# Patient Record
Sex: FEMALE | Race: WHITE | Employment: OTHER | URBAN - METROPOLITAN AREA
[De-identification: names, ages, dates, MRNs, and addresses within clinical notes are randomized per-mention and may not be internally consistent; named-entity substitution may affect disease eponyms.]

---

## 2017-01-26 ENCOUNTER — OFFICE VISIT (OUTPATIENT)
Dept: INTERNAL MEDICINE | Facility: CLINIC | Age: 67
End: 2017-01-26
Payer: MEDICARE

## 2017-01-26 ENCOUNTER — LAB VISIT (OUTPATIENT)
Dept: LAB | Facility: HOSPITAL | Age: 67
End: 2017-01-26
Attending: FAMILY MEDICINE
Payer: MEDICARE

## 2017-01-26 VITALS
TEMPERATURE: 98 F | SYSTOLIC BLOOD PRESSURE: 172 MMHG | RESPIRATION RATE: 14 BRPM | BODY MASS INDEX: 28.75 KG/M2 | HEART RATE: 64 BPM | DIASTOLIC BLOOD PRESSURE: 98 MMHG | HEIGHT: 63 IN | WEIGHT: 162.25 LBS

## 2017-01-26 DIAGNOSIS — M54.41 CHRONIC BILATERAL LOW BACK PAIN WITH RIGHT-SIDED SCIATICA: ICD-10-CM

## 2017-01-26 DIAGNOSIS — I10 ESSENTIAL HYPERTENSION: ICD-10-CM

## 2017-01-26 DIAGNOSIS — G89.29 CHRONIC BILATERAL LOW BACK PAIN WITH RIGHT-SIDED SCIATICA: ICD-10-CM

## 2017-01-26 DIAGNOSIS — R10.9 ACUTE LEFT FLANK PAIN: ICD-10-CM

## 2017-01-26 DIAGNOSIS — R10.9 ACUTE LEFT FLANK PAIN: Primary | ICD-10-CM

## 2017-01-26 PROBLEM — K21.9 GASTROESOPHAGEAL REFLUX DISEASE WITHOUT ESOPHAGITIS: Status: ACTIVE | Noted: 2017-01-26

## 2017-01-26 PROBLEM — E78.5 HYPERLIPIDEMIA: Status: ACTIVE | Noted: 2017-01-26

## 2017-01-26 PROBLEM — M15.0 PRIMARY OSTEOARTHRITIS INVOLVING MULTIPLE JOINTS: Status: ACTIVE | Noted: 2017-01-26

## 2017-01-26 PROBLEM — M15.9 PRIMARY OSTEOARTHRITIS INVOLVING MULTIPLE JOINTS: Status: ACTIVE | Noted: 2017-01-26

## 2017-01-26 LAB
BILIRUB UR QL STRIP: NEGATIVE
CLARITY UR: CLEAR
COLOR UR: YELLOW
GLUCOSE UR QL STRIP: NEGATIVE
HGB UR QL STRIP: NEGATIVE
KETONES UR QL STRIP: NEGATIVE
LEUKOCYTE ESTERASE UR QL STRIP: ABNORMAL
MICROSCOPIC COMMENT: NORMAL
NITRITE UR QL STRIP: NEGATIVE
PH UR STRIP: 7 [PH] (ref 5–8)
PROT UR QL STRIP: NEGATIVE
SP GR UR STRIP: 1.01 (ref 1–1.03)
URN SPEC COLLECT METH UR: ABNORMAL
WBC #/AREA URNS HPF: 2 /HPF (ref 0–5)

## 2017-01-26 PROCEDURE — 99999 PR PBB SHADOW E&M-EST. PATIENT-LVL III: CPT | Mod: PBBFAC,,, | Performed by: FAMILY MEDICINE

## 2017-01-26 PROCEDURE — 99214 OFFICE O/P EST MOD 30 MIN: CPT | Mod: S$PBB,,, | Performed by: FAMILY MEDICINE

## 2017-01-26 PROCEDURE — 81000 URINALYSIS NONAUTO W/SCOPE: CPT | Mod: PO

## 2017-01-26 RX ORDER — CYCLOBENZAPRINE HCL 10 MG
10 TABLET ORAL 3 TIMES DAILY
Qty: 90 TABLET | Refills: 0 | Status: SHIPPED | OUTPATIENT
Start: 2017-01-26 | End: 2017-02-05

## 2017-01-26 NOTE — MR AVS SNAPSHOT
[facility]  [address]  [place] [address]  Phone: [phone]  Fax: [phone]                  Venus Caldwell   2017 11:20 AM   Office Visit    Description:  Female : 1950   Provider:  Sourav Hernandez MD   Department:  [facility]           Reason for Visit     Flank Pain           Diagnoses this Visit        Comments    Acute left flank pain    -  Primary     Essential hypertension         Chronic bilateral low back pain with right-sided sciatica                To Do List           Future Appointments        Provider Department Dept Phone    2017 12:40 PM SPE[facility]IMEN, [place] [facility] [phone]    3/2/2017 10:00 AM Sourav Hernandez MD [facility] [phone]      Goals (5 Years of Data)     None      Follow-Up and Disposition     Return in about 1 month (around 2017).    Follow-up and Disposition History       These Medications        Disp Refills Start End    cyclobenzaprine (FLEXERIL) 10 MG tablet 90 tablet 0 2017    Take 1 tablet (10 mg total) by mouth 3 (three) times daily. - Oral    Pharmacy: [id number] - [place] - [address] [address] #: [phone]         [facility]     [facility] -  Assistance  Registered nurses in the [facility] provide clinical advisement, health education, appointment booking, and other advisory services.  Call for this free service at [phone].             Medications           Message regarding Medications     Verify the changes and/or additions to your medication regime listed below are the same as discussed with your clinician today.  If any of these changes or additions are incorrect, please notify your healthcare provider.        START taking these NEW medications        Refills    cyclobenzaprine (FLEXERIL) 10 MG tablet 0    Sig: Take 1 tablet (10 mg total) by mouth 3 (three) times daily.  "   Class: Normal    Route: Oral           Verify that the below list of medications is an accurate representation of the medications you are currently taking.  If none reported, the list may be blank. If incorrect, please contact your healthcare provider. Carry this list with you in case of emergency.           Current Medications     atorvastatin (LIPITOR) 40 MG tablet     cetirizine (ZYRTEC) 10 MG tablet Take 1 tablet by mouth Daily.    fluticasone (FLONASE) 50 mcg/actuation nasal spray 2 sprays by Nasal route nightly as needed.    gabapentin (NEURONTIN) 300 MG capsule Take 300 mg by mouth 3 (three) times daily.    hydrochlorothiazide (HYDRODIURIL) 25 MG tablet Take 1 tablet by mouth once daily.    hydrocodone-acetaminophen 10-325mg (NORCO)  mg Tab     losartan (COZAAR) 100 MG tablet Take 100 mg by mouth once daily.    meloxicam (MOBIC) 7.5 MG tablet     metoprolol (TOPROL XL) 50 MG 24 hr tablet Take 1 tablet by mouth Daily.    omeprazole (PRILOSEC) 20 MG capsule Take 20 mg by mouth once daily.    tramadol (ULTRAM) 50 mg tablet Take 50 mg by mouth 2 (two) times daily.    aspirin 325 MG tablet Daily.    cyclobenzaprine (FLEXERIL) 10 MG tablet Take 1 tablet (10 mg total) by mouth 3 (three) times daily.           Clinical Reference Information           Vital Signs - Last Recorded  Most recent update: 1/26/2017 11:45 AM by Sourav Hernandez MD    BP Pulse Temp Resp Ht Wt    (!) 172/98 64 98.2 °F (36.8 °C) (Tympanic) 14 5' 3" (1.6 m) 73.6 kg (162 lb 4.1 oz)    BMI                28.74 kg/m2          Blood Pressure          Most Recent Value    BP  (!)  172/98      Allergies as of 1/26/2017     No Known Drug Allergies      Immunizations Administered on Date of Encounter - 1/26/2017     None      Orders Placed During Today's Visit     Future Labs/Procedures Expected by Expires    Urinalysis  1/26/2017 3/27/2018      [facility] Sign-Up     Activating your [facility] account is as easy as 1-2-3!     1) Visit " [email or web address], select Sign Up Now, enter this activation code and your date of birth, then select Next.  [email or web address]  Expires: 3/12/2017 11:55 AM      2) Create a username and password to use when you visit [facility] in the future and select a security question in case you lose your password and select Next.    3) Enter your e-mail address and click Sign Up!    Additional Information  If you have questions, please e-mail [email or web address] or call [phone] to talk to our [facility] staff. Remember, [facility] is NOT to be used for urgent needs. For medical emergencies, dial 911.

## 2017-01-26 NOTE — PROGRESS NOTES
Subjective:   Patient ID: Venus Caldwell is a 67 y.o. female.  Chief Complaint:  Flank Pain (left)    HPI Comments: Patient presents for evaluation of left flank pain.  Different from chronic back pain.  Her sciatica is usually on right side.  Sees Dr. Burger.  Last visit had increased back pain with muscle spasm.  Responded well to Flexeril.  Labs done showed a normal CBC and CMP.  Denies any history of kidney stones.  Denies any fever or chills or other UTI associated symptoms.    Flank Pain   This is a new problem. The current episode started in the past 7 days. The problem occurs constantly. The problem is unchanged. The pain is present in the costovertebral angle. The quality of the pain is described as cramping. The pain does not radiate. The pain is at a severity of 8/10. The pain is severe. The pain is the same all the time. The symptoms are aggravated by bending, lying down, position and twisting. Associated symptoms include abdominal pain (LLQ) and numbness (Right Side Sciatica). Pertinent negatives include no bladder incontinence, bowel incontinence, chest pain, dysuria, fever, headaches, leg pain, paresis, paresthesias, tingling or weakness. Risk factors include lack of exercise, obesity, poor posture and sedentary lifestyle. She has tried analgesics for the symptoms. The treatment provided moderate relief.     Review of Systems   Constitutional: Negative for chills, fatigue and fever.   Cardiovascular: Negative for chest pain, palpitations and leg swelling.   Gastrointestinal: Positive for abdominal pain (LLQ). Negative for bowel incontinence, constipation, diarrhea, nausea and vomiting.   Genitourinary: Positive for flank pain. Negative for bladder incontinence, decreased urine volume, difficulty urinating, dysuria, frequency, hematuria and urgency.   Musculoskeletal: Positive for back pain and myalgias. Negative for arthralgias, gait problem, joint swelling, neck pain and neck stiffness.   Skin: Negative  "for rash.   Neurological: Positive for numbness (Right Side Sciatica). Negative for dizziness, tingling, tremors, syncope, weakness, light-headedness, headaches and paresthesias.   Psychiatric/Behavioral: Negative for sleep disturbance. The patient is not nervous/anxious.      Objective:     Visit Vitals    BP (!) 190/100 (BP Location: Right arm, Patient Position: Sitting, BP Method: Manual)    Pulse 64    Temp 98.2 °F (36.8 °C) (Tympanic)    Resp 14    Ht 5' 3" (1.6 m)    Wt 73.6 kg (162 lb 4.1 oz)    BMI 28.74 kg/m2     Physical Exam   Constitutional: She is oriented to person, place, and time. She appears well-developed and well-nourished. No distress.   Uncomfortable from pain   HENT:   Head: Normocephalic and atraumatic.   Neck: Normal range of motion and full passive range of motion without pain. Neck supple. No thyromegaly present.   Cardiovascular: Normal rate, regular rhythm, S1 normal, S2 normal and normal heart sounds.  Exam reveals no gallop and no friction rub.    No murmur heard.  Pulmonary/Chest: Effort normal and breath sounds normal. She has no wheezes. She has no rhonchi. She has no rales.   Abdominal: Soft. She exhibits no distension. There is no hepatosplenomegaly. There is tenderness in the suprapubic area and left lower quadrant. There is no rebound, no guarding and no CVA tenderness.   Musculoskeletal: She exhibits no edema.        Lumbar back: She exhibits decreased range of motion, bony tenderness, pain and spasm. She exhibits no tenderness, no swelling, no edema, no deformity and no laceration.   Lymphadenopathy:        Right: No inguinal adenopathy present.        Left: No inguinal adenopathy present.   Neurological: She is oriented to person, place, and time. She has normal strength and normal reflexes. She displays a negative Romberg sign. Coordination and gait normal.   Skin: Skin is warm and dry. No rash noted.   Psychiatric: Her mood appears anxious.   Nursing note and vitals " reviewed.    Assessment:     1. Acute left flank pain    2. Essential hypertension    3. Chronic bilateral low back pain with right-sided sciatica      Plan:   Acute left flank pain/Chronic bilateral low back pain with right-sided sciatica  -     Urinalysis; Future; Expected date: 1/26/17  -     cyclobenzaprine (FLEXERIL) 10 MG tablet; Take 1 tablet (10 mg total) by mouth 3 (three) times daily.  Dispense: 90 tablet; Refill: 0  Questionable musculoskeletal or kidney related  Check UA.  If any white cells, treatment for infection.  If any red cells, CT abdomen pelvis stone protocol.  Add Flexeril to present chronic pain regimen.  If UA negative and Flexeril helped, follow-up Dr. Burger    Essential hypertension  Not controlled.  In pain.  Questionable compliance with medication.  Continue all present blood pressure medications.  Recheck in one month.    RTC 1 month

## 2017-02-16 ENCOUNTER — PATIENT OUTREACH (OUTPATIENT)
Dept: ADMINISTRATIVE | Facility: HOSPITAL | Age: 67
End: 2017-02-16

## 2017-02-16 NOTE — LETTER
February 16, 2017    Venus Caldwell  [address]  [place] [address]             [facility]  [address]  [facility] [address]  Phone: [phone] Dear Mrs. Caldwell:    [facility] is committed to your overall health.  To help you get the most out of each of your visits, we will review your information to make sure you are up to date on all of your recommended tests and/or procedures.      Sourav Hernandez MD has found that you may be due for   Health Maintenance Due   Topic    TETANUS VACCINE     Mammogram     DEXA SCAN     Zoster Vaccine     Pneumococcal (65+) (1 of 2 - PCV13)        If you have had any of the above done at another facility, please bring the records or information with you so that your record at [facility] will be complete.    If you are currently taking medication, please bring it with you to your appointment for review.    We will be happy to assist you with scheduling any necessary appointments or you may contact the [facility] appointment desk at [phone] to schedule at your convenience.     Thank you for choosing [facility] for your healthcare needs,    If you have any questions or concerns, please don't hesitate to call.    Sincerely,  Jerica BURLESON LPN Care Coordinator  [facility]

## 2017-03-02 ENCOUNTER — OFFICE VISIT (OUTPATIENT)
Dept: INTERNAL MEDICINE | Facility: CLINIC | Age: 67
End: 2017-03-02
Payer: MEDICARE

## 2017-03-02 VITALS
SYSTOLIC BLOOD PRESSURE: 136 MMHG | TEMPERATURE: 98 F | HEIGHT: 63 IN | BODY MASS INDEX: 29.1 KG/M2 | WEIGHT: 164.25 LBS | DIASTOLIC BLOOD PRESSURE: 82 MMHG

## 2017-03-02 DIAGNOSIS — Z12.31 ENCOUNTER FOR SCREENING MAMMOGRAM FOR BREAST CANCER: ICD-10-CM

## 2017-03-02 DIAGNOSIS — I10 ESSENTIAL HYPERTENSION: Primary | ICD-10-CM

## 2017-03-02 DIAGNOSIS — Z23 NEED FOR PNEUMOCOCCAL VACCINATION: ICD-10-CM

## 2017-03-02 DIAGNOSIS — Z78.0 ASYMPTOMATIC MENOPAUSAL STATE: ICD-10-CM

## 2017-03-02 DIAGNOSIS — B02.9 HERPES ZOSTER WITHOUT COMPLICATION: ICD-10-CM

## 2017-03-02 PROCEDURE — 99213 OFFICE O/P EST LOW 20 MIN: CPT | Mod: PBBFAC,PO | Performed by: FAMILY MEDICINE

## 2017-03-02 PROCEDURE — 99999 PR PBB SHADOW E&M-EST. PATIENT-LVL III: CPT | Mod: PBBFAC,,, | Performed by: FAMILY MEDICINE

## 2017-03-02 PROCEDURE — 90732 PPSV23 VACC 2 YRS+ SUBQ/IM: CPT | Mod: PBBFAC,PO | Performed by: FAMILY MEDICINE

## 2017-03-02 PROCEDURE — 99214 OFFICE O/P EST MOD 30 MIN: CPT | Mod: S$PBB,,, | Performed by: FAMILY MEDICINE

## 2017-03-02 RX ORDER — CYCLOBENZAPRINE HCL 10 MG
10 TABLET ORAL 3 TIMES DAILY PRN
COMMUNITY
End: 2017-07-24

## 2017-03-02 RX ORDER — ASPIRIN 81 MG/1
81 TABLET ORAL DAILY
COMMUNITY
End: 2017-11-03 | Stop reason: DRUGHIGH

## 2017-03-02 RX ORDER — BETAMETHASONE DIPROPIONATE 0.5 MG/G
1 LOTION TOPICAL 2 TIMES DAILY
COMMUNITY
End: 2017-07-24

## 2017-03-02 NOTE — PROGRESS NOTES
Subjective:   Patient ID: Venus Caldwell is a 67 y.o. female.  Chief Complaint:  Follow-up    HPI Comments: Patient presents for follow-up on hypertension.  Last visit neck pain, unclear etiology.  Blood pressure elevated.  Urine testing negative for infection or possible kidney stones.  Shortly after visit, broke out with rash over area of pain saw dermatology and diagnosed with shingles.  Treated with Valtrex.  No previous Zostavax.  Rash much better.  Pain significant improvement.  Reports blood pressures normal at home without pain.  Due mammogram and bone density.  Needs pneumonia, tetanus, and shingles vaccination.  No new complaints or concerns today.    Review of Systems   Respiratory: Negative for cough, chest tightness, shortness of breath and wheezing.    Cardiovascular: Negative for chest pain, palpitations and leg swelling.   Gastrointestinal: Negative for abdominal pain, constipation, diarrhea, nausea and vomiting.   Genitourinary: Negative for difficulty urinating.   Musculoskeletal: Positive for arthralgias, back pain, gait problem, joint swelling and myalgias.   Skin: Positive for rash.   Neurological: Negative for syncope, weakness and light-headedness.       Current Outpatient Prescriptions:     aspirin (ECOTRIN) 81 MG EC tablet, Take 81 mg by mouth once daily., Disp: , Rfl:     atorvastatin (LIPITOR) 40 MG tablet, Take 40 mg by mouth once daily. , Disp: , Rfl:     betamethasone dipropionate (DIPROLENE) 0.05 % lotion, Apply 1 application topically 2 (two) times daily., Disp: , Rfl:     cetirizine (ZYRTEC) 10 MG tablet, Take 1 tablet by mouth Daily., Disp: , Rfl:     cyclobenzaprine (FLEXERIL) 10 MG tablet, Take 10 mg by mouth 3 (three) times daily as needed for Muscle spasms., Disp: , Rfl:     fluticasone (FLONASE) 50 mcg/actuation nasal spray, 2 sprays by Nasal route nightly as needed., Disp: , Rfl:     gabapentin (NEURONTIN) 300 MG capsule, Take 300 mg by mouth 3 (three) times daily., Disp:  ", Rfl:     hydrocodone-acetaminophen 10-325mg (NORCO)  mg Tab, Take 1 tablet by mouth 4 (four) times daily. , Disp: , Rfl:     losartan (COZAAR) 100 MG tablet, Take 100 mg by mouth once daily., Disp: , Rfl:     meloxicam (MOBIC) 7.5 MG tablet, , Disp: , Rfl:     metoprolol (TOPROL XL) 50 MG 24 hr tablet, Take 1 tablet by mouth Daily., Disp: , Rfl:     omeprazole (PRILOSEC) 20 MG capsule, Take 20 mg by mouth once daily., Disp: , Rfl:     hydrochlorothiazide (HYDRODIURIL) 25 MG tablet, Take 1 tablet by mouth once daily., Disp: , Rfl:     Objective:   /82 (BP Location: Right arm, Patient Position: Sitting, BP Method: Manual)  Temp 98.1 °F (36.7 °C) (Tympanic)   Ht 5' 3" (1.6 m)  Wt 74.5 kg (164 lb 3.9 oz)  BMI 29.09 kg/m2  Physical Exam   Constitutional: Vital signs are normal. She appears well-developed and well-nourished. No distress.   Neck: No JVD present.   Cardiovascular: Normal rate, regular rhythm and normal heart sounds.  Exam reveals no gallop and no friction rub.    No murmur heard.  Pulmonary/Chest: Effort normal and breath sounds normal. She has no wheezes. She has no rhonchi. She has no rales.   Abdominal: Soft. She exhibits no distension. There is no tenderness.   Musculoskeletal: She exhibits no edema.   Skin: Skin is warm and dry. Rash (Healing shingles with no secondary infection.) noted. Rash is vesicular.   Psychiatric: She has a normal mood and affect.   Nursing note and vitals reviewed.    Assessment:     1. Essential hypertension    2. Herpes zoster without complication    3. Need for pneumococcal vaccination    4. Asymptomatic menopausal state    5. Encounter for screening mammogram for breast cancer      Plan:   Essential hypertension  BP controlled, continue present meds    Herpes zoster without complication  Healing well. Zostavax when rash resolves    RHM  -     Pneumococcal Polysaccharide Vaccine (23 Valent) (SQ/IM)  -     DXA Bone Density Spine And Hip; Future; " Expected date: 3/2/1  -     Mammo Digital Screening Bilat with CAD; Future; Expected date: 3/2/17  Tdap and Zostavax at pharmacy when rash resolved    RTC 6 months, sooner as needed

## 2017-03-02 NOTE — MR AVS SNAPSHOT
[facility]  [address] [place] [address]  Phone: [phone]  Fax: [phone]                  Venus Caldwell   3/2/2017 10:00 AM   Office Visit    Description:  Female : 1950   Provider:  Sourav Hernandez MD   Department:  [facility]           Reason for Visit     Follow-up           Diagnoses this Visit        Comments    Essential hypertension    -  Primary     Herpes zoster without complication         Need for pneumococcal vaccination         Asymptomatic menopausal state         Encounter for screening mammogram for breast cancer                To Do List           Future Appointments        Provider Department Dept Phone    3/16/2017 9:15 AM [facility] MAMMO1-SCR [facility] [phone]    3/16/2017 10:00 AM [facility] BMD1 [facility] [phone]    2017 11:00 AM Sourav Hernandez MD [facility] [phone]      Goals (5 Years of Data)     None      Follow-Up and Disposition     Return in about 6 months (around 2017).    Follow-up and Disposition History      [facility]     [facility] Care [facility] -  Assistance  Registered nurses in the [facility] Center provide clinical advisement, health education, appointment booking, and other advisory services.  Call for this free service at [phone].             Medications           Message regarding Medications     Verify the changes and/or additions to your medication regime listed below are the same as discussed with your clinician today.  If any of these changes or additions are incorrect, please notify your healthcare provider.        STOP taking these medications     aspirin 325 MG tablet Daily.    tramadol (ULTRAM) 50 mg tablet Take 50 mg by mouth 2 (two) times daily.           Verify that the below list of medications is an accurate representation of the medications you are currently taking.  If none reported, the list may be  blank. If incorrect, please contact your healthcare provider. Carry this list with you in case of emergency.           Current Medications     aspirin (ECOTRIN) 81 MG EC tablet Take 81 mg by mouth once daily.    atorvastatin (LIPITOR) 40 MG tablet Take 40 mg by mouth once daily.     betamethasone dipropionate (DIPROLENE) 0.05 % lotion Apply 1 application topically 2 (two) times daily.    cetirizine (ZYRTEC) 10 MG tablet Take 1 tablet by mouth Daily.    cyclobenzaprine (FLEXERIL) 10 MG tablet Take 10 mg by mouth 3 (three) times daily as needed for Muscle spasms.    fluticasone (FLONASE) 50 mcg/actuation nasal spray 2 sprays by Nasal route nightly as needed.    gabapentin (NEURONTIN) 300 MG capsule Take 300 mg by mouth 3 (three) times daily.    hydrocodone-acetaminophen 10-325mg (NORCO)  mg Tab Take 1 tablet by mouth 4 (four) times daily.     losartan (COZAAR) 100 MG tablet Take 100 mg by mouth once daily.    meloxicam (MOBIC) 7.5 MG tablet     metoprolol (TOPROL XL) 50 MG 24 hr tablet Take 1 tablet by mouth Daily.    omeprazole (PRILOSEC) 20 MG capsule Take 20 mg by mouth once daily.    hydrochlorothiazide (HYDRODIURIL) 25 MG tablet Take 1 tablet by mouth once daily.           Clinical Reference Information           Your Vitals Were     BP                   136/82 (BP Location: Right arm, Patient Position: Sitting, BP Method: Manual)           Blood Pressure          Most Recent Value    BP  136/82      Allergies as of 3/2/2017     No Known Drug Allergies      Immunizations Administered on Date of Encounter - 3/2/2017     Name Date Dose VIS Date Route    Pneumococcal Polysaccharide - 23 Valent 3/2/2017 0.5 mL 4/24/2015 Intramuscular      Orders Placed During Today's Visit      Normal Orders This Visit    Pneumococcal Polysaccharide Vaccine (23 Valent) (SQ/IM)     Future Labs/Procedures Expected by Expires    DXA Bone Density Spine And Hip  3/2/2017 3/2/2018    Mammo Digital Screening Bilat with CAD  3/2/2017  4/6/2018      [facility] Sign-Up     Activating your [facility] account is as easy as 1-2-3!     1) Visit [email or web address], select Sign Up Now, enter this activation code and your date of birth, then select Next.  [email or web address]  Expires: 3/12/2017 11:55 AM      2) Create a username and password to use when you visit [facility] in the future and select a security question in case you lose your password and select Next.    3) Enter your e-mail address and click Sign Up!    Additional Information  If you have questions, please e-mail [email or web address] or call [phone] to talk to our [facility] staff. Remember, [facility] is NOT to be used for urgent needs. For medical emergencies, dial 911.         Language Assistance Services     ATTENTION: Language assistance services are available, free of charge. Please call [phone].      ATENCIÓN: Si genarola mckenna, tiene a moore disposición servicios gratuitos de asistencia lingüística. Llame al [phone].     [facility] Ý: N?u b?n nói Ti?ng Vi?t, có các d?ch v? h? tr? ngôn ng? mi?n phí dành cho b?n. G?i s? [phone].         [facility] complies with applicable Federal civil rights laws and does not discriminate on the basis of race, color, national origin, age, disability, or sex.

## 2017-03-16 ENCOUNTER — HOSPITAL ENCOUNTER (OUTPATIENT)
Dept: RADIOLOGY | Facility: HOSPITAL | Age: 67
Discharge: HOME | End: 2017-03-16
Attending: FAMILY MEDICINE
Payer: MEDICARE

## 2017-03-16 DIAGNOSIS — Z12.31 ENCOUNTER FOR SCREENING MAMMOGRAM FOR BREAST CANCER: ICD-10-CM

## 2017-03-16 PROCEDURE — 77067 SCR MAMMO BI INCL CAD: CPT | Mod: TC

## 2017-03-16 PROCEDURE — 77067 SCR MAMMO BI INCL CAD: CPT | Mod: 26,,, | Performed by: RADIOLOGY

## 2017-03-22 ENCOUNTER — TELEPHONE (OUTPATIENT)
Dept: INTERNAL MEDICINE | Facility: CLINIC | Age: 67
End: 2017-03-22

## 2017-03-22 NOTE — TELEPHONE ENCOUNTER
----- Message from Sourav Hernandez MD sent at 3/19/2017  8:27 PM CDT -----  Mammogram results are normal. Repeat the test in 1-2 years.

## 2017-04-05 ENCOUNTER — TELEPHONE (OUTPATIENT)
Dept: INTERNAL MEDICINE | Facility: CLINIC | Age: 67
End: 2017-04-05

## 2017-04-05 NOTE — TELEPHONE ENCOUNTER
----- Message from Sourav Hernandez MD sent at 3/31/2017  2:16 PM CDT -----  Osteopenia.  Needs vitamin D checked at next blood draw.  No needs needed. Daily exercise.  Minimal alcohol.  No smoking.   Recheck in 3 years

## 2017-04-13 ENCOUNTER — OFFICE VISIT (OUTPATIENT)
Dept: INTERNAL MEDICINE | Facility: CLINIC | Age: 67
End: 2017-04-13
Payer: MEDICARE

## 2017-04-13 ENCOUNTER — LAB VISIT (OUTPATIENT)
Dept: LAB | Facility: HOSPITAL | Age: 67
End: 2017-04-13
Attending: INTERNAL MEDICINE
Payer: MEDICARE

## 2017-04-13 VITALS
HEART RATE: 84 BPM | BODY MASS INDEX: 28.56 KG/M2 | TEMPERATURE: 98 F | HEIGHT: 63 IN | SYSTOLIC BLOOD PRESSURE: 86 MMHG | DIASTOLIC BLOOD PRESSURE: 60 MMHG | OXYGEN SATURATION: 96 % | WEIGHT: 161.19 LBS

## 2017-04-13 DIAGNOSIS — I95.9 HYPOTENSION, UNSPECIFIED HYPOTENSION TYPE: ICD-10-CM

## 2017-04-13 DIAGNOSIS — R09.89 CHEST CONGESTION: ICD-10-CM

## 2017-04-13 DIAGNOSIS — R19.7 DIARRHEA, UNSPECIFIED TYPE: Primary | ICD-10-CM

## 2017-04-13 DIAGNOSIS — J06.9 ACUTE URI: ICD-10-CM

## 2017-04-13 DIAGNOSIS — R53.83 FATIGUE, UNSPECIFIED TYPE: ICD-10-CM

## 2017-04-13 DIAGNOSIS — R19.7 DIARRHEA, UNSPECIFIED TYPE: ICD-10-CM

## 2017-04-13 LAB
ALBUMIN SERPL BCP-MCNC: 3.3 G/DL
ALP SERPL-CCNC: 158 U/L
ALT SERPL W/O P-5'-P-CCNC: 55 U/L
ANION GAP SERPL CALC-SCNC: 9 MMOL/L
AST SERPL-CCNC: 26 U/L
BASOPHILS # BLD AUTO: 0.03 K/UL
BASOPHILS NFR BLD: 0.4 %
BILIRUB SERPL-MCNC: 0.7 MG/DL
BUN SERPL-MCNC: 16 MG/DL
CALCIUM SERPL-MCNC: 9.7 MG/DL
CHLORIDE SERPL-SCNC: 106 MMOL/L
CO2 SERPL-SCNC: 27 MMOL/L
CREAT SERPL-MCNC: 1 MG/DL
DIFFERENTIAL METHOD: ABNORMAL
EOSINOPHIL # BLD AUTO: 0.3 K/UL
EOSINOPHIL NFR BLD: 3.2 %
ERYTHROCYTE [DISTWIDTH] IN BLOOD BY AUTOMATED COUNT: 14.6 %
EST. GFR  (AFRICAN AMERICAN): >60 ML/MIN/1.73 M^2
EST. GFR  (NON AFRICAN AMERICAN): 58 ML/MIN/1.73 M^2
FLUAV AG SPEC QL IA: NEGATIVE
FLUBV AG SPEC QL IA: NEGATIVE
GLUCOSE SERPL-MCNC: 120 MG/DL
HCT VFR BLD AUTO: 40.5 %
HGB BLD-MCNC: 13.2 G/DL
LYMPHOCYTES # BLD AUTO: 1.5 K/UL
LYMPHOCYTES NFR BLD: 17.7 %
MCH RBC QN AUTO: 29 PG
MCHC RBC AUTO-ENTMCNC: 32.6 %
MCV RBC AUTO: 89 FL
MONOCYTES # BLD AUTO: 1 K/UL
MONOCYTES NFR BLD: 11.1 %
NEUTROPHILS # BLD AUTO: 5.8 K/UL
NEUTROPHILS NFR BLD: 67.6 %
PLATELET # BLD AUTO: 314 K/UL
PMV BLD AUTO: 9.3 FL
POTASSIUM SERPL-SCNC: 4.1 MMOL/L
PROT SERPL-MCNC: 7.5 G/DL
RBC # BLD AUTO: 4.55 M/UL
SODIUM SERPL-SCNC: 142 MMOL/L
SPECIMEN SOURCE: NORMAL
WBC # BLD AUTO: 8.55 K/UL

## 2017-04-13 PROCEDURE — 85025 COMPLETE CBC W/AUTO DIFF WBC: CPT | Mod: PO

## 2017-04-13 PROCEDURE — 99999 PR PBB SHADOW E&M-EST. PATIENT-LVL IV: CPT | Mod: PBBFAC,,, | Performed by: PHYSICIAN ASSISTANT

## 2017-04-13 PROCEDURE — 36415 COLL VENOUS BLD VENIPUNCTURE: CPT | Mod: PO

## 2017-04-13 PROCEDURE — 80053 COMPREHEN METABOLIC PANEL: CPT | Mod: PO

## 2017-04-13 PROCEDURE — 99214 OFFICE O/P EST MOD 30 MIN: CPT | Mod: S$PBB,,, | Performed by: PHYSICIAN ASSISTANT

## 2017-04-13 RX ORDER — VALSARTAN 320 MG/1
320 TABLET ORAL DAILY
COMMUNITY
Start: 2017-03-21 | End: 2018-05-10 | Stop reason: SDUPTHER

## 2017-04-13 NOTE — PROGRESS NOTES
Subjective:       Patient ID: Venus Caldwell is a 67 y.o. female.    Chief Complaint: Diarrhea    HPI Comments: 67 year old female c/o watery diarrhea X 3 days. PCP is Dr. Hernandez. She reports having 1-2 episodes of diarrhea daily. She reports feeling warm but has not had a fever. She reports abdominal pain at onset of sxs, but that has resolved. She reports feeling as though she may have some chest congestion because she has felt she has been breathing harder than usual lately. She reports sinus congestion, post-nasal drainage, and fatigue. She reports no N/V, urinary symptoms, cough, dizziness, rash, swelling, CP, exertional sxs, palpitations, blood in stool, recent foreign travel, raw meat intake, or other medical complaints.    Past Medical History:  No date: Cancer, uterine  No date: Hypertension  No date: Sciatica        Diarrhea    Associated symptoms include abdominal pain (resolved). Pertinent negatives include no chills, coughing, fever, headaches or vomiting.     Review of Systems   Constitutional: Negative for chills and fever.   HENT: Positive for congestion, rhinorrhea and sinus pressure. Negative for ear pain.    Respiratory: Positive for shortness of breath (?). Negative for cough.    Gastrointestinal: Positive for abdominal pain (resolved) and diarrhea. Negative for blood in stool, nausea and vomiting.   Genitourinary: Negative for dysuria, frequency and urgency.   Skin: Negative for rash.   Neurological: Negative for dizziness, syncope, weakness, numbness and headaches.   Psychiatric/Behavioral: Negative for confusion.       Objective:      Physical Exam   Constitutional: She is oriented to person, place, and time. She appears well-developed and well-nourished. No distress.   HENT:   Head: Normocephalic and atraumatic.   Right Ear: Tympanic membrane and ear canal normal.   Left Ear: Tympanic membrane and ear canal normal.   Mouth/Throat: No oropharyngeal exudate.   Tongue slightly dry   Eyes: EOM  are normal. No scleral icterus.   Neck: Neck supple.   Cardiovascular: Normal rate and regular rhythm.    Pulmonary/Chest: Effort normal and breath sounds normal. No respiratory distress. She has no decreased breath sounds. She has no wheezes. She has no rhonchi. She has no rales.   Abdominal: Soft. Bowel sounds are increased. There is no tenderness. There is no rigidity, no rebound, no guarding and no CVA tenderness.   Musculoskeletal: Normal range of motion. She exhibits no edema.   Lymphadenopathy:     She has no cervical adenopathy.   Neurological: She is alert and oriented to person, place, and time. No cranial nerve deficit.   Skin: Skin is warm and dry. No rash noted.   Psychiatric: She has a normal mood and affect. Her speech is normal and behavior is normal. Thought content normal.       Assessment:       1. Diarrhea, unspecified type    2. Hypotension, unspecified hypotension type    3. Chest congestion    4. Acute URI    5. Fatigue, unspecified type        Plan:         1. Pt is hypotensive today at 86/60. She reports she just took her BP medications with a sip of water this AM. She reports feeling fatigued. Recommend she go directly to the ER at this time but she refuses.   2. CBC, CMP, abd xrays, CXR, and rapid flu test today with review following.  3. [place] diet. Fluids and rest. Monitor BP and sxs. Do not take BP medications if hypotensive. ER if sxs or BP at all worsen.  4. F/u with PCP if sxs persist or worsen. F/u with PCP for health management.    Addendum: Pt apparently had weakness after her blood draw today in clinic. Rapid response was called, which I was not a part of. She was sent to the ER ( driving her) at that time. Rapid response team then informed me that they were unable to assess pt / vitals due to her being in the restroom with uncontrollable diarrhea during rapid response. Rapid response team recommended I sign their paper showing pt was in restroom at that time with diarrhea  and ER was recommended. Paper signed.

## 2017-04-13 NOTE — MR AVS SNAPSHOT
[facility]  [address] [place] [address]  Phone: [phone]  Fax: [phone]                  Venus Caldwell   2017 1:00 PM   Office Visit    Description:  Female : 1950   Provider:  LAUREN Weaver   Department:  [facility]           Diagnoses this Visit        Comments    Diarrhea, unspecified type    -  Primary     Hypotension, unspecified hypotension type         Chest congestion         Acute URI         Fatigue, unspecified type                To Do List           Future Appointments        Provider Department Dept Phone    2017 12:50 PM [facility], [facility] [phone]    2017 1:00 PM LAUREN Weaver [facility] [phone]    2017 1:30 PM [facility] XR2 [facility] [phone]    2017 11:00 AM Sourav Hernandez MD [facility] [phone]      Goals (5 Years of Data)     None      [facility]     [facility] Nurse Care Line -  Assistance  Unless otherwise directed by your provider, please contact [facility], our nurse care line that is available for  assistance.     Registered nurses in the [facility] provide: appointment scheduling, clinical advisement, health education, and other advisory services.  Call: [phone] (toll free)               Medications           Message regarding Medications     Verify the changes and/or additions to your medication regime listed below are the same as discussed with your clinician today.  If any of these changes or additions are incorrect, please notify your healthcare provider.        STOP taking these medications     losartan (COZAAR) 100 MG tablet Take 100 mg by mouth once daily.           Verify that the below list of medications is an accurate representation of the medications you are currently taking.  If none reported, the list may be blank. If incorrect, please contact your  "healthcare provider. Carry this list with you in case of emergency.           Current Medications     atorvastatin (LIPITOR) 40 MG tablet Take 40 mg by mouth once daily.     betamethasone dipropionate (DIPROLENE) 0.05 % lotion Apply 1 application topically 2 (two) times daily.    cyclobenzaprine (FLEXERIL) 10 MG tablet Take 10 mg by mouth 3 (three) times daily as needed for Muscle spasms.    fluticasone (FLONASE) 50 mcg/actuation nasal spray 2 sprays by Nasal route nightly as needed.    gabapentin (NEURONTIN) 300 MG capsule Take 300 mg by mouth 3 (three) times daily.    hydrochlorothiazide (HYDRODIURIL) 25 MG tablet Take 1 tablet by mouth once daily.    hydrocodone-acetaminophen 10-325mg (NORCO)  mg Tab Take 1 tablet by mouth 4 (four) times daily.     meloxicam (MOBIC) 7.5 MG tablet Take 7.5 mg by mouth every evening.     metoprolol (TOPROL XL) 50 MG 24 hr tablet Take 1 tablet by mouth Daily.    omeprazole (PRILOSEC) 20 MG capsule Take 20 mg by mouth once daily.    valsartan (DIOVAN) 320 MG tablet     aspirin (ECOTRIN) 81 MG EC tablet Take 81 mg by mouth once daily.    cetirizine (ZYRTEC) 10 MG tablet Take 1 tablet by mouth Daily.           Clinical Reference Information           Your Vitals Were     BP Pulse Temp Height    86/60 (BP Location: Right arm, Patient Position: Sitting, BP Method: Automatic) 84 97.6 °F (36.4 °C) (Tympanic) 5' 3" (1.6 m)    Weight SpO2 BMI    73.1 kg (161 lb 2.5 oz) 96% 28.55 kg/m2      Blood Pressure          Most Recent Value    BP  (!)  86/60      Allergies as of 4/13/2017     No Known Drug Allergies      Immunizations Administered on Date of Encounter - 4/13/2017     None      Orders Placed During Today's Visit      Normal Orders This Visit    Influenza antigen Nasopharyngeal Swab     Future Labs/Procedures Expected by Expires    CBC auto differential  4/13/2017 6/12/2018    Comprehensive metabolic panel  4/13/2017 6/12/2018    X-Ray Abdomen Flat And Erect  4/13/2017 4/13/2018 "    X-Ray Chest PA And Lateral  4/13/2017 4/13/2018      [facility] Sign-Up     Activating your [facility] account is as easy as 1-2-3!     1) Visit [email or web address], select Sign Up Now, enter this activation code and your date of birth, then select Next.  [email or web address]  Expires: 5/28/2017 10:59 AM      2) Create a username and password to use when you visit [facility] in the future and select a security question in case you lose your password and select Next.    3) Enter your e-mail address and click Sign Up!    Additional Information  If you have questions, please e-mail [email or web address] or call [phone] to talk to our [facility] staff. Remember, [facility] is NOT to be used for urgent needs. For medical emergencies, dial 911.         Language Assistance Services     ATTENTION: Language assistance services are available, free of charge. Please call [phone].      ATENCIÓN: Si habla español, tiene a moore disposición servicios gratuitos de asistencia lingüística. Llame al [phone].     CHÚ Ý: N?u b?n nói Ti?ng Vi?t, có các d?ch v? h? tr? ngôn ng? mi?n phí dành cho b?n. G?i s? [phone].         [facility] complies with applicable Federal civil rights laws and does not discriminate on the basis of race, color, national origin, age, disability, or sex.

## 2017-04-17 ENCOUNTER — TELEPHONE (OUTPATIENT)
Dept: INTERNAL MEDICINE | Facility: CLINIC | Age: 67
End: 2017-04-17

## 2017-04-17 NOTE — TELEPHONE ENCOUNTER
----- Message from LAUREN Weaver sent at 4/13/2017  1:33 PM CDT -----  Liver enzymes slightly elevated - possibly from current stomach upset. Pt went to ER for eval.

## 2017-07-24 ENCOUNTER — OFFICE VISIT (OUTPATIENT)
Dept: INTERNAL MEDICINE | Facility: CLINIC | Age: 67
End: 2017-07-24
Payer: MEDICARE

## 2017-07-24 VITALS
SYSTOLIC BLOOD PRESSURE: 108 MMHG | OXYGEN SATURATION: 98 % | HEIGHT: 63 IN | DIASTOLIC BLOOD PRESSURE: 66 MMHG | HEART RATE: 64 BPM | BODY MASS INDEX: 29.46 KG/M2 | WEIGHT: 166.25 LBS | TEMPERATURE: 98 F

## 2017-07-24 DIAGNOSIS — I10 ESSENTIAL HYPERTENSION: Chronic | ICD-10-CM

## 2017-07-24 DIAGNOSIS — M15.9 PRIMARY OSTEOARTHRITIS INVOLVING MULTIPLE JOINTS: Primary | ICD-10-CM

## 2017-07-24 DIAGNOSIS — K21.9 GASTROESOPHAGEAL REFLUX DISEASE WITHOUT ESOPHAGITIS: ICD-10-CM

## 2017-07-24 DIAGNOSIS — Z01.818 PREOP EXAMINATION: ICD-10-CM

## 2017-07-24 DIAGNOSIS — J30.89 CHRONIC NON-SEASONAL ALLERGIC RHINITIS, UNSPECIFIED TRIGGER: ICD-10-CM

## 2017-07-24 DIAGNOSIS — E78.5 HYPERLIPIDEMIA, UNSPECIFIED HYPERLIPIDEMIA TYPE: ICD-10-CM

## 2017-07-24 PROCEDURE — 99999 PR PBB SHADOW E&M-EST. PATIENT-LVL III: CPT | Mod: PBBFAC,,, | Performed by: FAMILY MEDICINE

## 2017-07-24 PROCEDURE — 99213 OFFICE O/P EST LOW 20 MIN: CPT | Mod: PBBFAC,PO | Performed by: FAMILY MEDICINE

## 2017-07-24 PROCEDURE — 1159F MED LIST DOCD IN RCRD: CPT | Mod: ,,, | Performed by: FAMILY MEDICINE

## 2017-07-24 PROCEDURE — 99214 OFFICE O/P EST MOD 30 MIN: CPT | Mod: S$PBB,,, | Performed by: FAMILY MEDICINE

## 2017-07-24 PROCEDURE — 1125F AMNT PAIN NOTED PAIN PRSNT: CPT | Mod: ,,, | Performed by: FAMILY MEDICINE

## 2017-07-24 RX ORDER — ACETAMINOPHEN 500 MG
1 TABLET ORAL
COMMUNITY
End: 2018-02-05 | Stop reason: SDUPTHER

## 2017-07-24 NOTE — PROGRESS NOTES
Subjective:   Patient ID: Venus Caldwell is a 67 y.o. female.  Chief Complaint:  Pre-op Exam      Presents for preoperative evaluation.  Planned left total knee replacement.  August 10, 2017.  Dr. Chen.  Medical history osteoarthritis multiple joints, hypertension, hyperlipidemia, GERD.  Surgical history hysterectomy, cholecystectomy, exploratory laparotomy, total knee replacement right.  NO KNOWN DRUG ALLERGIES.  No tobacco, alcohol , or ilicit drug use.  Negative cardiac review of symptoms  No significant pulmonary disease.      Review of Systems   Constitutional: Negative for chills, fatigue and fever.   HENT: Positive for congestion and rhinorrhea. Negative for dental problem, ear pain, nosebleeds, postnasal drip, sinus pressure and sore throat.    Respiratory: Negative for cough, chest tightness, shortness of breath and wheezing.    Cardiovascular: Negative for chest pain, palpitations and leg swelling.   Gastrointestinal: Negative for abdominal distention, abdominal pain, constipation, diarrhea, nausea and vomiting.   Genitourinary: Negative for difficulty urinating.   Musculoskeletal: Positive for arthralgias, back pain, gait problem and myalgias. Negative for joint swelling.   Skin: Negative for rash.   Neurological: Negative for syncope, weakness, light-headedness and headaches.   Psychiatric/Behavioral: The patient is not nervous/anxious.        Current Outpatient Prescriptions:     aspirin (ECOTRIN) 81 MG EC tablet, Take 81 mg by mouth once daily., Disp: , Rfl:     atorvastatin (LIPITOR) 40 MG tablet, Take 40 mg by mouth once daily. , Disp: , Rfl:     cetirizine (ZYRTEC) 10 MG tablet, Take 1 tablet by mouth Daily., Disp: , Rfl:     cholecalciferol, vitamin D3, 5,000 unit Tab, Take 1 tablet by mouth., Disp: , Rfl:     fluticasone (FLONASE) 50 mcg/actuation nasal spray, 2 sprays by Nasal route nightly as needed., Disp: , Rfl:     gabapentin (NEURONTIN) 300 MG capsule, Take 300 mg by mouth 3 (three)  "times daily., Disp: , Rfl:     hydrocodone-acetaminophen 10-325mg (NORCO)  mg Tab, Take 1 tablet by mouth 4 (four) times daily. , Disp: , Rfl:     meloxicam (MOBIC) 7.5 MG tablet, Take 7.5 mg by mouth every evening. , Disp: , Rfl:     metoprolol (TOPROL XL) 50 MG 24 hr tablet, Take 1 tablet by mouth Daily., Disp: , Rfl:     omeprazole (PRILOSEC) 20 MG capsule, Take 20 mg by mouth once daily., Disp: , Rfl:     valsartan (DIOVAN) 320 MG tablet, , Disp: , Rfl:     Objective:   /66 (BP Location: Right arm, Patient Position: Sitting, BP Method: Manual)   Pulse 64   Temp 97.5 °F (36.4 °C) (Tympanic)   Ht 5' 3" (1.6 m)   Wt 75.4 kg (166 lb 3.6 oz)   SpO2 98%   BMI 29.45 kg/m²     Physical Exam   Constitutional: Vital signs are normal. She appears well-developed and well-nourished. No distress.   Eyes: No scleral icterus.   Neck: No JVD present. Carotid bruit is not present.   Cardiovascular: Normal rate, regular rhythm and normal heart sounds.  Exam reveals no gallop and no friction rub.    No murmur heard.  Pulmonary/Chest: Effort normal and breath sounds normal. She has no wheezes. She has no rhonchi. She has no rales.   Abdominal: Soft. She exhibits no distension. There is no hepatosplenomegaly. There is no tenderness. There is no rebound and no guarding.   Musculoskeletal: She exhibits no edema.   Skin: Skin is warm and dry. No rash noted.   Psychiatric: She has a normal mood and affect.   Nursing note and vitals reviewed.    CBC with white count 8.8, hemoglobin and hematocrit 11.6/34.7, platelets 358.  CMP with glucose 89, creatinine 0.60, AST/ALT normal.  MRSA testing negative.  Urinalysis with positive leukocyte esterase, only 5-10 white blood cells per high per field.  Concentrated 1.0-3.  No bacteria noted.  3-5 epithelial cells to likely contaminant.    EKG sinus bradycardia at 56 bpm with nonspecific T-wave and malady.  No acute changes.    Chest x-ray with bibasilar pulmonary discoid " atelectasis versus scarring right side greater than left.  No acute port a consolidation, edema, or pleural effusion evident.  Cholecystectomy clips right upper quadrant.  Assessment:     1. Primary osteoarthritis involving multiple joints    2. Preop examination    3. Essential hypertension    4. Hyperlipidemia, unspecified hyperlipidemia type    5. Gastroesophageal reflux disease without esophagitis    6. Chronic non-seasonal allergic rhinitis, unspecified trigger      Plan:   Primary osteoarthritis involving multiple joints  Preop examination  Medically stable.  Acceptable risk for general anesthesia.  Form completed.  Meloxicam on hold prior to surgery.    Essential hypertension  Controlled.  BP at goal.  Continue valsartan and Toprol-XL.    Hyperlipidemia, unspecified hyperlipidemia type  Continue Lipitor 40 mg daily.  Aspirin on hold prior to surgery.    Gastroesophageal reflux disease without esophagitis  Stable.  Symptoms controlled on intermittent PPI use.    Chronic non-seasonal allergic rhinitis, unspecified trigger  Controlled on Flonase and Zyrtec.    Return to clinic 6 months or sooner as needed.

## 2017-11-03 ENCOUNTER — OFFICE VISIT (OUTPATIENT)
Dept: INTERNAL MEDICINE | Facility: CLINIC | Age: 67
End: 2017-11-03
Payer: MEDICARE

## 2017-11-03 VITALS
HEART RATE: 77 BPM | OXYGEN SATURATION: 96 % | TEMPERATURE: 98 F | DIASTOLIC BLOOD PRESSURE: 56 MMHG | BODY MASS INDEX: 28.79 KG/M2 | WEIGHT: 162.5 LBS | SYSTOLIC BLOOD PRESSURE: 102 MMHG | HEIGHT: 63 IN

## 2017-11-03 DIAGNOSIS — R19.7 DIARRHEA, UNSPECIFIED TYPE: Primary | ICD-10-CM

## 2017-11-03 PROCEDURE — G0008 ADMIN INFLUENZA VIRUS VAC: HCPCS | Mod: PBBFAC,PO

## 2017-11-03 PROCEDURE — 99213 OFFICE O/P EST LOW 20 MIN: CPT | Mod: PBBFAC,PO | Performed by: INTERNAL MEDICINE

## 2017-11-03 PROCEDURE — 99999 PR PBB SHADOW E&M-EST. PATIENT-LVL III: CPT | Mod: PBBFAC,,, | Performed by: INTERNAL MEDICINE

## 2017-11-03 PROCEDURE — 99213 OFFICE O/P EST LOW 20 MIN: CPT | Mod: 25,S$PBB,, | Performed by: INTERNAL MEDICINE

## 2017-11-03 RX ORDER — DIAZEPAM 5 MG/1
5 TABLET ORAL
COMMUNITY
Start: 2017-09-01 | End: 2017-12-27

## 2017-11-03 RX ORDER — ASPIRIN 325 MG
325 TABLET ORAL
COMMUNITY
Start: 2017-08-11 | End: 2018-08-11

## 2017-11-03 RX ORDER — DIPHENOXYLATE HYDROCHLORIDE AND ATROPINE SULFATE 2.5; .025 MG/1; MG/1
1 TABLET ORAL 4 TIMES DAILY PRN
Qty: 30 TABLET | Refills: 0 | Status: CANCELLED | OUTPATIENT
Start: 2017-11-03

## 2017-11-03 NOTE — PROGRESS NOTES
"Subjective:      Patient ID: Venus Caldwell is a 67 y.o. female.    Chief Complaint: Diarrhea (x 4 days)    66 yo with Patient Active Problem List:     Essential hypertension     Hyperlipidemia     Gastroesophageal reflux disease without esophagitis     Primary osteoarthritis involving multiple joints     Chronic non-seasonal allergic rhinitis    Here today c/o diarrhea.      Diarrhea    This is a new problem. Episode onset: 4 days. Episode frequency: 4 times. The problem has been gradually improving. Diarrhea characteristics: mostly loose. some watery. Associated symptoms include abdominal pain (prior to bm). Pertinent negatives include no chills, coughing or fever. Exacerbated by: eating. There are no known risk factors. Treatments tried: pepto, immodium( no immodium in 2 days) The treatment provided moderate relief. There is no history of inflammatory bowel disease, irritable bowel syndrome or a recent abdominal surgery.     Review of Systems   Constitutional: Negative for chills and fever.   Respiratory: Negative for cough.    Gastrointestinal: Positive for abdominal pain (prior to bm) and diarrhea.     Objective:   BP (!) 102/56 (BP Location: Right arm, Patient Position: Sitting)   Pulse 77   Temp 98.4 °F (36.9 °C) (Oral)   Ht 5' 3" (1.6 m)   Wt 73.7 kg (162 lb 7.7 oz)   SpO2 96%   BMI 28.78 kg/m²     Physical Exam   Constitutional: She appears well-developed and well-nourished. No distress.   Cardiovascular: Normal rate and regular rhythm.    Pulmonary/Chest: Effort normal.   Abdominal: Soft. Bowel sounds are normal. She exhibits no distension and no mass. There is no tenderness. There is no rebound and no guarding.   Musculoskeletal: She exhibits no edema.   Neurological: She is alert.   Skin: Skin is warm and dry.   Psychiatric: She has a normal mood and affect. Her behavior is normal.       Assessment:     1. Diarrhea, unspecified type      Plan:   Diarrhea, unspecified " type  improving  Comments:  immodium over counter per instructions on package  stressed hydration  rtc or see gi if no resolution    Other orders  --     Influenza - High Dose (65+) (PF) (IM)        Lab Frequency Next Occurrence   X-Ray Abdomen Flat And Erect Once 04/13/2017   X-Ray Chest PA And Lateral Once 04/13/2017       Problem List Items Addressed This Visit     None      Visit Diagnoses     Diarrhea, unspecified type    -  Primary    immodium over counter per instructions on package  stress hydration          Return if symptoms worsen or fail to improve.

## 2017-12-27 ENCOUNTER — TELEPHONE (OUTPATIENT)
Dept: INTERNAL MEDICINE | Facility: CLINIC | Age: 67
End: 2017-12-27

## 2017-12-27 ENCOUNTER — OFFICE VISIT (OUTPATIENT)
Dept: INTERNAL MEDICINE | Facility: CLINIC | Age: 67
End: 2017-12-27
Payer: MEDICARE

## 2017-12-27 VITALS
HEART RATE: 73 BPM | HEIGHT: 63 IN | BODY MASS INDEX: 28.95 KG/M2 | WEIGHT: 163.38 LBS | OXYGEN SATURATION: 98 % | SYSTOLIC BLOOD PRESSURE: 126 MMHG | TEMPERATURE: 98 F | DIASTOLIC BLOOD PRESSURE: 76 MMHG

## 2017-12-27 DIAGNOSIS — Z20.828 EXPOSURE TO THE FLU: ICD-10-CM

## 2017-12-27 DIAGNOSIS — J20.0 ACUTE BRONCHITIS DUE TO MYCOPLASMA PNEUMONIAE: Primary | ICD-10-CM

## 2017-12-27 LAB
FLUAV AG SPEC QL IA: NEGATIVE
FLUBV AG SPEC QL IA: NEGATIVE
SPECIMEN SOURCE: NORMAL

## 2017-12-27 PROCEDURE — 99213 OFFICE O/P EST LOW 20 MIN: CPT | Mod: PBBFAC,PO | Performed by: FAMILY MEDICINE

## 2017-12-27 PROCEDURE — 99999 PR PBB SHADOW E&M-EST. PATIENT-LVL III: CPT | Mod: PBBFAC,,, | Performed by: FAMILY MEDICINE

## 2017-12-27 PROCEDURE — 87400 INFLUENZA A/B EACH AG IA: CPT | Mod: 59,PO

## 2017-12-27 PROCEDURE — 99214 OFFICE O/P EST MOD 30 MIN: CPT | Mod: S$PBB,,, | Performed by: FAMILY MEDICINE

## 2017-12-27 RX ORDER — PROMETHAZINE HYDROCHLORIDE AND DEXTROMETHORPHAN HYDROBROMIDE 6.25; 15 MG/5ML; MG/5ML
5 SYRUP ORAL EVERY 6 HOURS PRN
Qty: 180 ML | Refills: 0 | Status: SHIPPED | OUTPATIENT
Start: 2017-12-27 | End: 2017-12-27

## 2017-12-27 RX ORDER — AZITHROMYCIN 250 MG/1
TABLET, FILM COATED ORAL
Qty: 6 TABLET | Refills: 0 | Status: SHIPPED | OUTPATIENT
Start: 2017-12-27 | End: 2018-01-01

## 2017-12-27 RX ORDER — PROMETHAZINE HYDROCHLORIDE AND DEXTROMETHORPHAN HYDROBROMIDE 6.25; 15 MG/5ML; MG/5ML
5 SYRUP ORAL EVERY 6 HOURS PRN
Qty: 180 ML | Refills: 0 | Status: SHIPPED | OUTPATIENT
Start: 2017-12-27 | End: 2018-01-06

## 2017-12-27 NOTE — TELEPHONE ENCOUNTER
----- Message from Sourav Hernandez MD sent at 12/27/2017  9:52 AM CST -----  Flu test negative.  Sent prescription for antibiotic to pharmacy.

## 2017-12-27 NOTE — PROGRESS NOTES
Subjective:   Patient ID: Venus Caldwell is a 67 y.o. female.  Chief Complaint:  Cough; Fever; and Fatigue      Presents for evaluation of URI symptoms.  One week duration.  Slowly improving.  Main complaint is persistent cough.  Had flu vaccine this season.  Positive flu exposure.   admitted to hospital with pneumonia/flu.  Unknown strain.      Influenza   This is a new problem. The current episode started in the past 7 days. The problem occurs constantly. The problem has been gradually improving. Associated symptoms include chills, congestion, coughing, fatigue, a fever, headaches, myalgias and a sore throat. Pertinent negatives include no abdominal pain, anorexia, arthralgias, change in bowel habit, chest pain, diaphoresis, joint swelling, nausea, neck pain, numbness, rash, swollen glands, urinary symptoms, vertigo, visual change, vomiting or weakness. Nothing aggravates the symptoms. She has tried NSAIDs, acetaminophen and rest (Phenergan DM) for the symptoms. The treatment provided moderate relief.     Review of Systems   Constitutional: Positive for chills, fatigue and fever. Negative for diaphoresis.   HENT: Positive for congestion, rhinorrhea and sore throat. Negative for ear discharge, ear pain, postnasal drip, sinus pain, sinus pressure, sneezing and trouble swallowing.    Eyes: Negative for visual disturbance.   Respiratory: Positive for cough. Negative for chest tightness, shortness of breath and wheezing.    Cardiovascular: Negative for chest pain.   Gastrointestinal: Negative for abdominal pain, anorexia, change in bowel habit, nausea and vomiting.   Genitourinary: Negative for difficulty urinating.   Musculoskeletal: Positive for myalgias. Negative for arthralgias, joint swelling, neck pain and neck stiffness.   Skin: Negative for rash.   Neurological: Positive for headaches. Negative for dizziness, vertigo, weakness and numbness.     Objective:   /76 (BP Location: Right arm, Patient  "Position: Sitting, BP Method: Large (Manual))   Pulse 73   Temp 98.3 °F (36.8 °C) (Tympanic)   Ht 5' 3" (1.6 m)   Wt 74.1 kg (163 lb 5.8 oz)   SpO2 98%   BMI 28.94 kg/m²     Physical Exam   Constitutional: She appears well-developed and well-nourished.  Non-toxic appearance. She does not have a sickly appearance. She appears ill. No distress.   HENT:   Right Ear: Hearing, tympanic membrane, external ear and ear canal normal.   Left Ear: Hearing, tympanic membrane, external ear and ear canal normal.   Nose: Mucosal edema and rhinorrhea present. Right sinus exhibits no maxillary sinus tenderness and no frontal sinus tenderness. Left sinus exhibits no maxillary sinus tenderness and no frontal sinus tenderness.   Mouth/Throat: Uvula is midline and mucous membranes are normal. Posterior oropharyngeal edema and posterior oropharyngeal erythema present. No oropharyngeal exudate.   Eyes: Right conjunctiva is not injected. Left conjunctiva is not injected.   Neck: Full passive range of motion without pain.   Cardiovascular: Normal rate, regular rhythm, S1 normal, S2 normal and normal heart sounds.    Pulmonary/Chest: Effort normal. She has no decreased breath sounds. She has no wheezes. She has rhonchi. She has no rales.   Abdominal: Soft. She exhibits no distension. There is no tenderness. There is no rebound, no guarding and no CVA tenderness.   Lymphadenopathy:     She has no cervical adenopathy.   Skin: No rash noted.   Psychiatric: She has a normal mood and affect.   Nursing note and vitals reviewed.    Assessment:     1. Acute bronchitis due to Mycoplasma pneumoniae    2. Exposure to the flu      Plan:   Acute bronchitis due to Mycoplasma pneumoniae  -     promethazine-dextromethorphan (PROMETHAZINE-DM) 6.25-15 mg/5 mL Syrp; Take 5 mLs by mouth every 6 (six) hours as needed (cough).  Dispense: 180 mL; Refill: 0  -     azithromycin (Z-LARON) 250 MG tablet; Take 2 tablets by mouth on day 1; Take 1 tablet by mouth on " days 2-5  Dispense: 6 tablet; Refill: 0  Take medicine as prescribed for cough   Rest, Increase Fluids  Take Tylenol or Motrin as needed for fever  Macrolide for exposure to recent atypical community acquired pneumonia.    Exposure to the flu  -     Influenza antigen Nasopharyngeal Swab  Flu test negative.    Return to clinic as scheduled.

## 2018-02-05 ENCOUNTER — OFFICE VISIT (OUTPATIENT)
Dept: INTERNAL MEDICINE | Facility: CLINIC | Age: 68
End: 2018-02-05
Payer: MEDICARE

## 2018-02-05 VITALS
SYSTOLIC BLOOD PRESSURE: 120 MMHG | DIASTOLIC BLOOD PRESSURE: 62 MMHG | HEIGHT: 63 IN | WEIGHT: 164 LBS | TEMPERATURE: 99 F | BODY MASS INDEX: 29.06 KG/M2 | HEART RATE: 60 BPM | OXYGEN SATURATION: 98 %

## 2018-02-05 DIAGNOSIS — M16.11 PRIMARY OSTEOARTHRITIS OF RIGHT HIP: Primary | ICD-10-CM

## 2018-02-05 DIAGNOSIS — I25.10 CORONARY ARTERY DISEASE INVOLVING NATIVE CORONARY ARTERY OF NATIVE HEART WITHOUT ANGINA PECTORIS: ICD-10-CM

## 2018-02-05 DIAGNOSIS — E78.5 HYPERLIPIDEMIA, UNSPECIFIED HYPERLIPIDEMIA TYPE: ICD-10-CM

## 2018-02-05 DIAGNOSIS — I10 ESSENTIAL HYPERTENSION: Chronic | ICD-10-CM

## 2018-02-05 DIAGNOSIS — Z01.818 PREOP EXAMINATION: ICD-10-CM

## 2018-02-05 DIAGNOSIS — I47.19 PAROXYSMAL ATRIAL TACHYCARDIA: ICD-10-CM

## 2018-02-05 DIAGNOSIS — J30.89 CHRONIC NON-SEASONAL ALLERGIC RHINITIS, UNSPECIFIED TRIGGER: ICD-10-CM

## 2018-02-05 DIAGNOSIS — M15.9 PRIMARY OSTEOARTHRITIS INVOLVING MULTIPLE JOINTS: ICD-10-CM

## 2018-02-05 DIAGNOSIS — K21.9 GASTROESOPHAGEAL REFLUX DISEASE WITHOUT ESOPHAGITIS: ICD-10-CM

## 2018-02-05 PROCEDURE — 99214 OFFICE O/P EST MOD 30 MIN: CPT | Mod: S$PBB,,, | Performed by: FAMILY MEDICINE

## 2018-02-05 PROCEDURE — 99213 OFFICE O/P EST LOW 20 MIN: CPT | Mod: PBBFAC,PO | Performed by: FAMILY MEDICINE

## 2018-02-05 PROCEDURE — 1159F MED LIST DOCD IN RCRD: CPT | Mod: ,,, | Performed by: FAMILY MEDICINE

## 2018-02-05 PROCEDURE — 1125F AMNT PAIN NOTED PAIN PRSNT: CPT | Mod: ,,, | Performed by: FAMILY MEDICINE

## 2018-02-05 PROCEDURE — 99999 PR PBB SHADOW E&M-EST. PATIENT-LVL III: CPT | Mod: PBBFAC,,, | Performed by: FAMILY MEDICINE

## 2018-02-05 RX ORDER — HYDROCODONE BITARTRATE AND ACETAMINOPHEN 10; 325 MG/1; MG/1
1 TABLET ORAL
Status: ON HOLD | COMMUNITY
End: 2018-08-29 | Stop reason: SDUPTHER

## 2018-02-05 RX ORDER — HYDROCODONE BITARTRATE 40 MG/1
1 TABLET, EXTENDED RELEASE ORAL DAILY
COMMUNITY
Start: 2018-01-04 | End: 2018-09-17

## 2018-02-05 RX ORDER — FLUTICASONE PROPIONATE 50 MCG
2 SPRAY, SUSPENSION (ML) NASAL NIGHTLY
Qty: 48 G | Refills: 1 | Status: SHIPPED | OUTPATIENT
Start: 2018-02-05 | End: 2019-02-05

## 2018-02-05 RX ORDER — BETAMETHASONE DIPROPIONATE 0.5 MG/G
CREAM TOPICAL
COMMUNITY
Start: 2018-01-26

## 2018-02-05 RX ORDER — ACETAMINOPHEN 500 MG
1000 TABLET ORAL DAILY
COMMUNITY
End: 2023-08-16

## 2018-02-05 RX ORDER — VALACYCLOVIR HYDROCHLORIDE 500 MG/1
500 TABLET, FILM COATED ORAL
COMMUNITY
Start: 2018-01-18

## 2018-02-06 PROBLEM — I47.19 PAROXYSMAL ATRIAL TACHYCARDIA: Status: ACTIVE | Noted: 2018-02-06

## 2018-02-06 PROBLEM — I25.10 CORONARY ARTERY DISEASE INVOLVING NATIVE CORONARY ARTERY: Status: ACTIVE | Noted: 2018-02-06

## 2018-02-06 NOTE — PROGRESS NOTES
Subjective:   Patient ID: Venus Caldwell is a 68 y.o. female.  Chief Complaint:  Pre-op Exam      Presents for preoperative evaluation/physical exam.  Requested by Dr. Johnson.  Surgery scheduled for February 12, 2018.  Right total hip replacement.  Patient cardiac evaluation by Dr. Hannah.  Low risk for surgery.  Stable coronary artery disease and paroxysmal atrial tachycardia.  Recommends Eliquis 2.5 mg twice a day 35 days post surgery.  Previously tolerated multiple surgeries under general anesthesia without difficulty.  No chronic/active pulmonary disease.  No chronic/active kidney disease.  No symptoms of acute infectious process.  Aspirin and meloxicam on hold.  Other medications appropriate.  Pneumonia and flu vaccines up-to-date.  Needs tetanus booster.  Other than hip pain and chronic pain, no additional complaints concerns today.      Current Outpatient Prescriptions:     aspirin 325 MG tablet, Take 325 mg by mouth.(On Hold)    atorvastatin (LIPITOR) 40 MG tablet, Take 40 mg by mouth once daily.     betamethasone dipropionate (DIPROLENE) 0.05 % cream    cetirizine (ZYRTEC) 10 MG tablet, Take 1 tablet by mouth Daily.    cholecalciferol, vitamin D3, 5,000 unit Tab, Take by mouth.    fluticasone (FLONASE) 50 mcg/actuation nasal spray, 2 sprays (100 mcg total) by Each Nare route every evening.    gabapentin (NEURONTIN) 300 MG capsule, Take 300 mg by mouth 3 (three) times daily    hydrocodone-acetaminophen 10-325mg (NORCO)  mg Tab, Take by mouth.:     HYSINGLA ER 40 mg TP24    meloxicam (MOBIC) 7.5 MG tablet, Take 7.5 mg by mouth every evening.(On Hold)    metoprolol (TOPROL XL) 50 MG 24 hr tablet, Take 1 tablet by mouth Daily., Disp: , Rfl:     omeprazole (PRILOSEC) 20 MG capsule, Take 20 mg by mouth once daily., Disp: , Rfl:     valACYclovir (VALTREX) 500 MG tablet as needed    valsartan (DIOVAN) 320 MG tablet, Take 320 mg by mouth once daily    Review of Systems   Constitutional: Negative for  "chills, fatigue and fever.   HENT: Negative for congestion, dental problem, ear pain, postnasal drip, sinus pressure and sore throat.    Eyes: Negative for visual disturbance.   Respiratory: Negative for cough, chest tightness, shortness of breath and wheezing.    Cardiovascular: Negative for chest pain, palpitations and leg swelling.   Gastrointestinal: Negative for abdominal distention, abdominal pain, blood in stool, constipation, diarrhea, nausea and vomiting.   Endocrine: Negative for polydipsia, polyphagia and polyuria.   Genitourinary: Negative for difficulty urinating, dysuria, flank pain, hematuria and pelvic pain.   Musculoskeletal: Positive for arthralgias, back pain, gait problem, joint swelling and myalgias.   Skin: Negative for rash.   Neurological: Negative for dizziness, syncope, weakness, light-headedness and headaches.   Hematological: Negative for adenopathy.   Psychiatric/Behavioral: Positive for sleep disturbance. Negative for agitation, behavioral problems, confusion and dysphoric mood. The patient is not nervous/anxious.      Objective:   /62 (BP Location: Right arm, Patient Position: Sitting, BP Method: Large (Manual))   Pulse 60   Temp 98.8 °F (37.1 °C) (Tympanic)   Ht 5' 3" (1.6 m)   Wt 74.4 kg (164 lb 0.4 oz)   SpO2 98%   BMI 29.06 kg/m²     Physical Exam   Constitutional: She is oriented to person, place, and time. Vital signs are normal. She appears well-developed and well-nourished.   HENT:   Right Ear: Hearing, tympanic membrane, external ear and ear canal normal.   Left Ear: Hearing, tympanic membrane, external ear and ear canal normal.   Nose: Nose normal. Right sinus exhibits no maxillary sinus tenderness and no frontal sinus tenderness. Left sinus exhibits no maxillary sinus tenderness and no frontal sinus tenderness.   Mouth/Throat: Uvula is midline, oropharynx is clear and moist and mucous membranes are normal.   Eyes: Conjunctivae, EOM and lids are normal. Pupils are " equal, round, and reactive to light.   Neck: No JVD present. No thyroid mass and no thyromegaly present.   Cardiovascular: Normal rate, regular rhythm and normal heart sounds.  Exam reveals no gallop and no friction rub.    No murmur heard.  Pulses:       Radial pulses are 2+ on the right side, and 2+ on the left side.   Pulmonary/Chest: Effort normal and breath sounds normal. She has no wheezes. She has no rhonchi. She has no rales.   Abdominal: Soft. Bowel sounds are normal. She exhibits no distension. There is no tenderness. There is no rebound, no guarding and no CVA tenderness.   Musculoskeletal: She exhibits no edema.   Neurological: She is alert and oriented to person, place, and time. Gait abnormal. Coordination normal.   Skin: Skin is warm, dry and intact. No lesion and no rash noted.   Psychiatric: She has a normal mood and affect. Her behavior is normal. Judgment and thought content normal.   Nursing note and vitals reviewed.    Blood work not available for review during exam.  Chest x-ray with no evidence of acute pulmonary process.  EKG with sinus bradycardia otherwise normal.    Assessment:     1. Primary osteoarthritis of right hip    2. Preop examination    3. Essential hypertension    4. Hyperlipidemia, unspecified hyperlipidemia type    5. Coronary artery disease involving native coronary artery of native heart without angina pectoris    6. Paroxysmal atrial tachycardia    7. Gastroesophageal reflux disease without esophagitis    8. Chronic non-seasonal allergic rhinitis, unspecified trigger    9. Primary osteoarthritis involving multiple joints      Plan:   Primary osteoarthritis of right hip  Preop examination   Medically stable/acceptable for surgery under general anesthesia    Essential hypertension  Hyperlipidemia, unspecified hyperlipidemia type  Coronary artery disease involving native coronary artery of native heart without angina pectoris  Paroxysmal atrial tachycardia  Stable.   Asymptomatic.  Cleared from cardiology.  Continue all present medications.  Eliquis is advised post surgery.    Gastroesophageal reflux disease without esophagitis  Asymptomatic.  Controlled.  Continue PPI.    Chronic non-seasonal allergic rhinitis, unspecified trigger  -     fluticasone (FLONASE) 50 mcg/actuation nasal spray; 2 sprays (100 mcg total) by Each Nare route every evening.  Dispense: 48 g; Refill: 1  Symptoms controlled.  Continue Flonase and Zyrtec.    Primary osteoarthritis involving multiple joints  Continue per pain management.    Return to clinic 6 months or sooner as needed.

## 2018-02-07 ENCOUNTER — TELEPHONE (OUTPATIENT)
Dept: INTERNAL MEDICINE | Facility: CLINIC | Age: 68
End: 2018-02-07

## 2018-02-07 NOTE — TELEPHONE ENCOUNTER
----- Message from Angela Patel sent at 2/7/2018  1:30 PM CST -----  Contact: pt spouse  Caller states that Dr. Johnson has not received pt pre op clearance for surgery. Caller states that the clearance can be fax to [phone] Attn: Lois.     [phone] (home)

## 2018-03-13 ENCOUNTER — PATIENT OUTREACH (OUTPATIENT)
Dept: ADMINISTRATIVE | Facility: HOSPITAL | Age: 68
End: 2018-03-13

## 2018-03-13 NOTE — LETTER
March 13, 2018    Venus Caldwell  [address] [place] [address]             [facility]  [address]  [facility] [address]  Phone: [phone] Dear Mrs. Caldwell:    [facility] is committed to your overall health.  To help you get the most out of each of your visits, we will review your information to make sure you are up to date on all of your recommended tests and/or procedures.      Sourav Hernandez MD has found that you may be due for   Health Maintenance Due   Topic    TETANUS VACCINE     Zoster Vaccine     Lipid Panel     Pneumococcal (65+) (2 of 2 - PCV13)    Mammogram         If you have had any of the above done at another facility, please bring the records or information with you so that your record at [facility] will be complete.    If you are currently taking medication, please bring it with you to your appointment for review.    We will be happy to assist you with scheduling any necessary appointments or you may contact the [facility] appointment desk at [phone] to schedule at your convenience.     Thank you for choosing [facility] for your healthcare needs,    If you have any questions or concerns, please don't hesitate to call.    Sincerely,  Jerica BURLESON LPN Care Coordinator  [facility]  [phone]

## 2018-03-27 ENCOUNTER — OFFICE VISIT (OUTPATIENT)
Dept: INTERNAL MEDICINE | Facility: CLINIC | Age: 68
End: 2018-03-27
Payer: MEDICARE

## 2018-03-27 ENCOUNTER — LAB VISIT (OUTPATIENT)
Dept: LAB | Facility: HOSPITAL | Age: 68
End: 2018-03-27
Attending: FAMILY MEDICINE
Payer: MEDICARE

## 2018-03-27 VITALS
HEIGHT: 63 IN | BODY MASS INDEX: 29.26 KG/M2 | TEMPERATURE: 98 F | SYSTOLIC BLOOD PRESSURE: 118 MMHG | WEIGHT: 165.13 LBS | DIASTOLIC BLOOD PRESSURE: 66 MMHG

## 2018-03-27 DIAGNOSIS — D64.9 ANEMIA, UNSPECIFIED TYPE: ICD-10-CM

## 2018-03-27 DIAGNOSIS — K21.9 GASTROESOPHAGEAL REFLUX DISEASE WITHOUT ESOPHAGITIS: ICD-10-CM

## 2018-03-27 DIAGNOSIS — Z23 NEED FOR PNEUMOCOCCAL VACCINE: ICD-10-CM

## 2018-03-27 DIAGNOSIS — I10 ESSENTIAL HYPERTENSION: Primary | Chronic | ICD-10-CM

## 2018-03-27 DIAGNOSIS — Z12.39 BREAST CANCER SCREENING: ICD-10-CM

## 2018-03-27 DIAGNOSIS — J30.89 CHRONIC NON-SEASONAL ALLERGIC RHINITIS, UNSPECIFIED TRIGGER: ICD-10-CM

## 2018-03-27 DIAGNOSIS — R73.9 ELEVATED SERUM GLUCOSE: ICD-10-CM

## 2018-03-27 DIAGNOSIS — I47.19 PAROXYSMAL ATRIAL TACHYCARDIA: ICD-10-CM

## 2018-03-27 DIAGNOSIS — E78.5 HYPERLIPIDEMIA, UNSPECIFIED HYPERLIPIDEMIA TYPE: ICD-10-CM

## 2018-03-27 DIAGNOSIS — I10 ESSENTIAL HYPERTENSION: Chronic | ICD-10-CM

## 2018-03-27 DIAGNOSIS — I25.10 CORONARY ARTERY DISEASE INVOLVING NATIVE CORONARY ARTERY OF NATIVE HEART WITHOUT ANGINA PECTORIS: ICD-10-CM

## 2018-03-27 DIAGNOSIS — M15.9 PRIMARY OSTEOARTHRITIS INVOLVING MULTIPLE JOINTS: ICD-10-CM

## 2018-03-27 LAB
CHOLEST SERPL-MCNC: 144 MG/DL
CHOLEST/HDLC SERPL: 3.8 {RATIO}
ERYTHROCYTE [DISTWIDTH] IN BLOOD BY AUTOMATED COUNT: 14.6 %
ESTIMATED AVG GLUCOSE: 120 MG/DL
HBA1C MFR BLD HPLC: 5.8 %
HCT VFR BLD AUTO: 30.8 %
HDLC SERPL-MCNC: 38 MG/DL
HDLC SERPL: 26.4 %
HGB BLD-MCNC: 9.2 G/DL
LDLC SERPL CALC-MCNC: 74.8 MG/DL
MCH RBC QN AUTO: 25.8 PG
MCHC RBC AUTO-ENTMCNC: 29.9 G/DL
MCV RBC AUTO: 87 FL
NONHDLC SERPL-MCNC: 106 MG/DL
PLATELET # BLD AUTO: 533 K/UL
PMV BLD AUTO: 9.9 FL
RBC # BLD AUTO: 3.56 M/UL
TRIGL SERPL-MCNC: 156 MG/DL
TSH SERPL DL<=0.005 MIU/L-ACNC: 0.65 UIU/ML
WBC # BLD AUTO: 7.6 K/UL

## 2018-03-27 PROCEDURE — 85027 COMPLETE CBC AUTOMATED: CPT

## 2018-03-27 PROCEDURE — 36415 COLL VENOUS BLD VENIPUNCTURE: CPT | Mod: PO

## 2018-03-27 PROCEDURE — 99214 OFFICE O/P EST MOD 30 MIN: CPT | Mod: S$PBB,,, | Performed by: FAMILY MEDICINE

## 2018-03-27 PROCEDURE — 99999 PR PBB SHADOW E&M-EST. PATIENT-LVL III: CPT | Mod: PBBFAC,,, | Performed by: FAMILY MEDICINE

## 2018-03-27 PROCEDURE — 83036 HEMOGLOBIN GLYCOSYLATED A1C: CPT

## 2018-03-27 PROCEDURE — 80061 LIPID PANEL: CPT

## 2018-03-27 PROCEDURE — 84443 ASSAY THYROID STIM HORMONE: CPT

## 2018-03-27 PROCEDURE — G0009 ADMIN PNEUMOCOCCAL VACCINE: HCPCS | Mod: PBBFAC,PO

## 2018-03-27 PROCEDURE — 99213 OFFICE O/P EST LOW 20 MIN: CPT | Mod: PBBFAC,PO,25 | Performed by: FAMILY MEDICINE

## 2018-03-27 RX ORDER — GABAPENTIN 300 MG/1
300 CAPSULE ORAL 3 TIMES DAILY
COMMUNITY
End: 2019-02-13

## 2018-03-27 NOTE — PROGRESS NOTES
Subjective:   Patient ID: Venus Caldwell is a 68 y.o. female.  Chief Complaint:  Annual Exam (tired alot/ not sleeping)      Patient presents for annual checkup.  Last visit February 2018 preoperative right total hip replacement.  Reports had surgery.  Went well.  Hip pain significantly improved.  Overall mobility improved.  Still with significant chronic pain from osteoarthritis multiple sites.  Sees pain management.  Complains of fatigue and tired all the time since surgery.  Also with increased sleep difficulty since only taking 1 Norco daily.  Completed course of Eliquis.  Restarted Mobic.  Labs postop showed hemoglobin/hematocrit 8/25.  CMP with elevated glucose 125.  No recent lipid panel on file.  No thyroid testing recently on file.  Needs mammogram, Prevnar, tetanus, and shingles vaccination  Negative cardiac review of symptoms.  GERD stable on PPI.  ALLERGIES well controlled on Zyrtec and Flonase.  No additional complaints or concerns other than those stated above.        Current Outpatient Prescriptions:     aspirin 325 MG tablet, Take 325 mg by mouth., Disp: , Rfl:     atorvastatin (LIPITOR) 40 MG tablet, Take 40 mg by mouth once daily. , Disp: , Rfl:     betamethasone dipropionate (DIPROLENE) 0.05 % cream, , Disp: , Rfl:     cetirizine (ZYRTEC) 10 MG tablet, Take 1 tablet by mouth Daily., Disp: , Rfl:     cholecalciferol, vitamin D3, 5,000 unit Tab, Take by mouth., Disp: , Rfl:     fluticasone (FLONASE) 50 mcg/actuation nasal spray, 2 sprays (100 mcg total) by Each Nare route every evening., Disp: 48 g, Rfl: 1    gabapentin (NEURONTIN) 300 MG capsule, Take 300 mg by mouth 3 (three) times daily. Take 600 mg, two capsules, at bedtime dose, Disp: , Rfl:     hydrocodone-acetaminophen 10-325mg (NORCO)  mg Tab, Take 1 tablet by mouth every 24 hours as needed. , Disp: , Rfl:     HYSINGLA ER 40 mg TP24, Take 1 tablet by mouth once daily. , Disp: , Rfl:     meloxicam (MOBIC) 7.5 MG tablet, Take 7.5  "mg by mouth every evening. , Disp: , Rfl:     metoprolol (TOPROL XL) 50 MG 24 hr tablet, Take 1 tablet by mouth Daily., Disp: , Rfl:     omeprazole (PRILOSEC) 20 MG capsule, Take 20 mg by mouth once daily., Disp: , Rfl:     valACYclovir (VALTREX) 500 MG tablet, , Disp: , Rfl:     valsartan (DIOVAN) 320 MG tablet, Take 320 mg by mouth once daily. , Disp: , Rfl:      Review of Systems   Constitutional: Positive for fatigue. Negative for activity change, appetite change, chills, diaphoresis and fever.   Eyes: Negative for visual disturbance.   Respiratory: Negative for cough, chest tightness, shortness of breath and wheezing.    Cardiovascular: Negative for chest pain, palpitations and leg swelling.   Gastrointestinal: Negative for abdominal distention, abdominal pain, constipation, diarrhea, nausea and vomiting.   Endocrine: Negative for polydipsia, polyphagia and polyuria.   Genitourinary: Negative for difficulty urinating, dysuria, flank pain, frequency, hematuria, pelvic pain and urgency.   Musculoskeletal: Positive for arthralgias, back pain and gait problem. Negative for myalgias and neck pain.   Skin: Negative for rash.   Neurological: Negative for dizziness, tremors, syncope, weakness, light-headedness, numbness and headaches.   Psychiatric/Behavioral: Positive for sleep disturbance. Negative for agitation, behavioral problems, confusion, decreased concentration, dysphoric mood, hallucinations, self-injury and suicidal ideas. The patient is not nervous/anxious and is not hyperactive.      Objective:   /66 (BP Location: Right arm, Patient Position: Sitting, BP Method: Small (Manual))   Temp 98.4 °F (36.9 °C) (Tympanic)   Ht 5' 3" (1.6 m)   Wt 74.9 kg (165 lb 2 oz)   BMI 29.25 kg/m²     Physical Exam   Constitutional: She is oriented to person, place, and time. Vital signs are normal. She appears well-developed and well-nourished. She is cooperative. No distress.   Eyes: No scleral icterus.   Neck: " No JVD present. Carotid bruit is not present. No thyromegaly present.   Cardiovascular: Normal rate, regular rhythm and normal heart sounds.  Exam reveals no gallop and no friction rub.    No murmur heard.  Pulmonary/Chest: Effort normal and breath sounds normal. She has no wheezes. She has no rhonchi. She has no rales.   Abdominal: Soft. She exhibits no distension. There is no hepatosplenomegaly. There is no tenderness. There is no rebound, no guarding and no CVA tenderness.   Musculoskeletal: She exhibits no edema.   Neurological: She is alert and oriented to person, place, and time. Gait abnormal. Coordination normal.   Skin: Skin is warm and dry. No rash noted. No erythema.   Psychiatric: She has a normal mood and affect. Her speech is normal and behavior is normal. Judgment and thought content normal. Her mood appears not anxious. Her affect is not angry, not blunt, not labile and not inappropriate. She is not agitated, not aggressive, not hyperactive, not slowed, not withdrawn, not actively hallucinating and not combative. Thought content is not paranoid and not delusional. Cognition and memory are normal. She does not exhibit a depressed mood. She expresses no homicidal and no suicidal ideation. She is attentive.   Nursing note and vitals reviewed.    Assessment:     1. Essential hypertension    2. Hyperlipidemia, unspecified hyperlipidemia type    3. Coronary artery disease involving native coronary artery of native heart without angina pectoris    4. Paroxysmal atrial tachycardia    5. Gastroesophageal reflux disease without esophagitis    6. Chronic non-seasonal allergic rhinitis, unspecified trigger    7. Primary osteoarthritis involving multiple joints    8. Anemia, unspecified type    9. Elevated serum glucose    10. Need for pneumococcal vaccine    11. Breast cancer screening      Plan:   Essential hypertension  -     TSH; Future; Expected date: 03/27/2018  Controlled.  BP at goal.  Continue valsartan  320 mg daily and Toprol-XL 50 mg daily.    Hyperlipidemia, unspecified hyperlipidemia type  Coronary artery disease involving native coronary artery of native heart without angina pectoris  -     Lipid panel; Future; Expected date: 03/27/2018  Continue aspirin 325 mg daily.  Adjust Lipitor 40 mg daily if needed based on LDL reading.    Paroxysmal atrial tachycardia  Stable.  Asymptomatic.  No recurrence noted within past 12 months.    Gastroesophageal reflux disease without esophagitis  Symptoms controlled on PPI.  Continue Protonix 20 mg daily.    Chronic non-seasonal allergic rhinitis, unspecified trigger  Stable.  Symptoms controlled Zyrtec 10 mg and Flonase daily.    Primary osteoarthritis involving multiple joints  Follow-up with pain management as scheduled.    Anemia, unspecified type  -     CBC Without Differential; Future; Expected date: 03/27/2018  Postop anemia.  No transfusion.  Recheck today, start additional iron supplementation R other treatment as indicated.    Elevated serum glucose  -     Hemoglobin A1c; Future; Expected date: 03/27/2018  Treat for prediabetes or diabetes.  A1c elevated.    RHM  -     (In Office Administered) Pneumococcal Conjugate Vaccine (13 Valent) (IM)  -     Mammo Digital Screening Bilateral With CAD; Future; Expected date: 03/27/2018   Advised to obtain tetanus and shingles vaccine through pharmacy.    Return to clinic 6 months or sooner as needed.

## 2018-03-28 ENCOUNTER — TELEPHONE (OUTPATIENT)
Dept: INTERNAL MEDICINE | Facility: CLINIC | Age: 68
End: 2018-03-28

## 2018-04-09 ENCOUNTER — OFFICE VISIT (OUTPATIENT)
Dept: INTERNAL MEDICINE | Facility: CLINIC | Age: 68
End: 2018-04-09
Payer: MEDICARE

## 2018-04-09 VITALS
TEMPERATURE: 98 F | SYSTOLIC BLOOD PRESSURE: 148 MMHG | OXYGEN SATURATION: 98 % | HEART RATE: 61 BPM | HEIGHT: 63 IN | DIASTOLIC BLOOD PRESSURE: 80 MMHG | BODY MASS INDEX: 29.02 KG/M2 | WEIGHT: 163.81 LBS

## 2018-04-09 DIAGNOSIS — I10 ESSENTIAL HYPERTENSION: Chronic | ICD-10-CM

## 2018-04-09 DIAGNOSIS — R73.03 PREDIABETES: Primary | ICD-10-CM

## 2018-04-09 DIAGNOSIS — M25.561: ICD-10-CM

## 2018-04-09 PROCEDURE — 99213 OFFICE O/P EST LOW 20 MIN: CPT | Mod: PBBFAC,PO | Performed by: FAMILY MEDICINE

## 2018-04-09 PROCEDURE — 99214 OFFICE O/P EST MOD 30 MIN: CPT | Mod: S$PBB,,, | Performed by: FAMILY MEDICINE

## 2018-04-09 PROCEDURE — 99999 PR PBB SHADOW E&M-EST. PATIENT-LVL III: CPT | Mod: PBBFAC,,, | Performed by: FAMILY MEDICINE

## 2018-04-09 RX ORDER — METFORMIN HYDROCHLORIDE 500 MG/1
500 TABLET ORAL
Qty: 90 TABLET | Refills: 0 | Status: SHIPPED | OUTPATIENT
Start: 2018-04-09 | End: 2018-08-14 | Stop reason: SDUPTHER

## 2018-04-09 NOTE — PROGRESS NOTES
"Subjective:   Patient ID: Venus Caldwell is a 68 y.o. female.  Chief Complaint:  Follow-up      Presents for follow-up on recent lab work.  CBC with improved anemia.  Kidney, liver, cluster all, thyroid testing stable.    A1c 5.8% prediabetic range.  New diagnosis.  Reports overall doing well.  Compliant with blood pressure medication. Blood pressure elevated last 2 visits.  No active cardiac symptoms. ? Pain related  Complains of mild pain, above right knee. Continues to work with pain management on decreasing narcotics. Tender to palpation.  Questionable swelling.  Hurts with movement.  Seeing pain management tomorrow.      Review of Systems   Constitutional: Negative for diaphoresis, fatigue and fever.   Eyes: Negative for visual disturbance.   Respiratory: Negative for cough, chest tightness, shortness of breath and wheezing.    Cardiovascular: Negative for chest pain, palpitations and leg swelling.   Gastrointestinal: Negative for abdominal pain, constipation, diarrhea, nausea and vomiting.   Endocrine: Negative for polydipsia, polyphagia and polyuria.   Genitourinary: Negative for difficulty urinating, dysuria, flank pain, frequency, hematuria and urgency.   Musculoskeletal: Positive for arthralgias, back pain, gait problem and myalgias.   Skin: Negative for rash.   Neurological: Negative for dizziness, syncope, weakness, light-headedness, numbness and headaches.   Psychiatric/Behavioral: Negative for sleep disturbance. The patient is not nervous/anxious.      Objective:   BP (!) 148/80 (BP Location: Right arm, Patient Position: Sitting, BP Method: Small (Manual))   Pulse 61   Temp 98.2 °F (36.8 °C) (Tympanic)   Ht 5' 3" (1.6 m)   Wt 74.3 kg (163 lb 12.8 oz)   SpO2 98%   BMI 29.02 kg/m²     Physical Exam   Constitutional: Vital signs are normal. She appears well-developed and well-nourished. No distress.   Eyes: No scleral icterus.   Neck: No JVD present.   Cardiovascular: Normal rate, regular rhythm and " normal heart sounds.  Exam reveals no gallop and no friction rub.    No murmur heard.  Pulmonary/Chest: Effort normal and breath sounds normal. She has no wheezes. She has no rhonchi. She has no rales.   Abdominal: Soft. She exhibits no distension. There is no tenderness.   Musculoskeletal: She exhibits no edema.        Right knee: She exhibits decreased range of motion and abnormal patellar mobility. She exhibits no swelling, no effusion, no ecchymosis, no laceration, no erythema and no bony tenderness. Tenderness found. Patellar tendon tenderness noted. No medial joint line, no lateral joint line, no MCL and no LCL tenderness noted.   Neurological: She displays a negative Romberg sign. Coordination and gait normal.   Skin: Skin is warm and dry. No rash noted.   Psychiatric: She has a normal mood and affect.   Nursing note and vitals reviewed.    Assessment:     1. Prediabetes    2. Essential hypertension    3. Tenderness of right patella      Plan:   Prediabetes  -     metFORMIN (GLUCOPHAGE) 500 MG tablet; Take 1 tablet (500 mg total) by mouth daily with breakfast.  Dispense: 90 tablet; Refill: 0  Patient education/discussion on prediabetes.  Offered choice of weekly exercise targets versus daily low-dose metformin versus injectable GLP-1.  Chooses low-dose metformin.  Start metformin 500 mg daily.  Recheck CMP and A1c in 3 months.    Essential hypertension  Previously controlled.  Elevated today.  Continue valsartan 320 g daily.  Toprol-XL 50 mg daily.  Low-salt diet.  Continue to work on pain management.  Recheck one month.  If remains elevated increased Toprol-XL or add HCTZ    Tenderness of right patella  Probable patella tendinitis.  See pain management tomorrow as scheduled.  If unable to adequately control pain, consider/call for physical therapy referral.    Return to clinic 1 month.

## 2018-04-16 ENCOUNTER — HOSPITAL ENCOUNTER (OUTPATIENT)
Dept: RADIOLOGY | Facility: HOSPITAL | Age: 68
Discharge: HOME | End: 2018-04-16
Attending: FAMILY MEDICINE
Payer: MEDICARE

## 2018-04-16 VITALS — WEIGHT: 163 LBS | BODY MASS INDEX: 28.88 KG/M2 | HEIGHT: 63 IN

## 2018-04-16 DIAGNOSIS — Z12.39 BREAST CANCER SCREENING: ICD-10-CM

## 2018-04-16 PROCEDURE — 77067 SCR MAMMO BI INCL CAD: CPT | Mod: TC,PO

## 2018-04-16 PROCEDURE — 77067 SCR MAMMO BI INCL CAD: CPT | Mod: 26,,, | Performed by: RADIOLOGY

## 2018-04-26 ENCOUNTER — PATIENT OUTREACH (OUTPATIENT)
Dept: ADMINISTRATIVE | Facility: HOSPITAL | Age: 68
End: 2018-04-26

## 2018-05-10 ENCOUNTER — OFFICE VISIT (OUTPATIENT)
Dept: INTERNAL MEDICINE | Facility: CLINIC | Age: 68
End: 2018-05-10
Payer: MEDICARE

## 2018-05-10 VITALS
DIASTOLIC BLOOD PRESSURE: 80 MMHG | SYSTOLIC BLOOD PRESSURE: 130 MMHG | TEMPERATURE: 98 F | WEIGHT: 160.25 LBS | HEIGHT: 63 IN | BODY MASS INDEX: 28.39 KG/M2 | HEART RATE: 63 BPM | OXYGEN SATURATION: 99 %

## 2018-05-10 DIAGNOSIS — I10 ESSENTIAL HYPERTENSION: Primary | Chronic | ICD-10-CM

## 2018-05-10 DIAGNOSIS — R73.03 PREDIABETES: ICD-10-CM

## 2018-05-10 PROCEDURE — 99999 PR PBB SHADOW E&M-EST. PATIENT-LVL III: CPT | Mod: PBBFAC,,, | Performed by: FAMILY MEDICINE

## 2018-05-10 PROCEDURE — 99214 OFFICE O/P EST MOD 30 MIN: CPT | Mod: S$PBB,,, | Performed by: FAMILY MEDICINE

## 2018-05-10 PROCEDURE — 99213 OFFICE O/P EST LOW 20 MIN: CPT | Mod: PBBFAC,PO | Performed by: FAMILY MEDICINE

## 2018-05-10 RX ORDER — VALSARTAN 320 MG/1
320 TABLET ORAL DAILY
Qty: 90 TABLET | Refills: 3 | Status: SHIPPED | OUTPATIENT
Start: 2018-05-10 | End: 2018-08-13

## 2018-05-10 RX ORDER — METOPROLOL SUCCINATE 50 MG/1
50 TABLET, EXTENDED RELEASE ORAL 2 TIMES DAILY
Qty: 180 TABLET | Refills: 3 | Status: SHIPPED | OUTPATIENT
Start: 2018-05-10 | End: 2018-10-08 | Stop reason: SDUPTHER

## 2018-05-10 NOTE — PROGRESS NOTES
"Subjective:   Patient ID: Venus Caldwell is a 68 y.o. female.  Chief Complaint:  Follow-up      One-month follow-up on hypertension.  Previous 2 visits with blood pressure greater than 140/90 despite compliance with losartan 320 mg daily and Toprol XL 50 mg twice a day.  Felt to be related to pain. Continue present medications.  Follow up with pain management.  Reports overall pain level significantly decreased.  Blood pressure normal today.  Last visit  new diagnosis Prediabetes A1c 5.8%. Started metformin 500 mg daily.  Initial GI upset, has since improved.  Tolerating medication without difficulty.  No new complaints concerns today.      Review of Systems   Constitutional: Negative for diaphoresis, fatigue and fever.   Eyes: Negative for visual disturbance.   Respiratory: Negative for cough, chest tightness, shortness of breath and wheezing.    Cardiovascular: Negative for chest pain, palpitations and leg swelling.   Gastrointestinal: Negative for abdominal pain, constipation, diarrhea, nausea and vomiting.   Endocrine: Negative for polydipsia, polyphagia and polyuria.   Genitourinary: Negative for difficulty urinating, dysuria, flank pain, frequency, hematuria and urgency.   Musculoskeletal: Positive for arthralgias, back pain and gait problem. Negative for myalgias.   Skin: Negative for rash.   Neurological: Negative for dizziness, syncope, weakness, light-headedness, numbness and headaches.   Psychiatric/Behavioral: Negative for sleep disturbance. The patient is not nervous/anxious.      Objective:   /80 (BP Location: Right arm, Patient Position: Sitting, BP Method: Large (Manual))   Pulse 63   Temp 98.1 °F (36.7 °C) (Tympanic)   Ht 5' 3" (1.6 m)   Wt 72.7 kg (160 lb 4.4 oz)   SpO2 99%   BMI 28.39 kg/m²     Physical Exam   Constitutional: Vital signs are normal. She appears well-developed and well-nourished. No distress.   Eyes: No scleral icterus.   Neck: No JVD present.   Cardiovascular: Normal " rate, regular rhythm and normal heart sounds.  Exam reveals no gallop and no friction rub.    No murmur heard.  Pulmonary/Chest: Effort normal and breath sounds normal. She has no wheezes. She has no rhonchi. She has no rales.   Abdominal: Soft. She exhibits no distension. There is no tenderness.   Musculoskeletal: She exhibits no edema.   Neurological: She displays a negative Romberg sign. Gait abnormal. Coordination normal.   Skin: Skin is warm and dry. No rash noted.   Psychiatric: She has a normal mood and affect.   Nursing note and vitals reviewed.    Assessment:     1. Essential hypertension    2. Prediabetes      Plan:   Essential hypertension  -     valsartan (DIOVAN) 320 MG tablet; Take 1 tablet (320 mg total) by mouth once daily.  Dispense: 90 tablet; Refill: 3  -     metoprolol succinate (TOPROL XL) 50 MG 24 hr tablet; Take 1 tablet (50 mg total) by mouth 2 (two) times daily.  Dispense: 180 tablet; Refill: 3  Controlled.  BP at goal.  Continue valsartan 320 mg daily and Toprol-XL 50 mg twice a day.    Prediabetes  Continue metformin 500 mg daily.  Recheck A1c and CMP at next visit.    Return to clinic 3 months or sooner as needed.

## 2018-05-15 ENCOUNTER — OFFICE VISIT (OUTPATIENT)
Dept: OPHTHALMOLOGY | Facility: CLINIC | Age: 68
End: 2018-05-15
Payer: MEDICARE

## 2018-05-15 DIAGNOSIS — H52.13 MYOPIA WITH PRESBYOPIA OF BOTH EYES: ICD-10-CM

## 2018-05-15 DIAGNOSIS — H52.4 MYOPIA WITH PRESBYOPIA OF BOTH EYES: ICD-10-CM

## 2018-05-15 DIAGNOSIS — Z13.5 GLAUCOMA SCREENING: ICD-10-CM

## 2018-05-15 DIAGNOSIS — I10 ESSENTIAL HYPERTENSION: Chronic | ICD-10-CM

## 2018-05-15 DIAGNOSIS — R73.03 PREDIABETES: Primary | ICD-10-CM

## 2018-05-15 DIAGNOSIS — H25.13 AGE-RELATED NUCLEAR CATARACT OF BOTH EYES: ICD-10-CM

## 2018-05-15 PROCEDURE — 92014 COMPRE OPH EXAM EST PT 1/>: CPT | Mod: S$PBB,,, | Performed by: OPTOMETRIST

## 2018-05-15 PROCEDURE — 99999 PR PBB SHADOW E&M-EST. PATIENT-LVL I: CPT | Mod: PBBFAC,,, | Performed by: OPTOMETRIST

## 2018-05-15 PROCEDURE — 99211 OFF/OP EST MAY X REQ PHY/QHP: CPT | Mod: PBBFAC,PO | Performed by: OPTOMETRIST

## 2018-05-15 PROCEDURE — 92015 DETERMINE REFRACTIVE STATE: CPT | Mod: ,,, | Performed by: OPTOMETRIST

## 2018-05-15 NOTE — PROGRESS NOTES
HPI     Hypertensive Eye Exam    Additional comments: Yearly           Diabetic Eye Exam    Additional comments: Yearly           Comments   Last seen by [facility] on 5/30/16 for yearly eye exam.  Just diagnosed with diabetes.  States vision has changed since last visit. Pt takes her glasses off to   read. States her bifocal in her glasses do not work.  Wears PAL glasses full-time last updated 2 years ago  No other complaints   No drops         Last edited by Sharmin Keith, PCT on 5/15/2018  1:46 PM. (History)              Assessment /Plan     For exam results, see Encounter Report.    Prediabetes    Essential hypertension    Age-related nuclear cataract of both eyes    Glaucoma screening    Myopia with presbyopia of both eyes      Cataract(s) not yet affecting activities of daily living, therefore, surgery consult is not yet indicated.  No diabetic retinopathy or hypertensive retinopathy in either eye.  Glaucoma screening negative both eyes.  myopic shift secondary to nuclear sclerosis.  Updated glasses prescription.  Return to clinic 1 yr.

## 2018-06-14 ENCOUNTER — HOSPITAL ENCOUNTER (EMERGENCY)
Facility: HOSPITAL | Age: 68
Discharge: HOME | End: 2018-06-14
Attending: EMERGENCY MEDICINE
Payer: MEDICARE

## 2018-06-14 VITALS
OXYGEN SATURATION: 97 % | HEIGHT: 63 IN | TEMPERATURE: 99 F | HEART RATE: 86 BPM | DIASTOLIC BLOOD PRESSURE: 98 MMHG | WEIGHT: 158 LBS | RESPIRATION RATE: 18 BRPM | SYSTOLIC BLOOD PRESSURE: 140 MMHG | BODY MASS INDEX: 28 KG/M2

## 2018-06-14 DIAGNOSIS — K50.00 TERMINAL ILEITIS WITHOUT COMPLICATION: Primary | ICD-10-CM

## 2018-06-14 DIAGNOSIS — R11.2 NON-INTRACTABLE VOMITING WITH NAUSEA, UNSPECIFIED VOMITING TYPE: ICD-10-CM

## 2018-06-14 DIAGNOSIS — R10.9 ABDOMINAL PAIN, UNSPECIFIED ABDOMINAL LOCATION: ICD-10-CM

## 2018-06-14 LAB
ALBUMIN SERPL BCP-MCNC: 3.8 G/DL
ALP SERPL-CCNC: 127 U/L
ALT SERPL W/O P-5'-P-CCNC: 17 U/L
ANION GAP SERPL CALC-SCNC: 10 MMOL/L
APTT BLDCRRT: 27.3 SEC
AST SERPL-CCNC: 16 U/L
BASOPHILS # BLD AUTO: 0.02 K/UL
BASOPHILS NFR BLD: 0.2 %
BILIRUB SERPL-MCNC: 0.8 MG/DL
BILIRUB UR QL STRIP: NEGATIVE
BUN SERPL-MCNC: 16 MG/DL
CALCIUM SERPL-MCNC: 10 MG/DL
CHLORIDE SERPL-SCNC: 105 MMOL/L
CLARITY UR: CLEAR
CO2 SERPL-SCNC: 26 MMOL/L
COLOR UR: YELLOW
CREAT SERPL-MCNC: 0.6 MG/DL
DIFFERENTIAL METHOD: ABNORMAL
EOSINOPHIL # BLD AUTO: 0.2 K/UL
EOSINOPHIL NFR BLD: 1.8 %
ERYTHROCYTE [DISTWIDTH] IN BLOOD BY AUTOMATED COUNT: 16.6 %
EST. GFR  (AFRICAN AMERICAN): >60 ML/MIN/1.73 M^2
EST. GFR  (NON AFRICAN AMERICAN): >60 ML/MIN/1.73 M^2
GLUCOSE SERPL-MCNC: 145 MG/DL
GLUCOSE UR QL STRIP: NEGATIVE
HCT VFR BLD AUTO: 35.9 %
HGB BLD-MCNC: 11.3 G/DL
HGB UR QL STRIP: NEGATIVE
INR PPP: 1
KETONES UR QL STRIP: NEGATIVE
LEUKOCYTE ESTERASE UR QL STRIP: ABNORMAL
LIPASE SERPL-CCNC: 7 U/L
LYMPHOCYTES # BLD AUTO: 1.2 K/UL
LYMPHOCYTES NFR BLD: 11.1 %
MCH RBC QN AUTO: 24.7 PG
MCHC RBC AUTO-ENTMCNC: 31.5 G/DL
MCV RBC AUTO: 78 FL
MICROSCOPIC COMMENT: NORMAL
MONOCYTES # BLD AUTO: 0.4 K/UL
MONOCYTES NFR BLD: 3.8 %
NEUTROPHILS # BLD AUTO: 8.9 K/UL
NEUTROPHILS NFR BLD: 83.1 %
NITRITE UR QL STRIP: NEGATIVE
PH UR STRIP: 7 [PH] (ref 5–8)
PLATELET # BLD AUTO: 335 K/UL
PMV BLD AUTO: 9.4 FL
POTASSIUM SERPL-SCNC: 3.8 MMOL/L
PROT SERPL-MCNC: 7.5 G/DL
PROT UR QL STRIP: NEGATIVE
PROTHROMBIN TIME: 9.9 SEC
RBC # BLD AUTO: 4.58 M/UL
SODIUM SERPL-SCNC: 141 MMOL/L
SP GR UR STRIP: 1.01 (ref 1–1.03)
URN SPEC COLLECT METH UR: ABNORMAL
UROBILINOGEN UR STRIP-ACNC: NEGATIVE EU/DL
WBC # BLD AUTO: 10.65 K/UL
WBC #/AREA URNS HPF: 2 /HPF (ref 0–5)

## 2018-06-14 PROCEDURE — 25500020 PHARM REV CODE 255: Performed by: EMERGENCY MEDICINE

## 2018-06-14 PROCEDURE — 81000 URINALYSIS NONAUTO W/SCOPE: CPT

## 2018-06-14 PROCEDURE — 85610 PROTHROMBIN TIME: CPT

## 2018-06-14 PROCEDURE — 83690 ASSAY OF LIPASE: CPT

## 2018-06-14 PROCEDURE — 80053 COMPREHEN METABOLIC PANEL: CPT

## 2018-06-14 PROCEDURE — 96361 HYDRATE IV INFUSION ADD-ON: CPT

## 2018-06-14 PROCEDURE — 99284 EMERGENCY DEPT VISIT MOD MDM: CPT | Mod: 25

## 2018-06-14 PROCEDURE — 85025 COMPLETE CBC W/AUTO DIFF WBC: CPT

## 2018-06-14 PROCEDURE — 96375 TX/PRO/DX INJ NEW DRUG ADDON: CPT

## 2018-06-14 PROCEDURE — 96365 THER/PROPH/DIAG IV INF INIT: CPT

## 2018-06-14 PROCEDURE — 25000003 PHARM REV CODE 250: Performed by: EMERGENCY MEDICINE

## 2018-06-14 PROCEDURE — 85730 THROMBOPLASTIN TIME PARTIAL: CPT

## 2018-06-14 PROCEDURE — 63600175 PHARM REV CODE 636 W HCPCS: Performed by: EMERGENCY MEDICINE

## 2018-06-14 RX ORDER — MORPHINE SULFATE 4 MG/ML
4 INJECTION, SOLUTION INTRAMUSCULAR; INTRAVENOUS
Status: COMPLETED | OUTPATIENT
Start: 2018-06-14 | End: 2018-06-14

## 2018-06-14 RX ORDER — CIPROFLOXACIN 500 MG/1
500 TABLET ORAL
Status: COMPLETED | OUTPATIENT
Start: 2018-06-14 | End: 2018-06-14

## 2018-06-14 RX ORDER — METRONIDAZOLE 500 MG/1
500 TABLET ORAL 3 TIMES DAILY
Qty: 21 TABLET | Refills: 0 | Status: SHIPPED | OUTPATIENT
Start: 2018-06-14 | End: 2018-06-21

## 2018-06-14 RX ORDER — METRONIDAZOLE 500 MG/1
500 TABLET ORAL
Status: COMPLETED | OUTPATIENT
Start: 2018-06-14 | End: 2018-06-14

## 2018-06-14 RX ORDER — PROMETHAZINE HYDROCHLORIDE 25 MG/1
25 TABLET ORAL EVERY 6 HOURS PRN
Qty: 15 TABLET | Refills: 0 | Status: SHIPPED | OUTPATIENT
Start: 2018-06-14 | End: 2018-09-17

## 2018-06-14 RX ORDER — CIPROFLOXACIN 500 MG/1
500 TABLET ORAL 2 TIMES DAILY
Qty: 14 TABLET | Refills: 0 | Status: SHIPPED | OUTPATIENT
Start: 2018-06-14 | End: 2018-06-21

## 2018-06-14 RX ADMIN — IOHEXOL 75 ML: 350 INJECTION, SOLUTION INTRAVENOUS at 07:06

## 2018-06-14 RX ADMIN — SODIUM CHLORIDE 1000 ML: 0.9 INJECTION, SOLUTION INTRAVENOUS at 05:06

## 2018-06-14 RX ADMIN — PROMETHAZINE HYDROCHLORIDE 12.5 MG: 25 INJECTION INTRAMUSCULAR; INTRAVENOUS at 05:06

## 2018-06-14 RX ADMIN — METRONIDAZOLE 500 MG: 500 TABLET ORAL at 08:06

## 2018-06-14 RX ADMIN — MORPHINE SULFATE 4 MG: 4 INJECTION INTRAVENOUS at 05:06

## 2018-06-14 RX ADMIN — CIPROFLOXACIN 500 MG: 500 TABLET, FILM COATED ORAL at 08:06

## 2018-06-14 NOTE — ED PROVIDER NOTES
SCRIBE #1 NOTE: I, Mica Mccallum, am scribing for, and in the presence of, Clif Orona MD. I have scribed the entire note.      History      Chief Complaint   Patient presents with    Abdominal Pain     pt reports a twisting feeling in her stomach that began this AM, +nausea       Review of patient's allergies indicates:   Allergen Reactions    No known drug allergies     Buprenorphine Rash        HPI   HPI    6/14/2018, 4:53 PM   History obtained from the patient      History of Present Illness: Venus Caldwell is a 68 y.o. female patient who presents to the Emergency Department for diffuse abd pain described as intense cramping which onset gradually this AM. Symptoms are constant and moderate in severity. No mitigating or exacerbating factors reported. Associated sxs include N/V. Patient denies any diarrhea, hematuria, hematochezia, urinary frequency, constipation, fever, chills, diaphoresis, and all other sxs at this time. Pt has had a hysterectomy. No prior Tx reported. No further complaints or concerns at this time.     Arrival mode: Personal vehicle    PCP: Sourav Hernandez MD       Past Medical History:  Past Medical History:   Diagnosis Date    Cancer, uterine     Hypertension     Sciatica        Past Surgical History:  Past Surgical History:   Procedure Laterality Date    GALLBLADDER SURGERY      HYSTERECTOMY      TOTAL KNEE ARTHROPLASTY           Family History:  Family History   Problem Relation Age of Onset    Diabetes Mother     Cataracts Mother     Diabetes Brother     Cataracts Maternal Grandmother     Breast cancer Maternal Aunt        Social History:  Social History     Social History Main Topics    Smoking status: Never Smoker    Smokeless tobacco: Never Used    Alcohol use No    Drug use: Unknown    Sexual activity: Unknown       ROS   Review of Systems   Constitutional: Negative for chills, diaphoresis and fever.   HENT: Negative for congestion, rhinorrhea and sore throat.     Respiratory: Negative for cough and shortness of breath.    Cardiovascular: Negative for chest pain.   Gastrointestinal: Positive for abdominal pain (Diffuse), nausea and vomiting. Negative for blood in stool, constipation and diarrhea.   Genitourinary: Negative for dysuria, frequency and hematuria.   Musculoskeletal: Negative for back pain and neck pain.   Skin: Negative for rash.   Neurological: Negative for dizziness, weakness and headaches.   Hematological: Does not bruise/bleed easily.     Physical Exam      Initial Vitals [06/14/18 1608]   BP Pulse Resp Temp SpO2   (!) 163/99 (!) 54 18 98.3 °F (36.8 °C) 99 %      MAP       --          Physical Exam  Nursing Notes and Vital Signs Reviewed.  Constitutional: Patient is in no acute distress. Well-developed and well-nourished.  Head: Atraumatic. Normocephalic.  Eyes: PERRL. EOM intact. Conjunctivae are not pale. No scleral icterus.  ENT: Mucous membranes are moist. Oropharynx is clear and symmetric.    Neck: Supple. Full ROM. No lymphadenopathy.  Cardiovascular: Regular rate. Regular rhythm. No murmurs, rubs, or gallops. Distal pulses are 2+ and symmetric.  Pulmonary/Chest: No respiratory distress. Clear to auscultation bilaterally. No wheezing or rales.  Abdominal: Soft and mild distention. Diffuse tenderness.  No rebound, guarding, or rigidity.   Musculoskeletal: Moves all extremities. No obvious deformities. No edema. No calf tenderness.  Skin: Warm and dry.  Neurological:  Alert, awake, and appropriate.  Normal speech.  No acute focal neurological deficits are appreciated.  Psychiatric: Normal affect. Good eye contact. Appropriate in content.    ED Course    Procedures  ED Vital Signs:  Vitals:    06/14/18 1608 06/14/18 1759 06/14/18 1832 06/14/18 1902   BP: (!) 163/99 (!) 197/85 (!) 182/83 (!) 170/85   Pulse: (!) 54 63 60 69   Resp: 18 19 18    Temp: 98.3 °F (36.8 °C) 98.7 °F (37.1 °C)     TempSrc: Oral Oral     SpO2: 99% 99% 99% 96%   Weight: 71.7 kg (158  "lb)      Height: 5' 3" (1.6 m)       06/14/18 2058 06/14/18 2107   BP: (!) 140/98    Pulse: 80 86   Resp:     Temp:     TempSrc:     SpO2: 99% 97%   Weight:     Height:         Abnormal Lab Results:  Labs Reviewed   CBC W/ AUTO DIFFERENTIAL - Abnormal; Notable for the following:        Result Value    Hemoglobin 11.3 (*)     Hematocrit 35.9 (*)     MCV 78 (*)     MCH 24.7 (*)     MCHC 31.5 (*)     RDW 16.6 (*)     Gran # (ANC) 8.9 (*)     Gran% 83.1 (*)     Lymph% 11.1 (*)     Mono% 3.8 (*)     All other components within normal limits   COMPREHENSIVE METABOLIC PANEL - Abnormal; Notable for the following:     Glucose 145 (*)     All other components within normal limits   URINALYSIS - Abnormal; Notable for the following:     Leukocytes, UA Trace (*)     All other components within normal limits   PROTIME-INR   APTT   LIPASE   URINALYSIS MICROSCOPIC        All Lab Results:  Results for orders placed or performed during the hospital encounter of 06/14/18   CBC auto differential   Result Value Ref Range    WBC 10.65 3.90 - 12.70 K/uL    RBC 4.58 4.00 - 5.40 M/uL    Hemoglobin 11.3 (L) 12.0 - 16.0 g/dL    Hematocrit 35.9 (L) 37.0 - 48.5 %    MCV 78 (L) 82 - 98 fL    MCH 24.7 (L) 27.0 - 31.0 pg    MCHC 31.5 (L) 32.0 - 36.0 g/dL    RDW 16.6 (H) 11.5 - 14.5 %    Platelets 335 150 - 350 K/uL    MPV 9.4 9.2 - 12.9 fL    Gran # (ANC) 8.9 (H) 1.8 - 7.7 K/uL    Lymph # 1.2 1.0 - 4.8 K/uL    Mono # 0.4 0.3 - 1.0 K/uL    Eos # 0.2 0.0 - 0.5 K/uL    Baso # 0.02 0.00 - 0.20 K/uL    Gran% 83.1 (H) 38.0 - 73.0 %    Lymph% 11.1 (L) 18.0 - 48.0 %    Mono% 3.8 (L) 4.0 - 15.0 %    Eosinophil% 1.8 0.0 - 8.0 %    Basophil% 0.2 0.0 - 1.9 %    Differential Method Automated    Comprehensive metabolic panel   Result Value Ref Range    Sodium 141 136 - 145 mmol/L    Potassium 3.8 3.5 - 5.1 mmol/L    Chloride 105 95 - 110 mmol/L    CO2 26 23 - 29 mmol/L    Glucose 145 (H) 70 - 110 mg/dL    BUN, Bld 16 8 - 23 mg/dL    Creatinine 0.6 0.5 - 1.4 " mg/dL    Calcium 10.0 8.7 - 10.5 mg/dL    Total Protein 7.5 6.0 - 8.4 g/dL    Albumin 3.8 3.5 - 5.2 g/dL    Total Bilirubin 0.8 0.1 - 1.0 mg/dL    Alkaline Phosphatase 127 55 - 135 U/L    AST 16 10 - 40 U/L    ALT 17 10 - 44 U/L    Anion Gap 10 8 - 16 mmol/L    eGFR if African American >60 >60 mL/min/1.73 m^2    eGFR if non African American >60 >60 mL/min/1.73 m^2   Protime-INR   Result Value Ref Range    Prothrombin Time 9.9 9.0 - 12.5 sec    INR 1.0 0.8 - 1.2   APTT   Result Value Ref Range    aPTT 27.3 21.0 - 32.0 sec   Lipase   Result Value Ref Range    Lipase 7 4 - 60 U/L   Urinalysis   Result Value Ref Range    Specimen UA Urine, Clean Catch     Color, UA Yellow Yellow, Straw, Sarina    Appearance, UA Clear Clear    pH, UA 7.0 5.0 - 8.0    Specific Gravity, UA 1.010 1.005 - 1.030    Protein, UA Negative Negative    Glucose, UA Negative Negative    Ketones, UA Negative Negative    Bilirubin (UA) Negative Negative    Occult Blood UA Negative Negative    Nitrite, UA Negative Negative    Urobilinogen, UA Negative <2.0 EU/dL    Leukocytes, UA Trace (A) Negative   Urinalysis Microscopic   Result Value Ref Range    WBC, UA 2 0 - 5 /hpf    Microscopic Comment SEE COMMENT        Imaging Results:  Imaging Results          CT Abdomen Pelvis With Contrast (Final result)  Result time 06/14/18 19:46:49    Final result by Roney Green MD (06/14/18 19:46:49)                 Impression:      Infectious versus inflammatory terminal ileitis.  No evidence of bowel obstruction or perforation.  No abscess.    All CT scans at this facility use dose modulation, iterative reconstruction and/or weight based dosing when appropriate to reduce radiation dose to as low as reasonably achievable.      Electronically signed by: Roney Green MD  Date:    06/14/2018  Time:    19:46             Narrative:    EXAMINATION:  CT ABDOMEN PELVIS WITH CONTRAST    CLINICAL HISTORY:  Abdominal distension;    TECHNIQUE:  Axial CT imaging was performed  through the abdomen and pelvis with  75cc  of intravenous contrast. Multiplanar reformats were performed and interpreted.    COMPARISON:  None    FINDINGS:  Lung bases are clear.    The liver, adrenal glands, and pancreas are normal.  Incidental 9 mm splenic cyst.  Scattered, subcentimeter low-density lesions throughout the kidneys that are too small to characterize but statistically likely represent benign cysts.    The gallbladder has been removed.  No biliary ductal dilatation.    Mild distention of multiple loops of small bowel with scattered air-fluid levels.  There is circumferential wall thickening of a long segment of the terminal ileum.  Adjacent, mild mesenteric edema and trace free pelvic fluid.  The appendix is normal.  The colon is within normal limits.    Aortic atherosclerosis without evidence of aneurysm.  Multiple surgical clips seen throughout the retroperitoneum.    The urinary bladder is unremarkable. The uterus has been removed.  No adenopathy.    Right hip arthroplasty.  Multilevel thoracolumbar spondylosis.                                        The Emergency Provider reviewed the vital signs and test results, which are outlined above.    ED Discussion     9:17 PM: Reassessed pt at this time.  Pt states her condition has improved at this time. Discussed with pt all pertinent ED information and results. Discussed pt dx and plan of tx. Gave pt all f/u and return to the ED instructions. All questions and concerns were addressed at this time. Pt expresses understanding of information and instructions, and is comfortable with plan to discharge. Pt is stable for discharge.    I discussed with patient and/or family/caretaker that evaluation in the ED does not suggest any emergent or life threatening medical conditions requiring immediate intervention beyond what was provided in the ED, and I believe patient is safe for discharge.  Regardless, an unremarkable evaluation in the ED does not preclude the  development or presence of a serious of life threatening condition. As such, patient was instructed to return immediately for any worsening or change in current symptoms.      ED Medication(s):  Medications   sodium chloride 0.9% bolus 1,000 mL (0 mLs Intravenous Stopped 6/14/18 1811)   promethazine (PHENERGAN) 12.5 mg in dextrose 5 % 50 mL IVPB (0 mg Intravenous Stopped 6/14/18 1735)   morphine injection 4 mg (4 mg Intravenous Given 6/14/18 1717)   omnipaque 350 iohexol 75 mL (75 mLs Intravenous Given 6/14/18 1938)   ciprofloxacin HCl tablet 500 mg (500 mg Oral Given 6/14/18 2055)   metroNIDAZOLE tablet 500 mg (500 mg Oral Given 6/14/18 2055)       Discharge Medication List as of 6/14/2018  9:11 PM      START taking these medications    Details   ciprofloxacin HCl (CIPRO) 500 MG tablet Take 1 tablet (500 mg total) by mouth 2 (two) times daily., Starting u 6/14/2018, Until u 6/21/2018, Print      metroNIDAZOLE (FLAGYL) 500 MG tablet Take 1 tablet (500 mg total) by mouth 3 (three) times daily., Starting Thu 6/14/2018, Until Thu 6/21/2018, Print      promethazine (PHENERGAN) 25 MG tablet Take 1 tablet (25 mg total) by mouth every 6 (six) hours as needed for Nausea., Starting Thu 6/14/2018, Print             Follow-up Information     Sourav Hernandez MD In 2 days.    Specialty:  Family Medicine  Contact information:  [address]  [place] [address] [phone]             [facility] - .    Specialty:  Emergency Medicine  Why:  If symptoms worsen  Contact information:  [address]  [facility] [address] [phone]                   Medical Decision Making    Medical Decision Making:   Clinical Tests:   Lab Tests: Ordered and Reviewed  Radiological Study: Ordered and Reviewed           Scribe Attestation:   Scribe #1: I performed the above scribed service and the documentation accurately describes the services I performed. I attest to the accuracy of the  note.    Attending:   Physician Attestation Statement for Scribe #1: I, Clif Orona MD, personally performed the services described in this documentation, as scribed by Mica Mccallum, in my presence, and it is both accurate and complete.          Clinical Impression       ICD-10-CM ICD-9-CM   1. Terminal ileitis without complication K50.00 555.0   2. Abdominal pain, unspecified abdominal location R10.9 789.00   3. Non-intractable vomiting with nausea, unspecified vomiting type R11.2 787.01       Disposition:   Disposition: Discharged  Condition: Stable         Clif Orona MD  06/15/18 0036

## 2018-06-15 NOTE — ED NOTES
Dr. Orona notified that patient is requesting evening pain medication.  MD to bedside to speak with patient.

## 2018-06-18 ENCOUNTER — OFFICE VISIT (OUTPATIENT)
Dept: INTERNAL MEDICINE | Facility: CLINIC | Age: 68
End: 2018-06-18
Payer: MEDICARE

## 2018-06-18 VITALS
TEMPERATURE: 99 F | DIASTOLIC BLOOD PRESSURE: 66 MMHG | BODY MASS INDEX: 27.93 KG/M2 | SYSTOLIC BLOOD PRESSURE: 120 MMHG | HEIGHT: 63 IN | WEIGHT: 157.63 LBS

## 2018-06-18 DIAGNOSIS — K50.00 TERMINAL ILEITIS WITHOUT COMPLICATION: Primary | ICD-10-CM

## 2018-06-18 PROBLEM — I47.19 PAROXYSMAL ATRIAL TACHYCARDIA: Chronic | Status: ACTIVE | Noted: 2018-02-06

## 2018-06-18 PROBLEM — R73.03 PREDIABETES: Chronic | Status: ACTIVE | Noted: 2018-04-09

## 2018-06-18 PROBLEM — E78.2 MIXED HYPERLIPIDEMIA: Chronic | Status: ACTIVE | Noted: 2017-01-26

## 2018-06-18 PROBLEM — M15.0 PRIMARY OSTEOARTHRITIS INVOLVING MULTIPLE JOINTS: Chronic | Status: ACTIVE | Noted: 2017-01-26

## 2018-06-18 PROBLEM — I25.10 CORONARY ARTERY DISEASE INVOLVING NATIVE CORONARY ARTERY: Chronic | Status: ACTIVE | Noted: 2018-02-06

## 2018-06-18 PROBLEM — E78.2 MIXED HYPERLIPIDEMIA: Status: ACTIVE | Noted: 2017-01-26

## 2018-06-18 PROBLEM — J30.89 CHRONIC NON-SEASONAL ALLERGIC RHINITIS: Chronic | Status: ACTIVE | Noted: 2017-07-24

## 2018-06-18 PROBLEM — K21.9 GASTROESOPHAGEAL REFLUX DISEASE WITHOUT ESOPHAGITIS: Chronic | Status: ACTIVE | Noted: 2017-01-26

## 2018-06-18 PROBLEM — M15.9 PRIMARY OSTEOARTHRITIS INVOLVING MULTIPLE JOINTS: Chronic | Status: ACTIVE | Noted: 2017-01-26

## 2018-06-18 PROCEDURE — 99999 PR PBB SHADOW E&M-EST. PATIENT-LVL III: CPT | Mod: PBBFAC,,, | Performed by: FAMILY MEDICINE

## 2018-06-18 PROCEDURE — 99214 OFFICE O/P EST MOD 30 MIN: CPT | Mod: S$PBB,,, | Performed by: FAMILY MEDICINE

## 2018-06-18 PROCEDURE — 99213 OFFICE O/P EST LOW 20 MIN: CPT | Mod: PBBFAC,PO | Performed by: FAMILY MEDICINE

## 2018-06-18 NOTE — PROGRESS NOTES
Subjective:   Patient ID: Venus Caldwell is a 68 y.o. female.  Chief Complaint:  Follow-up ([facility])      Presents for ER follow-up for abdominal pain.  CT imaging showed terminal ileitis unspecified type.  In the ER hydrated, Phenergan.  Discharged on Cipro, Flagyl and oral Phenergan.  Patient reports compliance with Flagyl.  Has not started Cipro.  Overall pain has resolved.  No persistent nausea/vomiting/diarrhea.      Abdominal Pain   This is a new problem. The current episode started 1 to 4 weeks ago. The onset quality is sudden. The problem occurs constantly. The problem has been resolved. The pain is located in the LLQ, RLQ and suprapubic region. The pain is at a severity of 5/10. The pain is moderate. The quality of the pain is aching. The abdominal pain does not radiate. Associated symptoms include anorexia, myalgias, nausea and vomiting. Pertinent negatives include no arthralgias, belching, constipation, diarrhea, dysuria, fever, flatus, frequency, headaches, hematochezia, hematuria, melena or weight loss. The pain is aggravated by palpation and eating. The pain is relieved by nothing. Treatments tried: Flagyl and Phenergan. The treatment provided significant relief. Prior workup: None recently.  Last colonoscopy 2010. Her past medical history is significant for abdominal surgery, gallstones and GERD. There is no history of colon cancer, Crohn's disease, irritable bowel syndrome, pancreatitis, PUD or ulcerative colitis. Patient's medical history includes UTI.     Review of Systems   Constitutional: Positive for appetite change and fatigue. Negative for chills, fever and weight loss.   HENT: Negative for congestion, ear pain, postnasal drip, rhinorrhea, sinus pressure, sore throat and trouble swallowing.    Respiratory: Negative for cough and shortness of breath.    Cardiovascular: Negative for chest pain and leg swelling.   Gastrointestinal: Positive for anorexia, nausea and vomiting. Negative for  "abdominal distention, abdominal pain, blood in stool, constipation, diarrhea, flatus, hematochezia and melena.   Genitourinary: Negative for difficulty urinating, dysuria, flank pain, frequency, hematuria and pelvic pain.   Musculoskeletal: Positive for myalgias. Negative for arthralgias.   Skin: Negative for rash.   Neurological: Negative for dizziness, syncope, weakness, light-headedness and headaches.     Objective:   /66 (BP Location: Right arm, Patient Position: Sitting, BP Method: Small (Manual))   Temp 98.6 °F (37 °C) (Tympanic)   Ht 5' 3" (1.6 m)   Wt 71.5 kg (157 lb 10.1 oz)   BMI 27.92 kg/m²     Physical Exam   Constitutional: Vital signs are normal. She appears well-developed and well-nourished. No distress.   Eyes: No scleral icterus.   Neck: No JVD present.   Cardiovascular: Normal rate, regular rhythm and normal heart sounds.  Exam reveals no gallop and no friction rub.    No murmur heard.  Pulmonary/Chest: Effort normal and breath sounds normal. She has no wheezes. She has no rhonchi. She has no rales.   Abdominal: Soft. Bowel sounds are normal. She exhibits no distension. There is no hepatosplenomegaly. There is no tenderness. There is no rigidity, no rebound, no guarding and no CVA tenderness. No hernia.   Musculoskeletal: She exhibits no edema.   Neurological: She displays a negative Romberg sign. Gait abnormal. Coordination normal.   Skin: Skin is warm and dry. No rash noted.   Psychiatric: She has a normal mood and affect.   Nursing note and vitals reviewed.    Assessment:     1. Terminal ileitis without complication      Plan:   Terminal ileitis without complication  -     Ambulatory referral to Gastroenterology    Unclear etiology for ileitis.  Question if needs additional evaluation of the small intestine for possible Crohn's disease or ulcerative colitis or other abnormality.  Referral to GI.    Needs colonoscopy.    Advised to take/complete Cipro as prescribed.  Continue " Flagyl.  Return to clinic 2 months follow-up in prediabetes as scheduled.

## 2018-06-19 ENCOUNTER — OFFICE VISIT (OUTPATIENT)
Dept: GASTROENTEROLOGY | Facility: CLINIC | Age: 68
End: 2018-06-19
Payer: MEDICARE

## 2018-06-19 VITALS
HEART RATE: 72 BPM | BODY MASS INDEX: 28.13 KG/M2 | DIASTOLIC BLOOD PRESSURE: 70 MMHG | HEIGHT: 63 IN | WEIGHT: 158.75 LBS | SYSTOLIC BLOOD PRESSURE: 98 MMHG

## 2018-06-19 DIAGNOSIS — K50.00 TERMINAL ILEITIS WITHOUT COMPLICATION: Primary | ICD-10-CM

## 2018-06-19 PROCEDURE — 99999 PR PBB SHADOW E&M-EST. PATIENT-LVL III: CPT | Mod: PBBFAC,,, | Performed by: NURSE PRACTITIONER

## 2018-06-19 PROCEDURE — 99213 OFFICE O/P EST LOW 20 MIN: CPT | Mod: PBBFAC,PO | Performed by: NURSE PRACTITIONER

## 2018-06-19 PROCEDURE — 99204 OFFICE O/P NEW MOD 45 MIN: CPT | Mod: S$PBB,,, | Performed by: NURSE PRACTITIONER

## 2018-06-19 RX ORDER — SODIUM, POTASSIUM,MAG SULFATES 17.5-3.13G
1 SOLUTION, RECONSTITUTED, ORAL ORAL ONCE
Qty: 1 BOTTLE | Refills: 0 | Status: SHIPPED | OUTPATIENT
Start: 2018-06-19 | End: 2018-06-19

## 2018-06-19 NOTE — LETTER
June 19, 2018      Sourav Hernandez MD  [address]  [place] [address]           [facility]  [address] [place] [address]  Phone: [phone]  Fax: [phone]          Patient: Venus Caldwell   MR Number: [id number]   YOB: 1950   Date of Visit: 6/19/2018       Dear Dr. Sourav Hernandez:    Thank you for referring Venus Caldwell to me for evaluation. Attached you will find relevant portions of my assessment and plan of care.    If you have questions, please do not hesitate to call me. I look forward to following Venus Caldwell along with you.    Sincerely,    Michelle Raymundo, [facility]    Enclosure  CC:  No Recipients    If you would like to receive this communication electronically, please contact [email or web address] or [phone] to request more information on [employer] Link access.    For providers and/or their staff who would like to refer a patient to [facility], please contact us through our one-stop-shop provider referral line, [facility] , at [phone].    If you feel you have received this communication in error or would no longer like to receive these types of communications, please e-mail [email or web address]

## 2018-06-19 NOTE — PROGRESS NOTES
Clinic Consult:  [facility] Consultation Note    Reason for Consult:  The encounter diagnosis was Terminal ileitis without complication.    PCP: Sourav Hernandez   [address] [place] [address]    HPI:  This is a 68 y.o. female here for evaluation of the above  Pt was recently discharged from the ER after a visit for moderate to sever abdominal pain with diarrhea.   Workup included a CT scan which showed terminal ileitis.  She denies any previous hx of this.  She was given a prescription for Cipro and flagyl, but for unclear reasons, only took the Flagyl, which she is almost finished with.  She states that since discharge, she has had no abdominal pain and only a few episodes of diarrhea.  She has been advancing her diet as tolerated.   No melena or hematochezia.    Last colonoscopy 2010, unremarkable.       Review of Systems   Constitutional: Negative for chills, fever, malaise/fatigue and weight loss.   Respiratory: Negative for cough.    Cardiovascular: Negative for chest pain.   Gastrointestinal:        Per HPI   Musculoskeletal: Negative for myalgias.   Skin: Negative for itching and rash.   Neurological: Negative for headaches.   Psychiatric/Behavioral: The patient is not nervous/anxious.        Medical History:   Past Medical History:   Diagnosis Date    Cancer, uterine     Hypertension     Sciatica        Surgical History:  Past Surgical History:   Procedure Laterality Date    GALLBLADDER SURGERY      HYSTERECTOMY      TOTAL KNEE ARTHROPLASTY         Family History:   Family History   Problem Relation Age of Onset    Diabetes Mother     Cataracts Mother     Diabetes Brother     Cataracts Maternal Grandmother     Breast cancer Maternal Aunt        Social History:   Social History   Substance Use Topics    Smoking status: Never Smoker    Smokeless tobacco: Never Used    Alcohol use No       Allergies: Reviewed    Home Medications:   Current Outpatient Prescriptions on File  "Prior to Visit   Medication Sig Dispense Refill    aspirin 325 MG tablet Take 325 mg by mouth.      atorvastatin (LIPITOR) 40 MG tablet Take 40 mg by mouth once daily.       cetirizine (ZYRTEC) 10 MG tablet Take 1 tablet by mouth Daily.      cholecalciferol, vitamin D3, 5,000 unit Tab Take by mouth.      fluticasone (FLONASE) 50 mcg/actuation nasal spray 2 sprays (100 mcg total) by Each Nare route every evening. 48 g 1    gabapentin (NEURONTIN) 300 MG capsule Take 300 mg by mouth 3 (three) times daily. Take 600 mg, two capsules, at bedtime dose      hydrocodone-acetaminophen 10-325mg (NORCO)  mg Tab Take 1 tablet by mouth every 24 hours as needed.       HYSINGLA ER 40 mg TP24 Take 1 tablet by mouth once daily.       meloxicam (MOBIC) 7.5 MG tablet Take 7.5 mg by mouth every evening.       metFORMIN (GLUCOPHAGE) 500 MG tablet Take 1 tablet (500 mg total) by mouth daily with breakfast. 90 tablet 0    metoprolol succinate (TOPROL XL) 50 MG 24 hr tablet Take 1 tablet (50 mg total) by mouth 2 (two) times daily. 180 tablet 3    metroNIDAZOLE (FLAGYL) 500 MG tablet Take 1 tablet (500 mg total) by mouth 3 (three) times daily. 21 tablet 0    omeprazole (PRILOSEC) 20 MG capsule Take 20 mg by mouth once daily.      promethazine (PHENERGAN) 25 MG tablet Take 1 tablet (25 mg total) by mouth every 6 (six) hours as needed for Nausea. 15 tablet 0    valACYclovir (VALTREX) 500 MG tablet       valsartan (DIOVAN) 320 MG tablet Take 1 tablet (320 mg total) by mouth once daily. 90 tablet 3    betamethasone dipropionate (DIPROLENE) 0.05 % cream       ciprofloxacin HCl (CIPRO) 500 MG tablet Take 1 tablet (500 mg total) by mouth 2 (two) times daily. 14 tablet 0     No current facility-administered medications on file prior to visit.        Physical Exam:  Vital Signs:  BP 98/70   Pulse 72   Ht 5' 3" (1.6 m)   Wt 72 kg (158 lb 11.7 oz)   BMI 28.12 kg/m²   Body mass index is 28.12 kg/m².  Physical Exam "   Constitutional: She is oriented to person, place, and time. She appears well-developed and well-nourished.   HENT:   Head: Normocephalic.   Eyes: No scleral icterus.   Neck: Normal range of motion.   Cardiovascular: Normal rate and regular rhythm.    Pulmonary/Chest: Effort normal and breath sounds normal.   Abdominal: Soft. Bowel sounds are normal. She exhibits no distension. There is no tenderness.   Musculoskeletal: Normal range of motion.   Neurological: She is alert and oriented to person, place, and time.   Skin: Skin is warm and dry.   Psychiatric: She has a normal mood and affect.   Vitals reviewed.      Labs: Pertinent labs reviewed.    Assessment:  1. Terminal ileitis without complication         Recommendations:  Terminal ileitis without complication  - Symptoms are resolving  - Will plan for colonoscopy with special attention to the TI for evaluation and R/O Crohn's  -     Case request GI: COLONOSCOPY  -     sodium,potassium,mag sulfates (SUPREP BOWEL PREP KIT) 17.5-3.13-1.6 gram SolR; Take 354 mLs by mouth once.  Dispense: 1 Bottle; Refill: 0          Follow up to be determined by results of procedure.        Thank you so much for allowing me to participate in the care of GAMAL Bray

## 2018-07-06 ENCOUNTER — OFFICE VISIT (OUTPATIENT)
Dept: INTERNAL MEDICINE | Facility: CLINIC | Age: 68
End: 2018-07-06
Payer: MEDICARE

## 2018-07-06 VITALS
OXYGEN SATURATION: 98 % | TEMPERATURE: 99 F | SYSTOLIC BLOOD PRESSURE: 138 MMHG | DIASTOLIC BLOOD PRESSURE: 80 MMHG | HEART RATE: 65 BPM | BODY MASS INDEX: 26.45 KG/M2 | HEIGHT: 63 IN | WEIGHT: 149.25 LBS

## 2018-07-06 DIAGNOSIS — J01.90 ACUTE SINUSITIS, RECURRENCE NOT SPECIFIED, UNSPECIFIED LOCATION: Primary | ICD-10-CM

## 2018-07-06 PROCEDURE — 99999 PR PBB SHADOW E&M-EST. PATIENT-LVL IV: CPT | Mod: PBBFAC,,, | Performed by: NURSE PRACTITIONER

## 2018-07-06 PROCEDURE — 99214 OFFICE O/P EST MOD 30 MIN: CPT | Mod: S$PBB,,, | Performed by: NURSE PRACTITIONER

## 2018-07-06 PROCEDURE — 99214 OFFICE O/P EST MOD 30 MIN: CPT | Mod: PBBFAC,PO | Performed by: NURSE PRACTITIONER

## 2018-07-06 RX ORDER — NAPROXEN SODIUM 220 MG/1
81 TABLET, FILM COATED ORAL DAILY
COMMUNITY
End: 2019-08-13

## 2018-07-06 RX ORDER — AMOXICILLIN 875 MG/1
875 TABLET, FILM COATED ORAL 2 TIMES DAILY
Qty: 20 TABLET | Refills: 0 | Status: SHIPPED | OUTPATIENT
Start: 2018-07-06 | End: 2018-08-13

## 2018-07-06 NOTE — PATIENT INSTRUCTIONS
Sinusitis (Antibiotic Treatment)    The sinuses are air-filled spaces within the bones of the face. They connect to the inside of the nose. Sinusitis is an inflammation of the tissue lining the sinus cavity. Sinus inflammation can occur during a cold. It can also be due to allergies to pollens and other particles in the air. Sinusitis can cause symptoms of sinus congestion and fullness. A sinus infection causes fever, headache and facial pain. There is often green or yellow drainage from the nose or into the back of the throat (post-nasal drip). You have been given antibiotics to treat this condition.  Home care:  · Take the full course of antibiotics as instructed. Do not stop taking them, even if you feel better.  · Drink plenty of water, hot tea, and other liquids. This may help thin mucus. It also may promote sinus drainage.  · Heat may help soothe painful areas of the face. Use a towel soaked in hot water. Or,  the shower and direct the hot spray onto your face. Using a vaporizer along with a menthol rub at night may also help.   · An expectorant containing guaifenesin may help thin the mucus and promote drainage from the sinuses.  · Over-the-counter decongestants may be used unless a similar medicine was prescribed. Nasal sprays work the fastest. Use one that contains phenylephrine or oxymetazoline. First blow the nose gently. Then use the spray. Do not use these medicines more often than directed on the label or symptoms may get worse. You may also use tablets containing pseudoephedrine. Avoid products that combine ingredients, because side effects may be increased. Read labels. You can also ask the pharmacist for help. (NOTE: Persons with high blood pressure should not use decongestants. They can raise blood pressure.)  · Over-the-counter antihistamines may help if allergies contributed to your sinusitis.    · Do not use nasal rinses or irrigation during an acute sinus infection, unless told to by  your health care provider. Rinsing may spread the infection to other sinuses.  · Use acetaminophen or ibuprofen to control pain, unless another pain medicine was prescribed. (If you have chronic liver or kidney disease or ever had a stomach ulcer, talk with your doctor before using these medicines. Aspirin should never be used in anyone under 18 years of age who is ill with a fever. It may cause severe liver damage.)  · Don't smoke. This can worsen symptoms.  Follow-up care  Follow up with your healthcare provider or our staff if you are not improving within the next week.  When to seek medical advice  Call your healthcare provider if any of these occur:  · Facial pain or headache becoming more severe  · Stiff neck  · Unusual drowsiness or confusion  · Swelling of the forehead or eyelids  · Vision problems, including blurred or double vision  · Fever of 100.4ºF (38ºC) or higher, or as directed by your healthcare provider  · Seizure  · Breathing problems  · Symptoms not resolving within 10 days  Date Last Reviewed: 4/13/2015  © 2150-3660 The "[employer]. [address] [place] [address], [place] [address]. All rights reserved. This information is not intended as a substitute for professional medical care. Always follow your healthcare professional's instructions.

## 2018-07-06 NOTE — PROGRESS NOTES
"CHIEF COMPLAINT/REASON FOR VISIT: nasal congestion, post nasal drip, sore throat, facial pressure    HISTORY OF PRESENT ILLNESS:    Venus Caldwell.  68 y.o.  female complains of a sinus issue with nasal congestion, post nasal drip, sore throat, facial pressure, right ear discomfort  and productive cough onset 10 ago. Patient admits tried Zyrtec medications with little relief.  Has Flonase but has not been taken.      PAST MEDICAL HISTORY:   Past Medical History:   Diagnosis Date    Cancer, uterine     Hypertension     Sciatica          PAST SURGICAL HISTORY:.  Past Surgical History:   Procedure Laterality Date    GALLBLADDER SURGERY      HYSTERECTOMY      TOTAL KNEE ARTHROPLASTY           SOCIAL HISTORY:.  Social History     Social History    Marital status:      Spouse name: N/A    Number of children: N/A    Years of education: N/A     Occupational History    Not on file.     Social History Main Topics    Smoking status: Never Smoker    Smokeless tobacco: Never Used    Alcohol use No    Drug use: No    Sexual activity: Not on file     Other Topics Concern    Not on file     Social History Narrative    No narrative on file       ROS:  GENERAL: Reports no fever, chills.  SKIN: No rashes, itching or changes in color or texture of skin.  HEENT: Reports sinus pressure, nasal congestion/sneezing, postnasal drip, sore throat, ear discomfort, rhinorrhea.  CHEST: Denies cough and sputum production.  Denies shortness of breath and wheezing.  CARDIOVASCULAR: Denies chest pain, PND, orthopnea or reduced exercise tolerance.  ABDOMEN: Appetite fine. No weight loss. .  MUSCULOSKELETAL: No joint stiffness, pain or swelling. Denies back pain.  NEUROLOGIC: Report headaches. No history of seizures, paralysis, alteration of gait or coordination.  PSYCHIATRIC: Denies mood swings, depression or suicidal thoughts.    /80   Pulse 65   Temp 99.4 °F (37.4 °C) (Axillary)   Ht 5' 3" (1.6 m)   Wt 67.7 kg (149 lb " 4 oz)   SpO2 98%   BMI 26.44 kg/m² .    PE:   APPEARANCE: Well nourished, well developed, in mild distress.   SKIN: Normal skin turgor, no lesions.  HEENT: Turbinates red and enlarge, sinus tenderness (right) on palpation, erythematous pharynx, TMs poor light reflex bilaterally.  CHEST: Lungs clear to auscultation. no wheezing  CARDIOVASCULAR: Regular rate and rhythm.  MUSCULOSKELETAL: Motor: 5/5 strength major flexors/extensors.    PLAN:   Advise Hydrate and rest.    Mucinex for cough and chest congestion.  Tylenol or Ibuprofen for fever, headache and body aches.  See PCP or go to ER if symptoms worsen or fail to improve with treatment.      DIAGNOSIS:  Acute sinusitis, recurrence not specified, unspecified location        Instructed to take all medications as ordered.  Informed if no improvement or symptoms worsens to follow up with primary care physician.  Informed to hydrate and rest.  Printed and review after visit summary with patient.

## 2018-07-10 ENCOUNTER — DOCUMENTATION ONLY (OUTPATIENT)
Dept: ENDOSCOPY | Facility: HOSPITAL | Age: 68
End: 2018-07-10

## 2018-07-11 ENCOUNTER — DOCUMENTATION ONLY (OUTPATIENT)
Dept: ENDOSCOPY | Facility: HOSPITAL | Age: 68
End: 2018-07-11

## 2018-07-11 NOTE — PROGRESS NOTES
Left pt a message to call the office regarding procedure arrival time on 8/1/18. Pt has been moved from afternoon to morning.

## 2018-07-30 ENCOUNTER — PATIENT OUTREACH (OUTPATIENT)
Dept: ADMINISTRATIVE | Facility: HOSPITAL | Age: 68
End: 2018-07-30

## 2018-08-01 ENCOUNTER — HOSPITAL ENCOUNTER (OUTPATIENT)
Facility: HOSPITAL | Age: 68
Discharge: HOME | End: 2018-08-01
Attending: SURGERY | Admitting: INTERNAL MEDICINE
Payer: MEDICARE

## 2018-08-01 ENCOUNTER — ANESTHESIA (OUTPATIENT)
Dept: ENDOSCOPY | Facility: HOSPITAL | Age: 68
End: 2018-08-01
Payer: MEDICARE

## 2018-08-01 ENCOUNTER — SURGERY (OUTPATIENT)
Age: 68
End: 2018-08-01

## 2018-08-01 ENCOUNTER — ANESTHESIA EVENT (OUTPATIENT)
Dept: ENDOSCOPY | Facility: HOSPITAL | Age: 68
End: 2018-08-01
Payer: MEDICARE

## 2018-08-01 VITALS
WEIGHT: 152 LBS | TEMPERATURE: 98 F | HEART RATE: 79 BPM | DIASTOLIC BLOOD PRESSURE: 83 MMHG | HEIGHT: 63 IN | BODY MASS INDEX: 26.93 KG/M2 | SYSTOLIC BLOOD PRESSURE: 132 MMHG | RESPIRATION RATE: 12 BRPM | OXYGEN SATURATION: 95 %

## 2018-08-01 DIAGNOSIS — K50.00 TERMINAL ILEITIS: ICD-10-CM

## 2018-08-01 DIAGNOSIS — K63.5 POLYP OF TRANSVERSE COLON, UNSPECIFIED TYPE: Primary | ICD-10-CM

## 2018-08-01 DIAGNOSIS — K62.89 RECTAL NODULE: ICD-10-CM

## 2018-08-01 PROCEDURE — 88305 TISSUE EXAM BY PATHOLOGIST: CPT | Performed by: PATHOLOGY

## 2018-08-01 PROCEDURE — 27201012 HC FORCEPS, HOT/COLD, DISP: Performed by: INTERNAL MEDICINE

## 2018-08-01 PROCEDURE — 63600175 PHARM REV CODE 636 W HCPCS: Performed by: NURSE ANESTHETIST, CERTIFIED REGISTERED

## 2018-08-01 PROCEDURE — 45380 COLONOSCOPY AND BIOPSY: CPT | Mod: ,,, | Performed by: INTERNAL MEDICINE

## 2018-08-01 PROCEDURE — 25000003 PHARM REV CODE 250: Performed by: INTERNAL MEDICINE

## 2018-08-01 PROCEDURE — 45380 COLONOSCOPY AND BIOPSY: CPT | Performed by: INTERNAL MEDICINE

## 2018-08-01 PROCEDURE — 88305 TISSUE EXAM BY PATHOLOGIST: CPT | Mod: 26,,, | Performed by: PATHOLOGY

## 2018-08-01 PROCEDURE — 37000008 HC ANESTHESIA 1ST 15 MINUTES: Performed by: INTERNAL MEDICINE

## 2018-08-01 PROCEDURE — 37000009 HC ANESTHESIA EA ADD 15 MINS: Performed by: INTERNAL MEDICINE

## 2018-08-01 RX ORDER — LIDOCAINE HCL/PF 100 MG/5ML
SYRINGE (ML) INTRAVENOUS
Status: DISCONTINUED | OUTPATIENT
Start: 2018-08-01 | End: 2018-08-01

## 2018-08-01 RX ORDER — PROPOFOL 10 MG/ML
INJECTION, EMULSION INTRAVENOUS
Status: DISCONTINUED | OUTPATIENT
Start: 2018-08-01 | End: 2018-08-01

## 2018-08-01 RX ORDER — SODIUM CHLORIDE 0.9 % (FLUSH) 0.9 %
3 SYRINGE (ML) INJECTION
Status: CANCELLED | OUTPATIENT
Start: 2018-08-01

## 2018-08-01 RX ORDER — SODIUM CHLORIDE, SODIUM LACTATE, POTASSIUM CHLORIDE, CALCIUM CHLORIDE 600; 310; 30; 20 MG/100ML; MG/100ML; MG/100ML; MG/100ML
INJECTION, SOLUTION INTRAVENOUS CONTINUOUS
Status: DISCONTINUED | OUTPATIENT
Start: 2018-08-01 | End: 2018-08-01 | Stop reason: HOSPADM

## 2018-08-01 RX ADMIN — PROPOFOL 40 MG: 10 INJECTION, EMULSION INTRAVENOUS at 08:08

## 2018-08-01 RX ADMIN — PROPOFOL 140 MG: 10 INJECTION, EMULSION INTRAVENOUS at 08:08

## 2018-08-01 RX ADMIN — LIDOCAINE HYDROCHLORIDE 100 MG: 20 INJECTION, SOLUTION INTRAVENOUS at 08:08

## 2018-08-01 RX ADMIN — SODIUM CHLORIDE, SODIUM LACTATE, POTASSIUM CHLORIDE, AND CALCIUM CHLORIDE: 600; 310; 30; 20 INJECTION, SOLUTION INTRAVENOUS at 07:08

## 2018-08-01 RX ADMIN — PROPOFOL 50 MG: 10 INJECTION, EMULSION INTRAVENOUS at 08:08

## 2018-08-01 RX ADMIN — SODIUM CHLORIDE, SODIUM LACTATE, POTASSIUM CHLORIDE, AND CALCIUM CHLORIDE: 600; 310; 30; 20 INJECTION, SOLUTION INTRAVENOUS at 08:08

## 2018-08-01 NOTE — H&P
Short Stay Endoscopy History and Physical    PCP - Sourav Hernandez MD    Procedure - Colonoscopy  ASA - 2  Mallampati - per anesthesia  History of Anesthesia problems - no  Family history Anesthesia problems -  no     HPI:  This is a 68 y.o.female here for evaluation of : Terminal Ileitis    Reflux - no  Dysphagia - no  Abdominal pain - yes  Diarrhea - yes  Anemia - no  GI bleeding - no  Nausea and vomiting-no  Early satiety-no  aversion to sight or smell of food-no    ROS:  Constitutional: No fevers, chills, No weight loss  ENT: No allergies  CV: No chest pain  Pulm: No cough, No shortness of breath  Ophtho: No vision changes  GI: see HPI  Derm: No rash  Heme: No lymphadenopathy, No bruising  MSK: No arthritis  : No dysuria, No hematuria  Endo: No hot or cold intolerance  Neuro: No syncope, No seizure  Psych: No anxiety, No depression    Medical History:  Past Medical History:   Diagnosis Date    Cancer, uterine     Hypertension     Sciatica        Surgical History:  Past Surgical History:   Procedure Laterality Date    GALLBLADDER SURGERY      HYSTERECTOMY      TOTAL KNEE ARTHROPLASTY         Family History:  Family History   Problem Relation Age of Onset    Diabetes Mother     Cataracts Mother     Diabetes Brother     Cataracts Maternal Grandmother     Breast cancer Maternal Aunt        Social History:  Social History     Social History    Marital status:      Spouse name: N/A    Number of children: N/A    Years of education: N/A     Occupational History    Not on file.     Social History Main Topics    Smoking status: Never Smoker    Smokeless tobacco: Never Used    Alcohol use No    Drug use: No    Sexual activity: Not on file     Other Topics Concern    Not on file     Social History Narrative    No narrative on file       Allergies:   Review of patient's allergies indicates:   Allergen Reactions    No known drug allergies     Buprenorphine Rash       Medications:   No  current facility-administered medications on file prior to encounter.      Current Outpatient Prescriptions on File Prior to Encounter   Medication Sig Dispense Refill    aspirin 325 MG tablet Take 325 mg by mouth.      atorvastatin (LIPITOR) 40 MG tablet Take 40 mg by mouth once daily.       betamethasone dipropionate (DIPROLENE) 0.05 % cream       cetirizine (ZYRTEC) 10 MG tablet Take 1 tablet by mouth Daily.      cholecalciferol, vitamin D3, 5,000 unit Tab Take by mouth.      fluticasone (FLONASE) 50 mcg/actuation nasal spray 2 sprays (100 mcg total) by Each Nare route every evening. 48 g 1    gabapentin (NEURONTIN) 300 MG capsule Take 300 mg by mouth 3 (three) times daily. Take 600 mg, two capsules, at bedtime dose      hydrocodone-acetaminophen 10-325mg (NORCO)  mg Tab Take 1 tablet by mouth every 24 hours as needed.       HYSINGLA ER 40 mg TP24 Take 1 tablet by mouth once daily.       meloxicam (MOBIC) 7.5 MG tablet Take 7.5 mg by mouth every evening.       metFORMIN (GLUCOPHAGE) 500 MG tablet Take 1 tablet (500 mg total) by mouth daily with breakfast. 90 tablet 0    metoprolol succinate (TOPROL XL) 50 MG 24 hr tablet Take 1 tablet (50 mg total) by mouth 2 (two) times daily. 180 tablet 3    omeprazole (PRILOSEC) 20 MG capsule Take 20 mg by mouth once daily.      promethazine (PHENERGAN) 25 MG tablet Take 1 tablet (25 mg total) by mouth every 6 (six) hours as needed for Nausea. 15 tablet 0    valACYclovir (VALTREX) 500 MG tablet       valsartan (DIOVAN) 320 MG tablet Take 1 tablet (320 mg total) by mouth once daily. 90 tablet 3       Objective Findings:    Vital Signs:There were no vitals filed for this visit.        Physical Exam:  General Appearance: Well appearing in no acute distress  Eyes:    No scleral icterus  ENT: Neck supple, Lips, mucosa, and tongue normal; teeth and gums normal  Lungs: CTA bilaterally in anterior and posterior fields, no wheezes, no crackles.  Heart:  Regular  rate, S1, S2 normal, no murmurs heard.  Abdomen: Soft, non tender, non distended with normal bowel sounds. No hepatosplenomegaly, ascites, or mass.  Extremities: No clubbing, cyanosis or edema  Skin: No rash    Labs:  Reviewed    Plan:Colonoscopy  I have explained the risks and benefits of endoscopy procedures to the patient including but not limited to bleeding, perforation, infection, and death. The patient wishes to proceed.

## 2018-08-01 NOTE — TRANSFER OF CARE
"Anesthesia Transfer of Care Note    Patient: Venus Caldwell    Procedure(s) Performed: Procedure(s) (LRB):  COLONOSCOPY (N/A)    Patient location: PACU    Anesthesia Type: MAC    Transport from OR: Transported from OR on room air with adequate spontaneous ventilation    Post pain: adequate analgesia    Post assessment: no apparent anesthetic complications    Post vital signs: stable    Level of consciousness: sedated    Nausea/Vomiting: no nausea/vomiting    Complications: none    Transfer of care protocol was followed      Last vitals:   Visit Vitals  BP (!) 123/58   Pulse 87   Temp 36.8 °C (98.3 °F) (Oral)   Resp 12   Ht 5' 3" (1.6 m)   Wt 68.9 kg (152 lb)   SpO2 96%   Breastfeeding? No   BMI 26.93 kg/m²     "

## 2018-08-01 NOTE — ANESTHESIA POSTPROCEDURE EVALUATION
"Anesthesia Post Evaluation    Patient: Venus Caldwell    Procedure(s) Performed: Procedure(s) (LRB):  COLONOSCOPY (N/A)    Final Anesthesia Type: MAC  Patient location during evaluation: PACU  Patient participation: Yes- Able to Participate  Level of consciousness: awake and alert and oriented  Post-procedure vital signs: reviewed and stable  Airway patency: patent  PONV status at discharge: No PONV  Anesthetic complications: no      Cardiovascular status: blood pressure returned to baseline  Respiratory status: unassisted, room air and spontaneous ventilation  Hydration status: euvolemic  Follow-up not needed.        Visit Vitals  BP (!) 123/58   Pulse 87   Temp 36.8 °C (98.3 °F) (Oral)   Resp 12   Ht 5' 3" (1.6 m)   Wt 68.9 kg (152 lb)   SpO2 96%   Breastfeeding? No   BMI 26.93 kg/m²       Pain/Micheal Score: Pain Assessment Performed: Yes (8/1/2018  9:11 AM)  Presence of Pain: denies (8/1/2018  9:11 AM)  Micheal Score: 9 (8/1/2018  9:05 AM)      "

## 2018-08-01 NOTE — ANESTHESIA RELEASE NOTE
"Anesthesia Release from PACU Note    Patient: Venus Caldwell    Procedure(s) Performed: Procedure(s) (LRB):  COLONOSCOPY (N/A)    Anesthesia type: MAC    Post pain: Adequate analgesia    Post assessment: no apparent anesthetic complications, tolerated procedure well and no evidence of recall    Last Vitals:   Visit Vitals  BP (!) 123/58   Pulse 87   Temp 36.8 °C (98.3 °F) (Oral)   Resp 12   Ht 5' 3" (1.6 m)   Wt 68.9 kg (152 lb)   SpO2 96%   Breastfeeding? No   BMI 26.93 kg/m²       Post vital signs: stable    Level of consciousness: awake and alert     Nausea/Vomiting: no nausea/no vomiting    Complications: none    Airway Patency: patent    Respiratory: unassisted, spontaneous ventilation, room air    Cardiovascular: stable    Hydration: euvolemic  "

## 2018-08-01 NOTE — DISCHARGE INSTRUCTIONS
Understanding Colon and Rectal Polyps    The colon (also called the large intestine) is a muscular tube that forms the last part of the digestive tract. It absorbs water and stores food waste. The colon is about 4 to 6 feet long. The rectum is the last 6 inches of the colon. The colon and rectum have a smooth lining composed of millions of cells. Changes in these cells can lead to growths in the colon that can become cancerous and should be removed. Multiple tests are available to screen for colon cancer, but the colonoscopy is the most recommended test. During colonoscopy, these polyps can be removed. How often you need this test depends on many things including your condition, your family history, symptoms, and what the findings were at the previous colonoscopy.   When the colon lining changes  Changes that happen in the cells that line the colon or rectum can lead to growths called polyps. Over a period of years, polyps can turn cancerous. Removing polyps early may prevent cancer from ever forming.  Polyps  Polyps are fleshy clumps of tissue that form on the lining of the colon or rectum. Small polyps are usually benign (not cancerous). However, over time, cells in a polyp can change and become cancerous. Certain types of polyps known as adenomatous polyps are premalignant. The risk for invasive cancer increases with the size of the polyp and certain cell and gene features. This means that they can become cancerous if they're not removed. Hyperplastic polyps are benign. They can grow quite large and not turn cancerous.   Cancer  Almost all colorectal cancers start when polyp cells begin growing abnormally. As a cancerous tumor grows, it may involve more and more of the colon or rectum. In time, cancer can also grow beyond the colon or rectum and spread to nearby organs or to glands called lymph nodes. The cells can also travel to other parts of the body. This is known as metastasis. The earlier a cancerous  tumor is removed, the better the chance of preventing its spread.    Date Last Reviewed: 8/1/2016  © 4244-3136 [employer]. [address] [place] [address], [place] [address]. All rights reserved. This information is not intended as a substitute for professional medical care. Always follow your healthcare professional's instructions.        Diverticulosis    Diverticulosis means that small pouches have formed in the wall of your large intestine (colon). Most often, this problem causes no symptoms and is common as people age. But the pouches in the colon are at risk of becoming infected. When this happens, the condition is called diverticulitis. Although most people with diverticulosis never develop diverticulitis, it is still not uncommon. Rectal bleeding can also occur and in less common situations, a type of colon inflammation called colitis.  While most people do not have symptoms, some people with diverticulosis may have:  · Abdominal cramps and pain  · Bloating  · Constipation  · Change in bowel habits  Causes  The exact cause of diverticulosis (and diverticulitis) has not been proved, but a few things are associated with the condition:  · Low-fiber diet  · Constipation  · Lack of exercise  Your healthcare provider will talk with you about how to manage your condition. Diet changes may be all that are needed to help control diverticulosis and prevent progression to diverticulitis. If you develop diverticulitis, you will likely need other treatments.  Home care  You may be told to take fiber supplements daily. Fiber adds bulk to the stool so that it passes through the colon more easily. Stool softeners may be recommended. You may also be given medications for pain relief. Be sure to take all medications as directed.  In the past, people were told to avoid corn, nuts, and seeds. This is no longer necessary.  Follow these guidelines when caring for yourself at home:  · Eat unprocessed foods that are high in  fiber. Whole grains, fruits, and vegetables are good choices.  · Drink 6 to 8 glasses of water every day unless your healthcare provider has you limit how much fluid you should have.  · Watch for changes in your bowel movements. Tell your provider if you notice any changes.  · Begin an exercise program. Ask your provider how to get started. Generally, walking is the best.  · Get plenty of rest and sleep.  Follow-up care  Follow up with your healthcare provider, or as advised. Regular visits may be needed to check on your health. Sometimes special procedures such as colonoscopy, are needed after an episode of diverticulitis or blooding. Be sure to keep all your appointments.  If a stool sample was taken, or cultures were done, you should be told if they are positive, or if your treatment needs to be changed. You can call as directed for the results.  If X-rays were done, a radiologist will look at them. You will be told if there is a change in your treatment.  If antibiotics were prescribed, be sure to finish them all.  When to seek medical advice  Call your healthcare provider right away if any of these occur:  · Fever of 100.4°F (38°C) or higher, or as directed by your healthcare provider  · Severe cramps in the lower left side of the abdomen or pain that is getting worse  · Tenderness in the lower left side of the abdomen or worsening pain throughout the abdomen  · Diarrhea or constipation that doesn't get better within 24 hours  · Nausea and vomiting  · Bleeding from the rectum  Call 911  Call emergency services if any of the following occur:  · Trouble breathing  · Confusion  · Very drowsy or trouble awakening  · Fainting or loss of consciousness  · Rapid heart rate  · Chest pain  Date Last Reviewed: 12/30/2015  © 5216-7855 [employer]. [address] [place] [address], [place] [address]. All rights reserved. This information is not intended as a substitute for professional medical care. Always follow your  healthcare professional's instructions.

## 2018-08-01 NOTE — DISCHARGE SUMMARY
[facility] -   Brief Operative Note     SUMMARY     Surgery Date: 8/1/2018     Surgeon(s) and Role:     * Yrn Hunter III, MD - Primary    Assisting Surgeon: None    Pre-op Diagnosis:  Terminal ileitis without complication [K50.00]    Post-op Diagnosis:  Post-Op Diagnosis Codes:     * Terminal ileitis without complication [K50.00]      - Colon Polyp      - Rectal Nodule  Procedure(s) (LRB):  COLONOSCOPY (N/A)    Anesthesia: Choice    Description of the findings of the procedure: Procedures completed. See Procedure note for full details.    Findings/Key Components: Procedures completed. See Procedure note for full details.    Prosthetics/Devices: None    Estimated Blood Loss: * No values recorded between 8/1/2018 12:00 AM and 8/1/2018  9:23 AM *         Specimens:   Specimen (12h ago through future)    Start     Ordered    08/01/18 0903  Specimen to Pathology - Surgery  Once     Comments:  1.  Transverse colon polyp      08/01/18 0903    08/01/18 0842  Specimen to Pathology - Surgery  Once,   Status:  Canceled     Comments:  1.   Transverse colon polyp      08/01/18 0902          Discharge Note    SUMMARY     Admit Date: 8/1/2018    Discharge Date and Time: 8/1/2018    Hospital Course (synopsis of major diagnoses, care, treatment, and services provided during the course of the hospital stay):  Procedures completed. See Procedure note for full details. Discharge patient when discharge criteria met.    Final Diagnosis: Post-Op Diagnosis Codes:     * Terminal ileitis without complication [K50.00]      - Colon polyp      - Rectal Nodule  Disposition: Discharge patient when discharge criteria met.    Follow Up/Patient Instructions:       Medications:  Reconciled Home Medications: Current Discharge Medication List      CONTINUE these medications which have NOT CHANGED    Details   amoxicillin (AMOXIL) 875 MG tablet Take 1 tablet (875 mg total) by mouth 2 (two) times daily. Take with food  Qty: 20 tablet,  Refills: 0    Associated Diagnoses: Acute sinusitis, recurrence not specified, unspecified location      atorvastatin (LIPITOR) 40 MG tablet Take 40 mg by mouth once daily.       betamethasone dipropionate (DIPROLENE) 0.05 % cream       cetirizine (ZYRTEC) 10 MG tablet Take 1 tablet by mouth Daily.      cholecalciferol, vitamin D3, 5,000 unit Tab Take by mouth.      fluticasone (FLONASE) 50 mcg/actuation nasal spray 2 sprays (100 mcg total) by Each Nare route every evening.  Qty: 48 g, Refills: 1    Associated Diagnoses: Chronic non-seasonal allergic rhinitis, unspecified trigger      gabapentin (NEURONTIN) 300 MG capsule Take 300 mg by mouth 3 (three) times daily. Take 600 mg, two capsules, at bedtime dose      hydrocodone-acetaminophen 10-325mg (NORCO)  mg Tab Take 1 tablet by mouth every 24 hours as needed.       HYSINGLA ER 40 mg TP24 Take 1 tablet by mouth once daily.       meloxicam (MOBIC) 7.5 MG tablet Take 7.5 mg by mouth every evening.       metFORMIN (GLUCOPHAGE) 500 MG tablet Take 1 tablet (500 mg total) by mouth daily with breakfast.  Qty: 90 tablet, Refills: 0    Associated Diagnoses: Prediabetes      metoprolol succinate (TOPROL XL) 50 MG 24 hr tablet Take 1 tablet (50 mg total) by mouth 2 (two) times daily.  Qty: 180 tablet, Refills: 3    Associated Diagnoses: Essential hypertension      omeprazole (PRILOSEC) 20 MG capsule Take 20 mg by mouth once daily.      promethazine (PHENERGAN) 25 MG tablet Take 1 tablet (25 mg total) by mouth every 6 (six) hours as needed for Nausea.  Qty: 15 tablet, Refills: 0      valACYclovir (VALTREX) 500 MG tablet       valsartan (DIOVAN) 320 MG tablet Take 1 tablet (320 mg total) by mouth once daily.  Qty: 90 tablet, Refills: 3    Associated Diagnoses: Essential hypertension      aspirin 325 MG tablet Take 325 mg by mouth.      aspirin 81 MG Chew Take 81 mg by mouth once daily.              Discharge Procedure Orders  Diet general     Activity as tolerated

## 2018-08-01 NOTE — PROVATION PATIENT INSTRUCTIONS
Discharge Summary/Instructions after an Endoscopic Procedure  Patient Name: Venus Caldwell  Patient MRN: [id number]  Patient YOB: 1950 Wednesday, August 01, 2018 Yrn Hunter III, MD  RESTRICTIONS:  During your procedure today, you received medications for sedation.  These   medications may affect your judgment, balance and coordination.  Therefore,   for 24 hours, you have the following restrictions:   - DO NOT drive a car, operate machinery, make legal/financial decisions,   sign important papers or drink alcohol.    ACTIVITY:  Today: no heavy lifting, straining or running due to procedural   sedation/anesthesia.  The following day: return to full activity including work.  DIET:  Eat and drink normally unless instructed otherwise.     TREATMENT FOR COMMON SIDE EFFECTS:  - Mild abdominal pain, nausea, belching, bloating or excessive gas:  rest,   eat lightly and use a heating pad.  - Sore Throat: treat with throat lozenges and/or gargle with warm salt   water.  - Because air was used during the procedure, expelling large amounts of air   from your rectum or belching is normal.  - If a bowel prep was taken, you may not have a bowel movement for 1-3 days.    This is normal.  SYMPTOMS TO WATCH FOR AND REPORT TO YOUR PHYSICIAN:  1. Abdominal pain or bloating, other than gas cramps.  2. Chest pain.  3. Back pain.  4. Signs of infection such as: chills or fever occurring within 24 hours   after the procedure.  5. Rectal bleeding, which would show as bright red, maroon, or black stools.   (A tablespoon of blood from the rectum is not serious, especially if   hemorrhoids are present.)  6. Vomiting.  7. Weakness or dizziness.  GO DIRECTLY TO THE NEAREST EMERGENCY ROOM IF YOU HAVE ANY OF THE FOLLOWING:      Difficulty breathing              Chills and/or fever over 101 F   Persistent vomiting and/or vomiting blood   Severe abdominal pain   Severe chest pain   Black, tarry stools   Bleeding- more than one  tablespoon   Any other symptom or condition that you feel may need urgent attention  Your doctor recommends these additional instructions:  If any biopsies were taken, your doctors clinic will contact you in 1 to 2   weeks with any results.  - Discharge patient to home (via wheelchair).   - High fiber diet.   - Continue present medications.   - Await pathology results.   - Repeat colonoscopy in 5 years for surveillance based on pathology results.     - Return to GI clinic as previously scheduled.   - Discharge patient to home (via wheelchair).   - High fiber diet.   - Continue present medications.   - Await pathology results.   - Return to GI clinic as previously scheduled.  For questions, problems or results please call your physician Yrn Hunter III, MD at Work:  [phone]  If you have any questions about the above instructions, call the GI   department at [phone] or call the endoscopy unit at [phone]   from 7am until 3 pm.  [facility], EMERGENCY ROOM PHONE NUMBER:   [phone]  IF A COMPLICATION OR EMERGENCY SITUATION ARISES AND YOU ARE UNABLE TO REACH   YOUR PHYSICIAN - GO DIRECTLY TO THE EMERGENCY ROOM.  I have read or have had read to me these discharge instructions for my   procedure and have received a written copy.  I understand these   instructions and will follow-up with my physician if I have any questions.     __________________________________       _____________________________________  Nurse Signature                                          Patient/Designated   Responsible Party Signature  Yrn Hunter III, MD  8/1/2018 9:19:50 AM  This report has been verified and signed electronically.  [employer]

## 2018-08-01 NOTE — ANESTHESIA PREPROCEDURE EVALUATION
08/01/2018  Venus Caldwell is a 68 y.o., female.    Anesthesia Evaluation    I have reviewed the Patient Summary Reports.    I have reviewed the Nursing Notes.   I have reviewed the Medications.     Review of Systems  Anesthesia Hx:  No problems with previous Anesthesia    Social:  Non-Smoker, No Alcohol Use    Hematology/Oncology:         -- Cancer in past history: Oncology Comments: Cancer, uterine and ovarian     EENT/Dental:   chronic allergic rhinitis   Cardiovascular:   Exercise tolerance: poor Hypertension, well controlled CAD asymptomatic  hyperlipidemia ECG has been reviewed. Paroxysmal atrial tachycardia   Pulmonary:   Snore   Renal/:  Renal/ Normal     Hepatic/GI:   Bowel Prep. GERD, well controlled 0530 last drink of fluid.  1700 last solid meal on Monday.  Terminal ileitis   Musculoskeletal:   Arthritis     Neurological:   Neuromuscular Disease,    Endocrine:   Prediabetes   Dermatological:  Skin Normal        Physical Exam  General:  Well nourished    Airway/Jaw/Neck:  Airway Findings: Mallampati: III                Anesthesia Plan  Type of Anesthesia, risks & benefits discussed:  Anesthesia Type:  MAC  Patient's Preference:   Intra-op Monitoring Plan:   Intra-op Monitoring Plan Comments:   Post Op Pain Control Plan:   Post Op Pain Control Plan Comments:   Induction:   IV  Beta Blocker:  Patient is not currently on a Beta-Blocker (No further documentation required).       Informed Consent: Patient understands risks and agrees with Anesthesia plan.  Questions answered.   ASA Score: 3     Day of Surgery Review of History & Physical: I have interviewed and examined the patient. I have reviewed the patient's H&P dated: 08/01/18. There are no significant changes.  H&P update referred to the provider.         Ready For Surgery From Anesthesia Perspective.

## 2018-08-13 ENCOUNTER — OFFICE VISIT (OUTPATIENT)
Dept: INTERNAL MEDICINE | Facility: CLINIC | Age: 68
End: 2018-08-13
Payer: MEDICARE

## 2018-08-13 ENCOUNTER — LAB VISIT (OUTPATIENT)
Dept: LAB | Facility: HOSPITAL | Age: 68
End: 2018-08-13
Attending: FAMILY MEDICINE
Payer: MEDICARE

## 2018-08-13 VITALS
TEMPERATURE: 99 F | HEIGHT: 63 IN | SYSTOLIC BLOOD PRESSURE: 136 MMHG | OXYGEN SATURATION: 99 % | DIASTOLIC BLOOD PRESSURE: 72 MMHG | HEART RATE: 63 BPM | WEIGHT: 161.38 LBS | BODY MASS INDEX: 28.59 KG/M2

## 2018-08-13 DIAGNOSIS — I47.19 PAROXYSMAL ATRIAL TACHYCARDIA: Chronic | ICD-10-CM

## 2018-08-13 DIAGNOSIS — R73.03 PREDIABETES: Primary | Chronic | ICD-10-CM

## 2018-08-13 DIAGNOSIS — M15.9 PRIMARY OSTEOARTHRITIS INVOLVING MULTIPLE JOINTS: Chronic | ICD-10-CM

## 2018-08-13 DIAGNOSIS — I70.0 ABDOMINAL AORTIC ATHEROSCLEROSIS: ICD-10-CM

## 2018-08-13 DIAGNOSIS — I10 ESSENTIAL HYPERTENSION: Chronic | ICD-10-CM

## 2018-08-13 DIAGNOSIS — Z23 NEED FOR SHINGLES VACCINE: ICD-10-CM

## 2018-08-13 DIAGNOSIS — R73.03 PREDIABETES: Chronic | ICD-10-CM

## 2018-08-13 DIAGNOSIS — Z23 NEED FOR TDAP VACCINATION: ICD-10-CM

## 2018-08-13 DIAGNOSIS — E78.2 MIXED HYPERLIPIDEMIA: Chronic | ICD-10-CM

## 2018-08-13 DIAGNOSIS — M54.31 BILATERAL SCIATICA: ICD-10-CM

## 2018-08-13 DIAGNOSIS — M54.32 BILATERAL SCIATICA: ICD-10-CM

## 2018-08-13 PROBLEM — M54.30 SCIATICA: Chronic | Status: ACTIVE | Noted: 2018-08-13

## 2018-08-13 PROBLEM — K50.00 TERMINAL ILEITIS: Status: RESOLVED | Noted: 2018-08-01 | Resolved: 2018-08-13

## 2018-08-13 PROBLEM — M54.30 SCIATICA: Status: ACTIVE | Noted: 2018-08-13

## 2018-08-13 LAB
ALBUMIN SERPL BCP-MCNC: 3.2 G/DL
ALP SERPL-CCNC: 121 U/L
ALT SERPL W/O P-5'-P-CCNC: 30 U/L
ANION GAP SERPL CALC-SCNC: 5 MMOL/L
AST SERPL-CCNC: 25 U/L
BILIRUB SERPL-MCNC: 0.5 MG/DL
BUN SERPL-MCNC: 16 MG/DL
CALCIUM SERPL-MCNC: 9.4 MG/DL
CHLORIDE SERPL-SCNC: 107 MMOL/L
CO2 SERPL-SCNC: 30 MMOL/L
CREAT SERPL-MCNC: 0.6 MG/DL
EST. GFR  (AFRICAN AMERICAN): >60 ML/MIN/1.73 M^2
EST. GFR  (NON AFRICAN AMERICAN): >60 ML/MIN/1.73 M^2
ESTIMATED AVG GLUCOSE: 128 MG/DL
GLUCOSE SERPL-MCNC: 87 MG/DL
HBA1C MFR BLD HPLC: 6.1 %
POTASSIUM SERPL-SCNC: 4.6 MMOL/L
PROT SERPL-MCNC: 6.7 G/DL
SODIUM SERPL-SCNC: 142 MMOL/L

## 2018-08-13 PROCEDURE — 99214 OFFICE O/P EST MOD 30 MIN: CPT | Mod: S$PBB,,, | Performed by: FAMILY MEDICINE

## 2018-08-13 PROCEDURE — 99999 PR PBB SHADOW E&M-EST. PATIENT-LVL V: CPT | Mod: PBBFAC,,, | Performed by: FAMILY MEDICINE

## 2018-08-13 PROCEDURE — 36415 COLL VENOUS BLD VENIPUNCTURE: CPT | Mod: PO

## 2018-08-13 PROCEDURE — 83036 HEMOGLOBIN GLYCOSYLATED A1C: CPT

## 2018-08-13 PROCEDURE — 99215 OFFICE O/P EST HI 40 MIN: CPT | Mod: PBBFAC,PO | Performed by: FAMILY MEDICINE

## 2018-08-13 PROCEDURE — 80053 COMPREHEN METABOLIC PANEL: CPT

## 2018-08-13 RX ORDER — OLMESARTAN MEDOXOMIL 40 MG/1
40 TABLET, FILM COATED ORAL DAILY
COMMUNITY
Start: 2018-08-02 | End: 2019-08-06

## 2018-08-13 NOTE — PROGRESS NOTES
Subjective:   Patient ID: Venus Caldwell is a 68 y.o. female.  Chief Complaint:  Follow-up      Patient presents for follow-up on   Three prediabetes.    Previous A1c 5.8%.  Started metformin 500 mg daily.  Reports compliance.  Denies side effects.  Needs repeat A1c and CMP.    Hypertension previously well controlled.  Valsartan recalled.  Changed to Benicar 40 mg daily by Cardiology.  Continue Toprol XL 50 mg twice a day.  Reports compliance denies side effects.    Hyperlipidemia, but recent study also confirm aortic atherosclerosis, lipid panel with LDL at goal.  On aspirin and statin.    Chronic low back pain with sciatica.  Arthritis multiple sites.  Sees Pain Management at [facility].  They want to discontinue her Norco.  She requests referral to a another pain management physician for 2nd opinion.    Still needs tetanus and shingles vaccine.    Colonoscopy with hyperplastic polyp.  Repeat 10 years.  Also noted small nodule rectal junction.  Surgery consult scheduled for this week.  No new complaints or concerns today.      Review of Systems   Constitutional: Negative for chills, diaphoresis, fatigue and fever.   Eyes: Negative for visual disturbance.   Respiratory: Negative for cough, chest tightness, shortness of breath and wheezing.    Cardiovascular: Negative for chest pain, palpitations and leg swelling.   Gastrointestinal: Negative for abdominal distention, abdominal pain, constipation, diarrhea, nausea and vomiting.   Endocrine: Negative for polydipsia, polyphagia and polyuria.   Genitourinary: Negative for difficulty urinating, dysuria, flank pain, frequency, hematuria and urgency.   Musculoskeletal: Positive for arthralgias, back pain and gait problem. Negative for myalgias.   Skin: Negative for rash.   Neurological: Positive for weakness and numbness. Negative for dizziness, syncope, light-headedness and headaches.   Psychiatric/Behavioral: Negative for sleep disturbance. The patient is not  "nervous/anxious.      Objective:   BP (!) 142/70 (BP Location: Right arm, Patient Position: Sitting, BP Method: Large (Manual))   Pulse 63   Temp 98.6 °F (37 °C) (Tympanic)   Ht 5' 3" (1.6 m)   Wt 73.2 kg (161 lb 6 oz)   SpO2 99%   BMI 28.59 kg/m²     Physical Exam   Constitutional: Vital signs are normal. She appears well-developed and well-nourished. No distress.   Eyes: No scleral icterus.   Neck: No JVD present. Carotid bruit is not present.   Cardiovascular: Normal rate, regular rhythm and normal heart sounds. Exam reveals no gallop and no friction rub.   No murmur heard.  Pulmonary/Chest: Effort normal and breath sounds normal. She has no wheezes. She has no rhonchi. She has no rales.   Abdominal: Soft. She exhibits no distension. There is no hepatosplenomegaly. There is no tenderness. There is no rebound and no guarding.   Musculoskeletal: She exhibits no edema.   Neurological: Gait abnormal.   Skin: Skin is warm and dry. No rash noted.   Psychiatric: She has a normal mood and affect.   Nursing note and vitals reviewed.    Assessment:     1. Prediabetes    2. Essential hypertension    3. Paroxysmal atrial tachycardia    4. Mixed hyperlipidemia    5. Abdominal aortic atherosclerosis    6. Primary osteoarthritis involving multiple joints    7. Bilateral sciatica    8. Need for Tdap vaccination    9. Need for shingles vaccine      Plan:   Prediabetes  -     Comprehensive metabolic panel; Future; Expected date: 08/13/2018  -     Hemoglobin A1c; Future; Expected date: 08/13/2018  Check labs.  Adjust metformin 500 mg daily as indicated.      Essential hypertension  Paroxysmal atrial tachycardia  -     Comprehensive metabolic panel; Future; Expected date: 08/13/2018  Blood pressure controlled, remains at goal, with changed to Benicar 40 mg daily.  Continue Toprol-XL 50 mg twice a day.      Mixed hyperlipidemia  Abdominal aortic atherosclerosis  Stable.  Asymptomatic.  Most recent lipid panel with LDL at goal " for treatment.    Continue aspirin and statin.      Primary osteoarthritis involving multiple joints  Bilateral sciatica  -     Ambulatory referral to Pain Clinic    [facility]  -     diphth,pertus,acell,,tetanus (BOOSTRIX) 2.5-8-5 Lf-mcg-Lf/0.5mL Susp; Inject 0.5 mLs into the muscle once. for 1 dose  Dispense: 0.5 mL; Refill: 0  -     varicella-zoster gE-AS01B, PF, (SHINGRIX, PF,) 50 mcg/0.5 mL injection; Inject 0.5 mLs into the muscle once. for 1 dose  Dispense: 0.5 mL; Refill: 0    Follow up with surgery as scheduled.  RTC 6 months or sooner as needed

## 2018-08-14 DIAGNOSIS — R73.03 PREDIABETES: ICD-10-CM

## 2018-08-14 RX ORDER — METFORMIN HYDROCHLORIDE 500 MG/1
500 TABLET ORAL
Qty: 90 TABLET | Refills: 3 | Status: SHIPPED | OUTPATIENT
Start: 2018-08-14 | End: 2019-08-13

## 2018-08-16 ENCOUNTER — OFFICE VISIT (OUTPATIENT)
Dept: SURGERY | Facility: CLINIC | Age: 68
End: 2018-08-16
Payer: MEDICARE

## 2018-08-16 VITALS
HEIGHT: 63 IN | BODY MASS INDEX: 28.59 KG/M2 | TEMPERATURE: 98 F | HEART RATE: 64 BPM | SYSTOLIC BLOOD PRESSURE: 131 MMHG | WEIGHT: 161.38 LBS | DIASTOLIC BLOOD PRESSURE: 66 MMHG

## 2018-08-16 DIAGNOSIS — D37.5 NEOPLASM OF UNCERTAIN BEHAVIOR OF RECTUM: Primary | ICD-10-CM

## 2018-08-16 PROCEDURE — 99204 OFFICE O/P NEW MOD 45 MIN: CPT | Mod: S$PBB,,, | Performed by: SURGERY

## 2018-08-16 PROCEDURE — 99999 PR PBB SHADOW E&M-EST. PATIENT-LVL IV: CPT | Mod: PBBFAC,,, | Performed by: SURGERY

## 2018-08-16 PROCEDURE — 99214 OFFICE O/P EST MOD 30 MIN: CPT | Mod: PBBFAC | Performed by: SURGERY

## 2018-08-16 RX ORDER — LIDOCAINE HYDROCHLORIDE 10 MG/ML
1 INJECTION, SOLUTION EPIDURAL; INFILTRATION; INTRACAUDAL; PERINEURAL ONCE
Status: DISCONTINUED | OUTPATIENT
Start: 2018-08-16 | End: 2019-06-10

## 2018-08-16 RX ORDER — ONDANSETRON 4 MG/1
8 TABLET, ORALLY DISINTEGRATING ORAL EVERY 8 HOURS PRN
Status: CANCELLED | OUTPATIENT
Start: 2018-08-16

## 2018-08-16 NOTE — LETTER
August 16, 2018      Yrn Hunter III, MD  [address] [facility] [address]  [place] [id number]           [facility]  [address] [place] [id number]  Phone: [phone]  Fax: [phone]          Patient: Venus Caldwell   MR Number: [id number]   YOB: 1950   Date of Visit: 8/16/2018       Dear Dr. Yrn Hunter III:    Thank you for referring Venus Caldwell to me for evaluation. Attached you will find relevant portions of my assessment and plan of care.    If you have questions, please do not hesitate to call me. I look forward to following Venus Caldwell along with you.    Sincerely,    Yrn Balderrama MD    Enclosure  CC:  No Recipients    If you would like to receive this communication electronically, please contact [email or web address] or [phone] to request more information on [employer] Link access.    For providers and/or their staff who would like to refer a patient to [facility], please contact us through our one-stop-shop provider referral line, [facility] , at [phone].    If you feel you have received this communication in error or would no longer like to receive these types of communications, please e-mail [email or web address]

## 2018-08-16 NOTE — PROGRESS NOTES
Patient ID: Venus Caldwell is a 68 y.o. female.    Rectal mass    Chief Complaint: Consult      HPI:  Patient underwent a colonoscopy for abdominal pain and findings of possible inflammation of her terminal ileum on her CT scan.  She was found the have a 6 mm mass just inside her rectum.  She is sent for surgical evaluation to discuss operative biopsy.    Patient reports no blood per rectum.  She denies any significant abdominal pain at this point.    She does have a history of pelvic radiation        Review of Systems   Constitutional: Negative for activity change and unexpected weight change.   HENT: Negative for hearing loss, rhinorrhea and trouble swallowing.    Eyes: Negative for discharge and visual disturbance.   Respiratory: Negative for chest tightness and wheezing.    Cardiovascular: Negative for chest pain and palpitations.   Gastrointestinal: Negative for blood in stool, constipation, diarrhea and vomiting.   Endocrine: Negative for polydipsia and polyuria.   Genitourinary: Negative for difficulty urinating, dysuria, hematuria and menstrual problem.   Musculoskeletal: Negative for arthralgias, joint swelling and neck pain.   Neurological: Negative for weakness and headaches.   Psychiatric/Behavioral: Negative for confusion and dysphoric mood.       Current Outpatient Medications   Medication Sig Dispense Refill    aspirin 81 MG Chew Take 81 mg by mouth once daily.      atorvastatin (LIPITOR) 40 MG tablet Take 40 mg by mouth once daily.       BENICAR 40 mg tablet       betamethasone dipropionate (DIPROLENE) 0.05 % cream       cetirizine (ZYRTEC) 10 MG tablet Take 1 tablet by mouth Daily.      cholecalciferol, vitamin D3, 5,000 unit Tab Take by mouth.      fluticasone (FLONASE) 50 mcg/actuation nasal spray 2 sprays (100 mcg total) by Each Nare route every evening. 48 g 1    gabapentin (NEURONTIN) 300 MG capsule Take 300 mg by mouth 3 (three) times daily. Take 600 mg, two capsules, at bedtime dose       hydrocodone-acetaminophen 10-325mg (NORCO)  mg Tab Take 1 tablet by mouth every 24 hours as needed.       HYSINGLA ER 40 mg TP24 Take 1 tablet by mouth once daily.       meloxicam (MOBIC) 7.5 MG tablet Take 7.5 mg by mouth every evening.       metFORMIN (GLUCOPHAGE) 500 MG tablet Take 1 tablet (500 mg total) by mouth daily with breakfast. 90 tablet 3    metoprolol succinate (TOPROL XL) 50 MG 24 hr tablet Take 1 tablet (50 mg total) by mouth 2 (two) times daily. 180 tablet 3    omeprazole (PRILOSEC) 20 MG capsule Take 20 mg by mouth once daily.      valACYclovir (VALTREX) 500 MG tablet       promethazine (PHENERGAN) 25 MG tablet Take 1 tablet (25 mg total) by mouth every 6 (six) hours as needed for Nausea. 15 tablet 0     Current Facility-Administered Medications   Medication Dose Route Frequency Provider Last Rate Last Dose    ceFOXItin (MEFOXIN) 2 g in dextrose 5 % 100 mL IVPB  2 g Intravenous On Call Procedure Yrn aBlderrama MD        lidocaine (PF) 10 mg/ml (1%) injection 10 mg  1 mL Intradermal Once Yrn Balderrama MD           Review of patient's allergies indicates:   Allergen Reactions    Sulfa (sulfonamide antibiotics) Anaphylaxis     Pt became hypoxic after taking sulfa drugs as a child      Buprenorphine Rash       Past Medical History:   Diagnosis Date    Cancer, uterine     Hypertension     Sciatica        Past Surgical History:   Procedure Laterality Date    GALLBLADDER SURGERY      HYSTERECTOMY      TOTAL KNEE ARTHROPLASTY         Family History   Problem Relation Age of Onset    Diabetes Mother     Cataracts Mother     Diabetes Brother     Cataracts Maternal Grandmother     Breast cancer Maternal Aunt        Social History     Socioeconomic History    Marital status:      Spouse name: Not on file    Number of children: Not on file    Years of education: Not on file    Highest education level: Not on file   Social Needs    Financial resource strain: Not  on file    Food insecurity - worry: Not on file    Food insecurity - inability: Not on file    Transportation needs - medical: Not on file    Transportation needs - non-medical: Not on file   Occupational History    Not on file   Tobacco Use    Smoking status: Never Smoker    Smokeless tobacco: Never Used   Substance and Sexual Activity    Alcohol use: No    Drug use: No    Sexual activity: Not on file   Other Topics Concern    Not on file   Social History Narrative    Not on file       Vitals:    08/16/18 1532   BP: 131/66   Pulse: 64   Temp: 98.4 °F (36.9 °C)       Physical Exam   Constitutional: She appears well-developed and well-nourished. No distress.   HENT:   Head: Normocephalic and atraumatic.   Eyes: No scleral icterus.   Cardiovascular: Normal rate and intact distal pulses.   Pulmonary/Chest: Breath sounds normal.   Abdominal: Soft. Bowel sounds are normal.   Well-healed incision    Rectal exam was deferred   Musculoskeletal: Normal range of motion.   Skin: Skin is warm. Capillary refill takes less than 2 seconds.   Psychiatric: Her behavior is normal. Judgment and thought content normal.   Vitals reviewed.    CT scan images and reports were reviewed.  Colonoscopy images and reports were reviewed    Assessment & Plan:    small submucosal mass just inside the the anus/lower portion the rectum.    Exam under anesthesia and excision of the mass.  The risks of surgery include infection, bleeding and poor wound healing.  Questions were answered.  Consent was obtained.  I explained that we do work with a physician's assistant and medical students in the hospital

## 2018-08-16 NOTE — PATIENT INSTRUCTIONS
"Surgery scheduled for August 29th at the 3rd in the morning.  The hospital typically cause you with the time for arrival.  If for any reason you are not contacted by them please arrive at 7:00 a.m..    On the morning of surgery need to do 1 Fleet's enema before coming to the hospital.    Please stop your aspirin on August 22nd.    Please take all your usual medicines with a simple water on the morning of surgery. If any of your preop labs are abnormal we will let you know    [facility]  Instructions for Patients and Families    You are invited to share your experience with me and my staff.  If you receive a survey in the mail, please return it at your convenience, or complete a brief survey on [facility].  We value your opinion!        Did you know that [facility] can be used to make appointments?  Just select "Schedule Appointment" under the "Visits" menu.    Notify you if any of your preop laboratories show abnormalities.  I    Before surgery:  The pre-op nurse from the hospital will call you before the day of your surgery to confirm your arrival time.  The time of your surgery may change due to emergencies or other unforeseen events.  Do not eat or drink anything after midnight the night before your procedure, except for clear liquids up to three hours before your surgery time, and sips of water with medication.  If you are not diabetic, it is recommended that you drink a glass of clear fruit juice (apple, grape, cranberry, not orange) three hours before your surgery time, but nothing after that.  If you are diabetic, you may have water or sugar-free clear liquids such as Crystal Light up to three hours before your surgery time.    Day of Surgery:  · You will go to a pre-op area where an IV will be started and you will speak to the anesthesiologist and surgeon.  · Your family will be updated throughout the operation.  After surgery, your family member may be taken to a private room " for consultation with the surgeon.  This is for the privacy of your medical information and does not necessarily mean there is anything wrong.    If your incisions have:  · Glue:  You may take a bath or shower immediately and wash your skin as you normally do.  The glue will eventually crumble or peel off. Do not let your incisions soak under water.  · Strips: Leave them on, but it is OK if they fall off on their own. It is OK to get them wet 48 hours after surgery.  · Bandage: You may remove it 2 days after your surgery, and then you may leave the incision open and take a shower or bath, unless otherwise instructed. If your bandage has clear plastic, you may shower with it on and then remove it 2 days after surgery.    Activities  · Walking is recommended after surgery; bed rest is not recommended unless specifically ordered.  · If you have had abdominal surgery, do not lift over 20 pounds for 4 weeks after surgery.  · If you have had hernia surgery, do not lift over 20 pounds for 6 weeks after surgery.  · You may drive when you are off your post-operative pain medication.  · Do not smoke after surgery, it decreases your ability to heal and increases the risk of infection and pneumonia.    Diet:  Drink lots of fluids after surgery.  You might not have much of an appetite at first, you may eat regular food when you feel ready, unless you are given special diet instructions.    Post-operative symptoms and medications  · It is safe to take over-the-counter medications for constipation, heartburn, sleep, or itching if needed.  Prescription pain medication may contain acetaminophen (Tylenol), so you should not take additional acetaminophen (Tylenol) at the same time as your pain medication.  · You may experience nausea, low fever/chills, and clear drainage from your incision, sometimes up to a month after surgery.  Notify our office if you have fever over 101 degrees, worsening redness around your incision, thick cloudy  "drainage, or inability to drink any liquids.  · You will experience some level of pain after surgery.  Your pain medication should help with the pain, but may not be able to eliminate it entirely.  Pain will decrease with time, and most pain will be gone by 4 to 6 weeks after surgery.  · We are not able to call in prescriptions for pain medication after hours or on weekends.  If your pain medication is ineffective or you will run out soon and need a refill, please call our office at [phone].  We are not able to replace pain medication that has been lost or stolen.    After surgery, you will either be discharged home or admitted to the hospital.  If you are admitted to the hospital, one of the surgeons or a physician assistant will see you once a day.  Due to scheduled surgery, we may see you in the afternoon or at night; however, your nurse is able to page us at any time.  If you feel there is a situation that is not being addressed properly, please dial [phone] from the phone in your room.    Follow-up appointment  · You will see your surgeon or a physician assistant in clinic for a follow-up appointment at either our [facility] (off [facility]) or [facility] (off [facility]) locations, usually between one and four weeks after your surgery.  · The hospital nurses can make your follow up appointment, or you can make it online at [email or web address] or call [phone].  · If you have a smartphone with the [employer] krista, please let us know if you would like to do a phone visit instead of a post-op office visit.    If you are signed up for [facility], install the "[employer]" krista to access your test results, send messages to your doctors, and schedule appointments from your smartphone!    "

## 2018-08-16 NOTE — H&P (VIEW-ONLY)
Patient ID: Venus Caldwell is a 68 y.o. female.    Rectal mass    Chief Complaint: Consult      HPI:  Patient underwent a colonoscopy for abdominal pain and findings of possible inflammation of her terminal ileum on her CT scan.  She was found the have a 6 mm mass just inside her rectum.  She is sent for surgical evaluation to discuss operative biopsy.    Patient reports no blood per rectum.  She denies any significant abdominal pain at this point.    She does have a history of pelvic radiation        Review of Systems   Constitutional: Negative for activity change and unexpected weight change.   HENT: Negative for hearing loss, rhinorrhea and trouble swallowing.    Eyes: Negative for discharge and visual disturbance.   Respiratory: Negative for chest tightness and wheezing.    Cardiovascular: Negative for chest pain and palpitations.   Gastrointestinal: Negative for blood in stool, constipation, diarrhea and vomiting.   Endocrine: Negative for polydipsia and polyuria.   Genitourinary: Negative for difficulty urinating, dysuria, hematuria and menstrual problem.   Musculoskeletal: Negative for arthralgias, joint swelling and neck pain.   Neurological: Negative for weakness and headaches.   Psychiatric/Behavioral: Negative for confusion and dysphoric mood.       Current Outpatient Medications   Medication Sig Dispense Refill    aspirin 81 MG Chew Take 81 mg by mouth once daily.      atorvastatin (LIPITOR) 40 MG tablet Take 40 mg by mouth once daily.       BENICAR 40 mg tablet       betamethasone dipropionate (DIPROLENE) 0.05 % cream       cetirizine (ZYRTEC) 10 MG tablet Take 1 tablet by mouth Daily.      cholecalciferol, vitamin D3, 5,000 unit Tab Take by mouth.      fluticasone (FLONASE) 50 mcg/actuation nasal spray 2 sprays (100 mcg total) by Each Nare route every evening. 48 g 1    gabapentin (NEURONTIN) 300 MG capsule Take 300 mg by mouth 3 (three) times daily. Take 600 mg, two capsules, at bedtime dose       hydrocodone-acetaminophen 10-325mg (NORCO)  mg Tab Take 1 tablet by mouth every 24 hours as needed.       HYSINGLA ER 40 mg TP24 Take 1 tablet by mouth once daily.       meloxicam (MOBIC) 7.5 MG tablet Take 7.5 mg by mouth every evening.       metFORMIN (GLUCOPHAGE) 500 MG tablet Take 1 tablet (500 mg total) by mouth daily with breakfast. 90 tablet 3    metoprolol succinate (TOPROL XL) 50 MG 24 hr tablet Take 1 tablet (50 mg total) by mouth 2 (two) times daily. 180 tablet 3    omeprazole (PRILOSEC) 20 MG capsule Take 20 mg by mouth once daily.      valACYclovir (VALTREX) 500 MG tablet       promethazine (PHENERGAN) 25 MG tablet Take 1 tablet (25 mg total) by mouth every 6 (six) hours as needed for Nausea. 15 tablet 0     Current Facility-Administered Medications   Medication Dose Route Frequency Provider Last Rate Last Dose    ceFOXItin (MEFOXIN) 2 g in dextrose 5 % 100 mL IVPB  2 g Intravenous On Call Procedure Yrn Balderrama MD        lidocaine (PF) 10 mg/ml (1%) injection 10 mg  1 mL Intradermal Once Yrn Balderrama MD           Review of patient's allergies indicates:   Allergen Reactions    Sulfa (sulfonamide antibiotics) Anaphylaxis     Pt became hypoxic after taking sulfa drugs as a child      Buprenorphine Rash       Past Medical History:   Diagnosis Date    Cancer, uterine     Hypertension     Sciatica        Past Surgical History:   Procedure Laterality Date    GALLBLADDER SURGERY      HYSTERECTOMY      TOTAL KNEE ARTHROPLASTY         Family History   Problem Relation Age of Onset    Diabetes Mother     Cataracts Mother     Diabetes Brother     Cataracts Maternal Grandmother     Breast cancer Maternal Aunt        Social History     Socioeconomic History    Marital status:      Spouse name: Not on file    Number of children: Not on file    Years of education: Not on file    Highest education level: Not on file   Social Needs    Financial resource strain: Not  on file    Food insecurity - worry: Not on file    Food insecurity - inability: Not on file    Transportation needs - medical: Not on file    Transportation needs - non-medical: Not on file   Occupational History    Not on file   Tobacco Use    Smoking status: Never Smoker    Smokeless tobacco: Never Used   Substance and Sexual Activity    Alcohol use: No    Drug use: No    Sexual activity: Not on file   Other Topics Concern    Not on file   Social History Narrative    Not on file       Vitals:    08/16/18 1532   BP: 131/66   Pulse: 64   Temp: 98.4 °F (36.9 °C)       Physical Exam   Constitutional: She appears well-developed and well-nourished. No distress.   HENT:   Head: Normocephalic and atraumatic.   Eyes: No scleral icterus.   Cardiovascular: Normal rate and intact distal pulses.   Pulmonary/Chest: Breath sounds normal.   Abdominal: Soft. Bowel sounds are normal.   Well-healed incision    Rectal exam was deferred   Musculoskeletal: Normal range of motion.   Skin: Skin is warm. Capillary refill takes less than 2 seconds.   Psychiatric: Her behavior is normal. Judgment and thought content normal.   Vitals reviewed.    CT scan images and reports were reviewed.  Colonoscopy images and reports were reviewed    Assessment & Plan:    small submucosal mass just inside the the anus/lower portion the rectum.    Exam under anesthesia and excision of the mass.  The risks of surgery include infection, bleeding and poor wound healing.  Questions were answered.  Consent was obtained.  I explained that we do work with a physician's assistant and medical students in the hospital

## 2018-08-21 ENCOUNTER — LAB VISIT (OUTPATIENT)
Dept: LAB | Facility: HOSPITAL | Age: 68
End: 2018-08-21
Attending: SURGERY
Payer: MEDICARE

## 2018-08-21 ENCOUNTER — CLINICAL SUPPORT (OUTPATIENT)
Dept: CARDIOLOGY | Facility: CLINIC | Age: 68
End: 2018-08-21
Payer: MEDICARE

## 2018-08-21 DIAGNOSIS — D37.5 NEOPLASM OF UNCERTAIN BEHAVIOR OF RECTUM: ICD-10-CM

## 2018-08-21 LAB
ALBUMIN SERPL BCP-MCNC: 3.5 G/DL
ALP SERPL-CCNC: 126 U/L
ALT SERPL W/O P-5'-P-CCNC: 19 U/L
ANION GAP SERPL CALC-SCNC: 8 MMOL/L
AST SERPL-CCNC: 15 U/L
BASOPHILS # BLD AUTO: 0.07 K/UL
BASOPHILS NFR BLD: 0.8 %
BILIRUB SERPL-MCNC: 0.5 MG/DL
BUN SERPL-MCNC: 15 MG/DL
CALCIUM SERPL-MCNC: 9.4 MG/DL
CHLORIDE SERPL-SCNC: 105 MMOL/L
CO2 SERPL-SCNC: 27 MMOL/L
CREAT SERPL-MCNC: 0.6 MG/DL
DIFFERENTIAL METHOD: ABNORMAL
EOSINOPHIL # BLD AUTO: 0.3 K/UL
EOSINOPHIL NFR BLD: 3.9 %
ERYTHROCYTE [DISTWIDTH] IN BLOOD BY AUTOMATED COUNT: 17.2 %
EST. GFR  (AFRICAN AMERICAN): >60 ML/MIN/1.73 M^2
EST. GFR  (NON AFRICAN AMERICAN): >60 ML/MIN/1.73 M^2
GLUCOSE SERPL-MCNC: 92 MG/DL
HCT VFR BLD AUTO: 32.9 %
HGB BLD-MCNC: 9.8 G/DL
IMM GRANULOCYTES # BLD AUTO: 0.02 K/UL
IMM GRANULOCYTES NFR BLD AUTO: 0.2 %
LYMPHOCYTES # BLD AUTO: 2.3 K/UL
LYMPHOCYTES NFR BLD: 27 %
MCH RBC QN AUTO: 25.4 PG
MCHC RBC AUTO-ENTMCNC: 29.8 G/DL
MCV RBC AUTO: 85 FL
MONOCYTES # BLD AUTO: 0.6 K/UL
MONOCYTES NFR BLD: 6.9 %
NEUTROPHILS # BLD AUTO: 5.2 K/UL
NEUTROPHILS NFR BLD: 61.2 %
NRBC BLD-RTO: 0 /100 WBC
PLATELET # BLD AUTO: 398 K/UL
PMV BLD AUTO: 9.6 FL
POTASSIUM SERPL-SCNC: 4.3 MMOL/L
PROT SERPL-MCNC: 7.2 G/DL
RBC # BLD AUTO: 3.86 M/UL
SODIUM SERPL-SCNC: 140 MMOL/L
WBC # BLD AUTO: 8.42 K/UL

## 2018-08-21 PROCEDURE — 85025 COMPLETE CBC W/AUTO DIFF WBC: CPT

## 2018-08-21 PROCEDURE — 80053 COMPREHEN METABOLIC PANEL: CPT

## 2018-08-21 PROCEDURE — 36415 COLL VENOUS BLD VENIPUNCTURE: CPT

## 2018-08-21 PROCEDURE — 93010 ELECTROCARDIOGRAM REPORT: CPT | Mod: S$PBB,,, | Performed by: INTERNAL MEDICINE

## 2018-08-21 PROCEDURE — 93005 ELECTROCARDIOGRAM TRACING: CPT | Mod: PBBFAC | Performed by: INTERNAL MEDICINE

## 2018-08-27 NOTE — PRE ADMISSION SCREENING
Pre op instructions reviewed with patient per phone:    To confirm, Your surgeon has instructed you:  Surgery is scheduled 08/29/2018 at 0830  .  Pre admit office to call afternoon prior to surgery with final arrival time.  If surgery is on Monday, Pre admit office to call Friday afternoon with with final arrival time      Please report to [facility] 1st floor main lobby by 7am.      INSTRUCTIONS IMPORTANT!!!  ¨ No smoking after 12 midnight, the night before surgery.  ¨ No solid food after 12 midnight, but you may have clear liquids up until 3 hours prior to surgery.  This includes: grape, cranberry, and apple juice (not orange, and no coffee.)   ¨ OK to brush teeth, but no gum, candy or mints!    ¨ Take only these medicines with a small swallow of water-morning of surgery.  Benicar,toprol,gabapentin,norco, prilosec      ____  Do not wear makeup, including mascara.  ____  No powder, lotions or creams to surgical area.  ____  Please remove all jewelry, including piercings and leave at home.  ____  No money or valuables needed. Please leave at home.  ____  Please bring identification and insurance information to hospital.  ____  If going home the same day, arrange for a ride home. You will not be able to   drive if Anesthesia was used.  ____  Children, under 12 years old, must remain in the waiting room with an adult.  They are not allowed in patient areas.  ____  Wear loose fitting clothing. Allow for dressings, bandages.  ____  Stop Aspirin, Ibuprofen, Motrin and Aleve at least 5-7 days before surgery, unless otherwise instructed by your doctor, or the nurse.   You MAY use Tylenol/acetaminophen until day of surgery.  ____  If you take diabetic medication, do not take am of surgery unless instructed by   Doctor.  ____ Stop taking any Fish Oil supplement or any Vitamins that contain Vitamin E at least 5 days prior to surgery.          Bathing Instructions-- The night before surgery and the  morning prior to coming to the hospital:   -Do not shave the surgical area.   -Shower and wash your hair and body as usual with your regular soap and shampoo.   -Rinse your hair and body completely.   -Use one packet of hibiclens to wash the surgical site (using your hand) gently for 5 minutes.  Do not scrub you skin too hard.   -Do not use hibiclens on your head, face, or genitals.   -Do not wash with regular soap after you use the hibiclens.   -Rinse your body thoroughly.   -Dry with clean, soft towel.  Do not use lotion, cream, deodorant, or powders on   the surgical site.    Use antibacterial soap in place of hibiclens if your surgery is on the head, face or genitals.         Surgical Site Infection    Prevention of surgical site infections:     -Keep incisions clean and dry.   -Do not soak/submerge incisions in water until completely healed.   -Do not apply lotions, powders, creams, or deodorants to site.   -Always make sure hands are cleaned with antibacterial soap/ alcohol-based   prior to touching the surgical site.  (This includes doctors, nurses, staff, and yourself.)    Signs and symptoms:   -Redness and pain around the area where you had surgery   -Drainage of cloudy fluid from your surgical wound   -Fever over 100.4  I have read or had read and explained to me, and understand the above information.

## 2018-08-28 ENCOUNTER — ANESTHESIA EVENT (OUTPATIENT)
Dept: SURGERY | Facility: HOSPITAL | Age: 68
End: 2018-08-28
Payer: MEDICARE

## 2018-08-28 NOTE — ANESTHESIA PREPROCEDURE EVALUATION
08/28/2018  Micheal Caldwell is a 68 y.o., female.    Anesthesia Evaluation    I have reviewed the Patient Summary Reports.    I have reviewed the Nursing Notes.   I have reviewed the Medications.     Review of Systems  Anesthesia Hx:  Denies Family Hx of Anesthesia complications.   Denies Personal Hx of Anesthesia complications.   Hematology/Oncology:         -- Anemia: Current/Recent Cancer. (anal mass) --  Cancer in past history (uterine cancer):    Cardiovascular:   Hypertension CAD  Dysrhythmias (paroxysmal atrial tachycardia)     Hepatic/GI:   GERD    Musculoskeletal:   Arthritis     Endocrine:   Diabetes        Physical Exam  General:  Obesity    Airway/Jaw/Neck:  Airway Findings: Mallampati: II      Chest/Lungs:  Chest/Lungs Findings: Clear to auscultation, Normal Respiratory Rate     Heart/Vascular:  Heart Findings: Rate: Normal  Rhythm: Regular Rhythm        Mental Status:  Mental Status Findings:  Cooperative         Anesthesia Plan  Type of Anesthesia, risks & benefits discussed:  Anesthesia Type:   general  Patient's Preference:   Intra-op Monitoring Plan:   Intra-op Monitoring Plan Comments:   Post Op Pain Control Plan:  multimodal analgesia and per primary service following discharge from PACU  Post Op Pain Control Plan Comments:   Induction:   IV  Beta Blocker:  Patient is on a Beta-Blocker and has received one dose within the past 24 hours (No further documentation required).       Informed Consent: Patient understands risks and agrees with Anesthesia plan.  Questions answered. Anesthesia consent signed with patient.  ASA Score:  3     Day of Surgery Review of History & Physical: I have interviewed and examined the patient. I have reviewed the patient's H&P dated:  8/13/18. There are no significant changes.          Ready For Surgery From Anesthesia Perspective.   Results for CHRISTENMICHEAL (MRN  [id number]) as of 8/28/2018 12:28   Ref. Range 8/21/2018 10:23   WBC Latest Ref Range: 3.90 - 12.70 K/uL 8.42   RBC Latest Ref Range: 4.00 - 5.40 M/uL 3.86 (L)   Hemoglobin Latest Ref Range: 12.0 - 16.0 g/dL 9.8 (L)   Hematocrit Latest Ref Range: 37.0 - 48.5 % 32.9 (L)   MCV Latest Ref Range: 82 - 98 fL 85   MCH Latest Ref Range: 27.0 - 31.0 pg 25.4 (L)   MCHC Latest Ref Range: 32.0 - 36.0 g/dL 29.8 (L)   RDW Latest Ref Range: 11.5 - 14.5 % 17.2 (H)   Platelets Latest Ref Range: 150 - 350 K/uL 398 (H)     Results for KRISTEN VELÁSQUEZA DANYELLE (MRN [id number]) as of 8/28/2018 12:28   Ref. Range 8/21/2018 10:39   Sodium Latest Ref Range: 136 - 145 mmol/L 140   Potassium Latest Ref Range: 3.5 - 5.1 mmol/L 4.3   Chloride Latest Ref Range: 95 - 110 mmol/L 105   CO2 Latest Ref Range: 23 - 29 mmol/L 27   Anion Gap Latest Ref Range: 8 - 16 mmol/L 8   BUN, Bld Latest Ref Range: 8 - 23 mg/dL 15   Creatinine Latest Ref Range: 0.5 - 1.4 mg/dL 0.6   eGFR if non African American Latest Ref Range: >60 mL/min/1.73 m^2 >60.0   eGFR if African American Latest Ref Range: >60 mL/min/1.73 m^2 >60.0   Glucose Latest Ref Range: 70 - 110 mg/dL 92   Calcium Latest Ref Range: 8.7 - 10.5 mg/dL 9.4   Alkaline Phosphatase Latest Ref Range: 55 - 135 U/L 126   Total Protein Latest Ref Range: 6.0 - 8.4 g/dL 7.2   Albumin Latest Ref Range: 3.5 - 5.2 g/dL 3.5   Total Bilirubin Latest Ref Range: 0.1 - 1.0 mg/dL 0.5   AST Latest Ref Range: 10 - 40 U/L 15   ALT Latest Ref Range: 10 - 44 U/L 19

## 2018-08-29 ENCOUNTER — ANESTHESIA (OUTPATIENT)
Dept: SURGERY | Facility: HOSPITAL | Age: 68
End: 2018-08-29
Payer: MEDICARE

## 2018-08-29 ENCOUNTER — HOSPITAL ENCOUNTER (OUTPATIENT)
Facility: HOSPITAL | Age: 68
Discharge: HOME | End: 2018-08-29
Attending: SURGERY | Admitting: SURGERY
Payer: MEDICARE

## 2018-08-29 DIAGNOSIS — D37.5 NEOPLASM OF UNCERTAIN BEHAVIOR OF RECTUM: ICD-10-CM

## 2018-08-29 LAB
POCT GLUCOSE: 127 MG/DL (ref 70–110)
POCT GLUCOSE: 94 MG/DL (ref 70–110)

## 2018-08-29 PROCEDURE — 37000008 HC ANESTHESIA 1ST 15 MINUTES: Performed by: SURGERY

## 2018-08-29 PROCEDURE — 25000003 PHARM REV CODE 250: Performed by: SURGERY

## 2018-08-29 PROCEDURE — 36000707: Performed by: SURGERY

## 2018-08-29 PROCEDURE — 46922 EXCISION OF ANAL LESION(S): CPT | Mod: ,,, | Performed by: SURGERY

## 2018-08-29 PROCEDURE — 88305 TISSUE EXAM BY PATHOLOGIST: CPT | Performed by: PATHOLOGY

## 2018-08-29 PROCEDURE — 37000009 HC ANESTHESIA EA ADD 15 MINS: Performed by: SURGERY

## 2018-08-29 PROCEDURE — 25000003 PHARM REV CODE 250: Performed by: ANESTHESIOLOGY

## 2018-08-29 PROCEDURE — 25000003 PHARM REV CODE 250: Performed by: NURSE ANESTHETIST, CERTIFIED REGISTERED

## 2018-08-29 PROCEDURE — 71000015 HC POSTOP RECOV 1ST HR: Performed by: SURGERY

## 2018-08-29 PROCEDURE — 63600175 PHARM REV CODE 636 W HCPCS: Performed by: NURSE ANESTHETIST, CERTIFIED REGISTERED

## 2018-08-29 PROCEDURE — 71000033 HC RECOVERY, INTIAL HOUR: Performed by: SURGERY

## 2018-08-29 PROCEDURE — 88305 TISSUE EXAM BY PATHOLOGIST: CPT | Mod: 26,,, | Performed by: PATHOLOGY

## 2018-08-29 PROCEDURE — 36000706: Performed by: SURGERY

## 2018-08-29 RX ORDER — ONDANSETRON 8 MG/1
8 TABLET, ORALLY DISINTEGRATING ORAL EVERY 8 HOURS PRN
Status: DISCONTINUED | OUTPATIENT
Start: 2018-08-29 | End: 2018-08-29 | Stop reason: HOSPADM

## 2018-08-29 RX ORDER — ROCURONIUM BROMIDE 10 MG/ML
INJECTION, SOLUTION INTRAVENOUS
Status: DISCONTINUED | OUTPATIENT
Start: 2018-08-29 | End: 2018-08-29

## 2018-08-29 RX ORDER — ONDANSETRON 2 MG/ML
INJECTION INTRAMUSCULAR; INTRAVENOUS
Status: DISCONTINUED | OUTPATIENT
Start: 2018-08-29 | End: 2018-08-29

## 2018-08-29 RX ORDER — SUCCINYLCHOLINE CHLORIDE 20 MG/ML
INJECTION INTRAMUSCULAR; INTRAVENOUS
Status: DISCONTINUED | OUTPATIENT
Start: 2018-08-29 | End: 2018-08-29

## 2018-08-29 RX ORDER — OXYCODONE HYDROCHLORIDE 5 MG/1
10 TABLET ORAL ONCE AS NEEDED
Status: DISCONTINUED | OUTPATIENT
Start: 2018-08-29 | End: 2018-08-29 | Stop reason: HOSPADM

## 2018-08-29 RX ORDER — MEPERIDINE HYDROCHLORIDE 50 MG/ML
12.5 INJECTION INTRAMUSCULAR; INTRAVENOUS; SUBCUTANEOUS ONCE AS NEEDED
Status: DISCONTINUED | OUTPATIENT
Start: 2018-08-29 | End: 2018-08-29 | Stop reason: HOSPADM

## 2018-08-29 RX ORDER — FENTANYL CITRATE 50 UG/ML
25 INJECTION, SOLUTION INTRAMUSCULAR; INTRAVENOUS EVERY 5 MIN PRN
Status: DISCONTINUED | OUTPATIENT
Start: 2018-08-29 | End: 2018-08-29 | Stop reason: HOSPADM

## 2018-08-29 RX ORDER — PROPOFOL 10 MG/ML
VIAL (ML) INTRAVENOUS
Status: DISCONTINUED | OUTPATIENT
Start: 2018-08-29 | End: 2018-08-29

## 2018-08-29 RX ORDER — HYDROCODONE BITARTRATE AND ACETAMINOPHEN 5; 325 MG/1; MG/1
1 TABLET ORAL EVERY 4 HOURS PRN
Status: DISCONTINUED | OUTPATIENT
Start: 2018-08-29 | End: 2018-08-29 | Stop reason: HOSPADM

## 2018-08-29 RX ORDER — DEXAMETHASONE SODIUM PHOSPHATE 4 MG/ML
INJECTION, SOLUTION INTRA-ARTICULAR; INTRALESIONAL; INTRAMUSCULAR; INTRAVENOUS; SOFT TISSUE
Status: DISCONTINUED | OUTPATIENT
Start: 2018-08-29 | End: 2018-08-29

## 2018-08-29 RX ORDER — ONDANSETRON 2 MG/ML
4 INJECTION INTRAMUSCULAR; INTRAVENOUS DAILY PRN
Status: DISCONTINUED | OUTPATIENT
Start: 2018-08-29 | End: 2018-08-29 | Stop reason: HOSPADM

## 2018-08-29 RX ORDER — MIDAZOLAM HYDROCHLORIDE 1 MG/ML
INJECTION, SOLUTION INTRAMUSCULAR; INTRAVENOUS
Status: DISCONTINUED | OUTPATIENT
Start: 2018-08-29 | End: 2018-08-29

## 2018-08-29 RX ORDER — BUPIVACAINE HYDROCHLORIDE 2.5 MG/ML
INJECTION, SOLUTION EPIDURAL; INFILTRATION; INTRACAUDAL
Status: DISCONTINUED | OUTPATIENT
Start: 2018-08-29 | End: 2018-08-29 | Stop reason: HOSPADM

## 2018-08-29 RX ORDER — HYDROMORPHONE HYDROCHLORIDE 2 MG/ML
1 INJECTION, SOLUTION INTRAMUSCULAR; INTRAVENOUS; SUBCUTANEOUS EVERY 4 HOURS PRN
Status: DISCONTINUED | OUTPATIENT
Start: 2018-08-29 | End: 2018-08-29 | Stop reason: HOSPADM

## 2018-08-29 RX ORDER — HYDROCODONE BITARTRATE AND ACETAMINOPHEN 7.5; 325 MG/1; MG/1
1 TABLET ORAL EVERY 4 HOURS PRN
Status: DISCONTINUED | OUTPATIENT
Start: 2018-08-29 | End: 2018-08-29 | Stop reason: HOSPADM

## 2018-08-29 RX ORDER — LIDOCAINE HCL/PF 100 MG/5ML
SYRINGE (ML) INTRAVENOUS
Status: DISCONTINUED | OUTPATIENT
Start: 2018-08-29 | End: 2018-08-29

## 2018-08-29 RX ORDER — ACETAMINOPHEN 10 MG/ML
1000 INJECTION, SOLUTION INTRAVENOUS ONCE
Status: DISCONTINUED | OUTPATIENT
Start: 2018-08-29 | End: 2018-08-29 | Stop reason: HOSPADM

## 2018-08-29 RX ORDER — SODIUM CHLORIDE, SODIUM LACTATE, POTASSIUM CHLORIDE, CALCIUM CHLORIDE 600; 310; 30; 20 MG/100ML; MG/100ML; MG/100ML; MG/100ML
INJECTION, SOLUTION INTRAVENOUS CONTINUOUS
Status: DISCONTINUED | OUTPATIENT
Start: 2018-08-29 | End: 2021-09-15

## 2018-08-29 RX ORDER — HYDROCODONE BITARTRATE AND ACETAMINOPHEN 10; 325 MG/1; MG/1
1 TABLET ORAL
Qty: 20 TABLET | Refills: 0 | Status: SHIPPED | OUTPATIENT
Start: 2018-08-29 | End: 2021-01-04

## 2018-08-29 RX ORDER — FENTANYL CITRATE 50 UG/ML
INJECTION, SOLUTION INTRAMUSCULAR; INTRAVENOUS
Status: DISCONTINUED | OUTPATIENT
Start: 2018-08-29 | End: 2018-08-29

## 2018-08-29 RX ADMIN — PROPOFOL 150 MG: 10 INJECTION, EMULSION INTRAVENOUS at 08:08

## 2018-08-29 RX ADMIN — ONDANSETRON 4 MG: 2 INJECTION, SOLUTION INTRAMUSCULAR; INTRAVENOUS at 08:08

## 2018-08-29 RX ADMIN — MIDAZOLAM 2 MG: 1 INJECTION INTRAMUSCULAR; INTRAVENOUS at 08:08

## 2018-08-29 RX ADMIN — LIDOCAINE HYDROCHLORIDE 60 MG: 20 INJECTION, SOLUTION INTRAVENOUS at 08:08

## 2018-08-29 RX ADMIN — DEXAMETHASONE SODIUM PHOSPHATE 4 MG: 4 INJECTION, SOLUTION INTRA-ARTICULAR; INTRALESIONAL; INTRAMUSCULAR; INTRAVENOUS; SOFT TISSUE at 08:08

## 2018-08-29 RX ADMIN — SODIUM CHLORIDE, SODIUM LACTATE, POTASSIUM CHLORIDE, AND CALCIUM CHLORIDE: 600; 310; 30; 20 INJECTION, SOLUTION INTRAVENOUS at 08:08

## 2018-08-29 RX ADMIN — SUCCINYLCHOLINE CHLORIDE 100 MG: 20 INJECTION, SOLUTION INTRAMUSCULAR; INTRAVENOUS at 08:08

## 2018-08-29 RX ADMIN — FENTANYL CITRATE 50 MCG: 50 INJECTION, SOLUTION INTRAMUSCULAR; INTRAVENOUS at 08:08

## 2018-08-29 RX ADMIN — ROCURONIUM BROMIDE 5 MG: 10 INJECTION, SOLUTION INTRAVENOUS at 08:08

## 2018-08-29 NOTE — ANESTHESIA RELEASE NOTE
"Anesthesia Release from PACU Note    Patient: Venus Caldwell    Procedure(s) Performed: Procedure(s) (LRB):  EXCISION, LESION, ANUS (N/A)    Anesthesia type: general    Post pain: Adequate analgesia    Post assessment: no apparent anesthetic complications, tolerated procedure well and no evidence of recall    Last Vitals:   Visit Vitals  BP (!) 147/69 (BP Location: Right arm, Patient Position: Sitting)   Pulse (!) 58   Temp 36.8 °C (98.2 °F) (Tympanic)   Resp 20   Ht 5' 3" (1.6 m)   Wt 71.7 kg (158 lb 1.1 oz)   SpO2 98%   Breastfeeding? No   BMI 28.00 kg/m²       Post vital signs: stable    Level of consciousness: responds to stimulation    Nausea/Vomiting: no nausea/no vomiting    Complications: none    Airway Patency: patent    Respiratory: unassisted    Cardiovascular: stable and blood pressure at baseline    Hydration: euvolemic  "

## 2018-08-29 NOTE — DISCHARGE SUMMARY
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       Discharged Condition: stable    Hospital Course: Patient was admitted for an outpatient procedure and tolerated the procedure well with no complications.    Patient underwent exam under anesthesia and excision of an anterior distal rectal nodule    Final Diagnoses: Same as principal problem.    Disposition: Home or Self Care    Follow up/Patient Instructions:    Medications:  Reconciled Home Medications:      Medication List      START taking these medications    nozaseptin nasal   Commonly known as:  NOZIN  1 each by Each Nare route 2 (two) times daily.        CHANGE how you take these medications    metoprolol succinate 50 MG 24 hr tablet  Commonly known as:  TOPROL XL  Take 1 tablet (50 mg total) by mouth 2 (two) times daily.  What changed:  how much to take        CONTINUE taking these medications    aspirin 81 MG Chew  Take 81 mg by mouth once daily.     atorvastatin 40 MG tablet  Commonly known as:  LIPITOR  Take 40 mg by mouth every evening.     BENICAR 40 MG tablet  Generic drug:  olmesartan  Take 40 mg by mouth once daily.     betamethasone dipropionate 0.05 % cream  Commonly known as:  DIPROLENE     cholecalciferol (vitamin D3) 5,000 unit Tab  Take by mouth.     fluticasone 50 mcg/actuation nasal spray  Commonly known as:  FLONASE  2 sprays (100 mcg total) by Each Nare route every evening.     gabapentin 300 MG capsule  Commonly known as:  NEURONTIN  Take 300 mg by mouth 3 (three) times daily. Take 600 mg, two capsules, at bedtime dose     HYDROcodone-acetaminophen  mg per tablet  Commonly known as:   NORCO  Take 1 tablet by mouth every 24 hours as needed.     meloxicam 7.5 MG tablet  Commonly known as:  MOBIC  Take 7.5 mg by mouth every evening.     metFORMIN 500 MG tablet  Commonly known as:  GLUCOPHAGE  Take 1 tablet (500 mg total) by mouth daily with breakfast.     omeprazole 20 MG capsule  Commonly known as:  PRILOSEC  Take 20 mg by mouth once daily.     promethazine 25 MG tablet  Commonly known as:  PHENERGAN  Take 1 tablet (25 mg total) by mouth every 6 (six) hours as needed for Nausea.     valACYclovir 500 MG tablet  Commonly known as:  VALTREX     ZyrTEC 10 MG tablet  Generic drug:  cetirizine  Take 1 tablet by mouth Daily.        ASK your doctor about these medications    HYSINGLA ER 40 mg Tp24  Generic drug:  HYDROcodone bitartrate  Take 1 tablet by mouth once daily.          Discharge Procedure Orders   Diet general     Call MD for:  temperature >100.4     Call MD for:  persistent nausea and vomiting     Call MD for:  severe uncontrolled pain     Call MD for:  difficulty breathing, headache or visual disturbances     Call MD for:  redness, tenderness, or signs of infection (pain, swelling, redness, odor or green/yellow discharge around incision site)     Activity as tolerated         Discharge Procedure Orders (must include Diet, Follow-up, Activity):   Discharge Procedure Orders (must include Diet, Follow-up, Activity)   Diet general     Call MD for:  temperature >100.4     Call MD for:  persistent nausea and vomiting     Call MD for:  severe uncontrolled pain     Call MD for:  difficulty breathing, headache or visual disturbances     Call MD for:  redness, tenderness, or signs of infection (pain, swelling, redness, odor or green/yellow discharge around incision site)     Activity as tolerated

## 2018-08-29 NOTE — OP NOTE
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  She was placed on the operating table in the prone position. Appropriate padding was placed.  Pneumatic compression devices were placed on the lower extremities.  The buttocks were taped in distraction after the patient was placed in the kenney-knife position.    A time-out was performed.    A digital exam was performed and no abnormalities were palpated.  The bivalve anoscope was inserted the anal canal was inspected. There was a 6 mm small subcutaneous nodule located anteriorly.    I elliptical incision was made around the nodule.  The nodule was  from the underlying mucosa using electrocautery.    Hemostasis was achieved with electrocautery.    The mucosal defect was closed with 3 0 Vicryl in a running locking fashion.    An additional chromic stitch was placed to ensure hemostasis.    Marcaine was infiltrated.  Dry sterile dressings were placed.  Patient tolerated procedure well    Estimated Blood Loss: * No values recorded between 8/29/2018  8:38 AM and 8/29/2018  9:04 AM *           Specimens (From admission, onward)    Start     Ordered    08/29/18 0847  Specimen to Pathology - Surgery  Once     Start Status   08/29/18 0847 Collected (08/29/18 0850)       08/29/18 0848        Implants: * No implants in log *           Disposition: PACU - hemodynamically stable.           Condition: Stable    Attestation:  I performed the procedure.           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  Qty: 1 applicator, Refills: 0         CONTINUE these medications which have CHANGED    Details   HYDROcodone-acetaminophen (NORCO)  mg per tablet Take 1 tablet by mouth every 24 hours as needed.  Qty: 20 tablet, Refills: 0         CONTINUE these medications which have NOT CHANGED    Details   aspirin 81 MG Chew Take 81 mg by mouth once daily.      atorvastatin (LIPITOR) 40 MG tablet Take 40 mg by mouth every evening.       BENICAR 40 mg tablet Take 40 mg by mouth once daily.     Associated Diagnoses: Essential hypertension      betamethasone dipropionate (DIPROLENE) 0.05 % cream       cetirizine (ZYRTEC) 10 MG tablet Take 1 tablet by mouth Daily.      cholecalciferol, vitamin D3, 5,000 unit Tab Take by mouth.      fluticasone (FLONASE) 50 mcg/actuation nasal spray 2 sprays (100 mcg total) by Each Nare route every evening.  Qty: 48 g, Refills: 1    Associated Diagnoses: Chronic non-seasonal allergic rhinitis, unspecified trigger      gabapentin (NEURONTIN) 300 MG capsule Take 300 mg by mouth 3 (three) times daily. Take 600 mg, two capsules, at bedtime dose      meloxicam (MOBIC) 7.5 MG tablet Take 7.5 mg by mouth every evening.       metFORMIN (GLUCOPHAGE) 500 MG tablet Take 1 tablet (500 mg total) by mouth daily with breakfast.  Qty: 90 tablet, Refills: 3    Associated Diagnoses: Prediabetes      metoprolol succinate (TOPROL XL) 50 MG 24 hr tablet Take 1 tablet (50 mg total) by mouth 2 (two) times daily.  Qty: 180 tablet, Refills: 3    Associated Diagnoses: Essential hypertension      omeprazole (PRILOSEC) 20 MG capsule Take 20 mg by mouth once  daily.      valACYclovir (VALTREX) 500 MG tablet       HYSINGLA ER 40 mg TP24 Take 1 tablet by mouth once daily.       promethazine (PHENERGAN) 25 MG tablet Take 1 tablet (25 mg total) by mouth every 6 (six) hours as needed for Nausea.  Qty: 15 tablet, Refills: 0             Discharge Procedure Orders (must include Diet, Follow-up, Activity)   Discharge Procedure Orders (must include Diet, Follow-up, Activity)   Diet general     Call MD for:  temperature >100.4     Call MD for:  persistent nausea and vomiting     Call MD for:  severe uncontrolled pain     Call MD for:  difficulty breathing, headache or visual disturbances     Call MD for:  redness, tenderness, or signs of infection (pain, swelling, redness, odor or green/yellow discharge around incision site)     Activity as tolerated        Discharge Date: No discharge date for patient encounter.

## 2018-08-29 NOTE — INTERVAL H&P NOTE
The patient has been examined and the H&P has been reviewed:    I concur with the findings and no changes have occurred since H&P was written.    Anesthesia/Surgery risks, benefits and alternative options discussed and understood by patient/family.          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

## 2018-08-29 NOTE — ANESTHESIA POSTPROCEDURE EVALUATION
"Anesthesia Post Evaluation    Patient: Venus Caldwell    Procedure(s) Performed: Procedure(s) (LRB):  EXCISION, LESION, ANUS (N/A)    Final Anesthesia Type: general  Patient location during evaluation: PACU  Patient participation: Yes- Able to Participate  Level of consciousness: awake  Post-procedure vital signs: reviewed and stable  Pain management: adequate  Airway patency: patent  PONV status at discharge: No PONV  Anesthetic complications: no      Cardiovascular status: stable  Respiratory status: unassisted  Hydration status: euvolemic  Follow-up not needed.        Visit Vitals  BP (!) 189/85   Pulse 63   Temp 36.5 °C (97.7 °F) (Axillary)   Resp 16   Ht 5' 3" (1.6 m)   Wt 71.7 kg (158 lb 1.1 oz)   SpO2 97%   Breastfeeding? No   BMI 28.00 kg/m²       Pain/Micheal Score: Pain Assessment Performed: Yes (8/29/2018 10:00 AM)  Presence of Pain: denies (8/29/2018 10:00 AM)  Micheal Score: 10 (8/29/2018  9:45 AM)        "

## 2018-08-29 NOTE — TRANSFER OF CARE
"Anesthesia Transfer of Care Note    Patient: Venus Caldwell    Procedure(s) Performed: Procedure(s) (LRB):  EXCISION, LESION, ANUS (N/A)    Patient location: PACU    Anesthesia Type: general    Transport from OR: Transported from OR on room air with adequate spontaneous ventilation    Post pain: adequate analgesia    Post assessment: no apparent anesthetic complications    Post vital signs: stable    Level of consciousness: responds to stimulation    Nausea/Vomiting: no nausea/vomiting    Complications: none    Transfer of care protocol was followed      Last vitals:   Visit Vitals  BP (!) 147/69 (BP Location: Right arm, Patient Position: Sitting)   Pulse (!) 58   Temp 36.8 °C (98.2 °F) (Tympanic)   Resp 20   Ht 5' 3" (1.6 m)   Wt 71.7 kg (158 lb 1.1 oz)   SpO2 98%   Breastfeeding? No   BMI 28.00 kg/m²     "

## 2018-08-29 NOTE — DISCHARGE INSTRUCTIONS
Please obtain MiraLax are Glycolax and take it daily for the next 7 days.    Soak in the tub after bowel movements are after having perianal discomfort.    Please call for any increasing pain, redness and drainage.  A little bit of bleeding is not unusual after this procedure.    You do have some sutures in  that will eventually fall while        General Information:    1. Do not drink alcoholic beverages including beer for 24 hours or as long as you are on pain medication..  2. Do not drive a motor vehicle, operate machinery or power tools, or signs legal papers for 24 hours or as long as you are on pain medication.   3. You may experience light-headedness, dizziness, and sleepiness following surgery. Please do not stay alone. A responsible adult should be with you for this 24 hour period.  4. Go home and rest.  5. Progress slowly to a normal diet unless instructed.  Otherwise, begin with liquids such as soft drinks, then soup and crackers working up to solid foods. Drink plenty of nonalcoholic fluids.  6. Certain anesthetics and pain medications produce nausea and vomiting in certain individuals. If nausea becomes a problem at home, call you doctor.  7. A nurse will be calling you sometime after surgery. Do not be alarmed. This is our way of finding out how you are doing.  8. Several times every hour while you are awake, take 2-3 deep breaths and cough. If you had stomach surgery hold a pillow or rolled towel firmly against your stomach before you cough. This will help with any pain the cough might cause.  9. Several times every hour while you are awake, pump and flex your feet 5-6 times and do foot circles. This will help prevent blood clots.  10. Call your doctor for severe pain, bleeding, fever, or signs or symptoms of infection (pain, swelling, redness, foul odor, drainage).  11. You can contact your doctor anytime by [phone] for the [facility] (at [facility]) or [phone] for the [facility] on [facility].   [email or web address] is another way to contact your doctor if you are an active participant online with [facility].  12. Continue Nozin provided at discharge twice daily for 7 days or until the incision is healed.  See pamphlet or box for instructions.

## 2018-08-29 NOTE — PLAN OF CARE
Rachana AGUILAR notified that [facility] will not fill post op Rx due to pt having a recent Rx prescription filled and informed prescription will be shredded. Ms. Monroe verbalized agreement.

## 2018-08-30 VITALS
BODY MASS INDEX: 28 KG/M2 | TEMPERATURE: 98 F | RESPIRATION RATE: 16 BRPM | HEART RATE: 63 BPM | SYSTOLIC BLOOD PRESSURE: 189 MMHG | WEIGHT: 158.06 LBS | HEIGHT: 63 IN | OXYGEN SATURATION: 97 % | DIASTOLIC BLOOD PRESSURE: 85 MMHG

## 2018-08-31 ENCOUNTER — TELEPHONE (OUTPATIENT)
Dept: PAIN MEDICINE | Facility: CLINIC | Age: 68
End: 2018-08-31

## 2018-08-31 NOTE — TELEPHONE ENCOUNTER
Contacted patient to offer sooner appointment; appointment rescheduled with Dr. Moody on October 3rd at 3:10PM. Pt verbalized understanding.

## 2018-09-07 ENCOUNTER — TELEPHONE (OUTPATIENT)
Dept: SURGERY | Facility: HOSPITAL | Age: 68
End: 2018-09-07

## 2018-09-17 ENCOUNTER — OFFICE VISIT (OUTPATIENT)
Dept: SURGERY | Facility: CLINIC | Age: 68
End: 2018-09-17
Payer: MEDICARE

## 2018-09-17 VITALS
HEART RATE: 61 BPM | WEIGHT: 163.56 LBS | DIASTOLIC BLOOD PRESSURE: 72 MMHG | BODY MASS INDEX: 28.98 KG/M2 | SYSTOLIC BLOOD PRESSURE: 131 MMHG | TEMPERATURE: 98 F

## 2018-09-17 DIAGNOSIS — Z09 POSTOP CHECK: Primary | ICD-10-CM

## 2018-09-17 PROBLEM — D37.5: Status: RESOLVED | Noted: 2018-08-16 | Resolved: 2018-09-17

## 2018-09-17 PROCEDURE — 99024 POSTOP FOLLOW-UP VISIT: CPT | Mod: POP,,, | Performed by: SURGERY

## 2018-09-17 PROCEDURE — 99999 PR PBB SHADOW E&M-EST. PATIENT-LVL III: CPT | Mod: PBBFAC,,, | Performed by: SURGERY

## 2018-09-17 PROCEDURE — 99213 OFFICE O/P EST LOW 20 MIN: CPT | Mod: PBBFAC | Performed by: SURGERY

## 2018-09-17 NOTE — PATIENT INSTRUCTIONS
We removed a small benign cyst    Low was some general information about hemorrhoid     Hemorrhoids    Hemorrhoids are swollen and inflamed veins inside the rectum and near the anus. The rectum is the last several inches of the colon. The anus is the passage between the rectum and the outside of the body.  Causes  The veins can become swollen due to increased pressure in them. This is most often caused by:  · Chronic constipation or diarrhea  · Straining when having a bowel movement  · Sitting too long on the toilet  · A low-fiber diet  · Pregnancy  Symptoms  · Bleeding from the rectum (this may be noticeable after bowel movements)  · Lump near the anus  · Itching around the anus  · Pain around the anus  There are different types of hemorrhoids. Depending on the type you have and the severity, you may be able to treat yourself at home. In some cases, a procedure may be the best treatment option. Your healthcare provider can tell you more about this, if needed.  Home care  General care  · To get relief from pain or itching, try:  ¨ Topical products. Your healthcare provider may prescribe or recommend creams, ointments, or pads that can be applied to the hemorrhoid. Use these exactly as directed.  ¨ Medicines. Your healthcare provider may recommend stool softeners, suppositories, or laxatives to help manage constipation. Use these exactly as directed.  ¨ Sitz baths. A sitz bath involves sitting in a few inches of warm bath water. Be careful not to make the water so hot that you burn yourself--test it before sitting in it. Soak for about 10 to 15 minutes a few times a day. This may help relieve pain.  Tips to help prevent hemorrhoids  · Eat more fiber. Fiber adds bulk to stool and absorbs water as it moves through your colon. This makes stool softer and easier to pass.  ¨ Increase the fiber in your diet with more fiber-rich foods. These include fresh fruit, vegetables, and whole grains.  ¨ Take a fiber supplement or  bulking agent, if advised to by your provider. These include products such as psyllium or methylcellulose.  · Drink plenty of water, if directed to by your provider. This can help keep stool soft.  · Be more active. Frequent exercise aids digestion and helps prevent constipation. It may also help make bowel movements more regular.  · Dont strain during bowel movements. This can make hemorrhoids more likely. Also, dont sit on the toilet for long periods of time.  Follow-up care  Follow up with your healthcare provider, or as advised. If a culture or imaging tests were done, you will be notified of the results when they are ready. This may take a few days or longer.  When to seek medical advice  Call your healthcare provider right away if any of these occur:  · Increased bleeding from the rectum  · Increased pain around the rectum or anus  · Weakness or dizziness  Call 911  Call 911 or return to the emergency department right away if any of these occur:  · Trouble breathing or swallowing  · Fainting or loss of consciousness  · Unusually fast heart rate  · Vomiting blood  · Large amounts of blood in stool  Date Last Reviewed: 6/22/2015  © 5396-9936 [employer]. [address] [place] [address]. All rights reserved. This information is not intended as a substitute for professional medical care. Always follow your healthcare professional's instructions.        Treating Hemorrhoids: Self-Care    Follow your healthcare providers advice about caring for your hemorrhoids at home. Some treatments help relieve symptoms right away. Others involve making changes in your diet and exercise habits. These can help ease constipation and prevent hemorrhoid symptoms from coming back.  Relieving symptoms  Your healthcare provider may prescribe anti-inflammatory medicine to help ease your symptoms. The following tips will also help relieve pain and swelling.  · Take sitz baths. Taking a sitz bath means sitting  in a few inches of warm bath water. Soaking for 10 minutes twice a day can provide welcome relief from painful hemorrhoids. It can also help the area stay clean.  · Develop good bowel habits. Use the bathroom when you need to. Dont ignore the urge to move your bowels. This can lead to constipation, hard stools, and straining. Also, dont read while on the toilet. Sit only as long as needed. Wipe gently with soft, unscented toilet tissue or baby wipes.  · Use ice packs. Placing an ice pack on a thrombosed external hemorrhoid can help relieve pain right away. It will also help reduce the blood clot. Use the ice for 15 to 20 minutes at a time. Keep a cloth between the ice and your skin to prevent skin damage.  · Use other measures. Laxatives and enemas can help ease constipation. But use them only on your healthcare providers advice. For symptom relief, try using cotton pads soaked in witch hazel. These are available at most drugstores. Over-the-counter hemorrhoid ointments and petroleum jelly can also provide relief.  Add fiber to your diet  Adding fiber to your diet can help relieve constipation by making stools softer and easier to pass. To increase your fiber intake, your healthcare provider may recommend a bulking agent, such as psyllium. This is a high-fiber supplement available at most grocery stores and drugstores. Eating more fiber-rich foods will also help. There are two types of fiber:  · Insoluble fiber is the main ingredient in bulking agents. Its also found in foods such as wheat bran, whole-grain breads, fresh fruits, and vegetables.  · Soluble fiber is found in foods such as oat bran. Although soluble fiber is good for you, it may not ease constipation as much as foods high in insoluble fiber.  Drink more water  Along with a high-fiber diet, drinking more water can help ease constipation. This is because insoluble fiber absorbs water, making stools soft and bulky. Be sure to drink plenty of water  throughout the day. Drinking fruit juices, such as prune juice or apple juice, can also help prevent constipation.  Get more exercise  Regular exercise aids digestion and helps prevent constipation. Its also great for your health. So talk with your healthcare provider about starting an exercise program. Low-impact activities, such as swimming or walking, are good places to start. Take it easy at first. And remember to drink plenty of water when you exercise.  High-fiber foods  High-fiber foods offer many benefits. By making your stools softer, they help heal and prevent swollen hemorrhoids. They may also help reduce the risk of colon and rectal cancer. Best of all, theyre usually low in calories and taste great. Here are some examples of fiber-rich foods.  · Whole grains, such as wheat bran, corn bran, and brown rice.  · Vegetables, especially carrots, broccoli, cabbage, and peas.  · Fruits, such as apples, bananas, raisins, peaches, and pears.  · Nuts and legumes, especially peanuts, lentils, and kidney beans.  Easy ways to add fiber  The tips below offer some simple ways to add more high-fiber foods to your meals.  · Start your day with a high-fiber breakfast. Eat a wheat bran cereal along with a sliced banana. Or, try peanut butter on whole-wheat toast.  · Eat carrot sticks for snacks. Theyre easy to prepare, taste great, and are low in calories.  · Use whole-grain breads instead of white bread for sandwiches.  · Eat fruits for treats. Try an apple and some raisins instead of a candy bar.   Date Last Reviewed: 7/1/2016  © 9494-4834 [employer]. [address] [place] [address], [place] [address]. All rights reserved. This information is not intended as a substitute for professional medical care. Always follow your healthcare professional's instructions.

## 2018-10-03 ENCOUNTER — OFFICE VISIT (OUTPATIENT)
Dept: PAIN MEDICINE | Facility: CLINIC | Age: 68
End: 2018-10-03
Payer: MEDICARE

## 2018-10-03 VITALS
WEIGHT: 163.56 LBS | HEART RATE: 70 BPM | DIASTOLIC BLOOD PRESSURE: 62 MMHG | SYSTOLIC BLOOD PRESSURE: 104 MMHG | RESPIRATION RATE: 16 BRPM | HEIGHT: 63 IN | BODY MASS INDEX: 28.98 KG/M2

## 2018-10-03 DIAGNOSIS — M54.31 SCIATICA OF RIGHT SIDE: Chronic | ICD-10-CM

## 2018-10-03 DIAGNOSIS — M15.9 PRIMARY OSTEOARTHRITIS INVOLVING MULTIPLE JOINTS: Primary | Chronic | ICD-10-CM

## 2018-10-03 PROCEDURE — 99213 OFFICE O/P EST LOW 20 MIN: CPT | Mod: PBBFAC,PO | Performed by: PAIN MEDICINE

## 2018-10-03 PROCEDURE — 99999 PR PBB SHADOW E&M-EST. PATIENT-LVL III: CPT | Mod: PBBFAC,,, | Performed by: PAIN MEDICINE

## 2018-10-03 PROCEDURE — 99204 OFFICE O/P NEW MOD 45 MIN: CPT | Mod: S$PBB,,, | Performed by: PAIN MEDICINE

## 2018-10-03 NOTE — PROGRESS NOTES
Chief Pain Complaint:  Joint Pain      History of Present Illness:   This patient is a 68 y.o. female who presents today complaining of the above noted pain/s. The patient describes the pain as follows. Ms. Caldwell has history of hypertension, GERD, CAD, osteoarthritis of multiple joints status post bilateral knee replacements and right hip replacement who presents with complaints of low back pain which radiates into posterior legs to the knee but not below.  Currently she rates her pain as a 4/10 and describes her symptoms as a constant aching which is worse with walking and improved with rest and Norco.  She has previously been treated by Dr. Burger at [facility] and she has been on chronic opioids for several years including Norco 10 mg q.i.d. and highsingla ER which was [place].  She states that she kept her medications in a safe and would only take 3 Norco dose per day and occasionally 1 in the middle of the night if she woke up in pain. She reports that she had some procedures performed over the years including injections however she is unsure what type of injections and states that she got minimal pain relief.  She does not have any of her imaging records with her today.  She denies bowel or bladder changes.  Dr. Burger was slowly beginning to wean her off her opioids and suggested she seek a 2nd opinion if she did agree with this treatment.    Previous Therapy:  Medications:  Norco, meloxicam, gabapentin  Injections:  Some injections in the past, unknown type  Surgeries:  No lumbar surgeries  Physical Therapy:  Has participated with bilateral knee and right hip replacements    Past Surgical History:   Procedure Laterality Date    CHOLECYSTECTOMY      COLONOSCOPY N/A 8/1/2018    Procedure: COLONOSCOPY;  Surgeon: Yrn Hunter III, MD;  Location: [facility];  Service: Endoscopy;  Laterality: N/A;    COLONOSCOPY N/A 8/1/2018    Per[facility] III[facility] ENDO     "EXCISION, LESION, ANUS N/A 8/29/2018    Performed by Yrn Balderrama MD at [facility] OR    HYSTERECTOMY      JOINT REPLACEMENT Right     hip replacement    SURGICAL EXCISION OF ANAL LESION N/A 8/29/2018    Procedure: EXCISION, LESION, ANUS;  Surgeon: Yrn Balderrama MD;  Location: [facility] OR;  Service: General;  Laterality: N/A;    TOTAL KNEE ARTHROPLASTY       Imaging / Labs / Studies (reviewed on 10/3/2018):    Review of Systems:  CONSTITUTIONAL: patient denies any fever, chills, or weight loss  SKIN: patient denies any rash or itching  RESPIRATORY: patient denies having any shortness of breath  GASTROINTESTINAL: patient denies having any diarrhea, constipation, or bowel incontinence  GENITOURINARY: patient denies having any abnormal bladder function    MUSCULOSKELETAL:  - patient complains of the above noted pain/s (see chief pain complaint)    NEUROLOGICAL:   - pain as above  - strength in Lower extremities is intact, BILATERALLY  - sensation in Lower extremities is intact, BILATERALLY  - patient denies any loss of bowel or bladder control      PSYCHIATRIC: patient denies any change in mood    Other:  All other systems reviewed and are negative      Physical Exam:  /62 (BP Location: Right arm, Patient Position: Sitting)   Pulse 70   Resp 16   Ht 5' 3" (1.6 m)   Wt 74.2 kg (163 lb 9.3 oz)   BMI 28.98 kg/m²  (reviewed on 10/3/2018)\  General:  Alert and oriented, No acute distress.    HEENT:       EOMI.  Normocephalic, atraumatic.   Cardiovascular:  Regular rate.    Gastrointestinal:  Soft.    Respiratory:  Respirations are non-labored.    Cervical Spine:  No masses or atrophy,  Thoracic Spine:  Palpation normal.    Lumbar Spine:  No masses or atrophy,         Range of motion - normal Flexion, Extension,  Right Lat Bending,  Left Lat Bending        Increased pain with -no movements        Palpation - mild tenderness to palpation over lower lumbar facets bilaterally        PSIS tenderness - positive on " the right        Kemar's/KATHLEEN - difficulty performing due to right hip replacement and bilateral knee replacements              SLR - negative for radicular pain  Motor Exam:      Strength:  Rate on 1-5 scale Right Left   L2- hip flexion  5 5   L3- knee extension  5 5   L4- ankle dorsiflexion 5 5   L5- great toe extension 5 5   S1- ankle plantarflexion 5 5     Sensory Exam:  Full and equal sensation to light touch throughout.    Reflexes   Left DTR's      Knee.   2  Ankle.   2  Right DTR's    Knee.   2  Ankle.   2  Neurologic:  Cranial Nerves II-XII are grossly intact.    Pathologic reflexes:            Clonus - Neg  Psychiatric:  Cooperative.    Gait: Normal    Assessment  Sacroiliitis  Lumbar Radiculopathy    1. 68 y.o. year old patient with PMH of   Past Medical History:   Diagnosis Date    Arthritis     Cancer, uterine     Diabetes mellitus     prediabetes    Hypertension     Sciatica       presenting with pain located lumbosacral spine, bilateral posterior legs  2. Pain Generators / Etiology:  Rule out lumbar radiculopathy, rule out lumbar spondylosis, rule out sacroiliitis  3. Failed Meds (E- Effective, NE- Not Effective):  Norco-e, Mobic-e, Gabapentin-e  4. Physical Therapy - has participated for bilateral knees and right hip but not for lumbar spine  5. Psychological comorbidities -  none  6. Anticoagulants / Antiplatelets:  Aspirin 81 mg     PLAN:  1. Medications :  Agree with gabapentin and provided plan to increase to 600 mg 3 times daily, agree with meloxicam and plan to increase to b.i.d. dosing of the 7.5 mg  2. PT - provide a referral for physical therapy  3. Psychological - none  4. Labs - obtain  none; labs reviewed from 08/21/2018 creatinine 0.6  5. Imaging - obtain  none; will obtain patient's records from [facility] to evaluate MRIs and x-rays  6. Interventions - schedule none  7. Referrals - none  8. Records - none  9. Follow up visit - return to clinic in 6 weeks  10. Patient Questions -  answered all patient's questions regarding potential diagnoses, therapy, treatment    ZAKI Moody MD  Interventional Pain  [facility] [place]

## 2018-10-03 NOTE — PATIENT INSTRUCTIONS
- will provide PT referral for lumbar spine  - increase gabapentin to 600mg three times daily (take 1 tab at breakfast and lunch and 2 tabs at dinner for 1 week, then take 2 tabs at breakfast, 1 at lunch and 2 at dinner for 1 week, then take 2 tabs at breakfast, lunch and dinner  - take mobic 7.5mg twice daily with food  - we will obtain records from [facility]

## 2018-10-03 NOTE — LETTER
October 3, 2018      Sourav Hernandez MD  [address] [place] [address]           [facility]  [address] [place] [address]  Phone: [phone]  Fax: [phone]          Patient: Venus Caldwell   MR Number: [id number]   YOB: 1950   Date of Visit: 10/3/2018       Dear Dr. Sourav Hernandez:    Thank you for referring Venus Caldwell to me for evaluation. Attached you will find relevant portions of my assessment and plan of care.    If you have questions, please do not hesitate to call me. I look forward to following Venus Caldwell along with you.    Sincerely,    Ralph Moody MD    Enclosure  CC:  No Recipients    If you would like to receive this communication electronically, please contact [email or web address] or [phone] to request more information on [employer] access.    For providers and/or their staff who would like to refer a patient to [facility], please contact us through our one-stop-shop provider referral line, [facility] , at [phone].    If you feel you have received this communication in error or would no longer like to receive these types of communications, please e-mail [email or web address]

## 2018-10-08 ENCOUNTER — PATIENT MESSAGE (OUTPATIENT)
Dept: INTERNAL MEDICINE | Facility: CLINIC | Age: 68
End: 2018-10-08

## 2018-10-08 ENCOUNTER — PATIENT MESSAGE (OUTPATIENT)
Dept: PAIN MEDICINE | Facility: CLINIC | Age: 68
End: 2018-10-08

## 2018-10-08 ENCOUNTER — TELEPHONE (OUTPATIENT)
Dept: PHYSICAL MEDICINE AND REHAB | Facility: CLINIC | Age: 68
End: 2018-10-08

## 2018-10-08 DIAGNOSIS — M54.31 BILATERAL SCIATICA: ICD-10-CM

## 2018-10-08 DIAGNOSIS — M15.9 PRIMARY OSTEOARTHRITIS INVOLVING MULTIPLE JOINTS: Primary | ICD-10-CM

## 2018-10-08 DIAGNOSIS — I10 ESSENTIAL HYPERTENSION: Chronic | ICD-10-CM

## 2018-10-08 DIAGNOSIS — M54.32 BILATERAL SCIATICA: ICD-10-CM

## 2018-10-08 RX ORDER — MELOXICAM 7.5 MG/1
7.5 TABLET ORAL NIGHTLY
Qty: 90 TABLET | Refills: 3 | Status: SHIPPED | OUTPATIENT
Start: 2018-10-08 | End: 2019-05-09 | Stop reason: SDUPTHER

## 2018-10-08 RX ORDER — METOPROLOL SUCCINATE 50 MG/1
50 TABLET, EXTENDED RELEASE ORAL 2 TIMES DAILY
Qty: 180 TABLET | Refills: 3 | Status: SHIPPED | OUTPATIENT
Start: 2018-10-08 | End: 2019-09-16

## 2018-10-08 RX ORDER — METOPROLOL SUCCINATE 50 MG/1
50 TABLET, EXTENDED RELEASE ORAL 2 TIMES DAILY
Qty: 60 TABLET | Refills: 0 | Status: SHIPPED | OUTPATIENT
Start: 2018-10-08 | End: 2018-10-08 | Stop reason: SDUPTHER

## 2018-10-08 NOTE — TELEPHONE ENCOUNTER
Left a message for the pt stating f that  and his staff are out of office today; they will be back tomorrow.   ' notified.

## 2018-10-26 ENCOUNTER — OFFICE VISIT (OUTPATIENT)
Dept: GASTROENTEROLOGY | Facility: CLINIC | Age: 68
End: 2018-10-26
Payer: MEDICARE

## 2018-10-26 VITALS
WEIGHT: 167.56 LBS | HEIGHT: 63 IN | SYSTOLIC BLOOD PRESSURE: 136 MMHG | BODY MASS INDEX: 29.69 KG/M2 | HEART RATE: 60 BPM | DIASTOLIC BLOOD PRESSURE: 90 MMHG

## 2018-10-26 DIAGNOSIS — R11.0 NAUSEA: ICD-10-CM

## 2018-10-26 DIAGNOSIS — K50.00 TERMINAL ILEITIS WITHOUT COMPLICATION: ICD-10-CM

## 2018-10-26 DIAGNOSIS — R10.84 GENERALIZED ABDOMINAL PAIN: Primary | ICD-10-CM

## 2018-10-26 PROCEDURE — 99999 PR PBB SHADOW E&M-EST. PATIENT-LVL IV: CPT | Mod: PBBFAC,,, | Performed by: NURSE PRACTITIONER

## 2018-10-26 PROCEDURE — 99214 OFFICE O/P EST MOD 30 MIN: CPT | Mod: S$PBB,,, | Performed by: NURSE PRACTITIONER

## 2018-10-26 PROCEDURE — 99214 OFFICE O/P EST MOD 30 MIN: CPT | Mod: PBBFAC,PO | Performed by: NURSE PRACTITIONER

## 2018-10-27 NOTE — PROGRESS NOTES
Clinic Follow Up:  [facility] Follow Up Note    Reason for Follow Up:  The primary encounter diagnosis was Generalized abdominal pain. Diagnoses of Nausea and Terminal ileitis without complication were also pertinent to this visit.    PCP: Sourav Hernandez       HPI:  This is a 68 y.o. female here for follow up of the above  Pt states that over the last week, she has had a return of abdominal pain with nausea.  She states that the symptoms began suddenly without any known exacerbating events. She denies any association with PO intake or BM's.  She denies any associated diarrhea.  She reports that the symptoms are similar to her previous episode of terminal ileitis, however, she is having no diarrhea with this episode.   No melena or hematochezia.  No weight loss.       Review of Systems   Constitutional: Negative for chills, fever, malaise/fatigue and weight loss.   Respiratory: Negative for cough.    Cardiovascular: Negative for chest pain.   Gastrointestinal:        Per HPI   Musculoskeletal: Negative for myalgias.   Skin: Negative for itching and rash.   Neurological: Negative for headaches.   Psychiatric/Behavioral: The patient is not nervous/anxious.        Medical History:  Past Medical History:   Diagnosis Date    Arthritis     Cancer, uterine     Diabetes mellitus     prediabetes    Hypertension     Sciatica        Surgical History:   Past Surgical History:   Procedure Laterality Date    CHOLECYSTECTOMY      COLONOSCOPY N/A 8/1/2018    Procedure: COLONOSCOPY;  Surgeon: Yrn Hunter III, MD;  Location: [facility];  Service: Endoscopy;  Laterality: N/A;    COLONOSCOPY N/A 8/1/2018    Performed by Yrn Hunter III, MD at [facility] ENDO    EXCISION, LESION, ANUS N/A 8/29/2018    Performed by Yrn Balderrama MD at [facility] OR    HYSTERECTOMY      JOINT REPLACEMENT Right     hip replacement    SURGICAL EXCISION OF ANAL LESION N/A 8/29/2018    Procedure: EXCISION, LESION, ANUS;   Surgeon: Yrn Balderrama MD;  Location: [facility] OR;  Service: General;  Laterality: N/A;    TOTAL KNEE ARTHROPLASTY         Family History:   Family History   Problem Relation Age of Onset    Diabetes Mother     Cataracts Mother     Diabetes Brother     Cataracts Maternal Grandmother     Breast cancer Maternal Aunt        Social History:   Social History     Tobacco Use    Smoking status: Never Smoker    Smokeless tobacco: Never Used   Substance Use Topics    Alcohol use: No    Drug use: No       Allergies: Reviewed    Home Medications:  Current Outpatient Medications on File Prior to Visit   Medication Sig Dispense Refill    aspirin 81 MG Chew Take 81 mg by mouth once daily.      atorvastatin (LIPITOR) 40 MG tablet Take 40 mg by mouth every evening.       BENICAR 40 mg tablet Take 40 mg by mouth once daily.       betamethasone dipropionate (DIPROLENE) 0.05 % cream       cetirizine (ZYRTEC) 10 MG tablet Take 1 tablet by mouth Daily.      cholecalciferol, vitamin D3, 5,000 unit Tab Take by mouth.      fluticasone (FLONASE) 50 mcg/actuation nasal spray 2 sprays (100 mcg total) by Each Nare route every evening. 48 g 1    gabapentin (NEURONTIN) 300 MG capsule Take 300 mg by mouth 3 (three) times daily. Take 600 mg, two capsules, at bedtime dose      HYDROcodone-acetaminophen (NORCO)  mg per tablet Take 1 tablet by mouth every 24 hours as needed. 20 tablet 0    meloxicam (MOBIC) 7.5 MG tablet Take 1 tablet (7.5 mg total) by mouth every evening. 90 tablet 3    metFORMIN (GLUCOPHAGE) 500 MG tablet Take 1 tablet (500 mg total) by mouth daily with breakfast. 90 tablet 3    metoprolol succinate (TOPROL XL) 50 MG 24 hr tablet Take 1 tablet (50 mg total) by mouth 2 (two) times daily. 180 tablet 3    nozaseptin (NOZIN) nasal  1 each by Each Nare route 2 (two) times daily. 1 applicator 0    omeprazole (PRILOSEC) 20 MG capsule Take 20 mg by mouth once daily.      valACYclovir (VALTREX) 500 MG  "tablet        Current Facility-Administered Medications on File Prior to Visit   Medication Dose Route Frequency Provider Last Rate Last Dose    ceFOXItin (MEFOXIN) 2 g in dextrose 5 % 100 mL IVPB  2 g Intravenous On Call Procedure Yrn Balderrama MD        lactated ringers infusion   Intravenous Continuous Elise Hammer MD   Stopped at 08/29/18 0852    lidocaine (PF) 10 mg/ml (1%) injection 10 mg  1 mL Intradermal Once Yrn Balderrama MD           Physical Exam:  Vital Signs:  BP (!) 136/90   Pulse 60   Ht 5' 3" (1.6 m)   Wt 76 kg (167 lb 8.8 oz)   BMI 29.68 kg/m²   Body mass index is 29.68 kg/m².  Physical Exam   Constitutional: She is oriented to person, place, and time. She appears well-developed and well-nourished.   HENT:   Head: Normocephalic.   Eyes: No scleral icterus.   Neck: Normal range of motion.   Cardiovascular: Normal rate and regular rhythm.   Pulmonary/Chest: Effort normal and breath sounds normal.   Abdominal: Soft. Bowel sounds are normal. She exhibits no distension. There is no tenderness.   Musculoskeletal: Normal range of motion.   Neurological: She is alert and oriented to person, place, and time.   Skin: Skin is warm and dry.   Psychiatric: She has a normal mood and affect.   Vitals reviewed.      Labs: Pertinent labs reviewed.    Assessment:  1. Generalized abdominal pain    2. Nausea    3. Terminal ileitis without complication        Recommendations:  - terminal ileitis vs gastroenteritis vs other etiologies  - WIll plan for CT abd for additional investigation.   - pt to notify if diarrhea stars, will get stool studies at that time if needed  Generalized abdominal pain  -     CT Abdomen Pelvis W Wo Contrast; Future; Expected date: 10/26/2018  -     Creatinine, serum; Future; Expected date: 10/26/2018    Nausea    Terminal ileitis without complication          Return to Clinic:  Follow up to be determined by results of above.      "

## 2018-10-29 ENCOUNTER — TELEPHONE (OUTPATIENT)
Dept: RADIOLOGY | Facility: HOSPITAL | Age: 68
End: 2018-10-29

## 2018-10-30 ENCOUNTER — HOSPITAL ENCOUNTER (OUTPATIENT)
Dept: RADIOLOGY | Facility: HOSPITAL | Age: 68
Discharge: HOME | End: 2018-10-30
Attending: NURSE PRACTITIONER
Payer: MEDICARE

## 2018-10-30 DIAGNOSIS — R10.84 GENERALIZED ABDOMINAL PAIN: ICD-10-CM

## 2018-10-30 PROCEDURE — 74178 CT ABD&PLV WO CNTR FLWD CNTR: CPT | Mod: TC

## 2018-10-30 PROCEDURE — 74178 CT ABD&PLV WO CNTR FLWD CNTR: CPT | Mod: 26,,, | Performed by: RADIOLOGY

## 2018-11-02 ENCOUNTER — TELEPHONE (OUTPATIENT)
Dept: GASTROENTEROLOGY | Facility: CLINIC | Age: 68
End: 2018-11-02

## 2018-11-02 NOTE — TELEPHONE ENCOUNTER
Called patient on both house and cell phone numbers, but did not get answer and could not leave a voice message due to mailbox being fool. Release result note to patient portal.

## 2018-11-15 ENCOUNTER — OFFICE VISIT (OUTPATIENT)
Dept: PAIN MEDICINE | Facility: CLINIC | Age: 68
End: 2018-11-15
Payer: MEDICARE

## 2018-11-15 ENCOUNTER — HOSPITAL ENCOUNTER (OUTPATIENT)
Dept: RADIOLOGY | Facility: HOSPITAL | Age: 68
Discharge: HOME | End: 2018-11-15
Attending: PAIN MEDICINE
Payer: MEDICARE

## 2018-11-15 VITALS
WEIGHT: 167.56 LBS | RESPIRATION RATE: 16 BRPM | HEART RATE: 60 BPM | DIASTOLIC BLOOD PRESSURE: 81 MMHG | HEIGHT: 63 IN | SYSTOLIC BLOOD PRESSURE: 176 MMHG | BODY MASS INDEX: 29.69 KG/M2

## 2018-11-15 DIAGNOSIS — M47.816 LUMBAR SPONDYLOSIS: Primary | ICD-10-CM

## 2018-11-15 DIAGNOSIS — M47.816 LUMBAR SPONDYLOSIS: ICD-10-CM

## 2018-11-15 PROCEDURE — 99213 OFFICE O/P EST LOW 20 MIN: CPT | Mod: S$PBB,,, | Performed by: PAIN MEDICINE

## 2018-11-15 PROCEDURE — 99213 OFFICE O/P EST LOW 20 MIN: CPT | Mod: PBBFAC,25 | Performed by: PAIN MEDICINE

## 2018-11-15 PROCEDURE — 99999 PR PBB SHADOW E&M-EST. PATIENT-LVL III: CPT | Mod: PBBFAC,,, | Performed by: PAIN MEDICINE

## 2018-11-15 PROCEDURE — 72110 X-RAY EXAM L-2 SPINE 4/>VWS: CPT | Mod: 26,,, | Performed by: RADIOLOGY

## 2018-11-15 PROCEDURE — 72110 X-RAY EXAM L-2 SPINE 4/>VWS: CPT | Mod: TC

## 2018-11-15 RX ORDER — VALSARTAN 320 MG/1
320 TABLET ORAL
COMMUNITY
End: 2019-02-13

## 2018-11-15 NOTE — PROGRESS NOTES
Chief Pain Complaint:  Back Pain (pt c/o low back pain)      History of Present Illness:   Ms. Caldwell returns for follow up.  She continues to have low back pain which radiates down posterior bilateral legs to the knee but not below in the right anterior thigh to the knee.  In she has been participating in physical therapy which she reports has been going very well and she also has a stationary bike which she rods at home.  She has been taking gabapentin 300 mg 6 times daily, originally she was instructed to take 2 tabs 3 times a day however she found this to be is too sedating.  Therefore, she spread out her dosing throughout the day and has found this to be satisfactory.  She continues to see Dr. Burger at [facility] was continuing to wean her Lortab prescription which was confirmed with the [facility].  She has inquired about continuing this medication had a discussion with her this is not a medication that I prescribed nor would I recommend for her situation.  She continues to take meloxicam 7.5 mg twice daily.  We were supposed to receive her records from [facility] however at this point time with not received the records nor images.  Currently her pain is rated 7/10 and is described as a constant aching sensation.  Her symptoms are worse with activity and improved with rest.  She denies bowel bladder difficulty.    Initial HPI:  This patient is a 68 y.o. female who presents today complaining of the above noted pain/s. The patient describes the pain as follows. Ms. Caldwell has history of hypertension, GERD, CAD, osteoarthritis of multiple joints status post bilateral knee replacements and right hip replacement who presents with complaints of low back pain which radiates into posterior legs to the knee but not below.  Currently she rates her pain as a 4/10 and describes her symptoms as a constant aching which is worse with walking and improved with rest and Norco.  She has previously been [facility] and she has been on chronic opioids for several years including Norco 10 mg q.i.d. and highsingla ER which was confirmed with the [facility].  She states that she kept her medications in a safe and would only take 3 Norco dose per day and occasionally 1 in the middle of the night if she woke up in pain. She reports that she had some procedures performed over the years including injections however she is unsure what type of injections and states that she got minimal pain relief.  She does not have any of her imaging records with her today.  She denies bowel or bladder changes.  Dr. Burger was slowly beginning to wean her off her opioids and suggested she seek a 2nd opinion if she did agree with this treatment.    Previous Therapy:  Medications:  Norco, meloxicam, gabapentin  Injections:  Some injections in the past, unknown type  Surgeries:  No lumbar surgeries  Physical Therapy:  Has participated with bilateral knee and right hip replacements, is currently participating and riding a stationary bike at home    Past Surgical History:   Procedure Laterality Date    CHOLECYSTECTOMY      COLONOSCOPY N/A 8/1/2018    Procedure: COLONOSCOPY;  Surgeon: Yrn Hunter III, MD;  Location: [facility] ENDO;  Service: Endoscopy;  Laterality: N/A;    COLONOSCOPY N/A 8/1/2018    Performed by Yrn Hunter III, MD at [facility] ENDO    EXCISION, LESION, ANUS N/A 8/29/2018    Performed by Yrn Balderrama MD at [facility] OR    HYSTERECTOMY      JOINT REPLACEMENT Right     hip replacement    SURGICAL EXCISION OF ANAL LESION N/A 8/29/2018    Procedure: EXCISION, LESION, ANUS;  Surgeon: Yrn Balderrama MD;  Location: [facility] OR;  Service: General;  Laterality: N/A;    TOTAL KNEE ARTHROPLASTY       Imaging / Labs / Studies (reviewed on 11/15/2018):    Review of Systems:  CONSTITUTIONAL: patient denies any fever, chills, or weight loss  SKIN: patient denies any rash or itching  RESPIRATORY: patient denies having any shortness of  "breath  GASTROINTESTINAL: patient denies having any diarrhea, constipation, or bowel incontinence  GENITOURINARY: patient denies having any abnormal bladder function    MUSCULOSKELETAL:  - patient complains of the above noted pain/s (see chief pain complaint)    NEUROLOGICAL:   - pain as above  - strength in Lower extremities is intact, BILATERALLY  - sensation in Lower extremities is intact, BILATERALLY  - patient denies any loss of bowel or bladder control      PSYCHIATRIC: patient denies any change in mood    Other:  All other systems reviewed and are negative    Physical Exam:  BP (!) 176/81 (BP Location: Left arm, Patient Position: Sitting)   Pulse 60   Resp 16   Ht 5' 3" (1.6 m)   Wt 76 kg (167 lb 8.8 oz)   BMI 29.68 kg/m²  (reviewed on 11/15/2018)\  General:  Alert and oriented, No acute distress.    HEENT:       EOMI.  Normocephalic, atraumatic.   Cardiovascular:  Regular rate.    Gastrointestinal:  Soft.    Respiratory:  Respirations are non-labored.    Cervical Spine:  No masses or atrophy,  Thoracic Spine:  Palpation normal.    Lumbar Spine:  No masses or atrophy,         Range of motion - normal Flexion, Extension,  Right Lat Bending,  Left Lat Bending        Increased pain with -no movements        Palpation - mild tenderness to palpation over lower lumbar facets bilaterally        PSIS tenderness - positive on the right        Kemar's/KATHLEEN - negative bilaterally             SLR - negative for radicular pain  Motor Exam:      Strength:  Rate on 1-5 scale Right Left   L2- hip flexion  5 5   L3- knee extension  5 5   L4- ankle dorsiflexion 5 5   L5- great toe extension 5 5   S1- ankle plantarflexion 5 5     Sensory Exam:  Full and equal sensation to light touch throughout.    Reflexes   Left DTR's      Knee.   2  Ankle.   2  Right DTR's    Knee.   2  Ankle.   2  Neurologic:  Cranial Nerves II-XII are grossly intact.    Pathologic reflexes:            Clonus - Neg  Psychiatric:  Cooperative.  "   Gait: Normal    Assessment  Sacroiliitis  Lumbar Radiculopathy    1. 68 y.o. year old patient with PMH of   Past Medical History:   Diagnosis Date    Arthritis     Cancer, uterine     Diabetes mellitus     prediabetes    Hypertension     Sciatica       presenting with pain located lumbosacral spine, bilateral posterior legs  2. Pain Generators / Etiology:  Rule out lumbar radiculopathy, rule out lumbar spondylosis, rule out sacroiliitis  3. Failed Meds (E- Effective, NE- Not Effective):  Norco-e, Mobic-e, Gabapentin-e  4. Physical Therapy - has participated for bilateral knees and right hip but not for lumbar spine  5. Psychological comorbidities -  none  6. Anticoagulants / Antiplatelets:  Aspirin 81 mg     PLAN:  1. Medications:  Continue meloxicam 7.5 mg twice daily, continue gabapentin 600 mg 3 times daily (patient has been taking 300 mg 6 times daily due to sedating affects of the 600 mg 3 times daily)  2. PT - continue physical therapy and riding in a exercise bike at home  3. Psychological - none  4. Labs - obtain  none; labs reviewed from 08/21/2018 creatinine 0.6  5. Imaging - obtain lumbar x-ray  6. Interventions - schedule none  7. Referrals - none  8. Records - have since 2nd requested [facility] to obtain patient's records and imaging  9. Follow up visit - return to clinic in 6 weeks  10. Patient Questions - answered all patient's questions regarding potential diagnoses, therapy, treatment    ZAKI Moody MD  Interventional Pain  [facility] [place]

## 2018-11-15 NOTE — PATIENT INSTRUCTIONS
- continue gabapentin dosing and add Tylenol dosing during the day, not to exceed 3000 mg daily  - continue meloxicam 7.5mg twice daily, avoid other NSAIDs such as ibuprofen and alleve  - will obtain lumbar spine xray today  - continue physical therapy  - will obtain records from [facility]  - follow up in 4 weeks

## 2018-11-27 ENCOUNTER — PATIENT MESSAGE (OUTPATIENT)
Dept: PAIN MEDICINE | Facility: CLINIC | Age: 68
End: 2018-11-27

## 2018-12-20 ENCOUNTER — HOSPITAL ENCOUNTER (OUTPATIENT)
Dept: RADIOLOGY | Facility: HOSPITAL | Age: 68
Discharge: HOME | End: 2018-12-20
Attending: PAIN MEDICINE
Payer: MEDICARE

## 2018-12-20 ENCOUNTER — OFFICE VISIT (OUTPATIENT)
Dept: PAIN MEDICINE | Facility: CLINIC | Age: 68
End: 2018-12-20
Payer: MEDICARE

## 2018-12-20 VITALS
RESPIRATION RATE: 18 BRPM | HEART RATE: 68 BPM | WEIGHT: 167 LBS | SYSTOLIC BLOOD PRESSURE: 169 MMHG | DIASTOLIC BLOOD PRESSURE: 84 MMHG | HEIGHT: 63 IN | BODY MASS INDEX: 29.59 KG/M2

## 2018-12-20 DIAGNOSIS — M53.3 PAIN OF RIGHT SACROILIAC JOINT: ICD-10-CM

## 2018-12-20 DIAGNOSIS — M25.511 ACUTE PAIN OF RIGHT SHOULDER: ICD-10-CM

## 2018-12-20 DIAGNOSIS — M25.511 ACUTE PAIN OF RIGHT SHOULDER: Primary | ICD-10-CM

## 2018-12-20 PROCEDURE — 99214 OFFICE O/P EST MOD 30 MIN: CPT | Mod: S$PBB,,, | Performed by: PAIN MEDICINE

## 2018-12-20 PROCEDURE — 99999 PR PBB SHADOW E&M-EST. PATIENT-LVL IV: CPT | Mod: PBBFAC,,, | Performed by: PAIN MEDICINE

## 2018-12-20 PROCEDURE — 99214 OFFICE O/P EST MOD 30 MIN: CPT | Mod: PBBFAC,25 | Performed by: PAIN MEDICINE

## 2018-12-20 PROCEDURE — 72040 X-RAY EXAM NECK SPINE 2-3 VW: CPT | Mod: TC

## 2018-12-20 PROCEDURE — 72040 X-RAY EXAM NECK SPINE 2-3 VW: CPT | Mod: 26,,, | Performed by: RADIOLOGY

## 2018-12-20 PROCEDURE — 73030 X-RAY EXAM OF SHOULDER: CPT | Mod: 26,RT,, | Performed by: RADIOLOGY

## 2018-12-20 PROCEDURE — 73030 X-RAY EXAM OF SHOULDER: CPT | Mod: TC,RT

## 2018-12-20 NOTE — PROGRESS NOTES
Chief Pain Complaint:  Chief Complaint   Patient presents with    Low-back Pain     History of Present Illness:   Ms. Caldwell returns for follow up.  She continues to have low back pain which is located at the inferior most aspect of the lumbar spine and superior aspect of the sacral spine.  She reports having some radiation into her right lateral thigh to the knee but not below.  She denies having numbness or weakness in her lower extremities.  She reports that she is taking a half a tablet of her 10 mg Norco every couple of days to help with the pain. She also takes Tylenol arthritis which provides minimal benefit in addition to riding an exercise bike.  She reports that physical therapy did not help at all.  Currently her pain is rated 4/10 and is an aching sensation in her low back.  She has a lot of things going on currently with her  having recent diagnosis of prostate cancer and is about to undergo radiation therapy.  She also reports having new onset right shoulder pain which has been going on for approximately 5 days.  She reports no inciting event however she does have difficulty when she is a passenger in a vehicle and is reaching back to grab her seatbelt.  She denies having weakness and numbness in her bilateral upper extremities and occasionally has symptoms in similar area on the left upper extremity.  She reports, they aching sensation from the shoulder down to the bicep and occasionally up to the neck on her left and right the right side being worse.    Initial HPI:  This patient is a 68 y.o. female who presents today complaining of the above noted pain/s. The patient describes the pain as follows. Ms. Caldwell has history of hypertension, GERD, CAD, osteoarthritis of multiple joints status post bilateral knee replacements and right hip replacement who presents with complaints of low back pain which radiates into posterior legs to the knee but not below.  Currently she rates her pain as a 4/10 and  describes her symptoms as a constant aching which is worse with walking and improved with rest and Norco.  She has previously been treated by Dr. Burger at [facility] and she has been on chronic opioids for several years including Norco 10 mg q.i.d. and highsingla ER which was confirmed with the [facility].  She states that she kept her medications in a safe and would only take 3 Norco dose per day and occasionally 1 in the middle of the night if she woke up in pain. She reports that she had some procedures performed over the years including injections however she is unsure what type of injections and states that she got minimal pain relief.  She does not have any of her imaging records with her today.  She denies bowel or bladder changes.  Dr. Burger was slowly beginning to wean her off her opioids and suggested she seek a 2nd opinion if she did agree with this treatment.    Previous Therapy:  Medications:  Norco, meloxicam, gabapentin  Injections:  Some injections in the past, unknown type  Surgeries:  No lumbar surgeries  Physical Therapy:  Has participated with bilateral knee and right hip replacements, is currently participating and riding a stationary bike at home, participate in physical therapy with minimal benefit    Past Surgical History:   Procedure Laterality Date    CHOLECYSTECTOMY      COLONOSCOPY N/A 8/1/2018    Procedure: COLONOSCOPY;  Surgeon: Yrn Hunter III, MD;  Location: [facility] ENDO;  Service: Endoscopy;  Laterality: N/A;    COLONOSCOPY N/A 8/1/2018    Performed by Yrn Hunter III, MD at [facility] ENDO    EXCISION, LESION, ANUS N/A 8/29/2018    Performed by Yrn Balderrama MD at [facility] OR    HYSTERECTOMY      JOINT REPLACEMENT Right     hip replacement    SURGICAL EXCISION OF ANAL LESION N/A 8/29/2018    Procedure: EXCISION, LESION, ANUS;  Surgeon: Yrn Balderrama MD;  Location: [facility] OR;  Service: General;  Laterality: N/A;    TOTAL KNEE ARTHROPLASTY       Imaging / Labs / Studies  (reviewed on 12/20/2018):    Review of Systems:  CONSTITUTIONAL: patient denies any fever, chills, or weight loss  SKIN: patient denies any rash or itching  RESPIRATORY: patient denies having any shortness of breath  GASTROINTESTINAL: patient denies having any diarrhea, constipation, or bowel incontinence  GENITOURINARY: patient denies having any abnormal bladder function    MUSCULOSKELETAL:  - patient complains of the above noted pain/s (see chief pain complaint)    NEUROLOGICAL:   - pain as above  - strength in Lower extremities is intact, BILATERALLY  - sensation in Lower extremities is intact, BILATERALLY  - patient denies any loss of bowel or bladder control      PSYCHIATRIC: patient denies any change in mood    Other:  All other systems reviewed and are negative    Physical Exam:  There were no vitals taken for this visit. (reviewed on 12/20/2018)\  General:  Alert and oriented, No acute distress.    HEENT:       EOMI.  Normocephalic, atraumatic.   Cardiovascular:  Regular rate.    Gastrointestinal:  Soft.    Respiratory:  Respirations are non-labored.    Cervical Spine:  No masses or atrophy,  Thoracic Spine:  Palpation normal.    Lumbar Spine:  No masses or atrophy,         Range of motion - normal Flexion, Extension,  Right Lat Bending,  Left Lat Bending        Increased pain with -no movements        Palpation - mild tenderness to palpation over lower lumbar facets bilaterally        PSIS tenderness - positive on the right        Kemar's/KATHLEEN - negative bilaterally             SLR - negative for radicular pain    Shoulder Exam   Inspection - no atrophy   Palpation - mild tenderness palpation over anterior shoulder joint  ROM   Flexion (active) -    limited secondary to pain  Flexion (passive) -    limited secondary to pain  Internal rotation (reaching behind back) -   normal  Rotator cuff strength   Supraspinatus (empty can) -   normal  Infraspinatus / teres minor (external rotation) -.    Normal  Subscapularis (belly compression) -   normal  Impingement   Hawkin's Mook (flexion 90 + IR)-   increased pain  AC joint   Crossed body adduction-increased pain    Motor Exam:      Strength:  Rate on 1-5 scale Right Left   L2- hip flexion  5 5   L3- knee extension  5 5   L4- ankle dorsiflexion 5 5   L5- great toe extension 5 5   S1- ankle plantarflexion 5 5     Sensory Exam:  Full and equal sensation to light touch throughout.    Reflexes   Left DTR's      Knee.   2  Ankle.   2  Right DTR's    Knee.   2  Ankle.   2  Neurologic:  Cranial Nerves II-XII are grossly intact.    Pathologic reflexes:            Clonus - Neg  Psychiatric:  Cooperative.    Gait: Normal    Assessment  Sacroiliitis  Lumbar Radiculopathy    1. 68 y.o. year old patient with PMH of   Past Medical History:   Diagnosis Date    Arthritis     Cancer, uterine     Diabetes mellitus     prediabetes    Hypertension     Sciatica       presenting with pain located lumbosacral spine, bilateral posterior legs  2. Pain Generators / Etiology:  Rule out lumbar radiculopathy, rule out lumbar spondylosis, rule out sacroiliitis  3. Failed Meds (E- Effective, NE- Not Effective):  Norco-e, Mobic-e, Gabapentin-e  4. Physical Therapy - has participated for bilateral knees and right hip but not for lumbar spine  5. Psychological comorbidities -  none  6. Anticoagulants / Antiplatelets:  Aspirin 81 mg     PLAN:  1. Medications:  Continue meloxicam 7.5 mg twice daily, continue gabapentin 600 mg 3 times daily (patient has been taking 300 mg 6 times daily due to sedating affects of the 600 mg 3 times daily); patient would like to continue Norco therapy and therefore I have provided her with a referral to Dr. Narciso Zhao  2. PT - continue physical therapy and riding in a exercise bike at home  3. Psychological - none  4. Labs - obtain  none; labs reviewed from 08/21/2018 creatinine 0.6  5. Imaging - cervical spine and right shoulder x-ray today  6.  Interventions - schedule right SI joint injection  7. Referrals - none  8. Records - have since 2nd requested [facility] to obtain patient's records and imaging  9. Follow up visit - return to clinic in 6 weeks  10. Patient Questions - answered all patient's questions regarding potential diagnoses, therapy, treatment    ZAKI Moody MD  Interventional Pain  [facility] [place]

## 2018-12-20 NOTE — H&P (VIEW-ONLY)
Chief Pain Complaint:  Chief Complaint   Patient presents with    Low-back Pain     History of Present Illness:   Ms. Caldwell returns for follow up.  She continues to have low back pain which is located at the inferior most aspect of the lumbar spine and superior aspect of the sacral spine.  She reports having some radiation into her right lateral thigh to the knee but not below.  She denies having numbness or weakness in her lower extremities.  She reports that she is taking a half a tablet of her 10 mg Norco every couple of days to help with the pain. She also takes Tylenol arthritis which provides minimal benefit in addition to riding an exercise bike.  She reports that physical therapy did not help at all.  Currently her pain is rated 4/10 and is an aching sensation in her low back.  She has a lot of things going on currently with her  having recent diagnosis of prostate cancer and is about to undergo radiation therapy.  She also reports having new onset right shoulder pain which has been going on for approximately 5 days.  She reports no inciting event however she does have difficulty when she is a passenger in a vehicle and is reaching back to grab her seatbelt.  She denies having weakness and numbness in her bilateral upper extremities and occasionally has symptoms in similar area on the left upper extremity.  She reports, they aching sensation from the shoulder down to the bicep and occasionally up to the neck on her left and right the right side being worse.    Initial HPI:  This patient is a 68 y.o. female who presents today complaining of the above noted pain/s. The patient describes the pain as follows. Ms. Caldwell has history of hypertension, GERD, CAD, osteoarthritis of multiple joints status post bilateral knee replacements and right hip replacement who presents with complaints of low back pain which radiates into posterior legs to the knee but not below.  Currently she rates her pain as a 4/10 and  describes her symptoms as a constant aching which is worse with walking and improved with rest and Norco.  She has previously been treated by Dr. Burger at [facility] and she has been on chronic opioids for several years including Norco 10 mg q.i.d. and highsingla ER which was confirmed with the [facility].  She states that she kept her medications in a safe and would only take 3 Norco dose per day and occasionally 1 in the middle of the night if she woke up in pain. She reports that she had some procedures performed over the years including injections however she is unsure what type of injections and states that she got minimal pain relief.  She does not have any of her imaging records with her today.  She denies bowel or bladder changes.  Dr. Burger was slowly beginning to wean her off her opioids and suggested she seek a 2nd opinion if she did agree with this treatment.    Previous Therapy:  Medications:  Norco, meloxicam, gabapentin  Injections:  Some injections in the past, unknown type  Surgeries:  No lumbar surgeries  Physical Therapy:  Has participated with bilateral knee and right hip replacements, is currently participating and riding a stationary bike at home, participate in physical therapy with minimal benefit    Past Surgical History:   Procedure Laterality Date    CHOLECYSTECTOMY      COLONOSCOPY N/A 8/1/2018    Procedure: COLONOSCOPY;  Surgeon: Yrn Hunter III, MD;  Location: [facility] ENDO;  Service: Endoscopy;  Laterality: N/A;    COLONOSCOPY N/A 8/1/2018    Performed by Yrn Hunter III, MD at [facility] ENDO    EXCISION, LESION, ANUS N/A 8/29/2018    Performed by Yrn Balderrama MD at [facility] OR    HYSTERECTOMY      JOINT REPLACEMENT Right     hip replacement    SURGICAL EXCISION OF ANAL LESION N/A 8/29/2018    Procedure: EXCISION, LESION, ANUS;  Surgeon: Yrn Balderrama MD;  Location: [facility] OR;  Service: General;  Laterality: N/A;    TOTAL KNEE ARTHROPLASTY       Imaging / Labs / Studies  (reviewed on 12/20/2018):    Review of Systems:  CONSTITUTIONAL: patient denies any fever, chills, or weight loss  SKIN: patient denies any rash or itching  RESPIRATORY: patient denies having any shortness of breath  GASTROINTESTINAL: patient denies having any diarrhea, constipation, or bowel incontinence  GENITOURINARY: patient denies having any abnormal bladder function    MUSCULOSKELETAL:  - patient complains of the above noted pain/s (see chief pain complaint)    NEUROLOGICAL:   - pain as above  - strength in Lower extremities is intact, BILATERALLY  - sensation in Lower extremities is intact, BILATERALLY  - patient denies any loss of bowel or bladder control      PSYCHIATRIC: patient denies any change in mood    Other:  All other systems reviewed and are negative    Physical Exam:  There were no vitals taken for this visit. (reviewed on 12/20/2018)\  General:  Alert and oriented, No acute distress.    HEENT:       EOMI.  Normocephalic, atraumatic.   Cardiovascular:  Regular rate.    Gastrointestinal:  Soft.    Respiratory:  Respirations are non-labored.    Cervical Spine:  No masses or atrophy,  Thoracic Spine:  Palpation normal.    Lumbar Spine:  No masses or atrophy,         Range of motion - normal Flexion, Extension,  Right Lat Bending,  Left Lat Bending        Increased pain with -no movements        Palpation - mild tenderness to palpation over lower lumbar facets bilaterally        PSIS tenderness - positive on the right        Kemar's/KATHLEEN - negative bilaterally             SLR - negative for radicular pain    Shoulder Exam   Inspection - no atrophy   Palpation - mild tenderness palpation over anterior shoulder joint  ROM   Flexion (active) -    limited secondary to pain  Flexion (passive) -    limited secondary to pain  Internal rotation (reaching behind back) -   normal  Rotator cuff strength   Supraspinatus (empty can) -   normal  Infraspinatus / teres minor (external rotation) -.    Normal  Subscapularis (belly compression) -   normal  Impingement   Hawkin's Mook (flexion 90 + IR)-   increased pain  AC joint   Crossed body adduction-increased pain    Motor Exam:      Strength:  Rate on 1-5 scale Right Left   L2- hip flexion  5 5   L3- knee extension  5 5   L4- ankle dorsiflexion 5 5   L5- great toe extension 5 5   S1- ankle plantarflexion 5 5     Sensory Exam:  Full and equal sensation to light touch throughout.    Reflexes   Left DTR's      Knee.   2  Ankle.   2  Right DTR's    Knee.   2  Ankle.   2  Neurologic:  Cranial Nerves II-XII are grossly intact.    Pathologic reflexes:            Clonus - Neg  Psychiatric:  Cooperative.    Gait: Normal    Assessment  Sacroiliitis  Lumbar Radiculopathy    1. 68 y.o. year old patient with PMH of   Past Medical History:   Diagnosis Date    Arthritis     Cancer, uterine     Diabetes mellitus     prediabetes    Hypertension     Sciatica       presenting with pain located lumbosacral spine, bilateral posterior legs  2. Pain Generators / Etiology:  Rule out lumbar radiculopathy, rule out lumbar spondylosis, rule out sacroiliitis  3. Failed Meds (E- Effective, NE- Not Effective):  Norco-e, Mobic-e, Gabapentin-e  4. Physical Therapy - has participated for bilateral knees and right hip but not for lumbar spine  5. Psychological comorbidities -  none  6. Anticoagulants / Antiplatelets:  Aspirin 81 mg     PLAN:  1. Medications:  Continue meloxicam 7.5 mg twice daily, continue gabapentin 600 mg 3 times daily (patient has been taking 300 mg 6 times daily due to sedating affects of the 600 mg 3 times daily); patient would like to continue Norco therapy and therefore I have provided her with a referral to Dr. Narciso Zhao  2. PT - continue physical therapy and riding in a exercise bike at home  3. Psychological - none  4. Labs - obtain  none; labs reviewed from 08/21/2018 creatinine 0.6  5. Imaging - cervical spine and right shoulder x-ray today  6.  Interventions - schedule right SI joint injection  7. Referrals - none  8. Records - have since 2nd requested Neuro med to obtain patient's records and imaging  9. Follow up visit - return to clinic in 6 weeks  10. Patient Questions - answered all patient's questions regarding potential diagnoses, therapy, treatment    ZAKI Moody MD  Interventional Pain  [facility] [place]

## 2018-12-20 NOTE — PATIENT INSTRUCTIONS
-provided referral to Dr. Narciso Zhao  -continue meloxicam and gabapentin  -will schedule for right SI joint injection  -follow up in clinic in 6 weeks

## 2018-12-27 ENCOUNTER — TELEPHONE (OUTPATIENT)
Dept: PAIN MEDICINE | Facility: CLINIC | Age: 68
End: 2018-12-27

## 2018-12-27 NOTE — TELEPHONE ENCOUNTER
Contacted patient. Instructions given for procedure. Pt verbalized understanding.     ----- Message from Shanique Rai sent at 12/27/2018 12:39 PM CST -----  Contact: Pt   Pt called and stated she needs to confirm what time to arrive for her injection on 12/28/18. She can be reached at [phone].    Thanks,  TF

## 2018-12-28 ENCOUNTER — HOSPITAL ENCOUNTER (OUTPATIENT)
Facility: HOSPITAL | Age: 68
Discharge: HOME | End: 2018-12-28
Attending: PAIN MEDICINE | Admitting: PAIN MEDICINE
Payer: MEDICARE

## 2018-12-28 VITALS
SYSTOLIC BLOOD PRESSURE: 166 MMHG | RESPIRATION RATE: 16 BRPM | OXYGEN SATURATION: 98 % | HEART RATE: 66 BPM | TEMPERATURE: 98 F | DIASTOLIC BLOOD PRESSURE: 93 MMHG

## 2018-12-28 DIAGNOSIS — M53.3 PAIN OF RIGHT SACROILIAC JOINT: ICD-10-CM

## 2018-12-28 PROCEDURE — 25000003 PHARM REV CODE 250

## 2018-12-28 PROCEDURE — 63600175 PHARM REV CODE 636 W HCPCS

## 2018-12-28 PROCEDURE — 63600175 PHARM REV CODE 636 W HCPCS: Performed by: PAIN MEDICINE

## 2018-12-28 PROCEDURE — 27096 INJECT SACROILIAC JOINT: CPT | Mod: RT,,, | Performed by: PAIN MEDICINE

## 2018-12-28 PROCEDURE — 27096 PR INJECTION,SACROILIAC JOINT: ICD-10-PCS | Mod: RT,,, | Performed by: PAIN MEDICINE

## 2018-12-28 RX ORDER — DIAZEPAM 5 MG/1
5 TABLET ORAL ONCE
Status: COMPLETED | OUTPATIENT
Start: 2018-12-28 | End: 2018-12-28

## 2018-12-28 RX ORDER — METHYLPREDNISOLONE ACETATE 40 MG/ML
INJECTION, SUSPENSION INTRA-ARTICULAR; INTRALESIONAL; INTRAMUSCULAR; SOFT TISSUE
Status: DISCONTINUED | OUTPATIENT
Start: 2018-12-28 | End: 2018-12-28 | Stop reason: HOSPADM

## 2018-12-28 RX ADMIN — DIAZEPAM 5 MG: 5 TABLET ORAL at 10:12

## 2018-12-28 NOTE — OP NOTE
PROCEDURE: Sacroiliac joint injection under fluoroscopic guidance     SIDE: right      PROCEDURE DATE: 12/28/2018    PREOPERATIVE DIAGNOSIS: Sacroiliitis  POSTOPERATIVE DIAGNOSIS: Sacroiliitis    PROVIDER: ZAKI Moody MD  Assistant(s): None    ANESTHESIA: Local, 5mg valium PO    >> 0 mg of VERSED    >> 0 mcg of FENTANYL     INDICATION: The patient has a history of pain due to sacroiliitis unresponsive to conservative treatments. Fluoroscopy was used to optimize visualization of needle placement and to maximize safety.     PROCEDURE DESCRIPTION: The patient was seen and identified in the preoperative area. Risks, benefits, complications, and alternatives were discussed with the patient. The patient agreed to proceed with the procedure and signed the consent. The site and side of the procedure was identified and marked. An IV was not placed for this procedure.     The patient was taken to the procedural suite. The patient was positioned in prone orientation on procedure table. A time out was performed prior to any intervention. The procedure, site, side, and allergies were stated and agreed to by all present. The lumbosacral area was widely prepped with ChloraPrep. The procedural site was draped in usual sterile fashion. Vital signs were closely monitored throughout this procedure. Conscious sedation was not used for this procedure.    The fluoroscopic camera was placed in contralateral oblique view and was adjusted until the anterior and posterior joint margins of the targeted sacroiliac joint aligned in linear array. The lower pole of the joint was identified, marked, and localized with 1% Lidocaine. A 25 gauge 3.5 inch spinal needle was introduced and advanced to the joint under fluoroscopic guidance. The joint space was entered and after negative aspiration, 2 mL of injectate was posited into the joint space. The needle was then withdrawn outside of the joint space and after negative aspiration 1 mL of  solution was injected outside of the joint space. The injectant solution used was comprised of 2 mL of 1% PF Lidocaine and 1 mL of Methylprednisolone (40 mg/mL). This techniques was performed for the above noted joint/s.    Description of Findings: Not applicable    Prosthetic devices, grafts, tissues, or devices implanted: None    Specimen Removed: No    Estimated Blood Loss: minimal    COMPLICATIONS: None    DISPOSITION / PLANS: The patient was transferred to the recovery area in a stable condition for observation. The patient was reexamined prior to discharge. There was no evidence of acute neurologic injury following the procedure.  Patient was discharged from the recovery room after meeting discharge criteria. Home discharge instructions were given to the patient by the staff.

## 2018-12-28 NOTE — PLAN OF CARE
Problem: Adult Inpatient Plan of Care  Goal: Plan of Care Review  Outcome: Outcome(s) achieved Date Met: 12/28/18  Patient discharged home in stable condition via wheelchair with ride. Verbalized understanding of discharge instructions. Patient voiced no complaints at this time. Patient stood at side of bed, walked steps with no new motor or sensory deficits. Neurologically intact.

## 2019-02-13 ENCOUNTER — OFFICE VISIT (OUTPATIENT)
Dept: INTERNAL MEDICINE | Facility: CLINIC | Age: 69
End: 2019-02-13
Payer: MEDICARE

## 2019-02-13 ENCOUNTER — LAB VISIT (OUTPATIENT)
Dept: LAB | Facility: HOSPITAL | Age: 69
End: 2019-02-13
Attending: FAMILY MEDICINE
Payer: MEDICARE

## 2019-02-13 VITALS
HEIGHT: 63 IN | TEMPERATURE: 99 F | HEART RATE: 83 BPM | SYSTOLIC BLOOD PRESSURE: 138 MMHG | BODY MASS INDEX: 30.79 KG/M2 | OXYGEN SATURATION: 98 % | WEIGHT: 173.75 LBS | DIASTOLIC BLOOD PRESSURE: 88 MMHG

## 2019-02-13 DIAGNOSIS — R73.03 PREDIABETES: Chronic | ICD-10-CM

## 2019-02-13 DIAGNOSIS — Z23 NEED FOR INFLUENZA VACCINATION: ICD-10-CM

## 2019-02-13 DIAGNOSIS — R73.03 PREDIABETES: Primary | Chronic | ICD-10-CM

## 2019-02-13 LAB
ESTIMATED AVG GLUCOSE: 134 MG/DL
HBA1C MFR BLD HPLC: 6.3 %

## 2019-02-13 PROCEDURE — 99999 PR PBB SHADOW E&M-EST. PATIENT-LVL IV: CPT | Mod: PBBFAC,,, | Performed by: FAMILY MEDICINE

## 2019-02-13 PROCEDURE — 99214 OFFICE O/P EST MOD 30 MIN: CPT | Mod: S$PBB,,, | Performed by: FAMILY MEDICINE

## 2019-02-13 PROCEDURE — 90662 IIV NO PRSV INCREASED AG IM: CPT | Mod: PBBFAC,PN

## 2019-02-13 PROCEDURE — 99214 PR OFFICE/OUTPT VISIT, EST, LEVL IV, 30-39 MIN: ICD-10-PCS | Mod: S$PBB,,, | Performed by: FAMILY MEDICINE

## 2019-02-13 PROCEDURE — 83036 HEMOGLOBIN GLYCOSYLATED A1C: CPT

## 2019-02-13 PROCEDURE — 36415 COLL VENOUS BLD VENIPUNCTURE: CPT

## 2019-02-13 PROCEDURE — 99214 OFFICE O/P EST MOD 30 MIN: CPT | Mod: PBBFAC,PN | Performed by: FAMILY MEDICINE

## 2019-02-13 PROCEDURE — 99999 PR PBB SHADOW E&M-EST. PATIENT-LVL IV: ICD-10-PCS | Mod: PBBFAC,,, | Performed by: FAMILY MEDICINE

## 2019-02-13 RX ORDER — GABAPENTIN 600 MG/1
TABLET ORAL
COMMUNITY
Start: 2019-01-09 | End: 2019-02-13

## 2019-02-13 RX ORDER — GABAPENTIN 300 MG/1
300 CAPSULE ORAL 3 TIMES DAILY
COMMUNITY
End: 2021-01-04 | Stop reason: SDUPTHER

## 2019-02-13 RX ORDER — NORTRIPTYLINE HYDROCHLORIDE 25 MG/1
CAPSULE ORAL
COMMUNITY
Start: 2019-01-09 | End: 2019-02-13

## 2019-02-13 RX ORDER — NORTRIPTYLINE HYDROCHLORIDE 50 MG/1
50 CAPSULE ORAL NIGHTLY
COMMUNITY
Start: 2019-02-07 | End: 2021-07-12

## 2019-02-13 NOTE — PROGRESS NOTES
"Subjective:   Patient ID: Venus Caldwell is a 69 y.o. female.  Chief Complaint:  Follow-up (6mo )      Her patient presents follow-up on prediabetes.    Metformin 500 mg daily.  Reports somewhat decreased compliance since last visit.  No symptoms hypo or hyperglycemia.    August 2018 A1c  6.1%.    Hypertension, hyperlipidemia, coronary artery disease, aortic atherosclerosis well controlled with  Previous LDL at goal on present medications.    Complains of dry mouth from recent pain medication changes, otherwise doing well.    Needs flu vaccine.      Review of Systems   Constitutional: Negative for chills, diaphoresis, fatigue and fever.   HENT:        Dry mouth   Eyes: Negative for visual disturbance.   Respiratory: Negative for cough, chest tightness, shortness of breath and wheezing.    Cardiovascular: Negative for chest pain, palpitations and leg swelling.   Gastrointestinal: Negative for abdominal distention, abdominal pain, constipation, diarrhea, nausea and vomiting.   Endocrine: Negative for polydipsia, polyphagia and polyuria.   Genitourinary: Negative for difficulty urinating, dysuria, flank pain, frequency, hematuria and urgency.   Musculoskeletal: Positive for arthralgias, back pain and gait problem. Negative for myalgias.   Skin: Negative for rash.   Neurological: Negative for dizziness, syncope, weakness, light-headedness, numbness and headaches.   Psychiatric/Behavioral: Negative for sleep disturbance. The patient is not nervous/anxious.      Objective:   /88 (BP Location: Right arm, Patient Position: Sitting, BP Method: Medium (Manual))   Pulse 83   Temp 98.7 °F (37.1 °C) (Tympanic)   Ht 5' 3" (1.6 m)   Wt 78.8 kg (173 lb 11.6 oz)   SpO2 98%   BMI 30.77 kg/m²     Physical Exam   Constitutional: Vital signs are normal. She appears well-developed and well-nourished. No distress.   Eyes: No scleral icterus.   Neck: No JVD present. No thyroid mass and no thyromegaly present. "   Cardiovascular: Normal rate, regular rhythm and normal heart sounds. Exam reveals no gallop and no friction rub.   No murmur heard.  Pulmonary/Chest: Effort normal and breath sounds normal. She has no wheezes. She has no rhonchi. She has no rales.   Abdominal: Soft. She exhibits no distension. There is no tenderness.   Musculoskeletal: She exhibits no edema.   Neurological: Gait abnormal. Coordination normal.   Skin: Skin is warm, dry and intact. No rash noted.   Psychiatric: She has a normal mood and affect.   Nursing note and vitals reviewed.    Assessment:       ICD-10-CM ICD-9-CM   1. Prediabetes R73.03 790.29   2. Need for influenza vaccination Z23 V04.81     Plan:   Prediabetes  -     Hemoglobin A1c; Future; Expected date: 02/13/2019  Check A1c.    Adjust metformin 500 mg daily as indicated     Need for influenza vaccination  -     Influenza - High Dose (65+) (PF) (IM)    Return to clinic 6 months

## 2019-03-18 ENCOUNTER — OFFICE VISIT (OUTPATIENT)
Dept: INTERNAL MEDICINE | Facility: CLINIC | Age: 69
End: 2019-03-18
Payer: MEDICARE

## 2019-03-18 VITALS
WEIGHT: 176.56 LBS | SYSTOLIC BLOOD PRESSURE: 142 MMHG | HEART RATE: 79 BPM | BODY MASS INDEX: 31.28 KG/M2 | DIASTOLIC BLOOD PRESSURE: 92 MMHG | TEMPERATURE: 99 F | OXYGEN SATURATION: 97 %

## 2019-03-18 DIAGNOSIS — I10 ESSENTIAL HYPERTENSION: Chronic | ICD-10-CM

## 2019-03-18 DIAGNOSIS — J30.89 CHRONIC NON-SEASONAL ALLERGIC RHINITIS: Primary | Chronic | ICD-10-CM

## 2019-03-18 PROCEDURE — 99214 PR OFFICE/OUTPT VISIT, EST, LEVL IV, 30-39 MIN: ICD-10-PCS | Mod: S$PBB,,, | Performed by: FAMILY MEDICINE

## 2019-03-18 PROCEDURE — 99213 OFFICE O/P EST LOW 20 MIN: CPT | Mod: PBBFAC,PN,25 | Performed by: FAMILY MEDICINE

## 2019-03-18 PROCEDURE — 96372 THER/PROPH/DIAG INJ SC/IM: CPT | Mod: PBBFAC,PN

## 2019-03-18 PROCEDURE — 99999 PR PBB SHADOW E&M-EST. PATIENT-LVL III: ICD-10-PCS | Mod: PBBFAC,,, | Performed by: FAMILY MEDICINE

## 2019-03-18 PROCEDURE — 99214 OFFICE O/P EST MOD 30 MIN: CPT | Mod: S$PBB,,, | Performed by: FAMILY MEDICINE

## 2019-03-18 PROCEDURE — 99999 PR PBB SHADOW E&M-EST. PATIENT-LVL III: CPT | Mod: PBBFAC,,, | Performed by: FAMILY MEDICINE

## 2019-03-18 RX ORDER — GABAPENTIN 600 MG/1
TABLET ORAL
COMMUNITY
Start: 2019-02-27 | End: 2019-03-18

## 2019-03-18 RX ORDER — BETAMETHASONE SODIUM PHOSPHATE AND BETAMETHASONE ACETATE 3; 3 MG/ML; MG/ML
6 INJECTION, SUSPENSION INTRA-ARTICULAR; INTRALESIONAL; INTRAMUSCULAR; SOFT TISSUE
Status: COMPLETED | OUTPATIENT
Start: 2019-03-18 | End: 2019-03-18

## 2019-03-18 RX ORDER — VALSARTAN 320 MG/1
320 TABLET ORAL
COMMUNITY
End: 2019-03-18

## 2019-03-18 RX ORDER — FLUTICASONE PROPIONATE 50 MCG
1 SPRAY, SUSPENSION (ML) NASAL DAILY PRN
COMMUNITY
End: 2020-10-08

## 2019-03-18 RX ADMIN — BETAMETHASONE ACETATE AND BETAMETHASONE SODIUM PHOSPHATE 6 MG: 3; 3 INJECTION, SUSPENSION INTRA-ARTICULAR; INTRALESIONAL; INTRAMUSCULAR; SOFT TISSUE at 02:03

## 2019-03-18 NOTE — PROGRESS NOTES
Subjective:   Patient ID: Venus Caldwell is a 69 y.o. female.  Chief Complaint:  Sinus Problem (x 1 week)      Patient presents for evaluation possible sinusitis.    Known history allergic rhinitis.  Reports compliance with Flonase and Zyrtec.    Recent significant exposure to smoke and other  Previous rhinitis triggers.  No fever.    Head flu vaccine.    Last flare-up July 2018 resolved when restarted above medications.  Not require antibiotics.    Last bronchitis treatment December 2017 with Zithromax and Phenergan DM.      Sinus Problem   This is a new problem. The current episode started in the past 7 days. The problem is unchanged. There has been no fever. Her pain is at a severity of 3/10. The pain is mild. Associated symptoms include congestion, coughing, headaches, a hoarse voice, sinus pressure, sneezing and a sore throat. Pertinent negatives include no chills, diaphoresis, ear pain, neck pain, shortness of breath or swollen glands. Treatments tried: Zyrtec and Flonase. The treatment provided no relief.     Review of Systems   Constitutional: Positive for fatigue. Negative for chills, diaphoresis and fever.   HENT: Positive for congestion, hoarse voice, postnasal drip, rhinorrhea, sinus pressure, sneezing and sore throat. Negative for dental problem, ear discharge, ear pain, sinus pain, tinnitus, trouble swallowing and voice change.    Eyes: Negative for pain, discharge, redness and itching.   Respiratory: Positive for cough. Negative for chest tightness, shortness of breath and wheezing.    Cardiovascular: Negative for chest pain, palpitations and leg swelling.   Gastrointestinal: Negative for abdominal pain, diarrhea, nausea and vomiting.   Musculoskeletal: Negative for myalgias, neck pain and neck stiffness.   Skin: Negative for rash.   Neurological: Positive for headaches.     Objective:   BP (!) 142/92   Pulse 79   Temp 98.7 °F (37.1 °C) (Tympanic)   Wt 80.1 kg (176 lb 9.4 oz)   SpO2 97%    BMI 31.28 kg/m²     Physical Exam   Constitutional: Vital signs are normal. She appears well-developed and well-nourished. She does not appear ill.   HENT:   Right Ear: Hearing, tympanic membrane, external ear and ear canal normal.   Left Ear: Hearing, tympanic membrane, external ear and ear canal normal.   Nose: Mucosal edema and rhinorrhea present. Right sinus exhibits no maxillary sinus tenderness and no frontal sinus tenderness. Left sinus exhibits no maxillary sinus tenderness and no frontal sinus tenderness.   Mouth/Throat: Uvula is midline and mucous membranes are normal. Posterior oropharyngeal erythema present. No oropharyngeal exudate, posterior oropharyngeal edema or tonsillar abscesses. No tonsillar exudate.   Eyes: Right conjunctiva is injected. Left conjunctiva is injected.   Neck: Normal range of motion. Neck supple.   Cardiovascular: Normal rate, regular rhythm and normal heart sounds. Exam reveals no gallop and no friction rub.   No murmur heard.  Pulmonary/Chest: Effort normal and breath sounds normal. She has no wheezes. She has no rhonchi. She has no rales.   Abdominal: Soft. She exhibits no distension. There is no tenderness.   Lymphadenopathy:     She has no cervical adenopathy.   Skin: Skin is warm, dry and intact. No rash noted.   Psychiatric: She has a normal mood and affect.   Nursing note and vitals reviewed.    Assessment:       ICD-10-CM ICD-9-CM   1. Chronic non-seasonal allergic rhinitis J30.89 477.9   2. Essential hypertension I10 401.9     Plan:   Chronic non-seasonal allergic rhinitis  -     betamethasone acetate-betamethasone sodium phosphate injection 6 mg    Exacerbation of chronic allergy tendencies  Celestone shot in office.   Discussed no antibiotics indicated based on exam today.    If no significant improvement in 3-5 days, any fever, overall worsening symptoms despite above call for antibiotic.      Essential hypertension  -     Hypertension Digital Medicine (HDMP)  Enrollment Order  -     Hypertension Digital Medicine (HDMP): Assign Onboarding Questionnaires  Blood pressure elevated last 3 visits.  Patient reports compliance with Benicar 40 mg daily and metoprolol XL 50 mg twice a day.    Agrees to digital hypertension referral.    Continue on present medications and monitor blood pressures at home.    Additional adjustment of medication as indicated.      Return to clinic 4-6 months scheduled.

## 2019-03-20 ENCOUNTER — PATIENT MESSAGE (OUTPATIENT)
Dept: ADMINISTRATIVE | Facility: OTHER | Age: 69
End: 2019-03-20

## 2019-03-21 ENCOUNTER — PATIENT OUTREACH (OUTPATIENT)
Dept: OTHER | Facility: OTHER | Age: 69
End: 2019-03-21

## 2019-03-21 NOTE — LETTER
March 21, 2019     Venus [facility]  [address] [place] [address]       Dear Venus,    Welcome to [facility]! Our goal is to make care effective, proactive and convenient by using data you send us from home to better treat your chronic conditions.          My name is Alka Hinds, and I am your dedicated Digital Medicine clinician. As an expert in medication management, I will help ensure that the medications you are taking continue to provide the intended benefits and help you reach your goals. You can reach me directly at [phone] or by sending me a message directly through your [facility] account.        I am Shahrzad Domingo and I will be your health . My job is to help you identify lifestyle changes to improve your disease control. We will talk about nutrition, exercise, and other ways you may be able to adjust your current habits to better your health. Additionally, we will help ensure you are completing the tests and screenings that are necessary to help manage your conditions. You can reach me directly at [phone] or by sending me a message directly through your [facility] account.    Most importantly, YOU are at the center of this team. Together, we will work to improve your overall health and encourage you to meet your goals for a healthier lifestyle.     What we expect from YOU:  · Please take frequent home blood pressure measurements. We ask that you take at least 1 blood pressure reading per week, but more information will better help us get you know you. Be sure you rest for a few minutes before taking the reading in a quiet, comfortable place.     Be available to receive phone calls or [facility] messages, when appropriate, from your care team. Please let us know if there are any specific days or times that work best for us to reach you via phone.     Complete routine tests and screenings. Dont worry, we will help keep you on track!           What you  should expect from your Digital Medicine Care Team:   We will work with you to create a personalized plan of care and provide you with encouragement and education, including regarding lifestyle changes, that could help you manage your disease states.     We will adjust your current medications, if needed, and continue to monitor your long-term progress.     We will provide you and your physician with monthly progress reports after you have been in the program for more than 30 days.     We will send you reminders through [facility] and text messages to help ensure you do not miss any testing deadlines to help manage your disease states.    You will be able to reach us by phone or through your [facility] account by clicking our names under Care Team on the right side of the home screen.    I look forward to working with you to achieve your blood pressure goals!    We look forward to working with you to help manage your health,    Sincerely,    Your Digital Medicine Team    Please visit our websites to learn more:   · Hypertension: [email or web address]      Remember, we are not available for emergencies. If you have an emergency, please contact your doctors office directly or call [facility] on-call ([phone] or [phone]) or [phone].

## 2019-03-21 NOTE — PROGRESS NOTES
Digital Medicine Enrollment Call    Introduced Mrs. Venus Caldwell to Digital Medicine.     Discussed program expectations and requirements.    Introduced digital medicine care team.     Reviewed the importance of self-monitoring for digital medicine participation.     Reviewed that the Digital Medicine team is not available for emergencies and instructed the patient to call 911 or [facility] ([phone] or [phone]) if one arises.    Pt reports that she sits on a love seat while taking her BP with her arm resting on her 's lap. She takes her BP medications first but only waits a few minutes then takes her BP. Pt advised of the importance of the proper BP technique. She states that she will take her BP medication first with coffee. She will then take her BP at least an hour afterwards while sitting at the kitchen table going forward. Pt states that she has a goal to lose weight and has begun working towards that goal by eating better and drinking more water.          Last 5 Patient Entered Readings                                      Current 30 Day Average: 157/93     Recent Readings 3/21/2019 3/20/2019 3/20/2019 3/20/2019    SBP (mmHg) 149 164 161 138    DBP (mmHg) 91 93 94 92    Pulse 64 73 76 71

## 2019-04-03 ENCOUNTER — PATIENT MESSAGE (OUTPATIENT)
Dept: OTHER | Facility: OTHER | Age: 69
End: 2019-04-03

## 2019-04-03 ENCOUNTER — PATIENT OUTREACH (OUTPATIENT)
Dept: OTHER | Facility: OTHER | Age: 69
End: 2019-04-03

## 2019-04-03 NOTE — PROGRESS NOTES
Last 5 Patient Entered Readings                                      Current 30 Day Average: 146/86     Recent Readings 4/2/2019 4/2/2019 4/1/2019 3/31/2019 3/31/2019    SBP (mmHg) 161 162 121 159 149    DBP (mmHg) 105 86 76 89 87    Pulse 94 70 76 77 75        Hypertension Medications     BENICAR 40 mg tablet Take 40 mg by mouth once daily.     metoprolol succinate (TOPROL XL) 50 MG 24 hr tablet Take 1 tablet (50 mg total) by mouth 2 (two) times daily.     Called patient for digital medicine clinical pharmacist introduction. No answer. Left message for call back        Alka Du, Pharm.D.   Digital Medicine Clinical Pharmacist  [phone]

## 2019-04-03 NOTE — LETTER
May 21, 2019     [facility]  [address] [place] [address]       Dear Venus,    Thank you for enrolling in [facility]s Digital Medicine Program. To participate, we ask that you submit information at least once weekly through your [facility] account and maintain regular contact with your Care Team. We have not received any data or heard from you in some time.     The Digital Medicine Care Team has attempted to reach you on multiple occasions to determine if you would like to continue participating in the program. While we encourage you to continue participating fully, we understand that circumstances may change.     To continue participating in the program, please contact me at [phone]. If we do not hear back, you will be un-enrolled, and your physician will be notified of your decision.    If you have submitted data and believe you are receiving this letter in error, please call the Digital Medicine Patient Support Line at [phone] for troubleshooting.      We look forward to hearing from you soon.    Sincerely,     Alka Du  Digital Medicine Clinical Pharmacist  [phone]

## 2019-04-09 ENCOUNTER — PATIENT OUTREACH (OUTPATIENT)
Dept: OTHER | Facility: OTHER | Age: 69
End: 2019-04-09

## 2019-04-09 NOTE — PROGRESS NOTES
"Last 5 Patient Entered Readings                                      Current 30 Day Average: 143/86     Recent Readings 4/8/2019 4/7/2019 4/6/2019 4/5/2019 4/5/2019    SBP (mmHg) 151 127 138 119 137    DBP (mmHg) 92 78 83 76 80    Pulse 73 84 75 81 75          Patient has been comparing [employer] to [employer] cuff. Patient has been taking BP readings while sitting in a chair, arm supported, sitting for 5 minutes, and waiting at least an hour after taking BP medications. Reviewed proper BP technique and importance of adherence to ensure accurate readings.    Digital Medicine: Health  Introduction    Introduced Mrs. Venus Caldwell to Digital Medicine. Discussed health  role and recommended lifestyle modifications.    Lifestyle Assessment:  Current Dietary Habits(i.e. low sodium, food labels, dining out): Reports gaining 25 lbs over the past 6 months. Recently started reducing ice cream sandwiches at night.Typical meals consist of the following:    Breakfast: deferred    Lunch: peanut butter and jelly sandwich   Dinner: take out 3 nights per week- [employer] or [employer] hamburger and "a few" fries   Snacks: cookies, watermelon, brownies    Beverages: sweet tea, Sprite Zero, water "not as much as I should"    Exercise: Reports staying busy going to doctor appointments. Reports walking in [facility]. Will work with patient to establish exercise routine.     Alcohol/Tobacco: None    Medication Adherence: has been compliant with the medicaiton regimen    Reviewed AHA/AACE recommendations:  Limit sodium intake to <2000mg/day  Perform 150 minutes of physical activity per week    Reviewed the importance of self-monitoring, medication adherence, and that the health  can be used as a resource for lifestyle modifications to help reduce or maintain a healthy lifestyle.  Reviewed that the Digital Medicine team is not available for emergencies and instructed the patient to call 911 or [facility] ([phone] or " [phone]) if one arises.

## 2019-04-12 ENCOUNTER — PATIENT MESSAGE (OUTPATIENT)
Dept: INTERNAL MEDICINE | Facility: CLINIC | Age: 69
End: 2019-04-12

## 2019-04-26 ENCOUNTER — PATIENT MESSAGE (OUTPATIENT)
Dept: ADMINISTRATIVE | Facility: OTHER | Age: 69
End: 2019-04-26

## 2019-04-26 ENCOUNTER — PATIENT MESSAGE (OUTPATIENT)
Dept: INTERNAL MEDICINE | Facility: CLINIC | Age: 69
End: 2019-04-26

## 2019-04-26 DIAGNOSIS — I10 ESSENTIAL HYPERTENSION: Primary | Chronic | ICD-10-CM

## 2019-04-26 RX ORDER — AMLODIPINE BESYLATE 5 MG/1
5 TABLET ORAL DAILY
Qty: 90 TABLET | Refills: 0 | Status: SHIPPED | OUTPATIENT
Start: 2019-04-26 | End: 2019-07-25 | Stop reason: SDUPTHER

## 2019-04-30 NOTE — PROGRESS NOTES
Last 5 Patient Entered Readings                                      Current 30 Day Average: 138/85     Recent Readings 4/30/2019 4/30/2019 4/29/2019 4/28/2019 4/28/2019    SBP (mmHg) 112 123 114 127 119    DBP (mmHg) 68 77 82 86 78    Pulse 84 82 83 78 75          Called patient for digital medicine clinical pharmacist introduction. Patient was not available and asked that I call back.       Alka Du, Pharm.D.   Digital Medicine Clinical Pharmacist  [phone]

## 2019-05-02 ENCOUNTER — OFFICE VISIT (OUTPATIENT)
Dept: CARDIOLOGY | Facility: CLINIC | Age: 69
End: 2019-05-02
Payer: MEDICARE

## 2019-05-02 ENCOUNTER — CLINICAL SUPPORT (OUTPATIENT)
Dept: CARDIOLOGY | Facility: CLINIC | Age: 69
End: 2019-05-02
Payer: MEDICARE

## 2019-05-02 ENCOUNTER — LAB VISIT (OUTPATIENT)
Dept: LAB | Facility: HOSPITAL | Age: 69
End: 2019-05-02
Attending: INTERNAL MEDICINE
Payer: MEDICARE

## 2019-05-02 VITALS
DIASTOLIC BLOOD PRESSURE: 70 MMHG | SYSTOLIC BLOOD PRESSURE: 100 MMHG | HEIGHT: 63 IN | WEIGHT: 175.06 LBS | BODY MASS INDEX: 31.02 KG/M2 | HEART RATE: 83 BPM

## 2019-05-02 DIAGNOSIS — I47.19 PAROXYSMAL ATRIAL TACHYCARDIA: Chronic | ICD-10-CM

## 2019-05-02 DIAGNOSIS — E78.2 MIXED HYPERLIPIDEMIA: Chronic | ICD-10-CM

## 2019-05-02 DIAGNOSIS — R00.2 PALPITATIONS: ICD-10-CM

## 2019-05-02 DIAGNOSIS — I10 ESSENTIAL HYPERTENSION: Chronic | ICD-10-CM

## 2019-05-02 DIAGNOSIS — I10 ESSENTIAL HYPERTENSION: Primary | Chronic | ICD-10-CM

## 2019-05-02 DIAGNOSIS — I25.10 CORONARY ARTERY DISEASE INVOLVING NATIVE CORONARY ARTERY OF NATIVE HEART WITHOUT ANGINA PECTORIS: Chronic | ICD-10-CM

## 2019-05-02 DIAGNOSIS — G47.39 OTHER SLEEP APNEA: ICD-10-CM

## 2019-05-02 DIAGNOSIS — I70.0 ABDOMINAL AORTIC ATHEROSCLEROSIS: Chronic | ICD-10-CM

## 2019-05-02 DIAGNOSIS — I25.10 CORONARY ARTERY DISEASE WITHOUT ANGINA PECTORIS, UNSPECIFIED VESSEL OR LESION TYPE, UNSPECIFIED WHETHER NATIVE OR TRANSPLANTED HEART: Primary | ICD-10-CM

## 2019-05-02 DIAGNOSIS — I25.10 CORONARY ARTERY DISEASE WITHOUT ANGINA PECTORIS, UNSPECIFIED VESSEL OR LESION TYPE, UNSPECIFIED WHETHER NATIVE OR TRANSPLANTED HEART: ICD-10-CM

## 2019-05-02 LAB
T4 FREE SERPL-MCNC: 1.09 NG/DL (ref 0.71–1.51)
TSH SERPL DL<=0.005 MIU/L-ACNC: 0.37 UIU/ML (ref 0.4–4)

## 2019-05-02 PROCEDURE — 93010 ELECTROCARDIOGRAM REPORT: CPT | Mod: S$PBB,,, | Performed by: INTERNAL MEDICINE

## 2019-05-02 PROCEDURE — 99204 OFFICE O/P NEW MOD 45 MIN: CPT | Mod: S$PBB,,, | Performed by: INTERNAL MEDICINE

## 2019-05-02 PROCEDURE — 36415 COLL VENOUS BLD VENIPUNCTURE: CPT

## 2019-05-02 PROCEDURE — 93005 ELECTROCARDIOGRAM TRACING: CPT | Mod: PBBFAC | Performed by: INTERNAL MEDICINE

## 2019-05-02 PROCEDURE — 99213 OFFICE O/P EST LOW 20 MIN: CPT | Mod: PBBFAC,25 | Performed by: INTERNAL MEDICINE

## 2019-05-02 PROCEDURE — 84439 ASSAY OF FREE THYROXINE: CPT

## 2019-05-02 PROCEDURE — 99999 PR PBB SHADOW E&M-EST. PATIENT-LVL III: CPT | Mod: PBBFAC,,, | Performed by: INTERNAL MEDICINE

## 2019-05-02 PROCEDURE — 93010 EKG 12-LEAD: ICD-10-PCS | Mod: S$PBB,,, | Performed by: INTERNAL MEDICINE

## 2019-05-02 PROCEDURE — 84443 ASSAY THYROID STIM HORMONE: CPT

## 2019-05-02 PROCEDURE — 99204 PR OFFICE/OUTPT VISIT, NEW, LEVL IV, 45-59 MIN: ICD-10-PCS | Mod: S$PBB,,, | Performed by: INTERNAL MEDICINE

## 2019-05-02 PROCEDURE — 99999 PR PBB SHADOW E&M-EST. PATIENT-LVL III: ICD-10-PCS | Mod: PBBFAC,,, | Performed by: INTERNAL MEDICINE

## 2019-05-02 NOTE — PROGRESS NOTES
Subjective:   Patient ID:  Venus Caldwell is a 69 y.o. female who presents for cardiac consult of Coronary Artery Disease (consult); Hypertension; and Hyperlipidemia      HPI  The patient came in today for cardiac consult of Coronary Artery Disease (consult); Hypertension; and Hyperlipidemia    Referring Physician: Sourav Hernandez MD   Reason for consult: HTN    19  Venus Caldwell is a 69 y.o. female with current medical conditions HTN, PSVT, pre-DM, OA, uterine CA, GERD, pre-DM presents to establish CV care at [facility] and discuss BP.     Bp has improved with Norvasc 5 mg, recently started by Dr. Hernandez. Has been feeling more weak and fatigue, SBP was in upper 90s lately. She had LHC with minor artery blockage no stents placed, she thinks she had CP but strong FH of CAD. She does get palpitations, a few times a day, lasts one or two beats and she feels ok.   Prior cardiologist Dr. Hannah. Does snore, no prior sleep study. Has gained > 25 lbs in last 6 months, pre-DM.     Patient feels no chest pain, no sob, no leg swelling, no PND,  no syncope, no CNS symptoms.    Patient has fairly good exercise tolerance.    Patient is compliant with medications.    ECG - NSR, inferior infarct    FH - father had MI in age 58, brother  from MI 58    Past Medical History:   Diagnosis Date    Arthritis     Cancer, uterine     Diabetes mellitus     prediabetes    Hypertension     Sciatica        Past Surgical History:   Procedure Laterality Date    CHOLECYSTECTOMY      COLONOSCOPY N/A 2018    Performed by [facility] III[facility]O    EXCISION, LESION, ANUS N/A 2018    Per[facility]derr[facility] at [facility] OR    HYSTERECTOMY      JOINT REPLACEMENT Right     hip replacement    TOTAL KNEE ARTHROPLASTY         Social History     Tobacco Use    Smoking status: Never Smoker    Smokeless tobacco: Never Used   Substance Use Topics    Alcohol use: No    Drug use: No       Family  History   Problem Relation Age of Onset    Diabetes Mother     Cataracts Mother     Diabetes Brother     Cataracts Maternal Grandmother     Breast cancer Maternal Aunt        Patient's Medications   New Prescriptions    No medications on file   Previous Medications    AMLODIPINE (NORVASC) 5 MG TABLET    Take 1 tablet (5 mg total) by mouth once daily.    ASPIRIN 81 MG CHEW    Take 81 mg by mouth once daily.    ATORVASTATIN (LIPITOR) 40 MG TABLET    Take 40 mg by mouth every evening.     BENICAR 40 MG TABLET    Take 40 mg by mouth once daily.     BETAMETHASONE DIPROPIONATE (DIPROLENE) 0.05 % CREAM        CETIRIZINE (ZYRTEC) 10 MG TABLET    Take 1 tablet by mouth Daily.    CHOLECALCIFEROL, VITAMIN D3, 5,000 UNIT TAB    Take 1,000 Units by mouth once daily.     FLUTICASONE (FLONASE) 50 MCG/ACTUATION NASAL SPRAY    1 spray by Each Nare route daily as needed for Rhinitis.    GABAPENTIN (NEURONTIN) 300 MG CAPSULE    Take 300 mg by mouth 3 (three) times daily. 300 mg TID    HYDROCODONE-ACETAMINOPHEN (NORCO)  MG PER TABLET    Take 1 tablet by mouth every 24 hours as needed.    MELOXICAM (MOBIC) 7.5 MG TABLET    Take 1 tablet (7.5 mg total) by mouth every evening.    METFORMIN (GLUCOPHAGE) 500 MG TABLET    Take 1 tablet (500 mg total) by mouth daily with breakfast.    METOPROLOL SUCCINATE (TOPROL XL) 50 MG 24 HR TABLET    Take 1 tablet (50 mg total) by mouth 2 (two) times daily.    NORTRIPTYLINE (PAMELOR) 50 MG CAPSULE    Take 50 mg by mouth nightly.     NOZASEPTIN (NOZIN) NASAL     1 each by Each Nare route 2 (two) times daily.    OMEPRAZOLE (PRILOSEC) 20 MG CAPSULE    Take 20 mg by mouth once daily.    VALACYCLOVIR (VALTREX) 500 MG TABLET    Take 500 mg by mouth as needed.    Modified Medications    No medications on file   Discontinued Medications    No medications on file       Review of Systems   Constitutional: Positive for malaise/fatigue.   HENT: Negative.    Eyes: Negative.    Respiratory:  "Negative.    Cardiovascular: Positive for palpitations.   Gastrointestinal: Negative.    Genitourinary: Negative.    Musculoskeletal: Negative.    Skin: Negative.    Neurological: Negative.    Endo/Heme/Allergies: Negative.    Psychiatric/Behavioral: Negative.    All 12 systems otherwise negative.      Wt Readings from Last 3 Encounters:   05/02/19 79.4 kg (175 lb 0.7 oz)   03/18/19 80.1 kg (176 lb 9.4 oz)   02/13/19 78.8 kg (173 lb 11.6 oz)     Temp Readings from Last 3 Encounters:   03/18/19 98.7 °F (37.1 °C) (Tympanic)   02/13/19 98.7 °F (37.1 °C) (Tympanic)   12/28/18 98 °F (36.7 °C) (Oral)     BP Readings from Last 3 Encounters:   05/02/19 100/70   03/18/19 (!) 142/92   02/13/19 138/88     Pulse Readings from Last 3 Encounters:   05/02/19 83   03/18/19 79   02/13/19 83       /70 (BP Method: Large (Manual))   Pulse 83   Ht 5' 3" (1.6 m)   Wt 79.4 kg (175 lb 0.7 oz)   BMI 31.01 kg/m²     Objective:   Physical Exam   Constitutional: She is oriented to person, place, and time. She appears well-developed and well-nourished. No distress.   HENT:   Head: Normocephalic and atraumatic.   Nose: Nose normal.   Mouth/Throat: Oropharynx is clear and moist.   Eyes: Conjunctivae and EOM are normal. No scleral icterus.   Neck: Normal range of motion. Neck supple. No JVD present. No thyromegaly present.   Cardiovascular: Normal rate, regular rhythm, S1 normal and S2 normal. Exam reveals no gallop, no S3, no S4 and no friction rub.   No murmur heard.  Pulmonary/Chest: Effort normal and breath sounds normal. No stridor. No respiratory distress. She has no wheezes. She has no rales. She exhibits no tenderness.   Abdominal: Soft. Bowel sounds are normal. She exhibits no distension and no mass. There is no tenderness. There is no rebound.   Genitourinary:   Genitourinary Comments: Deferred   Musculoskeletal: Normal range of motion. She exhibits no edema, tenderness or deformity.   Lymphadenopathy:     She has no cervical " adenopathy.   Neurological: She is alert and oriented to person, place, and time. She exhibits normal muscle tone. Coordination normal.   Skin: Skin is warm and dry. No rash noted. She is not diaphoretic. No erythema. No pallor.   Psychiatric: She has a normal mood and affect. Her behavior is normal. Judgment and thought content normal.   Nursing note and vitals reviewed.      Lab Results   Component Value Date     08/21/2018    K 4.3 08/21/2018     08/21/2018    CO2 27 08/21/2018    BUN 15 08/21/2018    CREATININE 0.7 10/30/2018    GLU 92 08/21/2018    HGBA1C 6.3 (H) 02/13/2019    AST 15 08/21/2018    ALT 19 08/21/2018    ALBUMIN 3.5 08/21/2018    PROT 7.2 08/21/2018    BILITOT 0.5 08/21/2018    WBC 8.42 08/21/2018    HGB 9.8 (L) 08/21/2018    HCT 32.9 (L) 08/21/2018    MCV 85 08/21/2018     (H) 08/21/2018    INR 1.0 06/14/2018    TSH 0.645 03/27/2018    CHOL 144 03/27/2018    HDL 38 (L) 03/27/2018    LDLCALC 74.8 03/27/2018    TRIG 156 (H) 03/27/2018     Assessment:      1. Essential hypertension    2. Mixed hyperlipidemia    3. Paroxysmal atrial tachycardia    4. Coronary artery disease involving native coronary artery of native heart without angina pectoris    5. Abdominal aortic atherosclerosis    6. Other sleep apnea    7. Palpitations        Plan:   1. CAD, non obs with abd atherosclerosis   - no CP  - cont asa, statin, BB    2. HTN  - Bp well controlled  - monitor for low BP    3. Palpitations  - rare, cont to monitor  - cont BB  - check TSH,  T4    4. Sleep apnea symptoms  - refer to sleep study    Thank you for allowing me to participate in this patient's care. Please do not hesitate to contact me with any questions or concerns. Consult note has been forwarded to the referral physician.

## 2019-05-02 NOTE — LETTER
May 2, 2019      Sourav Hernandez MD  [address]  [place] [address]           [facility]  [id number] [place] [id number]  Phone: [phone]  Fax: [phone]          Patient: Venus Caldwell   MR Number: [id number]   YOB: 1950   Date of Visit: 5/2/2019       Dear Dr. Sourav Hernandez:    Thank you for referring Venus Caldwell to me for evaluation. Attached you will find relevant portions of my assessment and plan of care.    If you have questions, please do not hesitate to call me. I look forward to following Venus Caldwell along with you.    Sincerely,    Maikol Garcia MD    Enclosure  CC:  No Recipients    If you would like to receive this communication electronically, please contact [email or web address] or [phone] to request more information on [employer] access.    For providers and/or their staff who would like to refer a patient to [facility], please contact us through our one-stop-shop provider referral line, [facility] , at [phone].    If you feel you have received this communication in error or would no longer like to receive these types of communications, please e-mail [email or web address]

## 2019-05-07 NOTE — PROGRESS NOTES
Last 5 Patient Entered Readings                                      Current 30 Day Average: 135/84     Recent Readings 5/7/2019 5/6/2019 5/5/2019 5/5/2019 5/4/2019    SBP (mmHg) 138 141 105 126 148    DBP (mmHg) 86 84 70 81 86    Pulse 73 71 79 78 71          Called patient for digital medicine clinical pharmacist introduction. No answer. Left message for call back. Will send My chart message.       Alka Du, Pharm.D.   Digital Medicine Clinical Pharmacist  [phone]

## 2019-05-08 ENCOUNTER — PATIENT MESSAGE (OUTPATIENT)
Dept: INTERNAL MEDICINE | Facility: CLINIC | Age: 69
End: 2019-05-08

## 2019-05-09 ENCOUNTER — CLINICAL SUPPORT (OUTPATIENT)
Dept: CARDIOLOGY | Facility: CLINIC | Age: 69
End: 2019-05-09
Attending: INTERNAL MEDICINE
Payer: MEDICARE

## 2019-05-09 DIAGNOSIS — I25.10 CORONARY ARTERY DISEASE INVOLVING NATIVE CORONARY ARTERY OF NATIVE HEART WITHOUT ANGINA PECTORIS: Chronic | ICD-10-CM

## 2019-05-09 DIAGNOSIS — I10 ESSENTIAL HYPERTENSION: Chronic | ICD-10-CM

## 2019-05-09 DIAGNOSIS — I47.19 PAROXYSMAL ATRIAL TACHYCARDIA: Chronic | ICD-10-CM

## 2019-05-09 PROCEDURE — 93306 TTE W/DOPPLER COMPLETE: CPT | Mod: PBBFAC | Performed by: INTERNAL MEDICINE

## 2019-05-09 PROCEDURE — 93306 2D ECHO WITH COLOR FLOW DOPPLER: ICD-10-PCS | Mod: 26,S$PBB,, | Performed by: INTERNAL MEDICINE

## 2019-05-09 RX ORDER — MELOXICAM 7.5 MG/1
7.5 TABLET ORAL 2 TIMES DAILY
Qty: 180 TABLET | Refills: 3 | Status: SHIPPED | OUTPATIENT
Start: 2019-05-09 | End: 2020-06-04 | Stop reason: SDUPTHER

## 2019-05-11 LAB
DIASTOLIC DYSFUNCTION: YES
ESTIMATED PA SYSTOLIC PRESSURE: 22.09
RETIRED EF AND QEF - SEE NOTES: 60 (ref 55–65)

## 2019-05-14 NOTE — PROGRESS NOTES
Last 5 Patient Entered Readings                                      Current 30 Day Average: 131/83     Recent Readings 5/14/2019 5/14/2019 5/13/2019 5/13/2019 5/12/2019    SBP (mmHg) 119 135 121 115 143    DBP (mmHg) 81 86 81 77 83    Pulse 82 83 82 88 73        Called patient for digital medicine clinical pharmacist introduction. No answer. Left message for call back.       Alka Du, Pharm.D.   Digital Medicine Clinical Pharmacist  [phone]

## 2019-05-21 NOTE — PROGRESS NOTES
Last 5 Patient Entered Readings                                      Current 30 Day Average: 127/82     Recent Readings 5/20/2019 5/20/2019 5/19/2019 5/18/2019 5/18/2019    SBP (mmHg) 126 121 130 108 108    DBP (mmHg) 81 75 80 72 72    Pulse 93 76 79 85 85        Called patient for digital medicine clinical pharmacist introduction. No answer. Left message for call back. Will send non- compliance letter.       Alka Du, Pharm.D.   Digital Medicine Clinical Pharmacist  [phone]

## 2019-05-27 ENCOUNTER — PATIENT OUTREACH (OUTPATIENT)
Dept: OTHER | Facility: OTHER | Age: 69
End: 2019-05-27

## 2019-05-27 NOTE — PROGRESS NOTES
"Last 5 Patient Entered Readings                                      Current 30 Day Average: 124/79     Recent Readings 5/27/2019 5/26/2019 5/25/2019 5/24/2019 5/23/2019    SBP (mmHg) 120 125 148 132 117    DBP (mmHg) 77 77 86 85 75    Pulse 77 77 71 73 76        Reports feeling "tired" with lower BP readings, will notify DM clinician.     Digital Medicine: Health  Follow Up    Lifestyle Modifications:    1.Dietary Modifications (Sodium intake <2,000mg/day, food labels, dining out): Patient reports eating dinner earlier in efforts to reduce weight. Reports drinking 1-2 bottles of water per day. Set SMART goal to increase intake, will f/u in 3-4 weeks. Patient picked a pair of white jeans to track weight loss.     2.Physical Activity: Walks in [employer] "often", uses chart for support. Encouraged patient to increase time and frequency walking in [employer].    3.Medication Therapy: Patient has been compliant with the medication regimen.    4.Patient has the following medication side effects/concerns: None    Follow up with Mrs. Venus Caldwell completed. No further questions or concerns. Will continue to follow up to achieve health goals.      "

## 2019-05-27 NOTE — PROGRESS NOTES
Last 5 Patient Entered Readings                                      Current 30 Day Average: 124/79     Recent Readings 5/27/2019 5/26/2019 5/25/2019 5/24/2019 5/23/2019    SBP (mmHg) 120 125 148 132 117    DBP (mmHg) 77 77 86 85 75    Pulse 77 77 71 73 76            Digital Medicine: Health  Follow Up    Spoke with patient's , patient not available to follow up, will call back in 1 week.  stated he received NC letter, stated she has taken her BP almost every day and unsure why she received letter. Explained requirement of the program is to maintain contact with care team and DM clinician has been unable to reach patient,  verbalized understanding. Will notify DM clinician.     Current BP average 124/79 mmHg is at goal, 130/80

## 2019-05-30 NOTE — PROGRESS NOTES
Last 5 Patient Entered Readings                                      Current 30 Day Average: 126/79     Recent Readings 5/29/2019 5/29/2019 5/28/2019 5/28/2019 5/27/2019    SBP (mmHg) 164 110 121 124 122    DBP (mmHg) 94 69 74 75 81    Pulse 85 71 78 76 92        Hypertension Medications     amLODIPine (NORVASC) 5 MG tablet Take 1 tablet (5 mg total) by mouth once daily.    BENICAR 40 mg tablet Take 40 mg by mouth once daily.     metoprolol succinate (TOPROL XL) 50 MG 24 hr tablet Take 1 tablet (50 mg total) by mouth 2 (two) times daily.     Called patient for digital medicine clinical pharmacist introduction. Call was interrupted. I called back. No answer. Left message for call back      Alka Du, Pharm.D.   Digital Medicine Clinical Pharmacist  [phone]

## 2019-06-03 NOTE — PROGRESS NOTES
Last 5 Patient Entered Readings                                      Current 30 Day Average: 129/79     Recent Readings 6/3/2019 6/2/2019 6/1/2019 6/1/2019 5/31/2019    SBP (mmHg) 131 110 141 126 134    DBP (mmHg) 80 69 87 74 80    Pulse 81 82 90 73 72        Called patient to complete clinical pharmacist introduction. Patient was not available. Left message for call back.       Alka Du Pharm.D.   Digital Medicine Clinical Pharmacist  [phone]

## 2019-06-10 NOTE — PROGRESS NOTES
Last 5 Patient Entered Readings                                      Current 30 Day Average: 129/79     Recent Readings 6/10/2019 6/9/2019 6/8/2019 6/8/2019 6/7/2019    SBP (mmHg) 129 126 130 130 142    DBP (mmHg) 77 75 81 81 81    Pulse 72 76 69 69 77        Hypertension Medications     amLODIPine (NORVASC) 5 MG tablet Take 1 tablet (5 mg total) by mouth once daily.    BENICAR 40 mg tablet Take 40 mg by mouth once daily.     metoprolol succinate (TOPROL XL) 50 MG 24 hr tablet Take 1 tablet (50 mg total) by mouth 2 (two) times daily.     Called patient to complete clinical pharmacist introduction. She is doing well with no complaints today. PCP started amlodipine 04/2019. Patient says that this has helped her BP. She has noticed slight ankle edema but says it's not bothersome. She tries to take her BP three times daily, and sometimes takes her BP before medication or soon after drinking coffee. She reports correct monitoring technique.     1) 30- day BP average meets goal of <130/<80. Continue current BP medications. Last CMP WNL  2) Discussed proper BP monitoring technique and BP goal  3) Reviewed allergies and current medications  4) Patient knows to contact me with any non-urgent clinical changes or concerns. Go to ED or call [facility] for emergencies.   5) Will defer lifestyle counseling to digital medicine health .   6) Will continue to follow up.     Alka Du, Pharm.D.   Digital Medicine Clinical Pharmacist  [phone]

## 2019-06-24 ENCOUNTER — PATIENT MESSAGE (OUTPATIENT)
Dept: ADMINISTRATIVE | Facility: OTHER | Age: 69
End: 2019-06-24

## 2019-06-24 ENCOUNTER — PATIENT OUTREACH (OUTPATIENT)
Dept: OTHER | Facility: OTHER | Age: 69
End: 2019-06-24

## 2019-06-24 NOTE — PROGRESS NOTES
"Last 5 Patient Entered Readings                                      Current 30 Day Average: 127/77     Recent Readings 6/24/2019 6/24/2019 6/24/2019 6/23/2019 6/22/2019    SBP (mmHg) 100 88 79 116 121    DBP (mmHg) 67 58 60 74 75    Pulse 83 123 121 81 72        Discussed low BP alert. Reports feeling "really tired". Reports working outside for 20 mins prior to reading. Denies drinking "enough" water per day. Reviewed hypotensive protocol. Patient reports she would like to speak with DM clinician to discuss further, placed task. Will f/u in 3-4 weeks to discuss further.     Digital Medicine: Health  Follow Up    Lifestyle Modifications:    1.Dietary Modifications (Sodium intake <2,000mg/day, food labels, dining out): Deferred    2.Physical Activity: Deferred    3.Medication Therapy: Patient has been compliant with the medication regimen.    4.Patient has the following medication side effects/concerns: None    Follow up with Mrs. Venus Caldwell completed. No further questions or concerns. Will continue to follow up to achieve health goals.    "

## 2019-06-26 ENCOUNTER — PATIENT OUTREACH (OUTPATIENT)
Dept: OTHER | Facility: OTHER | Age: 69
End: 2019-06-26

## 2019-06-26 NOTE — PROGRESS NOTES
Last 5 Patient Entered Readings                                      Current 30 Day Average: 128/77     Recent Readings 6/26/2019 6/25/2019 6/25/2019 6/24/2019 6/24/2019    SBP (mmHg) 138 137 144 127 100    DBP (mmHg) 75 82 78 79 67    Pulse 74 85 72 77 83        Hypertension Medications     amLODIPine (NORVASC) 5 MG tablet Take 1 tablet (5 mg total) by mouth once daily.    BENICAR 40 mg tablet Take 40 mg by mouth once daily.     metoprolol succinate (TOPROL XL) 50 MG 24 hr tablet Take 1 tablet (50 mg total) by mouth 2 (two) times daily.     Patient returned my call for BP follow up. She is doing well today with no complaints. BPon 6/24/19 was 79/60 and 88/58 mmHg. Patient says this is likely due to dehydration. She says drinking a lot of water is a problem for her. She drinks 2 cups of regular coffee in the morning, iced tea in the afternoon, and about 2 bottles of water per day    1) 30-day BP average meets goal of <130/<80. Continue current BP medications.   2) Focus on hydration. Aim for at least 64 oz of water per day. Suggested decaf iced tea  3) Monitor BP. Eat salty snack and drink water if BP drops. Report symptoms of hypotension  4) Patient knows to contact me with any non-urgent clinical changes or concerns. Go to ED or call [facility] for emergencies.   5) Will defer lifestyle counseling to digital medicine health .   6) Will continue to follow up.     Alka Du, Pharm.D.   Digital Medicine Clinical Pharmacist  [phone]

## 2019-06-26 NOTE — PROGRESS NOTES
Last 5 Patient Entered Readings                                      Current 30 Day Average: 128/77     Recent Readings 6/26/2019 6/25/2019 6/25/2019 6/24/2019 6/24/2019    SBP (mmHg) 138 137 144 127 100    DBP (mmHg) 75 82 78 79 67    Pulse 74 85 72 77 83        Hypertension Medications     amLODIPine (NORVASC) 5 MG tablet Take 1 tablet (5 mg total) by mouth once daily.    BENICAR 40 mg tablet Take 40 mg by mouth once daily.     metoprolol succinate (TOPROL XL) 50 MG 24 hr tablet Take 1 tablet (50 mg total) by mouth 2 (two) times daily.     Called patient for BP follow up and to address low BP alert/ task placed by health . Patient was not available. I left message for call back.     Alka Du, Pharm.D.   [facility] Clinical Pharmacist  [phone]

## 2019-07-22 ENCOUNTER — PATIENT OUTREACH (OUTPATIENT)
Dept: OTHER | Facility: OTHER | Age: 69
End: 2019-07-22

## 2019-07-23 NOTE — PROGRESS NOTES
"Last 5 Patient Entered Readings                                      Current 30 Day Average: 124/73     Recent Readings 7/23/2019 7/23/2019 7/22/2019 7/21/2019 7/20/2019    SBP (mmHg) 111 99 146 149 148    DBP (mmHg) 71 61 81 79 78    Pulse 77 74 69 73 67        Reports feeling well, no complaints.     Digital Medicine: Health  Follow Up    Lifestyle Modifications:    1.Dietary Modifications (Sodium intake <2,000mg/day, food labels, dining out): Reports she is still not drinking "enough" water throughout the day. Reports she has been eating out "a lot", enjoys [employer] sandwiches.    2.Physical Activity: Reports staying active going to doctor appointments and going to the movies, Stays busy caring for her . Verbalized intent to start walking routine once  recovers from surgery.     3.Medication Therapy: Patient has been compliant with the medication regimen.    4.Patient has the following medication side effects/concerns: None     Follow up with Mrs. Venus Caldwell completed. No further questions or concerns. Will continue to follow up to achieve health goals.      "

## 2019-07-25 DIAGNOSIS — I10 ESSENTIAL HYPERTENSION: Chronic | ICD-10-CM

## 2019-07-29 RX ORDER — AMLODIPINE BESYLATE 5 MG/1
5 TABLET ORAL DAILY
Qty: 90 TABLET | Refills: 3 | Status: SHIPPED | OUTPATIENT
Start: 2019-07-29 | End: 2020-08-07 | Stop reason: SDUPTHER

## 2019-07-30 ENCOUNTER — PATIENT OUTREACH (OUTPATIENT)
Dept: ADMINISTRATIVE | Facility: HOSPITAL | Age: 69
End: 2019-07-30

## 2019-08-06 ENCOUNTER — OFFICE VISIT (OUTPATIENT)
Dept: CARDIOLOGY | Facility: CLINIC | Age: 69
End: 2019-08-06
Payer: MEDICARE

## 2019-08-06 VITALS
HEIGHT: 63 IN | SYSTOLIC BLOOD PRESSURE: 102 MMHG | DIASTOLIC BLOOD PRESSURE: 80 MMHG | WEIGHT: 175.06 LBS | BODY MASS INDEX: 31.02 KG/M2 | HEART RATE: 77 BPM

## 2019-08-06 DIAGNOSIS — R73.03 PREDIABETES: Chronic | ICD-10-CM

## 2019-08-06 DIAGNOSIS — I47.19 PAROXYSMAL ATRIAL TACHYCARDIA: Chronic | ICD-10-CM

## 2019-08-06 DIAGNOSIS — I50.30 (HFPEF) HEART FAILURE WITH PRESERVED EJECTION FRACTION: ICD-10-CM

## 2019-08-06 DIAGNOSIS — E78.2 MIXED HYPERLIPIDEMIA: Chronic | ICD-10-CM

## 2019-08-06 DIAGNOSIS — R00.2 PALPITATIONS: ICD-10-CM

## 2019-08-06 DIAGNOSIS — I10 ESSENTIAL HYPERTENSION: Chronic | ICD-10-CM

## 2019-08-06 DIAGNOSIS — I25.10 CORONARY ARTERY DISEASE INVOLVING NATIVE CORONARY ARTERY OF NATIVE HEART WITHOUT ANGINA PECTORIS: Primary | Chronic | ICD-10-CM

## 2019-08-06 PROCEDURE — 99999 PR PBB SHADOW E&M-EST. PATIENT-LVL III: ICD-10-PCS | Mod: PBBFAC,,, | Performed by: INTERNAL MEDICINE

## 2019-08-06 PROCEDURE — 99214 OFFICE O/P EST MOD 30 MIN: CPT | Mod: S$PBB,,, | Performed by: INTERNAL MEDICINE

## 2019-08-06 PROCEDURE — 99213 OFFICE O/P EST LOW 20 MIN: CPT | Mod: PBBFAC | Performed by: INTERNAL MEDICINE

## 2019-08-06 PROCEDURE — 99999 PR PBB SHADOW E&M-EST. PATIENT-LVL III: CPT | Mod: PBBFAC,,, | Performed by: INTERNAL MEDICINE

## 2019-08-06 PROCEDURE — 99214 PR OFFICE/OUTPT VISIT, EST, LEVL IV, 30-39 MIN: ICD-10-PCS | Mod: S$PBB,,, | Performed by: INTERNAL MEDICINE

## 2019-08-06 RX ORDER — CANDESARTAN 16 MG/1
16 TABLET ORAL DAILY
Qty: 30 TABLET | Refills: 1 | Status: SHIPPED | OUTPATIENT
Start: 2019-08-06 | End: 2019-10-02 | Stop reason: SDUPTHER

## 2019-08-06 NOTE — PROGRESS NOTES
Subjective:   Patient ID:  Venus Caldwell is a 69 y.o. female who presents for cardiac consult of Essential hypertension      HPI  The patient came in today for cardiac consult of Essential hypertension    Referring Physician: Sourav Hernandez MD   Reason for initial consult: HTN      Venus Caldwell is a 69 y.o. female with current medical conditions HFpEF, HTN, PSVT, pre-DM, OA, uterine CA, GERD, pre-DM presents to follow up CV eval.     19  Bp has improved with Norvasc 5 mg, recently started by Dr. Hernandez. Has been feeling more weak and fatigue, SBP was in upper 90s lately. She had LHC with minor artery blockage no stents placed, she thinks she had CP but strong FH of CAD. She does get palpitations, a few times a day, lasts one or two beats and she feels ok.   Prior cardiologist Dr. Hannah. Does snore, no prior sleep study. Has gained > 25 lbs in last 6 months, pre-DM.     19  ECHO with grade 1 DD otherwise normal BIV function. BP has been fluctuating at times. Is on digital HTN program. She has not had sleep study. NO recent heart monitor. Overall active.     Patient feels no chest pain, no sob, no leg swelling, no PND,  no syncope, no CNS symptoms.    Patient has fairly good exercise tolerance.    Patient is compliant with medications.    ECG - NSR, inferior infarct    FH - father had MI in age 58, brother  from MI 58    2d ECHO  2019  CONCLUSIONS     1 - Concentric hypertrophy.     2 - No wall motion abnormalities.     3 - Normal left ventricular systolic function (EF 60-65%).     4 - Impaired LV relaxation, normal LAP (grade 1 diastolic dysfunction).     5 - Normal right ventricular systolic function .     6 - The estimated PA systolic pressure is greater than 22 mmHg.       Past Medical History:   Diagnosis Date    Arthritis     Cancer, uterine     Diabetes mellitus     prediabetes    Hypertension     Sciatica        Past Surgical History:   Procedure Laterality Date     CHOLECYSTECTOMY      COLONOSCOPY N/A 8/1/2018    Performed by [facility]O    EXCISION, LESION, ANUS N/A 8/29/2018    [facility] at [facility] OR    HYSTERECTOMY      JOINT REPLACEMENT Right     hip replacement    TOTAL KNEE ARTHROPLASTY         Social History     Tobacco Use    Smoking status: Never Smoker    Smokeless tobacco: Never Used   Substance Use Topics    Alcohol use: No    Drug use: No       Family History   Problem Relation Age of Onset    Diabetes Mother     Cataracts Mother     Diabetes Brother     Cataracts Maternal Grandmother     Breast cancer Maternal Aunt        Patient's Medications   New Prescriptions    CANDESARTAN (ATACAND) 16 MG TABLET    Take 1 tablet (16 mg total) by mouth once daily.   Previous Medications    AMLODIPINE (NORVASC) 5 MG TABLET    Take 1 tablet (5 mg total) by mouth once daily.    ASPIRIN 81 MG CHEW    Take 81 mg by mouth once daily.    ATORVASTATIN (LIPITOR) 40 MG TABLET    Take 40 mg by mouth every evening.     BETAMETHASONE DIPROPIONATE (DIPROLENE) 0.05 % CREAM    as needed.     CETIRIZINE (ZYRTEC) 10 MG TABLET    Take 1 tablet by mouth Daily.    CHOLECALCIFEROL, VITAMIN D3, 5,000 UNIT TAB    Take 1,000 Units by mouth once daily.     FLUTICASONE (FLONASE) 50 MCG/ACTUATION NASAL SPRAY    1 spray by Each Nare route daily as needed for Rhinitis.    GABAPENTIN (NEURONTIN) 300 MG CAPSULE    Take 300 mg by mouth 3 (three) times daily. 300 mg TID    HYDROCODONE-ACETAMINOPHEN (NORCO)  MG PER TABLET    Take 1 tablet by mouth every 24 hours as needed.    MELOXICAM (MOBIC) 7.5 MG TABLET    Take 1 tablet (7.5 mg total) by mouth 2 (two) times daily.    METFORMIN (GLUCOPHAGE) 500 MG TABLET    Take 1 tablet (500 mg total) by mouth daily with breakfast.    METOPROLOL SUCCINATE (TOPROL XL) 50 MG 24 HR TABLET    Take 1 tablet (50 mg total) by mouth 2 (two) times daily.    NORTRIPTYLINE (PAMELOR) 50 MG CAPSULE    Take 50 mg by  "mouth nightly.     NOZASEPTIN (NOZIN) NASAL     1 each by Each Nare route 2 (two) times daily.    OMEPRAZOLE (PRILOSEC) 20 MG CAPSULE    Take 20 mg by mouth once daily.    VALACYCLOVIR (VALTREX) 500 MG TABLET    Take 500 mg by mouth as needed.    Modified Medications    No medications on file   Discontinued Medications    BENICAR 40 MG TABLET    Take 40 mg by mouth once daily.        Review of Systems   Constitutional: Positive for malaise/fatigue.   HENT: Negative.    Eyes: Negative.    Respiratory: Negative.    Cardiovascular: Positive for palpitations.   Gastrointestinal: Negative.    Genitourinary: Negative.    Musculoskeletal: Negative.    Skin: Negative.    Neurological: Negative.    Endo/Heme/Allergies: Negative.    Psychiatric/Behavioral: Negative.    All 12 systems otherwise negative.      Wt Readings from Last 3 Encounters:   08/06/19 79.4 kg (175 lb 0.7 oz)   05/02/19 79.4 kg (175 lb 0.7 oz)   03/18/19 80.1 kg (176 lb 9.4 oz)     Temp Readings from Last 3 Encounters:   03/18/19 98.7 °F (37.1 °C) (Tympanic)   02/13/19 98.7 °F (37.1 °C) (Tympanic)   12/28/18 98 °F (36.7 °C) (Oral)     BP Readings from Last 3 Encounters:   08/06/19 102/80   05/02/19 100/70   03/18/19 (!) 142/92     Pulse Readings from Last 3 Encounters:   08/06/19 77   05/02/19 83   03/18/19 79       /80 (BP Location: Left arm, Patient Position: Sitting)   Pulse 77   Ht 5' 3" (1.6 m)   Wt 79.4 kg (175 lb 0.7 oz)   BMI 31.01 kg/m²     Objective:   Physical Exam   Constitutional: She is oriented to person, place, and time. She appears well-developed and well-nourished. No distress.   HENT:   Head: Normocephalic and atraumatic.   Nose: Nose normal.   Mouth/Throat: Oropharynx is clear and moist.   Eyes: Conjunctivae and EOM are normal. No scleral icterus.   Neck: Normal range of motion. Neck supple. No JVD present. No thyromegaly present.   Cardiovascular: Normal rate, regular rhythm, S1 normal and S2 normal. Exam reveals no " gallop, no S3, no S4 and no friction rub.   No murmur heard.  Pulmonary/Chest: Effort normal and breath sounds normal. No stridor. No respiratory distress. She has no wheezes. She has no rales. She exhibits no tenderness.   Abdominal: Soft. Bowel sounds are normal. She exhibits no distension and no mass. There is no tenderness. There is no rebound.   Genitourinary:   Genitourinary Comments: Deferred   Musculoskeletal: Normal range of motion. She exhibits no edema, tenderness or deformity.   Lymphadenopathy:     She has no cervical adenopathy.   Neurological: She is alert and oriented to person, place, and time. She exhibits normal muscle tone. Coordination normal.   Skin: Skin is warm and dry. No rash noted. She is not diaphoretic. No erythema. No pallor.   Psychiatric: She has a normal mood and affect. Her behavior is normal. Judgment and thought content normal.   Nursing note and vitals reviewed.      Lab Results   Component Value Date     08/21/2018    K 4.3 08/21/2018     08/21/2018    CO2 27 08/21/2018    BUN 15 08/21/2018    CREATININE 0.7 10/30/2018    GLU 92 08/21/2018    HGBA1C 6.3 (H) 02/13/2019    AST 15 08/21/2018    ALT 19 08/21/2018    ALBUMIN 3.5 08/21/2018    PROT 7.2 08/21/2018    BILITOT 0.5 08/21/2018    WBC 8.42 08/21/2018    HGB 9.8 (L) 08/21/2018    HCT 32.9 (L) 08/21/2018    MCV 85 08/21/2018     (H) 08/21/2018    INR 1.0 06/14/2018    TSH 0.373 (L) 05/02/2019    CHOL 144 03/27/2018    HDL 38 (L) 03/27/2018    LDLCALC 74.8 03/27/2018    TRIG 156 (H) 03/27/2018     Assessment:      1. Coronary artery disease involving native coronary artery of native heart without angina pectoris    2. Essential hypertension    3. Mixed hyperlipidemia    4. Palpitations    5. Paroxysmal atrial tachycardia    6. (HFpEF) heart failure with preserved ejection fraction    7. Prediabetes        Plan:   1. CAD, non obs with abd atherosclerosis   - no CP  - cont asa, statin, BB    2. HTN  - Bp well  controlled  - monitor for low BP    3. Palpitations - intermittent   - check Zio patch  - cont BB    4. Sleep apnea symptoms  - refered to sleep study    5. HFpEF  - low salt diet  - euvolemic     6. PreDM/obesity  - rec weight loss with diet/exercise     Thank you for allowing me to participate in this patient's care. Please do not hesitate to contact me with any questions or concerns. Consult note has been forwarded to the referral physician.

## 2019-08-07 ENCOUNTER — PATIENT OUTREACH (OUTPATIENT)
Dept: OTHER | Facility: OTHER | Age: 69
End: 2019-08-07

## 2019-08-07 NOTE — PROGRESS NOTES
Last 5 Patient Entered Readings                                      Current 30 Day Average: 127/76     Recent Readings 8/7/2019 8/6/2019 8/5/2019 8/4/2019 8/3/2019    SBP (mmHg) 127 132 116 136 137    DBP (mmHg) 76 83 75 78 79    Pulse 70 75 83 71 74        Hypertension Medications     amLODIPine (NORVASC) 5 MG tablet Take 1 tablet (5 mg total) by mouth once daily.    candesartan (ATACAND) 16 MG tablet Take 1 tablet (16 mg total) by mouth once daily.    metoprolol succinate (TOPROL XL) 50 MG 24 hr tablet Take 1 tablet (50 mg total) by mouth 2 (two) times daily.     Called patient for BP follow up. She is doing well with no complaints. She saw Dr. Garcia, cardiologist, yesterday to discuss variable BP. He switched olmesartan to candesartan. Patient says she doesn't know why BP fluctuates but she doesn't feel bad. She has been trying to more water as we discussed at last encounter.     1) 30-day BP average meets goal of <130/<80. Continue current BP medications.   2) Patient knows to contact me with any non-urgent clinical changes or concerns. Go to ED or call [facility] for emergencies.   3) Will defer lifestyle counseling to digital medicine health . Continue to drink more water vs tea  4) Will continue to follow up.     Alka Du, Pharm.D.   Digital Medicine Clinical Pharmacist  [phone]

## 2019-08-13 ENCOUNTER — OFFICE VISIT (OUTPATIENT)
Dept: INTERNAL MEDICINE | Facility: CLINIC | Age: 69
End: 2019-08-13
Payer: MEDICARE

## 2019-08-13 ENCOUNTER — LAB VISIT (OUTPATIENT)
Dept: LAB | Facility: HOSPITAL | Age: 69
End: 2019-08-13
Attending: FAMILY MEDICINE
Payer: MEDICARE

## 2019-08-13 VITALS
BODY MASS INDEX: 31.1 KG/M2 | OXYGEN SATURATION: 94 % | TEMPERATURE: 99 F | WEIGHT: 175.5 LBS | DIASTOLIC BLOOD PRESSURE: 78 MMHG | HEIGHT: 63 IN | SYSTOLIC BLOOD PRESSURE: 132 MMHG | HEART RATE: 86 BPM

## 2019-08-13 DIAGNOSIS — Z79.899 HIGH RISK MEDICATION USE: ICD-10-CM

## 2019-08-13 DIAGNOSIS — M54.31 SCIATICA OF RIGHT SIDE: Chronic | ICD-10-CM

## 2019-08-13 DIAGNOSIS — R73.03 PREDIABETES: ICD-10-CM

## 2019-08-13 DIAGNOSIS — R53.82 CHRONIC FATIGUE: ICD-10-CM

## 2019-08-13 DIAGNOSIS — I10 ESSENTIAL HYPERTENSION: Chronic | ICD-10-CM

## 2019-08-13 DIAGNOSIS — R53.82 CHRONIC FATIGUE: Primary | ICD-10-CM

## 2019-08-13 DIAGNOSIS — Z12.39 SCREENING FOR BREAST CANCER: ICD-10-CM

## 2019-08-13 DIAGNOSIS — J30.89 CHRONIC NON-SEASONAL ALLERGIC RHINITIS: Chronic | ICD-10-CM

## 2019-08-13 DIAGNOSIS — K21.9 GASTROESOPHAGEAL REFLUX DISEASE WITHOUT ESOPHAGITIS: Chronic | ICD-10-CM

## 2019-08-13 DIAGNOSIS — M15.9 PRIMARY OSTEOARTHRITIS INVOLVING MULTIPLE JOINTS: Chronic | ICD-10-CM

## 2019-08-13 DIAGNOSIS — E78.2 MIXED HYPERLIPIDEMIA: Chronic | ICD-10-CM

## 2019-08-13 DIAGNOSIS — I25.10 CORONARY ARTERY DISEASE INVOLVING NATIVE CORONARY ARTERY OF NATIVE HEART WITHOUT ANGINA PECTORIS: Chronic | ICD-10-CM

## 2019-08-13 DIAGNOSIS — I70.0 ABDOMINAL AORTIC ATHEROSCLEROSIS: Chronic | ICD-10-CM

## 2019-08-13 DIAGNOSIS — I50.30 (HFPEF) HEART FAILURE WITH PRESERVED EJECTION FRACTION: ICD-10-CM

## 2019-08-13 DIAGNOSIS — I47.19 PAROXYSMAL ATRIAL TACHYCARDIA: Chronic | ICD-10-CM

## 2019-08-13 LAB
25(OH)D3+25(OH)D2 SERPL-MCNC: 32 NG/ML (ref 30–96)
ALBUMIN SERPL BCP-MCNC: 3.6 G/DL (ref 3.5–5.2)
ALP SERPL-CCNC: 133 U/L (ref 55–135)
ALT SERPL W/O P-5'-P-CCNC: 20 U/L (ref 10–44)
ANION GAP SERPL CALC-SCNC: 9 MMOL/L (ref 8–16)
AST SERPL-CCNC: 17 U/L (ref 10–40)
BILIRUB SERPL-MCNC: 0.4 MG/DL (ref 0.1–1)
BUN SERPL-MCNC: 21 MG/DL (ref 8–23)
CALCIUM SERPL-MCNC: 10 MG/DL (ref 8.7–10.5)
CHLORIDE SERPL-SCNC: 104 MMOL/L (ref 95–110)
CHOLEST SERPL-MCNC: 140 MG/DL (ref 120–199)
CHOLEST/HDLC SERPL: 2.9 {RATIO} (ref 2–5)
CO2 SERPL-SCNC: 27 MMOL/L (ref 23–29)
CREAT SERPL-MCNC: 0.7 MG/DL (ref 0.5–1.4)
ERYTHROCYTE [DISTWIDTH] IN BLOOD BY AUTOMATED COUNT: 14.9 % (ref 11.5–14.5)
EST. GFR  (AFRICAN AMERICAN): >60 ML/MIN/1.73 M^2
EST. GFR  (NON AFRICAN AMERICAN): >60 ML/MIN/1.73 M^2
ESTIMATED AVG GLUCOSE: 160 MG/DL (ref 68–131)
GLUCOSE SERPL-MCNC: 120 MG/DL (ref 70–110)
HBA1C MFR BLD HPLC: 7.2 % (ref 4–5.6)
HCT VFR BLD AUTO: 36.9 % (ref 37–48.5)
HDLC SERPL-MCNC: 48 MG/DL (ref 40–75)
HDLC SERPL: 34.3 % (ref 20–50)
HGB BLD-MCNC: 11.2 G/DL (ref 12–16)
LDLC SERPL CALC-MCNC: 60.4 MG/DL (ref 63–159)
MCH RBC QN AUTO: 28 PG (ref 27–31)
MCHC RBC AUTO-ENTMCNC: 30.4 G/DL (ref 32–36)
MCV RBC AUTO: 92 FL (ref 82–98)
NONHDLC SERPL-MCNC: 92 MG/DL
PLATELET # BLD AUTO: 381 K/UL (ref 150–350)
PMV BLD AUTO: 10 FL (ref 9.2–12.9)
POTASSIUM SERPL-SCNC: 4.5 MMOL/L (ref 3.5–5.1)
PROT SERPL-MCNC: 7.4 G/DL (ref 6–8.4)
RBC # BLD AUTO: 4 M/UL (ref 4–5.4)
SODIUM SERPL-SCNC: 140 MMOL/L (ref 136–145)
TRIGL SERPL-MCNC: 158 MG/DL (ref 30–150)
TSH SERPL DL<=0.005 MIU/L-ACNC: 0.59 UIU/ML (ref 0.4–4)
WBC # BLD AUTO: 8.8 K/UL (ref 3.9–12.7)

## 2019-08-13 PROCEDURE — 80061 LIPID PANEL: CPT

## 2019-08-13 PROCEDURE — 99214 OFFICE O/P EST MOD 30 MIN: CPT | Mod: PBBFAC | Performed by: FAMILY MEDICINE

## 2019-08-13 PROCEDURE — 85027 COMPLETE CBC AUTOMATED: CPT

## 2019-08-13 PROCEDURE — 36415 COLL VENOUS BLD VENIPUNCTURE: CPT

## 2019-08-13 PROCEDURE — 99215 PR OFFICE/OUTPT VISIT, EST, LEVL V, 40-54 MIN: ICD-10-PCS | Mod: S$PBB,,, | Performed by: FAMILY MEDICINE

## 2019-08-13 PROCEDURE — 99215 OFFICE O/P EST HI 40 MIN: CPT | Mod: S$PBB,,, | Performed by: FAMILY MEDICINE

## 2019-08-13 PROCEDURE — 84443 ASSAY THYROID STIM HORMONE: CPT

## 2019-08-13 PROCEDURE — 83036 HEMOGLOBIN GLYCOSYLATED A1C: CPT

## 2019-08-13 PROCEDURE — 82306 VITAMIN D 25 HYDROXY: CPT

## 2019-08-13 PROCEDURE — 99999 PR PBB SHADOW E&M-EST. PATIENT-LVL IV: ICD-10-PCS | Mod: PBBFAC,,, | Performed by: FAMILY MEDICINE

## 2019-08-13 PROCEDURE — 80053 COMPREHEN METABOLIC PANEL: CPT

## 2019-08-13 PROCEDURE — 99999 PR PBB SHADOW E&M-EST. PATIENT-LVL IV: CPT | Mod: PBBFAC,,, | Performed by: FAMILY MEDICINE

## 2019-08-13 RX ORDER — ASPIRIN 81 MG/1
81 TABLET ORAL DAILY
Refills: 0
Start: 2019-08-13 | End: 2021-08-17 | Stop reason: SDUPTHER

## 2019-08-16 ENCOUNTER — CLINICAL SUPPORT (OUTPATIENT)
Dept: CARDIOLOGY | Facility: CLINIC | Age: 69
End: 2019-08-16
Attending: INTERNAL MEDICINE
Payer: MEDICARE

## 2019-08-16 DIAGNOSIS — I47.19 PAROXYSMAL ATRIAL TACHYCARDIA: Chronic | ICD-10-CM

## 2019-08-16 DIAGNOSIS — R00.2 PALPITATIONS: ICD-10-CM

## 2019-08-16 PROCEDURE — 0296T HOLTER MONITOR - 3-14 DAY ADULT: CPT | Mod: PBBFAC | Performed by: NUCLEAR MEDICINE

## 2019-08-16 PROCEDURE — 0298T HOLTER MONITOR - 3-14 DAY ADULT: CPT | Mod: ,,, | Performed by: NUCLEAR MEDICINE

## 2019-08-16 PROCEDURE — 0298T HOLTER MONITOR - 3-14 DAY ADULT: ICD-10-PCS | Mod: ,,, | Performed by: NUCLEAR MEDICINE

## 2019-08-20 ENCOUNTER — PATIENT OUTREACH (OUTPATIENT)
Dept: OTHER | Facility: OTHER | Age: 69
End: 2019-08-20

## 2019-08-20 NOTE — PROGRESS NOTES
Last 5 Patient Entered Readings                                      Current 30 Day Average: 127/76     Recent Readings 8/20/2019 8/20/2019 8/19/2019 8/18/2019 8/18/2019    SBP (mmHg) 134 99 105 131 127    DBP (mmHg) 77 71 69 77 73    Pulse 77 82 75 68 74            Digital Medicine: Health  Follow Up    Left voicemail to follow up with . Venus Caldwell.  Current BP average 127/76 mmHg is at goal, 130/80

## 2019-08-27 ENCOUNTER — PATIENT MESSAGE (OUTPATIENT)
Dept: CARDIOLOGY | Facility: CLINIC | Age: 69
End: 2019-08-27

## 2019-08-27 DIAGNOSIS — R73.03 PREDIABETES: ICD-10-CM

## 2019-08-27 RX ORDER — ATORVASTATIN CALCIUM 40 MG/1
40 TABLET, FILM COATED ORAL NIGHTLY
Qty: 90 TABLET | Refills: 3 | Status: SHIPPED | OUTPATIENT
Start: 2019-08-27 | End: 2020-08-13 | Stop reason: SDUPTHER

## 2019-08-27 NOTE — PROGRESS NOTES
Last 5 Patient Entered Readings                                      Current 30 Day Average: 122/75     Recent Readings 8/27/2019 8/27/2019 8/26/2019 8/26/2019 8/25/2019    SBP (mmHg) 110 109 143 117 119    DBP (mmHg) 75 81 89 74 76    Pulse 74 126 77 72 78            Digital Medicine: Health  Follow Up    Unable to leave voicemail to follow up with Mrs. Venus Caldwell.  Current BP average 122/75 mmHg is at goal, 130/80. Sent [employer] message.

## 2019-08-27 NOTE — TELEPHONE ENCOUNTER
She stopped the metformin due to side effects.    Her last A1c is now greater than 7%.    I do not think she wants this filled.    She is supposed to have an appointment with me to discuss a different medication for her diabetes.

## 2019-08-28 DIAGNOSIS — R73.03 PREDIABETES: ICD-10-CM

## 2019-08-28 RX ORDER — METFORMIN HYDROCHLORIDE 500 MG/1
500 TABLET ORAL
Qty: 90 TABLET | Refills: 3 | Status: SHIPPED | OUTPATIENT
Start: 2019-08-28 | End: 2020-08-13 | Stop reason: SDUPTHER

## 2019-08-28 RX ORDER — METFORMIN HYDROCHLORIDE 500 MG/1
TABLET ORAL
Qty: 90 TABLET | Refills: 4 | OUTPATIENT
Start: 2019-08-28

## 2019-08-28 NOTE — TELEPHONE ENCOUNTER
Attempted to contact pt but no answer and VM full.  Pt need to schedule follow up appt for medication review.  beatrice

## 2019-09-04 ENCOUNTER — PATIENT OUTREACH (OUTPATIENT)
Dept: OTHER | Facility: OTHER | Age: 69
End: 2019-09-04

## 2019-09-04 NOTE — PROGRESS NOTES
Last 5 Patient Entered Readings                                      Current 30 Day Average: 121/75     Recent Readings 9/4/2019 9/3/2019 9/2/2019 9/1/2019 8/31/2019    SBP (mmHg) 113 120 144 114 142    DBP (mmHg) 76 73 80 71 84    Pulse 81 66 67 73 68        Hypertension Medications     amLODIPine (NORVASC) 5 MG tablet Take 1 tablet (5 mg total) by mouth once daily.    candesartan (ATACAND) 16 MG tablet Take 1 tablet (16 mg total) by mouth once daily.    metoprolol succinate (TOPROL XL) 50 MG 24 hr tablet Take 1 tablet (50 mg total) by mouth 2 (two) times daily.     Called patient for BP follow up. No answer. Left message for call back        Alka Du, Pharm.D.  [facility] Digital Medicine Clinical Pharmacist  [phone]

## 2019-09-16 ENCOUNTER — TELEPHONE (OUTPATIENT)
Dept: CARDIOLOGY | Facility: CLINIC | Age: 69
End: 2019-09-16

## 2019-09-16 DIAGNOSIS — I10 ESSENTIAL HYPERTENSION: Chronic | ICD-10-CM

## 2019-09-16 RX ORDER — METOPROLOL SUCCINATE 100 MG/1
100 TABLET, EXTENDED RELEASE ORAL 2 TIMES DAILY
Qty: 60 TABLET | Refills: 3 | Status: SHIPPED | OUTPATIENT
Start: 2019-09-16 | End: 2020-03-02 | Stop reason: SDUPTHER

## 2019-09-16 NOTE — TELEPHONE ENCOUNTER
----- Message from Maikol Garcia MD sent at 9/16/2019  2:07 PM CDT -----  Please call the patient regarding her abnormal result. Frequent SVT runs, double Toprol to 100 mg twice a day, if still symptomatic let me know soon, will add another rhythm medication and/or refer to EP. Needs to do sleep study as well to rule out NICKI.

## 2019-09-17 ENCOUNTER — TELEPHONE (OUTPATIENT)
Dept: CARDIOLOGY | Facility: CLINIC | Age: 69
End: 2019-09-17

## 2019-09-17 NOTE — TELEPHONE ENCOUNTER
Attempted to call patient to give results of Holter monitor voicemail is full not able to leave message.

## 2019-09-18 NOTE — PROGRESS NOTES
Called patient for BP follow up. No answer. Left message for call back    Per chart review, Dr. Garcia directed patient to increase metoprolol. Will follow up on this at next successful outreach.

## 2019-09-19 ENCOUNTER — TELEPHONE (OUTPATIENT)
Dept: CARDIOLOGY | Facility: CLINIC | Age: 69
End: 2019-09-19

## 2019-09-19 NOTE — TELEPHONE ENCOUNTER
Spoke with patient gave test results of Holter monitor her abnormal result. Frequent SVT runs, double Toprol to 100 mg twice a day,  Patient verbalizes understanding

## 2019-09-30 NOTE — PROGRESS NOTES
"Digital Medicine: Health  Follow-Up    Patient reports she is well, no complaints.     Reports she has been "busy" caring for her .     The history is provided by the patient.           Diet:   She has the following dietary restrictions: low sodium diet and low carbShe cooks for self.    Patient does the shopping for groceries.  She gets groceries from the grocery store.      Reports continued healthy eating habits/low-sodium diet. Recently found out she is diabetic, discussed Digital Medicine Diabetes program. Reports she is "watching" her sugar/carb intake. Will complete another food recall next outreach.     Physical Activity:       Will discuss further next call.     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  They live together. Patient doesn't have job.      Patient's  has prostate cancer.       INTERVENTION(S)  recommended diet modifications and encouragement/support    PLAN  patient verbalizes understanding and patient amenable to changes          Topic    Eye Exam        Last 5 Patient Entered Readings                                      Current 30 Day Average: 121/74     Recent Readings 9/30/2019 9/29/2019 9/29/2019 9/28/2019 9/28/2019    SBP (mmHg) 120 106 133 121 95    DBP (mmHg) 74 70 76 70 62    Pulse 73 68 68 70 72                "

## 2019-10-02 ENCOUNTER — PATIENT OUTREACH (OUTPATIENT)
Dept: ADMINISTRATIVE | Facility: HOSPITAL | Age: 69
End: 2019-10-02

## 2019-10-02 ENCOUNTER — PATIENT MESSAGE (OUTPATIENT)
Dept: CARDIOLOGY | Facility: CLINIC | Age: 69
End: 2019-10-02

## 2019-10-02 ENCOUNTER — IMMUNIZATION (OUTPATIENT)
Dept: PHARMACY | Facility: CLINIC | Age: 69
End: 2019-10-02
Payer: MEDICARE

## 2019-10-02 DIAGNOSIS — I10 ESSENTIAL HYPERTENSION: Primary | ICD-10-CM

## 2019-10-02 NOTE — PROGRESS NOTES
Called patient for BP follow up. No answer. Voicemail box full. Will send [employer] message    Per chart review, Dr. Garcia directed patient to increase metoprolol on 9/16/19. Will follow up on this

## 2019-10-03 RX ORDER — METOPROLOL SUCCINATE 50 MG/1
50 TABLET, EXTENDED RELEASE ORAL 2 TIMES DAILY
Qty: 180 TABLET | Refills: 3 | OUTPATIENT
Start: 2019-10-03

## 2019-10-03 RX ORDER — CANDESARTAN 16 MG/1
16 TABLET ORAL DAILY
Qty: 30 TABLET | Refills: 6 | Status: SHIPPED | OUTPATIENT
Start: 2019-10-03 | End: 2020-07-06 | Stop reason: SDUPTHER

## 2019-10-03 NOTE — TELEPHONE ENCOUNTER
Records shows recent refill by Cardiology for Toprol  mg twice a day.  One-month supply was sent to [facility] pharmacy.    Which she like me to send a 3 month supply  for the increased dose to the mail-order pharmacy?

## 2019-10-03 NOTE — TELEPHONE ENCOUNTER
Called pt, no answer, voicemail box full. [employer] message sent requesting pt to return call to clinic.

## 2019-10-08 ENCOUNTER — OFFICE VISIT (OUTPATIENT)
Dept: PULMONOLOGY | Facility: CLINIC | Age: 69
End: 2019-10-08
Payer: MEDICARE

## 2019-10-08 VITALS
HEIGHT: 63 IN | RESPIRATION RATE: 19 BRPM | DIASTOLIC BLOOD PRESSURE: 82 MMHG | HEART RATE: 72 BPM | OXYGEN SATURATION: 97 % | SYSTOLIC BLOOD PRESSURE: 120 MMHG | WEIGHT: 173.75 LBS | BODY MASS INDEX: 30.79 KG/M2

## 2019-10-08 DIAGNOSIS — G47.33 OSA (OBSTRUCTIVE SLEEP APNEA): Chronic | ICD-10-CM

## 2019-10-08 DIAGNOSIS — E66.09 CLASS 1 OBESITY DUE TO EXCESS CALORIES WITH SERIOUS COMORBIDITY AND BODY MASS INDEX (BMI) OF 30.0 TO 30.9 IN ADULT: Chronic | ICD-10-CM

## 2019-10-08 PROBLEM — E66.811 CLASS 1 OBESITY DUE TO EXCESS CALORIES WITH SERIOUS COMORBIDITY AND BODY MASS INDEX (BMI) OF 30.0 TO 30.9 IN ADULT: Chronic | Status: ACTIVE | Noted: 2019-10-08

## 2019-10-08 PROCEDURE — 99204 OFFICE O/P NEW MOD 45 MIN: CPT | Mod: S$PBB,,, | Performed by: INTERNAL MEDICINE

## 2019-10-08 PROCEDURE — 99213 OFFICE O/P EST LOW 20 MIN: CPT | Mod: PBBFAC | Performed by: INTERNAL MEDICINE

## 2019-10-08 PROCEDURE — 99999 PR PBB SHADOW E&M-EST. PATIENT-LVL III: ICD-10-PCS | Mod: PBBFAC,,, | Performed by: INTERNAL MEDICINE

## 2019-10-08 PROCEDURE — 99204 PR OFFICE/OUTPT VISIT, NEW, LEVL IV, 45-59 MIN: ICD-10-PCS | Mod: S$PBB,,, | Performed by: INTERNAL MEDICINE

## 2019-10-08 PROCEDURE — 99999 PR PBB SHADOW E&M-EST. PATIENT-LVL III: CPT | Mod: PBBFAC,,, | Performed by: INTERNAL MEDICINE

## 2019-10-08 NOTE — PROGRESS NOTES
Subjective:      Patient ID: Venus Caldwell is a 69 y.o. female.    Patient Active Problem List   Diagnosis    Essential hypertension    Mixed hyperlipidemia    Gastroesophageal reflux disease without esophagitis    Primary osteoarthritis involving multiple joints    Chronic non-seasonal allergic rhinitis    Coronary artery disease involving native coronary artery    Paroxysmal atrial tachycardia    Prediabetes    Abdominal aortic atherosclerosis    Sciatica    Palpitations    (HFpEF) heart failure with preserved ejection fraction    NICKI (obstructive sleep apnea)    Class 1 obesity due to excess calories with serious comorbidity and body mass index (BMI) of 30.0 to 30.9 in adult       Problem list has been reviewed.    Chief Complaint: chronic non seasonal allergic rhinitis        she has been referred by Maikol Garcia MD for evaluation for possible obstructive sleep apnea    HPI:   Sleep Apnea:    She presents for a sleep evaluation.  Patient has observed  restless sleep, frequent awakening, tossing/turning.   Patient reports snoring and non restful' sleep. she denies decreased memory, decreased concentration    Cardiovascular risk factors: diabetes, hypertension, hyperlipidemia, CHF and obesity.     Bed time is 2000 Wake time is 0600;Sleep onset is within  1 Hour;Sleep maintenance difficulties related to early morning awakening, frequent night time awakening, difficulty falling asleep and non-restful sleep;  Wake after sleep onset occurs four times a night;Nocturia occurs four times a night; Uses sleep aids : Yes - GABAPENTIN  Wakes up with a dry mouth : Yes.    Sleep walking: No;Sleep talking : No;Sleep eating:No;Vivid Dreams : Yes;Cataplexy : No,   Hypnogogic hallucinations:  No      COMORBIDITIES:  BP Readings from Last 3 Encounters:   10/08/19 120/82   08/13/19 132/78   08/06/19 102/80           [place] Questionnaire (validated NICKI screening questionnaire)  Positive --  Snoring/apnea  Positive -- Fatigue    Body mass index is 30.77 kg/m².  (>25 is overweight, >30 is obese)  Blood Pressure = Hypertension    (PreHTN 120-139/80-89, Stg1 140-159/90-99, Stg2 >160/>100)  [place] = Three of three NICKI categories are positive (high risk is 2-3 positive categories)     [place] Sleepiness Scale   EPWORTH SLEEPINESS SCALE 10/8/2019   Sitting and reading 3   Watching TV 3   Sitting, inactive in a public place (e.g. a theatre or a meeting) 2   As a passenger in a car for an hour without a break 0   Lying down to rest in the afternoon when circumstances permit 1   Sitting and talking to someone 0   Sitting quietly after a lunch without alcohol 0   In a car, while stopped for a few minutes in traffic 0   Total score 9       Reference: Alexandro PEÑA. A new method for measuring daytime sleepiness: The [place]  Sleepiness Scale. Sleep 1991; 14(6):540-5.    STOP-Bang Questionnaire (validated NICKI screening questionnaire)  Negative unless checked off.  [x] Snoring    [x]  Tired/Fatigued/Sleepy  [] Obstruction (apneas/choking)  [x] Pressure (HTN)  [] BMI >35  [x] Age >50  [] Neck >40 cm  [] Gender male   STOP-Bang = 4 (low risk 0-2,high risk 3-8)    References:   STOP Questionnaire   A Tool to Screen Patients for Obstructive Sleep Apnea: TYLOR Vasques.C.P.C., STUART Banerjee.B.B.S., Tung Chao M.D.,Makenna Mckeon, Ph.D., Heather Cobos M.B.B.S.,_ Dahlia Aaron.,_ Greer Koo M.D., Pavan Pierson, F.R.C.P.C.; Anesthesiology 2008; 108:812-21 Copyright © 2008, the American Society of Anesthesiologists, Inc. [employer]      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   Denies occupational exposure to asbestos, silica and petrochemicals.    Avocational  Exposures:  none    Pet Exposures:  Dog: Denies allergy to dog dander.    Previous Report Reviewed: lab reports, office notes and radiology reports     The following portions of the patient's history were reviewed and updated as appropriate: She  has a past medical history of Arthritis, Cancer, uterine, Diabetes mellitus, Hypertension, and Sciatica.  She  has a past surgical history that includes Hysterectomy; Total knee arthroplasty; Colonoscopy (N/A, 8/1/2018); Cholecystectomy; Joint replacement (Right); and Surgical excision of anal lesion (N/A, 8/29/2018).  Her family history includes Breast cancer in her maternal aunt; Cataracts in her maternal grandmother and mother; Diabetes in her brother and mother.  She  reports that she has never smoked. She has never used smokeless tobacco. She reports that she does not drink alcohol or use drugs.  She has a current medication list which includes the following prescription(s): amlodipine, aspirin, atorvastatin, betamethasone dipropionate, candesartan, cetirizine, cholecalciferol (vitamin d3), fluticasone propionate, gabapentin, hydrocodone-acetaminophen, meloxicam, metformin, metoprolol succinate, nortriptyline, nozaseptin, omeprazole, and valacyclovir, and the following Facility-Administered Medications: lactated ringers.  She is allergic to sulfa (sulfonamide antibiotics) and buprenorphine..    Review of Systems   Constitutional: Positive for fatigue and night sweats.   HENT: Positive for sinus pressure and congestion. Negative for postnasal drip and rhinorrhea.    Eyes: Positive for itching.   Respiratory: Positive for snoring. Negative for cough, hemoptysis, wheezing and dyspnea on extertion.    Cardiovascular: Positive for palpitations. Negative for chest pain and leg swelling.   Genitourinary: Negative for difficulty urinating and hematuria.   Endocrine: Negative for cold intolerance and heat intolerance.    Musculoskeletal: Positive for arthralgias and back pain.  "  Skin: Negative for rash.   Gastrointestinal: Positive for acid reflux. Negative for nausea and vomiting.   Neurological: Negative for dizziness, light-headedness and headaches.   Hematological: No excessive bruising.   Psychiatric/Behavioral: The patient is not nervous/anxious.    All other systems reviewed and are negative.     Objective:     Vitals:    10/08/19 1328   BP: 120/82   Pulse: 72   Resp: 19   SpO2: 97%   Weight: 78.8 kg (173 lb 11.6 oz)   Height: 5' 3" (1.6 m)     Body mass index is 30.77 kg/m².    Physical Exam   Constitutional: Vital signs are normal. She appears well-developed and well-nourished. No distress.   HENT:   Head: Normocephalic and atraumatic.   Mallampati 4   Eyes: Pupils are equal, round, and reactive to light. EOM are normal. No scleral icterus.   Neck: No JVD present. Carotid bruit is not present. No thyroid mass and no thyromegaly present.   14"   Cardiovascular: Normal rate and regular rhythm. Exam reveals no gallop and no friction rub.   No murmur heard.  Pulmonary/Chest: Effort normal and breath sounds normal. She has no wheezes. She has no rhonchi. She has no rales.   Abdominal: Soft. She exhibits no distension. There is no hepatosplenomegaly. There is no tenderness. There is no rebound and no guarding.   Musculoskeletal: Normal range of motion. She exhibits no edema.   Neurological: She is alert. She displays a negative Romberg sign. Gait abnormal. Coordination normal.   Skin: Skin is warm, dry and intact. Capillary refill takes less than 2 seconds. No rash noted.   Psychiatric: She has a normal mood and affect.   Nursing note and vitals reviewed.      Personal Diagnostic Review      Lab Review     Results for orders placed or performed in visit on 08/13/19   TSH   Result Value Ref Range    TSH 0.586 0.400 - 4.000 uIU/mL   Comprehensive metabolic panel   Result Value Ref Range    Sodium 140 136 - 145 mmol/L    Potassium 4.5 3.5 - 5.1 mmol/L    Chloride 104 95 - 110 mmol/L    " CO2 27 23 - 29 mmol/L    Glucose 120 (H) 70 - 110 mg/dL    BUN, Bld 21 8 - 23 mg/dL    Creatinine 0.7 0.5 - 1.4 mg/dL    Calcium 10.0 8.7 - 10.5 mg/dL    Total Protein 7.4 6.0 - 8.4 g/dL    Albumin 3.6 3.5 - 5.2 g/dL    Total Bilirubin 0.4 0.1 - 1.0 mg/dL    Alkaline Phosphatase 133 55 - 135 U/L    AST 17 10 - 40 U/L    ALT 20 10 - 44 U/L    Anion Gap 9 8 - 16 mmol/L    eGFR if African American >60.0 >60 mL/min/1.73 m^2    eGFR if non African American >60.0 >60 mL/min/1.73 m^2   CBC Without Differential   Result Value Ref Range    WBC 8.80 3.90 - 12.70 K/uL    RBC 4.00 4.00 - 5.40 M/uL    Hemoglobin 11.2 (L) 12.0 - 16.0 g/dL    Hematocrit 36.9 (L) 37.0 - 48.5 %    Mean Corpuscular Volume 92 82 - 98 fL    Mean Corpuscular Hemoglobin 28.0 27.0 - 31.0 pg    Mean Corpuscular Hemoglobin Conc 30.4 (L) 32.0 - 36.0 g/dL    RDW 14.9 (H) 11.5 - 14.5 %    Platelets 381 (H) 150 - 350 K/uL    MPV 10.0 9.2 - 12.9 fL   Lipid panel   Result Value Ref Range    Cholesterol 140 120 - 199 mg/dL    Triglycerides 158 (H) 30 - 150 mg/dL    HDL 48 40 - 75 mg/dL    LDL Cholesterol 60.4 (L) 63.0 - 159.0 mg/dL    Hdl/Cholesterol Ratio 34.3 20.0 - 50.0 %    Total Cholesterol/HDL Ratio 2.9 2.0 - 5.0    Non-HDL Cholesterol 92 mg/dL   Hemoglobin A1c   Result Value Ref Range    Hemoglobin A1C 7.2 (H) 4.0 - 5.6 %    Estimated Avg Glucose 160 (H) 68 - 131 mg/dL   Vitamin D   Result Value Ref Range    Vit D, 25-Hydroxy 32 30 - 96 ng/mL       Assessment /plan       Discussed diagnosis, its evaluation, treatment and usual course. All questions answered.    Problem List Items Addressed This Visit        Endocrine    Class 1 obesity due to excess calories with serious comorbidity and body mass index (BMI) of 30.0 to 30.9 in adult (Chronic)    Current Assessment & Plan     General weight loss/lifestyle modification strategies discussed (elicit support from others; identify saboteurs; non-food rewards, etc).  Behavioral treatment: Slim Fast.  Diet  interventions: low calorie (1000 kCal/d) deficit diet.  Informal exercise measures discussed, e.g. taking stairs instead of elevator.  Regular aerobic exercise program discussed.            Other    NICKI (obstructive sleep apnea) (Chronic)    Current Assessment & Plan     My recommendation at this point would be to set up a [facility].  We have discussed weight loss and how this may improve her situation.  We have discussed behavioral modifications, as well.  After her study, she  could certainly try a CPAP.          Relevant Orders    Polysomnogram (CPAP will be added if patient meets diagnostic criteria.)          TIME SPENT WITH PATIENT: Time spent: 50 minutes in face to face  discussion concerning diagnosis, prognosis, review of lab and test results, benefits of treatment as well as management of disease, counseling of patient and coordination of care between various health  care providers . Greater than half the time spent was used for coordination of care and counseling of patient.     Follow up in about 6 weeks (around 11/19/2019) for NICKI, Obesity.

## 2019-10-08 NOTE — LETTER
October 8, 2019      Maikol Garcia MD  [address]  [place] [address]           [facility]  [address] [place] [id number]  Phone: [phone]  Fax: [phone]          Patient: Venus Caldwell   MR Number: [id number]   YOB: 1950   Date of Visit: 10/8/2019       Dear Dr. Maikol Garcia:    Thank you for referring Venus Caldwell to me for evaluation. Attached you will find relevant portions of my assessment and plan of care.    If you have questions, please do not hesitate to call me. I look forward to following Venus Caldwell along with you.    Sincerely,    Darien Farrell MD    Enclosure  CC:  No Recipients    If you would like to receive this communication electronically, please contact [email or web address] or [phone] to request more information on [facility] access.    For providers and/or their staff who would like to refer a patient to [facility], please contact us through our one-stop-shop provider referral line, [facility] , at [phone].    If you feel you have received this communication in error or would no longer like to receive these types of communications, please e-mail [email or web address]

## 2019-10-08 NOTE — PATIENT INSTRUCTIONS
Obstructive Sleep Apnea  Obstructive sleep apnea is a condition that causes your air passages to become narrowed or blocked during sleep. As a result, breathing stops for short periods. Your body wakes up enough for breathing to begin again, though you don't remember it. The cycle of stopped breathing and brief awakenings can repeat dozens of times a night. This prevents the body from getting to the deeper stages of sleep that are needed for good rest and may cause your body's oxygen level to fall.  Signs of sleep apnea include loud snoring, noisy breathing, and gasping sounds during sleep. Daytime symptoms include waking up tired after a full night's sleep, waking up with headaches, feeling very sleepy or falling asleep during the day, and having problems with memory or concentration.  Risk factors for sleep apnea include:  · Being overweight  · Being a man, or a woman in menopause  · Smoking  · Using alcohol or sedating medicines  · Having enlarged structures in the nose or throat  Home care  Lifestyle changes that can help treat snoring and sleep apnea include the following:  · If you are overweight, lose weight. Talk to your healthcare provider about a weight-loss plan for you.  · Avoid alcohol for 3 to 4 hours before bedtime. Avoid sedating medications. Ask your healthcare provider about the medicines you take.  · If you smoke, talk to your healthcare provider about ways to quit.  · Sleep on your side. This can help prevent gravity from pulling relaxed throat tissues into your breathing passages.  · If you have allergies or sinus problems that block your nose, ask your healthcare provider for help.  Follow-up care  Follow up with your healthcare provider, or as advised. A diagnosis of sleep apnea is made with a sleep study. Your healthcare provider can tell you more about this test.  When to seek medical advice  Sleep apnea can make you more likely to have certain health problems. These include high blood  pressure, heart attack, stroke, and sexual dysfunction. If you have sleep apnea, talk to your healthcare provider about the best treatments for you.  Date Last Reviewed: 4/1/2017  © 7427-0066 [employer]. [address] [place] [address], [place] [address]. All rights reserved. This information is not intended as a substitute for professional medical care. Always follow your healthcare professional's instructions.

## 2019-10-10 ENCOUNTER — HOSPITAL ENCOUNTER (OUTPATIENT)
Dept: SLEEP MEDICINE | Facility: HOSPITAL | Age: 69
Discharge: HOME | End: 2019-10-10
Attending: INTERNAL MEDICINE
Payer: MEDICARE

## 2019-10-10 DIAGNOSIS — G47.33 OSA (OBSTRUCTIVE SLEEP APNEA): Chronic | ICD-10-CM

## 2019-10-10 PROCEDURE — 95810 POLYSOM 6/> YRS 4/> PARAM: CPT | Mod: 26,,, | Performed by: INTERNAL MEDICINE

## 2019-10-10 PROCEDURE — 95810 POLYSOM 6/> YRS 4/> PARAM: CPT

## 2019-10-10 PROCEDURE — 95810 PR POLYSOMNOGRAPHY, 4 OR MORE: ICD-10-PCS | Mod: 26,,, | Performed by: INTERNAL MEDICINE

## 2019-10-10 NOTE — Clinical Note
Mild Obstructive Sleep apnea(NICKI): Overall AHI was 10.3/hr with REM AHI 33.3/hr.EKG showed no cardiac abnormalities.Mild Oxygen DesaturationEEG did not show alpha intrusion.Moderate significant periodic leg movements(PLMs) during sleep. However, no significant associatedarousals.No Significant Central Sleep Apnea (CSA)Normal sleep efficiency, short primary sleep latency, long REM sleep latency and normal slow wavelatency.Reduced testing in Supine position may underestimate NICKI severityLuisana Caldwell - 1950Page 2 of 3Electronically Authenticated By Chester Mims MD on 10/13/2019 04:56 PM, from [phone] Mims MDNPI: [id number] Sleep Apnea (G47.33)Nocturnal Hypoxemia (G47.36)Therapeutic CPAP titration to determine optimal pressure required to alleviate sleep disordered breathing.Positional therapy avoiding supine position during sleep.Avoid alcohol, sedatives and other CNS depressants that m

## 2019-10-13 NOTE — PROCEDURES
"Mild Obstructive Sleep apnea(NICKI): Overall AHI was 10.3/hr with REM AHI 33.3/hr.  EKG showed no cardiac abnormalities.  Mild Oxygen Desaturation  EEG did not show alpha intrusion.  Moderate significant periodic leg movements(PLMs) during sleep. However, no significant associated  arousals.  No Significant Central Sleep Apnea (CSA)  Normal sleep efficiency, short primary sleep latency, long REM sleep latency and normal slow wave  latency.  Reduced testing in Supine position may underestimate NICKI severity  Venus Caldwell - 1950  Page 2 of 3  Electronically Authenticated By Chester Mims MD on 10/13/2019 04:56 PM, from [phone]  Chester Mims MD  NPI: [id number]  DIAGNOSIS  RECOMMENDATIONS  MISCELLANEOUS  Obstructive Sleep Apnea (G47.33)  Nocturnal Hypoxemia (G47.36)  Therapeutic CPAP titration to determine optimal pressure required to alleviate sleep disordered breathing.  Positional therapy avoiding supine position during sleep.  Avoid alcohol, sedatives and other CNS depressants that may worsen sleep apnea and disrupt normal sleep  architecture.  Sleep hygiene should be reviewed to assess factors that may improve sleep quality.  Weight management and regular exercise should be initiated or continued    See imported Sleep Study result in "Chart Review" under the   "Media tab".      (This Sleep Study was interpreted by a Board Certified Sleep   Specialist who conducted an epoch-by-epoch review of the entire   raw data recording.)     (The indication for this sleep study was reviewed and deemed   appropriate by AASM Practice Parameters or other reasons by a   Board Certified Sleep Specialist.)    Chester Mims MD      "

## 2019-10-14 ENCOUNTER — TELEPHONE (OUTPATIENT)
Dept: PULMONOLOGY | Facility: HOSPITAL | Age: 69
End: 2019-10-14

## 2019-10-14 DIAGNOSIS — G47.33 OSA (OBSTRUCTIVE SLEEP APNEA): Primary | Chronic | ICD-10-CM

## 2019-10-14 NOTE — TELEPHONE ENCOUNTER
MD Jami Jha Staff 14 minutes ago (4:46 PM)      Assessment     Mild NICKI.     Plan:    IN LAB CPAP TITRATION       Schedule 4 weeks  follow up.     Please inform patient.      Routing comment

## 2019-10-14 NOTE — TELEPHONE ENCOUNTER
Sleep study Results reviewed:     There were a total of 61 respiratory disturbances out of which 6 were apneas ( 2 obstructive, 0 mixed, 4 central) and 55 hypopneas. The apnea/hypopnea index (AHI) was 10.3 events/hour. The central sleep apnea index was 0.7 events/hour. The REM AHI was 33.3 events/hour and NREM AHI was 9.7 events/hour. The supine AHI was 10.7 events/hour and the non supine AHI was 0 supine during 95.94% of sleep. Respiratory disturbances were associated with oxygen desaturation down to a oh of 73.0% during sleep. The mean oxygen saturation during the study was 91.8%. The cumulative time under 88% oxygen saturation was 8.4 minutes.      Assessment;  Mild NICKI.    Plan:   IN LAB CPAP TITRATION      Schedule 4 weeks  follow up.    Please inform patient.

## 2019-10-14 NOTE — TELEPHONE ENCOUNTER
Patient notified as per Dr. Farrell. She is already scheduled as per below:    Future Appointments   Date Time Provider Department Center   11/13/2019  3:00 PM Darien Farrell MD ON [facility]   12/12/2019  9:00 AM Maikol Garcia MD ON [facility]   2/13/2020 11:00 AM [facility]     Reiterated appt date/time/location to patient. She informed me she would check her calendar since it is a previously scheduled appointment. Patient thanked me and ended call.

## 2019-10-16 NOTE — PROGRESS NOTES
Digital Medicine: Clinician Follow-Up    Called patient for follow up. She is doing well with no complaints. Patient says she was diagnosed with NICKI and is going to get fitted for CPAP machine tomorrow.     The history is provided by the patient.         Sleep Apnea  Patient previously diagnosed with NICKI She reports she is not currently using CPAP. She is not using CPAP because: Patient in process of getting fitted for CPAP machine.     Medication Affordability  Patient is currently not having problems affording medications      INTERVENTION(S)  reviewed appropriate dose schedule    PLAN  patient verbalizes understanding    30-day BP average meets goal of <130/<80 mmHg. Continue current medications.   Reviewed current BP medications and medication allergies.           Topic    Eye Exam      Last 5 Patient Entered Readings                                      Current 30 Day Average: 120/74     Recent Readings 10/16/2019 10/14/2019 10/13/2019 10/12/2019 10/11/2019    SBP (mmHg) 137 111 135 128 111    DBP (mmHg) 82 63 76 78 69    Pulse 71 76 77 73 74          Hypertension Medications     amLODIPine (NORVASC) 5 MG tablet Take 1 tablet (5 mg total) by mouth once daily.    candesartan (ATACAND) 16 MG tablet Take 1 tablet (16 mg total) by mouth once daily.    metoprolol succinate (TOPROL-XL) 100 MG 24 hr tablet Take 1 tablet (100 mg total) by mouth 2 (two) times daily.

## 2019-10-17 ENCOUNTER — HOSPITAL ENCOUNTER (OUTPATIENT)
Dept: SLEEP MEDICINE | Facility: HOSPITAL | Age: 69
Discharge: HOME | End: 2019-10-17
Attending: INTERNAL MEDICINE
Payer: MEDICARE

## 2019-10-17 DIAGNOSIS — G47.33 OSA (OBSTRUCTIVE SLEEP APNEA): Chronic | ICD-10-CM

## 2019-10-17 PROCEDURE — 95811 POLYSOM 6/>YRS CPAP 4/> PARM: CPT | Mod: 26,,, | Performed by: INTERNAL MEDICINE

## 2019-10-17 PROCEDURE — 95811 POLYSOM 6/>YRS CPAP 4/> PARM: CPT

## 2019-10-17 PROCEDURE — 95811 PR POLYSOMNOGRAPHY W/CPAP: ICD-10-PCS | Mod: 26,,, | Performed by: INTERNAL MEDICINE

## 2019-10-17 NOTE — Clinical Note
EKG showed TACHY/CONCHA cardiac abnormalities.Epoch 210, .No snoring was audible during this study.Javi AlexxLindy.GUILLERMO - 1950Page 2 of 3Electronically Authenticated By Chester Mims MD on 10/20/2019 07:00 AM, from [phone] Mims MDNPI: [id number] Significant Obstructive Sleep apnea(NICKI) Optimal pressure attained.MILD significant periodic leg movements(PLMs) during sleep. However, no significant associated arousals.Reduced sleep efficiency, short primary sleep latency, long REM sleep latency and normal slow wavelatency.Supplemental oxygen was not administered during the study.Obstructive Sleep Apnea (G47.33)Trial of CPAP therapy on 7 cm H2O with a Small size Resmed Nasal Mask AirFit N30I mask and heatedhumidification.Avoid alcohol, sedatives and other CNS depressants that may worsen sleep apnea and disrupt normal sleeparchitecture.Sleep hygiene should be reviewed to assess factors that may improve sleep quality.Weight ma

## 2019-10-21 ENCOUNTER — TELEPHONE (OUTPATIENT)
Dept: PULMONOLOGY | Facility: CLINIC | Age: 69
End: 2019-10-21

## 2019-10-21 DIAGNOSIS — G47.33 OSA (OBSTRUCTIVE SLEEP APNEA): Primary | Chronic | ICD-10-CM

## 2019-10-21 NOTE — TELEPHONE ENCOUNTER
CPAP titration sleep study Results reviewed:     The overall AHI was 0.0 per hour, and the RDI was 0.0 events/hour with a central apnea index of 0.0per hour.  The most appropriate setting of CPAP was 7 cm H2O. At this setting, the sleep efficiency was 85% and the  patient was supine for 0%. The AHI was 0.0 events per hour, and the RDI was 0.0 events/hour (with 0.0 central  events) and the arousal index was 6.2 per hour.The oxygen oh was 91.0% during sleep.  The cumulative time under 88% oxygen saturation was 0.0 minutes    Assessment:   Optimal PAP titration    Plan:  Start  CPAP of 7 CMWP.    Schedule  6 weeks follow up for complaince evaluation.     Ordered Please inform pateint

## 2019-10-21 NOTE — PROCEDURES
"EKG showed TACHY/CONCHA cardiac abnormalities.Epoch 210, .  No snoring was audible during this study.  Venus Caldwell - 1950  Page 2 of 3  Electronically Authenticated By Chester Mims MD on 10/20/2019 07:00 AM, from [phone]  Chester Mims MD  NPI: [id number]  DIAGNOSIS  RECOMMENDATIONS  No Significant Obstructive Sleep apnea(NICKI) Optimal pressure attained.  MILD significant periodic leg movements(PLMs) during sleep. However, no significant associated arousals.  Reduced sleep efficiency, short primary sleep latency, long REM sleep latency and normal slow wave  latency.  Supplemental oxygen was not administered during the study.  Obstructive Sleep Apnea (G47.33)  Trial of CPAP therapy on 7 cm H2O with a Small size Resmed Nasal Mask AirFit N30I mask and heated  humidification.  Avoid alcohol, sedatives and other CNS depressants that may worsen sleep apnea and disrupt normal sleep  architecture.  Sleep hygiene should be reviewed to assess factors that may improve sleep quality.  Weight management and regular exercise should be initiated or continued under supervison of clinician.  Return to [facility] Center for re-evaluation after 4 -6 weeks of therapy  Follow up with cardiology    See imported Sleep Study result in "Chart Review" under the   "Media tab".      (This Sleep Study was interpreted by a Board Certified Sleep   Specialist who conducted an epoch-by-epoch review of the entire   raw data recording.)     (The indication for this sleep study was reviewed and deemed   appropriate by AASM Practice Parameters or other reasons by a   Board Certified Sleep Specialist.)    Chester Mims MD      "

## 2019-10-22 NOTE — TELEPHONE ENCOUNTER
Conveyed results of CPAP titration appointment scheduled for 1st download on 12/17/19. All questions answered. Patient verbalized understanding

## 2019-10-28 ENCOUNTER — PATIENT OUTREACH (OUTPATIENT)
Dept: OTHER | Facility: OTHER | Age: 69
End: 2019-10-28

## 2019-10-30 ENCOUNTER — PATIENT OUTREACH (OUTPATIENT)
Dept: ADMINISTRATIVE | Facility: HOSPITAL | Age: 69
End: 2019-10-30

## 2019-10-30 NOTE — PROGRESS NOTES
Spoke with pt re scheduling appt with Dr. Hernandez for f/u DM and HTN. Appt scheduled 12/16/19. Instructed pt on appt. Pt verbalized understanding.

## 2019-12-05 ENCOUNTER — TELEPHONE (OUTPATIENT)
Dept: PULMONOLOGY | Facility: CLINIC | Age: 69
End: 2019-12-05

## 2019-12-05 NOTE — TELEPHONE ENCOUNTER
Phoned pt, provided number and name for dme to set up at for c pap, pt is worried about how much work it will be maintaining the c-pap but she will try it out.

## 2019-12-05 NOTE — TELEPHONE ENCOUNTER
----- Message from Yesica Villafuerte sent at 12/5/2019  3:38 PM CST -----  Regarding: Cpap Setup  We have called pt multiple times for setup. No answer and no return call. Pt has not been setup.

## 2019-12-11 DIAGNOSIS — I25.10 CORONARY ARTERY DISEASE WITHOUT ANGINA PECTORIS, UNSPECIFIED VESSEL OR LESION TYPE, UNSPECIFIED WHETHER NATIVE OR TRANSPLANTED HEART: Primary | ICD-10-CM

## 2019-12-16 ENCOUNTER — OFFICE VISIT (OUTPATIENT)
Dept: INTERNAL MEDICINE | Facility: CLINIC | Age: 69
End: 2019-12-16
Payer: MEDICARE

## 2019-12-16 ENCOUNTER — OFFICE VISIT (OUTPATIENT)
Dept: PODIATRY | Facility: CLINIC | Age: 69
End: 2019-12-16
Payer: MEDICARE

## 2019-12-16 ENCOUNTER — LAB VISIT (OUTPATIENT)
Dept: LAB | Facility: HOSPITAL | Age: 69
End: 2019-12-16
Attending: FAMILY MEDICINE
Payer: MEDICARE

## 2019-12-16 ENCOUNTER — OFFICE VISIT (OUTPATIENT)
Dept: OPHTHALMOLOGY | Facility: CLINIC | Age: 69
End: 2019-12-16
Payer: MEDICARE

## 2019-12-16 VITALS
WEIGHT: 172.94 LBS | HEIGHT: 63 IN | BODY MASS INDEX: 30.64 KG/M2 | DIASTOLIC BLOOD PRESSURE: 77 MMHG | SYSTOLIC BLOOD PRESSURE: 120 MMHG | HEART RATE: 65 BPM

## 2019-12-16 VITALS
DIASTOLIC BLOOD PRESSURE: 80 MMHG | SYSTOLIC BLOOD PRESSURE: 114 MMHG | WEIGHT: 172.38 LBS | BODY MASS INDEX: 30.54 KG/M2 | HEART RATE: 72 BPM | TEMPERATURE: 98 F | OXYGEN SATURATION: 96 % | HEIGHT: 63 IN

## 2019-12-16 DIAGNOSIS — E11.9 TYPE 2 DIABETES MELLITUS WITHOUT COMPLICATION, WITHOUT LONG-TERM CURRENT USE OF INSULIN: ICD-10-CM

## 2019-12-16 DIAGNOSIS — E78.2 MIXED DIABETIC HYPERLIPIDEMIA ASSOCIATED WITH TYPE 2 DIABETES MELLITUS: Chronic | ICD-10-CM

## 2019-12-16 DIAGNOSIS — K21.9 GASTROESOPHAGEAL REFLUX DISEASE WITHOUT ESOPHAGITIS: Chronic | ICD-10-CM

## 2019-12-16 DIAGNOSIS — G47.33 OSA (OBSTRUCTIVE SLEEP APNEA): Chronic | ICD-10-CM

## 2019-12-16 DIAGNOSIS — E11.9 TYPE 2 DIABETES MELLITUS WITHOUT COMPLICATION, WITHOUT LONG-TERM CURRENT USE OF INSULIN: Primary | ICD-10-CM

## 2019-12-16 DIAGNOSIS — E11.69 MIXED DIABETIC HYPERLIPIDEMIA ASSOCIATED WITH TYPE 2 DIABETES MELLITUS: Chronic | ICD-10-CM

## 2019-12-16 DIAGNOSIS — L84 CORN OR CALLUS: ICD-10-CM

## 2019-12-16 DIAGNOSIS — E11.42 ONYCHOMYCOSIS OF MULTIPLE TOENAILS WITH TYPE 2 DIABETES MELLITUS AND PERIPHERAL NEUROPATHY: ICD-10-CM

## 2019-12-16 DIAGNOSIS — I25.10 CORONARY ARTERY DISEASE INVOLVING NATIVE CORONARY ARTERY OF NATIVE HEART WITHOUT ANGINA PECTORIS: Chronic | ICD-10-CM

## 2019-12-16 DIAGNOSIS — L60.3 NAIL DYSTROPHY: ICD-10-CM

## 2019-12-16 DIAGNOSIS — H52.4 BILATERAL PRESBYOPIA: ICD-10-CM

## 2019-12-16 DIAGNOSIS — H25.13 CATARACT, NUCLEAR SCLEROTIC SENILE, BILATERAL: ICD-10-CM

## 2019-12-16 DIAGNOSIS — E11.9 ENCOUNTER FOR COMPREHENSIVE DIABETIC FOOT EXAMINATION, TYPE 2 DIABETES MELLITUS: Primary | ICD-10-CM

## 2019-12-16 DIAGNOSIS — I70.0 ABDOMINAL AORTIC ATHEROSCLEROSIS: Chronic | ICD-10-CM

## 2019-12-16 DIAGNOSIS — E11.9 DIABETES MELLITUS TYPE 2 WITHOUT RETINOPATHY: Primary | ICD-10-CM

## 2019-12-16 DIAGNOSIS — I15.2 HYPERTENSION ASSOCIATED WITH DIABETES: Chronic | ICD-10-CM

## 2019-12-16 DIAGNOSIS — Z12.31 ENCOUNTER FOR SCREENING MAMMOGRAM FOR MALIGNANT NEOPLASM OF BREAST: ICD-10-CM

## 2019-12-16 DIAGNOSIS — B35.1 ONYCHOMYCOSIS OF MULTIPLE TOENAILS WITH TYPE 2 DIABETES MELLITUS AND PERIPHERAL NEUROPATHY: ICD-10-CM

## 2019-12-16 DIAGNOSIS — E11.59 HYPERTENSION ASSOCIATED WITH DIABETES: Chronic | ICD-10-CM

## 2019-12-16 DIAGNOSIS — I50.32 CHRONIC HEART FAILURE WITH PRESERVED EJECTION FRACTION: Chronic | ICD-10-CM

## 2019-12-16 DIAGNOSIS — I47.19 PAROXYSMAL ATRIAL TACHYCARDIA: Chronic | ICD-10-CM

## 2019-12-16 DIAGNOSIS — E11.69 ONYCHOMYCOSIS OF MULTIPLE TOENAILS WITH TYPE 2 DIABETES MELLITUS AND PERIPHERAL NEUROPATHY: ICD-10-CM

## 2019-12-16 DIAGNOSIS — K57.90 DIVERTICULOSIS: ICD-10-CM

## 2019-12-16 PROBLEM — R73.03 PREDIABETES: Chronic | Status: RESOLVED | Noted: 2018-04-09 | Resolved: 2019-12-16

## 2019-12-16 PROCEDURE — 99214 OFFICE O/P EST MOD 30 MIN: CPT | Mod: PBBFAC,25 | Performed by: FAMILY MEDICINE

## 2019-12-16 PROCEDURE — 99999 PR PBB SHADOW E&M-EST. PATIENT-LVL III: ICD-10-PCS | Mod: PBBFAC,,, | Performed by: PODIATRIST

## 2019-12-16 PROCEDURE — 99999 PR PBB SHADOW E&M-EST. PATIENT-LVL I: CPT | Mod: PBBFAC,,, | Performed by: OPTOMETRIST

## 2019-12-16 PROCEDURE — 92014 COMPRE OPH EXAM EST PT 1/>: CPT | Mod: S$PBB,,, | Performed by: OPTOMETRIST

## 2019-12-16 PROCEDURE — 99213 OFFICE O/P EST LOW 20 MIN: CPT | Mod: PBBFAC,27 | Performed by: PODIATRIST

## 2019-12-16 PROCEDURE — 1159F PR MEDICATION LIST DOCUMENTED IN MEDICAL RECORD: ICD-10-PCS | Mod: ,,, | Performed by: FAMILY MEDICINE

## 2019-12-16 PROCEDURE — 99999 PR PBB SHADOW E&M-EST. PATIENT-LVL IV: CPT | Mod: PBBFAC,,, | Performed by: FAMILY MEDICINE

## 2019-12-16 PROCEDURE — 87210 SMEAR WET MOUNT SALINE/INK: CPT

## 2019-12-16 PROCEDURE — 83036 HEMOGLOBIN GLYCOSYLATED A1C: CPT

## 2019-12-16 PROCEDURE — 1159F PR MEDICATION LIST DOCUMENTED IN MEDICAL RECORD: ICD-10-PCS | Mod: ,,, | Performed by: PODIATRIST

## 2019-12-16 PROCEDURE — 1126F PR PAIN SEVERITY QUANTIFIED, NO PAIN PRESENT: ICD-10-PCS | Mod: ,,, | Performed by: PODIATRIST

## 2019-12-16 PROCEDURE — 92015 DETERMINE REFRACTIVE STATE: CPT | Mod: ,,, | Performed by: OPTOMETRIST

## 2019-12-16 PROCEDURE — 1159F MED LIST DOCD IN RCRD: CPT | Mod: ,,, | Performed by: FAMILY MEDICINE

## 2019-12-16 PROCEDURE — 92014 PR EYE EXAM, EST PATIENT,COMPREHESV: ICD-10-PCS | Mod: S$PBB,,, | Performed by: OPTOMETRIST

## 2019-12-16 PROCEDURE — 99999 PR PBB SHADOW E&M-EST. PATIENT-LVL I: ICD-10-PCS | Mod: PBBFAC,,, | Performed by: OPTOMETRIST

## 2019-12-16 PROCEDURE — 1126F AMNT PAIN NOTED NONE PRSNT: CPT | Mod: ,,, | Performed by: PODIATRIST

## 2019-12-16 PROCEDURE — 99999 PR PBB SHADOW E&M-EST. PATIENT-LVL III: CPT | Mod: PBBFAC,,, | Performed by: PODIATRIST

## 2019-12-16 PROCEDURE — 99203 OFFICE O/P NEW LOW 30 MIN: CPT | Mod: S$PBB,,, | Performed by: PODIATRIST

## 2019-12-16 PROCEDURE — 1126F PR PAIN SEVERITY QUANTIFIED, NO PAIN PRESENT: ICD-10-PCS | Mod: ,,, | Performed by: FAMILY MEDICINE

## 2019-12-16 PROCEDURE — 99203 PR OFFICE/OUTPT VISIT, NEW, LEVL III, 30-44 MIN: ICD-10-PCS | Mod: S$PBB,,, | Performed by: PODIATRIST

## 2019-12-16 PROCEDURE — 99215 PR OFFICE/OUTPT VISIT, EST, LEVL V, 40-54 MIN: ICD-10-PCS | Mod: S$PBB,,, | Performed by: FAMILY MEDICINE

## 2019-12-16 PROCEDURE — 36415 COLL VENOUS BLD VENIPUNCTURE: CPT

## 2019-12-16 PROCEDURE — 1126F AMNT PAIN NOTED NONE PRSNT: CPT | Mod: ,,, | Performed by: FAMILY MEDICINE

## 2019-12-16 PROCEDURE — 92015 PR REFRACTION: ICD-10-PCS | Mod: ,,, | Performed by: OPTOMETRIST

## 2019-12-16 PROCEDURE — 1159F MED LIST DOCD IN RCRD: CPT | Mod: ,,, | Performed by: PODIATRIST

## 2019-12-16 PROCEDURE — 99211 OFF/OP EST MAY X REQ PHY/QHP: CPT | Mod: PBBFAC,27,25 | Performed by: OPTOMETRIST

## 2019-12-16 PROCEDURE — 99215 OFFICE O/P EST HI 40 MIN: CPT | Mod: S$PBB,,, | Performed by: FAMILY MEDICINE

## 2019-12-16 PROCEDURE — 99999 PR PBB SHADOW E&M-EST. PATIENT-LVL IV: ICD-10-PCS | Mod: PBBFAC,,, | Performed by: FAMILY MEDICINE

## 2019-12-16 NOTE — PROGRESS NOTES
Subjective:   Patient ID: Venus Caldwell is a 69 y.o. female.  Chief Complaint:  Follow-up (follow up aon DM and HTN. Pt states she may have stomach virus. )      Patient presents for 3 month follow-up.    Last visit A1c confirm new diagnosis of diabetes.  Restarted metformin 500 mg daily.  Reports compliance.  Denies significant side effects.   No symptoms hypo or hyperglycemia. Needs foot and eye exam in addition to repeat A1c.    Additional past medical history:  Hypertension.  Controlled on amlodipine 5 mg daily, Atacand 60 mg daily, Toprol- mg twice a day.    Palpitations with paroxysmal atrial tachycardia.  Tolerating increased dopa Toprol XL without difficulty.  Symptoms improved.    Hyperlipidemia with coronary artery disease and aortic atherosclerosis.  On aspirin 81 mg daily Lipitor 40 mg daily.  Lipid panel with LDL  64 and at goal for treatment.  Denies any symptoms of chest pain.  GERD stable on low-dose PPI.    Vitamin-D insufficiency stable on 5000  Unit daily supplement  Allergic rhinitis stable on Zyrtec daily  Chronic fatigue.  TSH previously normal.  CBC stable hemoglobin/ hematocrit 11/36.9.  Sleep study positive mild to moderate sleep apnea.  Underwent CPAP titration.  Equipment benign arranged.    Routine health maintenance needs glue shingles vaccine and mammogram.    Today complains of 2-3 days of intermittent GI issues.  Nonspecific abdominal pain.  Increased frequency to 4 times a day.  Change in consistency more loose and diarrhea like.  Nothing dark black or bright red specialty for blood.  No nausea/ vomiting/ fever.  Colonoscopy 2018 with significant diverticulosis.      GI Problem   The primary symptoms include fatigue, abdominal pain and diarrhea. Primary symptoms do not include fever, weight loss, nausea, vomiting, melena, hematemesis, jaundice, hematochezia, dysuria, myalgias, arthralgias or rash. The illness began 2 days ago. The onset was sudden. The problem has been  rapidly improving.   The abdominal pain began yesterday. The abdominal pain has been resolved since its onset. The abdominal pain is located in the LLQ, LUQ and epigastric region. The abdominal pain does not radiate. The severity of the abdominal pain is 3/10. The abdominal pain is relieved by bowel movements.     The diarrhea began 2 days ago. The diarrhea is watery. The diarrhea occurs 2 to 4 times per day.   The illness does not include chills, anorexia, dysphagia, odynophagia, bloating, constipation, tenesmus, back pain or itching. Significant associated medical issues include GERD and diverticulitis. Associated medical issues do not include inflammatory bowel disease, gallstones, liver disease, alcohol abuse, PUD, gastric bypass, bowel resection or irritable bowel syndrome.     Review of Systems   Constitutional: Positive for fatigue. Negative for chills, diaphoresis, fever and weight loss.   Eyes: Negative for visual disturbance.   Respiratory: Negative for cough, chest tightness, shortness of breath and wheezing.    Cardiovascular: Negative for chest pain, palpitations and leg swelling.   Gastrointestinal: Positive for abdominal pain and diarrhea. Negative for abdominal distention, anorexia, bloating, blood in stool, constipation, dysphagia, hematemesis, hematochezia, jaundice, melena, nausea, rectal pain and vomiting.   Endocrine: Negative for polydipsia, polyphagia and polyuria.   Genitourinary: Negative for difficulty urinating, dysuria, flank pain, frequency, hematuria and urgency.   Musculoskeletal: Positive for gait problem. Negative for arthralgias, back pain and myalgias.   Skin: Negative for itching and rash.   Neurological: Negative for dizziness, syncope, weakness, light-headedness, numbness and headaches.   Psychiatric/Behavioral: Positive for sleep disturbance. Negative for agitation, behavioral problems, confusion, decreased concentration, dysphoric mood, hallucinations, self-injury and suicidal  "ideas. The patient is not nervous/anxious and is not hyperactive.      Objective:   /80 (BP Location: Right arm, Patient Position: Sitting, BP Method: Medium (Manual))   Pulse 72   Temp 98.1 °F (36.7 °C) (Oral)   Ht 5' 3" (1.6 m)   Wt 78.2 kg (172 lb 6.4 oz)   SpO2 96%   BMI 30.54 kg/m²     Physical Exam   Constitutional: Vital signs are normal. She appears well-developed and well-nourished. No distress.   Eyes: No scleral icterus.   Neck: No JVD present. Carotid bruit is not present. No thyroid mass and no thyromegaly present.   Cardiovascular: Normal rate, regular rhythm and normal heart sounds. Exam reveals no gallop and no friction rub.   No murmur heard.  Pulses:       Dorsalis pedis pulses are 2+ on the right side, and 2+ on the left side.        Posterior tibial pulses are 2+ on the right side, and 2+ on the left side.   Pulmonary/Chest: Effort normal and breath sounds normal. She has no wheezes. She has no rhonchi. She has no rales.   Abdominal: Soft. She exhibits no distension. There is no hepatosplenomegaly. There is no tenderness. There is no rebound and no guarding.   Musculoskeletal: She exhibits no edema.        Right foot: There is normal range of motion and no deformity.        Left foot: There is normal range of motion and no deformity.   Feet:   Right Foot:   Protective Sensation: 3 sites tested. 5 sites sensed.   Skin Integrity: Positive for callus and dry skin. Negative for ulcer, blister, skin breakdown, erythema or warmth.   Left Foot:   Protective Sensation: 5 sites tested. 5 sites sensed.   Skin Integrity: Positive for callus and dry skin. Negative for ulcer, blister, skin breakdown, erythema or warmth.   Neurological: She displays a negative Romberg sign. Gait abnormal. Coordination normal.   Skin: Skin is warm, dry and intact. No rash noted.    No tinea pedis changes present, but bilateral great toenails with significant hyper keratosis and onychomycotic changes.   Psychiatric: " She has a normal mood and affect.   Nursing note and vitals reviewed.    Assessment:       ICD-10-CM ICD-9-CM   1. Type 2 diabetes mellitus without complication, without long-term current use of insulin E11.9 250.00   2. Onychomycosis of multiple toenails with type 2 diabetes mellitus and peripheral neuropathy E11.42 250.60    B35.1 357.2    E11.69 250.80     110.1   3. Corn or callus L84 700   4. Hypertension associated with diabetes E11.59 250.80    I10 401.9   5. Chronic heart failure with preserved ejection fraction I50.32 428.9   6. Paroxysmal atrial tachycardia I47.1 427.0   7. Mixed diabetic hyperlipidemia associated with type 2 diabetes mellitus E11.69 250.80    E78.2 272.2   8. Coronary artery disease involving native coronary artery of native heart without angina pectoris I25.10 414.01   9. Abdominal aortic atherosclerosis I70.0 440.0   10. Gastroesophageal reflux disease without esophagitis K21.9 530.81   11. NICKI (obstructive sleep apnea) G47.33 327.23   12. Encounter for screening mammogram for malignant neoplasm of breast Z12.31 V76.12   13. Diverticulosis K57.90 562.10     Plan:   Type 2 diabetes mellitus without complication, without long-term current use of insulin  -     Hemoglobin A1c; Future; Expected date: 12/16/2019  -     Ambulatory referral to Ophthalmology  -     Ambulatory Referral to Podiatry   check A1c  Adjust metformin 500 mg daily as needed   Referral to Ophthalmology and podiatry.    Onychomycosis of multiple toenails with type 2 diabetes mellitus and peripheral neuropathy  [place] or callus  -     Ambulatory Referral to Podiatry    Hypertension associated with diabetes  Chronic heart failure with preserved ejection fraction  Controlled.  BP at goal.    Continue amlodipine 5 mg daily, Atacand 60 mg daily, and Toprol- mg twice a day.      Paroxysmal atrial tachycardia  Tolerating increased dose of beta-blocker without side effect.    Overall tachycardia /palpitations have  decreased/improved.      Mixed diabetic hyperlipidemia associated with type 2 diabetes mellitus  Coronary artery disease involving native coronary artery of native heart without angina pectoris  Abdominal aortic atherosclerosis  Asymptomatic.  Chest pain free.    Lipid panel with LDL at goal treatment.    Continue aspirin 81 mg daily.    Continue Lipitor 40 mg daily.      Gastroesophageal reflux disease without esophagitis  Stable.  Symptoms controlled.    Continue low-dose PPI daily.    Previous vitamin-D insufficiency resolved.    Adjust vitamin-D 5000 unit daily supplement if indicated.      NICKI (obstructive sleep apnea)  Obtain CPAP equipment ordered.    Stress need for compliance.    Follow-up Sleep Medicine as scheduled.      Encounter for screening mammogram for malignant neoplasm of breast  -     Mammo Digital Screening Bilat; Future; Expected date: 12/16/2019    Diverticulosis  Patient education /and on diverticulosis.    Present GI issues most likely mild flare-up.    No suspicion for diverticulitis.    No additional treatment needed for this episode.      Return to clinic 3 months or sooner as needed.

## 2019-12-16 NOTE — PROGRESS NOTES
HPI     NIDDM exam.  Last eye exam 05/15/2018 [facility].  Update glasses RX.  Lab Results       Component                Value               Date                       HGBA1C                   7.2 (H)             08/13/2019                Last edited by Mark Armstrong, OD on 12/16/2019  1:54 PM. (History)            Assessment /Plan     For exam results, see Encounter Report.    Diabetes mellitus type 2 without retinopathy    Hypertension associated with diabetes    Cataract, nuclear sclerotic senile, bilateral    Bilateral presbyopia      No Background Diabetic Retinopathy    No HTN Retinopathy    Moderate cataracts OU, not surgical    Dispense Final Rx for glasses.  RTC 1 year  Discussed above and answered questions.

## 2019-12-16 NOTE — LETTER
December 16, 2019      Sourav Hernandez MD  [address]  [place] [address]           '[facility]  [address] [place] [id number]  Phone: [phone]  Fax: [phone]          Patient: Venus Caldwell   MR Number: [id number]   YOB: 1950   Date of Visit: 12/16/2019       Dear Dr. Sourav Hernandez:    Thank you for referring Venus Caldwell to me for evaluation. Attached you will find relevant portions of my assessment and plan of care.    If you have questions, please do not hesitate to call me. I look forward to following Venus Caldwell along with you.    Sincerely,    Halina Vidales, SHANON    Enclosure  CC:  No Recipients    If you would like to receive this communication electronically, please contact [email or web address] or [phone] to request more information on [email or web address] Link access.    For providers and/or their staff who would like to refer a patient to [facility], please contact us through our one-stop-shop provider referral line, [facility], at [phone].    If you feel you have received this communication in error or would no longer like to receive these types of communications, please e-mail [email or web address]

## 2019-12-16 NOTE — PATIENT INSTRUCTIONS

## 2019-12-16 NOTE — LETTER
December 16, 2019      Sourav Hernandez MD  [address]  [place] [address]           [facility]  [address] [place] [id number]  Phone: [phone]  Fax: [phone]          Patient: Venus Caldwell   MR Number: [id number]   YOB: 1950   Date of Visit: 12/16/2019       Dear Dr. Sourav Hernandez:    Thank you for referring Venus Caldwell to me for evaluation. Attached you will find relevant portions of my assessment and plan of care.    If you have questions, please do not hesitate to call me. I look forward to following Venus Caldwell along with you.    Sincerely,    Mark Armstrong, OD    Enclosure  CC:  No Recipients    If you would like to receive this communication electronically, please contact [email or web address] or [phone] to request more information on [employer] Link access.    For providers and/or their staff who would like to refer a patient to [facility], please contact us through our one-stop-shop provider referral line, [facility] [place], at [phone].    If you feel you have received this communication in error or would no longer like to receive these types of communications, please e-mail [email or web address]

## 2019-12-16 NOTE — PROGRESS NOTES
Subjective:       Patient ID: Venus Caldwell is a 69 y.o. female.    Chief Complaint: Diabetic Foot Exam (Pt presents w/ dry skin and bilat bunions. Pt states difficulty cutting nails. Wears casual shoes w/ socks. Last seen by Dr Hernandez 12/16/19.)      HPI: Venus Caldwell presents to the office today, under referral by their Primary Care Provider, Sourav Hernandez MD, for her annual diabetic foot assessment and risk evaluation.  Patient also complains of painful thickened, discolored, dystrophic toenails and is interested and a diagnosis and permanent treatment options.  Patient is a DMII.  This patient last saw his/her primary care provider on 12/16/2019.     Hemoglobin A1C   Date Value Ref Range Status   08/13/2019 7.2 (H) 4.0 - 5.6 % Final     Comment:     ADA Screening Guidelines:  5.7-6.4%  Consistent with prediabetes  >or=6.5%  Consistent with diabetes  High levels of fetal hemoglobin interfere with the HbA1C  assay. Heterozygous hemoglobin variants (HbS, HgC, etc)do  not significantly interfere with this assay.   However, presence of multiple variants may affect accuracy.     02/13/2019 6.3 (H) 4.0 - 5.6 % Final     Comment:     ADA Screening Guidelines:  5.7-6.4%  Consistent with prediabetes  >or=6.5%  Consistent with diabetes  High levels of fetal hemoglobin interfere with the HbA1C  assay. Heterozygous hemoglobin variants (HbS, HgC, etc)do  not significantly interfere with this assay.   However, presence of multiple variants may affect accuracy.     08/13/2018 6.1 (H) 4.0 - 5.6 % Final     Comment:     ADA Screening Guidelines:  5.7-6.4%  Consistent with prediabetes  >or=6.5%  Consistent with diabetes  High levels of fetal hemoglobin interfere with the HbA1C  assay. Heterozygous hemoglobin variants (HbS, HgC, etc)do  not significantly interfere with this assay.   However, presence of multiple variants may affect accuracy.     .    Review of patient's allergies indicates:   Allergen Reactions     Sulfa (sulfonamide antibiotics) Anaphylaxis     Pt became hypoxic after taking sulfa drugs as a child      Buprenorphine Rash       Past Medical History:   Diagnosis Date    Arthritis     Cancer, uterine     Diabetes mellitus     prediabetes    Diabetes mellitus, type 2     DM (diabetes mellitus) 08/2019    BS doesn't check 12/16/2019    Hypertension     Sciatica        Family History   Problem Relation Age of Onset    Diabetes Mother     Cataracts Mother     Diabetes Brother     Cataracts Maternal Grandmother     Breast cancer Maternal Aunt        Social History     Socioeconomic History    Marital status:      Spouse name: Not on file    Number of children: Not on file    Years of education: Not on file    Highest education level: Not on file   Occupational History    Not on file   Social Needs    Financial resource strain: Not on file    Food insecurity:     Worry: Not on file     Inability: Not on file    Transportation needs:     Medical: Not on file     Non-medical: Not on file   Tobacco Use    Smoking status: Never Smoker    Smokeless tobacco: Never Used   Substance and Sexual Activity    Alcohol use: No    Drug use: No    Sexual activity: Not on file   Lifestyle    Physical activity:     Days per week: Not on file     Minutes per session: Not on file    Stress: Not on file   Relationships    Social connections:     Talks on phone: Not on file     Gets together: Not on file     Attends Anabaptism service: Not on file     Active member of club or organization: Not on file     Attends meetings of clubs or organizations: Not on file     Relationship status: Not on file   Other Topics Concern    Not on file   Social History Narrative    Not on file       Past Surgical History:   Procedure Laterality Date    CHOLECYSTECTOMY      COLONOSCOPY N/A 8/1/2018    Procedure: COLONOSCOPY;  Surgeon: Yrn Hunter III, MD;  [facility];  Service: Endoscopy;  Laterality:  "N/A;    HYSTERECTOMY      JOINT REPLACEMENT Right     hip replacement    SURGICAL EXCISION OF ANAL LESION N/A 8/29/2018    Procedure: EXCISION, LESION, ANUS;  Surgeon: Yrn Balderrama MD;  [facility];  Service: General;  Laterality: N/A;    TOTAL KNEE ARTHROPLASTY         Review of Systems   Constitutional: Negative for activity change, appetite change, chills and fever.   HENT: Negative for sinus pain, sore throat and voice change.    Eyes: Negative for pain, redness and visual disturbance.   Respiratory: Negative for cough and shortness of breath.    Cardiovascular: Negative for chest pain and palpitations.   Gastrointestinal: Negative for diarrhea, nausea and vomiting.   Musculoskeletal: Negative for back pain and joint swelling.   Skin: Negative for color change and wound.   Neurological: Negative for dizziness, weakness and numbness.   Psychiatric/Behavioral: The patient is not nervous/anxious.        Objective:   /77   Pulse 65   Ht 5' 3" (1.6 m)   Wt 78.4 kg (172 lb 15.2 oz)   BMI 30.64 kg/m²     Physical Exam  LOWER EXTREMITY PHYSICAL EXAMINATION    ORTHOPEDIC:  No pain on palpation of the foot or ankle.   Range of motion within normal limits of the ankle joint, rearfoot, and forefoot.  Manual muscle strength testing is 5/5 with dorsiflexion, plantar flexion, abduction and abduction of the lower extremity.  No pain with or without resistance.  The patient is a full to ambulate without pain or discomfort.  The patient's gait is non antalgic.  The patient does not utilize any assistive device for ambulation.    VASCULAR:  Dorsalis pedis pulse on the right 2/4, on the left 2/4.  Posterior tibial pulse on the right 2/4, on the left 2/4.  Capillary refill intact less than 3 sec of bilateral lower extremities.  Pedal hair growth is present to the dorsal aspect of the foot and digits bilaterally.  No presence of edema to lower extremities.    NEUROLOGY: Protective sensation is intact with " Lucas Fritz monofilament. Proprioception is intact. Intact sensation to light touch. No neurological symptoms noted on palpation of interspace with radiating sensations proximally or distally. Tinel's and Valliex's sign is negative. No neurological symptoms with compression of metatarsal heads. No numbness or tingling reported on examination.     DERMATOLOGY: Skin is dry, scaly, intact. There is 2 the hyperkeratotic lesions to the right foot at the medial aspect of the hallux and the plantar aspect of 1st metatarsophalangeal joint. Upon removal/trimming of this thickened tissue there is no underlying wounds.  There is scaling and flaking of the plantar skin present to the right left foot at the heel and forefoot region.  There is no ulcerations.  Of the webspaces 1, 2, 3, 4 are clean, dry, intact bilateral. The nails on the right foot 1, 2, 5 and left foot 1 are thickened, discolored, dystrophic, elevated with subungual debris.  The remaining nails on the right foot 3, 4 and the left foot 2, 3, 4, 5 are elongated and of normal color, thickness, and texture.    Assessment:     1. Encounter for comprehensive diabetic foot examination, type 2 diabetes mellitus    2. Type 2 diabetes mellitus without complication, without long-term current use of insulin    3. Nail dystrophy        Plan:     Encounter for comprehensive diabetic foot examination, type 2 diabetes mellitus    Type 2 diabetes mellitus without complication, without long-term current use of insulin    Nail dystrophy  -     KOH prep       I counseled the patient on his/her Diabetic Mellitus regarding today's clinical examination and objection findings. We did also discuss recent medication changes, pertinent labs and imaging evaluations and other medical consultation notes and progress notes. Greater than 50% of this visit was spent on counseling and coordination of care. Greater than 20 minutes of this appt. was spent on education about the diabetic foot,  in relation to PVD and/or neuropathy, and the prevention of limb loss.     Shoe gear is inspected and wear and proper fit/type. Patient is reminded of the importance of good nutrition and blood sugar control to help prevent podiatric complications of diabetes. Patient instructed on proper foot hygeine. We discussed wearing proper shoe gear, daily foot inspections, never walking without protective shoe gear, never putting sharp instruments to feet.  Patient  will continue to monitor the areas daily, inspect feet, wear protective shoe gear when ambulatory, moisturizer to maintain skin integrity.     Patient's DMI/DMII is managed by Primary Care Provider and/or Endocrinology Advanced Practice Provider.    I did take a biopsy of the right hallux nail and sent for pathology and KOH stain to determine if there was fungal elements present in the nail.  We did discuss treatments for onychomycosis which includes oral, topical, and over-the-counter options.  Patient states she would like to wait and determine if there is fungus prior to starting any medication

## 2019-12-17 ENCOUNTER — TELEPHONE (OUTPATIENT)
Dept: PODIATRY | Facility: CLINIC | Age: 69
End: 2019-12-17

## 2019-12-17 LAB
ESTIMATED AVG GLUCOSE: 157 MG/DL (ref 68–131)
HBA1C MFR BLD HPLC: 7.1 % (ref 4–5.6)
KOH PREP SPEC: NORMAL

## 2019-12-17 NOTE — TELEPHONE ENCOUNTER
Call patient discussed results of nail biopsy taken in clinic on 12/16/2019.  Nail showing no signs of fungus or yeast elements.

## 2020-01-02 ENCOUNTER — CLINICAL SUPPORT (OUTPATIENT)
Dept: CARDIOLOGY | Facility: CLINIC | Age: 70
End: 2020-01-02
Payer: MEDICARE

## 2020-01-02 ENCOUNTER — OFFICE VISIT (OUTPATIENT)
Dept: CARDIOLOGY | Facility: CLINIC | Age: 70
End: 2020-01-02
Payer: MEDICARE

## 2020-01-02 VITALS
BODY MASS INDEX: 30.54 KG/M2 | SYSTOLIC BLOOD PRESSURE: 112 MMHG | HEART RATE: 75 BPM | DIASTOLIC BLOOD PRESSURE: 70 MMHG | WEIGHT: 172.38 LBS

## 2020-01-02 DIAGNOSIS — I25.10 CORONARY ARTERY DISEASE WITHOUT ANGINA PECTORIS, UNSPECIFIED VESSEL OR LESION TYPE, UNSPECIFIED WHETHER NATIVE OR TRANSPLANTED HEART: ICD-10-CM

## 2020-01-02 DIAGNOSIS — R00.2 PALPITATIONS: ICD-10-CM

## 2020-01-02 DIAGNOSIS — I70.0 ABDOMINAL AORTIC ATHEROSCLEROSIS: Chronic | ICD-10-CM

## 2020-01-02 DIAGNOSIS — I50.32 CHRONIC HEART FAILURE WITH PRESERVED EJECTION FRACTION: Chronic | ICD-10-CM

## 2020-01-02 DIAGNOSIS — I47.19 PAROXYSMAL ATRIAL TACHYCARDIA: Chronic | ICD-10-CM

## 2020-01-02 DIAGNOSIS — E11.9 TYPE 2 DIABETES MELLITUS WITHOUT COMPLICATION, WITHOUT LONG-TERM CURRENT USE OF INSULIN: ICD-10-CM

## 2020-01-02 DIAGNOSIS — I15.2 HYPERTENSION ASSOCIATED WITH DIABETES: Primary | Chronic | ICD-10-CM

## 2020-01-02 DIAGNOSIS — E66.09 CLASS 1 OBESITY DUE TO EXCESS CALORIES WITH SERIOUS COMORBIDITY AND BODY MASS INDEX (BMI) OF 30.0 TO 30.9 IN ADULT: Chronic | ICD-10-CM

## 2020-01-02 DIAGNOSIS — E11.59 HYPERTENSION ASSOCIATED WITH DIABETES: Primary | Chronic | ICD-10-CM

## 2020-01-02 DIAGNOSIS — E78.2 MIXED DIABETIC HYPERLIPIDEMIA ASSOCIATED WITH TYPE 2 DIABETES MELLITUS: Chronic | ICD-10-CM

## 2020-01-02 DIAGNOSIS — E11.69 MIXED DIABETIC HYPERLIPIDEMIA ASSOCIATED WITH TYPE 2 DIABETES MELLITUS: Chronic | ICD-10-CM

## 2020-01-02 DIAGNOSIS — I25.10 CORONARY ARTERY DISEASE INVOLVING NATIVE CORONARY ARTERY OF NATIVE HEART WITHOUT ANGINA PECTORIS: Chronic | ICD-10-CM

## 2020-01-02 PROCEDURE — 99214 OFFICE O/P EST MOD 30 MIN: CPT | Mod: S$PBB,,, | Performed by: INTERNAL MEDICINE

## 2020-01-02 PROCEDURE — 93005 ELECTROCARDIOGRAM TRACING: CPT | Mod: PBBFAC | Performed by: INTERNAL MEDICINE

## 2020-01-02 PROCEDURE — 93010 ELECTROCARDIOGRAM REPORT: CPT | Mod: S$PBB,,, | Performed by: INTERNAL MEDICINE

## 2020-01-02 PROCEDURE — 1159F PR MEDICATION LIST DOCUMENTED IN MEDICAL RECORD: ICD-10-PCS | Mod: ,,, | Performed by: INTERNAL MEDICINE

## 2020-01-02 PROCEDURE — 99999 PR PBB SHADOW E&M-EST. PATIENT-LVL III: ICD-10-PCS | Mod: PBBFAC,,, | Performed by: INTERNAL MEDICINE

## 2020-01-02 PROCEDURE — 99214 PR OFFICE/OUTPT VISIT, EST, LEVL IV, 30-39 MIN: ICD-10-PCS | Mod: S$PBB,,, | Performed by: INTERNAL MEDICINE

## 2020-01-02 PROCEDURE — 99213 OFFICE O/P EST LOW 20 MIN: CPT | Mod: PBBFAC | Performed by: INTERNAL MEDICINE

## 2020-01-02 PROCEDURE — 99999 PR PBB SHADOW E&M-EST. PATIENT-LVL III: CPT | Mod: PBBFAC,,, | Performed by: INTERNAL MEDICINE

## 2020-01-02 PROCEDURE — 1159F MED LIST DOCD IN RCRD: CPT | Mod: ,,, | Performed by: INTERNAL MEDICINE

## 2020-01-02 PROCEDURE — 93010 EKG 12-LEAD: ICD-10-PCS | Mod: S$PBB,,, | Performed by: INTERNAL MEDICINE

## 2020-01-02 NOTE — PROGRESS NOTES
Subjective:   Patient ID:  Venus Caldwell is a 69 y.o. female who presents for cardiac consult of Follow-up      HPI  The patient came in today for cardiac consult of Follow-up    Referring Physician: Sourav Hernandez MD   Reason for initial consult: HTN      Venus Caldwell is a 69 y.o. female with current medical conditions HFpEF, HTN, PSVT, pre-DM, OA, uterine CA, GERD, pre-DM presents to follow up CV eval.     19  Bp has improved with Norvasc 5 mg, recently started by Dr. Hernandez. Has been feeling more weak and fatigue, SBP was in upper 90s lately. She had LHC with minor artery blockage no stents placed, she thinks she had CP but strong FH of CAD. She does get palpitations, a few times a day, lasts one or two beats and she feels ok.   Prior cardiologist Dr. Hannah. Does snore, no prior sleep study. Has gained > 25 lbs in last 6 months, pre-DM.     19  ECHO with grade 1 DD otherwise normal BIV function. BP has been fluctuating at times. Is on digital HTN program. She has not had sleep study. NO recent heart monitor. Overall active.     19  Frequent SVT runs, doubled Toprol to 100 mg twice a day. Sleep study negative. She had two bottles of valsartan, given by Dr. Hannah but wanted to take it off due to recall. She still feels palpitations occasionally worse with talking at times, she did not have much palpitations on valsartan. Will refer to EP.     Patient feels no chest pain, no sob, no leg swelling, no PND,  no syncope, no CNS symptoms.    Patient has fairly good exercise tolerance.    Patient is compliant with medications.    ECG - NSR, inferior infarct    FH - father had MI in age 58, brother  from MI 58    TEST DESCRIPTION   1-CARDIO EVENT RECORDING FROM 19 TO 19    2-INDICATIONS- PALPITATIONS    CONCLUSIONS   Patient had a min HR of 54 bpm, max HR of 200 bpm, and avg HR of 78  bpm. Predominant underlying rhythm was Sinus Rhythm. 93  Supraventricular Tachycardia runs  occurred, the run with the fastest  interval lasting 12 mins 38 secs with a max rate of 200 bpm, the longest  lasting 44 mins 15 secs with an avg rate of 123 bpm. Supraventricular  Tachycardia was detected within +/- 45 seconds of symptomatic patient  event(s). Isolated SVEs were rare (<1.0%), SVE Couplets were rare  (<1.0%), and SVE Triplets were rare (<1.0%). Isolated VEs were rare  (<1.0%, 970), VE Couplets were rare (<1.0%, 12), and VE Triplets were  rare (<1.0%, 1).    This document has been electronically    SIGNED BY: Jarrett Hussein MD On: 09/16/2019 09:28    2d ECHO  05/11/2019  CONCLUSIONS     1 - Concentric hypertrophy.     2 - No wall motion abnormalities.     3 - Normal left ventricular systolic function (EF 60-65%).     4 - Impaired LV relaxation, normal LAP (grade 1 diastolic dysfunction).     5 - Normal right ventricular systolic function .     6 - The estimated PA systolic pressure is greater than 22 mmHg.       Past Medical History:   Diagnosis Date    Arthritis     Cancer, uterine     Diabetes mellitus     prediabetes    Diabetes mellitus, type 2     DM (diabetes mellitus) 08/2019    BS doesn't check 12/16/2019    Hypertension     Sciatica        Past Surgical History:   Procedure Laterality Date    CHOLECYSTECTOMY      COLONOSCOPY N/A 8/1/2018    Procedure: COLONOSCOPY;  Surgeon: Yrn Hunter III, MD;  Location: [facility] ENDO;  Service: Endoscopy;  Laterality: N/A;    HYSTERECTOMY      JOINT REPLACEMENT Right     hip replacement    SURGICAL EXCISION OF ANAL LESION N/A 8/29/2018    Procedure: EXCISION, LESION, ANUS;  Surgeon: Yrn Balderrama MD;  Location: [facility] OR;  Service: General;  Laterality: N/A;    TOTAL KNEE ARTHROPLASTY         Social History     Tobacco Use    Smoking status: Never Smoker    Smokeless tobacco: Never Used   Substance Use Topics    Alcohol use: No    Drug use: No       Family History   Problem Relation Age of Onset    Diabetes Mother     Cataracts  Mother     Diabetes Brother     Cataracts Maternal Grandmother     Breast cancer Maternal Aunt        Patient's Medications   New Prescriptions    No medications on file   Previous Medications    AMLODIPINE (NORVASC) 5 MG TABLET    Take 1 tablet (5 mg total) by mouth once daily.    ASPIRIN (ECOTRIN) 81 MG EC TABLET    Take 1 tablet (81 mg total) by mouth once daily.    ATORVASTATIN (LIPITOR) 40 MG TABLET    Take 1 tablet (40 mg total) by mouth every evening.    BETAMETHASONE DIPROPIONATE (DIPROLENE) 0.05 % CREAM    as needed.     CANDESARTAN (ATACAND) 16 MG TABLET    Take 1 tablet (16 mg total) by mouth once daily.    CETIRIZINE (ZYRTEC) 10 MG TABLET    Take 1 tablet by mouth Daily.    CHOLECALCIFEROL, VITAMIN D3, 5,000 UNIT TAB    Take 1,000 Units by mouth once daily.     FLUTICASONE (FLONASE) 50 MCG/ACTUATION NASAL SPRAY    1 spray by Each Nare route daily as needed for Rhinitis.    GABAPENTIN (NEURONTIN) 300 MG CAPSULE    Take 300 mg by mouth 3 (three) times daily. 300 mg TID    HYDROCODONE-ACETAMINOPHEN (NORCO)  MG PER TABLET    Take 1 tablet by mouth every 24 hours as needed.    MELOXICAM (MOBIC) 7.5 MG TABLET    Take 1 tablet (7.5 mg total) by mouth 2 (two) times daily.    METFORMIN (GLUCOPHAGE) 500 MG TABLET    Take 1 tablet (500 mg total) by mouth daily with breakfast.    METOPROLOL SUCCINATE (TOPROL-XL) 100 MG 24 HR TABLET    Take 1 tablet (100 mg total) by mouth 2 (two) times daily.    NORTRIPTYLINE (PAMELOR) 50 MG CAPSULE    Take 50 mg by mouth nightly.     NOZASEPTIN (NOZIN) NASAL     1 each by Each Nare route 2 (two) times daily.    OMEPRAZOLE (PRILOSEC) 20 MG CAPSULE    Take 20 mg by mouth once daily.    VALACYCLOVIR (VALTREX) 500 MG TABLET    Take 500 mg by mouth as needed.    Modified Medications    No medications on file   Discontinued Medications    No medications on file       Review of Systems   Constitutional: Positive for malaise/fatigue.   HENT: Negative.    Eyes: Negative.     Respiratory: Negative.    Cardiovascular: Positive for palpitations.   Gastrointestinal: Negative.    Genitourinary: Negative.    Musculoskeletal: Negative.    Skin: Negative.    Neurological: Negative.    Endo/Heme/Allergies: Negative.    Psychiatric/Behavioral: Negative.    All 12 systems otherwise negative.      Wt Readings from Last 3 Encounters:   01/02/20 78.2 kg (172 lb 6.4 oz)   12/16/19 78.4 kg (172 lb 15.2 oz)   12/16/19 78.2 kg (172 lb 6.4 oz)     Temp Readings from Last 3 Encounters:   12/16/19 98.1 °F (36.7 °C) (Oral)   08/13/19 98.8 °F (37.1 °C) (Tympanic)   03/18/19 98.7 °F (37.1 °C) (Tympanic)     BP Readings from Last 3 Encounters:   01/02/20 112/70   12/16/19 120/77   12/16/19 114/80     Pulse Readings from Last 3 Encounters:   01/02/20 75   12/16/19 65   12/16/19 72       /70 (BP Location: Right arm, Patient Position: Sitting, BP Method: Medium (Manual))   Pulse 75   Wt 78.2 kg (172 lb 6.4 oz)   BMI 30.54 kg/m²     Objective:   Physical Exam   Constitutional: She is oriented to person, place, and time. She appears well-developed and well-nourished. No distress.   HENT:   Head: Normocephalic and atraumatic.   Nose: Nose normal.   Mouth/Throat: Oropharynx is clear and moist.   Eyes: Conjunctivae and EOM are normal. No scleral icterus.   Neck: Normal range of motion. Neck supple. No JVD present. No thyromegaly present.   Cardiovascular: Normal rate, regular rhythm, S1 normal and S2 normal. Exam reveals no gallop, no S3, no S4 and no friction rub.   No murmur heard.  Pulmonary/Chest: Effort normal and breath sounds normal. No stridor. No respiratory distress. She has no wheezes. She has no rales. She exhibits no tenderness.   Abdominal: Soft. Bowel sounds are normal. She exhibits no distension and no mass. There is no tenderness. There is no rebound.   Genitourinary:   Genitourinary Comments: Deferred   Musculoskeletal: Normal range of motion. She exhibits no edema, tenderness or deformity.    Lymphadenopathy:     She has no cervical adenopathy.   Neurological: She is alert and oriented to person, place, and time. She exhibits normal muscle tone. Coordination normal.   Skin: Skin is warm and dry. No rash noted. She is not diaphoretic. No erythema. No pallor.   Psychiatric: She has a normal mood and affect. Her behavior is normal. Judgment and thought content normal.   Nursing note and vitals reviewed.      Lab Results   Component Value Date     08/13/2019    K 4.5 08/13/2019     08/13/2019    CO2 27 08/13/2019    BUN 21 08/13/2019    CREATININE 0.7 08/13/2019     (H) 08/13/2019    HGBA1C 7.1 (H) 12/16/2019    AST 17 08/13/2019    ALT 20 08/13/2019    ALBUMIN 3.6 08/13/2019    PROT 7.4 08/13/2019    BILITOT 0.4 08/13/2019    WBC 8.80 08/13/2019    HGB 11.2 (L) 08/13/2019    HCT 36.9 (L) 08/13/2019    MCV 92 08/13/2019     (H) 08/13/2019    INR 1.0 06/14/2018    TSH 0.586 08/13/2019    CHOL 140 08/13/2019    HDL 48 08/13/2019    LDLCALC 60.4 (L) 08/13/2019    TRIG 158 (H) 08/13/2019     Assessment:      1. Hypertension associated with diabetes    2. Mixed diabetic hyperlipidemia associated with type 2 diabetes mellitus    3. Coronary artery disease involving native coronary artery of native heart without angina pectoris    4. Paroxysmal atrial tachycardia    5. Abdominal aortic atherosclerosis    6. Chronic heart failure with preserved ejection fraction    7. Palpitations    8. Class 1 obesity due to excess calories with serious comorbidity and body mass index (BMI) of 30.0 to 30.9 in adult    9. Type 2 diabetes mellitus without complication, without long-term current use of insulin        Plan:   1. CAD, non obs with abd atherosclerosis   - no CP  - cont asa, statin, BB    2. HTN  - Bp well controlled  - monitor for low BP    3. Palpitations - intermittent with PSVT  - cont BB  - EP eval to discuss    4. Sleep apnea symptoms  - refered to sleep study - negative     5. HFpEF  -  low salt diet  - euvolemic     6. PreDM/obesity  - rec weight loss with diet/exercise     Thank you for allowing me to participate in this patient's care. Please do not hesitate to contact me with any questions or concerns. Consult note has been forwarded to the referral physician.

## 2020-01-13 NOTE — PROGRESS NOTES
"Digital Medicine: Health  Follow-Up    Reports she is well, but dealing with a "sinus problems" today. Verbalized plans to discuss further with PCP in needed.     Denies symptoms of hypertension, including HA, chest pains, SOB and changes in vision.    Discussed low BP reading yesterday, 89/56, reports she cannot remember if she felt symptoms. Patient not concerned at this time.     Stated no changes to lifestyle since last call. Verbalized plans to watch the [employer] game tonight with her .       The history is provided by the patient.     Follow Up  Follow-up reason(s): reading review and routine education          INTERVENTION(S)  recommended diet modifications, encouragement/support, denied further coaching, denied resources and denied questions    PLAN  patient verbalizes understanding, demonstrates understanding via teach back, patient amenable to changes and continue monitoring    Discuss exercise ideas next call.      There are no preventive care reminders to display for this patient.    Last 5 Patient Entered Readings                                      Current 30 Day Average: 121/75     Recent Readings 1/12/2020 1/11/2020 1/10/2020 1/9/2020 1/9/2020    SBP (mmHg) 89 137 126 119 112    DBP (mmHg) 56 80 83 73 70    Pulse 69 71 73 71 75                      Diet Screening   No change to diet.  She has the following dietary restrictions: low sodium diet    Declined to discuss problem.     Physical Activity Screening   No change to exercise routine.    When asked if exercising, patient responded: no    Reports she would like to lose weight this year. Reports she has an exercise bike at home, verbalized interest to start using this year.    Reports she cannot walk very far due to sciatica pain.    Reports she will "play" with several exercises, including walking in her neighborhood, to discuss next call.      Intervention(s): exercise ideas     Medication Adherence Screening   She misses doses: never  "       SDOH

## 2020-01-21 ENCOUNTER — OFFICE VISIT (OUTPATIENT)
Dept: INTERNAL MEDICINE | Facility: CLINIC | Age: 70
End: 2020-01-21
Payer: MEDICARE

## 2020-01-21 VITALS
DIASTOLIC BLOOD PRESSURE: 88 MMHG | BODY MASS INDEX: 30.54 KG/M2 | SYSTOLIC BLOOD PRESSURE: 148 MMHG | WEIGHT: 172.38 LBS | OXYGEN SATURATION: 89 % | HEART RATE: 113 BPM | TEMPERATURE: 102 F | HEIGHT: 63 IN

## 2020-01-21 DIAGNOSIS — R68.89 FLU-LIKE SYMPTOMS: Primary | ICD-10-CM

## 2020-01-21 LAB
DEPRECATED S PYO AG THROAT QL EIA: NEGATIVE
INFLUENZA A, MOLECULAR: NEGATIVE
INFLUENZA B, MOLECULAR: NEGATIVE
SPECIMEN SOURCE: NORMAL

## 2020-01-21 PROCEDURE — 1159F PR MEDICATION LIST DOCUMENTED IN MEDICAL RECORD: ICD-10-PCS | Mod: ,,, | Performed by: FAMILY MEDICINE

## 2020-01-21 PROCEDURE — 99999 PR PBB SHADOW E&M-EST. PATIENT-LVL III: CPT | Mod: PBBFAC,,, | Performed by: FAMILY MEDICINE

## 2020-01-21 PROCEDURE — 87502 INFLUENZA DNA AMP PROBE: CPT

## 2020-01-21 PROCEDURE — 87880 STREP A ASSAY W/OPTIC: CPT

## 2020-01-21 PROCEDURE — 1125F AMNT PAIN NOTED PAIN PRSNT: CPT | Mod: ,,, | Performed by: FAMILY MEDICINE

## 2020-01-21 PROCEDURE — 1125F PR PAIN SEVERITY QUANTIFIED, PAIN PRESENT: ICD-10-PCS | Mod: ,,, | Performed by: FAMILY MEDICINE

## 2020-01-21 PROCEDURE — 99214 PR OFFICE/OUTPT VISIT, EST, LEVL IV, 30-39 MIN: ICD-10-PCS | Mod: S$PBB,,, | Performed by: FAMILY MEDICINE

## 2020-01-21 PROCEDURE — 87081 CULTURE SCREEN ONLY: CPT

## 2020-01-21 PROCEDURE — 1159F MED LIST DOCD IN RCRD: CPT | Mod: ,,, | Performed by: FAMILY MEDICINE

## 2020-01-21 PROCEDURE — 99214 OFFICE O/P EST MOD 30 MIN: CPT | Mod: S$PBB,,, | Performed by: FAMILY MEDICINE

## 2020-01-21 PROCEDURE — 99999 PR PBB SHADOW E&M-EST. PATIENT-LVL III: ICD-10-PCS | Mod: PBBFAC,,, | Performed by: FAMILY MEDICINE

## 2020-01-21 PROCEDURE — 99213 OFFICE O/P EST LOW 20 MIN: CPT | Mod: PBBFAC | Performed by: FAMILY MEDICINE

## 2020-01-21 RX ORDER — PROMETHAZINE HYDROCHLORIDE AND DEXTROMETHORPHAN HYDROBROMIDE 6.25; 15 MG/5ML; MG/5ML
5 SYRUP ORAL EVERY 6 HOURS PRN
Qty: 180 ML | Refills: 0 | Status: SHIPPED | OUTPATIENT
Start: 2020-01-21 | End: 2020-02-13

## 2020-01-21 RX ORDER — OSELTAMIVIR PHOSPHATE 75 MG/1
75 CAPSULE ORAL 2 TIMES DAILY
Qty: 10 CAPSULE | Refills: 0 | Status: SHIPPED | OUTPATIENT
Start: 2020-01-21 | End: 2020-01-26

## 2020-01-21 RX ORDER — ACETAMINOPHEN 500 MG
1000 TABLET ORAL ONCE
Status: COMPLETED | OUTPATIENT
Start: 2020-01-21 | End: 2020-01-21

## 2020-01-21 RX ADMIN — Medication 1000 MG: at 03:01

## 2020-01-21 NOTE — PROGRESS NOTES
"Subjective:   Patient ID: Venus Caldwell is a 69 y.o. female.  Chief Complaint:  Fever; Cough; Sore Throat; and Generalized Body Aches      Patient with 24 hour symptoms flu like illness.    Had flu vaccine in October.    No known flu or strep exposure.    Influenza   This is a new problem. The current episode started yesterday. The problem occurs constantly. The problem has been gradually worsening. Associated symptoms include chills, congestion, coughing, diaphoresis, fatigue, a fever, headaches, myalgias, nausea, a sore throat and weakness. Pertinent negatives include no abdominal pain, anorexia, arthralgias, change in bowel habit, chest pain, joint swelling, neck pain, numbness, rash, swollen glands, urinary symptoms, vertigo, visual change or vomiting. Nothing aggravates the symptoms. She has tried nothing for the symptoms.      Review of Systems   Constitutional: Positive for chills, diaphoresis, fatigue and fever.   HENT: Positive for congestion, rhinorrhea and sore throat. Negative for ear discharge, ear pain, postnasal drip, sinus pressure, sinus pain, sneezing and trouble swallowing.    Eyes: Negative for visual disturbance.   Respiratory: Positive for cough. Negative for chest tightness, shortness of breath and wheezing.    Cardiovascular: Negative for chest pain.   Gastrointestinal: Positive for nausea. Negative for abdominal pain, anorexia, change in bowel habit and vomiting.   Genitourinary: Negative for difficulty urinating.   Musculoskeletal: Positive for myalgias. Negative for arthralgias, joint swelling, neck pain and neck stiffness.   Skin: Negative for rash.   Neurological: Positive for weakness and headaches. Negative for dizziness, vertigo and numbness.     Objective:   BP (!) 148/88 (BP Location: Left arm, Patient Position: Sitting, BP Method: Medium (Manual))   Pulse (!) 113   Temp (!) 101.7 °F (38.7 °C) (Tympanic)   Ht 5' 3" (1.6 m)   Wt 78.2 kg (172 lb 6.4 oz)   SpO2 (!) 89%   " BMI 30.54 kg/m²     Physical Exam   Constitutional: She appears well-developed and well-nourished.  Non-toxic appearance. She does not have a sickly appearance. She appears ill. No distress.   Febrile    HENT:   Right Ear: Hearing, tympanic membrane, external ear and ear canal normal.   Left Ear: Hearing, tympanic membrane, external ear and ear canal normal.   Nose: Mucosal edema and rhinorrhea present. Right sinus exhibits no maxillary sinus tenderness and no frontal sinus tenderness. Left sinus exhibits no maxillary sinus tenderness and no frontal sinus tenderness.   Mouth/Throat: Uvula is midline and mucous membranes are normal. Posterior oropharyngeal edema and posterior oropharyngeal erythema present. No oropharyngeal exudate.   Eyes: Right conjunctiva is not injected. Left conjunctiva is not injected.   Neck: Full passive range of motion without pain.   Cardiovascular: Regular rhythm, S1 normal, S2 normal and normal heart sounds. Tachycardia present.   Pulmonary/Chest: Effort normal. No accessory muscle usage. No tachypnea. No respiratory distress. She has no decreased breath sounds. She has no wheezes. She has rhonchi. She has no rales.   Abdominal: Soft. She exhibits no distension. There is no tenderness. There is no rebound, no guarding and no CVA tenderness.   Lymphadenopathy:     She has no cervical adenopathy.   Skin: No rash noted.   Psychiatric: She has a normal mood and affect.   Nursing note and vitals reviewed.    Assessment:       ICD-10-CM ICD-9-CM   1. Flu-like symptoms R68.89 780.99     Plan:   Flu-like symptoms  -     Influenza A & B by Molecular  -     Throat Screen, Rapid  -     acetaminophen tablet 1,000 mg  -     promethazine-dextromethorphan (PROMETHAZINE-DM) 6.25-15 mg/5 mL Syrp; Take 5 mLs by mouth every 6 (six) hours as needed (Cough).  Dispense: 180 mL; Refill: 0  -     oseltamivir (TAMIFLU) 75 MG capsule; Take 1 capsule (75 mg total) by mouth 2 (two) times daily. for 5 days  Dispense:  10 capsule; Refill: 0    Rapid strep testing negative.  Probable influenza based on clinical symptoms.    Significant other co morbidities.    Less than 24 hr duration.    Start Tamiflu 75 mg twice a day.    Phenergan DM for cough.    Tylenol in office for fever.    Stay hydrated.    Tylenol/Motrin for fever or myalgias  If any worsening despite above, advised to go to ER.    Otherwise should improve significantly next 40-72 hours.    Return to clinic as needed.

## 2020-01-24 LAB — BACTERIA THROAT CULT: NORMAL

## 2020-01-27 ENCOUNTER — PATIENT MESSAGE (OUTPATIENT)
Dept: INTERNAL MEDICINE | Facility: CLINIC | Age: 70
End: 2020-01-27

## 2020-01-28 ENCOUNTER — OFFICE VISIT (OUTPATIENT)
Dept: INTERNAL MEDICINE | Facility: CLINIC | Age: 70
End: 2020-01-28
Payer: MEDICARE

## 2020-01-28 VITALS
OXYGEN SATURATION: 97 % | DIASTOLIC BLOOD PRESSURE: 70 MMHG | TEMPERATURE: 98 F | BODY MASS INDEX: 30.97 KG/M2 | SYSTOLIC BLOOD PRESSURE: 118 MMHG | HEIGHT: 63 IN | WEIGHT: 174.81 LBS | HEART RATE: 73 BPM

## 2020-01-28 DIAGNOSIS — E78.2 MIXED DIABETIC HYPERLIPIDEMIA ASSOCIATED WITH TYPE 2 DIABETES MELLITUS: Primary | Chronic | ICD-10-CM

## 2020-01-28 DIAGNOSIS — E11.69 MIXED DIABETIC HYPERLIPIDEMIA ASSOCIATED WITH TYPE 2 DIABETES MELLITUS: Primary | Chronic | ICD-10-CM

## 2020-01-28 DIAGNOSIS — R05.9 COUGH: ICD-10-CM

## 2020-01-28 PROCEDURE — 99213 PR OFFICE/OUTPT VISIT, EST, LEVL III, 20-29 MIN: ICD-10-PCS | Mod: S$PBB,,, | Performed by: FAMILY MEDICINE

## 2020-01-28 PROCEDURE — 99999 PR PBB SHADOW E&M-EST. PATIENT-LVL V: CPT | Mod: PBBFAC,,, | Performed by: FAMILY MEDICINE

## 2020-01-28 PROCEDURE — 1126F AMNT PAIN NOTED NONE PRSNT: CPT | Mod: ,,, | Performed by: FAMILY MEDICINE

## 2020-01-28 PROCEDURE — 99215 OFFICE O/P EST HI 40 MIN: CPT | Mod: PBBFAC | Performed by: FAMILY MEDICINE

## 2020-01-28 PROCEDURE — 1159F PR MEDICATION LIST DOCUMENTED IN MEDICAL RECORD: ICD-10-PCS | Mod: ,,, | Performed by: FAMILY MEDICINE

## 2020-01-28 PROCEDURE — 99999 PR PBB SHADOW E&M-EST. PATIENT-LVL V: ICD-10-PCS | Mod: PBBFAC,,, | Performed by: FAMILY MEDICINE

## 2020-01-28 PROCEDURE — 1159F MED LIST DOCD IN RCRD: CPT | Mod: ,,, | Performed by: FAMILY MEDICINE

## 2020-01-28 PROCEDURE — 99213 OFFICE O/P EST LOW 20 MIN: CPT | Mod: S$PBB,,, | Performed by: FAMILY MEDICINE

## 2020-01-28 PROCEDURE — 96372 THER/PROPH/DIAG INJ SC/IM: CPT | Mod: PBBFAC

## 2020-01-28 PROCEDURE — 1126F PR PAIN SEVERITY QUANTIFIED, NO PAIN PRESENT: ICD-10-PCS | Mod: ,,, | Performed by: FAMILY MEDICINE

## 2020-01-28 RX ORDER — BETAMETHASONE SODIUM PHOSPHATE AND BETAMETHASONE ACETATE 3; 3 MG/ML; MG/ML
6 INJECTION, SUSPENSION INTRA-ARTICULAR; INTRALESIONAL; INTRAMUSCULAR; SOFT TISSUE
Status: COMPLETED | OUTPATIENT
Start: 2020-01-28 | End: 2020-01-28

## 2020-01-28 RX ORDER — BENZONATATE 200 MG/1
200 CAPSULE ORAL 3 TIMES DAILY PRN
Qty: 30 CAPSULE | Refills: 1 | Status: SHIPPED | OUTPATIENT
Start: 2020-01-28 | End: 2020-02-07

## 2020-01-28 RX ORDER — PREDNISONE 10 MG/1
TABLET ORAL
Qty: 15 TABLET | Refills: 0 | Status: SHIPPED | OUTPATIENT
Start: 2020-01-28 | End: 2020-02-13

## 2020-01-28 RX ADMIN — BETAMETHASONE ACETATE AND BETAMETHASONE SODIUM PHOSPHATE 6 MG: 3; 3 INJECTION, SUSPENSION INTRA-ARTICULAR; INTRALESIONAL; INTRAMUSCULAR; SOFT TISSUE at 04:01

## 2020-01-28 NOTE — PATIENT INSTRUCTIONS
Fluids, rest as needed    Tylenol twice a day as needed    Take prescription medications as directed    Return to PCP in 2-3 weeks if not resolving

## 2020-01-28 NOTE — PROGRESS NOTES
Venus Caldwell  01/28/2020  [id number]    Sourav Hernandez MD  Patient Care Team:  Sourav Hernandez MD as PCP - General (Family Medicine)  Sourav Hernandez MD as PCP - MSSP Attributed  Nash Hannah MD (Cardiology)  Gwen Burger MD as Consulting Physician (Physical Medicine and Rehabilitation)  Jerica Finch LPN as Care Coordinator  Sourav Hernandez MD as Hypertension Digital Medicine Responsible Provider (Family Medicine)  Shahrzad Domingo as Digital Medicine Health   Alka Du PharmD as Hypertension Digital Medicine Clinician  Medicare [employer] Program as Hypertension Digital Medicine Contract        Chief Complaint:  Chief Complaint   Patient presents with    Cough       History of Present Illness:   Venus Caldwell 70 y.o. female  treated for viral upper respiratory infection (flu test negative at the time) and symptoms improved except that severe cough has remained.  Mostly dry cough associated with low-grade fever at times and some fatigue.  Cough interferes with daily activities and sleep.  At no significant shortness of breath or chest pain except related to the cough itself.      History:  Past Medical History:   Diagnosis Date    Arthritis     Cancer, uterine     Diabetes mellitus     prediabetes    Diabetes mellitus, type 2     DM (diabetes mellitus) 08/2019    BS doesn't check 12/16/2019    Hypertension     Sciatica      Past Surgical History:   Procedure Laterality Date    CHOLECYSTECTOMY      COLONOSCOPY N/A 8/1/2018    Procedure: COLONOSCOPY;  Surgeon: Yrn Hunter III, MD;  Location: [facility];  Service: Endoscopy;  Laterality: N/A;    HYSTERECTOMY      JOINT REPLACEMENT Right     hip replacement    SURGICAL EXCISION OF ANAL LESION N/A 8/29/2018    Procedure: EXCISION, LESION, ANUS;  Surgeon: Yrn Balderrama MD;  Location: [facility] OR;  Service: General;  Laterality: N/A;    TOTAL KNEE ARTHROPLASTY       Family History   Problem Relation Age of Onset     Diabetes Mother     Cataracts Mother     Diabetes Brother     Cataracts Maternal Grandmother     Breast cancer Maternal Aunt      Social History     Socioeconomic History    Marital status:      Spouse name: Not on file    Number of children: Not on file    Years of education: Not on file    Highest education level: Not on file   Occupational History    Not on file   Social Needs    Financial resource strain: Not on file    Food insecurity:     Worry: Not on file     Inability: Not on file    Transportation needs:     Medical: Not on file     Non-medical: Not on file   Tobacco Use    Smoking status: Never Smoker    Smokeless tobacco: Never Used   Substance and Sexual Activity    Alcohol use: No    Drug use: No    Sexual activity: Not on file   Lifestyle    Physical activity:     Days per week: Not on file     Minutes per session: Not on file    Stress: Not on file   Relationships    Social connections:     Talks on phone: Not on file     Gets together: Not on file     Attends [employer] service: Not on file     Active member of club or organization: Not on file     Attends meetings of clubs or organizations: Not on file     Relationship status: Not on file   Other Topics Concern    Not on file   Social History Narrative    Not on file     Patient Active Problem List   Diagnosis    Hypertension associated with diabetes    Mixed diabetic hyperlipidemia associated with type 2 diabetes mellitus    Gastroesophageal reflux disease without esophagitis    Primary osteoarthritis involving multiple joints    Chronic non-seasonal allergic rhinitis    Coronary artery disease involving native coronary artery    Paroxysmal atrial tachycardia    Abdominal aortic atherosclerosis    Sciatica    Palpitations    (HFpEF) heart failure with preserved ejection fraction    NICKI (obstructive sleep apnea)    Class 1 obesity due to excess calories with serious comorbidity and body mass index (BMI) of  30.0 to 30.9 in adult    Encounter for comprehensive diabetic foot examination, type 2 diabetes mellitus    Type 2 diabetes mellitus without complication, without long-term current use of insulin    Nail dystrophy    Cough     Review of patient's allergies indicates:   Allergen Reactions    Sulfa (sulfonamide antibiotics) Anaphylaxis     Pt became hypoxic after taking sulfa drugs as a child      Buprenorphine Rash       The following were reviewed at this visit: active problem list, medication list, allergies, family history, social history, and health maintenance.    Medications:  Current Outpatient Medications on File Prior to Visit   Medication Sig Dispense Refill    amLODIPine (NORVASC) 5 MG tablet Take 1 tablet (5 mg total) by mouth once daily. 90 tablet 3    aspirin (ECOTRIN) 81 MG EC tablet Take 1 tablet (81 mg total) by mouth once daily.  0    betamethasone dipropionate (DIPROLENE) 0.05 % cream as needed.       candesartan (ATACAND) 16 MG tablet Take 1 tablet (16 mg total) by mouth once daily. 30 tablet 6    cetirizine (ZYRTEC) 10 MG tablet Take 1 tablet by mouth Daily.      cholecalciferol, vitamin D3, 5,000 unit Tab Take 1,000 Units by mouth once daily.       fluticasone (FLONASE) 50 mcg/actuation nasal spray 1 spray by Each Nare route daily as needed for Rhinitis.      gabapentin (NEURONTIN) 300 MG capsule Take 300 mg by mouth 3 (three) times daily. 300 mg TID      HYDROcodone-acetaminophen (NORCO)  mg per tablet Take 1 tablet by mouth every 24 hours as needed. 20 tablet 0    meloxicam (MOBIC) 7.5 MG tablet Take 1 tablet (7.5 mg total) by mouth 2 (two) times daily. 180 tablet 3    metFORMIN (GLUCOPHAGE) 500 MG tablet Take 1 tablet (500 mg total) by mouth daily with breakfast. 90 tablet 3    metoprolol succinate (TOPROL-XL) 100 MG 24 hr tablet Take 1 tablet (100 mg total) by mouth 2 (two) times daily. 60 tablet 3    nortriptyline (PAMELOR) 50 MG capsule Take 50 mg by mouth nightly.        omeprazole (PRILOSEC) 20 MG capsule Take 20 mg by mouth once daily.      promethazine-dextromethorphan (PROMETHAZINE-DM) 6.25-15 mg/5 mL Syrp Take 5 mLs by mouth every 6 (six) hours as needed (Cough). 180 mL 0    valACYclovir (VALTREX) 500 MG tablet Take 500 mg by mouth as needed.       atorvastatin (LIPITOR) 40 MG tablet Take 1 tablet (40 mg total) by mouth every evening. 90 tablet 3    nozaseptin (NOZIN) nasal  1 each by Each Nare route 2 (two) times daily. 1 applicator 0     Current Facility-Administered Medications on File Prior to Visit   Medication Dose Route Frequency Provider Last Rate Last Dose    lactated ringers infusion   Intravenous Continuous Elise Hammer MD   Stopped at 08/29/18 0852       Medications have been reviewed and reconciled with patient at this visit.      Exam:  Wt Readings from Last 3 Encounters:   01/28/20 79.3 kg (174 lb 13.2 oz)   01/21/20 78.2 kg (172 lb 6.4 oz)   01/02/20 78.2 kg (172 lb 6.4 oz)     Temp Readings from Last 3 Encounters:   01/28/20 98 °F (36.7 °C) (Tympanic)   01/21/20 (!) 101.7 °F (38.7 °C) (Tympanic)   12/16/19 98.1 °F (36.7 °C) (Oral)     BP Readings from Last 3 Encounters:   01/28/20 118/70   01/21/20 (!) 148/88   01/02/20 112/70     Pulse Readings from Last 3 Encounters:   01/28/20 73   01/21/20 (!) 113   01/02/20 75     Body mass index is 30.97 kg/m².      Review of Systems   Constitutional: Negative for chills, fever and weight loss.   Respiratory: Positive for cough. Negative for shortness of breath.    Cardiovascular: Negative for chest pain and palpitations.     Physical Exam  WNWD, A&O pleasant adult female ambulatory and in no acute distress but with very harsh strong dry cough.    HEENT-ear canals clear  Pharynx with mild postnasal drip  Neck supple  Heart regular rate and rhythm  Lungs clear  Psychiatric good affect cognition      Venus was seen today for cough.    Diagnoses and all orders for this visit:    Mixed diabetic  hyperlipidemia associated with type 2 diabetes mellitus    Cough    Other orders  -     betamethasone acetate-betamethasone sodium phosphate injection 6 mg        The patient verbalized good understanding of the medical issues discussed today and expressed appreciation for the care provided.  Patient was given the opportunity to ask questions and be an active participant in their medical care. Patient had no further questions or concerns at this time.   The patient was encouraged to participate in appropriate physical activity.    After visit summary was printed and given to patient upon discharge today.  Patient goals and care plan are included in After Visit Summary.    This note was produced with voice recognition software and may have sound a like errors

## 2020-01-30 ENCOUNTER — PATIENT OUTREACH (OUTPATIENT)
Dept: ADMINISTRATIVE | Facility: HOSPITAL | Age: 70
End: 2020-01-30

## 2020-02-03 ENCOUNTER — PATIENT OUTREACH (OUTPATIENT)
Dept: OTHER | Facility: OTHER | Age: 70
End: 2020-02-03

## 2020-02-03 DIAGNOSIS — J20.0 ACUTE BRONCHITIS DUE TO MYCOPLASMA PNEUMONIAE: Primary | ICD-10-CM

## 2020-02-03 RX ORDER — DOXYCYCLINE 100 MG/1
100 CAPSULE ORAL 2 TIMES DAILY
Qty: 20 CAPSULE | Refills: 0 | Status: SHIPPED | OUTPATIENT
Start: 2020-02-03 | End: 2020-02-20

## 2020-02-03 RX ORDER — ALBUTEROL SULFATE 90 UG/1
2 AEROSOL, METERED RESPIRATORY (INHALATION)
Qty: 18 G | Refills: 0 | Status: SHIPPED | OUTPATIENT
Start: 2020-02-03 | End: 2020-02-13 | Stop reason: SDUPTHER

## 2020-02-03 NOTE — PROGRESS NOTES
Called patient to address high BP alert on 2/1/2020. 1st attempt. No answer. Left message for call back

## 2020-02-04 NOTE — PROGRESS NOTES
Called patient to address high BP alert on 2/1/2020. 2nd attempt. No answer. Left message for call back

## 2020-02-06 NOTE — PROGRESS NOTES
Called patient to address high BP alert on 2/1/2020. Patient was currently unavailable. Left message for call back.     BP readings have since trended down.

## 2020-02-13 ENCOUNTER — OFFICE VISIT (OUTPATIENT)
Dept: INTERNAL MEDICINE | Facility: CLINIC | Age: 70
End: 2020-02-13
Payer: MEDICARE

## 2020-02-13 VITALS
BODY MASS INDEX: 31.45 KG/M2 | HEART RATE: 84 BPM | SYSTOLIC BLOOD PRESSURE: 132 MMHG | DIASTOLIC BLOOD PRESSURE: 80 MMHG | WEIGHT: 177.5 LBS | HEIGHT: 63 IN | RESPIRATION RATE: 16 BRPM | OXYGEN SATURATION: 98 % | TEMPERATURE: 98 F

## 2020-02-13 DIAGNOSIS — E11.9 TYPE 2 DIABETES MELLITUS WITHOUT COMPLICATION, WITHOUT LONG-TERM CURRENT USE OF INSULIN: ICD-10-CM

## 2020-02-13 DIAGNOSIS — J20.0 ACUTE BRONCHITIS DUE TO MYCOPLASMA PNEUMONIAE: Primary | ICD-10-CM

## 2020-02-13 DIAGNOSIS — R05.9 COUGH: ICD-10-CM

## 2020-02-13 PROCEDURE — 99999 PR PBB SHADOW E&M-EST. PATIENT-LVL III: ICD-10-PCS | Mod: PBBFAC,,, | Performed by: FAMILY MEDICINE

## 2020-02-13 PROCEDURE — 99214 OFFICE O/P EST MOD 30 MIN: CPT | Mod: S$PBB,,, | Performed by: FAMILY MEDICINE

## 2020-02-13 PROCEDURE — 99214 PR OFFICE/OUTPT VISIT, EST, LEVL IV, 30-39 MIN: ICD-10-PCS | Mod: S$PBB,,, | Performed by: FAMILY MEDICINE

## 2020-02-13 PROCEDURE — 99213 OFFICE O/P EST LOW 20 MIN: CPT | Mod: PBBFAC | Performed by: FAMILY MEDICINE

## 2020-02-13 PROCEDURE — 99999 PR PBB SHADOW E&M-EST. PATIENT-LVL III: CPT | Mod: PBBFAC,,, | Performed by: FAMILY MEDICINE

## 2020-02-13 RX ORDER — ALBUTEROL SULFATE 90 UG/1
2 AEROSOL, METERED RESPIRATORY (INHALATION)
Qty: 18 G | Refills: 0 | Status: SHIPPED | OUTPATIENT
Start: 2020-02-13 | End: 2020-03-11

## 2020-02-13 NOTE — PROGRESS NOTES
"Subjective:   Patient ID: Venus Caldwell is a 70 y.o. female.  Chief Complaint:  Follow-up      Patient presents follow-up on persistent cough.    Recently prescribed doxycycline , oral steroids, and albuterol for potential mycoplasma pneumonia.    Did not fill albuterol due to cost.    Completed steroids  Finishing course of doxycycline  Reports cough significantly improved/ almost resolved.    Additional history for allergic rhinitis on Zyrtec but not Flonase.  And GERD on low-dose PPI daily.  Follow-up visit December 2019 for chronic medical conditions.    All were stable.    No new complaints concerns today.      Review of Systems   Constitutional: Negative for chills, diaphoresis, fatigue and fever.   HENT: Negative for congestion, ear discharge, ear pain, postnasal drip, rhinorrhea, sinus pressure, sinus pain, sneezing, sore throat and trouble swallowing.    Eyes: Negative for visual disturbance.   Respiratory: Positive for cough. Negative for chest tightness, shortness of breath and wheezing.    Cardiovascular: Negative for chest pain.   Gastrointestinal: Negative for abdominal pain, nausea and vomiting.   Genitourinary: Negative for difficulty urinating.   Musculoskeletal: Positive for myalgias. Negative for arthralgias, joint swelling, neck pain and neck stiffness.   Skin: Negative for rash.   Neurological: Negative for dizziness, weakness, numbness and headaches.     Objective:   /80 (BP Location: Right arm, Patient Position: Sitting)   Pulse 84   Temp 98.3 °F (36.8 °C) (Oral)   Resp 16   Ht 5' 3" (1.6 m)   Wt 80.5 kg (177 lb 7.5 oz)   SpO2 98%   BMI 31.44 kg/m²     Physical Exam   Constitutional: She is oriented to person, place, and time. Vital signs are normal. She appears well-developed and well-nourished.   Neck: No JVD present. No thyroid mass and no thyromegaly present.   Cardiovascular: Normal rate, regular rhythm and normal heart sounds. Exam reveals no gallop and no friction " rub.   No murmur heard.  Pulses:       Radial pulses are 2+ on the right side, and 2+ on the left side.   Pulmonary/Chest: Effort normal and breath sounds normal. She has no wheezes. She has no rhonchi. She has no rales.   Abdominal: Soft. She exhibits no distension. There is no tenderness. There is no rebound, no guarding and no CVA tenderness.   Musculoskeletal: Normal range of motion. She exhibits no edema.   Neurological: She is oriented to person, place, and time. She displays a negative Romberg sign. Coordination and gait normal.   Skin: Skin is warm and dry. No rash noted.   Psychiatric: She has a normal mood and affect. Her behavior is normal. Judgment and thought content normal.   Nursing note and vitals reviewed.    Assessment:       ICD-10-CM ICD-9-CM   1. Acute bronchitis due to Mycoplasma pneumoniae J20.0 466.0     041.81   2. Cough R05 786.2   3. Type 2 diabetes mellitus without complication, without long-term current use of insulin E11.9 250.00     Plan:   Acute bronchitis due to Mycoplasma pneumoniae  Cough  -     albuterol (PROAIR HFA) 90 mcg/actuation inhaler; Inhale 2 puffs into the lungs every 4 to 6 hours as needed for Wheezing or Shortness of Breath (or Cough).  Dispense: 18 g; Refill: 0  Finish course of doxycycline.    Attempt to fill a butyrate all inhaler at the [place] pharmacy   If cough not resolved by next week, recommend:  Zyrtec daily.  Start Flonase daily.    Increase Prilosec 40 mg twice a day dosing  If no improvement with those changes, will need follow-up for pulmonary function testing to rule out any underlying COPD/asthma.      Type 2 diabetes mellitus without complication, without long-term current use of insulin  A1c 7.2%.  Stable  Continue metformin 500 mg daily.      Return to clinic 6 months or sooner as needed.

## 2020-02-17 ENCOUNTER — PATIENT OUTREACH (OUTPATIENT)
Dept: OTHER | Facility: OTHER | Age: 70
End: 2020-02-17

## 2020-02-17 NOTE — PROGRESS NOTES
Subjective:       Patient ID: Venus Caldwell is a 68 y.o. female.    Patient returns after an EUA and removal of a small cyst    Chief Complaint: Post-op Evaluation    She had some initial irritation of an anterior hemorrhoid but this is now resolved      Review of Systems   Gastrointestinal:        Hemorrhoidal irritation has resolved       Objective:      Physical Exam   Constitutional: She appears well-developed and well-nourished.   Genitourinary:   Genitourinary Comments: The perianal area was examined.  There is a small prolapsing internal hemorrhoid in the anterior aspect.  There is no other abnormality   Vitals reviewed.        FINAL PATHOLOGIC DIAGNOSIS  Rectal nodule:  Simple inclusion cyst.    Assessment:     doing well after removal of the small distal rectal inclusion cyst.  Asymptomatic hemorrhoids    Plan:       Follow up with surgery as needed     
No foreign body/The wound was explored to base in bloodless field.

## 2020-02-17 NOTE — PROGRESS NOTES
"Digital Medicine: Health  Follow-Up    Patient reports she has been going to bed late at night now and sleeping in lounge chair. Reports she has also had a cough for a while, already been to the doctor 3 times. Watching TV late at night. Brothers are also staying with her. Reports her  and brothers are heavy smokers. Reports she has had a cough for 4 weeks.     Reports she is making efforts to "get back on track".     The history is provided by the patient.     Follow Up  Follow-up reason(s): reading review and routine education      Readings are trending up due to cough, not sleeping well.        INTERVENTION(S)  recommended diet modifications, reviewed monitoring technique, encouragement/support, denied further coaching, denied resources and denied questions    PLAN  patient verbalizes understanding, demonstrates understanding via teach back, patient amenable to changes and continue monitoring      There are no preventive care reminders to display for this patient.    Last 5 Patient Entered Readings                                      Current 30 Day Average: 136/78     Recent Readings 2/16/2020 2/15/2020 2/14/2020 2/9/2020 2/8/2020    SBP (mmHg) 148 132 126 136 120    DBP (mmHg) 84 81 80 78 76    Pulse 78 78 80 77 70                      Diet Screening   Breakfast is typically between. Cheerios, oatmeal, toast, grits .  Lunch is typically between. Skips "sometimes".    Dinner is typically between. Spaghetti.    Patient reports eating or drinking the following: packaged/processed foods    Reports she has not been eating "too much" lately, endorses smaller portion sizes.    Physical Activity Screening   No change to exercise routine.        Declined to discuss today.     Medication Adherence Screening   She did not miss a dose this month.      SDOH  "

## 2020-02-25 ENCOUNTER — HOSPITAL ENCOUNTER (OUTPATIENT)
Dept: RADIOLOGY | Facility: HOSPITAL | Age: 70
Discharge: HOME | End: 2020-02-25
Attending: FAMILY MEDICINE
Payer: MEDICARE

## 2020-02-25 VITALS — BODY MASS INDEX: 31.45 KG/M2 | WEIGHT: 177.5 LBS | HEIGHT: 63 IN

## 2020-02-25 DIAGNOSIS — Z12.31 ENCOUNTER FOR SCREENING MAMMOGRAM FOR MALIGNANT NEOPLASM OF BREAST: ICD-10-CM

## 2020-02-25 PROCEDURE — 77067 SCR MAMMO BI INCL CAD: CPT | Mod: 26,,, | Performed by: RADIOLOGY

## 2020-02-25 PROCEDURE — 77063 BREAST TOMOSYNTHESIS BI: CPT | Mod: 26,,, | Performed by: RADIOLOGY

## 2020-02-25 PROCEDURE — 77063 MAMMO DIGITAL SCREENING BILAT WITH TOMOSYNTHESIS_CAD: ICD-10-PCS | Mod: 26,,, | Performed by: RADIOLOGY

## 2020-02-25 PROCEDURE — 77067 SCR MAMMO BI INCL CAD: CPT | Mod: TC

## 2020-02-25 PROCEDURE — 77067 MAMMO DIGITAL SCREENING BILAT WITH TOMOSYNTHESIS_CAD: ICD-10-PCS | Mod: 26,,, | Performed by: RADIOLOGY

## 2020-03-02 ENCOUNTER — PATIENT MESSAGE (OUTPATIENT)
Dept: INTERNAL MEDICINE | Facility: CLINIC | Age: 70
End: 2020-03-02

## 2020-03-02 DIAGNOSIS — I10 ESSENTIAL HYPERTENSION: Chronic | ICD-10-CM

## 2020-03-02 RX ORDER — METOPROLOL SUCCINATE 100 MG/1
100 TABLET, EXTENDED RELEASE ORAL 2 TIMES DAILY
Qty: 180 TABLET | Refills: 3 | Status: SHIPPED | OUTPATIENT
Start: 2020-03-02 | End: 2020-03-04 | Stop reason: SDUPTHER

## 2020-03-02 RX ORDER — METOPROLOL SUCCINATE 100 MG/1
100 TABLET, EXTENDED RELEASE ORAL 2 TIMES DAILY
Qty: 60 TABLET | Refills: 0 | Status: SHIPPED | OUTPATIENT
Start: 2020-03-02 | End: 2020-03-02 | Stop reason: SDUPTHER

## 2020-03-04 ENCOUNTER — PATIENT MESSAGE (OUTPATIENT)
Dept: CARDIOLOGY | Facility: CLINIC | Age: 70
End: 2020-03-04

## 2020-03-04 ENCOUNTER — NURSE TRIAGE (OUTPATIENT)
Dept: ADMINISTRATIVE | Facility: CLINIC | Age: 70
End: 2020-03-04

## 2020-03-04 DIAGNOSIS — I10 ESSENTIAL HYPERTENSION: Chronic | ICD-10-CM

## 2020-03-04 RX ORDER — METOPROLOL SUCCINATE 100 MG/1
100 TABLET, EXTENDED RELEASE ORAL 2 TIMES DAILY
Qty: 30 TABLET | Refills: 1 | Status: SHIPPED | OUTPATIENT
Start: 2020-03-04 | End: 2020-06-08 | Stop reason: SDUPTHER

## 2020-03-04 NOTE — TELEPHONE ENCOUNTER
Pt's  calling with pt at side, reports pt's last BP 93/70 and HR has been in the 150s sustained since this morning. Per triage protocol, advised that he dial 911 for the pt now. Advised that pt should elevate legs. He verbalized understanding.    Reason for Disposition   Shock suspected (e.g., cold/pale/clammy skin, too weak to stand, low BP, rapid pulse)    Additional Information   Negative: Passed out (i.e., fainted, collapsed and was not responding)    Protocols used: HEART RATE AND HEARTBEAT [id number]

## 2020-03-11 ENCOUNTER — PATIENT MESSAGE (OUTPATIENT)
Dept: PULMONOLOGY | Facility: CLINIC | Age: 70
End: 2020-03-11

## 2020-03-11 ENCOUNTER — OFFICE VISIT (OUTPATIENT)
Dept: INTERNAL MEDICINE | Facility: CLINIC | Age: 70
End: 2020-03-11
Payer: MEDICARE

## 2020-03-11 ENCOUNTER — CLINICAL SUPPORT (OUTPATIENT)
Dept: PULMONOLOGY | Facility: CLINIC | Age: 70
End: 2020-03-11
Payer: MEDICARE

## 2020-03-11 ENCOUNTER — HOSPITAL ENCOUNTER (OUTPATIENT)
Dept: RADIOLOGY | Facility: HOSPITAL | Age: 70
Discharge: HOME | End: 2020-03-11
Attending: FAMILY MEDICINE
Payer: MEDICARE

## 2020-03-11 VITALS
SYSTOLIC BLOOD PRESSURE: 118 MMHG | HEART RATE: 78 BPM | DIASTOLIC BLOOD PRESSURE: 82 MMHG | BODY MASS INDEX: 27.89 KG/M2 | RESPIRATION RATE: 16 BRPM | TEMPERATURE: 98 F | WEIGHT: 177.69 LBS | HEIGHT: 67 IN

## 2020-03-11 DIAGNOSIS — R94.2 ABNORMAL PFTS: ICD-10-CM

## 2020-03-11 DIAGNOSIS — J84.10 INTERSTITIAL PULMONARY FIBROSIS: Primary | ICD-10-CM

## 2020-03-11 DIAGNOSIS — R05.3 CHRONIC COUGH: ICD-10-CM

## 2020-03-11 DIAGNOSIS — R05.3 CHRONIC COUGH: Primary | ICD-10-CM

## 2020-03-11 LAB
BRPFT: ABNORMAL
DLCO ADJ PRE: 14.22 ML/(MIN*MMHG) (ref 17.37–28.83)
DLCO SINGLE BREATH LLN: 17.37
DLCO SINGLE BREATH PRE REF: 61.6 %
DLCO SINGLE BREATH REF: 23.1
DLCOC SBVA LLN: 2.94
DLCOC SBVA PRE REF: 93.2 %
DLCOC SBVA REF: 4.25
DLCOC SINGLE BREATH LLN: 17.37
DLCOC SINGLE BREATH PRE REF: 61.6 %
DLCOC SINGLE BREATH REF: 23.1
DLCOVA LLN: 2.94
DLCOVA PRE REF: 93.2 %
DLCOVA PRE: 3.96 ML/(MIN*MMHG*L) (ref 2.94–5.56)
DLCOVA REF: 4.25
DLVAADJ PRE: 3.96 ML/(MIN*MMHG*L) (ref 2.94–5.56)
ERV LLN: 0.68
ERV PRE REF: 7.3 %
ERV REF: 0.68
FEF 25 75 CHG: 2.8 %
FEF 25 75 LLN: 0.92
FEF 25 75 POST REF: 89.5 %
FEF 25 75 PRE REF: 87 %
FEF 25 75 REF: 1.99
FET100 CHG: -12.4 %
FEV1 CHG: 1.9 %
FEV1 FVC CHG: 1 %
FEV1 FVC LLN: 65
FEV1 FVC POST REF: 102.3 %
FEV1 FVC PRE REF: 101.3 %
FEV1 FVC REF: 78
FEV1 LLN: 1.77
FEV1 POST REF: 76.3 %
FEV1 PRE REF: 74.8 %
FEV1 REF: 2.42
FRCPLETH LLN: 2.06
FRCPLETH PREREF: 38.4 %
FRCPLETH REF: 2.88
FVC CHG: 1 %
FVC LLN: 2.3
FVC POST REF: 73.9 %
FVC PRE REF: 73.2 %
FVC REF: 3.15
IVC PRE: 2.1 L (ref 2.3–4)
IVC SINGLE BREATH LLN: 2.3
IVC SINGLE BREATH PRE REF: 66.6 %
IVC SINGLE BREATH REF: 3.15
MVV LLN: 80
MVV PRE REF: 82.5 %
MVV REF: 95
PEF CHG: -5.6 %
PEF LLN: 4.24
PEF POST REF: 79.5 %
PEF PRE REF: 84.3 %
PEF REF: 6.11
POST FEF 25 75: 1.78 L/S (ref 0.92–3.06)
POST FET 100: 7.94 SEC
POST FEV1 FVC: 79.5 % (ref 64.52–90.96)
POST FEV1: 1.85 L (ref 1.77–3.08)
POST FVC: 2.33 L (ref 2.3–4)
POST PEF: 4.86 L/S (ref 4.24–7.98)
PRE DLCO: 14.22 ML/(MIN*MMHG) (ref 17.37–28.83)
PRE ERV: 0.05 L (ref 0.68–0.68)
PRE FEF 25 75: 1.73 L/S (ref 0.92–3.06)
PRE FET 100: 9.06 SEC
PRE FEV1 FVC: 78.74 % (ref 64.52–90.96)
PRE FEV1: 1.81 L (ref 1.77–3.08)
PRE FRC PL: 1.11 L
PRE FVC: 2.31 L (ref 2.3–4)
PRE MVV: 78 L/MIN (ref 80.41–108.79)
PRE PEF: 5.15 L/S (ref 4.24–7.98)
PRE RV: 1 L (ref 1.62–2.77)
PRE TLC: 3.38 L (ref 4.44–6.42)
RAW LLN: 3.06
RAW PRE REF: 81.8 %
RAW PRE: 2.5 CMH2O*S/L (ref 3.06–3.06)
RAW REF: 3.06
RV LLN: 1.62
RV PRE REF: 45.7 %
RV REF: 2.2
RVTLC LLN: 33
RVTLC PRE REF: 69.5 %
RVTLC PRE: 29.71 % (ref 33.17–52.35)
RVTLC REF: 43
TLC LLN: 4.44
TLC PRE REF: 62.2 %
TLC REF: 5.43
VA PRE: 3.6 L (ref 5.28–5.28)
VA SINGLE BREATH LLN: 5.28
VA SINGLE BREATH PRE REF: 68.1 %
VA SINGLE BREATH REF: 5.28
VC LLN: 2.3
VC PRE REF: 75.4 %
VC PRE: 2.37 L (ref 2.3–4)
VC REF: 3.15
VTGRAWPRE: 2.48 L

## 2020-03-11 PROCEDURE — 94726 PLETHYSMOGRAPHY LUNG VOLUMES: CPT | Mod: 26,S$PBB,, | Performed by: INTERNAL MEDICINE

## 2020-03-11 PROCEDURE — 94729 DIFFUSING CAPACITY: CPT | Mod: PBBFAC

## 2020-03-11 PROCEDURE — 94726 PLETHYSMOGRAPHY LUNG VOLUMES: CPT | Mod: PBBFAC

## 2020-03-11 PROCEDURE — 71046 X-RAY EXAM CHEST 2 VIEWS: CPT | Mod: TC

## 2020-03-11 PROCEDURE — 71046 X-RAY EXAM CHEST 2 VIEWS: CPT | Mod: 26,,, | Performed by: RADIOLOGY

## 2020-03-11 PROCEDURE — 94729 DIFFUSING CAPACITY: CPT | Mod: 26,S$PBB,, | Performed by: INTERNAL MEDICINE

## 2020-03-11 PROCEDURE — 94060 PR EVAL OF BRONCHOSPASM: ICD-10-PCS | Mod: 26,S$PBB,, | Performed by: INTERNAL MEDICINE

## 2020-03-11 PROCEDURE — 94729 PR C02/MEMBANE DIFFUSE CAPACITY: ICD-10-PCS | Mod: 26,S$PBB,, | Performed by: INTERNAL MEDICINE

## 2020-03-11 PROCEDURE — 99214 PR OFFICE/OUTPT VISIT, EST, LEVL IV, 30-39 MIN: ICD-10-PCS | Mod: S$PBB,,, | Performed by: FAMILY MEDICINE

## 2020-03-11 PROCEDURE — 99214 OFFICE O/P EST MOD 30 MIN: CPT | Mod: S$PBB,,, | Performed by: FAMILY MEDICINE

## 2020-03-11 PROCEDURE — 99999 PR PBB SHADOW E&M-EST. PATIENT-LVL III: CPT | Mod: PBBFAC,,, | Performed by: FAMILY MEDICINE

## 2020-03-11 PROCEDURE — 94060 EVALUATION OF WHEEZING: CPT | Mod: PBBFAC

## 2020-03-11 PROCEDURE — 99213 OFFICE O/P EST LOW 20 MIN: CPT | Mod: PBBFAC,25 | Performed by: FAMILY MEDICINE

## 2020-03-11 PROCEDURE — 99999 PR PBB SHADOW E&M-EST. PATIENT-LVL III: ICD-10-PCS | Mod: PBBFAC,,, | Performed by: FAMILY MEDICINE

## 2020-03-11 PROCEDURE — 71046 XR CHEST PA AND LATERAL: ICD-10-PCS | Mod: 26,,, | Performed by: RADIOLOGY

## 2020-03-11 PROCEDURE — 94060 EVALUATION OF WHEEZING: CPT | Mod: 26,S$PBB,, | Performed by: INTERNAL MEDICINE

## 2020-03-11 PROCEDURE — 94726 PULM FUNCT TST PLETHYSMOGRAP: ICD-10-PCS | Mod: 26,S$PBB,, | Performed by: INTERNAL MEDICINE

## 2020-03-11 NOTE — PROGRESS NOTES
"Subjective:   Patient ID: Venus Caldwell is a 70 y.o. female.  Chief Complaint:  Cough      Patient presents following chronic cough.    Now proximally 8 weeks duration from previous diagnosis/treatment for URI.  Still unable to fill albuterol inhaler 2 to cost  Did restart daily antihistamine, nasal steroid, and double dose PPI.    No improvement or change in cough.    Cough pattern is stable and not worse   No other constitutional symptoms with cough.    Previously told follow-up if persisted for chest x-ray and pulmonary function testing.    No known COPD  Known chronic allergic rhino sinusitis, but no known asthma.  Other than cough, doing well.      Review of Systems   Constitutional: Negative for chills, diaphoresis, fatigue and fever.   HENT: Negative for congestion, ear discharge, ear pain, postnasal drip, rhinorrhea, sinus pressure, sinus pain, sneezing, sore throat and trouble swallowing.    Eyes: Negative for visual disturbance.   Respiratory: Positive for cough. Negative for chest tightness, shortness of breath and wheezing.    Cardiovascular: Negative for chest pain.   Gastrointestinal: Negative for abdominal pain, nausea and vomiting.   Genitourinary: Negative for difficulty urinating.   Musculoskeletal: Negative for arthralgias, joint swelling, myalgias, neck pain and neck stiffness.   Skin: Negative for rash.   Neurological: Negative for dizziness, weakness, numbness and headaches.     Objective:   /82 (BP Location: Left arm, Patient Position: Sitting)   Pulse 78   Temp 98 °F (36.7 °C) (Oral)   Resp 16   Ht 5' 7" (1.702 m)   Wt 80.6 kg (177 lb 11.1 oz)   BMI 27.83 kg/m²     Physical Exam   Constitutional: She is oriented to person, place, and time. Vital signs are normal. She appears well-developed and well-nourished.   Neck: No JVD present. No thyroid mass and no thyromegaly present.   Cardiovascular: Normal rate, regular rhythm and normal heart sounds. Exam reveals no gallop and no " friction rub.   No murmur heard.  Pulses:       Radial pulses are 2+ on the right side, and 2+ on the left side.   Pulmonary/Chest: Effort normal and breath sounds normal. She has no wheezes. She has no rhonchi. She has no rales.   Abdominal: Soft. She exhibits no distension. There is no tenderness. There is no rebound, no guarding and no CVA tenderness.   Musculoskeletal: Normal range of motion. She exhibits no edema.   Neurological: She is oriented to person, place, and time. She displays a negative Romberg sign. Coordination and gait normal.   Skin: Skin is warm and dry. No rash noted.   Psychiatric: She has a normal mood and affect. Her behavior is normal. Judgment and thought content normal.   Nursing note and vitals reviewed.    Assessment:       ICD-10-CM ICD-9-CM   1. Chronic cough R05 786.2     Plan:   Chronic cough  -     Complete PFT with bronchodilator; Future; Expected date: 03/11/2020  -     X-Ray Chest PA And Lateral; Future; Expected date: 03/11/2020  -     ipratropium-albuteroL (COMBIVENT)  mcg/actuation inhaler; Inhale 1 puff into the lungs every 6 (six) hours as needed for Wheezing or Shortness of Breath (or Cough). Rescue  Dispense: 4 g; Refill: 0    Check chest x-ray and pulmonary function test   If positive for COPD / chronic bronchitis, start Spiriva or equivalent  Try and see if Combivent  Is better covered/ more affordable on her insurance     Follow-up all specialists scheduled.    Return to clinic August 2020 as scheduled   Return to clinic sooner if needed.

## 2020-03-13 NOTE — PROGRESS NOTES
Called patient for BP follow up. No answer. Left message for call back. Sent [employer] message.

## 2020-03-16 ENCOUNTER — PATIENT OUTREACH (OUTPATIENT)
Dept: OTHER | Facility: OTHER | Age: 70
End: 2020-03-16

## 2020-03-17 ENCOUNTER — TELEPHONE (OUTPATIENT)
Dept: INTERNAL MEDICINE | Facility: CLINIC | Age: 70
End: 2020-03-17

## 2020-03-17 NOTE — TELEPHONE ENCOUNTER
----- Message from Sourav Hernandez MD sent at 3/11/2020  1:48 PM CDT -----  Chest Xray is normal

## 2020-03-18 ENCOUNTER — TELEPHONE (OUTPATIENT)
Dept: INTERNAL MEDICINE | Facility: CLINIC | Age: 70
End: 2020-03-18

## 2020-03-18 DIAGNOSIS — I10 ESSENTIAL HYPERTENSION: Primary | ICD-10-CM

## 2020-04-01 ENCOUNTER — PATIENT MESSAGE (OUTPATIENT)
Dept: ADMINISTRATIVE | Facility: OTHER | Age: 70
End: 2020-04-01

## 2020-04-03 NOTE — PROGRESS NOTES
Digital Medicine: Health  Follow-Up    Patient reports she is well, no complaints. Denies symptoms of hypertension- HA, chest pains and SOB.     Discussed COVID-19 and importance of proper hand hygiene and social distancing. Reports she has had a deep cough, since the end of February. Denies other symptoms. Reports she has already been to the doctor to discuss cough. Reports her hands are so dry from washing her hands so much.     Waiting for glucometer to arrive in the mail.     The history is provided by the patient.     Follow Up  Follow-up reason(s): reading review and routine education      Readings are trending up       INTERVENTION(S)  recommended diet modifications, recommend physical activity, encouragement/support, denied further coaching, denied resources, denied support and denied questions    PLAN  patient verbalizes understanding, demonstrates understanding via teach back, patient amenable to changes and continue monitoring      There are no preventive care reminders to display for this patient.    Last 5 Patient Entered Readings                                      Current 30 Day Average: 127/76     Recent Readings 3/31/2020 3/30/2020 3/29/2020 3/28/2020 3/27/2020    SBP (mmHg) 136 115 142 150 113    DBP (mmHg) 76 69 78 81 68    Pulse 68 67 69 71 71                      Diet Screening   Patient reports eating or drinking the following: water and pretzels She has the following dietary restrictions: low fat diet and low sodium diet    Reports she goes to [employer] 1/week, taking extra precautions when shopping. Reports she just joined the [employer] diabetes program, ordered glucometer. Reports she and her  are working to limit fat and sodium intake.     Reports she and her  are making efforts to increase water intake to 3 bottles per day.     Physical Activity Screening   When asked if exercising, patient responded: no    Staying home, watching [employer].     Medication Adherence  Screening   She did not miss a dose this month.      SDOH

## 2020-04-09 NOTE — PROGRESS NOTES
Called patient for BP follow up. No answer. Left message for call back    Alka Du, Pharm.D.  Digital Medicine Clinical Pharmacist  [phone]

## 2020-04-23 NOTE — PROGRESS NOTES
Digital Medicine: Clinician Follow-Up    Called patient for BP follow up. Patient says that she has noticed her ankles are a little swollen for the past couple of weeks. She says she did eat a little more salt than usual. She is taking amlodipine but this is not anew medication. Otherwise, she is doing well with no complaints. She notes that BP is sometimes a little high. She sometimes takes BP reading before taking medications.         Follow Up  Follow-up reason(s): reading review and routine education      Routine Education Topics: eating patterns  Patient's 30-day BP average is above goal of <130/<80 mmHg.       INTERVENTION(S)  reviewed monitoring technique    PLAN  patient verbalizes understanding and continue monitoring    Continue current medications.     Take BP at least an hour after taking medications.     Low sodium diet. Monitor CARLTON. Discuss with PCP if worsens or fails to improve      There are no preventive care reminders to display for this patient.    Last 5 Patient Entered Readings                                      Current 30 Day Average: 131/75     Recent Readings 4/23/2020 4/22/2020 4/22/2020 4/17/2020 4/14/2020    SBP (mmHg) 128 147 137 120 119    DBP (mmHg) 64 76 71 73 74    Pulse 77 74 73 79 69             Hypertension Medications     amLODIPine (NORVASC) 5 MG tablet Take 1 tablet (5 mg total) by mouth once daily.    candesartan (ATACAND) 16 MG tablet Take 1 tablet (16 mg total) by mouth once daily.    metoprolol succinate (TOPROL-XL) 100 MG 24 hr tablet Take 1 tablet (100 mg total) by mouth 2 (two) times daily.                 Screenings

## 2020-04-24 ENCOUNTER — PATIENT OUTREACH (OUTPATIENT)
Dept: OTHER | Facility: OTHER | Age: 70
End: 2020-04-24

## 2020-05-18 ENCOUNTER — OFFICE VISIT (OUTPATIENT)
Dept: INTERNAL MEDICINE | Facility: CLINIC | Age: 70
End: 2020-05-18
Payer: MEDICARE

## 2020-05-18 VITALS
DIASTOLIC BLOOD PRESSURE: 84 MMHG | RESPIRATION RATE: 16 BRPM | HEIGHT: 63 IN | TEMPERATURE: 97 F | OXYGEN SATURATION: 95 % | HEART RATE: 84 BPM | BODY MASS INDEX: 31.05 KG/M2 | SYSTOLIC BLOOD PRESSURE: 108 MMHG | WEIGHT: 175.25 LBS

## 2020-05-18 DIAGNOSIS — J84.10 INTERSTITIAL PULMONARY FIBROSIS: Primary | ICD-10-CM

## 2020-05-18 DIAGNOSIS — R05.3 CHRONIC COUGH: ICD-10-CM

## 2020-05-18 DIAGNOSIS — R94.2 ABNORMAL PFTS: ICD-10-CM

## 2020-05-18 PROCEDURE — 99999 PR PBB SHADOW E&M-EST. PATIENT-LVL IV: CPT | Mod: PBBFAC,,, | Performed by: FAMILY MEDICINE

## 2020-05-18 PROCEDURE — 99999 PR PBB SHADOW E&M-EST. PATIENT-LVL IV: ICD-10-PCS | Mod: PBBFAC,,, | Performed by: FAMILY MEDICINE

## 2020-05-18 PROCEDURE — 99214 OFFICE O/P EST MOD 30 MIN: CPT | Mod: S$PBB,,, | Performed by: FAMILY MEDICINE

## 2020-05-18 PROCEDURE — 99214 OFFICE O/P EST MOD 30 MIN: CPT | Mod: PBBFAC | Performed by: FAMILY MEDICINE

## 2020-05-18 PROCEDURE — 99214 PR OFFICE/OUTPT VISIT, EST, LEVL IV, 30-39 MIN: ICD-10-PCS | Mod: S$PBB,,, | Performed by: FAMILY MEDICINE

## 2020-05-18 NOTE — PROGRESS NOTES
Subjective:   Patient ID: Venus Caldwell is a 70 y.o. female.  Chief Complaint:  Follow-up (cough)      Patient presents following chronic cough.    Last seen 3/2020  Cough > 12 weeks  Could not fill l albuterol inhaler due to cost  Previously failed daily antihistamine, nasal steroid, and double dose PPI.    Chest x-ray ordered was normal   Pulmonary function test abnormal and consistent with interstitial pulmonary fibrosis   Based on abnormalities recommend she see a pulmonologist.     Referral ordered.   Patient missed scheduled visit.  Cough pattern remains stable and not worse   May be slightly helped by Combivent inhaler.    No other constitutional symptoms with cough.       Current Outpatient Medications:     amLODIPine (NORVASC) 5 MG tablet, Take 1 tablet (5 mg total) by mouth once daily., Disp: 90 tablet, Rfl: 3    aspirin (ECOTRIN) 81 MG EC tablet, Take 1 tablet (81 mg total) by mouth once daily., Disp: , Rfl: 0    atorvastatin (LIPITOR) 40 MG tablet, Take 1 tablet (40 mg total) by mouth every evening., Disp: 90 tablet, Rfl: 3    betamethasone dipropionate (DIPROLENE) 0.05 % cream, as needed. , Disp: , Rfl:     candesartan (ATACAND) 16 MG tablet, Take 1 tablet (16 mg total) by mouth once daily., Disp: 30 tablet, Rfl: 6    cetirizine (ZYRTEC) 10 MG tablet, Take 1 tablet by mouth Daily., Disp: , Rfl:     cholecalciferol, vitamin D3, 5,000 unit Tab, Take 1,000 Units by mouth once daily. , Disp: , Rfl:     fluticasone (FLONASE) 50 mcg/actuation nasal spray, 1 spray by Each Nare route daily as needed for Rhinitis., Disp: , Rfl:     gabapentin (NEURONTIN) 300 MG capsule, Take 300 mg by mouth 3 (three) times daily. 300 mg TID, Disp: , Rfl:     HYDROcodone-acetaminophen (NORCO)  mg per tablet, Take 1 tablet by mouth every 24 hours as needed., Disp: 20 tablet, Rfl: 0    ipratropium-albuteroL (COMBIVENT)  mcg/actuation inhaler, Inhale 1 puff into the lungs every 6 (six) hours as needed  "for Wheezing or Shortness of Breath (or Cough). Rescue, Disp: 4 g, Rfl: 0    meloxicam (MOBIC) 7.5 MG tablet, Take 1 tablet (7.5 mg total) by mouth 2 (two) times daily., Disp: 180 tablet, Rfl: 3    metFORMIN (GLUCOPHAGE) 500 MG tablet, Take 1 tablet (500 mg total) by mouth daily with breakfast., Disp: 90 tablet, Rfl: 3    metoprolol succinate (TOPROL-XL) 100 MG 24 hr tablet, Take 1 tablet (100 mg total) by mouth 2 (two) times daily., Disp: 30 tablet, Rfl: 1    nortriptyline (PAMELOR) 50 MG capsule, Take 50 mg by mouth nightly. , Disp: , Rfl:     omeprazole (PRILOSEC) 20 MG capsule, Take 20 mg by mouth once daily., Disp: , Rfl:     valACYclovir (VALTREX) 500 MG tablet, Take 500 mg by mouth as needed. , Disp: , Rfl:   No current facility-administered medications for this visit.     Facility-Administered Medications Ordered in Other Visits:     lactated ringers infusion, , Intravenous, Continuous, Elise Hammer MD, Stopped at 08/29/18 0852    Review of Systems   Constitutional: Negative for chills, diaphoresis, fatigue and fever.   HENT: Negative for congestion, ear discharge, ear pain, postnasal drip, rhinorrhea, sinus pressure, sinus pain, sneezing, sore throat and trouble swallowing.    Eyes: Negative for visual disturbance.   Respiratory: Positive for cough. Negative for chest tightness, shortness of breath and wheezing.    Cardiovascular: Negative for chest pain.   Gastrointestinal: Negative for abdominal pain, nausea and vomiting.   Genitourinary: Negative for difficulty urinating.   Musculoskeletal: Negative for arthralgias, joint swelling, myalgias, neck pain and neck stiffness.   Skin: Negative for rash.   Neurological: Negative for dizziness, weakness, numbness and headaches.     Objective:   /84 (BP Location: Left arm, Patient Position: Sitting, BP Method: Medium (Manual))   Pulse 84   Temp 97.3 °F (36.3 °C) (Tympanic)   Resp 16   Ht 5' 3" (1.6 m)   Wt 79.5 kg (175 lb 4.3 oz)   " SpO2 95%   BMI 31.05 kg/m²     Physical Exam   Constitutional: She is oriented to person, place, and time. Vital signs are normal. She appears well-developed and well-nourished.   Neck: No JVD present. No thyroid mass and no thyromegaly present.   Cardiovascular: Normal rate, regular rhythm and normal heart sounds. Exam reveals no gallop and no friction rub.   No murmur heard.  Pulses:       Radial pulses are 2+ on the right side, and 2+ on the left side.   Pulmonary/Chest: Effort normal and breath sounds normal. She has no wheezes. She has no rhonchi. She has no rales.   Abdominal: Soft. She exhibits no distension. There is no tenderness. There is no rebound, no guarding and no CVA tenderness.   Musculoskeletal: Normal range of motion. She exhibits no edema.   Neurological: She is oriented to person, place, and time. She displays a negative Romberg sign. Coordination and gait normal.   Skin: Skin is warm and dry. No rash noted.   Psychiatric: She has a normal mood and affect. Her behavior is normal. Judgment and thought content normal.   Nursing note and vitals reviewed.    Assessment:       ICD-10-CM ICD-9-CM   1. Interstitial pulmonary fibrosis J84.10 515   2. Abnormal PFTs R94.2 794.2   3. Chronic cough R05 786.2     Plan:   Interstitial pulmonary fibrosis  Abnormal PFTs  Chronic cough  -     Ambulatory referral/consult to Pulmonology; Future; Expected date: 05/25/2020    Review previous chest x-ray and PFT findings with patient.    Recommend she see pulmonology for additional evaluation.    Patient agreeable.    Referral reordered.    For now may continue Combivent as needed.

## 2020-05-25 ENCOUNTER — OFFICE VISIT (OUTPATIENT)
Dept: CARDIOLOGY | Facility: CLINIC | Age: 70
End: 2020-05-25
Payer: MEDICARE

## 2020-05-25 DIAGNOSIS — E11.59 HYPERTENSION ASSOCIATED WITH DIABETES: Primary | Chronic | ICD-10-CM

## 2020-05-25 DIAGNOSIS — I47.10 SVT (SUPRAVENTRICULAR TACHYCARDIA): ICD-10-CM

## 2020-05-25 DIAGNOSIS — I15.2 HYPERTENSION ASSOCIATED WITH DIABETES: Primary | Chronic | ICD-10-CM

## 2020-05-25 PROCEDURE — 99205 PR OFFICE/OUTPT VISIT, NEW, LEVL V, 60-74 MIN: ICD-10-PCS | Mod: 95,,, | Performed by: INTERNAL MEDICINE

## 2020-05-25 PROCEDURE — 99205 OFFICE O/P NEW HI 60 MIN: CPT | Mod: 95,,, | Performed by: INTERNAL MEDICINE

## 2020-05-25 NOTE — LETTER
May 25, 2020      Maikol Garcia MD  [address] [facility]  [place] [address]           [facility]  [address] [place] [address] , 2ND FLOOR  [place] [address]  Phone: [phone]  Fax: [phone]          Patient: Venus Caldwell   MR Number: [id number]   YOB: 1950   Date of Visit: 5/25/2020       Dear Dr. Maikol Garcai:    Thank you for referring Venus Caldwell to me for evaluation. Attached you will find relevant portions of my assessment and plan of care.    If you have questions, please do not hesitate to call me. I look forward to following Venus Caldwell along with you.    Sincerely,    Ramírez Torres MD    Enclosure  CC:  No Recipients    If you would like to receive this communication electronically, please contact [email or web address] or [phone] to request more information on [employer] Link access.    For providers and/or their staff who would like to refer a patient to [facility], please contact us through our one-stop-shop provider referral line, [facility], at [phone].    If you feel you have received this communication in error or would no longer like to receive these types of communications, please e-mail [email or web address]

## 2020-05-25 NOTE — PROGRESS NOTES
Subjective:    Patient ID:  Venus Caldwell is a 70 y.o. female who presents for evaluation of SVT      The patient location is: Home  The chief complaint leading to consultation is: SVT  Visit type: Virtual visit with synchronous audio and video  Total time spent with patient: 15  Each patient to whom he or she provides medical services by telemedicine is:  (1) informed of the relationship between the physician and patient and the respective role of any other health care provider with respect to management of the patient; and (2) notified that he or she may decline to receive medical services by telemedicine and may withdraw from such care at any time.    Notes:   70 yoF DM, HTN referred for SVT. She has palpitations dating back several decades. Her symptoms were under control until the past few months. She had a Zio monitor 8/19 with 45m of SVT as well as several shorter episodes. She feels chest pain with episodes. These have become more worrisome for her. She is on metoprolol 100mg qd with recurrent symptoms. No syncope or near syncope. Normal EF. Episodes initiate with PAC and long AR interval followed by short RP tachycardia.     Zio 8/19:  Patient had a min HR of 54 bpm, max HR of 200 bpm, and avg HR of 78  bpm. Predominant underlying rhythm was Sinus Rhythm. 93  Supraventricular Tachycardia runs occurred, the run with the fastest  interval lasting 12 mins 38 secs with a max rate of 200 bpm, the longest  lasting 44 mins 15 secs with an avg rate of 123 bpm. Supraventricular  Tachycardia was detected within +/- 45 seconds of symptomatic patient  event(s). Isolated SVEs were rare (<1.0%), SVE Couplets were rare  (<1.0%), and SVE Triplets were rare (<1.0%). Isolated VEs were rare  (<1.0%, 970), VE Couplets were rare (<1.0%, 12), and VE Triplets were  rare (<1.0%, 1).    Echo 5/19:  CONCLUSIONS     1 - Concentric hypertrophy.     2 - No wall motion abnormalities.     3 - Normal left ventricular systolic  function (EF 60-65%).     4 - Impaired LV relaxation, normal LAP (grade 1 diastolic dysfunction).     5 - Normal right ventricular systolic function .     6 - The estimated PA systolic pressure is greater than 22 mmHg.     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, uterine  No date: Diabetes mellitus      Comment:  prediabetes  No date: Diabetes mellitus, type 2  08/2019: DM (diabetes mellitus)      Comment:  BS doesn't check 12/16/2019  No date: Hypertension  No date: Ovarian cancer  No date: Sciatica    Past Surgical History:  No date: CHOLECYSTECTOMY  8/1/2018: COLONOSCOPY; N/A      Comment:  Procedure: COLONOSCOPY;  Surgeon: Yrn Hunter III, MD;  Location: [facility] ENDO;  Service: Endoscopy;                 Laterality: N/A;  No date: HYSTERECTOMY  No date: JOINT REPLACEMENT; Right      Comment:  hip replacement  8/29/2018: SURGICAL EXCISION OF ANAL LESION; N/A      Comment:  Procedure: EXCISION, LESION, ANUS;  Surgeon: Yrn Balderrama MD;  Location: [facility] OR;  Service: General;                 Laterality: N/A;  No date: TOTAL KNEE ARTHROPLASTY    Social History    Socioeconomic History      Marital status:       Spouse name: Not on file      Number of children: Not on file      Years of education: Not on file      Highest education level: Not on file    Occupational History      Not on file    Social Needs      Financial resource strain: Not on file      Food insecurity:        Worry: Not on file        Inability: Not on file      Transportation needs:        Medical: Not on file        Non-medical: Not on file    Tobacco Use      Smoking status: Never Smoker      Smokeless tobacco: Never Used    Substance and Sexual Activity      Alcohol use: No      Drug use: No      Sexual activity: Not on file    Lifestyle      Physical activity:        Days per week: Not on file        Minutes per session: Not on file      Stress: Not on file    Relationships       Social connections:        Talks on phone: Not on file        Gets together: Not on file        Attends Anglican service: Not on file        Active member of club or organization: Not on file        Attends meetings of clubs or organizations: Not on file        Relationship status: Not on file    Other Topics      Concerns:        Not on file    Social History Narrative      Not on file    Review of patient's family history indicates:  Problem: Diabetes      Relation: Mother          Age of Onset: (Not Specified)  Problem: Cataracts      Relation: Mother          Age of Onset: (Not Specified)  Problem: Diabetes      Relation: Brother          Age of Onset: (Not Specified)  Problem: Cataracts      Relation: Maternal Grandmother          Age of Onset: (Not Specified)  Problem: Breast cancer      Relation: Maternal Aunt          Age of Onset: (Not Specified)         Review of Systems   Constitution: Negative.   HENT: Negative.    Eyes: Negative.    Cardiovascular: Positive for palpitations. Negative for chest pain, dyspnea on exertion, leg swelling, near-syncope and syncope.   Respiratory: Negative.  Negative for shortness of breath.    Endocrine: Negative.    Hematologic/Lymphatic: Negative.    Skin: Negative.    Musculoskeletal: Negative.    Gastrointestinal: Negative.    Genitourinary: Negative.    Neurological: Negative.  Negative for dizziness and light-headedness.   Psychiatric/Behavioral: Negative.    Allergic/Immunologic: Negative.         Objective:    Physical Exam      Assessment:       1. Hypertension associated with diabetes    2. SVT (supraventricular tachycardia)         Plan:       70 yoF with recurrent, symptomatic SVT. I recommended EPS/RFA for definitive therapy. Extensive discussion regarding risks, benefits, and alternative approaches to the procedure was had with the patient.  The patient voices understanding and all questions have been answered.  The patient would like to proceed as planned.  Risks included but were not limited to vascular injury, cardiac perforation, conduction system damage leading to pacemaker implantation, MI, stroke.    SVT EPS/RFA  Anesthesia, MAC  VIOLET  Hold metoprolol 3 days prior

## 2020-05-28 ENCOUNTER — PATIENT OUTREACH (OUTPATIENT)
Dept: OTHER | Facility: OTHER | Age: 70
End: 2020-05-28

## 2020-06-04 RX ORDER — MELOXICAM 7.5 MG/1
7.5 TABLET ORAL 2 TIMES DAILY
Qty: 180 TABLET | Refills: 3 | Status: CANCELLED | OUTPATIENT
Start: 2020-06-04 | End: 2021-06-04

## 2020-06-08 DIAGNOSIS — I10 ESSENTIAL HYPERTENSION: Chronic | ICD-10-CM

## 2020-06-08 RX ORDER — METOPROLOL SUCCINATE 100 MG/1
100 TABLET, EXTENDED RELEASE ORAL 2 TIMES DAILY
Qty: 180 TABLET | Refills: 3 | Status: SHIPPED | OUTPATIENT
Start: 2020-06-08 | End: 2021-06-03 | Stop reason: SDUPTHER

## 2020-06-08 RX ORDER — MELOXICAM 7.5 MG/1
7.5 TABLET ORAL 2 TIMES DAILY
Qty: 180 TABLET | Refills: 3 | Status: SHIPPED | OUTPATIENT
Start: 2020-06-08 | End: 2020-12-09 | Stop reason: SDUPTHER

## 2020-06-14 ENCOUNTER — PATIENT OUTREACH (OUTPATIENT)
Dept: ADMINISTRATIVE | Facility: OTHER | Age: 70
End: 2020-06-14

## 2020-06-14 DIAGNOSIS — E11.9 TYPE 2 DIABETES MELLITUS WITHOUT COMPLICATION, WITHOUT LONG-TERM CURRENT USE OF INSULIN: Primary | Chronic | ICD-10-CM

## 2020-06-16 ENCOUNTER — LAB VISIT (OUTPATIENT)
Dept: LAB | Facility: HOSPITAL | Age: 70
End: 2020-06-16
Attending: INTERNAL MEDICINE
Payer: MEDICARE

## 2020-06-16 ENCOUNTER — TELEPHONE (OUTPATIENT)
Dept: ELECTROPHYSIOLOGY | Facility: CLINIC | Age: 70
End: 2020-06-16

## 2020-06-16 ENCOUNTER — OFFICE VISIT (OUTPATIENT)
Dept: PULMONOLOGY | Facility: CLINIC | Age: 70
End: 2020-06-16
Payer: MEDICARE

## 2020-06-16 VITALS
HEART RATE: 75 BPM | BODY MASS INDEX: 31.1 KG/M2 | HEIGHT: 63 IN | RESPIRATION RATE: 18 BRPM | DIASTOLIC BLOOD PRESSURE: 74 MMHG | WEIGHT: 175.5 LBS | OXYGEN SATURATION: 98 % | SYSTOLIC BLOOD PRESSURE: 112 MMHG

## 2020-06-16 DIAGNOSIS — R05.9 COUGH: ICD-10-CM

## 2020-06-16 DIAGNOSIS — J98.4 CRLD (CHRONIC RESTRICTIVE LUNG DISEASE): Primary | Chronic | ICD-10-CM

## 2020-06-16 DIAGNOSIS — G47.33 OSA (OBSTRUCTIVE SLEEP APNEA): Chronic | ICD-10-CM

## 2020-06-16 DIAGNOSIS — E66.09 CLASS 1 OBESITY DUE TO EXCESS CALORIES WITH SERIOUS COMORBIDITY AND BODY MASS INDEX (BMI) OF 30.0 TO 30.9 IN ADULT: Chronic | ICD-10-CM

## 2020-06-16 DIAGNOSIS — J30.89 CHRONIC NON-SEASONAL ALLERGIC RHINITIS: Chronic | ICD-10-CM

## 2020-06-16 DIAGNOSIS — R05.3 CHRONIC COUGH: Chronic | ICD-10-CM

## 2020-06-16 DIAGNOSIS — K21.9 GASTROESOPHAGEAL REFLUX DISEASE WITHOUT ESOPHAGITIS: Chronic | ICD-10-CM

## 2020-06-16 LAB
CRP SERPL-MCNC: 7.5 MG/L (ref 0–8.2)
ERYTHROCYTE [SEDIMENTATION RATE] IN BLOOD BY WESTERGREN METHOD: 33 MM/HR (ref 0–20)

## 2020-06-16 PROCEDURE — 86255 FLUORESCENT ANTIBODY SCREEN: CPT | Mod: 91

## 2020-06-16 PROCEDURE — 99214 PR OFFICE/OUTPT VISIT, EST, LEVL IV, 30-39 MIN: ICD-10-PCS | Mod: S$PBB,,, | Performed by: INTERNAL MEDICINE

## 2020-06-16 PROCEDURE — 99999 PR PBB SHADOW E&M-EST. PATIENT-LVL V: CPT | Mod: PBBFAC,,, | Performed by: INTERNAL MEDICINE

## 2020-06-16 PROCEDURE — 36415 COLL VENOUS BLD VENIPUNCTURE: CPT

## 2020-06-16 PROCEDURE — 99215 OFFICE O/P EST HI 40 MIN: CPT | Mod: PBBFAC | Performed by: INTERNAL MEDICINE

## 2020-06-16 PROCEDURE — 86038 ANTINUCLEAR ANTIBODIES: CPT

## 2020-06-16 PROCEDURE — 99214 OFFICE O/P EST MOD 30 MIN: CPT | Mod: S$PBB,,, | Performed by: INTERNAL MEDICINE

## 2020-06-16 PROCEDURE — 86140 C-REACTIVE PROTEIN: CPT

## 2020-06-16 PROCEDURE — 99999 PR PBB SHADOW E&M-EST. PATIENT-LVL V: ICD-10-PCS | Mod: PBBFAC,,, | Performed by: INTERNAL MEDICINE

## 2020-06-16 PROCEDURE — 85651 RBC SED RATE NONAUTOMATED: CPT

## 2020-06-16 RX ORDER — LEVOCETIRIZINE DIHYDROCHLORIDE 5 MG/1
5 TABLET, FILM COATED ORAL NIGHTLY
Qty: 30 TABLET | Refills: 11 | Status: SHIPPED | OUTPATIENT
Start: 2020-06-16 | End: 2021-08-16

## 2020-06-16 NOTE — ASSESSMENT & PLAN NOTE
Etiology unclear.  Multifactorial etiology suspected.  Likely contributors to etiology ( checked)      RESPIRATORY:  [] COPD    []  Asthma  [] Parenchymal lung disease (eg.Bronchiectasis, chroninc bronchitis, CTD - ILD)  [] Pulmonary vasculitis  [] Pneumoconiosis ( Asbestosis, Silicosis, [place] [facility])  [] CTD - ILD  [] Pleural Effusion  [] Lung Tumors  [] Pulmonary Fibrosis.  ENT:  [x] Allergic Rhinitis  [] Sinusitis  [] Laryngitis  [] History of sinus disease / rhinitis  CARDIAC:  [] CHF  [] Valvular heart disease  [] Coronary heart disease  GI:  [] Heart burn / dyspepsia  [x] History of GERD  MEDICATIONS:  [] ACE inhibitor.  [] Beta Blockers  [] Nitrofurantoin    EVALUATION:  [x] JUAN  [x] Sedimentation rate  [x] C-reactive protein  [x] Anti-neutrophilic cytoplasmic antibody  [x] CT scan of chest.  [] 2D ECHO  [] PFT  [] 6MWT      PLAN:  [x] Antihistmines per medication orders.  [] Antibiotics per medication orders.  [] Antitussives per medication orders.  [x] Avoid exposure to tobacco smoke and fumes.      Call for blood in sputum, change in character of cough, development of fever or chills, inability to maintain nutrition and hydration.

## 2020-06-16 NOTE — PATIENT INSTRUCTIONS
Lung Anatomy  Your lungs take air in to give your body oxygen, which the body needs to work. Your lungs, like all the tissues in your body, are made up of billions of tiny specialized cells. Old lung cells die and are replaced by new, identical lung cells. This natural process helps ensure healthy lungs.    Date Last Reviewed: 11/1/2016  © 8753-3866 [employer]. [address] [place] [address], [place] [address]. All rights reserved. This information is not intended as a substitute for professional medical care. Always follow your healthcare professional's instructions.

## 2020-06-16 NOTE — LETTER
June 16, 2020      Sourav Hernandez MD  [address]  [place] [address]           [facility]  [address] [place] [id number]  Phone: [phone]  Fax: [phone]          Patient: Venus Caldwell   MR Number: [id number]   YOB: 1950   Date of Visit: 6/16/2020       Dear Dr. Sourav Hernandez:    Thank you for referring Venus Caldwell to me for evaluation. Attached you will find relevant portions of my assessment and plan of care.    If you have questions, please do not hesitate to call me. I look forward to following Venus Caldwell along with you.    Sincerely,    Darien Farrell MD    Enclosure  CC:  No Recipients    If you would like to receive this communication electronically, please contact [email or web address] or [phone] to request more information on [employer] Link access.    For providers and/or their staff who would like to refer a patient to [facility], please contact us through our one-stop-shop provider referral line, [facility], at [phone].    If you feel you have received this communication in error or would no longer like to receive these types of communications, please e-mail [email or web address]

## 2020-06-16 NOTE — PROGRESS NOTES
Subjective:      Patient ID: Venus Caldwell is a 70 y.o. female.    Patient Active Problem List   Diagnosis    Hypertension associated with diabetes    Mixed diabetic hyperlipidemia associated with type 2 diabetes mellitus    Gastroesophageal reflux disease without esophagitis    Primary osteoarthritis involving multiple joints    Chronic non-seasonal allergic rhinitis    Coronary artery disease involving native coronary artery    Paroxysmal atrial tachycardia    Abdominal aortic atherosclerosis    Sciatica    Palpitations    (HFpEF) heart failure with preserved ejection fraction    NICKI (obstructive sleep apnea)    Class 1 obesity due to excess calories with serious comorbidity and body mass index (BMI) of 30.0 to 30.9 in adult    Encounter for comprehensive diabetic foot examination, type 2 diabetes mellitus    Type 2 diabetes mellitus without complication, without long-term current use of insulin    Nail dystrophy    Chronic cough    SVT (supraventricular tachycardia)    CRLD (chronic restrictive lung disease)       Problem list has been reviewed.    Chief Complaint: Sleep Apnea      HPI:    PSG: 10/10/19: There were a total of 61 respiratory disturbances out of which 6 were apneas ( 2 obstructive, 0 mixed, 4 central) and 55 hypopneas. The apnea/hypopnea index (AHI) was 10.3 events/hour. The central sleep apnea index was 0.7 events/hour. The REM AHI was 33.3 events/hour and NREM AHI was 9.7 events/hour. The supine AHI was 10.7 events/hour and the non supine AHI was 0 supine during 95.94% of sleep. Respiratory disturbances were associated with oxygen desaturation down to a oh of 73.0% during sleep. The mean oxygen saturation during the study was 91.8%. The cumulative time under 88% oxygen saturation was 8.4 minutes.    CPAP titration PSG: 10/17/19: The overall AHI was 0.0 per hour, and the RDI was 0.0 events/hour with a central apnea index of 0.0per hour. The most appropriate setting  of CPAP was 7 cm H2O. At this setting, the sleep efficiency was 85% and the  patient was supine for 0%. The AHI was 0.0 events per hour, and the RDI was 0.0 events/hour (with 0.0 central  events) and the arousal index was 6.2 per hour.The oxygen oh was 91.0% during sleep.  The cumulative time under 88% oxygen saturation was 0.0 minutes    CPAP of 7 CMWP was ordered but patient never received.     [place] Sleepiness Scale   EPWORTH SLEEPINESS SCALE 6/16/2020 10/8/2019   Sitting and reading 2 3   Watching TV 1 3   Sitting, inactive in a public place (e.g. a theatre or a meeting) 0 2   As a passenger in a car for an hour without a break 0 0   Lying down to rest in the afternoon when circumstances permit 0 1   Sitting and talking to someone 0 0   Sitting quietly after a lunch without alcohol 0 0   In a car, while stopped for a few minutes in traffic 0 0   Total score 3 9       Patients reports stable NYHA I dyspnea and Chronic non productive cough. Patient denies recent sick contacts.   Patient does have new pets. Patient does not have a history of asthma.     PFT: 03/11/20: Mild restriction and mild reduction in diffusion which corrects for alveolar volume.     CXR: 03/11/20: Cardiac silhouette and mediastinal contours are stable with cardiomegaly.  Lungs are clear.  Osseous structures are unchanged.      She is a never smoker but her  is an active smoker. She is constantly exposed to passive cigarette smoke.     Patient does have a history of environmental allergens. Allergy testing years ago revealed allergy to dust mites and smoke among other things.  No wheezing. Patient has not traveled recently. Patient does not have a history of smoking. Patient has had a previous chest x-ray. Patient has had a PPD done.  PPD was Negative      Previous Report Reviewed: historical medical records, lab reports, office notes and radiology reports     Past Medical History: The following portions of the patient's history  were reviewed and updated as appropriate:   She  has a past surgical history that includes Hysterectomy; Total knee arthroplasty; Colonoscopy (N/A, 8/1/2018); Cholecystectomy; Joint replacement (Right); and Surgical excision of anal lesion (N/A, 8/29/2018).  Her family history includes Breast cancer in her maternal aunt; Cataracts in her maternal grandmother and mother; Diabetes in her brother and mother.  She  reports that she has never smoked. She has never used smokeless tobacco. She reports that she does not drink alcohol or use drugs.  She has a current medication list which includes the following prescription(s): amlodipine, aspirin, atorvastatin, betamethasone dipropionate, candesartan, cholecalciferol (vitamin d3), fluticasone propionate, gabapentin, hydrocodone-acetaminophen, ipratropium-albuterol, meloxicam, metformin, metoprolol succinate, nortriptyline, omeprazole, valacyclovir, and levocetirizine, and the following Facility-Administered Medications: lactated ringers.  She is allergic to sulfa (sulfonamide antibiotics) and buprenorphine..    Review of Systems   Constitutional: Positive for fatigue and night sweats.   HENT: Positive for sinus pressure and congestion. Negative for postnasal drip and rhinorrhea.    Eyes: Positive for itching.   Respiratory: Positive for snoring. Negative for cough, hemoptysis, wheezing and dyspnea on extertion.    Cardiovascular: Positive for palpitations. Negative for chest pain and leg swelling.   Genitourinary: Negative for difficulty urinating and hematuria.   Endocrine: Negative for cold intolerance and heat intolerance.    Musculoskeletal: Positive for arthralgias and back pain.   Skin: Negative for rash.   Gastrointestinal: Positive for acid reflux. Negative for nausea and vomiting.   Neurological: Negative for dizziness, light-headedness and headaches.   Hematological: No excessive bruising.   Psychiatric/Behavioral: The patient is not nervous/anxious.    All other  "systems reviewed and are negative.         Occupational History:  denies occupational exposure to asbestos, silica and petrochemicals.    Avocational Exposures:  none    Pet Exposures:  none      Objective:     Vitals:    06/16/20 0943   BP: 112/74   Pulse: 75   Resp: 18   SpO2: 98%   Weight: 79.6 kg (175 lb 7.8 oz)   Height: 5' 3" (1.6 m)     Body mass index is 31.09 kg/m².    Physical Exam  Vitals signs and nursing note reviewed.   Constitutional:       General: She is not in acute distress.     Appearance: She is well-developed.   HENT:      Head: Normocephalic and atraumatic.   Eyes:      General: No scleral icterus.     Pupils: Pupils are equal, round, and reactive to light.   Neck:      Thyroid: No thyroid mass or thyromegaly.      Vascular: No carotid bruit or JVD.      Comments: 14"  Cardiovascular:      Rate and Rhythm: Normal rate and regular rhythm.      Heart sounds: No murmur. No friction rub. No gallop.    Pulmonary:      Effort: Pulmonary effort is normal.      Breath sounds: Normal breath sounds. No wheezing, rhonchi or rales.   Abdominal:      General: There is no distension.      Palpations: Abdomen is soft.      Tenderness: There is no abdominal tenderness. There is no guarding or rebound.   Musculoskeletal: Normal range of motion.   Skin:     General: Skin is warm and dry.      Capillary Refill: Capillary refill takes less than 2 seconds.      Findings: No rash.   Neurological:      Mental Status: She is alert.      Coordination: Coordination normal.      Gait: Gait abnormal.         Personal Diagnostic Review    PFT: 03/11/20:  Mild restriction and mild reduction in diffusion which corrects for alveolar volume.     CXR 03/11/20: Cardiac silhouette and mediastinal contours are stable with cardiomegaly.  Lungs are clear.  Osseous structures are unchanged.          Assessment /Plan:     Discussed diagnosis, its evaluation, treatment and usual course. All questions answered.    Problem List Items " Addressed This Visit        Pulmonary    Chronic cough (Chronic)    Current Assessment & Plan     Etiology unclear.  Multifactorial etiology suspected.  Likely contributors to etiology ( checked)      RESPIRATORY:  [] COPD    []  Asthma  [] Parenchymal lung disease (eg.Bronchiectasis, chroninc bronchitis, CTD - ILD)  [] Pulmonary vasculitis  [] Pneumoconiosis ( Asbestosis, Silicosis, [place] [facility])  [] CTD - ILD  [] Pleural Effusion  [] Lung Tumors  [] Pulmonary Fibrosis.  ENT:  [x] Allergic Rhinitis  [] Sinusitis  [] Laryngitis  [] History of sinus disease / rhinitis  CARDIAC:  [] CHF  [] Valvular heart disease  [] Coronary heart disease  GI:  [] Heart burn / dyspepsia  [x] History of GERD  MEDICATIONS:  [] ACE inhibitor.  [] Beta Blockers  [] Nitrofurantoin    EVALUATION:  [x] JUAN  [x] Sedimentation rate  [x] C-reactive protein  [x] Anti-neutrophilic cytoplasmic antibody  [x] CT scan of chest.  [] 2D ECHO  [] PFT  [] 6MWT      PLAN:  [x] Antihistmines per medication orders.  [] Antibiotics per medication orders.  [] Antitussives per medication orders.  [x] Avoid exposure to tobacco smoke and fumes.      Call for blood in sputum, change in character of cough, development of fever or chills, inability to maintain nutrition and hydration.            Relevant Orders    CT Chest Without Contrast    CRLD (chronic restrictive lung disease) - Primary (Chronic)    Current Assessment & Plan     Etiology unclear.  Multifactorial etiology suspected.  Likely contributors to etiology (checked)    There is no radiologic evidence of ILD    [] Pulmonary airway disease    []  Pulmonary parenchymal disease ( sarcoidosis)  [] Pulmonary vascular disease   [] Pleural disease  [] Pulmonary vasculitis  [] Hypoventilation ( chest wall deformity, neuromuscular disease, obesity etc)  [] Anemia  [] Thyroid disease.  [] Cardiac illess  []         Sleep disorder  [x]         Body Habitus (Obesity)    CRLD ROS: no significant ongoing wheezing.  Stable NYHA I STERLING   New concerns: None.   Exam: appears well, vitals normal, no respiratory distress, acyanotic, normal RR.   Assessment:  CRLD stable.   Plan: Current treatment plan is effective - COMBIVENT, no change in therapy. Check HRCT lungs.             Endocrine    Class 1 obesity due to excess calories with serious comorbidity and body mass index (BMI) of 30.0 to 30.9 in adult (Chronic)    Current Assessment & Plan     General weight loss/lifestyle modification strategies discussed (elicit support from others; identify saboteurs; non-food rewards, etc).  Behavioral treatment: Slim Fast.  Diet interventions: low calorie (1000 kCal/d) deficit diet.  Informal exercise measures discussed, e.g. taking stairs instead of elevator.  Regular aerobic exercise program discussed.            GI    Gastroesophageal reflux disease without esophagitis (Chronic)    Current Assessment & Plan     Lifestyle modifications for GERD reviewed - elevate head of bed, weight loss and dietary modifications ( fatty foods, caffeine, chocolate, spicy foods, food with high fat content, carbonated beverages, and peppermint).  Continue PRILOSEC.             Other    Chronic non-seasonal allergic rhinitis (Chronic)    Current Assessment & Plan     Allergy ROS: taking medications as instructed, no medication side effects noted.   New concerns: SINUS PAIN and DISCOMFORT, NASAL CONGESTION.   Exam: nasal congestion with pale boggy mucosa noted.   Assessment:  Allergic Rhinitis poorly controlled.   Plan: Switch ZYRTEC to XYZAL. orders as documented in EMR.         Relevant Medications    levocetirizine (XYZAL) 5 MG tablet    Other Relevant Orders    JUAN Screen w/Reflex    Anti-neutrophilic cytoplasmic antibody    C-Reactive Protein    Sedimentation rate    NICKI (obstructive sleep apnea) (Chronic)    Current Assessment & Plan     Start CPAP of 7. (DME - [facility])     Discussed therapeutic goals for positive airway pressure therapy(CPAP or BiPAP): Ideal  is usage 100% of nights for 6 - 8 hours per night. Minimum usage is 70% of night for at least 4 hours per night used. Pateint expressed understanding. All Questions answered.    Complaince download in 6 weeks.          Relevant Orders    CPAP FOR HOME USE      Other Visit Diagnoses     Cough                  TIME SPENT WITH PATIENT: Time spent: 40 minutes in face to face  discussion concerning diagnosis, prognosis, review of lab and test results, benefits of treatment as well as management of disease, counseling of patient and coordination of care between various health  care providers . Greater than half the time spent was used for coordination of care and counseling of patient.     Return in 6 - 8 weeks  for results review.

## 2020-06-16 NOTE — ASSESSMENT & PLAN NOTE
Etiology unclear.  Multifactorial etiology suspected.  Likely contributors to etiology (checked)    There is no radiologic evidence of ILD    [] Pulmonary airway disease    []  Pulmonary parenchymal disease ( sarcoidosis)  [] Pulmonary vascular disease   [] Pleural disease  [] Pulmonary vasculitis  [] Hypoventilation ( chest wall deformity, neuromuscular disease, obesity etc)  [] Anemia  [] Thyroid disease.  [] Cardiac illess  []         Sleep disorder  [x]         Body Habitus (Obesity)    CRLD ROS: no significant ongoing wheezing. Stable NYHA I STERLING   New concerns: None.   Exam: appears well, vitals normal, no respiratory distress, acyanotic, normal RR.   Assessment:  CRLD stable.   Plan: Current treatment plan is effective - COMBIVENT, no change in therapy. Check HRCT lungs.

## 2020-06-16 NOTE — TELEPHONE ENCOUNTER
Spoke with patient and her  to schedule ablation procedure. They said that she is not having problems with her SVT and they are still worried about coming to [place] to the hospital because of COVID 19. I explained the precautions that [facility] is taking to protect patients, staff and visitors but they would still like to hold off. I advised her to call if she starts having issues with her SVT or for any questions or concerns. Patient and  verbalized understanding and appreciated call.

## 2020-06-16 NOTE — ASSESSMENT & PLAN NOTE
Allergy ROS: taking medications as instructed, no medication side effects noted.   New concerns: SINUS PAIN and DISCOMFORT, NASAL CONGESTION.   Exam: nasal congestion with pale boggy mucosa noted.   Assessment:  Allergic Rhinitis poorly controlled.   Plan: Switch ZYRTEC to XYZAL. orders as documented in EMR.

## 2020-06-16 NOTE — ASSESSMENT & PLAN NOTE
Lifestyle modifications for GERD reviewed - elevate head of bed, weight loss and dietary modifications ( fatty foods, caffeine, chocolate, spicy foods, food with high fat content, carbonated beverages, and peppermint).  Continue PRILOSEC.

## 2020-06-17 LAB — ANA SER QL IF: NORMAL

## 2020-06-19 LAB
ANCA AB TITR SER IF: NORMAL TITER
P-ANCA TITR SER IF: NORMAL TITER

## 2020-07-01 ENCOUNTER — PATIENT OUTREACH (OUTPATIENT)
Dept: ADMINISTRATIVE | Facility: OTHER | Age: 70
End: 2020-07-01

## 2020-07-02 ENCOUNTER — OFFICE VISIT (OUTPATIENT)
Dept: CARDIOLOGY | Facility: CLINIC | Age: 70
End: 2020-07-02
Payer: MEDICARE

## 2020-07-02 ENCOUNTER — HOSPITAL ENCOUNTER (OUTPATIENT)
Dept: CARDIOLOGY | Facility: HOSPITAL | Age: 70
Discharge: HOME | End: 2020-07-02
Attending: INTERNAL MEDICINE
Payer: MEDICARE

## 2020-07-02 VITALS
RESPIRATION RATE: 18 BRPM | SYSTOLIC BLOOD PRESSURE: 124 MMHG | BODY MASS INDEX: 31.84 KG/M2 | HEART RATE: 67 BPM | OXYGEN SATURATION: 98 % | WEIGHT: 179.69 LBS | HEIGHT: 63 IN | DIASTOLIC BLOOD PRESSURE: 80 MMHG

## 2020-07-02 DIAGNOSIS — I10 ESSENTIAL HYPERTENSION: ICD-10-CM

## 2020-07-02 DIAGNOSIS — I47.19 PAROXYSMAL ATRIAL TACHYCARDIA: Chronic | ICD-10-CM

## 2020-07-02 DIAGNOSIS — I50.32 CHRONIC HEART FAILURE WITH PRESERVED EJECTION FRACTION: Chronic | ICD-10-CM

## 2020-07-02 DIAGNOSIS — E11.69 MIXED DIABETIC HYPERLIPIDEMIA ASSOCIATED WITH TYPE 2 DIABETES MELLITUS: Chronic | ICD-10-CM

## 2020-07-02 DIAGNOSIS — R00.2 PALPITATIONS: ICD-10-CM

## 2020-07-02 DIAGNOSIS — I47.10 SVT (SUPRAVENTRICULAR TACHYCARDIA): Primary | ICD-10-CM

## 2020-07-02 DIAGNOSIS — I15.2 HYPERTENSION ASSOCIATED WITH DIABETES: Chronic | ICD-10-CM

## 2020-07-02 DIAGNOSIS — E78.2 MIXED DIABETIC HYPERLIPIDEMIA ASSOCIATED WITH TYPE 2 DIABETES MELLITUS: Chronic | ICD-10-CM

## 2020-07-02 DIAGNOSIS — I25.10 CORONARY ARTERY DISEASE INVOLVING NATIVE CORONARY ARTERY OF NATIVE HEART WITHOUT ANGINA PECTORIS: Chronic | ICD-10-CM

## 2020-07-02 DIAGNOSIS — I70.0 ABDOMINAL AORTIC ATHEROSCLEROSIS: Chronic | ICD-10-CM

## 2020-07-02 DIAGNOSIS — E11.59 HYPERTENSION ASSOCIATED WITH DIABETES: Chronic | ICD-10-CM

## 2020-07-02 PROBLEM — G47.33 OSA (OBSTRUCTIVE SLEEP APNEA): Chronic | Status: RESOLVED | Noted: 2019-10-08 | Resolved: 2020-07-02

## 2020-07-02 PROCEDURE — 93010 ELECTROCARDIOGRAM REPORT: CPT | Mod: ,,, | Performed by: INTERNAL MEDICINE

## 2020-07-02 PROCEDURE — 99214 OFFICE O/P EST MOD 30 MIN: CPT | Mod: PBBFAC,25 | Performed by: INTERNAL MEDICINE

## 2020-07-02 PROCEDURE — 99999 PR PBB SHADOW E&M-EST. PATIENT-LVL IV: CPT | Mod: PBBFAC,,, | Performed by: INTERNAL MEDICINE

## 2020-07-02 PROCEDURE — 93010 EKG 12-LEAD: ICD-10-PCS | Mod: ,,, | Performed by: INTERNAL MEDICINE

## 2020-07-02 PROCEDURE — 93005 ELECTROCARDIOGRAM TRACING: CPT

## 2020-07-02 PROCEDURE — 99214 OFFICE O/P EST MOD 30 MIN: CPT | Mod: S$PBB,,, | Performed by: INTERNAL MEDICINE

## 2020-07-02 PROCEDURE — 99214 PR OFFICE/OUTPT VISIT, EST, LEVL IV, 30-39 MIN: ICD-10-PCS | Mod: S$PBB,,, | Performed by: INTERNAL MEDICINE

## 2020-07-02 PROCEDURE — 99999 PR PBB SHADOW E&M-EST. PATIENT-LVL IV: ICD-10-PCS | Mod: PBBFAC,,, | Performed by: INTERNAL MEDICINE

## 2020-07-02 NOTE — PROGRESS NOTES
Chart reviewed.   Immunizations: Triggered Imm Registry     Orders placed: n/a  Upcoming appts to satisfy SOURAV topics: n/a

## 2020-07-02 NOTE — PROGRESS NOTES
Subjective:   Patient ID:  Venus Caldwell is a 70 y.o. female who presents for cardiac consult of Follow-up      HPI  The patient came in today for cardiac consult of Follow-up    Referring Physician: Sourav Hernandez MD   Reason for initial consult: HTN      Venus Caldwell is a 70 y.o. female with current medical conditions HFpEF, HTN, PSVT, DM, obesity, OA, uterine CA, GERD presents to follow up CV eval.     19  Bp has improved with Norvasc 5 mg, recently started by Dr. Hernandez. Has been feeling more weak and fatigue, SBP was in upper 90s lately. She had LHC with minor artery blockage no stents placed, she thinks she had CP but strong FH of CAD. She does get palpitations, a few times a day, lasts one or two beats and she feels ok.   Prior cardiologist Dr. Hannah. Does snore, no prior sleep study. Has gained > 25 lbs in last 6 months, pre-DM.     19  ECHO with grade 1 DD otherwise normal BIV function. BP has been fluctuating at times. Is on digital HTN program. She has not had sleep study. NO recent heart monitor. Overall active.     19  Frequent SVT runs, doubled Toprol to 100 mg twice a day. Sleep study negative. She had two bottles of valsartan, given by Dr. Hannah but wanted to take it off due to recall. She still feels palpitations occasionally worse with talking at times, she did not have much palpitations on valsartan. Will refer to EP.     20  Had eval by Dr. Torres - symptomatic SVT. He recommended EPS/RFA for definitive therapy. She will have SVT ablation - will hold metoprolol 3 days prior. Overall has been feeling well, rarely feels palpitations. No major heart racing. She has gained some weight lately.   ECG - NSR,     Patient feels no chest pain, no sob, no leg swelling, no PND,  no syncope, no CNS symptoms.    Patient has fairly good exercise tolerance.    Patient is compliant with medications.    ECG - NSR, inferior infarct    FH - father had MI in age 58, brother   from MI 58    TEST DESCRIPTION   1-CARDIO EVENT RECORDING FROM 8/16/19 TO 8/30/19    2-INDICATIONS- PALPITATIONS    CONCLUSIONS   Patient had a min HR of 54 bpm, max HR of 200 bpm, and avg HR of 78  bpm. Predominant underlying rhythm was Sinus Rhythm. 93  Supraventricular Tachycardia runs occurred, the run with the fastest  interval lasting 12 mins 38 secs with a max rate of 200 bpm, the longest  lasting 44 mins 15 secs with an avg rate of 123 bpm. Supraventricular  Tachycardia was detected within +/- 45 seconds of symptomatic patient  event(s). Isolated SVEs were rare (<1.0%), SVE Couplets were rare  (<1.0%), and SVE Triplets were rare (<1.0%). Isolated VEs were rare  (<1.0%, 970), VE Couplets were rare (<1.0%, 12), and VE Triplets were  rare (<1.0%, 1).    This document has been electronically    SIGNED BY: Jarrett Hussein MD On: 09/16/2019 09:28    2d ECHO  05/11/2019  CONCLUSIONS     1 - Concentric hypertrophy.     2 - No wall motion abnormalities.     3 - Normal left ventricular systolic function (EF 60-65%).     4 - Impaired LV relaxation, normal LAP (grade 1 diastolic dysfunction).     5 - Normal right ventricular systolic function .     6 - The estimated PA systolic pressure is greater than 22 mmHg.       Past Medical History:   Diagnosis Date    Arthritis     Cancer, uterine     Diabetes mellitus     prediabetes    Diabetes mellitus, type 2     DM (diabetes mellitus) 08/2019    BS doesn't check 12/16/2019    Hypertension     Ovarian cancer     Sciatica        Past Surgical History:   Procedure Laterality Date    CHOLECYSTECTOMY      COLONOSCOPY N/A 8/1/2018    Procedure: COLONOSCOPY;  Surgeon: Yrn Hunter III, MD;  Location: [facility] ENDO;  Service: Endoscopy;  Laterality: N/A;    HYSTERECTOMY      JOINT REPLACEMENT Right     hip replacement    SURGICAL EXCISION OF ANAL LESION N/A 8/29/2018    Procedure: EXCISION, LESION, ANUS;  Surgeon: Yrn Balderrama MD;  Location: [facility] OR;  Service:  General;  Laterality: N/A;    TOTAL KNEE ARTHROPLASTY         Social History     Tobacco Use    Smoking status: Never Smoker    Smokeless tobacco: Never Used   Substance Use Topics    Alcohol use: No    Drug use: No       Family History   Problem Relation Age of Onset    Diabetes Mother     Cataracts Mother     Diabetes Brother     Cataracts Maternal Grandmother     Breast cancer Maternal Aunt        Patient's Medications   New Prescriptions    No medications on file   Previous Medications    AMLODIPINE (NORVASC) 5 MG TABLET    Take 1 tablet (5 mg total) by mouth once daily.    ASPIRIN (ECOTRIN) 81 MG EC TABLET    Take 1 tablet (81 mg total) by mouth once daily.    ATORVASTATIN (LIPITOR) 40 MG TABLET    Take 1 tablet (40 mg total) by mouth every evening.    BETAMETHASONE DIPROPIONATE (DIPROLENE) 0.05 % CREAM    as needed.     CANDESARTAN (ATACAND) 16 MG TABLET    Take 1 tablet (16 mg total) by mouth once daily.    CHOLECALCIFEROL, VITAMIN D3, 5,000 UNIT TAB    Take 1,000 Units by mouth once daily.     FLUTICASONE (FLONASE) 50 MCG/ACTUATION NASAL SPRAY    1 spray by Each Nare route daily as needed for Rhinitis.    GABAPENTIN (NEURONTIN) 300 MG CAPSULE    Take 300 mg by mouth 3 (three) times daily. 300 mg TID    HYDROCODONE-ACETAMINOPHEN (NORCO)  MG PER TABLET    Take 1 tablet by mouth every 24 hours as needed.    IPRATROPIUM-ALBUTEROL (COMBIVENT)  MCG/ACTUATION INHALER    Inhale 1 puff into the lungs every 6 (six) hours as needed for Wheezing or Shortness of Breath (or Cough). Rescue    LEVOCETIRIZINE (XYZAL) 5 MG TABLET    Take 1 tablet (5 mg total) by mouth every evening.    MELOXICAM (MOBIC) 7.5 MG TABLET    Take 1 tablet (7.5 mg total) by mouth 2 (two) times daily.    METFORMIN (GLUCOPHAGE) 500 MG TABLET    Take 1 tablet (500 mg total) by mouth daily with breakfast.    METOPROLOL SUCCINATE (TOPROL-XL) 100 MG 24 HR TABLET    Take 1 tablet (100 mg total) by mouth 2 (two) times daily.     "NORTRIPTYLINE (PAMELOR) 50 MG CAPSULE    Take 50 mg by mouth nightly.     OMEPRAZOLE (PRILOSEC) 20 MG CAPSULE    Take 20 mg by mouth once daily.    VALACYCLOVIR (VALTREX) 500 MG TABLET    Take 500 mg by mouth as needed.    Modified Medications    No medications on file   Discontinued Medications    No medications on file       Review of Systems   Constitutional: Positive for malaise/fatigue.   HENT: Negative.    Eyes: Negative.    Respiratory: Negative.    Cardiovascular: Positive for palpitations.   Gastrointestinal: Negative.    Genitourinary: Negative.    Musculoskeletal: Negative.    Skin: Negative.    Neurological: Negative.    Endo/Heme/Allergies: Negative.    Psychiatric/Behavioral: Negative.    All 12 systems otherwise negative.      Wt Readings from Last 3 Encounters:   07/02/20 81.5 kg (179 lb 10.8 oz)   06/16/20 79.6 kg (175 lb 7.8 oz)   05/18/20 79.5 kg (175 lb 4.3 oz)     Temp Readings from Last 3 Encounters:   05/18/20 97.3 °F (36.3 °C) (Tympanic)   03/11/20 98 °F (36.7 °C) (Oral)   02/13/20 98.3 °F (36.8 °C) (Oral)     BP Readings from Last 3 Encounters:   07/02/20 124/80   06/16/20 112/74   05/18/20 108/84     Pulse Readings from Last 3 Encounters:   07/02/20 67   06/16/20 75   05/18/20 84       /80 (BP Location: Right arm, Patient Position: Sitting, BP Method: Medium (Manual))   Pulse 67   Resp 18   Ht 5' 3" (1.6 m)   Wt 81.5 kg (179 lb 10.8 oz)   SpO2 98%   BMI 31.83 kg/m²     Objective:   Physical Exam   Constitutional: She is oriented to person, place, and time. She appears well-developed and well-nourished. No distress.   HENT:   Head: Normocephalic and atraumatic.   Nose: Nose normal.   Mouth/Throat: Oropharynx is clear and moist.   Eyes: Conjunctivae and EOM are normal. No scleral icterus.   Neck: Normal range of motion. Neck supple. No JVD present. No thyromegaly present.   Cardiovascular: Normal rate, regular rhythm, S1 normal and S2 normal. Exam reveals no gallop, no S3, no S4 " and no friction rub.   No murmur heard.  Pulmonary/Chest: Effort normal and breath sounds normal. No stridor. No respiratory distress. She has no wheezes. She has no rales. She exhibits no tenderness.   Abdominal: Soft. Bowel sounds are normal. She exhibits no distension and no mass. There is no abdominal tenderness. There is no rebound.   Genitourinary:    Genitourinary Comments: Deferred     Musculoskeletal: Normal range of motion.         General: No tenderness, deformity or edema.   Lymphadenopathy:     She has no cervical adenopathy.   Neurological: She is alert and oriented to person, place, and time. She exhibits normal muscle tone. Coordination normal.   Skin: Skin is warm and dry. No rash noted. She is not diaphoretic. No erythema. No pallor.   Psychiatric: She has a normal mood and affect. Her behavior is normal. Judgment and thought content normal.   Nursing note and vitals reviewed.      Lab Results   Component Value Date     08/13/2019    K 4.5 08/13/2019     08/13/2019    CO2 27 08/13/2019    BUN 21 08/13/2019    CREATININE 0.7 08/13/2019     (H) 08/13/2019    HGBA1C 7.1 (H) 12/16/2019    AST 17 08/13/2019    ALT 20 08/13/2019    ALBUMIN 3.6 08/13/2019    PROT 7.4 08/13/2019    BILITOT 0.4 08/13/2019    WBC 8.80 08/13/2019    HGB 11.2 (L) 08/13/2019    HCT 36.9 (L) 08/13/2019    MCV 92 08/13/2019     (H) 08/13/2019    INR 1.0 06/14/2018    TSH 0.586 08/13/2019    CHOL 140 08/13/2019    HDL 48 08/13/2019    LDLCALC 60.4 (L) 08/13/2019    TRIG 158 (H) 08/13/2019     Assessment:      1. SVT (supraventricular tachycardia)    2. Palpitations    3. Chronic heart failure with preserved ejection fraction    4. Abdominal aortic atherosclerosis    5. Paroxysmal atrial tachycardia    6. Coronary artery disease involving native coronary artery of native heart without angina pectoris    7. Mixed diabetic hyperlipidemia associated with type 2 diabetes mellitus    8. Hypertension associated  with diabetes        Plan:   1. CAD, non obs with abd atherosclerosis   - no CP  - cont asa, statin, BB    2. HTN  - Bp well controlled  - monitor for low BP    3. Palpitations - intermittent with PSVT  - cont BB  - will have SVT ablation by Dr. Torres, Hold metoprolol 3 days prior    4. Sleep apnea symptoms  - refered to sleep study - negative     5. HFpEF  - low salt diet  - euvolemic     6. DM/obesity - A1c 7.1  - cont tx per PCP  - rec weight loss with diet/exercise     Thank you for allowing me to participate in this patient's care. Please do not hesitate to contact me with any questions or concerns. Consult note has been forwarded to the referral physician.

## 2020-07-06 DIAGNOSIS — I10 ESSENTIAL HYPERTENSION: ICD-10-CM

## 2020-07-06 RX ORDER — CANDESARTAN 16 MG/1
16 TABLET ORAL DAILY
Qty: 30 TABLET | Refills: 6 | Status: CANCELLED | OUTPATIENT
Start: 2020-07-06 | End: 2021-07-06

## 2020-07-06 RX ORDER — CANDESARTAN 16 MG/1
16 TABLET ORAL DAILY
Qty: 30 TABLET | Refills: 6 | Status: SHIPPED | OUTPATIENT
Start: 2020-07-06 | End: 2020-12-17 | Stop reason: SDUPTHER

## 2020-07-31 PROCEDURE — 99454 REM MNTR PHYSIOL PARAM 16-30: CPT | Mod: PBBFAC | Performed by: INTERNAL MEDICINE

## 2020-07-31 PROCEDURE — 99454 REM MNTR PHYSIOL PARAM 16-30: CPT | Mod: PBBFAC | Performed by: FAMILY MEDICINE

## 2020-08-03 PROCEDURE — 99454 REM MNTR PHYSIOL PARAM 16-30: CPT | Mod: PBBFAC | Performed by: INTERNAL MEDICINE

## 2020-08-07 DIAGNOSIS — I10 ESSENTIAL HYPERTENSION: Chronic | ICD-10-CM

## 2020-08-07 RX ORDER — AMLODIPINE BESYLATE 5 MG/1
5 TABLET ORAL DAILY
Qty: 90 TABLET | Refills: 3 | Status: SHIPPED | OUTPATIENT
Start: 2020-08-07 | End: 2021-08-02

## 2020-08-13 ENCOUNTER — OFFICE VISIT (OUTPATIENT)
Dept: INTERNAL MEDICINE | Facility: CLINIC | Age: 70
End: 2020-08-13
Payer: MEDICARE

## 2020-08-13 ENCOUNTER — LAB VISIT (OUTPATIENT)
Dept: LAB | Facility: HOSPITAL | Age: 70
End: 2020-08-13
Attending: FAMILY MEDICINE
Payer: MEDICARE

## 2020-08-13 VITALS
WEIGHT: 179 LBS | TEMPERATURE: 99 F | DIASTOLIC BLOOD PRESSURE: 78 MMHG | HEART RATE: 78 BPM | SYSTOLIC BLOOD PRESSURE: 128 MMHG | HEIGHT: 63 IN | BODY MASS INDEX: 31.71 KG/M2 | OXYGEN SATURATION: 97 %

## 2020-08-13 DIAGNOSIS — E11.9 TYPE 2 DIABETES MELLITUS WITHOUT COMPLICATION, WITHOUT LONG-TERM CURRENT USE OF INSULIN: ICD-10-CM

## 2020-08-13 DIAGNOSIS — E78.2 MIXED DIABETIC HYPERLIPIDEMIA ASSOCIATED WITH TYPE 2 DIABETES MELLITUS: ICD-10-CM

## 2020-08-13 DIAGNOSIS — E11.9 TYPE 2 DIABETES MELLITUS WITHOUT COMPLICATION, WITHOUT LONG-TERM CURRENT USE OF INSULIN: Primary | ICD-10-CM

## 2020-08-13 DIAGNOSIS — I10 ESSENTIAL HYPERTENSION: ICD-10-CM

## 2020-08-13 DIAGNOSIS — E11.69 MIXED DIABETIC HYPERLIPIDEMIA ASSOCIATED WITH TYPE 2 DIABETES MELLITUS: ICD-10-CM

## 2020-08-13 DIAGNOSIS — R53.83 FATIGUE, UNSPECIFIED TYPE: ICD-10-CM

## 2020-08-13 DIAGNOSIS — M85.80 OSTEOPENIA, UNSPECIFIED LOCATION: ICD-10-CM

## 2020-08-13 LAB
ALBUMIN SERPL BCP-MCNC: 3.5 G/DL (ref 3.5–5.2)
ALP SERPL-CCNC: 139 U/L (ref 55–135)
ALT SERPL W/O P-5'-P-CCNC: 22 U/L (ref 10–44)
ANION GAP SERPL CALC-SCNC: 7 MMOL/L (ref 8–16)
AST SERPL-CCNC: 21 U/L (ref 10–40)
BILIRUB SERPL-MCNC: 0.4 MG/DL (ref 0.1–1)
BUN SERPL-MCNC: 18 MG/DL (ref 8–23)
CALCIUM SERPL-MCNC: 9.6 MG/DL (ref 8.7–10.5)
CHLORIDE SERPL-SCNC: 106 MMOL/L (ref 95–110)
CHOLEST SERPL-MCNC: 130 MG/DL (ref 120–199)
CHOLEST/HDLC SERPL: 2.7 {RATIO} (ref 2–5)
CO2 SERPL-SCNC: 28 MMOL/L (ref 23–29)
CREAT SERPL-MCNC: 0.7 MG/DL (ref 0.5–1.4)
ERYTHROCYTE [DISTWIDTH] IN BLOOD BY AUTOMATED COUNT: 15.9 % (ref 11.5–14.5)
EST. GFR  (AFRICAN AMERICAN): >60 ML/MIN/1.73 M^2
EST. GFR  (NON AFRICAN AMERICAN): >60 ML/MIN/1.73 M^2
GLUCOSE SERPL-MCNC: 106 MG/DL (ref 70–110)
HCT VFR BLD AUTO: 33 % (ref 37–48.5)
HDLC SERPL-MCNC: 49 MG/DL (ref 40–75)
HDLC SERPL: 37.7 % (ref 20–50)
HGB BLD-MCNC: 9.5 G/DL (ref 12–16)
LDLC SERPL CALC-MCNC: 54.4 MG/DL (ref 63–159)
MCH RBC QN AUTO: 25.7 PG (ref 27–31)
MCHC RBC AUTO-ENTMCNC: 28.8 G/DL (ref 32–36)
MCV RBC AUTO: 89 FL (ref 82–98)
NONHDLC SERPL-MCNC: 81 MG/DL
PLATELET # BLD AUTO: 405 K/UL (ref 150–350)
PMV BLD AUTO: 9.9 FL (ref 9.2–12.9)
POTASSIUM SERPL-SCNC: 4.6 MMOL/L (ref 3.5–5.1)
PROT SERPL-MCNC: 7.1 G/DL (ref 6–8.4)
RBC # BLD AUTO: 3.69 M/UL (ref 4–5.4)
SODIUM SERPL-SCNC: 141 MMOL/L (ref 136–145)
TRIGL SERPL-MCNC: 133 MG/DL (ref 30–150)
TSH SERPL DL<=0.005 MIU/L-ACNC: 0.8 UIU/ML (ref 0.4–4)
WBC # BLD AUTO: 9.32 K/UL (ref 3.9–12.7)

## 2020-08-13 PROCEDURE — 80053 COMPREHEN METABOLIC PANEL: CPT

## 2020-08-13 PROCEDURE — 85027 COMPLETE CBC AUTOMATED: CPT

## 2020-08-13 PROCEDURE — 99999 PR PBB SHADOW E&M-EST. PATIENT-LVL V: CPT | Mod: PBBFAC,,, | Performed by: FAMILY MEDICINE

## 2020-08-13 PROCEDURE — 83036 HEMOGLOBIN GLYCOSYLATED A1C: CPT

## 2020-08-13 PROCEDURE — 36415 COLL VENOUS BLD VENIPUNCTURE: CPT

## 2020-08-13 PROCEDURE — 84443 ASSAY THYROID STIM HORMONE: CPT

## 2020-08-13 PROCEDURE — 99999 PR PBB SHADOW E&M-EST. PATIENT-LVL V: ICD-10-PCS | Mod: PBBFAC,,, | Performed by: FAMILY MEDICINE

## 2020-08-13 PROCEDURE — 99215 OFFICE O/P EST HI 40 MIN: CPT | Mod: PBBFAC,25 | Performed by: FAMILY MEDICINE

## 2020-08-13 PROCEDURE — 80061 LIPID PANEL: CPT

## 2020-08-13 PROCEDURE — 99214 OFFICE O/P EST MOD 30 MIN: CPT | Mod: S$PBB,,, | Performed by: FAMILY MEDICINE

## 2020-08-13 PROCEDURE — 99214 PR OFFICE/OUTPT VISIT, EST, LEVL IV, 30-39 MIN: ICD-10-PCS | Mod: S$PBB,,, | Performed by: FAMILY MEDICINE

## 2020-08-13 RX ORDER — METFORMIN HYDROCHLORIDE 500 MG/1
500 TABLET ORAL
Qty: 90 TABLET | Refills: 3 | Status: SHIPPED | OUTPATIENT
Start: 2020-08-13 | End: 2021-08-02

## 2020-08-13 RX ORDER — ATORVASTATIN CALCIUM 40 MG/1
40 TABLET, FILM COATED ORAL NIGHTLY
Qty: 90 TABLET | Refills: 3 | Status: SHIPPED | OUTPATIENT
Start: 2020-08-13 | End: 2021-08-02

## 2020-08-14 LAB
ESTIMATED AVG GLUCOSE: 160 MG/DL (ref 68–131)
HBA1C MFR BLD HPLC: 7.2 % (ref 4–5.6)

## 2020-08-21 NOTE — PROGRESS NOTES
"Digital Medicine: Health  Follow-Up    The history is provided by the patient.             Reason for review: Blood pressure at goal        Topics Covered on Call: physical activity and Diet    Additional Follow-up details: Higher readings are when she takes BP in the morning, after a poor nights sleep. Reports she feels "a little sleepy" right now.     BP at goal, 125/72.         Diet-Change  24 hour dietary recall  Breakfast is typically between after 10 am. Cinnamon Cheerios or oatmeal .  Lunch is typically between. Skip .   Dinner is typically between. Pork chops, rice and gravy, vegetable.   Patient reports eating or drinking the following: Reports she is eating more at home. Doesn't enjoy cooking very much  Dietary Improvements:Reports she is really trying to avoid sugar/carbs. Reports her cholesterol was "super good" on her last blood test. Trying to incorporate a vegetable at every meal. Working to avoid "junk" foods. Reports she tried to not keep it in the house, but her  really likes junk food. Trying to limit alcala intake.     Dietary Indiscretions:Noticed she has recently gained some weight. Reports this is the most she has ever weighed in her whole life, even when she was pregnant.           Physical Activity-Change      Additional physical activity details: Walks in [employer], 1-2 times per week. Uses shopping cart, leans on it.  uses the motorized chart. But limited exercise beyond this. Thinks she could start using her exercise bike at home. Discussed idea to incorporate this into daily routine. Spent time in change talk. Reports her mom's side of the family has weight issues, "hereditary" problem for her.       Medication Adherence-Medication adherence was asssessed.  Patient continue taking medication as prescribed.          Recently had meds refill at last appointment.   Substance, Sleep, Stress-Not assessed      Continue current diet/physical activity routine.  Provided patient " education.       Addressed any questions or concerns and patient has my contact information if needed prior to next outreach. Patient verbalizes understanding.      Explained the importance of self-monitoring and medication adherence. Encouraged the patient to communicate with their health  for lifestyle modifications to help improve or maintain a healthy lifestyle.            There are no preventive care reminders to display for this patient.    Last 5 Patient Entered Readings                                      Current 30 Day Average: 125/72     Recent Readings 8/18/2020 8/17/2020 8/14/2020 8/11/2020 8/10/2020    SBP (mmHg) 126 136 145 102 137    DBP (mmHg) 71 81 80 61 75    Pulse 75 71 72 71 64

## 2020-08-26 ENCOUNTER — OFFICE VISIT (OUTPATIENT)
Dept: PULMONOLOGY | Facility: CLINIC | Age: 70
End: 2020-08-26
Payer: MEDICARE

## 2020-08-26 ENCOUNTER — HOSPITAL ENCOUNTER (OUTPATIENT)
Dept: RADIOLOGY | Facility: HOSPITAL | Age: 70
Discharge: HOME | End: 2020-08-26
Attending: INTERNAL MEDICINE
Payer: MEDICARE

## 2020-08-26 VITALS
DIASTOLIC BLOOD PRESSURE: 70 MMHG | SYSTOLIC BLOOD PRESSURE: 110 MMHG | BODY MASS INDEX: 30.84 KG/M2 | HEART RATE: 76 BPM | WEIGHT: 174.06 LBS | RESPIRATION RATE: 16 BRPM | HEIGHT: 63 IN | OXYGEN SATURATION: 97 %

## 2020-08-26 DIAGNOSIS — R05.3 CHRONIC COUGH: Primary | Chronic | ICD-10-CM

## 2020-08-26 DIAGNOSIS — E66.09 CLASS 1 OBESITY DUE TO EXCESS CALORIES WITH SERIOUS COMORBIDITY AND BODY MASS INDEX (BMI) OF 30.0 TO 30.9 IN ADULT: Chronic | ICD-10-CM

## 2020-08-26 DIAGNOSIS — J30.89 CHRONIC NON-SEASONAL ALLERGIC RHINITIS: Chronic | ICD-10-CM

## 2020-08-26 DIAGNOSIS — R91.1 LUNG NODULE, SOLITARY: Chronic | ICD-10-CM

## 2020-08-26 DIAGNOSIS — J98.4 CRLD (CHRONIC RESTRICTIVE LUNG DISEASE): Chronic | ICD-10-CM

## 2020-08-26 DIAGNOSIS — R05.3 CHRONIC COUGH: Chronic | ICD-10-CM

## 2020-08-26 DIAGNOSIS — R05.3 CHRONIC COUGH: ICD-10-CM

## 2020-08-26 PROBLEM — R91.8 MULTIPLE LUNG NODULES ON CT: Status: ACTIVE | Noted: 2020-08-26

## 2020-08-26 PROCEDURE — 99213 PR OFFICE/OUTPT VISIT, EST, LEVL III, 20-29 MIN: ICD-10-PCS | Mod: S$PBB,,, | Performed by: INTERNAL MEDICINE

## 2020-08-26 PROCEDURE — 99215 OFFICE O/P EST HI 40 MIN: CPT | Mod: PBBFAC,25 | Performed by: INTERNAL MEDICINE

## 2020-08-26 PROCEDURE — 99999 PR PBB SHADOW E&M-EST. PATIENT-LVL V: ICD-10-PCS | Mod: PBBFAC,,, | Performed by: INTERNAL MEDICINE

## 2020-08-26 PROCEDURE — 71250 CT THORAX DX C-: CPT | Mod: TC

## 2020-08-26 PROCEDURE — 99999 PR PBB SHADOW E&M-EST. PATIENT-LVL V: CPT | Mod: PBBFAC,,, | Performed by: INTERNAL MEDICINE

## 2020-08-26 PROCEDURE — 99213 OFFICE O/P EST LOW 20 MIN: CPT | Mod: S$PBB,,, | Performed by: INTERNAL MEDICINE

## 2020-08-26 RX ORDER — AZELASTINE 1 MG/ML
1 SPRAY, METERED NASAL 2 TIMES DAILY
Qty: 30 ML | Refills: 11 | Status: SHIPPED | OUTPATIENT
Start: 2020-08-26

## 2020-08-26 NOTE — PATIENT INSTRUCTIONS
Lung Anatomy  Your lungs take air in to give your body oxygen, which the body needs to work. Your lungs, like all the tissues in your body, are made up of billions of tiny specialized cells. Old lung cells die and are replaced by new, identical lung cells. This natural process helps ensure healthy lungs.    Date Last Reviewed: 11/1/2016  © 1185-2140 [employer] [address] [place] [address], [place] [address]. All rights reserved. This information is not intended as a substitute for professional medical care. Always follow your healthcare professional's instructions.

## 2020-08-26 NOTE — ASSESSMENT & PLAN NOTE
Etiology unclear.  Multifactorial etiology suspected.  Likely contributors to etiology ( checked)      RESPIRATORY:  [] COPD    []  Asthma  [] Parenchymal lung disease (eg.Bronchiectasis, chroninc bronchitis, CTD - ILD)  [] Pulmonary vasculitis  [] Pneumoconiosis ( Asbestosis, Silicosis, [place] [facility])  [] CTD - ILD  [] Pleural Effusion  [] Lung Tumors  [] Pulmonary Fibrosis.  ENT:  [x] Allergic Rhinitis  [] Sinusitis  [] Laryngitis  [] History of sinus disease / rhinitis  CARDIAC:  [] CHF  [] Valvular heart disease  [] Coronary heart disease  GI:  [] Heart burn / dyspepsia  [x] History of GERD  MEDICATIONS:  [] ACE inhibitor.  [] Beta Blockers  [] Nitrofurantoin      PLAN:  [x] Antihistmines per medication orders.  [] Antibiotics per medication orders.  [x] Antitussives per medication orders.  [x] Avoid exposure to tobacco smoke and fumes.

## 2020-08-26 NOTE — PROGRESS NOTES
Subjective:      Patient ID: Venus Caldwell is a 70 y.o. female.    Patient Active Problem List   Diagnosis    Hypertension associated with diabetes    Mixed diabetic hyperlipidemia associated with type 2 diabetes mellitus    Gastroesophageal reflux disease without esophagitis    Primary osteoarthritis involving multiple joints    Chronic non-seasonal allergic rhinitis    Coronary artery disease involving native coronary artery    Paroxysmal atrial tachycardia    Abdominal aortic atherosclerosis    Sciatica    Palpitations    (HFpEF) heart failure with preserved ejection fraction    Class 1 obesity due to excess calories with serious comorbidity and body mass index (BMI) of 30.0 to 30.9 in adult    Encounter for comprehensive diabetic foot examination, type 2 diabetes mellitus    Type 2 diabetes mellitus without complication, without long-term current use of insulin    Nail dystrophy    Chronic cough    SVT (supraventricular tachycardia)    CRLD (chronic restrictive lung disease)    Osteopenia    Lung nodule, solitary       Problem list has been reviewed.    Chief Complaint: CRLD            HPI:     CT chest and CPAP compliance download reveiewed with patient who voiced understanding .     Patients reports NYHA I dyspnea    The patient does not have currently have symptoms / an exacerbation.       No recent change in breathing.       She has NICKI. She is on CPAP of 7 CMWP. She is complainant with her CPAP. She definitely thinks PAP is beneficial to her health and she wants to continue with PAP therapy.        Compliance Summary  7/27/2020 - 8/25/2020 (30 days)  Days with Device Usage 29 days  Days without Device Usage 1 day  Percent Days with Device Usage 96.7%  Cumulative Usage 6 days 8 hrs. 1 mins. 16 secs.  Maximum Usage (1 Day) 7 hrs. 51 mins. 13 secs.  Average Usage (All Days) 5 hrs. 4 mins. 2 secs.  Average Usage (Days Used) 5 hrs. 14 mins. 31 secs.  Minimum Usage (1 Day) 4 hrs. 7  mins. 6 secs.  Percent of Days with Usage >= 4 Hours 96.7%  Percent of Days with Usage < 4 Hours 3.3%  Date Range  Total Blower Time 6 days 10 hrs. 51 mins. 53 secs.  CPAP Summary  Average Time in Large Leak Per Day 13 mins. 48 secs.  Average AHI 5.6  CPAP 7.0 cmH2O    [place] Sleepiness Scale   EPWORTH SLEEPINESS SCALE 8/26/2020 6/16/2020 10/8/2019   Sitting and reading 3 2 3   Watching TV 2 1 3   Sitting, inactive in a public place (e.g. a theatre or a meeting) 0 0 2   As a passenger in a car for an hour without a break 2 0 0   Lying down to rest in the afternoon when circumstances permit 1 0 1   Sitting and talking to someone 0 0 0   Sitting quietly after a lunch without alcohol 0 0 0   In a car, while stopped for a few minutes in traffic 0 0 0   Total score 8 3 9       Reference: Alexandro PEÑA. A new method for measuring daytime sleepiness: The [place]  Sleepiness Scale. Sleep 1991; 14(6):540-5.       A full  review of systems, past , family  and social histories was performed except as mentioned in the note above, these are non contributory to the main issues discussed today.       Previous Report Reviewed: lab reports, office notes and radiology reports     The following portions of the patient's history were reviewed and updated as appropriate: She  has a past medical history of Arthritis, Cancer, uterine, Diabetes mellitus, Diabetes mellitus, type 2, DM (diabetes mellitus) (08/2019), Hypertension, Ovarian cancer, and Sciatica.  She  has a past surgical history that includes Hysterectomy; Total knee arthroplasty; Colonoscopy (N/A, 8/1/2018); Cholecystectomy; Joint replacement (Right); and Surgical excision of anal lesion (N/A, 8/29/2018).  Her family history includes Breast cancer in her maternal aunt; Cataracts in her maternal grandmother and mother; Diabetes in her brother and mother.  She  reports that she has never smoked. She has never used smokeless tobacco. She reports that she does not drink alcohol or use  "drugs.  She has a current medication list which includes the following prescription(s): amlodipine, atorvastatin, betamethasone dipropionate, candesartan, cholecalciferol (vitamin d3), fluticasone propionate, gabapentin, hydrocodone-acetaminophen, ipratropium-albuterol, levocetirizine, meloxicam, metformin, metoprolol succinate, nortriptyline, omeprazole, valacyclovir, aspirin, and azelastine, and the following Facility-Administered Medications: lactated ringers.  She is allergic to sulfa (sulfonamide antibiotics) and buprenorphine..    Review of Systems   Constitutional: Positive for fatigue and night sweats.   HENT: Positive for postnasal drip, sinus pressure and congestion. Negative for rhinorrhea.    Eyes: Positive for itching.   Respiratory: Positive for snoring, cough and dyspnea on extertion. Negative for hemoptysis and wheezing.    Cardiovascular: Positive for palpitations. Negative for chest pain and leg swelling.   Genitourinary: Negative for difficulty urinating and hematuria.   Endocrine: Negative for cold intolerance and heat intolerance.    Musculoskeletal: Positive for arthralgias and back pain.   Skin: Negative for rash.   Gastrointestinal: Positive for acid reflux. Negative for nausea and vomiting.   Neurological: Negative for dizziness, light-headedness and headaches.   Hematological: No excessive bruising.   Psychiatric/Behavioral: The patient is not nervous/anxious.    All other systems reviewed and are negative.     Objective:     Vitals:    08/26/20 1130   BP: 110/70   Pulse: 76   Resp: 16   SpO2: 97%   Weight: 78.9 kg (174 lb 0.9 oz)   Height: 5' 3" (1.6 m)     Body mass index is 30.83 kg/m².    Physical Exam  Vitals signs and nursing note reviewed.   Constitutional:       General: She is not in acute distress.     Appearance: She is well-developed.   HENT:      Head: Normocephalic and atraumatic.   Eyes:      General: No scleral icterus.     Pupils: Pupils are equal, round, and reactive to " "light.   Neck:      Thyroid: No thyroid mass or thyromegaly.      Vascular: No carotid bruit or JVD.      Comments: 14"  Cardiovascular:      Rate and Rhythm: Normal rate and regular rhythm.      Heart sounds: No murmur. No friction rub. No gallop.    Pulmonary:      Effort: Pulmonary effort is normal.      Breath sounds: Normal breath sounds. No wheezing, rhonchi or rales.   Abdominal:      General: There is no distension.      Palpations: Abdomen is soft.      Tenderness: There is no abdominal tenderness. There is no guarding or rebound.   Musculoskeletal: Normal range of motion.   Skin:     General: Skin is warm and dry.      Capillary Refill: Capillary refill takes less than 2 seconds.      Findings: No rash.   Neurological:      Mental Status: She is alert.      Coordination: Coordination normal.      Gait: Gait abnormal.         Personal Diagnostic Review      Diagnostic Review  CT of chest performed on 08/26/20 without contrast :       Base of Neck: 1.3 cm and 1 cm thyroid nodules in the right and left lobes of the thyroid gland respectively.  Did recommend thyroid ultrasound.     Thoracic soft tissues: Normal.     Aorta: Scattered atherosclerotic calcifications of the nondilated thoracic aorta.     Heart: Normal size. No effusion.     Pulmonary vasculature: Normal.     Zoya/Mediastinum: No adenopathy.     Airways: Patent.     Lungs/Pleura: 2 mm subpleural nodule in the right upper lobe.  No other pulmonary nodules identified.     Esophagus: Normal.     Upper Abdomen: 2.3 cm low-density nodule in the spleen.     Bones: Accentuated thoracic kyphosis with multilevel degenerative changes.      I have reviewed all labs and imaging studies and compared to previous results. I have also discussed labs with all the teams in the medical care of the patient and my plan is outlined below       Laboratory Data:    Lab Results   Component Value Date    CHOL 130 08/13/2020    CHOL 140 08/13/2019    CHOL 144 03/27/2018 "     Lab Results   Component Value Date    HDL 49 08/13/2020    HDL 48 08/13/2019    HDL 38 (L) 03/27/2018     Lab Results   Component Value Date    LDLCALC 54.4 (L) 08/13/2020    LDLCALC 60.4 (L) 08/13/2019    LDLCALC 74.8 03/27/2018     Lab Results   Component Value Date    TRIG 133 08/13/2020    TRIG 158 (H) 08/13/2019    TRIG 156 (H) 03/27/2018     Lab Results   Component Value Date    CHOLHDL 37.7 08/13/2020    CHOLHDL 34.3 08/13/2019    CHOLHDL 26.4 03/27/2018       Chemistry        Component Value Date/Time     08/13/2020 1115    K 4.6 08/13/2020 1115     08/13/2020 1115    CO2 28 08/13/2020 1115    BUN 18 08/13/2020 1115    CREATININE 0.7 08/13/2020 1115     08/13/2020 1115        Component Value Date/Time    CALCIUM 9.6 08/13/2020 1115    ALKPHOS 139 (H) 08/13/2020 1115    AST 21 08/13/2020 1115    ALT 22 08/13/2020 1115    BILITOT 0.4 08/13/2020 1115    ESTGFRAFRICA >60.0 08/13/2020 1115    EGFRNONAA >60.0 08/13/2020 1115          Lab Results   Component Value Date    TSH 0.800 08/13/2020     Lab Results   Component Value Date    INR 1.0 06/14/2018     Lab Results   Component Value Date    WBC 9.32 08/13/2020    HGB 9.5 (L) 08/13/2020    HCT 33.0 (L) 08/13/2020    MCV 89 08/13/2020     (H) 08/13/2020       Assessment /plan       Discussed diagnosis, its evaluation, treatment and usual course. All questions answered.    Problem List Items Addressed This Visit        Pulmonary    Chronic cough - Primary (Chronic)    Current Assessment & Plan     Etiology unclear.  Multifactorial etiology suspected.  Likely contributors to etiology ( checked)      RESPIRATORY:  [] COPD    []  Asthma  [] Parenchymal lung disease (eg.Bronchiectasis, chroninc bronchitis, CTD - ILD)  [] Pulmonary vasculitis  [] Pneumoconiosis ( Asbestosis, Silicosis, [place] Lung)  [] CTD - ILD  [] Pleural Effusion  [] Lung Tumors  [] Pulmonary Fibrosis.  ENT:  [x] Allergic  Rhinitis  [] Sinusitis  [] Laryngitis  [] History of sinus disease / rhinitis  CARDIAC:  [] CHF  [] Valvular heart disease  [] Coronary heart disease  GI:  [] Heart burn / dyspepsia  [x] History of GERD  MEDICATIONS:  [] ACE inhibitor.  [] Beta Blockers  [] Nitrofurantoin      PLAN:  [x] Antihistmines per medication orders.  [] Antibiotics per medication orders.  [x] Antitussives per medication orders.  [x] Avoid exposure to tobacco smoke and fumes.               CRLD (chronic restrictive lung disease) (Chronic)    Current Assessment & Plan     Etiology unclear.  Multifactorial etiology suspected.  Likely contributors to etiology (checked)    [] Pulmonary airway disease    []  Pulmonary parenchymal disease ( sarcoidosis)  [] Pulmonary vascular disease   [] Pleural disease  [] Pulmonary vasculitis  [] Hypoventilation ( chest wall deformity, neuromuscular disease, obesity etc)  [] Anemia  [] Thyroid disease.  [] Cardiac illess  []         Sleep disorder  [x]         Body Habitus (Obesity)    CRLD ROS: no significant ongoing wheezing.   New concerns: None.   Exam: appears well, vitals normal, no respiratory distress, acyanotic, normal RR.   Assessment:  CRLD stable.   Plan: Current treatment plan is effective, no change in therapy.         Lung nodule, solitary    Current Assessment & Plan     2 mm subpleural nodule in the right upper lobe.  No other pulmonary nodules identified.    Repeat CT chest in 1 year.     Fleischner Society Guidelines:    High risk patient:   Smoking history, history of malignancy or risk factors for malignancy.( non-solid ground glass opacities and partially solid nodules may require longer follow up to exclude indolent adenocarcinoma)      Nodule size Low risk patient High risk patient   4 mm  No follow up Follow up CT in 12 months. If unchanged, no further follow up.   4 - 6 mm Follow up CT in 12 months; if unchanged, no further follow up.  Initial follow up CT in 6 - 12 months, then at 18 -  24 months if no change.      6 - 8 mm  Initial follow up CT at 6 - 12 months and at 18 months if no change.  Initial follow up CT at 3 - 6 months. Then at 9-12 months and 24 months if no change.      > 8 mm Initial follow up CT at 3, 6, 9 and 24 months, dynamic contrast enhanced CT, PET-CT and/or biopsy. Initial follow up CT at 3, 6, 9 and 24 months, dynamic contrast enhanced CT, PET-CT and/or biopsy.            Relevant Orders    CT Chest Without Contrast       Endocrine    Class 1 obesity due to excess calories with serious comorbidity and body mass index (BMI) of 30.0 to 30.9 in adult (Chronic)    Current Assessment & Plan     General weight loss/lifestyle modification strategies discussed (elicit support from others; identify saboteurs; non-food rewards, etc).  Behavioral treatment: Slim Fast.  Diet interventions: low calorie (1000 kCal/d) deficit diet.  Informal exercise measures discussed, e.g. taking stairs instead of elevator.  Regular aerobic exercise program discussed.            Other    Chronic non-seasonal allergic rhinitis (Chronic)    Current Assessment & Plan     Allergy ROS: taking medications as instructed, no medication side effects noted.   New concerns: SINUS PAIN and DISCOMFORT, NASAL CONGESTION.   Exam: nasal congestion with pale boggy mucosa noted.   Assessment:  Allergic Rhinitis stable.   Plan: XYZAL, ASTELIN. .         Relevant Medications    azelastine (ASTELIN) 137 mcg (0.1 %) nasal spray          TIME SPENT WITH PATIENT: Time spent: 30 minutes in face to face  discussion concerning diagnosis, prognosis, review of lab and test results, benefits of treatment as well as management of disease, counseling of patient and coordination of care between various health  care providers . Greater than half the time spent was used for coordination of care and counseling of patient.     Follow up in about 6 weeks (around 10/7/2020) for NICKI, Obesity, Lung Nodule, Restrictive Lung Disease,  Cough.

## 2020-08-26 NOTE — ASSESSMENT & PLAN NOTE
2 mm subpleural nodule in the right upper lobe.  No other pulmonary nodules identified.    Repeat CT chest in 1 year.     Fleischner Society Guidelines:    High risk patient:   Smoking history, history of malignancy or risk factors for malignancy.( non-solid ground glass opacities and partially solid nodules may require longer follow up to exclude indolent adenocarcinoma)      Nodule size Low risk patient High risk patient   4 mm  No follow up Follow up CT in 12 months. If unchanged, no further follow up.   4 - 6 mm Follow up CT in 12 months; if unchanged, no further follow up.  Initial follow up CT in 6 - 12 months, then at 18 - 24 months if no change.      6 - 8 mm  Initial follow up CT at 6 - 12 months and at 18 months if no change.  Initial follow up CT at 3 - 6 months. Then at 9-12 months and 24 months if no change.      > 8 mm Initial follow up CT at 3, 6, 9 and 24 months, dynamic contrast enhanced CT, PET-CT and/or biopsy. Initial follow up CT at 3, 6, 9 and 24 months, dynamic contrast enhanced CT, PET-CT and/or biopsy.

## 2020-08-26 NOTE — ASSESSMENT & PLAN NOTE
Allergy ROS: taking medications as instructed, no medication side effects noted.   New concerns: SINUS PAIN and DISCOMFORT, NASAL CONGESTION.   Exam: nasal congestion with pale boggy mucosa noted.   Assessment:  Allergic Rhinitis stable.   Plan: XYZAL, ASTELIN. .

## 2020-08-31 PROCEDURE — 99457 PR MONITORING, PHYSIOL PARAM, REMOTE, 1ST 20 MINS, PER MONTH: ICD-10-PCS | Mod: S$PBB,,, | Performed by: FAMILY MEDICINE

## 2020-08-31 PROCEDURE — 99457 RPM TX MGMT 1ST 20 MIN: CPT | Mod: S$PBB,,, | Performed by: FAMILY MEDICINE

## 2020-09-25 ENCOUNTER — PATIENT OUTREACH (OUTPATIENT)
Dept: OTHER | Facility: OTHER | Age: 70
End: 2020-09-25

## 2020-10-02 NOTE — PROGRESS NOTES
[facility]: Health  Follow-Up    The history is provided by the patient.             Reason for review: Blood pressure at goal      Additional Follow-up details: Patient reports she is well, no complaints. Denies symptoms of hypertension- HA, chest pains and SOB.     Discussed COVID-19 and importance of proper hand hygiene and social distancing.     Patient denies further needs from the program today.             Diet-no change to diet    No change to diet.        Physical Activity-no change to routine  No change to exercise routine.     Medication Adherence-Medication adherence was assessed.        Substance, Sleep, Stress-No change  stress-  Details:  Intervention(s):    Sleep-  Details:  Intervention(s):    Alcohol -  Details:  Intervention(s):    Tobacco-  Details:  Intervention(s):          Continue current diet/physical activity routine.  Provided patient education.       Addressed patient questions and patient has my contact information if needed prior to next outreach. Patient verbalizes understanding.      Explained the importance of self-monitoring and medication adherence. Encouraged the patient to communicate with their health  for lifestyle modifications to help improve or maintain a healthy lifestyle.            There are no preventive care reminders to display for this patient.    Last 5 Patient Entered Readings                                      Current 30 Day Average: 130/75     Recent Readings 10/1/2020 9/28/2020 9/26/2020 9/23/2020 9/21/2020    SBP (mmHg) 131 123 122 134 136    DBP (mmHg) 75 73 72 79 75    Pulse 69 70 70 70 76

## 2020-10-08 ENCOUNTER — OFFICE VISIT (OUTPATIENT)
Dept: PULMONOLOGY | Facility: CLINIC | Age: 70
End: 2020-10-08
Payer: MEDICARE

## 2020-10-08 ENCOUNTER — LAB VISIT (OUTPATIENT)
Dept: LAB | Facility: HOSPITAL | Age: 70
End: 2020-10-08
Attending: INTERNAL MEDICINE
Payer: MEDICARE

## 2020-10-08 VITALS
RESPIRATION RATE: 16 BRPM | OXYGEN SATURATION: 97 % | DIASTOLIC BLOOD PRESSURE: 78 MMHG | SYSTOLIC BLOOD PRESSURE: 130 MMHG | BODY MASS INDEX: 31.99 KG/M2 | HEIGHT: 63 IN | HEART RATE: 87 BPM | WEIGHT: 180.56 LBS

## 2020-10-08 DIAGNOSIS — D64.9 NORMOCYTIC ANEMIA: Chronic | ICD-10-CM

## 2020-10-08 DIAGNOSIS — R05.3 CHRONIC COUGH: Chronic | ICD-10-CM

## 2020-10-08 DIAGNOSIS — E66.09 CLASS 1 OBESITY DUE TO EXCESS CALORIES WITH SERIOUS COMORBIDITY AND BODY MASS INDEX (BMI) OF 31.0 TO 31.9 IN ADULT: Chronic | ICD-10-CM

## 2020-10-08 DIAGNOSIS — R91.1 LUNG NODULE, SOLITARY: Chronic | ICD-10-CM

## 2020-10-08 DIAGNOSIS — J98.4 CRLD (CHRONIC RESTRICTIVE LUNG DISEASE): Primary | Chronic | ICD-10-CM

## 2020-10-08 DIAGNOSIS — J30.89 CHRONIC NON-SEASONAL ALLERGIC RHINITIS: Chronic | ICD-10-CM

## 2020-10-08 PROBLEM — D50.9 IRON DEFICIENCY ANEMIA: Chronic | Status: ACTIVE | Noted: 2020-10-08

## 2020-10-08 LAB — RETICS/RBC NFR AUTO: 2.2 % (ref 0.5–2.5)

## 2020-10-08 PROCEDURE — 82746 ASSAY OF FOLIC ACID SERUM: CPT

## 2020-10-08 PROCEDURE — 99999 PR PBB SHADOW E&M-EST. PATIENT-LVL V: CPT | Mod: PBBFAC,,, | Performed by: INTERNAL MEDICINE

## 2020-10-08 PROCEDURE — 83540 ASSAY OF IRON: CPT

## 2020-10-08 PROCEDURE — 82607 VITAMIN B-12: CPT

## 2020-10-08 PROCEDURE — 99214 OFFICE O/P EST MOD 30 MIN: CPT | Mod: S$PBB,,, | Performed by: INTERNAL MEDICINE

## 2020-10-08 PROCEDURE — 99214 PR OFFICE/OUTPT VISIT, EST, LEVL IV, 30-39 MIN: ICD-10-PCS | Mod: S$PBB,,, | Performed by: INTERNAL MEDICINE

## 2020-10-08 PROCEDURE — 99215 OFFICE O/P EST HI 40 MIN: CPT | Mod: PBBFAC | Performed by: INTERNAL MEDICINE

## 2020-10-08 PROCEDURE — 99999 PR PBB SHADOW E&M-EST. PATIENT-LVL V: ICD-10-PCS | Mod: PBBFAC,,, | Performed by: INTERNAL MEDICINE

## 2020-10-08 PROCEDURE — 82728 ASSAY OF FERRITIN: CPT

## 2020-10-08 PROCEDURE — 36415 COLL VENOUS BLD VENIPUNCTURE: CPT

## 2020-10-08 PROCEDURE — 85045 AUTOMATED RETICULOCYTE COUNT: CPT

## 2020-10-08 NOTE — ASSESSMENT & PLAN NOTE
2 mm subpleural nodule in the right upper lobe.  No other pulmonary nodules identified.    Repeat CT chest in 10 months.     Fleischner Society Guidelines:    High risk patient:   Smoking history, history of malignancy or risk factors for malignancy.( non-solid ground glass opacities and partially solid nodules may require longer follow up to exclude indolent adenocarcinoma)      Nodule size Low risk patient High risk patient   4 mm  No follow up Follow up CT in 12 months. If unchanged, no further follow up.   4 - 6 mm Follow up CT in 12 months; if unchanged, no further follow up.  Initial follow up CT in 6 - 12 months, then at 18 - 24 months if no change.      6 - 8 mm  Initial follow up CT at 6 - 12 months and at 18 months if no change.  Initial follow up CT at 3 - 6 months. Then at 9-12 months and 24 months if no change.      > 8 mm Initial follow up CT at 3, 6, 9 and 24 months, dynamic contrast enhanced CT, PET-CT and/or biopsy. Initial follow up CT at 3, 6, 9 and 24 months, dynamic contrast enhanced CT, PET-CT and/or biopsy.

## 2020-10-08 NOTE — ASSESSMENT & PLAN NOTE
Etiology unclear.  Multifactorial etiology suspected.  Likely contributors to etiology (checked)    []Pulmonary airway disease    [] Pulmonary parenchymal disease ( sarcoidosis)  []Pulmonary vascular disease   []Pleural disease  []Pulmonary vasculitis  []Hypoventilation ( chest wall deformity, neuromuscular disease, obesity etc)  []Anemia  []Thyroid disease.  []Cardiac illess  []Sleep disorder  [x] Body Habitus (Obesity)    CRLD ROS: no significant ongoing wheezing.   New concerns: None.   Exam: appears well, vitals normal, no respiratory distress, acyanotic, normal RR.   Assessment:  CRLD stable.   Plan: COMBIVENT as needed. Current treatment plan is effective, no change in therapy.

## 2020-10-08 NOTE — PROGRESS NOTES
Subjective:      Patient ID: Venus Caldwell is a 70 y.o. female.    Patient Active Problem List   Diagnosis    Hypertension associated with diabetes    Mixed diabetic hyperlipidemia associated with type 2 diabetes mellitus    Gastroesophageal reflux disease without esophagitis    Primary osteoarthritis involving multiple joints    Chronic non-seasonal allergic rhinitis    Coronary artery disease involving native coronary artery    Paroxysmal atrial tachycardia    Abdominal aortic atherosclerosis    Sciatica    Palpitations    (HFpEF) heart failure with preserved ejection fraction    Class 1 obesity due to excess calories with serious comorbidity and body mass index (BMI) of 31.0 to 31.9 in adult    Encounter for comprehensive diabetic foot examination, type 2 diabetes mellitus    Type 2 diabetes mellitus without complication, without long-term current use of insulin    Nail dystrophy    Chronic cough    SVT (supraventricular tachycardia)    CRLD (chronic restrictive lung disease)    Osteopenia    Lung nodule, solitary    Normocytic anemia       Problem list has been reviewed.    Chief Complaint: NICKI on CPAP and CRLD            HPI:     Patients reports stable  NYHA I dyspnea    The patient does not have currently have symptoms / an exacerbation.       No recent change in breathing.       She reports that her cough is better but she has noticed simona association between the cough and her beagle. She thinks she is allergic to dog dander. She reports that allergy testing revealed allergy to dust mite, mold and pollen amongst other things. She does not recall being on AIT.     Her daily allergy regimen is XYZAL and ASTELIN    She has GERD. Controlled on PRILOSEC.    She has NICKI. She is on CPAP of 7 CMWP. She is complainant with her CPAP. She definitely thinks PAP is beneficial to her health and she wants to continue with PAP therapy.      Compliance Summary  7/10/2020 - 10/7/2020 (90  days)  Days with Device Usage 89 days  Days without Device Usage 1 day  Percent Days with Device Usage 98.9%  Cumulative Usage 19 days 6 hrs. 38 mins. 8 secs.  Maximum Usage (1 Day) 8 hrs. 19 mins. 8 secs.  Average Usage (All Days) 5 hrs. 8 mins. 25 secs.  Average Usage (Days Used) 5 hrs. 11 mins. 53 secs.  Minimum Usage (1 Day) 2 hrs. 48 mins. 49 secs.  Percent of Days with Usage >= 4 Hours 93.3%  Percent of Days with Usage < 4 Hours 6.7%  Total Blower Time 19 days 19 hrs. 30 mins. 38 secs.  CPAP Summary  Average Time in Large Leak Per Day 8 mins. 44 secs.  Average AHI 5.3  CPAP 7.0 cmH2O        [place] Sleepiness Scale   EPWORTH SLEEPINESS SCALE 10/8/2020 8/26/2020 6/16/2020 10/8/2019   Sitting and reading 1 3 2 3   Watching TV 2 2 1 3   Sitting, inactive in a public place (e.g. a theatre or a meeting) 0 0 0 2   As a passenger in a car for an hour without a break 1 2 0 0   Lying down to rest in the afternoon when circumstances permit 1 1 0 1   Sitting and talking to someone 0 0 0 0   Sitting quietly after a lunch without alcohol 1 0 0 0   In a car, while stopped for a few minutes in traffic 0 0 0 0   Total score 6 8 3 9       Reference: Alexandro PEÑA. A new method for measuring daytime sleepiness: The [place]  Sleepiness Scale. Sleep 1991; 14(6):540-5.    Immunization status reviewed and up to date.     A full  review of systems, past , family  and social histories was performed except as mentioned in the note above, these are non contributory to the main issues discussed today.       Previous Report Reviewed: lab reports, office notes and radiology reports     The following portions of the patient's history were reviewed and updated as appropriate: She  has a past medical history of Arthritis, Cancer, uterine, Diabetes mellitus, Diabetes mellitus, type 2, DM (diabetes mellitus) (08/2019), Hypertension, Ovarian cancer, and Sciatica.  She  has a past surgical history that includes Hysterectomy; Total knee arthroplasty;  Colonoscopy (N/A, 8/1/2018); Cholecystectomy; Joint replacement (Right); and Surgical excision of anal lesion (N/A, 8/29/2018).  Her family history includes Breast cancer in her maternal aunt; Cataracts in her maternal grandmother and mother; Diabetes in her brother and mother.  She  reports that she has never smoked. She has never used smokeless tobacco. She reports that she does not drink alcohol or use drugs.  She has a current medication list which includes the following prescription(s): amlodipine, atorvastatin, azelastine, betamethasone dipropionate, candesartan, cholecalciferol (vitamin d3), gabapentin, hydrocodone-acetaminophen, ipratropium-albuterol, levocetirizine, meloxicam, metformin, metoprolol succinate, nortriptyline, omeprazole, valacyclovir, and aspirin, and the following Facility-Administered Medications: lactated ringers.  She is allergic to sulfa (sulfonamide antibiotics) and buprenorphine..    Review of Systems   Constitutional: Positive for fatigue and night sweats.   HENT: Positive for postnasal drip, sinus pressure and congestion. Negative for rhinorrhea.    Eyes: Positive for itching.   Respiratory: Positive for snoring, cough and dyspnea on extertion. Negative for hemoptysis and wheezing.    Cardiovascular: Positive for palpitations. Negative for chest pain and leg swelling.   Genitourinary: Negative for difficulty urinating and hematuria.   Endocrine: Negative for cold intolerance and heat intolerance.    Musculoskeletal: Positive for arthralgias and back pain.   Skin: Negative for rash.   Gastrointestinal: Positive for acid reflux. Negative for nausea and vomiting.   Neurological: Negative for dizziness, light-headedness and headaches.   Hematological: No excessive bruising.   Psychiatric/Behavioral: The patient is not nervous/anxious.    All other systems reviewed and are negative.     Objective:     Vitals:    10/08/20 1255   BP: 130/78   Pulse: 87   Resp: 16   SpO2: 97%   Weight: 81.9  "kg (180 lb 8.9 oz)   Height: 5' 3" (1.6 m)     Body mass index is 31.98 kg/m².    Physical Exam  Vitals signs and nursing note reviewed.   Constitutional:       General: She is not in acute distress.     Appearance: She is well-developed.   HENT:      Head: Normocephalic and atraumatic.   Eyes:      General: No scleral icterus.     Pupils: Pupils are equal, round, and reactive to light.   Neck:      Thyroid: No thyroid mass or thyromegaly.      Vascular: No carotid bruit or JVD.      Comments: 14"  Cardiovascular:      Rate and Rhythm: Normal rate and regular rhythm.      Heart sounds: No murmur. No friction rub. No gallop.    Pulmonary:      Effort: Pulmonary effort is normal.      Breath sounds: Normal breath sounds. No wheezing, rhonchi or rales.   Abdominal:      General: There is no distension.      Palpations: Abdomen is soft.      Tenderness: There is no abdominal tenderness. There is no guarding or rebound.   Musculoskeletal: Normal range of motion.   Skin:     General: Skin is warm and dry.      Capillary Refill: Capillary refill takes less than 2 seconds.      Findings: No rash.   Neurological:      Mental Status: She is alert.      Coordination: Coordination normal.      Gait: Gait abnormal.         Personal Diagnostic Review       CT of chest performed on 08/26/20 without contrast :       Base of Neck: 1.3 cm and 1 cm thyroid nodules in the right and left lobes of the thyroid gland respectively.  Did recommend thyroid ultrasound.     Thoracic soft tissues: Normal.     Aorta: Scattered atherosclerotic calcifications of the nondilated thoracic aorta.     Heart: Normal size. No effusion.     Pulmonary vasculature: Normal.     Zoya/Mediastinum: No adenopathy.     Airways: Patent.     Lungs/Pleura: 2 mm subpleural nodule in the right upper lobe.  No other pulmonary nodules identified.     Esophagus: Normal.     Upper Abdomen: 2.3 cm low-density nodule in the spleen.     Bones: Accentuated thoracic kyphosis " with multilevel degenerative changes.      I have reviewed all labs and imaging studies and compared to previous results. I have also discussed labs with all the teams in the medical care of the patient and my plan is outlined below       Laboratory Data:    Lab Results   Component Value Date    CHOL 130 08/13/2020    CHOL 140 08/13/2019    CHOL 144 03/27/2018     Lab Results   Component Value Date    HDL 49 08/13/2020    HDL 48 08/13/2019    HDL 38 (L) 03/27/2018     Lab Results   Component Value Date    LDLCALC 54.4 (L) 08/13/2020    LDLCALC 60.4 (L) 08/13/2019    LDLCALC 74.8 03/27/2018     Lab Results   Component Value Date    TRIG 133 08/13/2020    TRIG 158 (H) 08/13/2019    TRIG 156 (H) 03/27/2018     Lab Results   Component Value Date    CHOLHDL 37.7 08/13/2020    CHOLHDL 34.3 08/13/2019    CHOLHDL 26.4 03/27/2018       Chemistry        Component Value Date/Time     08/13/2020 1115    K 4.6 08/13/2020 1115     08/13/2020 1115    CO2 28 08/13/2020 1115    BUN 18 08/13/2020 1115    CREATININE 0.7 08/13/2020 1115     08/13/2020 1115        Component Value Date/Time    CALCIUM 9.6 08/13/2020 1115    ALKPHOS 139 (H) 08/13/2020 1115    AST 21 08/13/2020 1115    ALT 22 08/13/2020 1115    BILITOT 0.4 08/13/2020 1115    ESTGFRAFRICA >60.0 08/13/2020 1115    EGFRNONAA >60.0 08/13/2020 1115          Lab Results   Component Value Date    TSH 0.800 08/13/2020     Lab Results   Component Value Date    INR 1.0 06/14/2018     Lab Results   Component Value Date    WBC 9.32 08/13/2020    HGB 9.5 (L) 08/13/2020    HCT 33.0 (L) 08/13/2020    MCV 89 08/13/2020     (H) 08/13/2020       Assessment /plan       Discussed diagnosis, its evaluation, treatment and usual course. All questions answered.    Problem List Items Addressed This Visit        Pulmonary    Chronic cough (Chronic)    Current Assessment & Plan     Etiology unclear.  Multifactorial etiology suspected.  Likely contributors to etiology (  checked)      RESPIRATORY:  [] COPD    []  Asthma  [] Parenchymal lung disease (eg.Bronchiectasis, chroninc bronchitis, CTD - ILD)  [] Pulmonary vasculitis  [] Pneumoconiosis ( Asbestosis, Silicosis, [place] [facility])  [] CTD - ILD  [] Pleural Effusion  [] Lung Tumors  [] Pulmonary Fibrosis.  ENT:  [x] Allergic Rhinitis  [] Sinusitis  [] Laryngitis  [] History of sinus disease / rhinitis  CARDIAC:  [] CHF  [] Valvular heart disease  [] Coronary heart disease  GI:  [] Heart burn / dyspepsia  [x] History of GERD  MEDICATIONS:  [] ACE inhibitor.  [] Beta Blockers  [] Nitrofurantoin      PLAN:  [x] Antihistmines per medication orders.  [] Antibiotics per medication orders.  [x] Antitussives per medication orders.  [x] Avoid exposure to tobacco smoke and fumes.               CRLD (chronic restrictive lung disease) - Primary (Chronic)    Current Assessment & Plan     Etiology unclear.  Multifactorial etiology suspected.  Likely contributors to etiology (checked)    []Pulmonary airway disease    [] Pulmonary parenchymal disease ( sarcoidosis)  []Pulmonary vascular disease   []Pleural disease  []Pulmonary vasculitis  []Hypoventilation ( chest wall deformity, neuromuscular disease, obesity etc)  []Anemia  []Thyroid disease.  []Cardiac illess  []Sleep disorder  [x] Body Habitus (Obesity)    CRLD ROS: no significant ongoing wheezing.   New concerns: None.   Exam: appears well, vitals normal, no respiratory distress, acyanotic, normal RR.   Assessment:  CRLD stable.   Plan: COMBIVENT as needed. Current treatment plan is effective, no change in therapy.         Lung nodule, solitary    Current Assessment & Plan     2 mm subpleural nodule in the right upper lobe.  No other pulmonary nodules identified.    Repeat CT chest in 10 months.     Fleischner Society Guidelines:    High risk patient:   Smoking history, history of malignancy or risk factors for malignancy.( non-solid ground glass opacities and partially solid nodules may  require longer follow up to exclude indolent adenocarcinoma)      Nodule size Low risk patient High risk patient   4 mm  No follow up Follow up CT in 12 months. If unchanged, no further follow up.   4 - 6 mm Follow up CT in 12 months; if unchanged, no further follow up.  Initial follow up CT in 6 - 12 months, then at 18 - 24 months if no change.      6 - 8 mm  Initial follow up CT at 6 - 12 months and at 18 months if no change.  Initial follow up CT at 3 - 6 months. Then at 9-12 months and 24 months if no change.      > 8 mm Initial follow up CT at 3, 6, 9 and 24 months, dynamic contrast enhanced CT, PET-CT and/or biopsy. Initial follow up CT at 3, 6, 9 and 24 months, dynamic contrast enhanced CT, PET-CT and/or biopsy.               Oncology    Normocytic anemia (Chronic)    Current Assessment & Plan     Mild normocytic hypochromic anemia.    Anemia work up.         Relevant Orders    Ferritin    Iron and TIBC    Vitamin B12    Folate    RETICULOCYTES       Endocrine    Class 1 obesity due to excess calories with serious comorbidity and body mass index (BMI) of 31.0 to 31.9 in adult (Chronic)    Current Assessment & Plan     General weight loss/lifestyle modification strategies discussed (elicit support from others; identify saboteurs; non-food rewards, etc).  Diet interventions: low calorie (1000 kCal/d) deficit diet.  Informal exercise measures discussed, e.g. taking stairs instead of elevator.  Regular aerobic exercise program discussed.            Other    Chronic non-seasonal allergic rhinitis (Chronic)    Current Assessment & Plan     Allergy ROS: taking medications as instructed, no medication side effects noted.   New concerns: SINUS PAIN and DISCOMFORT, NASAL CONGESTION.   Exam: nasal congestion with pale boggy mucosa noted.   Assessment:  Allergic Rhinitis stable.   Plan: XYZAL, ASTELIN. Ambulatory referral to ALLERGY.         Relevant Orders    Ambulatory referral/consult to Allergy          TIME SPENT  WITH PATIENT: Time spent: 30 minutes in face to face  discussion concerning diagnosis, prognosis, review of lab and test results, benefits of treatment as well as management of disease, counseling of patient and coordination of care between various health  care providers . Greater than half the time spent was used for coordination of care and counseling of patient.     Follow up in about 3 months (around 1/8/2021) for Restrictive Lung Disease, Allergic Rhinitis, Obesity, Lung Nodule.

## 2020-10-08 NOTE — ASSESSMENT & PLAN NOTE
Etiology unclear.  Multifactorial etiology suspected.  Likely contributors to etiology ( checked)      RESPIRATORY:  [] COPD    []  Asthma  [] Parenchymal lung disease (eg.Bronchiectasis, chroninc bronchitis, CTD - ILD)  [] Pulmonary vasculitis  [] Pneumoconiosis ( Asbestosis, Silicosis, [place] [facility])  [] CTD - ILD  [] Pleural Effusion  [] Lung Tumors  [] Pulmonary Fibrosis.  ENT:  [x] Allergic Rhinitis  [] Sinusitis  [] Laryngitis  [] History of sinus disease / rhinitis  CARDIAC:  [] CHF  [] Valvular heart disease  [] Coronary heart disease  GI:  [] Heart burn / dyspepsia  [x] History of GERD  MEDICATIONS:  [] ACE inhibitor.  [] Beta Blockers  [] Nitrofurantoin      PLAN:  [x] Antihistmines per medication orders.  [] Antibiotics per medication orders.  [x] Antitussives per medication orders.  [x] Avoid exposure to tobacco smoke and fumes.

## 2020-10-08 NOTE — PATIENT INSTRUCTIONS
Obstructive Sleep Apnea  Obstructive sleep apnea is a condition that causes your air passages to become narrowed or blocked during sleep. As a result, breathing stops for short periods. Your body wakes up enough for breathing to begin again, though you don't remember it. The cycle of stopped breathing and brief awakenings can repeat dozens of times a night. This prevents the body from getting to the deeper stages of sleep that are needed for good rest and may cause your body's oxygen level to fall.  Signs of sleep apnea include loud snoring, noisy breathing, and gasping sounds during sleep. Daytime symptoms include waking up tired after a full night's sleep, waking up with headaches, feeling very sleepy or falling asleep during the day, and having problems with memory or concentration.  Risk factors for sleep apnea include:  · Being overweight  · Being a man, or a woman in menopause  · Smoking  · Using alcohol or sedating medicines  · Having enlarged structures in the nose or throat  Home care  Lifestyle changes that can help treat snoring and sleep apnea include the following:  · If you are overweight, lose weight. Talk to your healthcare provider about a weight-loss plan for you.  · Avoid alcohol for 3 to 4 hours before bedtime. Avoid sedating medications. Ask your healthcare provider about the medicines you take.  · If you smoke, talk to your healthcare provider about ways to quit.  · Sleep on your side. This can help prevent gravity from pulling relaxed throat tissues into your breathing passages.  · If you have allergies or sinus problems that block your nose, ask your healthcare provider for help.  Follow-up care  Follow up with your healthcare provider, or as advised. A diagnosis of sleep apnea is made with a sleep study. Your healthcare provider can tell you more about this test.  When to seek medical advice  Sleep apnea can make you more likely to have certain health problems. These include high blood  pressure, heart attack, stroke, and sexual dysfunction. If you have sleep apnea, talk to your healthcare provider about the best treatments for you.  Date Last Reviewed: 4/1/2017  © 0999-7518 [employer]. [address] [place] [address], [place] [address]. All rights reserved. This information is not intended as a substitute for professional medical care. Always follow your healthcare professional's instructions.

## 2020-10-08 NOTE — ASSESSMENT & PLAN NOTE
Allergy ROS: taking medications as instructed, no medication side effects noted.   New concerns: SINUS PAIN and DISCOMFORT, NASAL CONGESTION.   Exam: nasal congestion with pale boggy mucosa noted.   Assessment:  Allergic Rhinitis stable.   Plan: XYZAL, ASTELIN. Ambulatory referral to ALLERGY.

## 2020-10-09 ENCOUNTER — TELEPHONE (OUTPATIENT)
Dept: PULMONOLOGY | Facility: CLINIC | Age: 70
End: 2020-10-09

## 2020-10-09 DIAGNOSIS — D50.9 IRON DEFICIENCY ANEMIA, UNSPECIFIED IRON DEFICIENCY ANEMIA TYPE: Primary | ICD-10-CM

## 2020-10-09 LAB
FERRITIN SERPL-MCNC: 14 NG/ML (ref 20–300)
FOLATE SERPL-MCNC: 7.8 NG/ML (ref 4–24)
IRON SERPL-MCNC: 29 UG/DL (ref 30–160)
SATURATED IRON: 6 % (ref 20–50)
TOTAL IRON BINDING CAPACITY: 447 UG/DL (ref 250–450)
TRANSFERRIN SERPL-MCNC: 302 MG/DL (ref 200–375)
VIT B12 SERPL-MCNC: 314 PG/ML (ref 210–950)

## 2020-10-09 NOTE — TELEPHONE ENCOUNTER
Anemia panel reviewed with patient who voiced understanding.     Assessment:     Iron Deficiency Anemia:    Iron Decreased   Saturated Iron Decreased   Ferritin Decreased   TIBC Normal  / Increased       Plan:  1. Ambulatory referral to haematology for valuation and management.

## 2020-10-12 ENCOUNTER — OFFICE VISIT (OUTPATIENT)
Dept: HEMATOLOGY/ONCOLOGY | Facility: CLINIC | Age: 70
End: 2020-10-12
Payer: MEDICARE

## 2020-10-12 VITALS
SYSTOLIC BLOOD PRESSURE: 145 MMHG | DIASTOLIC BLOOD PRESSURE: 87 MMHG | WEIGHT: 179 LBS | HEART RATE: 80 BPM | TEMPERATURE: 98 F | OXYGEN SATURATION: 95 % | HEIGHT: 63 IN | BODY MASS INDEX: 31.71 KG/M2

## 2020-10-12 DIAGNOSIS — D64.9 NORMOCYTIC ANEMIA: Chronic | ICD-10-CM

## 2020-10-12 DIAGNOSIS — D50.8 IRON DEFICIENCY ANEMIA SECONDARY TO INADEQUATE DIETARY IRON INTAKE: ICD-10-CM

## 2020-10-12 DIAGNOSIS — K21.9 GASTROESOPHAGEAL REFLUX DISEASE WITHOUT ESOPHAGITIS: Primary | Chronic | ICD-10-CM

## 2020-10-12 DIAGNOSIS — D50.9 IRON DEFICIENCY ANEMIA, UNSPECIFIED IRON DEFICIENCY ANEMIA TYPE: ICD-10-CM

## 2020-10-12 PROCEDURE — 99999 PR PBB SHADOW E&M-EST. PATIENT-LVL V: CPT | Mod: PBBFAC,,, | Performed by: INTERNAL MEDICINE

## 2020-10-12 PROCEDURE — 99215 OFFICE O/P EST HI 40 MIN: CPT | Mod: PBBFAC | Performed by: INTERNAL MEDICINE

## 2020-10-12 PROCEDURE — 99205 OFFICE O/P NEW HI 60 MIN: CPT | Mod: 25,S$PBB,, | Performed by: INTERNAL MEDICINE

## 2020-10-12 PROCEDURE — 99999 PR PBB SHADOW E&M-EST. PATIENT-LVL V: ICD-10-PCS | Mod: PBBFAC,,, | Performed by: INTERNAL MEDICINE

## 2020-10-12 PROCEDURE — 99205 PR OFFICE/OUTPT VISIT, NEW, LEVL V, 60-74 MIN: ICD-10-PCS | Mod: 25,S$PBB,, | Performed by: INTERNAL MEDICINE

## 2020-10-12 RX ORDER — HEPARIN 100 UNIT/ML
500 SYRINGE INTRAVENOUS
Status: CANCELLED | OUTPATIENT
Start: 2020-10-26

## 2020-10-12 RX ORDER — SODIUM CHLORIDE 0.9 % (FLUSH) 0.9 %
10 SYRINGE (ML) INJECTION
Status: CANCELLED | OUTPATIENT
Start: 2020-10-26

## 2020-10-12 RX ORDER — SODIUM CHLORIDE 0.9 % (FLUSH) 0.9 %
10 SYRINGE (ML) INJECTION
Status: CANCELLED | OUTPATIENT
Start: 2020-11-02

## 2020-10-12 RX ORDER — HEPARIN 100 UNIT/ML
500 SYRINGE INTRAVENOUS
Status: CANCELLED | OUTPATIENT
Start: 2020-11-02

## 2020-10-12 NOTE — PROGRESS NOTES
Subjective:   Date of Visit: 10/12/20   ?   ?    REFERRING PROVIDER: Darien Farrell MD  [address] [facility]  [place] [address]   ?   CHIEF COMPLAINT:  Iron deficiency anemia???????       HPI:  70-year-old female with past medical history significant for endometrial cancer (@ 58 yrs), diabetes type 2, hypertension was referred to us by Dr. Farrell due to recently noted iron deficiency anemia.    Review of most recent iron studies performed on 10/08/2020 showed serum iron of 29, iron saturation of 6% and ferritin level of 14 ng per cc.  CBC from August 2020 showed hemoglobin of 9.5 grams/deciliter, hematocrit of 33.0 however normal MCV.  There was also noticeable thrombocytosis with platelet count at 405.    She denies abnormal bleed including vaginal bleed given her prior history for endometrial cancer.  She denies epistaxis, hemoptysis, hematemesis, hematochezia, melena or hematuria. Denies unintentional weight loss, night sweats, nausea vomiting, abdominal pain, diarrhea or dysuria.    Colonoscopy performed in August 2018 showed a one 4 mm polyp in the transverse colon that was noted to be benign-hyperplastic polyp.  There was also diverticulosis at the hepatic flexure.  Recommended 5 year repeat which will be 2023.  During the colonoscopy, erectile nodule was noted that was biopsied and showed to be simple inclusion cyst.    No pertinent family history or occupational history.      Review of Systems   Constitutional: Positive for fatigue. Negative for activity change, appetite change, chills, fever and unexpected weight change.   HENT: Negative for hearing loss, mouth sores, nosebleeds, sore throat, tinnitus, trouble swallowing and voice change.    Eyes: Negative for visual disturbance.   Respiratory: Positive for shortness of breath. Negative for cough and chest tightness.    Cardiovascular: Negative for chest pain, palpitations and leg swelling.   Gastrointestinal: Negative for abdominal pain, anal  bleeding, blood in stool, constipation, diarrhea, nausea and vomiting.   Genitourinary: Negative for dysuria, frequency, hematuria, pelvic pain, vaginal bleeding and vaginal pain.   Musculoskeletal: Negative for arthralgias, back pain, joint swelling and neck pain.   Skin: Negative for color change, pallor, rash and wound.   Allergic/Immunologic: Negative for immunocompromised state.   Neurological: Positive for weakness. Negative for dizziness, tremors, syncope, speech difficulty, light-headedness and headaches.   Hematological: Negative for adenopathy. Does not bruise/bleed easily.   Psychiatric/Behavioral: Negative for agitation, confusion, decreased concentration, hallucinations and sleep disturbance. The patient is not nervous/anxious.        ?   PAST MEDICAL HISTORY:   Past Medical History:   Diagnosis Date    Arthritis     Cancer, uterine     Diabetes mellitus     prediabetes    Diabetes mellitus, type 2     DM (diabetes mellitus) 08/2019    BS doesn't check 12/16/2019    Hypertension     Ovarian cancer     Sciatica     ?     PAST SURGICAL HISTORY:   Past Surgical History:   Procedure Laterality Date    CHOLECYSTECTOMY      COLONOSCOPY N/A 8/1/2018    Procedure: COLONOSCOPY;  Surgeon: Yrn Hunter III, MD;  Location: [facility] ENDO;  Service: Endoscopy;  Laterality: N/A;    HYSTERECTOMY      JOINT REPLACEMENT Right     hip replacement    SURGICAL EXCISION OF ANAL LESION N/A 8/29/2018    Procedure: EXCISION, LESION, ANUS;  Surgeon: Yrn Balderrama MD;  Location: [facility] OR;  Service: General;  Laterality: N/A;    TOTAL KNEE ARTHROPLASTY        ?   ALLERGIES:   Allergies as of 10/12/2020 - Reviewed 10/12/2020   Allergen Reaction Noted    Sulfa (sulfonamide antibiotics) Anaphylaxis 04/13/2017    Buprenorphine Rash 07/26/2017      ?   MEDICATIONS:?   No outpatient medications have been marked as taking for the 10/12/20 encounter (Office Visit) with Álvaro Campbell MD.      ?   SOCIAL HISTORY:?    Social History     Tobacco Use    Smoking status: Never Smoker    Smokeless tobacco: Never Used   Substance Use Topics    Alcohol use: No     Frequency: Never     Drinks per session: Patient refused     Binge frequency: Never        ?   FAMILY HISTORY:   family history includes Breast cancer in her maternal aunt; Cataracts in her maternal grandmother and mother; Diabetes in her brother and mother.   ?     Objective:      Physical Exam  Constitutional:       General: She is not in acute distress.     Appearance: She is well-developed. She is cachectic. She is not ill-appearing or toxic-appearing.   HENT:      Head: Normocephalic and atraumatic.      Mouth/Throat:      Pharynx: No oropharyngeal exudate.   Eyes:      General: No scleral icterus.        Right eye: No discharge.         Left eye: No discharge.      Conjunctiva/sclera: Conjunctivae normal.      Pupils: Pupils are equal, round, and reactive to light.   Neck:      Musculoskeletal: Normal range of motion and neck supple.      Thyroid: No thyromegaly.   Cardiovascular:      Rate and Rhythm: Normal rate and regular rhythm.      Heart sounds: No murmur.   Pulmonary:      Effort: Pulmonary effort is normal. No respiratory distress.      Breath sounds: Normal breath sounds.   Chest:      Chest wall: No tenderness.   Abdominal:      General: Bowel sounds are normal. There is no distension.      Palpations: Abdomen is soft. There is no mass.      Tenderness: There is no abdominal tenderness. There is no guarding or rebound.   Musculoskeletal: Normal range of motion.         General: No tenderness.   Lymphadenopathy:      Cervical: No cervical adenopathy.      Right cervical: No superficial cervical adenopathy.     Left cervical: No superficial cervical adenopathy.      Upper Body:      Right upper body: No supraclavicular or pectoral adenopathy.      Left upper body: No supraclavicular or pectoral adenopathy.   Skin:     General: Skin is warm and dry.       Capillary Refill: Capillary refill takes 2 to 3 seconds.      Coloration: Skin is not pale.      Findings: No erythema or rash.   Neurological:      Mental Status: She is alert and oriented to person, place, and time.      Cranial Nerves: No cranial nerve deficit.      Sensory: No sensory deficit.   Psychiatric:         Behavior: Behavior normal. Behavior is cooperative.         Judgment: Judgment normal.       ?   Vitals:    10/12/20 1304   BP: (!) 145/87   Pulse: 80   Temp: 98 °F (36.7 °C)      ?     ECOG SCORE    1 - Restricted in strenuous activity-ambulatory and able to carry out work of a light nature         ?   Laboratory:  ?   No visits with results within 1 Day(s) from this visit.   Latest known visit with results is:   Lab Visit on 10/08/2020   Component Date Value Ref Range Status    Ferritin 10/08/2020 14* 20.0 - 300.0 ng/mL Final    Iron 10/08/2020 29* 30 - 160 ug/dL Final    Transferrin 10/08/2020 302  200 - 375 mg/dL Final    TIBC 10/08/2020 447  250 - 450 ug/dL Final    Saturated Iron 10/08/2020 6* 20 - 50 % Final    Vitamin B-12 10/08/2020 314  210 - 950 pg/mL Final    Folate 10/08/2020 7.8  4.0 - 24.0 ng/mL Final    Retic 10/08/2020 2.2  0.5 - 2.5 % Final      ?   Tumor markers   ?   ?   Imaging: CT Chest Without Contrast  Narrative: EXAMINATION:  CT CHEST WITHOUT CONTRAST    CLINICAL HISTORY:  Cough; Cough    TECHNIQUE:  Low dose axial images, sagittal and coronal reformations were obtained from the thoracic inlet to the lung bases. Contrast was not administered.    COMPARISON:  None    FINDINGS:  Base of Neck: 1.3 cm and 1 cm thyroid nodules in the right and left lobes of the thyroid gland respectively.  Did recommend thyroid ultrasound.    Thoracic soft tissues: Normal.    Aorta: Scattered atherosclerotic calcifications of the nondilated thoracic aorta.    Heart: Normal size. No effusion.    Pulmonary vasculature: Normal.    Zoya/Mediastinum: No adenopathy.    Airways:  Patent.    Lungs/Pleura: 2 mm subpleural nodule in the right upper lobe.  No other pulmonary nodules identified.    Esophagus: Normal.    Upper Abdomen: 2.3 cm low-density nodule in the spleen.    Bones: Accentuated thoracic kyphosis with multilevel degenerative changes.  Impression: Thyroid nodules, consider thyroid ultrasound.  2 mm subpleural nodule in the right upper lobe.  2.3 cm low-density nodule in the spleen, most hypodense nodules of the spleen are benign.  Accentuated thoracic kyphosis with multilevel degenerative changes.    For a solid nodule <6 mm, Fleischner Society 2017 guidelines recommend no routine follow up for a low risk patient, or follow-up with non-contrast chest CT at 12 months in a high risk patient.    All CT scans at this facility are performed  using dose modulation techniques as appropriate to performed exam including the following:  automated exposure control; adjustment of mA and/or kV according to the patients size (this includes techniques or standardized protocols for targeted exams where dose is matched to indication/reason for exam: i.e. extremities or head);  iterative reconstruction technique.    Electronically signed by: Chon Conway MD  Date:    08/26/2020  Time:    11:19     ?      Pathology:  Pathology Results  (Last 10 years)    None           ?   Assessment/Plan:       1. Gastroesophageal reflux disease without esophagitis    2. Normocytic anemia    3. Iron deficiency anemia, unspecified iron deficiency anemia type    4. Iron deficiency anemia secondary to inadequate dietary iron intake        Normocytic anemia  Multifactorial including chronic inflammation from hypertension and diabetes.  Noted normal B12, folate and TSH levels.  Iron studies showed severe iron deficiency with ferritin at 14 and iron saturation at 6%.  Plan is to administer 2 course of IV iron therapy and recheck iron status.  Will also recommend upper endoscopy to rule out gastric ulcer given history of  GERD.  Plan to see her back in 4-6 weeks or sooner if needed.      ?Gastroesophageal reflux disease without esophagitis  -     CBC auto differential; Future; Expected date: 10/12/2020  -     Comprehensive Metabolic Panel; Future; Expected date: 10/12/2020  -     Iron and TIBC; Future; Expected date: 10/12/2020  -     Ferritin; Future; Expected date: 10/12/2020  -     Case request GI: EGD (ESOPHAGOGASTRODUODENOSCOPY)    Normocytic anemia  -     CBC auto differential; Future; Expected date: 10/12/2020  -     Comprehensive Metabolic Panel; Future; Expected date: 10/12/2020  -     Iron and TIBC; Future; Expected date: 10/12/2020  -     Ferritin; Future; Expected date: 10/12/2020  -     Case request GI: EGD (ESOPHAGOGASTRODUODENOSCOPY)    Iron deficiency anemia, unspecified iron deficiency anemia type  -     CBC auto differential; Future; Expected date: 10/12/2020  -     Comprehensive Metabolic Panel; Future; Expected date: 10/12/2020  -     Iron and TIBC; Future; Expected date: 10/12/2020  -     Ferritin; Future; Expected date: 10/12/2020  -     Case request GI: EGD (ESOPHAGOGASTRODUODENOSCOPY)  -     Ambulatory referral/consult to Hematology / Oncology    Iron deficiency anemia secondary to inadequate dietary iron intake      ?   Follow-Up: Follow up in about 4 weeks (around 11/9/2020).    EDWIGE ROBISON Md., Ph.D  Hematology & Oncology Department  Phone #: [phone]

## 2020-10-12 NOTE — LETTER
October 12, 2020      Darien Farrell MD  [address] [facility] [place] [address]           [facility]emat[facility] Oncology  [address] [facility] [place] [address]  Phone: [phone]  Fax: [phone]          Patient: Venus Caldwell   MR Number: [id number]   YOB: 1950   Date of Visit: 10/12/2020       Dear Dr. Darien Farrell:    Thank you for referring Venus Caldwell to me for evaluation. Attached you will find relevant portions of my assessment and plan of care.    If you have questions, please do not hesitate to call me. I look forward to following Venus Caldwell along with you.    Sincerely,    Álvaro Campbell MD    Enclosure  CC:  No Recipients    If you would like to receive this communication electronically, please contact [email or web address] or [phone] to request more information on [email or web address] Link access.    For providers and/or their staff who would like to refer a patient to [facility], please contact us through our one-stop-shop provider referral line, [facility], at [phone].    If you feel you have received this communication in error or would no longer like to receive these types of communications, please e-mail [email or web address]

## 2020-10-12 NOTE — ASSESSMENT & PLAN NOTE
Multifactorial including chronic inflammation from hypertension and diabetes.  Noted normal B12, folate and TSH levels.  Iron studies showed severe iron deficiency with ferritin at 14 and iron saturation at 6%.  Plan is to administer 2 course of IV iron therapy and recheck iron status.  Will also recommend upper endoscopy to rule out gastric ulcer given history of GERD.  Plan to see her back in 4-6 weeks or sooner if needed.

## 2020-10-14 ENCOUNTER — TELEPHONE (OUTPATIENT)
Dept: GASTROENTEROLOGY | Facility: CLINIC | Age: 70
End: 2020-10-14

## 2020-10-14 DIAGNOSIS — R10.84 GENERALIZED ABDOMINAL PAIN: Primary | ICD-10-CM

## 2020-10-14 NOTE — TELEPHONE ENCOUNTER
----- Message from Patience England LPN sent at 10/12/2020  2:16 PM CDT -----  Good afternoon, Dr. Campbell would like patient to be seen for a EGD.  Thank you!

## 2020-10-21 ENCOUNTER — INFUSION (OUTPATIENT)
Dept: INFUSION THERAPY | Facility: HOSPITAL | Age: 70
End: 2020-10-21
Attending: INTERNAL MEDICINE
Payer: MEDICARE

## 2020-10-21 VITALS
SYSTOLIC BLOOD PRESSURE: 117 MMHG | HEART RATE: 59 BPM | OXYGEN SATURATION: 98 % | DIASTOLIC BLOOD PRESSURE: 67 MMHG | HEIGHT: 64 IN | TEMPERATURE: 98 F | RESPIRATION RATE: 16 BRPM | BODY MASS INDEX: 31.21 KG/M2

## 2020-10-21 DIAGNOSIS — D50.8 IRON DEFICIENCY ANEMIA SECONDARY TO INADEQUATE DIETARY IRON INTAKE: Primary | ICD-10-CM

## 2020-10-21 PROCEDURE — 96365 THER/PROPH/DIAG IV INF INIT: CPT

## 2020-10-21 PROCEDURE — 25000003 PHARM REV CODE 250: Performed by: INTERNAL MEDICINE

## 2020-10-21 PROCEDURE — 63600175 PHARM REV CODE 636 W HCPCS: Mod: JG | Performed by: INTERNAL MEDICINE

## 2020-10-21 RX ORDER — SODIUM CHLORIDE 0.9 % (FLUSH) 0.9 %
10 SYRINGE (ML) INJECTION
Status: DISCONTINUED | OUTPATIENT
Start: 2020-10-21 | End: 2020-10-21 | Stop reason: HOSPADM

## 2020-10-21 RX ADMIN — FERRIC CARBOXYMALTOSE INJECTION 750 MG: 50 INJECTION, SOLUTION INTRAVENOUS at 10:10

## 2020-10-21 NOTE — DISCHARGE INSTRUCTIONS
[facility]  [address] [facility]  [address]  [phone] phone     [phone] fax  Hours of Operation: Monday- Friday 8:00am- 5:00pm  After hours phone  [phone]  Hematology / Oncology Physicians on call      RAVI Andrea Dr., Dr., Dr., Dr., NP Sydney Prescott, NP Tyesha Taylor, NP    Please call with any concerns regarding your appointment today.

## 2020-10-22 ENCOUNTER — IMMUNIZATION (OUTPATIENT)
Dept: PHARMACY | Facility: CLINIC | Age: 70
End: 2020-10-22
Payer: MEDICARE

## 2020-10-22 ENCOUNTER — LAB VISIT (OUTPATIENT)
Dept: LAB | Facility: HOSPITAL | Age: 70
End: 2020-10-22
Attending: FAMILY MEDICINE
Payer: MEDICARE

## 2020-10-22 ENCOUNTER — OFFICE VISIT (OUTPATIENT)
Dept: ALLERGY | Facility: CLINIC | Age: 70
End: 2020-10-22
Payer: MEDICARE

## 2020-10-22 VITALS
HEIGHT: 62 IN | TEMPERATURE: 98 F | BODY MASS INDEX: 32.14 KG/M2 | DIASTOLIC BLOOD PRESSURE: 76 MMHG | HEART RATE: 70 BPM | SYSTOLIC BLOOD PRESSURE: 123 MMHG | WEIGHT: 174.63 LBS

## 2020-10-22 DIAGNOSIS — J30.89 CHRONIC NON-SEASONAL ALLERGIC RHINITIS: Primary | Chronic | ICD-10-CM

## 2020-10-22 DIAGNOSIS — R05.9 COUGH: ICD-10-CM

## 2020-10-22 DIAGNOSIS — J30.89 CHRONIC NON-SEASONAL ALLERGIC RHINITIS: Chronic | ICD-10-CM

## 2020-10-22 PROCEDURE — 99999 PR PBB SHADOW E&M-EST. PATIENT-LVL V: CPT | Mod: PBBFAC,,, | Performed by: ALLERGY & IMMUNOLOGY

## 2020-10-22 PROCEDURE — 36415 COLL VENOUS BLD VENIPUNCTURE: CPT

## 2020-10-22 PROCEDURE — 99204 OFFICE O/P NEW MOD 45 MIN: CPT | Mod: S$PBB,,, | Performed by: ALLERGY & IMMUNOLOGY

## 2020-10-22 PROCEDURE — 82785 ASSAY OF IGE: CPT

## 2020-10-22 PROCEDURE — 99215 OFFICE O/P EST HI 40 MIN: CPT | Mod: PBBFAC | Performed by: ALLERGY & IMMUNOLOGY

## 2020-10-22 PROCEDURE — 86003 ALLG SPEC IGE CRUDE XTRC EA: CPT

## 2020-10-22 PROCEDURE — 99999 PR PBB SHADOW E&M-EST. PATIENT-LVL V: ICD-10-PCS | Mod: PBBFAC,,, | Performed by: ALLERGY & IMMUNOLOGY

## 2020-10-22 PROCEDURE — 99204 PR OFFICE/OUTPT VISIT, NEW, LEVL IV, 45-59 MIN: ICD-10-PCS | Mod: S$PBB,,, | Performed by: ALLERGY & IMMUNOLOGY

## 2020-10-22 PROCEDURE — 86003 ALLG SPEC IGE CRUDE XTRC EA: CPT | Mod: 59

## 2020-10-22 NOTE — PATIENT INSTRUCTIONS
-The following causes or exacerbate GERD/ heartburn/acid reflux:    Caffeine, chocolate, peppermints, alcohol, spicy foods, greasy foods, large meals, acidic foods (like tomatoes, lemon), and being sedentary after eating.      -If you are not diabetic or have health issues that prohibit a long fast, I recommend no food three to four hours before you lay down at night.

## 2020-10-22 NOTE — PROGRESS NOTES
"Subjective:       Patient ID: Vensu Caldwell is a 70 y.o. female.    Chief Complaint:  Allergic Rhinitis       HPI: 70 year old female complaining of a cough worse at night, when she is sitting up in "her chair". She reports that it is dry. Cough is worse with dog exposure (she has two) and in "her chair".  She has a post nasal drip.  She reports dry eyes. She reports having a runny nose and nasal congestion.  "I was taking Zyrtec and Flonase forever."   She is now taking Levocetirizine in place of Zyrtec. She is also taking Astelin. She stopped taking Flonase.  Previous allergy testing significant to dust mites      Past Medical History:   Diagnosis Date    Arthritis     Cancer, uterine     Diabetes mellitus     prediabetes    Diabetes mellitus, type 2     DM (diabetes mellitus) 08/2019    BS doesn't check 12/16/2019    Hypertension     Ovarian cancer     Sciatica      Family History   Problem Relation Age of Onset    Diabetes Mother     Cataracts Mother     Diabetes Brother     Cataracts Maternal Grandmother     Breast cancer Maternal Aunt        Current Outpatient Medications on File Prior to Visit   Medication Sig Dispense Refill    amLODIPine (NORVASC) 5 MG tablet Take 1 tablet (5 mg total) by mouth once daily. 90 tablet 3    amoxicillin (AMOXIL) 500 MG capsule Take 1 capsule by mouth  every 8 hours  until gone 21 capsule 0    aspirin (ECOTRIN) 81 MG EC tablet Take 1 tablet (81 mg total) by mouth once daily.  0    atorvastatin (LIPITOR) 40 MG tablet Take 1 tablet (40 mg total) by mouth every evening. 90 tablet 3    azelastine (ASTELIN) 137 mcg (0.1 %) nasal spray 1 spray (137 mcg total) by Nasal route 2 (two) times daily. 30 mL 11    betamethasone dipropionate (DIPROLENE) 0.05 % cream as needed.       candesartan (ATACAND) 16 MG tablet Take 1 tablet (16 mg total) by mouth once daily. 30 tablet 6    cholecalciferol, vitamin D3, 5,000 unit Tab Take 1,000 Units by mouth once daily.       " gabapentin (NEURONTIN) 300 MG capsule Take 300 mg by mouth 3 (three) times daily. 300 mg TID      HYDROcodone-acetaminophen (NORCO)  mg per tablet Take 1 tablet by mouth every 24 hours as needed. 20 tablet 0    ibuprofen (ADVIL,MOTRIN) 800 MG tablet Take 1 tablet by  mouth every 6-8 hours as needed for pain 28 tablet 1    ipratropium-albuteroL (COMBIVENT)  mcg/actuation inhaler Inhale 1 puff into the lungs every 6 (six) hours as needed for Wheezing or Shortness of Breath (or Cough). Rescue 4 g 0    levocetirizine (XYZAL) 5 MG tablet Take 1 tablet (5 mg total) by mouth every evening. 30 tablet 11    meloxicam (MOBIC) 7.5 MG tablet Take 1 tablet (7.5 mg total) by mouth 2 (two) times daily. 180 tablet 3    metFORMIN (GLUCOPHAGE) 500 MG tablet Take 1 tablet (500 mg total) by mouth daily with breakfast. 90 tablet 3    metoprolol succinate (TOPROL-XL) 100 MG 24 hr tablet Take 1 tablet (100 mg total) by mouth 2 (two) times daily. 180 tablet 3    nortriptyline (PAMELOR) 50 MG capsule Take 50 mg by mouth nightly.       omeprazole (PRILOSEC) 20 MG capsule Take 20 mg by mouth once daily.      valACYclovir (VALTREX) 500 MG tablet Take 500 mg by mouth as needed.        Current Facility-Administered Medications on File Prior to Visit   Medication Dose Route Frequency Provider Last Rate Last Dose    lactated ringers infusion   Intravenous Continuous Elise Hammer MD   Stopped at 08/29/18 0852    [DISCONTINUED] sodium chloride 0.9% 100 mL flush bag   Intravenous 1 time in Clinic/HOD Álvaro Campbell MD        [DISCONTINUED] sodium chloride 0.9% flush 10 mL  10 mL Intravenous PRN Álvaro Campbell MD         Review of patient's allergies indicates:   Allergen Reactions    Sulfa (sulfonamide antibiotics) Anaphylaxis     Pt became hypoxic after taking sulfa drugs as a child      Buprenorphine Rash       Environmental History: Dogs- 2,   Review of Systems   Constitutional: Negative for chills  and fever.   HENT: Positive for congestion, postnasal drip and rhinorrhea.    Eyes: Negative for discharge and itching.   Respiratory: Positive for cough. Negative for chest tightness, shortness of breath and wheezing.    Cardiovascular: Negative for chest pain and leg swelling.   Gastrointestinal: Negative for nausea and vomiting.   Skin: Positive for rash. Negative for wound.   Allergic/Immunologic: Positive for environmental allergies. Negative for food allergies.   Neurological: Negative for facial asymmetry and speech difficulty.   Hematological: Negative for adenopathy. Does not bruise/bleed easily.   Psychiatric/Behavioral: Negative for behavioral problems and suicidal ideas.        Objective:    Physical Exam  Vitals signs reviewed.   Constitutional:       General: She is not in acute distress.     Appearance: Normal appearance. She is well-developed. She is obese. She is not ill-appearing, toxic-appearing or diaphoretic.   HENT:      Head: Normocephalic and atraumatic.      Right Ear: Tympanic membrane, ear canal and external ear normal. There is no impacted cerumen.      Left Ear: Tympanic membrane, ear canal and external ear normal. There is no impacted cerumen.      Nose: Nose normal. No congestion or rhinorrhea.      Mouth/Throat:      Mouth: Mucous membranes are moist.      Pharynx: Oropharynx is clear. No oropharyngeal exudate or posterior oropharyngeal erythema.   Eyes:      General: No scleral icterus.        Right eye: No discharge.         Left eye: No discharge.      Pupils: Pupils are equal, round, and reactive to light.   Neck:      Musculoskeletal: Normal range of motion and neck supple. No neck rigidity or muscular tenderness.      Thyroid: No thyromegaly.   Cardiovascular:      Rate and Rhythm: Normal rate and regular rhythm.      Heart sounds: Normal heart sounds. No murmur. No friction rub. No gallop.    Pulmonary:      Effort: Pulmonary effort is normal. No respiratory distress.       Breath sounds: Normal breath sounds. No stridor. No wheezing or rales.   Abdominal:      General: Bowel sounds are normal. There is no distension.      Palpations: Abdomen is soft. There is no mass.      Tenderness: There is no abdominal tenderness. There is no guarding or rebound.      Hernia: No hernia is present.   Musculoskeletal: Normal range of motion.         General: No swelling, tenderness or deformity.      Right lower leg: No edema.      Left lower leg: No edema.   Lymphadenopathy:      Cervical: No cervical adenopathy.   Skin:     General: Skin is warm.      Coloration: Skin is not jaundiced or pale.      Findings: No bruising or erythema.   Neurological:      Mental Status: She is alert and oriented to person, place, and time.      Gait: Gait normal.   Psychiatric:         Mood and Affect: Mood normal.         Behavior: Behavior normal.         Thought Content: Thought content normal.         Judgment: Judgment normal.         Unable to skin prick test as she is taking oral antihistamines.  Assessment:       1. Chronic non-seasonal allergic rhinitis Stable   2. Cough         Plan:       Unable to skin prick test today, as she is taking oral antihistamines.  Chronic non-seasonal allergic rhinitis  -     Ambulatory referral/consult to Allergy  -     IgE; Future; Expected date: 10/22/2020  -     Bahia grass IgE; Future; Expected date: 10/22/2020  -     Aspergillus fumagatus IgE; Future; Expected date: 10/22/2020  -     Chaetomium globosum IgE; Future; Expected date: 10/22/2020  -     Cockroach, American IgE; Future; Expected date: 10/22/2020  -     Cladosporium IgE; Future; Expected date: 10/22/2020  -     Curvularia lunata IgE; Future; Expected date: 10/22/2020  -     D. farinae IgE; Future; Expected date: 10/22/2020  -     D. pteronyssinus IgE; Future; Expected date: 10/22/2020  -     Dog dander IgE; Future; Expected date: 10/22/2020  -     Plantain, English IgE; Future; Expected date: 10/22/2020  -      Eucalyptus IgE; Future; Expected date: 10/22/2020  -     Marsh elder, rough IgE; Future; Expected date: 10/22/2020  -     Mugwort IgE; Future; Expected date: 10/22/2020  -     Nettle IgE; Future; Expected date: 10/22/2020  -     Orchard grass IgE; Future; Expected date: 10/22/2020  -     Savannah, western white IgE; Future; Expected date: 10/22/2020  -     Privet, common IgE; Future; Expected date: 10/22/2020  -     Ragweed, short, common IgE; Future; Expected date: 10/22/2020  -     Red top grass IgE; Future; Expected date: 10/22/2020  -     Rye grass, cultivated IgE; Future; Expected date: 10/22/2020  -     Thistle, Russian IgE; Future; Expected date: 10/22/2020  -     Stemphyllium IgE; Future; Expected date: 10/22/2020  -     Brayden IgE; Future; Expected date: 10/22/2020  -     Jasen grass IgE; Future; Expected date: 10/22/2020  -     Allergen, Pecan Tree IgE; Future; Expected date: 10/22/2020  -     [place], black IgE; Future; Expected date: 10/22/2020  -     Aydlett, bald IgE; Future; Expected date: 10/22/2020  -     Oak, white IgE; Future; Expected date: 10/22/2020  -     Allergen, Cocklebur; Future; Expected date: 10/22/2020  -     Cat epithelium IgE; Future; Expected date: 10/22/2020  -     Allergen, Hackberry Celtis; Future; Expected date: 10/22/2020  -     Allergen, Elm Cedar; Future; Expected date: 10/22/2020  -     Allergen-[place]; Future; Expected date: 10/22/2020  -     ALLERGEN-ALTERNARIA ALTERNATA; Future; Expected date: 10/22/2020    Cough    Continue Astelin and Levocetirizine.  GERD maybe causing her cough. Discussed diet and lifestyle changes to improve symptoms.  RTC 2 weeks or sooner, if needed.    HOSSEIN DUNN

## 2020-10-22 NOTE — LETTER
October 22, 2020      Darien Farrell MD  [address]  [place] [address]           [facility]  [address] [place] [id number]  Phone: [phone]  Fax: [phone]          Patient: Venus Caldwell   MR Number: [id number]   YOB: 1950   Date of Visit: 10/22/2020       Dear Dr. Darien Farrell:    Thank you for referring Venus Caldwell to me for evaluation. Attached you will find relevant portions of my assessment and plan of care.    If you have questions, please do not hesitate to call me. I look forward to following Venus Caldwell along with you.    Sincerely,    Kate Bonner MD    Enclosure  CC:  No Recipients    If you would like to receive this communication electronically, please contact [email or web address] or [phone] to request more information on [employer] Link access.    For providers and/or their staff who would like to refer a patient to [facility], please contact us through our one-stop-shop provider referral line, [facility], at [phone].    If you feel you have received this communication in error or would no longer like to receive these types of communications, please e-mail [email or web address]

## 2020-10-23 LAB — IGE SERPL-ACNC: <35 IU/ML (ref 0–100)

## 2020-10-26 ENCOUNTER — PES CALL (OUTPATIENT)
Dept: ADMINISTRATIVE | Facility: CLINIC | Age: 70
End: 2020-10-26

## 2020-10-27 LAB
A ALTERNATA IGE QN: <0.1 KU/L
A FUMIGATUS IGE QN: <0.1 KU/L
ALLERGEN CHAETOMIUM GLOBOSUM IGE: <0.1 KU/L
ALLERGEN WHITE PINE TREE IGE: <0.1 KU/L
BAHIA GRASS IGE QN: <0.1 KU/L
BALD CYPRESS IGE QN: <0.1 KU/L
C HERBARUM IGE QN: <0.1 KU/L
C LUNATA IGE QN: <0.1 KU/L
CAT DANDER IGE QN: <0.1 KU/L
CHAETOMIUM GLOB. CLASS: NORMAL
COCKLEBUR IGE QN: <0.1 KU/L
COCKSFOOT IGE QN: <0.1 KU/L
COMMON RAGWEED IGE QN: <0.1 KU/L
COTTONWOOD IGE QN: <0.1 KU/L
D FARINAE IGE QN: <0.1 KU/L
D PTERONYSS IGE QN: <0.1 KU/L
DEPRECATED A ALTERNATA IGE RAST QL: NORMAL
DEPRECATED A FUMIGATUS IGE RAST QL: NORMAL
DEPRECATED BAHIA GRASS IGE RAST QL: NORMAL
DEPRECATED BALD CYPRESS IGE RAST QL: NORMAL
DEPRECATED C HERBARUM IGE RAST QL: NORMAL
DEPRECATED C LUNATA IGE RAST QL: NORMAL
DEPRECATED CAT DANDER IGE RAST QL: NORMAL
DEPRECATED COCKLEBUR IGE RAST QL: NORMAL
DEPRECATED COCKSFOOT IGE RAST QL: NORMAL
DEPRECATED COMMON RAGWEED IGE RAST QL: NORMAL
DEPRECATED COTTONWOOD IGE RAST QL: NORMAL
DEPRECATED D FARINAE IGE RAST QL: NORMAL
DEPRECATED D PTERONYSS IGE RAST QL: NORMAL
DEPRECATED DOG DANDER IGE RAST QL: NORMAL
DEPRECATED ELDER IGE RAST QL: NORMAL
DEPRECATED ENGL PLANTAIN IGE RAST QL: NORMAL
DEPRECATED GUM-TREE IGE RAST QL: NORMAL
DEPRECATED HACKBERRY TREE IGE RAST QL: NORMAL
DEPRECATED JOHNSON GRASS IGE RAST QL: NORMAL
DEPRECATED MUGWORT IGE RAST QL: NORMAL
DEPRECATED NETTLE IGE RAST QL: NORMAL
DEPRECATED PECAN/HICK TREE IGE RAST QL: NORMAL
DEPRECATED PER RYE GRASS IGE RAST QL: NORMAL
DEPRECATED PRIVET IGE RAST QL: NORMAL
DEPRECATED RED TOP GRASS IGE RAST QL: NORMAL
DEPRECATED ROACH IGE RAST QL: NORMAL
DEPRECATED SALTWORT IGE RAST QL: NORMAL
DEPRECATED TIMOTHY IGE RAST QL: NORMAL
DEPRECATED WHITE OAK IGE RAST QL: NORMAL
DEPRECATED WILLOW IGE RAST QL: NORMAL
DOG DANDER IGE QN: <0.1 KU/L
ELDER IGE QN: <0.1 KU/L
ELM CEDAR CLASS: NORMAL
ELM CEDAR, IGE: <0.1 KU/L
ENGL PLANTAIN IGE QN: <0.1 KU/L
GUM-TREE IGE QN: <0.1 KU/L
HACKBERRY CELTIS, IGE: <0.1 KU/L
JOHNSON GRASS IGE QN: <0.1 KU/L
MUGWORT IGE QN: <0.1 KU/L
NETTLE IGE QN: <0.1 KU/L
PECAN/HICK TREE IGE QN: <0.1 KU/L
PER RYE GRASS IGE QN: <0.1 KU/L
PRIVET IGE QN: <0.1 KU/L
RED TOP GRASS IGE QN: <0.1 KU/L
ROACH IGE QN: <0.1 KU/L
SALTWORT IGE QN: <0.1 KU/L
STEMPHYLIUM HERBARUM CLASS: NORMAL
STEMPHYLLIUM, IGE: <0.1 KU/L
TIMOTHY IGE QN: <0.1 KU/L
WHITE OAK IGE QN: <0.1 KU/L
WHITE PINE CLASS: NORMAL
WILLOW IGE QN: <0.1 KU/L

## 2020-10-28 ENCOUNTER — INFUSION (OUTPATIENT)
Dept: INFUSION THERAPY | Facility: HOSPITAL | Age: 70
End: 2020-10-28
Attending: INTERNAL MEDICINE
Payer: MEDICARE

## 2020-10-28 VITALS
DIASTOLIC BLOOD PRESSURE: 68 MMHG | SYSTOLIC BLOOD PRESSURE: 114 MMHG | HEIGHT: 62 IN | BODY MASS INDEX: 31.94 KG/M2 | OXYGEN SATURATION: 98 % | TEMPERATURE: 98 F | RESPIRATION RATE: 18 BRPM | HEART RATE: 64 BPM

## 2020-10-28 DIAGNOSIS — D50.8 IRON DEFICIENCY ANEMIA SECONDARY TO INADEQUATE DIETARY IRON INTAKE: Primary | ICD-10-CM

## 2020-10-28 PROCEDURE — 96365 THER/PROPH/DIAG IV INF INIT: CPT

## 2020-10-28 PROCEDURE — 25000003 PHARM REV CODE 250: Performed by: INTERNAL MEDICINE

## 2020-10-28 PROCEDURE — 63600175 PHARM REV CODE 636 W HCPCS: Mod: JG | Performed by: INTERNAL MEDICINE

## 2020-10-28 RX ORDER — SODIUM CHLORIDE 0.9 % (FLUSH) 0.9 %
10 SYRINGE (ML) INJECTION
Status: DISCONTINUED | OUTPATIENT
Start: 2020-10-28 | End: 2020-10-28 | Stop reason: HOSPADM

## 2020-10-28 RX ADMIN — FERRIC CARBOXYMALTOSE INJECTION 750 MG: 50 INJECTION, SOLUTION INTRAVENOUS at 10:10

## 2020-10-28 NOTE — PLAN OF CARE
Patient arrived in good spirits and voices no concerns at this time. Patient reports an increase in energy since last infusion.

## 2020-11-05 ENCOUNTER — OFFICE VISIT (OUTPATIENT)
Dept: ALLERGY | Facility: CLINIC | Age: 70
End: 2020-11-05
Payer: MEDICARE

## 2020-11-05 ENCOUNTER — PES CALL (OUTPATIENT)
Dept: ADMINISTRATIVE | Facility: CLINIC | Age: 70
End: 2020-11-05

## 2020-11-05 VITALS
TEMPERATURE: 98 F | BODY MASS INDEX: 31.65 KG/M2 | HEART RATE: 57 BPM | DIASTOLIC BLOOD PRESSURE: 65 MMHG | WEIGHT: 173.06 LBS | SYSTOLIC BLOOD PRESSURE: 111 MMHG

## 2020-11-05 DIAGNOSIS — R09.82 POST-NASAL DRIP: Primary | ICD-10-CM

## 2020-11-05 DIAGNOSIS — Z77.22 TOBACCO SMOKE EXPOSURE: ICD-10-CM

## 2020-11-05 DIAGNOSIS — K21.9 GASTROESOPHAGEAL REFLUX DISEASE, UNSPECIFIED WHETHER ESOPHAGITIS PRESENT: ICD-10-CM

## 2020-11-05 PROCEDURE — 99214 OFFICE O/P EST MOD 30 MIN: CPT | Mod: S$PBB,,, | Performed by: ALLERGY & IMMUNOLOGY

## 2020-11-05 PROCEDURE — 99999 PR PBB SHADOW E&M-EST. PATIENT-LVL IV: CPT | Mod: PBBFAC,,, | Performed by: ALLERGY & IMMUNOLOGY

## 2020-11-05 PROCEDURE — 99214 PR OFFICE/OUTPT VISIT, EST, LEVL IV, 30-39 MIN: ICD-10-PCS | Mod: S$PBB,,, | Performed by: ALLERGY & IMMUNOLOGY

## 2020-11-05 PROCEDURE — 99214 OFFICE O/P EST MOD 30 MIN: CPT | Mod: PBBFAC | Performed by: ALLERGY & IMMUNOLOGY

## 2020-11-05 PROCEDURE — 99999 PR PBB SHADOW E&M-EST. PATIENT-LVL IV: ICD-10-PCS | Mod: PBBFAC,,, | Performed by: ALLERGY & IMMUNOLOGY

## 2020-11-05 RX ORDER — PANTOPRAZOLE SODIUM 40 MG/1
40 TABLET, DELAYED RELEASE ORAL DAILY
Qty: 30 TABLET | Refills: 11 | Status: SHIPPED | OUTPATIENT
Start: 2020-11-05 | End: 2021-08-16

## 2020-11-05 NOTE — PROGRESS NOTES
"Subjective:       Patient ID: Venus Caldwell is a 70 y.o. female.    Follow up from 10/22/2020    Chief Complaint:  Follow-up      HPI 10/22/2020: 70 year old female complaining of a cough worse at night, when she is sitting up in "her chair". She reports that it is dry. Cough is worse with dog exposure (she has two) and in "her chair".  She has a post nasal drip.  She reports dry eyes. She reports having a runny nose and nasal congestion.  "I was taking Zyrtec and Flonase forever."   She is now taking Levocetirizine in place of Zyrtec. She is also taking Astelin. She stopped taking Flonase.  Previous allergy testing significant to dust mites    HPI today: 70 year old here for follow up. She feels that her cough is decreasing. She feels like it is not as "deep" as it previously was. She is taking the same medications she was taking at her previous visit. She reports persistent post nasal drip. She is not on GERD medications.      Past Medical History:   Diagnosis Date    Arthritis     Cancer, uterine     Diabetes mellitus     prediabetes    Diabetes mellitus, type 2     DM (diabetes mellitus) 08/2019    BS doesn't check 12/16/2019    Hypertension     Ovarian cancer     Sciatica      Family History   Problem Relation Age of Onset    Diabetes Mother     Cataracts Mother     Diabetes Brother     Cataracts Maternal Grandmother     Breast cancer Maternal Aunt        Current Outpatient Medications on File Prior to Visit   Medication Sig Dispense Refill    amLODIPine (NORVASC) 5 MG tablet Take 1 tablet (5 mg total) by mouth once daily. 90 tablet 3    amoxicillin (AMOXIL) 500 MG capsule Take 1 capsule by mouth  every 8 hours  until gone 21 capsule 0    aspirin (ECOTRIN) 81 MG EC tablet Take 1 tablet (81 mg total) by mouth once daily.  0    atorvastatin (LIPITOR) 40 MG tablet Take 1 tablet (40 mg total) by mouth every evening. 90 tablet 3    azelastine (ASTELIN) 137 mcg (0.1 %) nasal spray 1 spray (137 " mcg total) by Nasal route 2 (two) times daily. 30 mL 11    betamethasone dipropionate (DIPROLENE) 0.05 % cream as needed.       candesartan (ATACAND) 16 MG tablet Take 1 tablet (16 mg total) by mouth once daily. 30 tablet 6    cholecalciferol, vitamin D3, 5,000 unit Tab Take 1,000 Units by mouth once daily.       gabapentin (NEURONTIN) 300 MG capsule Take 300 mg by mouth 3 (three) times daily. 300 mg TID      HYDROcodone-acetaminophen (NORCO)  mg per tablet Take 1 tablet by mouth every 24 hours as needed. 20 tablet 0    ibuprofen (ADVIL,MOTRIN) 800 MG tablet Take 1 tablet by  mouth every 6-8 hours as needed for pain 28 tablet 1    ipratropium-albuteroL (COMBIVENT)  mcg/actuation inhaler Inhale 1 puff into the lungs every 6 (six) hours as needed for Wheezing or Shortness of Breath (or Cough). Rescue 4 g 0    levocetirizine (XYZAL) 5 MG tablet Take 1 tablet (5 mg total) by mouth every evening. 30 tablet 11    meloxicam (MOBIC) 7.5 MG tablet Take 1 tablet (7.5 mg total) by mouth 2 (two) times daily. 180 tablet 3    metFORMIN (GLUCOPHAGE) 500 MG tablet Take 1 tablet (500 mg total) by mouth daily with breakfast. 90 tablet 3    metoprolol succinate (TOPROL-XL) 100 MG 24 hr tablet Take 1 tablet (100 mg total) by mouth 2 (two) times daily. 180 tablet 3    nortriptyline (PAMELOR) 50 MG capsule Take 50 mg by mouth nightly.       omeprazole (PRILOSEC) 20 MG capsule Take 20 mg by mouth once daily.      valACYclovir (VALTREX) 500 MG tablet Take 500 mg by mouth as needed.        Current Facility-Administered Medications on File Prior to Visit   Medication Dose Route Frequency Provider Last Rate Last Dose    lactated ringers infusion   Intravenous Continuous Elise Hammer MD   Stopped at 08/29/18 0888     Review of patient's allergies indicates:   Allergen Reactions    Sulfa (sulfonamide antibiotics) Anaphylaxis     Pt became hypoxic after taking sulfa drugs as a child      Buprenorphine Rash        Environmental History: Dogs- 2,   Review of Systems   Constitutional: Negative for chills and fever.   HENT: Positive for postnasal drip. Negative for congestion and rhinorrhea.    Eyes: Negative for discharge and itching.   Respiratory: Positive for cough. Negative for chest tightness, shortness of breath and wheezing.    Cardiovascular: Negative for chest pain and leg swelling.   Gastrointestinal: Negative for nausea and vomiting.   Skin: Positive for rash. Negative for wound.   Allergic/Immunologic: Positive for environmental allergies. Negative for food allergies.   Neurological: Negative for facial asymmetry and speech difficulty.   Hematological: Negative for adenopathy. Does not bruise/bleed easily.   Psychiatric/Behavioral: Negative for behavioral problems and suicidal ideas.        Objective:    Physical Exam  Vitals signs reviewed.   Constitutional:       General: She is not in acute distress.     Appearance: Normal appearance. She is well-developed. She is obese. She is not ill-appearing, toxic-appearing or diaphoretic.   HENT:      Head: Normocephalic and atraumatic.      Right Ear: Tympanic membrane, ear canal and external ear normal. There is no impacted cerumen.      Left Ear: Tympanic membrane, ear canal and external ear normal. There is no impacted cerumen.      Nose: Nose normal. No congestion or rhinorrhea.      Mouth/Throat:      Mouth: Mucous membranes are moist.      Pharynx: Oropharynx is clear. No oropharyngeal exudate or posterior oropharyngeal erythema.   Eyes:      General: No scleral icterus.        Right eye: No discharge.         Left eye: No discharge.      Pupils: Pupils are equal, round, and reactive to light.   Neck:      Musculoskeletal: Normal range of motion and neck supple. No neck rigidity or muscular tenderness.      Thyroid: No thyromegaly.   Cardiovascular:      Rate and Rhythm: Normal rate and regular rhythm.      Heart sounds: Normal heart sounds. No murmur. No  friction rub. No gallop.    Pulmonary:      Effort: Pulmonary effort is normal. No respiratory distress.      Breath sounds: Normal breath sounds. No stridor. No wheezing or rales.   Abdominal:      General: Bowel sounds are normal. There is no distension.      Palpations: Abdomen is soft. There is no mass.      Tenderness: There is no abdominal tenderness. There is no guarding or rebound.      Hernia: No hernia is present.   Musculoskeletal: Normal range of motion.         General: No swelling, tenderness or deformity.      Right lower leg: No edema.      Left lower leg: No edema.   Lymphadenopathy:      Cervical: No cervical adenopathy.   Skin:     General: Skin is warm.      Coloration: Skin is not jaundiced or pale.      Findings: No bruising or erythema.   Neurological:      Mental Status: She is alert and oriented to person, place, and time.      Gait: Gait normal.   Psychiatric:         Mood and Affect: Mood normal.         Behavior: Behavior normal.         Thought Content: Thought content normal.         Judgment: Judgment normal.       Reviewed allergy testing, which was negative.  Assessment:       1. Post-nasal drip    2. Gastroesophageal reflux disease, unspecified whether esophagitis present    3. Tobacco smoke exposure         Plan:       Post-nasal drip    Gastroesophageal reflux disease, unspecified whether esophagitis present  -     pantoprazole (PROTONIX) 40 MG tablet; Take 1 tablet (40 mg total) by mouth once daily.  Dispense: 30 tablet; Refill: 11    Tobacco smoke exposure    Advised to take Protonix on an empty stomach with no food for thirty minutes.  Reviewed labs results- negative for allergies.   smokes. Recommend avoidance.  HOSSEIN DUNN

## 2020-11-27 ENCOUNTER — LAB VISIT (OUTPATIENT)
Dept: LAB | Facility: HOSPITAL | Age: 70
End: 2020-11-27
Attending: INTERNAL MEDICINE
Payer: MEDICARE

## 2020-11-27 DIAGNOSIS — D50.9 IRON DEFICIENCY ANEMIA, UNSPECIFIED IRON DEFICIENCY ANEMIA TYPE: ICD-10-CM

## 2020-11-27 DIAGNOSIS — K21.9 GASTROESOPHAGEAL REFLUX DISEASE WITHOUT ESOPHAGITIS: Chronic | ICD-10-CM

## 2020-11-27 DIAGNOSIS — D64.9 NORMOCYTIC ANEMIA: Chronic | ICD-10-CM

## 2020-11-27 LAB
ALBUMIN SERPL BCP-MCNC: 3.7 G/DL (ref 3.5–5.2)
ALP SERPL-CCNC: 122 U/L (ref 55–135)
ALT SERPL W/O P-5'-P-CCNC: 30 U/L (ref 10–44)
ANION GAP SERPL CALC-SCNC: 8 MMOL/L (ref 8–16)
AST SERPL-CCNC: 20 U/L (ref 10–40)
BASOPHILS # BLD AUTO: 0.05 K/UL (ref 0–0.2)
BASOPHILS NFR BLD: 0.6 % (ref 0–1.9)
BILIRUB SERPL-MCNC: 0.6 MG/DL (ref 0.1–1)
BUN SERPL-MCNC: 22 MG/DL (ref 8–23)
CALCIUM SERPL-MCNC: 8.8 MG/DL (ref 8.7–10.5)
CHLORIDE SERPL-SCNC: 104 MMOL/L (ref 95–110)
CO2 SERPL-SCNC: 26 MMOL/L (ref 23–29)
CREAT SERPL-MCNC: 0.7 MG/DL (ref 0.5–1.4)
DIFFERENTIAL METHOD: ABNORMAL
EOSINOPHIL # BLD AUTO: 0.4 K/UL (ref 0–0.5)
EOSINOPHIL NFR BLD: 4.1 % (ref 0–8)
ERYTHROCYTE [DISTWIDTH] IN BLOOD BY AUTOMATED COUNT: 18.9 % (ref 11.5–14.5)
EST. GFR  (AFRICAN AMERICAN): >60 ML/MIN/1.73 M^2
EST. GFR  (NON AFRICAN AMERICAN): >60 ML/MIN/1.73 M^2
FERRITIN SERPL-MCNC: 838 NG/ML (ref 20–300)
GLUCOSE SERPL-MCNC: 145 MG/DL (ref 70–110)
HCT VFR BLD AUTO: 36.3 % (ref 37–48.5)
HGB BLD-MCNC: 11.9 G/DL (ref 12–16)
IMM GRANULOCYTES # BLD AUTO: 0.03 K/UL (ref 0–0.04)
IMM GRANULOCYTES NFR BLD AUTO: 0.3 % (ref 0–0.5)
IRON SERPL-MCNC: 60 UG/DL (ref 30–160)
LYMPHOCYTES # BLD AUTO: 2.8 K/UL (ref 1–4.8)
LYMPHOCYTES NFR BLD: 30.8 % (ref 18–48)
MCH RBC QN AUTO: 29 PG (ref 27–31)
MCHC RBC AUTO-ENTMCNC: 32.8 G/DL (ref 32–36)
MCV RBC AUTO: 89 FL (ref 82–98)
MONOCYTES # BLD AUTO: 0.7 K/UL (ref 0.3–1)
MONOCYTES NFR BLD: 7.5 % (ref 4–15)
NEUTROPHILS # BLD AUTO: 5.1 K/UL (ref 1.8–7.7)
NEUTROPHILS NFR BLD: 56.7 % (ref 38–73)
NRBC BLD-RTO: 0 /100 WBC
PLATELET # BLD AUTO: 269 K/UL (ref 150–350)
PMV BLD AUTO: 9.2 FL (ref 9.2–12.9)
POTASSIUM SERPL-SCNC: 4.2 MMOL/L (ref 3.5–5.1)
PROT SERPL-MCNC: 6.9 G/DL (ref 6–8.4)
RBC # BLD AUTO: 4.1 M/UL (ref 4–5.4)
SATURATED IRON: 21 % (ref 20–50)
SODIUM SERPL-SCNC: 138 MMOL/L (ref 136–145)
TOTAL IRON BINDING CAPACITY: 292 UG/DL (ref 250–450)
TRANSFERRIN SERPL-MCNC: 197 MG/DL (ref 200–375)
WBC # BLD AUTO: 8.93 K/UL (ref 3.9–12.7)

## 2020-11-27 PROCEDURE — 36415 COLL VENOUS BLD VENIPUNCTURE: CPT

## 2020-11-27 PROCEDURE — 82728 ASSAY OF FERRITIN: CPT

## 2020-11-27 PROCEDURE — 80053 COMPREHEN METABOLIC PANEL: CPT

## 2020-11-27 PROCEDURE — 85025 COMPLETE CBC W/AUTO DIFF WBC: CPT

## 2020-11-27 PROCEDURE — 83540 ASSAY OF IRON: CPT

## 2020-11-30 ENCOUNTER — OFFICE VISIT (OUTPATIENT)
Dept: HEMATOLOGY/ONCOLOGY | Facility: CLINIC | Age: 70
End: 2020-11-30
Payer: MEDICARE

## 2020-11-30 VITALS
HEART RATE: 61 BPM | SYSTOLIC BLOOD PRESSURE: 133 MMHG | OXYGEN SATURATION: 98 % | WEIGHT: 175.06 LBS | HEIGHT: 62 IN | DIASTOLIC BLOOD PRESSURE: 78 MMHG | BODY MASS INDEX: 32.22 KG/M2 | TEMPERATURE: 98 F

## 2020-11-30 DIAGNOSIS — D50.8 IRON DEFICIENCY ANEMIA SECONDARY TO INADEQUATE DIETARY IRON INTAKE: ICD-10-CM

## 2020-11-30 PROCEDURE — 99214 OFFICE O/P EST MOD 30 MIN: CPT | Mod: S$PBB,,, | Performed by: INTERNAL MEDICINE

## 2020-11-30 PROCEDURE — 99214 PR OFFICE/OUTPT VISIT, EST, LEVL IV, 30-39 MIN: ICD-10-PCS | Mod: S$PBB,,, | Performed by: INTERNAL MEDICINE

## 2020-11-30 PROCEDURE — 99214 OFFICE O/P EST MOD 30 MIN: CPT | Mod: PBBFAC | Performed by: INTERNAL MEDICINE

## 2020-11-30 PROCEDURE — 99999 PR PBB SHADOW E&M-EST. PATIENT-LVL IV: ICD-10-PCS | Mod: PBBFAC,,, | Performed by: INTERNAL MEDICINE

## 2020-11-30 PROCEDURE — 99999 PR PBB SHADOW E&M-EST. PATIENT-LVL IV: CPT | Mod: PBBFAC,,, | Performed by: INTERNAL MEDICINE

## 2020-11-30 NOTE — PROGRESS NOTES
Subjective:   Date of Visit: 11/30/20   ?   ?    REFERRING PROVIDER: No referring provider defined for this encounter.   ?   CHIEF COMPLAINT:  Iron deficiency anemia???????       HPI:  70-year-old female with past medical history significant for endometrial cancer (@ 58 yrs), diabetes type 2, hypertension was referred to us by Dr. Farrell due to recently noted iron deficiency anemia.    Review of most recent iron studies performed on 10/08/2020 showed serum iron of 29, iron saturation of 6% and ferritin level of 14 ng per cc.  CBC from August 2020 showed hemoglobin of 9.5 grams/deciliter, hematocrit of 33.0 however normal MCV.  There was also noticeable thrombocytosis with platelet count at 405.    She denies abnormal bleed including vaginal bleed given her prior history for endometrial cancer.  She denies epistaxis, hemoptysis, hematemesis, hematochezia, melena or hematuria. Denies unintentional weight loss, night sweats, nausea vomiting, abdominal pain, diarrhea or dysuria.    Colonoscopy performed in August 2018 showed a one 4 mm polyp in the transverse colon that was noted to be benign-hyperplastic polyp.  There was also diverticulosis at the hepatic flexure.  Recommended 5 year repeat which will be 2023.  During the colonoscopy, erectile nodule was noted that was biopsied and showed to be simple inclusion cyst.    No pertinent family history or occupational history.    Interval history:  Iron studies showed severe iron deficiency, status post IV iron therapy x2 and presents today for follow-up.  Overall feels better, denies worsening shortness of breath of fatigue.  Denies any bleed.    Review of Systems   Constitutional: Negative for activity change, appetite change, chills, fever and unexpected weight change.   HENT: Negative for hearing loss, mouth sores, nosebleeds, sore throat, tinnitus, trouble swallowing and voice change.    Eyes: Negative for visual disturbance.   Respiratory: Negative for cough and  chest tightness.    Cardiovascular: Negative for chest pain, palpitations and leg swelling.   Gastrointestinal: Negative for abdominal pain, anal bleeding, blood in stool, constipation, diarrhea, nausea and vomiting.   Genitourinary: Negative for dysuria, frequency, hematuria, pelvic pain, vaginal bleeding and vaginal pain.   Musculoskeletal: Negative for arthralgias, back pain, joint swelling and neck pain.   Skin: Negative for color change, pallor, rash and wound.   Allergic/Immunologic: Negative for immunocompromised state.   Neurological: Positive for weakness. Negative for dizziness, tremors, syncope, speech difficulty, light-headedness and headaches.   Hematological: Negative for adenopathy. Does not bruise/bleed easily.   Psychiatric/Behavioral: Negative for agitation, confusion, decreased concentration, hallucinations and sleep disturbance. The patient is not nervous/anxious.        ?   PAST MEDICAL HISTORY:   Past Medical History:   Diagnosis Date    Arthritis     Cancer, uterine     Diabetes mellitus     prediabetes    Diabetes mellitus, type 2     DM (diabetes mellitus) 08/2019    BS doesn't check 12/16/2019    Hypertension     Ovarian cancer     Sciatica     ?     PAST SURGICAL HISTORY:   Past Surgical History:   Procedure Laterality Date    CHOLECYSTECTOMY      COLONOSCOPY N/A 8/1/2018    Procedure: COLONOSCOPY;  Surgeon: Yrn Hunter III, MD;  Location: [facility] ENDO;  Service: Endoscopy;  Laterality: N/A;    HYSTERECTOMY      JOINT REPLACEMENT Right     hip replacement    SURGICAL EXCISION OF ANAL LESION N/A 8/29/2018    Procedure: EXCISION, LESION, ANUS;  Surgeon: Yrn Balderrama MD;  Location: [facility] OR;  Service: General;  Laterality: N/A;    TOTAL KNEE ARTHROPLASTY        ?   ALLERGIES:   Allergies as of 11/30/2020 - Reviewed 11/30/2020   Allergen Reaction Noted    Sulfa (sulfonamide antibiotics) Anaphylaxis 04/13/2017    Buprenorphine Rash 07/26/2017      ?   MEDICATIONS:?    Outpatient Medications Marked as Taking for the 11/30/20 encounter (Office Visit) with Álvaro Campbell MD   Medication Sig Dispense Refill    amLODIPine (NORVASC) 5 MG tablet Take 1 tablet (5 mg total) by mouth once daily. 90 tablet 3    amoxicillin (AMOXIL) 500 MG capsule Take 1 capsule by mouth  every 8 hours  until gone 21 capsule 0    atorvastatin (LIPITOR) 40 MG tablet Take 1 tablet (40 mg total) by mouth every evening. 90 tablet 3    azelastine (ASTELIN) 137 mcg (0.1 %) nasal spray 1 spray (137 mcg total) by Nasal route 2 (two) times daily. 30 mL 11    betamethasone dipropionate (DIPROLENE) 0.05 % cream as needed.       candesartan (ATACAND) 16 MG tablet Take 1 tablet (16 mg total) by mouth once daily. 30 tablet 6    cholecalciferol, vitamin D3, 5,000 unit Tab Take 1,000 Units by mouth once daily.       gabapentin (NEURONTIN) 300 MG capsule Take 300 mg by mouth 3 (three) times daily. 300 mg TID      HYDROcodone-acetaminophen (NORCO)  mg per tablet Take 1 tablet by mouth every 24 hours as needed. 20 tablet 0    ibuprofen (ADVIL,MOTRIN) 800 MG tablet Take 1 tablet by  mouth every 6-8 hours as needed for pain 28 tablet 1    ipratropium-albuteroL (COMBIVENT)  mcg/actuation inhaler Inhale 1 puff into the lungs every 6 (six) hours as needed for Wheezing or Shortness of Breath (or Cough). Rescue 4 g 0    levocetirizine (XYZAL) 5 MG tablet Take 1 tablet (5 mg total) by mouth every evening. 30 tablet 11    meloxicam (MOBIC) 7.5 MG tablet Take 1 tablet (7.5 mg total) by mouth 2 (two) times daily. 180 tablet 3    metFORMIN (GLUCOPHAGE) 500 MG tablet Take 1 tablet (500 mg total) by mouth daily with breakfast. 90 tablet 3    metoprolol succinate (TOPROL-XL) 100 MG 24 hr tablet Take 1 tablet (100 mg total) by mouth 2 (two) times daily. 180 tablet 3    nortriptyline (PAMELOR) 50 MG capsule Take 50 mg by mouth nightly.       omeprazole (PRILOSEC) 20 MG capsule Take 20 mg by mouth once daily.       pantoprazole (PROTONIX) 40 MG tablet Take 1 tablet (40 mg total) by mouth once daily. 30 tablet 11    valACYclovir (VALTREX) 500 MG tablet Take 500 mg by mouth as needed.         ?   SOCIAL HISTORY:?   Social History     Tobacco Use    Smoking status: Never Smoker    Smokeless tobacco: Never Used   Substance Use Topics    Alcohol use: No     Frequency: Never     Drinks per session: Patient refused     Binge frequency: Never        ?   FAMILY HISTORY:   family history includes Breast cancer in her maternal aunt; Cataracts in her maternal grandmother and mother; Diabetes in her brother and mother.   ?     Objective:      Physical Exam  Constitutional:       General: She is not in acute distress.     Appearance: She is well-developed. She is not ill-appearing or toxic-appearing.   HENT:      Head: Normocephalic and atraumatic.      Mouth/Throat:      Pharynx: No oropharyngeal exudate.   Eyes:      General: No scleral icterus.        Right eye: No discharge.         Left eye: No discharge.      Conjunctiva/sclera: Conjunctivae normal.      Pupils: Pupils are equal, round, and reactive to light.   Neck:      Musculoskeletal: Normal range of motion and neck supple.      Thyroid: No thyromegaly.   Cardiovascular:      Rate and Rhythm: Normal rate and regular rhythm.      Heart sounds: No murmur.   Pulmonary:      Effort: Pulmonary effort is normal. No respiratory distress.      Breath sounds: Normal breath sounds.   Chest:      Chest wall: No tenderness.   Abdominal:      General: Bowel sounds are normal. There is no distension.      Palpations: Abdomen is soft. There is no mass.      Tenderness: There is no abdominal tenderness. There is no guarding or rebound.   Musculoskeletal: Normal range of motion.         General: No tenderness.   Lymphadenopathy:      Cervical: No cervical adenopathy.      Right cervical: No superficial cervical adenopathy.     Left cervical: No superficial cervical adenopathy.      Upper  Body:      Right upper body: No supraclavicular or pectoral adenopathy.      Left upper body: No supraclavicular or pectoral adenopathy.   Skin:     General: Skin is warm and dry.      Capillary Refill: Capillary refill takes 2 to 3 seconds.      Coloration: Skin is not pale.      Findings: No erythema or rash.   Neurological:      Mental Status: She is alert and oriented to person, place, and time.      Cranial Nerves: No cranial nerve deficit.      Sensory: No sensory deficit.   Psychiatric:         Behavior: Behavior normal. Behavior is cooperative.         Judgment: Judgment normal.       ?   Vitals:    11/30/20 1351   BP: 133/78   Pulse: 61   Temp: 98.2 °F (36.8 °C)      ?     ECOG SCORE    1 - Restricted in strenuous activity-ambulatory and able to carry out work of a light nature         ?   Laboratory:  ?   No visits with results within 1 Day(s) from this visit.   Latest known visit with results is:   Lab Visit on 11/27/2020   Component Date Value Ref Range Status    WBC 11/27/2020 8.93  3.90 - 12.70 K/uL Final    RBC 11/27/2020 4.10  4.00 - 5.40 M/uL Final    Hemoglobin 11/27/2020 11.9* 12.0 - 16.0 g/dL Final    Hematocrit 11/27/2020 36.3* 37.0 - 48.5 % Final    MCV 11/27/2020 89  82 - 98 fL Final    MCH 11/27/2020 29.0  27.0 - 31.0 pg Final    MCHC 11/27/2020 32.8  32.0 - 36.0 g/dL Final    RDW 11/27/2020 18.9* 11.5 - 14.5 % Final    Platelets 11/27/2020 269  150 - 350 K/uL Final    MPV 11/27/2020 9.2  9.2 - 12.9 fL Final    Immature Granulocytes 11/27/2020 0.3  0.0 - 0.5 % Final    Gran # (ANC) 11/27/2020 5.1  1.8 - 7.7 K/uL Final    Immature Grans (Abs) 11/27/2020 0.03  0.00 - 0.04 K/uL Final    Lymph # 11/27/2020 2.8  1.0 - 4.8 K/uL Final    Mono # 11/27/2020 0.7  0.3 - 1.0 K/uL Final    Eos # 11/27/2020 0.4  0.0 - 0.5 K/uL Final    Baso # 11/27/2020 0.05  0.00 - 0.20 K/uL Final    nRBC 11/27/2020 0  0 /100 WBC Final    Gran % 11/27/2020 56.7  38.0 - 73.0 % Final    Lymph %  11/27/2020 30.8  18.0 - 48.0 % Final    Mono % 11/27/2020 7.5  4.0 - 15.0 % Final    Eosinophil % 11/27/2020 4.1  0.0 - 8.0 % Final    Basophil % 11/27/2020 0.6  0.0 - 1.9 % Final    Differential Method 11/27/2020 Automated   Final    Sodium 11/27/2020 138  136 - 145 mmol/L Final    Potassium 11/27/2020 4.2  3.5 - 5.1 mmol/L Final    Chloride 11/27/2020 104  95 - 110 mmol/L Final    CO2 11/27/2020 26  23 - 29 mmol/L Final    Glucose 11/27/2020 145* 70 - 110 mg/dL Final    BUN 11/27/2020 22  8 - 23 mg/dL Final    Creatinine 11/27/2020 0.7  0.5 - 1.4 mg/dL Final    Calcium 11/27/2020 8.8  8.7 - 10.5 mg/dL Final    Total Protein 11/27/2020 6.9  6.0 - 8.4 g/dL Final    Albumin 11/27/2020 3.7  3.5 - 5.2 g/dL Final    Total Bilirubin 11/27/2020 0.6  0.1 - 1.0 mg/dL Final    Alkaline Phosphatase 11/27/2020 122  55 - 135 U/L Final    AST 11/27/2020 20  10 - 40 U/L Final    ALT 11/27/2020 30  10 - 44 U/L Final    Anion Gap 11/27/2020 8  8 - 16 mmol/L Final    eGFR if African American 11/27/2020 >60  >60 mL/min/1.73 m^2 Final    eGFR if non African American 11/27/2020 >60  >60 mL/min/1.73 m^2 Final    Iron 11/27/2020 60  30 - 160 ug/dL Final    Transferrin 11/27/2020 197* 200 - 375 mg/dL Final    TIBC 11/27/2020 292  250 - 450 ug/dL Final    Saturated Iron 11/27/2020 21  20 - 50 % Final    Ferritin 11/27/2020 838* 20.0 - 300.0 ng/mL Final      ?   Tumor markers   ?   ?   Imaging: CT Chest Without Contrast  Narrative: EXAMINATION:  CT CHEST WITHOUT CONTRAST    CLINICAL HISTORY:  Cough; Cough    TECHNIQUE:  Low dose axial images, sagittal and coronal reformations were obtained from the thoracic inlet to the lung bases. Contrast was not administered.    COMPARISON:  None    FINDINGS:  Base of Neck: 1.3 cm and 1 cm thyroid nodules in the right and left lobes of the thyroid gland respectively.  Did recommend thyroid ultrasound.    Thoracic soft tissues: Normal.    Aorta: Scattered atherosclerotic  calcifications of the nondilated thoracic aorta.    Heart: Normal size. No effusion.    Pulmonary vasculature: Normal.    Zoya/Mediastinum: No adenopathy.    Airways: Patent.    Lungs/Pleura: 2 mm subpleural nodule in the right upper lobe.  No other pulmonary nodules identified.    Esophagus: Normal.    Upper Abdomen: 2.3 cm low-density nodule in the spleen.    Bones: Accentuated thoracic kyphosis with multilevel degenerative changes.  Impression: Thyroid nodules, consider thyroid ultrasound.  2 mm subpleural nodule in the right upper lobe.  2.3 cm low-density nodule in the spleen, most hypodense nodules of the spleen are benign.  Accentuated thoracic kyphosis with multilevel degenerative changes.    For a solid nodule <6 mm, Fleischner Society 2017 guidelines recommend no routine follow up for a low risk patient, or follow-up with non-contrast chest CT at 12 months in a high risk patient.    All CT scans at this facility are performed  using dose modulation techniques as appropriate to performed exam including the following:  automated exposure control; adjustment of mA and/or kV according to the patients size (this includes techniques or standardized protocols for targeted exams where dose is matched to indication/reason for exam: i.e. extremities or head);  iterative reconstruction technique.    Electronically signed by: Chon Conway MD  Date:    08/26/2020  Time:    11:19     ?      Pathology:  Pathology Results  (Last 10 years)    None           ?   Assessment/Plan:       1. Iron deficiency anemia secondary to inadequate dietary iron intake        Iron deficiency anemia secondary to inadequate dietary iron intake  Iron deficiency anemia status post IV iron therapy with improvement noted in hemoglobin from 9 grams/deciliter to above 11 g per dL.  Iron studies also showed improvement in iron saturation, serum iron level and iron storage capacity.    Ordered upper endoscopy to rule out gastric ulcer.  Yet to be  obtained per patient.  Return back in 3 months with repeat labs or sooner if needed.      ?Iron deficiency anemia secondary to inadequate dietary iron intake  -     CBC Oncology; Future; Expected date: 11/30/2020  -     Comprehensive Metabolic Panel; Future; Expected date: 11/30/2020  -     Iron and TIBC; Future; Expected date: 11/30/2020  -     Ferritin; Future; Expected date: 11/30/2020    ?   Follow-Up: Follow up in about 3 months (around 2/28/2021).    EDWIGE ROBISON Md., Ph.D  Hematology & Oncology Department  Phone #: [phone]

## 2020-11-30 NOTE — ASSESSMENT & PLAN NOTE
Iron deficiency anemia status post IV iron therapy with improvement noted in hemoglobin from 9 grams/deciliter to above 11 g per dL.  Iron studies also showed improvement in iron saturation, serum iron level and iron storage capacity.    Ordered upper endoscopy to rule out gastric ulcer.  Yet to be obtained per patient.  Return back in 3 months with repeat labs or sooner if needed.

## 2020-12-07 DIAGNOSIS — R05.3 CHRONIC COUGH: ICD-10-CM

## 2020-12-09 ENCOUNTER — PATIENT MESSAGE (OUTPATIENT)
Dept: INTERNAL MEDICINE | Facility: CLINIC | Age: 70
End: 2020-12-09

## 2020-12-09 RX ORDER — MELOXICAM 7.5 MG/1
7.5 TABLET ORAL 2 TIMES DAILY
Qty: 180 TABLET | Refills: 3 | Status: SHIPPED | OUTPATIENT
Start: 2020-12-09 | End: 2021-12-09

## 2020-12-17 ENCOUNTER — PATIENT OUTREACH (OUTPATIENT)
Dept: OTHER | Facility: OTHER | Age: 70
End: 2020-12-17

## 2020-12-17 DIAGNOSIS — I10 ESSENTIAL HYPERTENSION: ICD-10-CM

## 2020-12-17 RX ORDER — CANDESARTAN 16 MG/1
16 TABLET ORAL DAILY
Qty: 30 TABLET | Refills: 6 | Status: SHIPPED | OUTPATIENT
Start: 2020-12-17 | End: 2020-12-28 | Stop reason: SDUPTHER

## 2020-12-22 NOTE — TELEPHONE ENCOUNTER
----- Message from Sourav Hernandez MD sent at 3/28/2018  7:38 AM CDT -----  A1c elevated in a Prediabetic range. Schedule an appointment to discuss treatment options.    CBC with anemia, but improving from hospital postsurgical discharge.  Also similar to previous readings when she was not excessively fatigued.  Not likely the cause of her fatigue.  More likely related to her pain and decreasing her chronic narcotic use.     Thyroid testing is normal.  Cholesterol tests at goal.  Continue Lipitor present dose.       70

## 2020-12-28 DIAGNOSIS — I10 ESSENTIAL HYPERTENSION: ICD-10-CM

## 2020-12-28 RX ORDER — CANDESARTAN 16 MG/1
16 TABLET ORAL DAILY
Qty: 30 TABLET | Refills: 6 | Status: SHIPPED | OUTPATIENT
Start: 2020-12-28 | End: 2021-01-14 | Stop reason: SDUPTHER

## 2020-12-29 ENCOUNTER — TELEPHONE (OUTPATIENT)
Dept: GASTROENTEROLOGY | Facility: CLINIC | Age: 70
End: 2020-12-29

## 2020-12-29 ENCOUNTER — PATIENT OUTREACH (OUTPATIENT)
Dept: ADMINISTRATIVE | Facility: OTHER | Age: 70
End: 2020-12-29

## 2020-12-29 NOTE — TELEPHONE ENCOUNTER
----- Message from Fredo Guillory sent at 12/29/2020  3:13 PM CST -----  Contact: Venus  .Type:  Patient Returning Call    Who Called:Dr. Alexander  Who Left Message for Patient: Ines  Does the patient know what this is regarding?:unsure  Would the patient rather a call back or a response via [employer]? Call back  Best Call Back Number:[phone]  Additional Information: n/a      Thanks  DD

## 2020-12-29 NOTE — TELEPHONE ENCOUNTER
Phoned patient to schedule EGD per Dr. Campbell.  Patient's voice mail is full-unable to leave message.  Phoned 's number-he stated she is eating lunch and will call back.

## 2020-12-30 ENCOUNTER — OFFICE VISIT (OUTPATIENT)
Dept: PODIATRY | Facility: CLINIC | Age: 70
End: 2020-12-30
Payer: MEDICARE

## 2020-12-30 VITALS — BODY MASS INDEX: 32.05 KG/M2 | HEIGHT: 62 IN | WEIGHT: 174.19 LBS

## 2020-12-30 DIAGNOSIS — L84 CORN OR CALLUS: ICD-10-CM

## 2020-12-30 DIAGNOSIS — M77.41 METATARSALGIA OF BOTH FEET: ICD-10-CM

## 2020-12-30 DIAGNOSIS — E11.49 TYPE 2 DIABETES MELLITUS WITH NEUROLOGICAL MANIFESTATION: Primary | ICD-10-CM

## 2020-12-30 DIAGNOSIS — M77.42 METATARSALGIA OF BOTH FEET: ICD-10-CM

## 2020-12-30 DIAGNOSIS — L60.2 ONYCHOGRYPHOSIS: ICD-10-CM

## 2020-12-30 PROCEDURE — 99213 OFFICE O/P EST LOW 20 MIN: CPT | Mod: PBBFAC,25 | Performed by: PODIATRIST

## 2020-12-30 PROCEDURE — 11720 PR DEBRIDEMENT OF NAIL(S), 1-5: ICD-10-PCS | Mod: 59,Q9,S$PBB, | Performed by: PODIATRIST

## 2020-12-30 PROCEDURE — 99999 PR PBB SHADOW E&M-EST. PATIENT-LVL III: CPT | Mod: PBBFAC,,, | Performed by: PODIATRIST

## 2020-12-30 PROCEDURE — 99999 PR PBB SHADOW E&M-EST. PATIENT-LVL III: ICD-10-PCS | Mod: PBBFAC,,, | Performed by: PODIATRIST

## 2020-12-30 PROCEDURE — 11056 PARNG/CUTG B9 HYPRKR LES 2-4: CPT | Mod: Q9,PBBFAC,59 | Performed by: PODIATRIST

## 2020-12-30 PROCEDURE — 11720 DEBRIDE NAIL 1-5: CPT | Mod: Q9,PBBFAC | Performed by: PODIATRIST

## 2020-12-30 PROCEDURE — 11056 PR TRIM BENIGN HYPERKERATOTIC SKIN LESION,2-4: ICD-10-PCS | Mod: Q9,S$PBB,, | Performed by: PODIATRIST

## 2020-12-30 PROCEDURE — 11719 PR TRIM NAIL(S): ICD-10-PCS | Mod: 59,Q9,S$PBB, | Performed by: PODIATRIST

## 2020-12-30 PROCEDURE — 11720 DEBRIDE NAIL 1-5: CPT | Mod: 59,Q9,S$PBB, | Performed by: PODIATRIST

## 2020-12-30 PROCEDURE — 99213 PR OFFICE/OUTPT VISIT, EST, LEVL III, 20-29 MIN: ICD-10-PCS | Mod: 25,S$PBB,, | Performed by: PODIATRIST

## 2020-12-30 PROCEDURE — 11719 TRIM NAIL(S) ANY NUMBER: CPT | Mod: Q9,PBBFAC,59 | Performed by: PODIATRIST

## 2020-12-30 PROCEDURE — 11056 PARNG/CUTG B9 HYPRKR LES 2-4: CPT | Mod: Q9,S$PBB,, | Performed by: PODIATRIST

## 2020-12-30 PROCEDURE — 11719 TRIM NAIL(S) ANY NUMBER: CPT | Mod: 59,Q9,S$PBB, | Performed by: PODIATRIST

## 2020-12-30 PROCEDURE — 99213 OFFICE O/P EST LOW 20 MIN: CPT | Mod: 25,S$PBB,, | Performed by: PODIATRIST

## 2020-12-30 NOTE — PROGRESS NOTES
Health Maintenance Due   Topic Date Due    Aspirin/Antiplatelet Therapy  01/24/1968    Shingles Vaccine (2 of 2) 10/08/2018    Foot Exam  12/16/2020    Eye Exam  12/16/2020    Mammogram  02/25/2021     Updates were requested from care everywhere.  Chart was reviewed for overdue Proactive [facility] Encounters ([facility]) topics (CRS, Breast Cancer Screening, Eye exam)  Health Maintenance has been updated.  LINKS immunization registry triggered.  Immunizations were reconciled.

## 2020-12-30 NOTE — PROGRESS NOTES
Subjective:       Patient ID: Venus Caldwell is a 70 y.o. female.    Chief Complaint: Diabetic Foot Exam (Pt reports painful overgrown nails. She also states aching pain in feet at night. Wears tennis shoes w/o socks. Last seen 8/31/2020 by PCP Dr Hernandez.)      HPI: Venus Caldwell presents to the office today, for her yearly diabetic foot exam and risk evaluation.  She reports some aching in her feet when walking barefoot or sock footed in her house.  She is unsure if this does not alleviate with shoe gear wear.  Patient is a DMII.  Patient follows with Dr. Hernandez as her primary care physician.  This patient last saw his/her primary care provider on 08/31/2020.  She complains of painful elongated toenails which are thickened on the right foot and the left foot.  She also is concerned about callus formation to the plantar surface of the right and left greats.     Hemoglobin A1C   Date Value Ref Range Status   08/13/2020 7.2 (H) 4.0 - 5.6 % Final     Comment:     ADA Screening Guidelines:  5.7-6.4%  Consistent with prediabetes  >or=6.5%  Consistent with diabetes  High levels of fetal hemoglobin interfere with the HbA1C  assay. Heterozygous hemoglobin variants (HbS, HgC, etc)do  not significantly interfere with this assay.   However, presence of multiple variants may affect accuracy.     12/16/2019 7.1 (H) 4.0 - 5.6 % Final     Comment:     ADA Screening Guidelines:  5.7-6.4%  Consistent with prediabetes  >or=6.5%  Consistent with diabetes  High levels of fetal hemoglobin interfere with the HbA1C  assay. Heterozygous hemoglobin variants (HbS, HgC, etc)do  not significantly interfere with this assay.   However, presence of multiple variants may affect accuracy.     08/13/2019 7.2 (H) 4.0 - 5.6 % Final     Comment:     ADA Screening Guidelines:  5.7-6.4%  Consistent with prediabetes  >or=6.5%  Consistent with diabetes  High levels of fetal hemoglobin interfere with the HbA1C  assay. Heterozygous  hemoglobin variants (HbS, HgC, etc)do  not significantly interfere with this assay.   However, presence of multiple variants may affect accuracy.     .    Review of patient's allergies indicates:   Allergen Reactions    Sulfa (sulfonamide antibiotics) Anaphylaxis     Pt became hypoxic after taking sulfa drugs as a child      Buprenorphine Rash       Past Medical History:   Diagnosis Date    Arthritis     Cancer, uterine     Diabetes mellitus     prediabetes    Diabetes mellitus, type 2     DM (diabetes mellitus) 08/2019    BS doesn't check 12/16/2019    Hypertension     Ovarian cancer     Sciatica        Family History   Problem Relation Age of Onset    Diabetes Mother     Cataracts Mother     Diabetes Brother     Cataracts Maternal Grandmother     Breast cancer Maternal Aunt        Social History     Socioeconomic History    Marital status:      Spouse name: Not on file    Number of children: Not on file    Years of education: Not on file    Highest education level: Not on file   Occupational History    Not on file   Social Needs    Financial resource strain: Not hard at all    Food insecurity     Worry: Never true     Inability: Never true    Transportation needs     Medical: No     Non-medical: No   Tobacco Use    Smoking status: Never Smoker    Smokeless tobacco: Never Used   Substance and Sexual Activity    Alcohol use: No     Frequency: Never     Drinks per session: Patient refused     Binge frequency: Never    Drug use: No    Sexual activity: Not on file   Lifestyle    Physical activity     Days per week: 0 days     Minutes per session: Not on file    Stress: Not at all   Relationships    Social connections     Talks on phone: Patient refused     Gets together: Never     Attends Voodoo service: Not on file     Active member of club or organization: No     Attends meetings of clubs or organizations: Patient refused     Relationship status:    Other Topics Concern  "   Not on file   Social History Narrative    Not on file       Past Surgical History:   Procedure Laterality Date    CHOLECYSTECTOMY      COLONOSCOPY N/A 8/1/2018    Procedure: COLONOSCOPY;  Surgeon: Yrn Hunter III, MD;  Location: [facility] ENDO;  Service: Endoscopy;  Laterality: N/A;    HYSTERECTOMY      JOINT REPLACEMENT Right     hip replacement    SURGICAL EXCISION OF ANAL LESION N/A 8/29/2018    Procedure: EXCISION, LESION, ANUS;  Surgeon: Yrn Balderrama MD;  Location: [facility] OR;  Service: General;  Laterality: N/A;    TOTAL KNEE ARTHROPLASTY         Review of Systems   Constitutional: Negative for activity change, appetite change, chills and fever.   HENT: Negative for sinus pain, sore throat and voice change.    Eyes: Negative for pain, redness and visual disturbance.   Respiratory: Negative for cough and shortness of breath.    Cardiovascular: Negative for chest pain and palpitations.   Gastrointestinal: Negative for diarrhea, nausea and vomiting.   Musculoskeletal: Negative for back pain and joint swelling.   Skin: Negative for color change and wound.   Neurological: Negative for dizziness, weakness and numbness.   Psychiatric/Behavioral: The patient is not nervous/anxious.          Objective:   Ht 5' 2" (1.575 m)   Wt 79 kg (174 lb 2.6 oz)   BMI 31.85 kg/m²     Physical Exam  LOWER EXTREMITY PHYSICAL EXAMINATION    ORTHOPEDIC:  Hallux abductovalgus deformities noted to the 1st metatarsophalangeal joints bilaterally.  There is sub metatarsal head callusing as well.  Plantar fat pad atrophy is noted to the bilateral forefoot.  No pain on palpation of the foot or ankle.   Range of motion within normal limits of the ankle joint, rearfoot, and forefoot.  Manual muscle strength testing is 5/5 with dorsiflexion, plantar flexion, abduction and abduction of the lower extremity.  No pain with or without resistance.  The patient is a full to ambulate without pain or discomfort.  The patient's gait is non " antalgic.  The patient does not utilize any assistive device for ambulation.    VASCULAR: Capillary refill time is less than 3s. Edema is trace. The left dorsalis pedis pulse is 2/4 and the right dorsalis pedis pulse is 2/4. The left posterior tibial pulse is 1/4 and the right posterior tibial pulse is 1/4. Varicosities are absent. Spider veins and telangiectasias are absent. Hair growth is sparse to absent. Skin appearance is WNL. Proximal to distal warm to cool to    NEUROLOGY: Proprioception is intact. Sensation to light touch is intact. Sensation to pin prick is intact. Vibratory sensation is diminished to the left and right lower extremity. Examination with 5.07 [place] Fritz monofilament reveals that protective sensation is  intact to the left and right plantar surfaces of the foot and digits    DERMATOLOGY: Skin is supple, moist, intact.  There is callusing formation to the plantar surface of the right left 1st metatarsophalangeal joint.  The right and left hallux nails are dystrophic, thickened, elevated, discolored with subungual debris.  There is no signs of ingrowing into the medial lateral borders.  The remaining nails 2-5 on the right foot and the left foot are elongated but of normal color, thickness, and texture.  The web spaces are clean, dry, intact.    Assessment:     1. 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 Onychogryphosis    3. Corn or callus    4. 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 We did also discuss recent medication changes, pertinent labs and imaging evaluations and other medical consultation notes and progress notes. Greater than 50% of this visit was spent on counseling and coordination of care. Greater than 20 minutes of this appt. was spent on education about the diabetic foot, in  relation to PVD and/or neuropathy, and the prevention of limb loss.     Shoe gear is inspected and wear and proper fit/type. Patient is reminded of the importance of good nutrition and blood sugar control to help prevent podiatric complications of diabetes. Patient instructed on proper foot hygeine. We discussed wearing proper shoe gear, daily foot inspections, never walking without protective shoe gear, never putting sharp instruments to feet.  Patient  will continue to monitor the areas daily, inspect feet, wear protective shoe gear when ambulatory, moisturizer to maintain skin integrity.     Patient's DMI/DMII is managed by Primary Care Provider and/or Endocrinology Advanced Practice Provider.    Onychogryphosis  The onychogrphotic nail plates, as outlined above (R#1  ; L# 1) are sharply debrided with double action nail nipper, and/or with the assistance of a mechanical rotary elia, with removal of all offending nail and nail border(s), for reduction of pains. Nails are reduced in terms of length, width and girth with removal of subungual debris to facilitate pain free weight bearing and ambulation. The elongated nails as outlined in the objective portion of this note, were trimmed to appropriate length, with a double action nail nipper, for alleviation/reduction of pains as well. Follow up in approx. 3-4 months.    Corn or callus  Hypertrophic skin formation x2, as outlined within the examination portion of this note, is surgically debrided with sharp #10/#15 blade, to alleviate discomfort with weight bearing and ambulation, and to lessen the possibility of skin complications, e.g., ulceration due to pressure. No ulceration(s) is are noted with/post debridement. The lesion is completed healed and resolved. No evidence of infection.     Metatarsalgia of both feet  Discussed pathology etiology associated metatarsalgia.  Do encourage patient to wear appropriate shoe gear while inside the hallux to provide adequate  padding to the plantar surface of foot.  I discussed the importance of appropriate padding as relates to plantar fat pad atrophy to elevate the metatarsal heads from recurrence discomfort with ambulation.

## 2021-01-02 ENCOUNTER — PATIENT MESSAGE (OUTPATIENT)
Dept: INTERNAL MEDICINE | Facility: CLINIC | Age: 71
End: 2021-01-02

## 2021-01-04 ENCOUNTER — PATIENT MESSAGE (OUTPATIENT)
Dept: INTERNAL MEDICINE | Facility: CLINIC | Age: 71
End: 2021-01-04

## 2021-01-04 DIAGNOSIS — I10 ESSENTIAL HYPERTENSION: ICD-10-CM

## 2021-01-04 RX ORDER — CANDESARTAN 16 MG/1
16 TABLET ORAL DAILY
Qty: 30 TABLET | Refills: 6 | Status: SHIPPED | OUTPATIENT
Start: 2021-01-04 | End: 2021-06-25 | Stop reason: SDUPTHER

## 2021-01-04 RX ORDER — GABAPENTIN 300 MG/1
300 CAPSULE ORAL 3 TIMES DAILY
Qty: 270 CAPSULE | Refills: 3 | Status: SHIPPED | OUTPATIENT
Start: 2021-01-04 | End: 2022-02-16 | Stop reason: SDUPTHER

## 2021-01-07 ENCOUNTER — IMMUNIZATION (OUTPATIENT)
Dept: INTERNAL MEDICINE | Facility: CLINIC | Age: 71
End: 2021-01-07
Payer: MEDICARE

## 2021-01-07 DIAGNOSIS — Z23 NEED FOR VACCINATION: ICD-10-CM

## 2021-01-07 PROCEDURE — 91300 COVID-19, MRNA, LNP-S, PF, 30 MCG/0.3 ML DOSE VACCINE: CPT | Mod: PBBFAC | Performed by: FAMILY MEDICINE

## 2021-01-28 ENCOUNTER — IMMUNIZATION (OUTPATIENT)
Dept: INTERNAL MEDICINE | Facility: CLINIC | Age: 71
End: 2021-01-28
Payer: MEDICARE

## 2021-01-28 DIAGNOSIS — Z23 NEED FOR VACCINATION: Primary | ICD-10-CM

## 2021-01-28 PROCEDURE — 91300 COVID-19, MRNA, LNP-S, PF, 30 MCG/0.3 ML DOSE VACCINE: CPT | Mod: PBBFAC | Performed by: FAMILY MEDICINE

## 2021-01-28 PROCEDURE — 0002A COVID-19, MRNA, LNP-S, PF, 30 MCG/0.3 ML DOSE VACCINE: CPT | Mod: PBBFAC | Performed by: FAMILY MEDICINE

## 2021-01-29 ENCOUNTER — TELEPHONE (OUTPATIENT)
Dept: GASTROENTEROLOGY | Facility: CLINIC | Age: 71
End: 2021-01-29

## 2021-01-31 PROCEDURE — 99457 RPM TX MGMT 1ST 20 MIN: CPT | Mod: S$PBB,,, | Performed by: FAMILY MEDICINE

## 2021-01-31 PROCEDURE — 99457 PR MONITORING, PHYSIOL PARAM, REMOTE, 1ST 20 MINS, PER MONTH: ICD-10-PCS | Mod: S$PBB,,, | Performed by: FAMILY MEDICINE

## 2021-02-17 ENCOUNTER — PES CALL (OUTPATIENT)
Dept: ADMINISTRATIVE | Facility: CLINIC | Age: 71
End: 2021-02-17

## 2021-02-19 ENCOUNTER — LAB VISIT (OUTPATIENT)
Dept: LAB | Facility: HOSPITAL | Age: 71
End: 2021-02-19
Attending: INTERNAL MEDICINE
Payer: MEDICARE

## 2021-02-19 DIAGNOSIS — D50.8 IRON DEFICIENCY ANEMIA SECONDARY TO INADEQUATE DIETARY IRON INTAKE: ICD-10-CM

## 2021-02-19 LAB
ALBUMIN SERPL BCP-MCNC: 3.9 G/DL (ref 3.5–5.2)
ALP SERPL-CCNC: 129 U/L (ref 55–135)
ALT SERPL W/O P-5'-P-CCNC: 26 U/L (ref 10–44)
ANION GAP SERPL CALC-SCNC: 13 MMOL/L (ref 8–16)
AST SERPL-CCNC: 20 U/L (ref 10–40)
BILIRUB SERPL-MCNC: 0.7 MG/DL (ref 0.1–1)
BUN SERPL-MCNC: 26 MG/DL (ref 8–23)
CALCIUM SERPL-MCNC: 9.5 MG/DL (ref 8.7–10.5)
CHLORIDE SERPL-SCNC: 105 MMOL/L (ref 95–110)
CO2 SERPL-SCNC: 25 MMOL/L (ref 23–29)
CREAT SERPL-MCNC: 0.7 MG/DL (ref 0.5–1.4)
ERYTHROCYTE [DISTWIDTH] IN BLOOD BY AUTOMATED COUNT: 12.7 % (ref 11.5–14.5)
EST. GFR  (AFRICAN AMERICAN): >60 ML/MIN/1.73 M^2
EST. GFR  (NON AFRICAN AMERICAN): >60 ML/MIN/1.73 M^2
GLUCOSE SERPL-MCNC: 106 MG/DL (ref 70–110)
HCT VFR BLD AUTO: 40.8 % (ref 37–48.5)
HGB BLD-MCNC: 13 G/DL (ref 12–16)
IMM GRANULOCYTES # BLD AUTO: 0.02 K/UL (ref 0–0.04)
MCH RBC QN AUTO: 30.2 PG (ref 27–31)
MCHC RBC AUTO-ENTMCNC: 31.9 G/DL (ref 32–36)
MCV RBC AUTO: 95 FL (ref 82–98)
NEUTROPHILS # BLD AUTO: 4.4 K/UL (ref 1.8–7.7)
PLATELET # BLD AUTO: 315 K/UL (ref 150–350)
PMV BLD AUTO: 10.8 FL (ref 9.2–12.9)
POTASSIUM SERPL-SCNC: 4.5 MMOL/L (ref 3.5–5.1)
PROT SERPL-MCNC: 7.4 G/DL (ref 6–8.4)
RBC # BLD AUTO: 4.31 M/UL (ref 4–5.4)
SODIUM SERPL-SCNC: 143 MMOL/L (ref 136–145)
WBC # BLD AUTO: 8.26 K/UL (ref 3.9–12.7)

## 2021-02-19 PROCEDURE — 36415 COLL VENOUS BLD VENIPUNCTURE: CPT

## 2021-02-19 PROCEDURE — 85027 COMPLETE CBC AUTOMATED: CPT

## 2021-02-19 PROCEDURE — 82728 ASSAY OF FERRITIN: CPT

## 2021-02-19 PROCEDURE — 83540 ASSAY OF IRON: CPT

## 2021-02-19 PROCEDURE — 80053 COMPREHEN METABOLIC PANEL: CPT

## 2021-02-20 LAB
FERRITIN SERPL-MCNC: 332 NG/ML (ref 20–300)
IRON SERPL-MCNC: 95 UG/DL (ref 30–160)
SATURATED IRON: 32 % (ref 20–50)
TOTAL IRON BINDING CAPACITY: 297 UG/DL (ref 250–450)
TRANSFERRIN SERPL-MCNC: 201 MG/DL (ref 200–375)

## 2021-02-24 ENCOUNTER — OFFICE VISIT (OUTPATIENT)
Dept: HEMATOLOGY/ONCOLOGY | Facility: CLINIC | Age: 71
End: 2021-02-24
Payer: MEDICARE

## 2021-02-24 VITALS
TEMPERATURE: 98 F | SYSTOLIC BLOOD PRESSURE: 106 MMHG | WEIGHT: 174.38 LBS | RESPIRATION RATE: 16 BRPM | OXYGEN SATURATION: 98 % | DIASTOLIC BLOOD PRESSURE: 62 MMHG | HEART RATE: 66 BPM | HEIGHT: 63 IN | BODY MASS INDEX: 30.9 KG/M2

## 2021-02-24 DIAGNOSIS — D50.8 IRON DEFICIENCY ANEMIA SECONDARY TO INADEQUATE DIETARY IRON INTAKE: Primary | ICD-10-CM

## 2021-02-24 PROCEDURE — 99215 OFFICE O/P EST HI 40 MIN: CPT | Mod: PBBFAC | Performed by: NURSE PRACTITIONER

## 2021-02-24 PROCEDURE — 99214 PR OFFICE/OUTPT VISIT, EST, LEVL IV, 30-39 MIN: ICD-10-PCS | Mod: S$PBB,,, | Performed by: NURSE PRACTITIONER

## 2021-02-24 PROCEDURE — 99999 PR PBB SHADOW E&M-EST. PATIENT-LVL V: CPT | Mod: PBBFAC,,, | Performed by: NURSE PRACTITIONER

## 2021-02-24 PROCEDURE — 99214 OFFICE O/P EST MOD 30 MIN: CPT | Mod: S$PBB,,, | Performed by: NURSE PRACTITIONER

## 2021-02-24 PROCEDURE — 99999 PR PBB SHADOW E&M-EST. PATIENT-LVL V: ICD-10-PCS | Mod: PBBFAC,,, | Performed by: NURSE PRACTITIONER

## 2021-03-02 ENCOUNTER — TELEPHONE (OUTPATIENT)
Dept: GASTROENTEROLOGY | Facility: CLINIC | Age: 71
End: 2021-03-02

## 2021-03-04 ENCOUNTER — TELEPHONE (OUTPATIENT)
Dept: GASTROENTEROLOGY | Facility: CLINIC | Age: 71
End: 2021-03-04

## 2021-03-10 ENCOUNTER — TELEPHONE (OUTPATIENT)
Dept: PREADMISSION TESTING | Facility: HOSPITAL | Age: 71
End: 2021-03-10

## 2021-03-14 ENCOUNTER — LAB VISIT (OUTPATIENT)
Dept: OTOLARYNGOLOGY | Facility: CLINIC | Age: 71
End: 2021-03-14
Payer: MEDICARE

## 2021-03-14 DIAGNOSIS — R10.84 GENERALIZED ABDOMINAL PAIN: ICD-10-CM

## 2021-03-14 LAB — SARS-COV-2 RNA RESP QL NAA+PROBE: NOT DETECTED

## 2021-03-14 PROCEDURE — U0003 INFECTIOUS AGENT DETECTION BY NUCLEIC ACID (DNA OR RNA); SEVERE ACUTE RESPIRATORY SYNDROME CORONAVIRUS 2 (SARS-COV-2) (CORONAVIRUS DISEASE [COVID-19]), AMPLIFIED PROBE TECHNIQUE, MAKING USE OF HIGH THROUGHPUT TECHNOLOGIES AS DESCRIBED BY CMS-2020-01-R: HCPCS | Performed by: INTERNAL MEDICINE

## 2021-03-14 PROCEDURE — U0005 INFEC AGEN DETEC AMPLI PROBE: HCPCS | Performed by: INTERNAL MEDICINE

## 2021-03-15 ENCOUNTER — ANESTHESIA EVENT (OUTPATIENT)
Dept: ENDOSCOPY | Facility: HOSPITAL | Age: 71
End: 2021-03-15
Payer: MEDICARE

## 2021-03-17 ENCOUNTER — HOSPITAL ENCOUNTER (OUTPATIENT)
Facility: HOSPITAL | Age: 71
Discharge: HOME | End: 2021-03-17
Attending: INTERNAL MEDICINE | Admitting: INTERNAL MEDICINE
Payer: MEDICARE

## 2021-03-17 ENCOUNTER — ANESTHESIA (OUTPATIENT)
Dept: ENDOSCOPY | Facility: HOSPITAL | Age: 71
End: 2021-03-17
Payer: MEDICARE

## 2021-03-17 VITALS
OXYGEN SATURATION: 99 % | SYSTOLIC BLOOD PRESSURE: 116 MMHG | TEMPERATURE: 98 F | BODY MASS INDEX: 30.86 KG/M2 | WEIGHT: 174.19 LBS | RESPIRATION RATE: 16 BRPM | HEIGHT: 63 IN | HEART RATE: 66 BPM | DIASTOLIC BLOOD PRESSURE: 70 MMHG

## 2021-03-17 DIAGNOSIS — D50.8 IRON DEFICIENCY ANEMIA SECONDARY TO INADEQUATE DIETARY IRON INTAKE: Primary | ICD-10-CM

## 2021-03-17 LAB — POCT GLUCOSE: 160 MG/DL (ref 70–110)

## 2021-03-17 PROCEDURE — 43239 EGD BIOPSY SINGLE/MULTIPLE: CPT | Performed by: INTERNAL MEDICINE

## 2021-03-17 PROCEDURE — 43239 PR EGD, FLEX, W/BIOPSY, SGL/MULTI: ICD-10-PCS | Mod: ,,, | Performed by: INTERNAL MEDICINE

## 2021-03-17 PROCEDURE — 25000003 PHARM REV CODE 250: Performed by: NURSE ANESTHETIST, CERTIFIED REGISTERED

## 2021-03-17 PROCEDURE — 88305 TISSUE EXAM BY PATHOLOGIST: ICD-10-PCS | Mod: 26,,, | Performed by: PATHOLOGY

## 2021-03-17 PROCEDURE — 37000008 HC ANESTHESIA 1ST 15 MINUTES: Performed by: INTERNAL MEDICINE

## 2021-03-17 PROCEDURE — 82962 GLUCOSE BLOOD TEST: CPT | Performed by: INTERNAL MEDICINE

## 2021-03-17 PROCEDURE — D9220A PRA ANESTHESIA: ICD-10-PCS | Mod: ANES,,, | Performed by: ANESTHESIOLOGY

## 2021-03-17 PROCEDURE — 63600175 PHARM REV CODE 636 W HCPCS: Performed by: NURSE ANESTHETIST, CERTIFIED REGISTERED

## 2021-03-17 PROCEDURE — 27201012 HC FORCEPS, HOT/COLD, DISP: Performed by: INTERNAL MEDICINE

## 2021-03-17 PROCEDURE — 88305 TISSUE EXAM BY PATHOLOGIST: CPT | Performed by: PATHOLOGY

## 2021-03-17 PROCEDURE — 43239 EGD BIOPSY SINGLE/MULTIPLE: CPT | Mod: ,,, | Performed by: INTERNAL MEDICINE

## 2021-03-17 PROCEDURE — D9220A PRA ANESTHESIA: Mod: ANES,,, | Performed by: ANESTHESIOLOGY

## 2021-03-17 PROCEDURE — 37000009 HC ANESTHESIA EA ADD 15 MINS: Performed by: INTERNAL MEDICINE

## 2021-03-17 PROCEDURE — D9220A PRA ANESTHESIA: Mod: CRNA,,, | Performed by: NURSE ANESTHETIST, CERTIFIED REGISTERED

## 2021-03-17 PROCEDURE — D9220A PRA ANESTHESIA: ICD-10-PCS | Mod: CRNA,,, | Performed by: NURSE ANESTHETIST, CERTIFIED REGISTERED

## 2021-03-17 PROCEDURE — 88305 TISSUE EXAM BY PATHOLOGIST: CPT | Mod: 26,,, | Performed by: PATHOLOGY

## 2021-03-17 RX ORDER — SODIUM CHLORIDE 0.9 % (FLUSH) 0.9 %
10 SYRINGE (ML) INJECTION
Status: DISCONTINUED | OUTPATIENT
Start: 2021-03-17 | End: 2021-03-17 | Stop reason: HOSPADM

## 2021-03-17 RX ORDER — LIDOCAINE HCL/PF 100 MG/5ML
SYRINGE (ML) INTRAVENOUS
Status: DISCONTINUED | OUTPATIENT
Start: 2021-03-17 | End: 2021-03-17

## 2021-03-17 RX ORDER — PROPOFOL 10 MG/ML
VIAL (ML) INTRAVENOUS
Status: DISCONTINUED | OUTPATIENT
Start: 2021-03-17 | End: 2021-03-17

## 2021-03-17 RX ADMIN — Medication 100 MG: at 07:03

## 2021-03-17 RX ADMIN — PROPOFOL 100 MG: 10 INJECTION, EMULSION INTRAVENOUS at 08:03

## 2021-03-17 RX ADMIN — PROPOFOL 50 MG: 10 INJECTION, EMULSION INTRAVENOUS at 08:03

## 2021-03-17 RX ADMIN — GLYCOPYRROLATE 0.2 MG: 0.2 INJECTION, SOLUTION INTRAMUSCULAR; INTRAVITREAL at 07:03

## 2021-03-17 RX ADMIN — PROPOFOL 100 MG: 10 INJECTION, EMULSION INTRAVENOUS at 07:03

## 2021-03-19 LAB
FINAL PATHOLOGIC DIAGNOSIS: NORMAL
GROSS: NORMAL
Lab: NORMAL

## 2021-04-08 ENCOUNTER — PES CALL (OUTPATIENT)
Dept: ADMINISTRATIVE | Facility: CLINIC | Age: 71
End: 2021-04-08

## 2021-05-02 ENCOUNTER — PATIENT OUTREACH (OUTPATIENT)
Dept: ADMINISTRATIVE | Facility: OTHER | Age: 71
End: 2021-05-02

## 2021-05-02 DIAGNOSIS — E11.9 TYPE 2 DIABETES MELLITUS WITHOUT COMPLICATION, WITHOUT LONG-TERM CURRENT USE OF INSULIN: ICD-10-CM

## 2021-05-02 DIAGNOSIS — Z12.31 ENCOUNTER FOR SCREENING MAMMOGRAM FOR BREAST CANCER: Primary | ICD-10-CM

## 2021-05-03 ENCOUNTER — PATIENT OUTREACH (OUTPATIENT)
Dept: ADMINISTRATIVE | Facility: OTHER | Age: 71
End: 2021-05-03

## 2021-05-03 ENCOUNTER — PATIENT MESSAGE (OUTPATIENT)
Dept: ADMINISTRATIVE | Facility: OTHER | Age: 71
End: 2021-05-03

## 2021-05-03 ENCOUNTER — OFFICE VISIT (OUTPATIENT)
Dept: PODIATRY | Facility: CLINIC | Age: 71
End: 2021-05-03
Payer: MEDICARE

## 2021-05-03 VITALS — BODY MASS INDEX: 30.92 KG/M2 | HEIGHT: 63 IN | WEIGHT: 174.5 LBS

## 2021-05-03 DIAGNOSIS — L60.3 NAIL DYSTROPHY: ICD-10-CM

## 2021-05-03 DIAGNOSIS — E11.49 TYPE 2 DIABETES MELLITUS WITH NEUROLOGICAL MANIFESTATION: Primary | ICD-10-CM

## 2021-05-03 PROCEDURE — 11719 PR TRIM NAIL(S): ICD-10-PCS | Mod: Q9,S$PBB,, | Performed by: PODIATRIST

## 2021-05-03 PROCEDURE — 99999 PR PBB SHADOW E&M-EST. PATIENT-LVL III: CPT | Mod: PBBFAC,,, | Performed by: PODIATRIST

## 2021-05-03 PROCEDURE — 99499 NO LOS: ICD-10-PCS | Mod: S$PBB,,, | Performed by: PODIATRIST

## 2021-05-03 PROCEDURE — 11720 DEBRIDE NAIL 1-5: CPT | Mod: 59,Q9,S$PBB, | Performed by: PODIATRIST

## 2021-05-03 PROCEDURE — 99213 OFFICE O/P EST LOW 20 MIN: CPT | Mod: PBBFAC,25 | Performed by: PODIATRIST

## 2021-05-03 PROCEDURE — 11720 DEBRIDE NAIL 1-5: CPT | Mod: Q9,PBBFAC | Performed by: PODIATRIST

## 2021-05-03 PROCEDURE — 11719 TRIM NAIL(S) ANY NUMBER: CPT | Mod: Q9,S$PBB,, | Performed by: PODIATRIST

## 2021-05-03 PROCEDURE — 11720 PR DEBRIDEMENT OF NAIL(S), 1-5: ICD-10-PCS | Mod: 59,Q9,S$PBB, | Performed by: PODIATRIST

## 2021-05-03 PROCEDURE — 11719 TRIM NAIL(S) ANY NUMBER: CPT | Mod: Q9,PBBFAC | Performed by: PODIATRIST

## 2021-05-03 PROCEDURE — 99499 UNLISTED E&M SERVICE: CPT | Mod: S$PBB,,, | Performed by: PODIATRIST

## 2021-05-03 PROCEDURE — 99999 PR PBB SHADOW E&M-EST. PATIENT-LVL III: ICD-10-PCS | Mod: PBBFAC,,, | Performed by: PODIATRIST

## 2021-05-12 ENCOUNTER — PES CALL (OUTPATIENT)
Dept: ADMINISTRATIVE | Facility: CLINIC | Age: 71
End: 2021-05-12

## 2021-05-27 ENCOUNTER — LAB VISIT (OUTPATIENT)
Dept: LAB | Facility: HOSPITAL | Age: 71
End: 2021-05-27
Attending: INTERNAL MEDICINE
Payer: MEDICARE

## 2021-05-27 DIAGNOSIS — D50.8 IRON DEFICIENCY ANEMIA SECONDARY TO INADEQUATE DIETARY IRON INTAKE: ICD-10-CM

## 2021-05-27 LAB
ANION GAP SERPL CALC-SCNC: 6 MMOL/L (ref 8–16)
BASOPHILS # BLD AUTO: 0.06 K/UL (ref 0–0.2)
BASOPHILS NFR BLD: 0.9 % (ref 0–1.9)
BUN SERPL-MCNC: 18 MG/DL (ref 8–23)
CALCIUM SERPL-MCNC: 9 MG/DL (ref 8.7–10.5)
CHLORIDE SERPL-SCNC: 109 MMOL/L (ref 95–110)
CO2 SERPL-SCNC: 27 MMOL/L (ref 23–29)
CREAT SERPL-MCNC: 0.7 MG/DL (ref 0.5–1.4)
DIFFERENTIAL METHOD: NORMAL
EOSINOPHIL # BLD AUTO: 0.3 K/UL (ref 0–0.5)
EOSINOPHIL NFR BLD: 3.6 % (ref 0–8)
ERYTHROCYTE [DISTWIDTH] IN BLOOD BY AUTOMATED COUNT: 12.2 % (ref 11.5–14.5)
EST. GFR  (AFRICAN AMERICAN): >60 ML/MIN/1.73 M^2
EST. GFR  (NON AFRICAN AMERICAN): >60 ML/MIN/1.73 M^2
GLUCOSE SERPL-MCNC: 123 MG/DL (ref 70–110)
HCT VFR BLD AUTO: 38.9 % (ref 37–48.5)
HGB BLD-MCNC: 12.6 G/DL (ref 12–16)
IMM GRANULOCYTES # BLD AUTO: 0.02 K/UL (ref 0–0.04)
IMM GRANULOCYTES NFR BLD AUTO: 0.3 % (ref 0–0.5)
LYMPHOCYTES # BLD AUTO: 1.9 K/UL (ref 1–4.8)
LYMPHOCYTES NFR BLD: 26.9 % (ref 18–48)
MCH RBC QN AUTO: 30.1 PG (ref 27–31)
MCHC RBC AUTO-ENTMCNC: 32.4 G/DL (ref 32–36)
MCV RBC AUTO: 93 FL (ref 82–98)
MONOCYTES # BLD AUTO: 0.4 K/UL (ref 0.3–1)
MONOCYTES NFR BLD: 6.4 % (ref 4–15)
NEUTROPHILS # BLD AUTO: 4.3 K/UL (ref 1.8–7.7)
NEUTROPHILS NFR BLD: 61.9 % (ref 38–73)
NRBC BLD-RTO: 0 /100 WBC
PLATELET # BLD AUTO: 296 K/UL (ref 150–450)
PMV BLD AUTO: 10 FL (ref 9.2–12.9)
POTASSIUM SERPL-SCNC: 4.1 MMOL/L (ref 3.5–5.1)
RBC # BLD AUTO: 4.18 M/UL (ref 4–5.4)
SODIUM SERPL-SCNC: 142 MMOL/L (ref 136–145)
WBC # BLD AUTO: 6.91 K/UL (ref 3.9–12.7)

## 2021-05-27 PROCEDURE — 85025 COMPLETE CBC W/AUTO DIFF WBC: CPT | Performed by: NURSE PRACTITIONER

## 2021-05-27 PROCEDURE — 82728 ASSAY OF FERRITIN: CPT | Performed by: NURSE PRACTITIONER

## 2021-05-27 PROCEDURE — 80048 BASIC METABOLIC PNL TOTAL CA: CPT | Performed by: NURSE PRACTITIONER

## 2021-05-27 PROCEDURE — 36415 COLL VENOUS BLD VENIPUNCTURE: CPT | Performed by: NURSE PRACTITIONER

## 2021-05-27 PROCEDURE — 83540 ASSAY OF IRON: CPT | Performed by: NURSE PRACTITIONER

## 2021-05-28 LAB
FERRITIN SERPL-MCNC: 212 NG/ML (ref 20–300)
IRON SERPL-MCNC: 68 UG/DL (ref 30–160)
SATURATED IRON: 23 % (ref 20–50)
TOTAL IRON BINDING CAPACITY: 292 UG/DL (ref 250–450)
TRANSFERRIN SERPL-MCNC: 197 MG/DL (ref 200–375)

## 2021-05-31 ENCOUNTER — OFFICE VISIT (OUTPATIENT)
Dept: HEMATOLOGY/ONCOLOGY | Facility: CLINIC | Age: 71
End: 2021-05-31
Payer: MEDICARE

## 2021-05-31 VITALS
TEMPERATURE: 97 F | BODY MASS INDEX: 30.71 KG/M2 | RESPIRATION RATE: 18 BRPM | OXYGEN SATURATION: 98 % | HEIGHT: 63 IN | HEART RATE: 56 BPM | SYSTOLIC BLOOD PRESSURE: 128 MMHG | DIASTOLIC BLOOD PRESSURE: 77 MMHG | WEIGHT: 173.31 LBS

## 2021-05-31 DIAGNOSIS — D50.8 IRON DEFICIENCY ANEMIA SECONDARY TO INADEQUATE DIETARY IRON INTAKE: Primary | ICD-10-CM

## 2021-05-31 PROCEDURE — 99999 PR PBB SHADOW E&M-EST. PATIENT-LVL V: CPT | Mod: PBBFAC,,, | Performed by: NURSE PRACTITIONER

## 2021-05-31 PROCEDURE — 99215 OFFICE O/P EST HI 40 MIN: CPT | Mod: PBBFAC | Performed by: NURSE PRACTITIONER

## 2021-05-31 PROCEDURE — 99213 PR OFFICE/OUTPT VISIT, EST, LEVL III, 20-29 MIN: ICD-10-PCS | Mod: S$PBB,,, | Performed by: NURSE PRACTITIONER

## 2021-05-31 PROCEDURE — 99213 OFFICE O/P EST LOW 20 MIN: CPT | Mod: S$PBB,,, | Performed by: NURSE PRACTITIONER

## 2021-05-31 PROCEDURE — 99999 PR PBB SHADOW E&M-EST. PATIENT-LVL V: ICD-10-PCS | Mod: PBBFAC,,, | Performed by: NURSE PRACTITIONER

## 2021-06-03 ENCOUNTER — PES CALL (OUTPATIENT)
Dept: ADMINISTRATIVE | Facility: CLINIC | Age: 71
End: 2021-06-03

## 2021-06-03 ENCOUNTER — PATIENT MESSAGE (OUTPATIENT)
Dept: INTERNAL MEDICINE | Facility: CLINIC | Age: 71
End: 2021-06-03

## 2021-06-03 DIAGNOSIS — I10 ESSENTIAL HYPERTENSION: Chronic | ICD-10-CM

## 2021-06-03 RX ORDER — METOPROLOL SUCCINATE 100 MG/1
100 TABLET, EXTENDED RELEASE ORAL 2 TIMES DAILY
Qty: 180 TABLET | Refills: 3 | Status: SHIPPED | OUTPATIENT
Start: 2021-06-03 | End: 2021-06-07 | Stop reason: SDUPTHER

## 2021-06-07 RX ORDER — METOPROLOL SUCCINATE 100 MG/1
100 TABLET, EXTENDED RELEASE ORAL 2 TIMES DAILY
Qty: 60 TABLET | Refills: 0 | Status: SHIPPED | OUTPATIENT
Start: 2021-06-07 | End: 2021-06-28 | Stop reason: SDUPTHER

## 2021-06-25 ENCOUNTER — PATIENT MESSAGE (OUTPATIENT)
Dept: CARDIOLOGY | Facility: CLINIC | Age: 71
End: 2021-06-25

## 2021-06-25 DIAGNOSIS — I10 ESSENTIAL HYPERTENSION: ICD-10-CM

## 2021-06-25 RX ORDER — CANDESARTAN 16 MG/1
16 TABLET ORAL DAILY
Qty: 30 TABLET | Refills: 6 | Status: SHIPPED | OUTPATIENT
Start: 2021-06-25 | End: 2021-07-06 | Stop reason: SDUPTHER

## 2021-06-28 ENCOUNTER — PATIENT MESSAGE (OUTPATIENT)
Dept: INTERNAL MEDICINE | Facility: CLINIC | Age: 71
End: 2021-06-28

## 2021-06-28 ENCOUNTER — TELEPHONE (OUTPATIENT)
Dept: ADMINISTRATIVE | Facility: HOSPITAL | Age: 71
End: 2021-06-28

## 2021-06-28 ENCOUNTER — PATIENT OUTREACH (OUTPATIENT)
Dept: ADMINISTRATIVE | Facility: OTHER | Age: 71
End: 2021-06-28

## 2021-06-28 DIAGNOSIS — I10 ESSENTIAL HYPERTENSION: Chronic | ICD-10-CM

## 2021-06-28 RX ORDER — METOPROLOL SUCCINATE 100 MG/1
100 TABLET, EXTENDED RELEASE ORAL 2 TIMES DAILY
Qty: 180 TABLET | Refills: 3 | Status: SHIPPED | OUTPATIENT
Start: 2021-06-28 | End: 2021-07-12 | Stop reason: SDUPTHER

## 2021-06-29 ENCOUNTER — OFFICE VISIT (OUTPATIENT)
Dept: OPHTHALMOLOGY | Facility: CLINIC | Age: 71
End: 2021-06-29
Payer: MEDICARE

## 2021-06-29 ENCOUNTER — PATIENT MESSAGE (OUTPATIENT)
Dept: INTERNAL MEDICINE | Facility: CLINIC | Age: 71
End: 2021-06-29

## 2021-06-29 ENCOUNTER — PATIENT MESSAGE (OUTPATIENT)
Dept: ADMINISTRATIVE | Facility: OTHER | Age: 71
End: 2021-06-29

## 2021-06-29 ENCOUNTER — PATIENT OUTREACH (OUTPATIENT)
Dept: ADMINISTRATIVE | Facility: OTHER | Age: 71
End: 2021-06-29

## 2021-06-29 DIAGNOSIS — E11.9 DIABETES MELLITUS TYPE 2 WITHOUT RETINOPATHY: Primary | ICD-10-CM

## 2021-06-29 DIAGNOSIS — E11.36 DIABETIC CATARACT: ICD-10-CM

## 2021-06-29 DIAGNOSIS — H52.4 BILATERAL PRESBYOPIA: ICD-10-CM

## 2021-06-29 DIAGNOSIS — I15.2 HYPERTENSION ASSOCIATED WITH DIABETES: ICD-10-CM

## 2021-06-29 DIAGNOSIS — E11.59 HYPERTENSION ASSOCIATED WITH DIABETES: ICD-10-CM

## 2021-06-29 PROCEDURE — 92015 PR REFRACTION: ICD-10-PCS | Mod: ,,, | Performed by: OPTOMETRIST

## 2021-06-29 PROCEDURE — 92015 DETERMINE REFRACTIVE STATE: CPT | Mod: ,,, | Performed by: OPTOMETRIST

## 2021-06-29 PROCEDURE — 92014 COMPRE OPH EXAM EST PT 1/>: CPT | Mod: S$PBB,,, | Performed by: OPTOMETRIST

## 2021-06-29 PROCEDURE — 99999 PR PBB SHADOW E&M-EST. PATIENT-LVL I: CPT | Mod: PBBFAC,,, | Performed by: OPTOMETRIST

## 2021-06-29 PROCEDURE — 99211 OFF/OP EST MAY X REQ PHY/QHP: CPT | Mod: PBBFAC | Performed by: OPTOMETRIST

## 2021-06-29 PROCEDURE — 99999 PR PBB SHADOW E&M-EST. PATIENT-LVL I: ICD-10-PCS | Mod: PBBFAC,,, | Performed by: OPTOMETRIST

## 2021-06-29 PROCEDURE — 92014 PR EYE EXAM, EST PATIENT,COMPREHESV: ICD-10-PCS | Mod: S$PBB,,, | Performed by: OPTOMETRIST

## 2021-07-05 ENCOUNTER — PATIENT MESSAGE (OUTPATIENT)
Dept: CARDIOLOGY | Facility: CLINIC | Age: 71
End: 2021-07-05

## 2021-07-05 DIAGNOSIS — I10 ESSENTIAL HYPERTENSION: ICD-10-CM

## 2021-07-06 RX ORDER — CANDESARTAN 16 MG/1
16 TABLET ORAL DAILY
Qty: 90 TABLET | Refills: 1 | Status: SHIPPED | OUTPATIENT
Start: 2021-07-06 | End: 2021-09-09 | Stop reason: SDUPTHER

## 2021-07-08 ENCOUNTER — LAB VISIT (OUTPATIENT)
Dept: LAB | Facility: HOSPITAL | Age: 71
End: 2021-07-08
Payer: MEDICARE

## 2021-07-08 ENCOUNTER — OFFICE VISIT (OUTPATIENT)
Dept: INTERNAL MEDICINE | Facility: CLINIC | Age: 71
End: 2021-07-08
Payer: MEDICARE

## 2021-07-08 VITALS
BODY MASS INDEX: 30.74 KG/M2 | SYSTOLIC BLOOD PRESSURE: 138 MMHG | HEART RATE: 59 BPM | HEIGHT: 63 IN | WEIGHT: 173.5 LBS | DIASTOLIC BLOOD PRESSURE: 68 MMHG | OXYGEN SATURATION: 95 %

## 2021-07-08 DIAGNOSIS — I25.10 CORONARY ARTERY DISEASE INVOLVING NATIVE CORONARY ARTERY OF NATIVE HEART WITHOUT ANGINA PECTORIS: Chronic | ICD-10-CM

## 2021-07-08 DIAGNOSIS — I10 HYPERTENSION, UNSPECIFIED TYPE: ICD-10-CM

## 2021-07-08 DIAGNOSIS — E04.2 MULTIPLE THYROID NODULES: ICD-10-CM

## 2021-07-08 DIAGNOSIS — M85.80 OSTEOPENIA, UNSPECIFIED LOCATION: ICD-10-CM

## 2021-07-08 DIAGNOSIS — E11.49 TYPE II DIABETES MELLITUS WITH NEUROLOGICAL MANIFESTATIONS: ICD-10-CM

## 2021-07-08 DIAGNOSIS — I47.19 PAROXYSMAL ATRIAL TACHYCARDIA: Chronic | ICD-10-CM

## 2021-07-08 DIAGNOSIS — D64.9 ANEMIA, UNSPECIFIED TYPE: ICD-10-CM

## 2021-07-08 DIAGNOSIS — Z00.00 ENCOUNTER FOR PREVENTIVE HEALTH EXAMINATION: Primary | ICD-10-CM

## 2021-07-08 DIAGNOSIS — I70.0 ATHEROSCLEROSIS OF AORTA: ICD-10-CM

## 2021-07-08 DIAGNOSIS — E11.69 HYPERLIPIDEMIA ASSOCIATED WITH TYPE 2 DIABETES MELLITUS: ICD-10-CM

## 2021-07-08 DIAGNOSIS — E78.5 HYPERLIPIDEMIA ASSOCIATED WITH TYPE 2 DIABETES MELLITUS: ICD-10-CM

## 2021-07-08 DIAGNOSIS — Z85.42 HISTORY OF ENDOMETRIAL CANCER: ICD-10-CM

## 2021-07-08 DIAGNOSIS — J98.4 CRLD (CHRONIC RESTRICTIVE LUNG DISEASE): Chronic | ICD-10-CM

## 2021-07-08 DIAGNOSIS — I47.10 SVT (SUPRAVENTRICULAR TACHYCARDIA): ICD-10-CM

## 2021-07-08 DIAGNOSIS — E11.9 TYPE 2 DIABETES MELLITUS WITHOUT COMPLICATION, WITHOUT LONG-TERM CURRENT USE OF INSULIN: ICD-10-CM

## 2021-07-08 DIAGNOSIS — D73.89 NODULE OF SPLEEN: ICD-10-CM

## 2021-07-08 DIAGNOSIS — I50.32 CHRONIC HEART FAILURE WITH PRESERVED EJECTION FRACTION: Chronic | ICD-10-CM

## 2021-07-08 DIAGNOSIS — R91.1 PULMONARY NODULE: ICD-10-CM

## 2021-07-08 DIAGNOSIS — E66.9 OBESITY (BMI 30.0-34.9): ICD-10-CM

## 2021-07-08 PROCEDURE — 82570 ASSAY OF URINE CREATININE: CPT | Performed by: NURSE PRACTITIONER

## 2021-07-08 PROCEDURE — G0439 PPPS, SUBSEQ VISIT: HCPCS | Mod: ,,, | Performed by: NURSE PRACTITIONER

## 2021-07-08 PROCEDURE — 99999 PR PBB SHADOW E&M-EST. PATIENT-LVL V: ICD-10-PCS | Mod: PBBFAC,,, | Performed by: NURSE PRACTITIONER

## 2021-07-08 PROCEDURE — G0439 PR MEDICARE ANNUAL WELLNESS SUBSEQUENT VISIT: ICD-10-PCS | Mod: ,,, | Performed by: NURSE PRACTITIONER

## 2021-07-08 PROCEDURE — 82043 UR ALBUMIN QUANTITATIVE: CPT | Performed by: NURSE PRACTITIONER

## 2021-07-08 PROCEDURE — 99215 OFFICE O/P EST HI 40 MIN: CPT | Mod: PBBFAC | Performed by: NURSE PRACTITIONER

## 2021-07-08 PROCEDURE — 36415 COLL VENOUS BLD VENIPUNCTURE: CPT | Performed by: FAMILY MEDICINE

## 2021-07-08 PROCEDURE — 99999 PR PBB SHADOW E&M-EST. PATIENT-LVL V: CPT | Mod: PBBFAC,,, | Performed by: NURSE PRACTITIONER

## 2021-07-08 PROCEDURE — 83036 HEMOGLOBIN GLYCOSYLATED A1C: CPT | Performed by: FAMILY MEDICINE

## 2021-07-08 RX ORDER — HYDROCODONE BITARTRATE AND ACETAMINOPHEN 10; 325 MG/1; MG/1
1 TABLET ORAL
COMMUNITY
End: 2022-02-16

## 2021-07-09 ENCOUNTER — TELEPHONE (OUTPATIENT)
Dept: INTERNAL MEDICINE | Facility: CLINIC | Age: 71
End: 2021-07-09

## 2021-07-09 LAB
ALBUMIN/CREAT UR: 10.3 UG/MG (ref 0–30)
CREAT UR-MCNC: 263 MG/DL (ref 15–325)
ESTIMATED AVG GLUCOSE: 146 MG/DL (ref 68–131)
HBA1C MFR BLD: 6.7 % (ref 4–5.6)
MICROALBUMIN UR DL<=1MG/L-MCNC: 27 UG/ML

## 2021-07-12 ENCOUNTER — PATIENT MESSAGE (OUTPATIENT)
Dept: INTERNAL MEDICINE | Facility: CLINIC | Age: 71
End: 2021-07-12

## 2021-07-12 DIAGNOSIS — I10 ESSENTIAL HYPERTENSION: Chronic | ICD-10-CM

## 2021-07-12 RX ORDER — METOPROLOL SUCCINATE 100 MG/1
100 TABLET, EXTENDED RELEASE ORAL 2 TIMES DAILY
Qty: 60 TABLET | Refills: 0 | Status: SHIPPED | OUTPATIENT
Start: 2021-07-12 | End: 2021-08-02

## 2021-07-13 ENCOUNTER — HOSPITAL ENCOUNTER (OUTPATIENT)
Dept: RADIOLOGY | Facility: HOSPITAL | Age: 71
Discharge: HOME | End: 2021-07-13
Attending: FAMILY MEDICINE
Payer: MEDICARE

## 2021-07-13 VITALS — BODY MASS INDEX: 30.74 KG/M2 | WEIGHT: 173.5 LBS | HEIGHT: 63 IN

## 2021-07-13 DIAGNOSIS — Z12.31 ENCOUNTER FOR SCREENING MAMMOGRAM FOR BREAST CANCER: ICD-10-CM

## 2021-07-13 PROCEDURE — 77067 SCR MAMMO BI INCL CAD: CPT | Mod: 26,,, | Performed by: RADIOLOGY

## 2021-07-13 PROCEDURE — 77067 MAMMO DIGITAL SCREENING BILAT WITH TOMO: ICD-10-PCS | Mod: 26,,, | Performed by: RADIOLOGY

## 2021-07-13 PROCEDURE — 77063 MAMMO DIGITAL SCREENING BILAT WITH TOMO: ICD-10-PCS | Mod: 26,,, | Performed by: RADIOLOGY

## 2021-07-13 PROCEDURE — 77067 SCR MAMMO BI INCL CAD: CPT | Mod: TC

## 2021-07-13 PROCEDURE — 77063 BREAST TOMOSYNTHESIS BI: CPT | Mod: 26,,, | Performed by: RADIOLOGY

## 2021-07-29 ENCOUNTER — OFFICE VISIT (OUTPATIENT)
Dept: PULMONOLOGY | Facility: CLINIC | Age: 71
End: 2021-07-29
Payer: MEDICARE

## 2021-07-29 VITALS
BODY MASS INDEX: 31.02 KG/M2 | WEIGHT: 175.06 LBS | HEART RATE: 59 BPM | SYSTOLIC BLOOD PRESSURE: 134 MMHG | OXYGEN SATURATION: 96 % | RESPIRATION RATE: 18 BRPM | HEIGHT: 63 IN | DIASTOLIC BLOOD PRESSURE: 76 MMHG

## 2021-07-29 DIAGNOSIS — R91.1 INCIDENTAL PULMONARY NODULE, LESS THAN OR EQUAL TO 3MM: ICD-10-CM

## 2021-07-29 DIAGNOSIS — R91.1 SOLITARY PULMONARY NODULE: ICD-10-CM

## 2021-07-29 DIAGNOSIS — J98.4 CRLD (CHRONIC RESTRICTIVE LUNG DISEASE): ICD-10-CM

## 2021-07-29 DIAGNOSIS — E66.09 CLASS 1 OBESITY DUE TO EXCESS CALORIES WITH SERIOUS COMORBIDITY AND BODY MASS INDEX (BMI) OF 31.0 TO 31.9 IN ADULT: Chronic | ICD-10-CM

## 2021-07-29 DIAGNOSIS — G47.33 OSA ON CPAP: Primary | ICD-10-CM

## 2021-07-29 PROBLEM — R05.3 CHRONIC COUGH: Chronic | Status: RESOLVED | Noted: 2020-01-28 | Resolved: 2021-07-29

## 2021-07-29 PROCEDURE — 99999 PR PBB SHADOW E&M-EST. PATIENT-LVL V: CPT | Mod: PBBFAC,,, | Performed by: NURSE PRACTITIONER

## 2021-07-29 PROCEDURE — 99215 OFFICE O/P EST HI 40 MIN: CPT | Mod: PBBFAC | Performed by: NURSE PRACTITIONER

## 2021-07-29 PROCEDURE — 99215 PR OFFICE/OUTPT VISIT, EST, LEVL V, 40-54 MIN: ICD-10-PCS | Mod: S$PBB,,, | Performed by: NURSE PRACTITIONER

## 2021-07-29 PROCEDURE — 99215 OFFICE O/P EST HI 40 MIN: CPT | Mod: S$PBB,,, | Performed by: NURSE PRACTITIONER

## 2021-07-29 PROCEDURE — 99999 PR PBB SHADOW E&M-EST. PATIENT-LVL V: ICD-10-PCS | Mod: PBBFAC,,, | Performed by: NURSE PRACTITIONER

## 2021-08-04 ENCOUNTER — PATIENT MESSAGE (OUTPATIENT)
Dept: INTERNAL MEDICINE | Facility: CLINIC | Age: 71
End: 2021-08-04

## 2021-08-04 DIAGNOSIS — I10 ESSENTIAL HYPERTENSION: Chronic | ICD-10-CM

## 2021-08-04 RX ORDER — METOPROLOL SUCCINATE 100 MG/1
100 TABLET, EXTENDED RELEASE ORAL DAILY
Qty: 90 TABLET | Refills: 3 | Status: SHIPPED | OUTPATIENT
Start: 2021-08-04 | End: 2021-09-09 | Stop reason: SDUPTHER

## 2021-08-13 ENCOUNTER — PATIENT MESSAGE (OUTPATIENT)
Dept: PULMONOLOGY | Facility: CLINIC | Age: 71
End: 2021-08-13

## 2021-08-16 ENCOUNTER — LAB VISIT (OUTPATIENT)
Dept: LAB | Facility: HOSPITAL | Age: 71
End: 2021-08-16
Attending: FAMILY MEDICINE
Payer: MEDICARE

## 2021-08-16 ENCOUNTER — OFFICE VISIT (OUTPATIENT)
Dept: INTERNAL MEDICINE | Facility: CLINIC | Age: 71
End: 2021-08-16
Payer: MEDICARE

## 2021-08-16 ENCOUNTER — IMMUNIZATION (OUTPATIENT)
Dept: PHARMACY | Facility: CLINIC | Age: 71
End: 2021-08-16
Payer: MEDICARE

## 2021-08-16 VITALS
TEMPERATURE: 99 F | BODY MASS INDEX: 31.13 KG/M2 | DIASTOLIC BLOOD PRESSURE: 80 MMHG | HEIGHT: 63 IN | SYSTOLIC BLOOD PRESSURE: 130 MMHG | WEIGHT: 175.69 LBS | OXYGEN SATURATION: 97 % | HEART RATE: 60 BPM

## 2021-08-16 DIAGNOSIS — I47.10 SVT (SUPRAVENTRICULAR TACHYCARDIA): ICD-10-CM

## 2021-08-16 DIAGNOSIS — I70.0 ATHEROSCLEROSIS OF AORTA: ICD-10-CM

## 2021-08-16 DIAGNOSIS — E78.2 MIXED DIABETIC HYPERLIPIDEMIA ASSOCIATED WITH TYPE 2 DIABETES MELLITUS: ICD-10-CM

## 2021-08-16 DIAGNOSIS — I25.10 CORONARY ARTERY DISEASE INVOLVING NATIVE CORONARY ARTERY OF NATIVE HEART WITHOUT ANGINA PECTORIS: ICD-10-CM

## 2021-08-16 DIAGNOSIS — I15.2 HYPERTENSION ASSOCIATED WITH DIABETES: ICD-10-CM

## 2021-08-16 DIAGNOSIS — D50.8 IRON DEFICIENCY ANEMIA SECONDARY TO INADEQUATE DIETARY IRON INTAKE: ICD-10-CM

## 2021-08-16 DIAGNOSIS — D73.89 NODULE OF SPLEEN: ICD-10-CM

## 2021-08-16 DIAGNOSIS — E11.69 MIXED DIABETIC HYPERLIPIDEMIA ASSOCIATED WITH TYPE 2 DIABETES MELLITUS: ICD-10-CM

## 2021-08-16 DIAGNOSIS — J84.10 INTERSTITIAL PULMONARY FIBROSIS: ICD-10-CM

## 2021-08-16 DIAGNOSIS — R91.1 INCIDENTAL PULMONARY NODULE, LESS THAN OR EQUAL TO 3MM: ICD-10-CM

## 2021-08-16 DIAGNOSIS — I50.32 CHRONIC HEART FAILURE WITH PRESERVED EJECTION FRACTION: ICD-10-CM

## 2021-08-16 DIAGNOSIS — E04.2 MULTIPLE THYROID NODULES: ICD-10-CM

## 2021-08-16 DIAGNOSIS — E11.59 HYPERTENSION ASSOCIATED WITH DIABETES: ICD-10-CM

## 2021-08-16 DIAGNOSIS — E11.49 TYPE II DIABETES MELLITUS WITH NEUROLOGICAL MANIFESTATIONS: Primary | ICD-10-CM

## 2021-08-16 DIAGNOSIS — J98.4 CRLD (CHRONIC RESTRICTIVE LUNG DISEASE): ICD-10-CM

## 2021-08-16 DIAGNOSIS — I47.19 PAROXYSMAL ATRIAL TACHYCARDIA: ICD-10-CM

## 2021-08-16 PROCEDURE — 99999 PR PBB SHADOW E&M-EST. PATIENT-LVL V: CPT | Mod: PBBFAC,,, | Performed by: FAMILY MEDICINE

## 2021-08-16 PROCEDURE — 80061 LIPID PANEL: CPT | Performed by: FAMILY MEDICINE

## 2021-08-16 PROCEDURE — 99215 OFFICE O/P EST HI 40 MIN: CPT | Mod: PBBFAC | Performed by: FAMILY MEDICINE

## 2021-08-16 PROCEDURE — 99215 PR OFFICE/OUTPT VISIT, EST, LEVL V, 40-54 MIN: ICD-10-PCS | Mod: S$PBB,,, | Performed by: FAMILY MEDICINE

## 2021-08-16 PROCEDURE — 36415 COLL VENOUS BLD VENIPUNCTURE: CPT | Performed by: FAMILY MEDICINE

## 2021-08-16 PROCEDURE — 84443 ASSAY THYROID STIM HORMONE: CPT | Performed by: FAMILY MEDICINE

## 2021-08-16 PROCEDURE — 99215 OFFICE O/P EST HI 40 MIN: CPT | Mod: S$PBB,,, | Performed by: FAMILY MEDICINE

## 2021-08-16 PROCEDURE — 99999 PR PBB SHADOW E&M-EST. PATIENT-LVL V: ICD-10-PCS | Mod: PBBFAC,,, | Performed by: FAMILY MEDICINE

## 2021-08-16 RX ORDER — CETIRIZINE HYDROCHLORIDE 10 MG/1
10 TABLET ORAL DAILY
COMMUNITY

## 2021-08-17 ENCOUNTER — NURSE TRIAGE (OUTPATIENT)
Dept: ADMINISTRATIVE | Facility: CLINIC | Age: 71
End: 2021-08-17

## 2021-08-17 LAB
CHOLEST SERPL-MCNC: 127 MG/DL (ref 120–199)
CHOLEST/HDLC SERPL: 2.8 {RATIO} (ref 2–5)
HDLC SERPL-MCNC: 46 MG/DL (ref 40–75)
HDLC SERPL: 36.2 % (ref 20–50)
LDLC SERPL CALC-MCNC: 56.8 MG/DL (ref 63–159)
NONHDLC SERPL-MCNC: 81 MG/DL
TRIGL SERPL-MCNC: 121 MG/DL (ref 30–150)
TSH SERPL DL<=0.005 MIU/L-ACNC: 0.85 UIU/ML (ref 0.4–4)

## 2021-08-17 RX ORDER — ASPIRIN 81 MG/1
81 TABLET ORAL DAILY
Refills: 0
Start: 2021-08-17 | End: 2022-01-26 | Stop reason: SDUPTHER

## 2021-08-18 ENCOUNTER — PATIENT MESSAGE (OUTPATIENT)
Dept: INTERNAL MEDICINE | Facility: CLINIC | Age: 71
End: 2021-08-18

## 2021-08-20 ENCOUNTER — TELEPHONE (OUTPATIENT)
Dept: RADIOLOGY | Facility: HOSPITAL | Age: 71
End: 2021-08-20

## 2021-08-23 ENCOUNTER — HOSPITAL ENCOUNTER (OUTPATIENT)
Dept: RADIOLOGY | Facility: HOSPITAL | Age: 71
Discharge: HOME | End: 2021-08-23
Attending: FAMILY MEDICINE
Payer: MEDICARE

## 2021-08-23 DIAGNOSIS — E04.2 MULTIPLE THYROID NODULES: ICD-10-CM

## 2021-08-23 PROCEDURE — 76536 US EXAM OF HEAD AND NECK: CPT | Mod: TC

## 2021-08-23 PROCEDURE — 76536 US SOFT TISSUE HEAD NECK THYROID: ICD-10-PCS | Mod: 26,,, | Performed by: RADIOLOGY

## 2021-08-23 PROCEDURE — 76536 US EXAM OF HEAD AND NECK: CPT | Mod: 26,,, | Performed by: RADIOLOGY

## 2021-09-07 ENCOUNTER — OFFICE VISIT (OUTPATIENT)
Dept: PODIATRY | Facility: CLINIC | Age: 71
End: 2021-09-07
Payer: MEDICARE

## 2021-09-07 DIAGNOSIS — E11.49 TYPE 2 DIABETES MELLITUS WITH NEUROLOGICAL MANIFESTATION: Primary | ICD-10-CM

## 2021-09-07 DIAGNOSIS — L60.3 NAIL DYSTROPHY: ICD-10-CM

## 2021-09-07 PROCEDURE — 99999 PR PBB SHADOW E&M-EST. PATIENT-LVL III: ICD-10-PCS | Mod: PBBFAC,,, | Performed by: PODIATRIST

## 2021-09-07 PROCEDURE — 11719 TRIM NAIL(S) ANY NUMBER: CPT | Mod: Q9,PBBFAC | Performed by: PODIATRIST

## 2021-09-07 PROCEDURE — 99499 UNLISTED E&M SERVICE: CPT | Mod: S$PBB,,, | Performed by: PODIATRIST

## 2021-09-07 PROCEDURE — 11719 PR TRIM NAIL(S): ICD-10-PCS | Mod: Q9,S$PBB,, | Performed by: PODIATRIST

## 2021-09-07 PROCEDURE — 11719 TRIM NAIL(S) ANY NUMBER: CPT | Mod: Q9,S$PBB,, | Performed by: PODIATRIST

## 2021-09-07 PROCEDURE — 11720 DEBRIDE NAIL 1-5: CPT | Mod: Q9,PBBFAC | Performed by: PODIATRIST

## 2021-09-07 PROCEDURE — 99499 NO LOS: ICD-10-PCS | Mod: S$PBB,,, | Performed by: PODIATRIST

## 2021-09-07 PROCEDURE — 99999 PR PBB SHADOW E&M-EST. PATIENT-LVL III: CPT | Mod: PBBFAC,,, | Performed by: PODIATRIST

## 2021-09-07 PROCEDURE — 11720 DEBRIDE NAIL 1-5: CPT | Mod: Q9,59,S$PBB, | Performed by: PODIATRIST

## 2021-09-07 PROCEDURE — 99213 OFFICE O/P EST LOW 20 MIN: CPT | Mod: PBBFAC | Performed by: PODIATRIST

## 2021-09-07 PROCEDURE — 11720 PR DEBRIDEMENT OF NAIL(S), 1-5: ICD-10-PCS | Mod: Q9,59,S$PBB, | Performed by: PODIATRIST

## 2021-09-09 ENCOUNTER — OFFICE VISIT (OUTPATIENT)
Dept: CARDIOLOGY | Facility: CLINIC | Age: 71
End: 2021-09-09
Payer: MEDICARE

## 2021-09-09 ENCOUNTER — TELEPHONE (OUTPATIENT)
Dept: CARDIOLOGY | Facility: CLINIC | Age: 71
End: 2021-09-09

## 2021-09-09 DIAGNOSIS — I25.10 CORONARY ARTERY DISEASE INVOLVING NATIVE CORONARY ARTERY OF NATIVE HEART WITHOUT ANGINA PECTORIS: Primary | ICD-10-CM

## 2021-09-09 DIAGNOSIS — E11.59 HYPERTENSION ASSOCIATED WITH DIABETES: Chronic | ICD-10-CM

## 2021-09-09 DIAGNOSIS — I47.19 PAROXYSMAL ATRIAL TACHYCARDIA: Chronic | ICD-10-CM

## 2021-09-09 DIAGNOSIS — I47.10 SVT (SUPRAVENTRICULAR TACHYCARDIA): ICD-10-CM

## 2021-09-09 DIAGNOSIS — E78.2 MIXED DIABETIC HYPERLIPIDEMIA ASSOCIATED WITH TYPE 2 DIABETES MELLITUS: Chronic | ICD-10-CM

## 2021-09-09 DIAGNOSIS — I10 ESSENTIAL HYPERTENSION: Chronic | ICD-10-CM

## 2021-09-09 DIAGNOSIS — R00.2 PALPITATIONS: ICD-10-CM

## 2021-09-09 DIAGNOSIS — I50.32 CHRONIC HEART FAILURE WITH PRESERVED EJECTION FRACTION: Chronic | ICD-10-CM

## 2021-09-09 DIAGNOSIS — E11.69 MIXED DIABETIC HYPERLIPIDEMIA ASSOCIATED WITH TYPE 2 DIABETES MELLITUS: Chronic | ICD-10-CM

## 2021-09-09 DIAGNOSIS — I15.2 HYPERTENSION ASSOCIATED WITH DIABETES: Chronic | ICD-10-CM

## 2021-09-09 DIAGNOSIS — I70.0 ATHEROSCLEROSIS OF AORTA: ICD-10-CM

## 2021-09-09 PROBLEM — G47.33 OSA ON CPAP: Status: RESOLVED | Noted: 2019-10-08 | Resolved: 2021-09-09

## 2021-09-09 PROCEDURE — 99214 PR OFFICE/OUTPT VISIT, EST, LEVL IV, 30-39 MIN: ICD-10-PCS | Mod: 95,,, | Performed by: INTERNAL MEDICINE

## 2021-09-09 PROCEDURE — 99214 OFFICE O/P EST MOD 30 MIN: CPT | Mod: 95,,, | Performed by: INTERNAL MEDICINE

## 2021-09-09 RX ORDER — METOPROLOL SUCCINATE 100 MG/1
100 TABLET, EXTENDED RELEASE ORAL DAILY
Qty: 90 TABLET | Refills: 3 | Status: SHIPPED | OUTPATIENT
Start: 2021-09-09 | End: 2022-06-06 | Stop reason: SDUPTHER

## 2021-09-09 RX ORDER — CANDESARTAN 16 MG/1
16 TABLET ORAL DAILY
Qty: 90 TABLET | Refills: 1 | Status: SHIPPED | OUTPATIENT
Start: 2021-09-09 | End: 2022-02-24 | Stop reason: SDUPTHER

## 2021-09-10 ENCOUNTER — HOSPITAL ENCOUNTER (EMERGENCY)
Facility: HOSPITAL | Age: 71
Discharge: HOME | End: 2021-09-10
Attending: EMERGENCY MEDICINE
Payer: MEDICARE

## 2021-09-10 VITALS
TEMPERATURE: 98 F | SYSTOLIC BLOOD PRESSURE: 154 MMHG | DIASTOLIC BLOOD PRESSURE: 75 MMHG | HEART RATE: 66 BPM | OXYGEN SATURATION: 97 % | RESPIRATION RATE: 16 BRPM
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
## 2021-09-10 PROCEDURE — 99284 EMERGENCY DEPT VISIT MOD MDM: CPT | Mod: 25

## 2021-09-10 PROCEDURE — 90471 IMMUNIZATION ADMIN: CPT | Performed by: REGISTERED NURSE

## 2021-09-10 PROCEDURE — 90715 TDAP VACCINE 7 YRS/> IM: CPT | Performed by: REGISTERED NURSE

## 2021-09-10 PROCEDURE — 25000003 PHARM REV CODE 250: Performed by: REGISTERED NURSE

## 2021-09-10 PROCEDURE — 63600175 PHARM REV CODE 636 W HCPCS: Performed by: REGISTERED NURSE

## 2021-09-10 RX ORDER — AMOXICILLIN AND CLAVULANATE POTASSIUM 875; 125 MG/1; MG/1
1 TABLET, FILM COATED ORAL 2 TIMES DAILY
Qty: 20 TABLET | Refills: 0 | Status: SHIPPED | OUTPATIENT
Start: 2021-09-10 | End: 2021-09-23

## 2021-09-10 RX ORDER — AMOXICILLIN AND CLAVULANATE POTASSIUM 875; 125 MG/1; MG/1
1 TABLET, FILM COATED ORAL
Status: COMPLETED | OUTPATIENT
Start: 2021-09-10 | End: 2021-09-10

## 2021-09-10 RX ADMIN — TETANUS TOXOID, REDUCED DIPHTHERIA TOXOID AND ACELLULAR PERTUSSIS VACCINE, ADSORBED 0.5 ML: 5; 2.5; 8; 8; 2.5 SUSPENSION INTRAMUSCULAR at 05:09

## 2021-09-10 RX ADMIN — BACITRACIN, NEOMYCIN, POLYMYXIN B 1 EACH: 400; 3.5; 5 OINTMENT TOPICAL at 04:09

## 2021-09-10 RX ADMIN — AMOXICILLIN AND CLAVULANATE POTASSIUM 1 TABLET: 875; 125 TABLET, FILM COATED ORAL at 04:09

## 2021-09-15 ENCOUNTER — OFFICE VISIT (OUTPATIENT)
Dept: INTERNAL MEDICINE | Facility: CLINIC | Age: 71
End: 2021-09-15
Payer: MEDICARE

## 2021-09-15 VITALS
BODY MASS INDEX: 30.43 KG/M2 | HEART RATE: 71 BPM | SYSTOLIC BLOOD PRESSURE: 124 MMHG | TEMPERATURE: 98 F | WEIGHT: 171.75 LBS | DIASTOLIC BLOOD PRESSURE: 72 MMHG | HEIGHT: 63 IN | OXYGEN SATURATION: 96 %
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
## 2021-09-15 DIAGNOSIS — S61.052D DOG BITE OF LEFT THUMB, SUBSEQUENT ENCOUNTER: Primary | ICD-10-CM

## 2021-09-15 PROCEDURE — 99999 PR PBB SHADOW E&M-EST. PATIENT-LVL V: ICD-10-PCS | Mod: PBBFAC,,, | Performed by: FAMILY MEDICINE

## 2021-09-15 PROCEDURE — 99213 OFFICE O/P EST LOW 20 MIN: CPT | Mod: S$PBB,,, | Performed by: FAMILY MEDICINE

## 2021-09-15 PROCEDURE — 99215 OFFICE O/P EST HI 40 MIN: CPT | Mod: PBBFAC | Performed by: FAMILY MEDICINE

## 2021-09-15 PROCEDURE — 99213 PR OFFICE/OUTPT VISIT, EST, LEVL III, 20-29 MIN: ICD-10-PCS | Mod: S$PBB,,, | Performed by: FAMILY MEDICINE

## 2021-09-15 PROCEDURE — 99999 PR PBB SHADOW E&M-EST. PATIENT-LVL V: CPT | Mod: PBBFAC,,, | Performed by: FAMILY MEDICINE

## 2021-09-15 RX ORDER — MUPIROCIN 20 MG/G
OINTMENT TOPICAL 3 TIMES DAILY
Qty: 22 G | Refills: 0 | Status: SHIPPED | OUTPATIENT
Start: 2021-09-15 | End: 2023-08-16

## 2021-09-15 RX ORDER — MUPIROCIN 20 MG/G
OINTMENT TOPICAL 3 TIMES DAILY
Qty: 30 G | Refills: 0 | Status: SHIPPED | OUTPATIENT
Start: 2021-09-15 | End: 2021-09-15 | Stop reason: SDUPTHER

## 2021-09-20 ENCOUNTER — OFFICE VISIT (OUTPATIENT)
Dept: INTERNAL MEDICINE | Facility: CLINIC | Age: 71
End: 2021-09-20
Payer: MEDICARE

## 2021-09-20 ENCOUNTER — PATIENT MESSAGE (OUTPATIENT)
Dept: INTERNAL MEDICINE | Facility: CLINIC | Age: 71
End: 2021-09-20

## 2021-09-20 VITALS
WEIGHT: 171.31 LBS | OXYGEN SATURATION: 95 % | HEIGHT: 63 IN | HEART RATE: 75 BPM | DIASTOLIC BLOOD PRESSURE: 60 MMHG | TEMPERATURE: 100 F | BODY MASS INDEX: 30.35 KG/M2 | SYSTOLIC BLOOD PRESSURE: 100 MMHG

## 2021-09-20 DIAGNOSIS — R19.7 DIARRHEA, UNSPECIFIED TYPE: Primary | ICD-10-CM

## 2021-09-20 PROCEDURE — 99999 PR PBB SHADOW E&M-EST. PATIENT-LVL V: CPT | Mod: PBBFAC,,, | Performed by: INTERNAL MEDICINE

## 2021-09-20 PROCEDURE — 99214 PR OFFICE/OUTPT VISIT, EST, LEVL IV, 30-39 MIN: ICD-10-PCS | Mod: S$PBB,,, | Performed by: INTERNAL MEDICINE

## 2021-09-20 PROCEDURE — 99214 OFFICE O/P EST MOD 30 MIN: CPT | Mod: S$PBB,,, | Performed by: INTERNAL MEDICINE

## 2021-09-20 PROCEDURE — 99999 PR PBB SHADOW E&M-EST. PATIENT-LVL V: ICD-10-PCS | Mod: PBBFAC,,, | Performed by: INTERNAL MEDICINE

## 2021-09-20 PROCEDURE — 99215 OFFICE O/P EST HI 40 MIN: CPT | Mod: PBBFAC | Performed by: INTERNAL MEDICINE

## 2021-09-23 ENCOUNTER — LAB VISIT (OUTPATIENT)
Dept: LAB | Facility: HOSPITAL | Age: 71
End: 2021-09-23
Attending: FAMILY MEDICINE
Payer: MEDICARE

## 2021-09-23 DIAGNOSIS — D50.8 IRON DEFICIENCY ANEMIA SECONDARY TO INADEQUATE DIETARY IRON INTAKE: ICD-10-CM

## 2021-09-23 LAB
BASOPHILS # BLD AUTO: 0.07 K/UL (ref 0–0.2)
BASOPHILS NFR BLD: 0.8 % (ref 0–1.9)
DIFFERENTIAL METHOD: NORMAL
EOSINOPHIL # BLD AUTO: 0.4 K/UL (ref 0–0.5)
EOSINOPHIL NFR BLD: 4.6 % (ref 0–8)
ERYTHROCYTE [DISTWIDTH] IN BLOOD BY AUTOMATED COUNT: 12.7 % (ref 11.5–14.5)
FERRITIN SERPL-MCNC: 224 NG/ML (ref 20–300)
HCT VFR BLD AUTO: 37.6 % (ref 37–48.5)
HGB BLD-MCNC: 12.1 G/DL (ref 12–16)
IMM GRANULOCYTES # BLD AUTO: 0.03 K/UL (ref 0–0.04)
IMM GRANULOCYTES NFR BLD AUTO: 0.3 % (ref 0–0.5)
IRON SERPL-MCNC: 62 UG/DL (ref 30–160)
LYMPHOCYTES # BLD AUTO: 2.2 K/UL (ref 1–4.8)
LYMPHOCYTES NFR BLD: 24.7 % (ref 18–48)
MCH RBC QN AUTO: 29.4 PG (ref 27–31)
MCHC RBC AUTO-ENTMCNC: 32.2 G/DL (ref 32–36)
MCV RBC AUTO: 91 FL (ref 82–98)
MONOCYTES # BLD AUTO: 0.5 K/UL (ref 0.3–1)
MONOCYTES NFR BLD: 6.1 % (ref 4–15)
NEUTROPHILS # BLD AUTO: 5.6 K/UL (ref 1.8–7.7)
NEUTROPHILS NFR BLD: 63.5 % (ref 38–73)
NRBC BLD-RTO: 0 /100 WBC
PLATELET # BLD AUTO: 347 K/UL (ref 150–450)
PMV BLD AUTO: 9.9 FL (ref 9.2–12.9)
RBC # BLD AUTO: 4.12 M/UL (ref 4–5.4)
SATURATED IRON: 21 % (ref 20–50)
TOTAL IRON BINDING CAPACITY: 289 UG/DL (ref 250–450)
TRANSFERRIN SERPL-MCNC: 195 MG/DL (ref 200–375)
WBC # BLD AUTO: 8.84 K/UL (ref 3.9–12.7)

## 2021-09-23 PROCEDURE — 84466 ASSAY OF TRANSFERRIN: CPT | Performed by: NURSE PRACTITIONER

## 2021-09-23 PROCEDURE — 36415 COLL VENOUS BLD VENIPUNCTURE: CPT | Performed by: NURSE PRACTITIONER

## 2021-09-23 PROCEDURE — 85025 COMPLETE CBC W/AUTO DIFF WBC: CPT | Performed by: NURSE PRACTITIONER

## 2021-09-23 PROCEDURE — 82728 ASSAY OF FERRITIN: CPT | Performed by: NURSE PRACTITIONER

## 2021-09-27 ENCOUNTER — OFFICE VISIT (OUTPATIENT)
Dept: HEMATOLOGY/ONCOLOGY | Facility: CLINIC | Age: 71
End: 2021-09-27
Payer: MEDICARE

## 2021-09-27 VITALS
TEMPERATURE: 98 F | OXYGEN SATURATION: 97 % | HEIGHT: 63 IN | SYSTOLIC BLOOD PRESSURE: 119 MMHG | HEART RATE: 68 BPM | BODY MASS INDEX: 30 KG/M2 | WEIGHT: 169.31 LBS | DIASTOLIC BLOOD PRESSURE: 76 MMHG

## 2021-09-27 DIAGNOSIS — D50.8 IRON DEFICIENCY ANEMIA SECONDARY TO INADEQUATE DIETARY IRON INTAKE: Primary | ICD-10-CM

## 2021-09-27 PROCEDURE — 99215 OFFICE O/P EST HI 40 MIN: CPT | Mod: PBBFAC | Performed by: NURSE PRACTITIONER

## 2021-09-27 PROCEDURE — 99999 PR PBB SHADOW E&M-EST. PATIENT-LVL V: ICD-10-PCS | Mod: PBBFAC,,, | Performed by: NURSE PRACTITIONER

## 2021-09-27 PROCEDURE — 99214 PR OFFICE/OUTPT VISIT, EST, LEVL IV, 30-39 MIN: ICD-10-PCS | Mod: S$PBB,,, | Performed by: NURSE PRACTITIONER

## 2021-09-27 PROCEDURE — 99214 OFFICE O/P EST MOD 30 MIN: CPT | Mod: S$PBB,,, | Performed by: NURSE PRACTITIONER

## 2021-09-27 PROCEDURE — 99999 PR PBB SHADOW E&M-EST. PATIENT-LVL V: CPT | Mod: PBBFAC,,, | Performed by: NURSE PRACTITIONER

## 2021-09-29 ENCOUNTER — IMMUNIZATION (OUTPATIENT)
Dept: PRIMARY CARE CLINIC | Facility: CLINIC | Age: 71
End: 2021-09-29
Payer: MEDICARE

## 2021-09-29 DIAGNOSIS — Z23 NEED FOR VACCINATION: Primary | ICD-10-CM

## 2021-09-29 PROCEDURE — 91300 COVID-19, MRNA, LNP-S, PF, 30 MCG/0.3 ML DOSE VACCINE: CPT | Mod: PBBFAC | Performed by: FAMILY MEDICINE

## 2021-09-29 PROCEDURE — 0003A COVID-19, MRNA, LNP-S, PF, 30 MCG/0.3 ML DOSE VACCINE: CPT | Mod: CV19,PBBFAC | Performed by: FAMILY MEDICINE

## 2021-10-05 ENCOUNTER — PATIENT OUTREACH (OUTPATIENT)
Dept: ADMINISTRATIVE | Facility: OTHER | Age: 71
End: 2021-10-05

## 2021-10-06 ENCOUNTER — LAB VISIT (OUTPATIENT)
Dept: LAB | Facility: HOSPITAL | Age: 71
End: 2021-10-06
Attending: PHYSICIAN ASSISTANT
Payer: MEDICARE

## 2021-10-06 ENCOUNTER — OFFICE VISIT (OUTPATIENT)
Dept: GASTROENTEROLOGY | Facility: CLINIC | Age: 71
End: 2021-10-06
Payer: MEDICARE

## 2021-10-06 VITALS
HEART RATE: 66 BPM | OXYGEN SATURATION: 99 % | WEIGHT: 169.63 LBS | HEIGHT: 63 IN | DIASTOLIC BLOOD PRESSURE: 60 MMHG | BODY MASS INDEX: 30.05 KG/M2 | SYSTOLIC BLOOD PRESSURE: 102 MMHG

## 2021-10-06 DIAGNOSIS — R19.7 DIARRHEA, UNSPECIFIED TYPE: ICD-10-CM

## 2021-10-06 DIAGNOSIS — D50.8 IRON DEFICIENCY ANEMIA SECONDARY TO INADEQUATE DIETARY IRON INTAKE: ICD-10-CM

## 2021-10-06 DIAGNOSIS — R53.83 FATIGUE, UNSPECIFIED TYPE: Primary | ICD-10-CM

## 2021-10-06 DIAGNOSIS — R53.83 FATIGUE, UNSPECIFIED TYPE: ICD-10-CM

## 2021-10-06 LAB
ANION GAP SERPL CALC-SCNC: 15 MMOL/L (ref 8–16)
BUN SERPL-MCNC: 20 MG/DL (ref 8–23)
CALCIUM SERPL-MCNC: 9.6 MG/DL (ref 8.7–10.5)
CHLORIDE SERPL-SCNC: 107 MMOL/L (ref 95–110)
CO2 SERPL-SCNC: 22 MMOL/L (ref 23–29)
CREAT SERPL-MCNC: 0.8 MG/DL (ref 0.5–1.4)
EST. GFR  (AFRICAN AMERICAN): >60 ML/MIN/1.73 M^2
EST. GFR  (NON AFRICAN AMERICAN): >60 ML/MIN/1.73 M^2
GLUCOSE SERPL-MCNC: 134 MG/DL (ref 70–110)
HCT VFR BLD AUTO: 38.5 % (ref 37–48.5)
HGB BLD-MCNC: 12.1 G/DL (ref 12–16)
POTASSIUM SERPL-SCNC: 4.3 MMOL/L (ref 3.5–5.1)
SODIUM SERPL-SCNC: 144 MMOL/L (ref 136–145)

## 2021-10-06 PROCEDURE — 80048 BASIC METABOLIC PNL TOTAL CA: CPT | Performed by: PHYSICIAN ASSISTANT

## 2021-10-06 PROCEDURE — 99215 OFFICE O/P EST HI 40 MIN: CPT | Mod: PBBFAC | Performed by: PHYSICIAN ASSISTANT

## 2021-10-06 PROCEDURE — 99214 OFFICE O/P EST MOD 30 MIN: CPT | Mod: S$PBB,,, | Performed by: PHYSICIAN ASSISTANT

## 2021-10-06 PROCEDURE — 99999 PR PBB SHADOW E&M-EST. PATIENT-LVL V: ICD-10-PCS | Mod: PBBFAC,,, | Performed by: PHYSICIAN ASSISTANT

## 2021-10-06 PROCEDURE — 85014 HEMATOCRIT: CPT | Performed by: PHYSICIAN ASSISTANT

## 2021-10-06 PROCEDURE — 99214 PR OFFICE/OUTPT VISIT, EST, LEVL IV, 30-39 MIN: ICD-10-PCS | Mod: S$PBB,,, | Performed by: PHYSICIAN ASSISTANT

## 2021-10-06 PROCEDURE — 85018 HEMOGLOBIN: CPT | Performed by: PHYSICIAN ASSISTANT

## 2021-10-06 PROCEDURE — 99999 PR PBB SHADOW E&M-EST. PATIENT-LVL V: CPT | Mod: PBBFAC,,, | Performed by: PHYSICIAN ASSISTANT

## 2021-10-06 PROCEDURE — 36415 COLL VENOUS BLD VENIPUNCTURE: CPT | Performed by: PHYSICIAN ASSISTANT

## 2021-11-29 ENCOUNTER — CLINICAL SUPPORT (OUTPATIENT)
Dept: PULMONOLOGY | Facility: CLINIC | Age: 71
End: 2021-11-29
Payer: MEDICARE

## 2021-11-29 ENCOUNTER — OFFICE VISIT (OUTPATIENT)
Dept: PULMONOLOGY | Facility: CLINIC | Age: 71
End: 2021-11-29
Payer: MEDICARE

## 2021-11-29 ENCOUNTER — HOSPITAL ENCOUNTER (OUTPATIENT)
Dept: RADIOLOGY | Facility: HOSPITAL | Age: 71
Discharge: HOME | End: 2021-11-29
Attending: NURSE PRACTITIONER
Payer: MEDICARE

## 2021-11-29 VITALS
RESPIRATION RATE: 20 BRPM | HEIGHT: 63 IN | SYSTOLIC BLOOD PRESSURE: 120 MMHG | OXYGEN SATURATION: 97 % | WEIGHT: 169.75 LBS | BODY MASS INDEX: 30.08 KG/M2 | HEART RATE: 63 BPM | DIASTOLIC BLOOD PRESSURE: 60 MMHG

## 2021-11-29 DIAGNOSIS — J98.4 CRLD (CHRONIC RESTRICTIVE LUNG DISEASE): Chronic | ICD-10-CM

## 2021-11-29 DIAGNOSIS — E11.9 TYPE 2 DIABETES MELLITUS WITHOUT COMPLICATION, WITHOUT LONG-TERM CURRENT USE OF INSULIN: Chronic | ICD-10-CM

## 2021-11-29 DIAGNOSIS — I47.19 PAROXYSMAL ATRIAL TACHYCARDIA: Chronic | ICD-10-CM

## 2021-11-29 DIAGNOSIS — E66.09 CLASS 1 OBESITY DUE TO EXCESS CALORIES WITH SERIOUS COMORBIDITY AND BODY MASS INDEX (BMI) OF 30.0 TO 30.9 IN ADULT: ICD-10-CM

## 2021-11-29 DIAGNOSIS — R91.1 INCIDENTAL PULMONARY NODULE, LESS THAN OR EQUAL TO 3MM: ICD-10-CM

## 2021-11-29 DIAGNOSIS — R91.1 SOLITARY PULMONARY NODULE: ICD-10-CM

## 2021-11-29 DIAGNOSIS — J84.10 INTERSTITIAL PULMONARY FIBROSIS: ICD-10-CM

## 2021-11-29 DIAGNOSIS — I70.0 ATHEROSCLEROSIS OF AORTA: ICD-10-CM

## 2021-11-29 DIAGNOSIS — J30.89 CHRONIC NON-SEASONAL ALLERGIC RHINITIS: Chronic | ICD-10-CM

## 2021-11-29 DIAGNOSIS — E04.2 MULTIPLE THYROID NODULES: ICD-10-CM

## 2021-11-29 DIAGNOSIS — J98.4 CRLD (CHRONIC RESTRICTIVE LUNG DISEASE): ICD-10-CM

## 2021-11-29 DIAGNOSIS — G47.33 OSA ON CPAP: Primary | ICD-10-CM

## 2021-11-29 DIAGNOSIS — I15.2 HYPERTENSION ASSOCIATED WITH DIABETES: Chronic | ICD-10-CM

## 2021-11-29 DIAGNOSIS — E11.59 HYPERTENSION ASSOCIATED WITH DIABETES: Chronic | ICD-10-CM

## 2021-11-29 PROBLEM — E66.811 CLASS 1 OBESITY DUE TO EXCESS CALORIES WITH SERIOUS COMORBIDITY AND BODY MASS INDEX (BMI) OF 30.0 TO 30.9 IN ADULT: Status: ACTIVE | Noted: 2019-10-08

## 2021-11-29 LAB
BRPFT: NORMAL
DLCO ADJ PRE: 15.83 ML/(MIN*MMHG)
DLCO SINGLE BREATH LLN: 14.78
DLCO SINGLE BREATH PRE REF: 77.2 %
DLCO SINGLE BREATH REF: 20.51
DLCOC SBVA LLN: 2.8
DLCOC SBVA PRE REF: 97.9 %
DLCOC SBVA REF: 4.3
DLCOC SINGLE BREATH LLN: 14.78
DLCOC SINGLE BREATH PRE REF: 77.2 %
DLCOC SINGLE BREATH REF: 20.51
DLCOVA LLN: 2.8
DLCOVA PRE REF: 97.9 %
DLCOVA PRE: 4.21 ML/(MIN*MMHG*L)
DLCOVA REF: 4.3
DLVAADJ PRE: 4.21 ML/(MIN*MMHG*L)
ERV LLN: -16449.38
ERV PRE REF: 27.3 %
ERV REF: 0.62
FEF 25 75 CHG: 13.4 %
FEF 25 75 LLN: 0.8
FEF 25 75 POST REF: 88.1 %
FEF 25 75 PRE REF: 77.7 %
FEF 25 75 REF: 1.77
FET100 CHG: -10.3 %
FEV1 CHG: -1.2 %
FEV1 FVC CHG: 4.4 %
FEV1 FVC LLN: 64
FEV1 FVC POST REF: 96.6 %
FEV1 FVC PRE REF: 92.6 %
FEV1 FVC REF: 78
FEV1 LLN: 1.51
FEV1 POST REF: 93.3 %
FEV1 PRE REF: 94.4 %
FEV1 REF: 2.08
FRCPLETH LLN: 1.83
FRCPLETH PREREF: 69.4 %
FRCPLETH REF: 2.66
FVC CHG: -5.4 %
FVC LLN: 1.95
FVC POST REF: 95.8 %
FVC PRE REF: 101.2 %
FVC REF: 2.69
IVC PRE: 2.47 L
IVC SINGLE BREATH LLN: 1.95
IVC SINGLE BREATH PRE REF: 91.8 %
IVC SINGLE BREATH REF: 2.69
MVV LLN: 66
MVV PRE REF: 95.1 %
MVV REF: 78
PEF CHG: 1.3 %
PEF LLN: 3.76
PEF POST REF: 85.4 %
PEF PRE REF: 84.3 %
PEF REF: 5.42
POST FEF 25 75: 1.56 L/S
POST FET 100: 9.15 SEC
POST FEV1 FVC: 75.41 %
POST FEV1: 1.94 L
POST FVC: 2.58 L
POST PEF: 4.62 L/S
PRE DLCO: 15.83 ML/(MIN*MMHG)
PRE ERV: 0.17 L
PRE FEF 25 75: 1.37 L/S
PRE FET 100: 10.2 SEC
PRE FEV1 FVC: 72.25 %
PRE FEV1: 1.97 L
PRE FRC PL: 1.84 L
PRE FVC: 2.72 L
PRE MVV: 74 L/MIN
PRE PEF: 4.56 L/S
PRE RV: 1.66 L
PRE TLC: 4.52 L
RAW LLN: 3.06
RAW PRE REF: 96.8 %
RAW PRE: 2.96 CMH2O*S/L
RAW REF: 3.06
RV LLN: 1.46
RV PRE REF: 81.8 %
RV REF: 2.03
RVTLC LLN: 34
RVTLC PRE REF: 85.3 %
RVTLC PRE: 36.78 %
RVTLC REF: 43
TLC LLN: 3.78
TLC PRE REF: 94.8 %
TLC REF: 4.77
VA PRE: 3.76 L
VA SINGLE BREATH LLN: 4.62
VA SINGLE BREATH PRE REF: 81.5 %
VA SINGLE BREATH REF: 4.62
VC LLN: 1.95
VC PRE REF: 106.2 %
VC PRE: 2.86 L
VC REF: 2.69
VTGRAWPRE: 2.57 L

## 2021-11-29 PROCEDURE — 71250 CT THORAX DX C-: CPT | Mod: TC

## 2021-11-29 PROCEDURE — 94726 PLETHYSMOGRAPHY LUNG VOLUMES: CPT | Mod: PBBFAC

## 2021-11-29 PROCEDURE — 94729 DIFFUSING CAPACITY: CPT | Mod: PBBFAC

## 2021-11-29 PROCEDURE — 99215 OFFICE O/P EST HI 40 MIN: CPT | Mod: PBBFAC,25 | Performed by: NURSE PRACTITIONER

## 2021-11-29 PROCEDURE — 94726 PLETHYSMOGRAPHY LUNG VOLUMES: CPT | Mod: 26,S$PBB,, | Performed by: INTERNAL MEDICINE

## 2021-11-29 PROCEDURE — 94729 PR C02/MEMBANE DIFFUSE CAPACITY: ICD-10-PCS | Mod: 26,S$PBB,, | Performed by: INTERNAL MEDICINE

## 2021-11-29 PROCEDURE — 94060 EVALUATION OF WHEEZING: CPT | Mod: PBBFAC

## 2021-11-29 PROCEDURE — 99999 PR PBB SHADOW E&M-EST. PATIENT-LVL V: CPT | Mod: PBBFAC,,, | Performed by: NURSE PRACTITIONER

## 2021-11-29 PROCEDURE — 99214 PR OFFICE/OUTPT VISIT, EST, LEVL IV, 30-39 MIN: ICD-10-PCS | Mod: 25,S$PBB,, | Performed by: NURSE PRACTITIONER

## 2021-11-29 PROCEDURE — 94060 PR EVAL OF BRONCHOSPASM: ICD-10-PCS | Mod: 26,S$PBB,, | Performed by: INTERNAL MEDICINE

## 2021-11-29 PROCEDURE — 99214 OFFICE O/P EST MOD 30 MIN: CPT | Mod: 25,S$PBB,, | Performed by: NURSE PRACTITIONER

## 2021-11-29 PROCEDURE — 99999 PR PBB SHADOW E&M-EST. PATIENT-LVL V: ICD-10-PCS | Mod: PBBFAC,,, | Performed by: NURSE PRACTITIONER

## 2021-11-29 PROCEDURE — 94729 DIFFUSING CAPACITY: CPT | Mod: 26,S$PBB,, | Performed by: INTERNAL MEDICINE

## 2021-11-29 PROCEDURE — 94060 EVALUATION OF WHEEZING: CPT | Mod: 26,S$PBB,, | Performed by: INTERNAL MEDICINE

## 2021-11-29 PROCEDURE — 94726 PULM FUNCT TST PLETHYSMOGRAP: ICD-10-PCS | Mod: 26,S$PBB,, | Performed by: INTERNAL MEDICINE

## 2021-12-06 ENCOUNTER — OFFICE VISIT (OUTPATIENT)
Dept: HEMATOLOGY/ONCOLOGY | Facility: CLINIC | Age: 71
End: 2021-12-06
Payer: MEDICARE

## 2021-12-06 DIAGNOSIS — D50.8 IRON DEFICIENCY ANEMIA SECONDARY TO INADEQUATE DIETARY IRON INTAKE: ICD-10-CM

## 2021-12-06 PROCEDURE — 99214 OFFICE O/P EST MOD 30 MIN: CPT | Mod: S$PBB,,, | Performed by: INTERNAL MEDICINE

## 2021-12-06 PROCEDURE — 99214 PR OFFICE/OUTPT VISIT, EST, LEVL IV, 30-39 MIN: ICD-10-PCS | Mod: S$PBB,,, | Performed by: INTERNAL MEDICINE

## 2021-12-07 ENCOUNTER — OFFICE VISIT (OUTPATIENT)
Dept: PODIATRY | Facility: CLINIC | Age: 71
End: 2021-12-07
Payer: MEDICARE

## 2021-12-07 DIAGNOSIS — L60.3 NAIL DYSTROPHY: ICD-10-CM

## 2021-12-07 DIAGNOSIS — E11.49 TYPE 2 DIABETES MELLITUS WITH NEUROLOGICAL MANIFESTATION: Primary | ICD-10-CM

## 2021-12-07 PROCEDURE — 11720 DEBRIDE NAIL 1-5: CPT | Mod: Q9,PBBFAC | Performed by: PODIATRIST

## 2021-12-07 PROCEDURE — 99999 PR PBB SHADOW E&M-EST. PATIENT-LVL III: ICD-10-PCS | Mod: PBBFAC,,, | Performed by: PODIATRIST

## 2021-12-07 PROCEDURE — 11719 TRIM NAIL(S) ANY NUMBER: CPT | Mod: Q9,PBBFAC | Performed by: PODIATRIST

## 2021-12-07 PROCEDURE — 11719 TRIM NAIL(S) ANY NUMBER: CPT | Mod: Q9,S$PBB,, | Performed by: PODIATRIST

## 2021-12-07 PROCEDURE — 99499 UNLISTED E&M SERVICE: CPT | Mod: S$PBB,,, | Performed by: PODIATRIST

## 2021-12-07 PROCEDURE — 11719 PR TRIM NAIL(S): ICD-10-PCS | Mod: Q9,S$PBB,, | Performed by: PODIATRIST

## 2021-12-07 PROCEDURE — 11720 DEBRIDE NAIL 1-5: CPT | Mod: 59,Q9,S$PBB, | Performed by: PODIATRIST

## 2021-12-07 PROCEDURE — 99499 NO LOS: ICD-10-PCS | Mod: S$PBB,,, | Performed by: PODIATRIST

## 2021-12-07 PROCEDURE — 11720 PR DEBRIDEMENT OF NAIL(S), 1-5: ICD-10-PCS | Mod: 59,Q9,S$PBB, | Performed by: PODIATRIST

## 2021-12-07 PROCEDURE — 99213 OFFICE O/P EST LOW 20 MIN: CPT | Mod: PBBFAC | Performed by: PODIATRIST

## 2021-12-07 PROCEDURE — 99999 PR PBB SHADOW E&M-EST. PATIENT-LVL III: CPT | Mod: PBBFAC,,, | Performed by: PODIATRIST

## 2021-12-12 ENCOUNTER — HOSPITAL ENCOUNTER (EMERGENCY)
Facility: HOSPITAL | Age: 71
Discharge: HOME | End: 2021-12-12
Attending: EMERGENCY MEDICINE
Payer: MEDICARE

## 2021-12-12 VITALS
OXYGEN SATURATION: 96 % | BODY MASS INDEX: 31.71 KG/M2 | DIASTOLIC BLOOD PRESSURE: 75 MMHG | RESPIRATION RATE: 16 BRPM | WEIGHT: 179 LBS | HEIGHT: 63 IN | SYSTOLIC BLOOD PRESSURE: 132 MMHG | TEMPERATURE: 99 F | HEART RATE: 60 BPM

## 2021-12-12 DIAGNOSIS — R05.9 COUGH: ICD-10-CM

## 2021-12-12 DIAGNOSIS — J40 BRONCHITIS: Primary | ICD-10-CM

## 2021-12-12 LAB
CTP QC/QA: YES
CTP QC/QA: YES
POC MOLECULAR INFLUENZA A AGN: NEGATIVE
POC MOLECULAR INFLUENZA B AGN: NEGATIVE
SARS-COV-2 RDRP RESP QL NAA+PROBE: NEGATIVE

## 2021-12-12 PROCEDURE — 99284 EMERGENCY DEPT VISIT MOD MDM: CPT | Mod: 25

## 2021-12-12 PROCEDURE — U0002 COVID-19 LAB TEST NON-CDC: HCPCS | Performed by: PHYSICIAN ASSISTANT

## 2021-12-12 RX ORDER — ALBUTEROL SULFATE 90 UG/1
1-2 AEROSOL, METERED RESPIRATORY (INHALATION) EVERY 6 HOURS PRN
Qty: 18 G | Refills: 0 | Status: SHIPPED | OUTPATIENT
Start: 2021-12-12 | End: 2023-02-01

## 2021-12-12 RX ORDER — MELOXICAM 7.5 MG/1
7.5 TABLET ORAL 2 TIMES DAILY
COMMUNITY
End: 2021-12-22

## 2021-12-12 RX ORDER — AZITHROMYCIN 250 MG/1
TABLET, FILM COATED ORAL
Qty: 6 TABLET | Refills: 0 | Status: SHIPPED | OUTPATIENT
Start: 2021-12-12 | End: 2021-12-17

## 2021-12-14 ENCOUNTER — PATIENT MESSAGE (OUTPATIENT)
Dept: INTERNAL MEDICINE | Facility: CLINIC | Age: 71
End: 2021-12-14
Payer: MEDICARE

## 2021-12-20 ENCOUNTER — PATIENT MESSAGE (OUTPATIENT)
Dept: INTERNAL MEDICINE | Facility: CLINIC | Age: 71
End: 2021-12-20
Payer: MEDICARE

## 2021-12-21 NOTE — TELEPHONE ENCOUNTER
Evaluated in the ER 12/12/2021 and treated for bronchitis with Zithromax and albuterol    The Zithromax is the most likely cause of her GI upset  She should have completed  so most likely reason why her GI issues have resolved    Unfortunately, based on her GI issues and with that blood pressure reading she has probably gotten dehydrated    I recommended she go to urgent care they can do an x-ray and make sure that her bronchitis is not progressed to a pneumonia    Also, they can give her IV fluids for possible dehydration

## 2021-12-22 ENCOUNTER — HOSPITAL ENCOUNTER (EMERGENCY)
Facility: HOSPITAL | Age: 71
Discharge: HOME | End: 2021-12-22
Attending: EMERGENCY MEDICINE
Payer: MEDICARE

## 2021-12-22 ENCOUNTER — PATIENT MESSAGE (OUTPATIENT)
Dept: INTERNAL MEDICINE | Facility: CLINIC | Age: 71
End: 2021-12-22
Payer: MEDICARE

## 2021-12-22 VITALS
BODY MASS INDEX: 31 KG/M2 | TEMPERATURE: 98 F | DIASTOLIC BLOOD PRESSURE: 59 MMHG | WEIGHT: 175 LBS | HEART RATE: 66 BPM | OXYGEN SATURATION: 98 % | SYSTOLIC BLOOD PRESSURE: 131 MMHG | RESPIRATION RATE: 19 BRPM

## 2021-12-22 DIAGNOSIS — E86.0 DEHYDRATION: Primary | ICD-10-CM

## 2021-12-22 DIAGNOSIS — R05.9 COUGH: ICD-10-CM

## 2021-12-22 DIAGNOSIS — I95.9 HYPOTENSION, UNSPECIFIED HYPOTENSION TYPE: ICD-10-CM

## 2021-12-22 LAB
ALBUMIN SERPL BCP-MCNC: 3.7 G/DL (ref 3.5–5.2)
ALP SERPL-CCNC: 112 U/L (ref 55–135)
ALT SERPL W/O P-5'-P-CCNC: 17 U/L (ref 10–44)
ANION GAP SERPL CALC-SCNC: 9 MMOL/L (ref 8–16)
AST SERPL-CCNC: 12 U/L (ref 10–40)
BACTERIA #/AREA URNS HPF: NORMAL /HPF
BASOPHILS # BLD AUTO: 0.06 K/UL (ref 0–0.2)
BASOPHILS NFR BLD: 0.5 % (ref 0–1.9)
BILIRUB SERPL-MCNC: 0.7 MG/DL (ref 0.1–1)
BILIRUB UR QL STRIP: NEGATIVE
BUN SERPL-MCNC: 26 MG/DL (ref 8–23)
CALCIUM SERPL-MCNC: 9.3 MG/DL (ref 8.7–10.5)
CHLORIDE SERPL-SCNC: 103 MMOL/L (ref 95–110)
CLARITY UR: CLEAR
CO2 SERPL-SCNC: 28 MMOL/L (ref 23–29)
COLOR UR: YELLOW
CREAT SERPL-MCNC: 0.8 MG/DL (ref 0.5–1.4)
CTP QC/QA: YES
DIFFERENTIAL METHOD: ABNORMAL
EOSINOPHIL # BLD AUTO: 0.5 K/UL (ref 0–0.5)
EOSINOPHIL NFR BLD: 4 % (ref 0–8)
ERYTHROCYTE [DISTWIDTH] IN BLOOD BY AUTOMATED COUNT: 13.1 % (ref 11.5–14.5)
EST. GFR  (AFRICAN AMERICAN): >60 ML/MIN/1.73 M^2
EST. GFR  (NON AFRICAN AMERICAN): >60 ML/MIN/1.73 M^2
GLUCOSE SERPL-MCNC: 140 MG/DL (ref 70–110)
GLUCOSE UR QL STRIP: NEGATIVE
HCT VFR BLD AUTO: 38.3 % (ref 37–48.5)
HGB BLD-MCNC: 12.7 G/DL (ref 12–16)
HGB UR QL STRIP: NEGATIVE
IMM GRANULOCYTES # BLD AUTO: 0.04 K/UL (ref 0–0.04)
IMM GRANULOCYTES NFR BLD AUTO: 0.3 % (ref 0–0.5)
INFLUENZA A, MOLECULAR: NEGATIVE
INFLUENZA B, MOLECULAR: NEGATIVE
KETONES UR QL STRIP: NEGATIVE
LACTATE SERPL-SCNC: 1.3 MMOL/L (ref 0.5–2.2)
LEUKOCYTE ESTERASE UR QL STRIP: ABNORMAL
LYMPHOCYTES # BLD AUTO: 2 K/UL (ref 1–4.8)
LYMPHOCYTES NFR BLD: 17.1 % (ref 18–48)
MCH RBC QN AUTO: 29.6 PG (ref 27–31)
MCHC RBC AUTO-ENTMCNC: 33.2 G/DL (ref 32–36)
MCV RBC AUTO: 89 FL (ref 82–98)
MICROSCOPIC COMMENT: NORMAL
MONOCYTES # BLD AUTO: 0.6 K/UL (ref 0.3–1)
MONOCYTES NFR BLD: 5.1 % (ref 4–15)
NEUTROPHILS # BLD AUTO: 8.3 K/UL (ref 1.8–7.7)
NEUTROPHILS NFR BLD: 73 % (ref 38–73)
NITRITE UR QL STRIP: NEGATIVE
NRBC BLD-RTO: 0 /100 WBC
PH UR STRIP: 6 [PH] (ref 5–8)
PLATELET # BLD AUTO: 309 K/UL (ref 150–450)
PMV BLD AUTO: 10.3 FL (ref 9.2–12.9)
POTASSIUM SERPL-SCNC: 3.6 MMOL/L (ref 3.5–5.1)
PROT SERPL-MCNC: 6.7 G/DL (ref 6–8.4)
PROT UR QL STRIP: NEGATIVE
RBC # BLD AUTO: 4.29 M/UL (ref 4–5.4)
SARS-COV-2 RDRP RESP QL NAA+PROBE: NEGATIVE
SODIUM SERPL-SCNC: 140 MMOL/L (ref 136–145)
SP GR UR STRIP: >=1.03 (ref 1–1.03)
SPECIMEN SOURCE: NORMAL
SQUAMOUS #/AREA URNS HPF: 3 /HPF
URN SPEC COLLECT METH UR: ABNORMAL
UROBILINOGEN UR STRIP-ACNC: NEGATIVE EU/DL
WBC # BLD AUTO: 11.43 K/UL (ref 3.9–12.7)
WBC #/AREA URNS HPF: 2 /HPF (ref 0–5)

## 2021-12-22 PROCEDURE — 87040 BLOOD CULTURE FOR BACTERIA: CPT | Performed by: EMERGENCY MEDICINE

## 2021-12-22 PROCEDURE — 80053 COMPREHEN METABOLIC PANEL: CPT | Performed by: PHYSICIAN ASSISTANT

## 2021-12-22 PROCEDURE — U0002 COVID-19 LAB TEST NON-CDC: HCPCS | Performed by: PHYSICIAN ASSISTANT

## 2021-12-22 PROCEDURE — 63600175 PHARM REV CODE 636 W HCPCS: Performed by: EMERGENCY MEDICINE

## 2021-12-22 PROCEDURE — 25000003 PHARM REV CODE 250: Performed by: PHYSICIAN ASSISTANT

## 2021-12-22 PROCEDURE — 81000 URINALYSIS NONAUTO W/SCOPE: CPT | Performed by: PHYSICIAN ASSISTANT

## 2021-12-22 PROCEDURE — 85025 COMPLETE CBC W/AUTO DIFF WBC: CPT | Performed by: PHYSICIAN ASSISTANT

## 2021-12-22 PROCEDURE — 87502 INFLUENZA DNA AMP PROBE: CPT | Performed by: PHYSICIAN ASSISTANT

## 2021-12-22 PROCEDURE — 99284 EMERGENCY DEPT VISIT MOD MDM: CPT | Mod: 25

## 2021-12-22 PROCEDURE — 96360 HYDRATION IV INFUSION INIT: CPT

## 2021-12-22 PROCEDURE — 83605 ASSAY OF LACTIC ACID: CPT | Performed by: EMERGENCY MEDICINE

## 2021-12-22 RX ORDER — MELOXICAM 7.5 MG/1
7.5 TABLET ORAL DAILY
Qty: 90 TABLET | Refills: 3 | Status: SHIPPED | OUTPATIENT
Start: 2021-12-22 | End: 2022-10-05

## 2021-12-22 RX ORDER — PREDNISONE 20 MG/1
40 TABLET ORAL
Status: COMPLETED | OUTPATIENT
Start: 2021-12-22 | End: 2021-12-22

## 2021-12-22 RX ORDER — PREDNISONE 10 MG/1
40 TABLET ORAL DAILY
Qty: 16 TABLET | Refills: 0 | Status: SHIPPED | OUTPATIENT
Start: 2021-12-22 | End: 2021-12-27

## 2021-12-22 RX ADMIN — SODIUM CHLORIDE 1000 ML: 0.9 INJECTION, SOLUTION INTRAVENOUS at 07:12

## 2021-12-22 RX ADMIN — PREDNISONE 40 MG: 20 TABLET ORAL at 09:12

## 2021-12-22 NOTE — FIRST PROVIDER EVALUATION
Emergency Department TeleTriage Encounter Note      CHIEF COMPLAINT    Chief Complaint   Patient presents with    Cough     Diagnosed with bronchitis 2 weeks ago, finished meds. Now vomiting and diarrhea        VITAL SIGNS   Initial Vitals   BP Pulse Resp Temp SpO2   12/22/21 1537 12/22/21 1537 12/22/21 1537 12/22/21 1539 12/22/21 1537   (!) 82/58 81 18 97.9 °F (36.6 °C) 98 %      MAP       --                   ALLERGIES    Review of patient's allergies indicates:   Allergen Reactions    Buprenorphine Rash       PROVIDER TRIAGE NOTE  This is a teletriage evaluation of a 71 y.o. female presenting to the ED complaining of cough. Patient reports bronchitis 2 weeks ago. Completed antibiotic but is still coughing. Also has developed vomiting and diarrhea. No fever. Feels weak.     Initial orders will be placed and care will be transferred to an alternate provider when patient is roomed for a full evaluation. Any additional orders and the final disposition will be determined by that provider.           ORDERS  Labs Reviewed   INFLUENZA A & B BY MOLECULAR   CBC W/ AUTO DIFFERENTIAL   COMPREHENSIVE METABOLIC PANEL   URINALYSIS, REFLEX TO URINE CULTURE   SARS-COV-2 RDRP GENE       ED Orders (720h ago, onward)    Start Ordered     Status Ordering Provider    12/22/21 1600 12/22/21 1552  sodium chloride 0.9% bolus 1,000 mL  ED 1 Time         Ordered DILIPGUSTAVO G.    12/22/21 1553 12/22/21 1552  Insert Saline lock IV  Once         Ordered DILIP, GUSTAVO G.    12/22/21 1552 12/22/21 1552  POCT COVID-19 Rapid Screening  Once         Ordered DILIP GUSTAVO G.    12/22/21 1552 12/22/21 1552  Airborne and Contact and Droplet Isolation Status  Continuous         Ordered DILIP, GUSTAVO G.    12/22/21 1552 12/22/21 1552  Influenza A & B by Molecular  STAT         Ordered DILIP, GUSTAVO G.    12/22/21 1552 12/22/21 1552  CBC auto differential  STAT         Ordered DILIP, GUSTAVO G.    12/22/21 1552 12/22/21 1552  Comprehensive metabolic  panel  STAT         Ordered GUSTAVO CHERRY    12/22/21 1552 12/22/21 1552  Urinalysis, Reflex to Urine Culture Urine, Clean Catch  STAT         Ordered GUSTAVO CHERRY.            Virtual Visit Note: The provider triage portion of this emergency department evaluation and documentation was performed via [employer], a HIPAA-compliant telemedicine application, in concert with a tele-presenter in the room. A face to face patient evaluation with one of my colleagues will occur once the patient is placed in an emergency department room.      DISCLAIMER: This note was prepared with [employer] voice recognition transcription software. Garbled syntax, mangled pronouns, and other bizarre constructions may be attributed to that software system.

## 2021-12-23 NOTE — ED PROVIDER NOTES
SCRIBE #1 NOTE: I, True Bailey, am scribing for, and in the presence of, Yevgeniy Santiago MD. I have scribed the entire note.       History     Chief Complaint   Patient presents with    Cough     Diagnosed with bronchitis 2 weeks ago, finished meds. Now vomiting and diarrhea      Review of patient's allergies indicates:   Allergen Reactions    Buprenorphine Rash         History of Present Illness     HPI    12/22/2021, 7:11 PM  History obtained from the patient      History of Present Illness: Venus Caldwell is a 71 y.o. female patient with a PMHx of uterine cancer, CAD, DM, HTN, ovarian cancer who presents to the Emergency Department for evaluation of cough which onset gradually 2 weeks PTA. Pt states she was given antibiotics to treat her sxs for 5 days but with no relief, also stating she started having abdominal issues such as diarrhea and vomiting 3 or 4 times over a few days. Pt states her blood pressure has been low (97/58). She took her BP medicine this morning and ate breakfast. Symptoms are constant and moderate in severity. No mitigating or exacerbating factors reported. Associated sxs include n/v/d, abdominal pain, loss of appetite, fatigue, weakness, trouble walking. Patient denies any SOB, fever, chills, dysuria, sore throat, and all other sxs at this time. Prior Tx includes antibiotics. No further complaints or concerns at this time.       Arrival mode: Personal vehicle    PCP: Sourav Hernandez MD        Past Medical History:  Past Medical History:   Diagnosis Date    Arthritis     Cancer, uterine     Coronary artery disease     Diabetes mellitus     prediabetes    Diabetes mellitus, type 2     Digestive disorder     DM (diabetes mellitus) 08/2019    BS doesn't check 12/16/2019    DM (diabetes mellitus) 08/2019    BS doesn't check 06/29/2021    Hypertension     Multiple thyroid nodules 7/8/2021    Ovarian cancer     Sciatica     Sleep apnea        Past Surgical History:  Past  Surgical History:   Procedure Laterality Date    CHOLECYSTECTOMY      COLONOSCOPY N/A 8/1/2018    Procedure: COLONOSCOPY;  Surgeon: Yrn Hunter III, MD;  Location: [facility] ENDO;  Service: Endoscopy;  Laterality: N/A;    ESOPHAGOGASTRODUODENOSCOPY N/A 3/17/2021    Procedure: EGD (ESOPHAGOGASTRODUODENOSCOPY) (Dr. EMILIO billings);  Surgeon: Coy Ortiz MD;  Location: [facility] ENDO;  Service: Endoscopy;  Laterality: N/A;    HYSTERECTOMY      JOINT REPLACEMENT Right     hip replacement    SURGICAL EXCISION OF ANAL LESION N/A 8/29/2018    Procedure: EXCISION, LESION, ANUS;  Surgeon: Yrn Balderrama MD;  Location: [facility] OR;  Service: General;  Laterality: N/A;    TOTAL KNEE ARTHROPLASTY Bilateral          Family History:  Family History   Problem Relation Age of Onset    Diabetes Mother     Cataracts Mother     Diabetes Brother     Cataracts Maternal Grandmother     Breast cancer Maternal Aunt        Social History:  Social History     Tobacco Use    Smoking status: Never Smoker    Smokeless tobacco: Never Used   Substance and Sexual Activity    Alcohol use: No    Drug use: No    Sexual activity: Not Currently        Review of Systems     Review of Systems   Constitutional: Positive for activity change (Trouble walking), appetite change (Loss of appetite) and fatigue. Negative for chills and fever.   HENT: Negative for sore throat.    Respiratory: Positive for cough. Negative for shortness of breath.    Cardiovascular: Negative for chest pain.   Gastrointestinal: Positive for abdominal pain, diarrhea, nausea and vomiting.   Genitourinary: Negative for dysuria.   Musculoskeletal: Negative for back pain.   Skin: Negative for rash.   Neurological: Positive for weakness.   Hematological: Does not bruise/bleed easily.   All other systems reviewed and are negative.     Physical Exam     Initial Vitals   BP Pulse Resp Temp SpO2   12/22/21 1537 12/22/21 1537 12/22/21 1537 12/22/21 1539 12/22/21 1537   (!)  82/58 81 18 97.9 °F (36.6 °C) 98 %      MAP       --                 Physical Exam  Nursing Notes and Vital Signs Reviewed.  Constitutional: Patient is in no acute distress. Well-developed and well-nourished.  Head: Atraumatic. Normocephalic.  Eyes: PERRL. EOM intact. Conjunctivae are not pale. No scleral icterus.  ENT: Mucous membranes are moist. Oropharynx is clear and symmetric.    Neck: Supple. Full ROM. No lymphadenopathy.  Cardiovascular: Regular rate. Regular rhythm. No murmurs, rubs, or gallops. Distal pulses are 2+ and symmetric.  Pulmonary/Chest: No respiratory distress. Clear to auscultation bilaterally. No wheezing or rales.  Abdominal: Soft and non-distended.  There is no tenderness.  No rebound, guarding, or rigidity. Good bowel sounds.  Genitourinary: No CVA tenderness  Musculoskeletal: Moves all extremities. No obvious deformities. No edema. No calf tenderness. No leg swelling.  Skin: Warm and dry.  Neurological:  Alert, awake, and appropriate.  Normal speech.  No acute focal neurological deficits are appreciated.  Psychiatric: Normal affect. Good eye contact. Appropriate in content.     ED Course   Critical Care    Date/Time: 12/23/2021 1:17 AM  Performed by: Yevgeniy Santiago MD  Authorized by: Yevgeniy Santiago MD   Direct patient critical care time: 7 minutes  Additional history critical care time: 8 minutes  Ordering / reviewing critical care time: 9 minutes  Documentation critical care time: 7 minutes  Consulting other physicians critical care time: 5 minutes  Consult with family critical care time: 4 minutes  Other critical care time: 5 minutes  Total critical care time (exclusive of procedural time) : 45 minutes  Critical care time was exclusive of separately billable procedures and treating other patients and teaching time.  Critical care was necessary to treat or prevent imminent or life-threatening deterioration of the following conditions: dehydration.  Critical care was time spent personally  by me on the following activities: blood draw for specimens, development of treatment plan with patient or surrogate, discussions with consultants, interpretation of cardiac output measurements, evaluation of patient's response to treatment, examination of patient, obtaining history from patient or surrogate, ordering and performing treatments and interventions, ordering and review of laboratory studies, ordering and review of radiographic studies, pulse oximetry, re-evaluation of patient's condition and review of old charts.        ED Vital Signs:  Vitals:    12/22/21 1537 12/22/21 1539 12/22/21 1915 12/22/21 1918   BP: (!) 82/58   (!) 148/68   Pulse: 81  (!) 58 64   Resp: 18   20   Temp:  97.9 °F (36.6 °C)     TempSrc: Oral Oral     SpO2: 98%   99%   Weight: 79.4 kg (175 lb)       12/22/21 2107 12/22/21 2157   BP: 130/61 (!) 131/59   Pulse: 65 66   Resp: (!) 29 19   Temp:     TempSrc:     SpO2: 100% 98%   Weight:         Abnormal Lab Results:  Labs Reviewed   CBC W/ AUTO DIFFERENTIAL - Abnormal; Notable for the following components:       Result Value    Gran # (ANC) 8.3 (*)     Lymph % 17.1 (*)     All other components within normal limits   COMPREHENSIVE METABOLIC PANEL - Abnormal; Notable for the following components:    Glucose 140 (*)     BUN 26 (*)     All other components within normal limits   URINALYSIS, REFLEX TO URINE CULTURE - Abnormal; Notable for the following components:    Specific Gravity, UA >=1.030 (*)     Leukocytes, UA Trace (*)     All other components within normal limits    Narrative:     Specimen Source->Urine   INFLUENZA A & B BY MOLECULAR   CULTURE, BLOOD   CULTURE, BLOOD   LACTIC ACID, PLASMA   URINALYSIS MICROSCOPIC    Narrative:     Specimen Source->Urine   SARS-COV-2 RDRP GENE    Narrative:     This test utilizes isothermal nucleic acid amplification   technology to detect the SARS-CoV-2 RdRp nucleic acid segment.   The analytical sensitivity (limit of detection) is 125 genome    equivalents/mL.   A POSITIVE result implies infection with the SARS-CoV-2 virus;   the patient is presumed to be contagious.     A NEGATIVE result means that SARS-CoV-2 nucleic acids are not   present above the limit of detection. A NEGATIVE result should be   treated as presumptive. It does not rule out the possibility of   COVID-19 and should not be the sole basis for treatment decisions.   If COVID-19 is strongly suspected based on clinical and exposure   history, re-testing using an alternate molecular assay should be   considered.   This test is only for use under the Food and Drug   Administration s Emergency Use Authorization (EUA).   Commercial kits are provided by [employer].   Performance characteristics of the EUA have been independently   verified by [facility] Department of   Pathology and Laboratory Medicine.   _________________________________________________________________   The authorized Fact Sheet for Healthcare Providers and the authorized Fact   Sheet for Patients of the ID NOW COVID-19 are available on the FDA   website:     https://www.fda.gov/media/420240/download  https://www.fda.gov/media/007148/download              All Lab Results:  Results for orders placed or performed during the hospital encounter of 12/22/21   Influenza A & B by Molecular    Specimen: Nasopharyngeal Swab   Result Value Ref Range    Influenza A, Molecular Negative Negative    Influenza B, Molecular Negative Negative    Flu A & B Source NP    CBC auto differential   Result Value Ref Range    WBC 11.43 3.90 - 12.70 K/uL    RBC 4.29 4.00 - 5.40 M/uL    Hemoglobin 12.7 12.0 - 16.0 g/dL    Hematocrit 38.3 37.0 - 48.5 %    MCV 89 82 - 98 fL    MCH 29.6 27.0 - 31.0 pg    MCHC 33.2 32.0 - 36.0 g/dL    RDW 13.1 11.5 - 14.5 %    Platelets 309 150 - 450 K/uL    MPV 10.3 9.2 - 12.9 fL    Immature Granulocytes 0.3 0.0 - 0.5 %    Gran # (ANC) 8.3 (H) 1.8 - 7.7 K/uL    Immature Grans (Abs) 0.04 0.00 - 0.04 K/uL     Lymph # 2.0 1.0 - 4.8 K/uL    Mono # 0.6 0.3 - 1.0 K/uL    Eos # 0.5 0.0 - 0.5 K/uL    Baso # 0.06 0.00 - 0.20 K/uL    nRBC 0 0 /100 WBC    Gran % 73.0 38.0 - 73.0 %    Lymph % 17.1 (L) 18.0 - 48.0 %    Mono % 5.1 4.0 - 15.0 %    Eosinophil % 4.0 0.0 - 8.0 %    Basophil % 0.5 0.0 - 1.9 %    Differential Method Automated    Comprehensive metabolic panel   Result Value Ref Range    Sodium 140 136 - 145 mmol/L    Potassium 3.6 3.5 - 5.1 mmol/L    Chloride 103 95 - 110 mmol/L    CO2 28 23 - 29 mmol/L    Glucose 140 (H) 70 - 110 mg/dL    BUN 26 (H) 8 - 23 mg/dL    Creatinine 0.8 0.5 - 1.4 mg/dL    Calcium 9.3 8.7 - 10.5 mg/dL    Total Protein 6.7 6.0 - 8.4 g/dL    Albumin 3.7 3.5 - 5.2 g/dL    Total Bilirubin 0.7 0.1 - 1.0 mg/dL    Alkaline Phosphatase 112 55 - 135 U/L    AST 12 10 - 40 U/L    ALT 17 10 - 44 U/L    Anion Gap 9 8 - 16 mmol/L    eGFR if African American >60 >60 mL/min/1.73 m^2    eGFR if non African American >60 >60 mL/min/1.73 m^2   Urinalysis, Reflex to Urine Culture Urine, Clean Catch    Specimen: Urine   Result Value Ref Range    Specimen UA Urine, Clean Catch     Color, UA Yellow Yellow, Straw, Sarina    Appearance, UA Clear Clear    pH, UA 6.0 5.0 - 8.0    Specific Gravity, UA >=1.030 (A) 1.005 - 1.030    Protein, UA Negative Negative    Glucose, UA Negative Negative    Ketones, UA Negative Negative    Bilirubin (UA) Negative Negative    Occult Blood UA Negative Negative    Nitrite, UA Negative Negative    Urobilinogen, UA Negative <2.0 EU/dL    Leukocytes, UA Trace (A) Negative   Lactic acid, plasma   Result Value Ref Range    Lactate (Lactic Acid) 1.3 0.5 - 2.2 mmol/L   Urinalysis Microscopic   Result Value Ref Range    WBC, UA 2 0 - 5 /hpf    Bacteria Rare None-Occ /hpf    Squam Epithel, UA 3 /hpf    Microscopic Comment SEE COMMENT    POCT COVID-19 Rapid Screening   Result Value Ref Range    POC Rapid COVID Negative Negative     Acceptable Yes          Imaging Results:  Imaging  Results          X-Ray Chest AP Portable (Final result)  Result time 12/22/21 20:59:29    Final result by Lida Alejandro MD (12/22/21 20:59:29)                 Impression:      No acute abnormality.      Electronically signed by: Javon Hilton  Date:    12/22/2021  Time:    20:59             Narrative:    EXAMINATION:  XR CHEST AP PORTABLE    CLINICAL HISTORY:  Cough;    TECHNIQUE:  Single frontal view of the chest was performed.    COMPARISON:  None    FINDINGS:  The lungs are clear, with normal appearance of pulmonary vasculature and no pleural effusion or pneumothorax.    The cardiac silhouette is prominent.  The hilar and mediastinal contours are unremarkable.    Bones are intact.                                          The Emergency Provider reviewed the vital signs and test results, which are outlined above.     ED Discussion       9:42 PM: Reassessed pt at this time.  Discussed with pt all pertinent ED information and results. Discussed pt dx and plan of tx. Gave pt all f/u and return to the ED instructions. All questions and concerns were addressed at this time. Pt expresses understanding of information and instructions, and is comfortable with plan to discharge. Pt is stable for discharge.    I discussed with patient and/or family/caretaker that evaluation in the ED does not suggest any emergent or life threatening medical conditions requiring immediate intervention beyond what was provided in the ED, and I believe patient is safe for discharge.  Regardless, an unremarkable evaluation in the ED does not preclude the development or presence of a serious of life threatening condition. As such, patient was instructed to return immediately for any worsening or change in current symptoms.         Medical Decision Making:   Clinical Tests:   Lab Tests: Ordered and Reviewed  Radiological Study: Ordered and Reviewed           ED Medication(s):  Medications   sodium chloride 0.9% bolus 1,000 mL (0 mLs Intravenous  Stopped 12/22/21 2041)   predniSONE tablet 40 mg (40 mg Oral Given 12/22/21 2137)       Discharge Medication List as of 12/22/2021  9:31 PM      START taking these medications    Details   predniSONE (DELTASONE) 10 MG tablet Take 4 tablets (40 mg total) by mouth once daily. for 4 days, Starting Wed 12/22/2021, Until Sun 12/26/2021, Print              Follow-up Information     Sourav Hernandez MD. Schedule an appointment as soon as possible for a visit in 2 days.    Specialty: Family Medicine  Why: For re-evaluation and further treatment  Contact information:  [id number]  [place] [address] [phone]             [facility] - Emergency Dept.. Go today.    Specialty: Emergency Medicine  Why: If symptoms worsen, For re-evaluation and further treatment, As needed  Contact information:  [address] [facility] [address] [phone]                           Scribe Attestation:   Scribe #1: I performed the above scribed service and the documentation accurately describes the services I performed. I attest to the accuracy of the note.     Attending:   Physician Attestation Statement for Scribe #1: I, Yevgeniy Santiago MD, personally performed the services described in this documentation, as scribed by True Bailey, in my presence, and it is both accurate and complete.           Clinical Impression       ICD-10-CM ICD-9-CM   1. Dehydration  E86.0 276.51   2. Cough  R05.9 786.2   3. Hypotension, unspecified hypotension type  I95.9 458.9       Disposition:   Disposition: Discharged  Condition: Stable         Yevgeniy Santiago MD  12/23/21 0118

## 2021-12-28 LAB
BACTERIA BLD CULT: NORMAL
BACTERIA BLD CULT: NORMAL

## 2022-01-05 ENCOUNTER — PATIENT MESSAGE (OUTPATIENT)
Dept: INTERNAL MEDICINE | Facility: CLINIC | Age: 72
End: 2022-01-05
Payer: MEDICARE

## 2022-01-05 DIAGNOSIS — E11.9 TYPE 2 DIABETES MELLITUS WITHOUT COMPLICATION, WITHOUT LONG-TERM CURRENT USE OF INSULIN: ICD-10-CM

## 2022-01-05 RX ORDER — METFORMIN HYDROCHLORIDE 500 MG/1
500 TABLET ORAL
Qty: 30 TABLET | Refills: 0 | Status: SHIPPED | OUTPATIENT
Start: 2022-01-05 | End: 2022-02-16 | Stop reason: SDUPTHER

## 2022-01-05 NOTE — TELEPHONE ENCOUNTER
No new care gaps identified.  Powered by [employer]. Reference number: [id number].   1/05/2022 8:11:56 AM CST

## 2022-01-10 DIAGNOSIS — I47.19 PAROXYSMAL ATRIAL TACHYCARDIA: Primary | ICD-10-CM

## 2022-01-17 ENCOUNTER — PATIENT MESSAGE (OUTPATIENT)
Dept: ADMINISTRATIVE | Facility: OTHER | Age: 72
End: 2022-01-17
Payer: MEDICARE

## 2022-01-21 ENCOUNTER — PATIENT MESSAGE (OUTPATIENT)
Dept: OTHER | Facility: OTHER | Age: 72
End: 2022-01-21
Payer: MEDICARE

## 2022-01-24 ENCOUNTER — PES CALL (OUTPATIENT)
Dept: ADMINISTRATIVE | Facility: CLINIC | Age: 72
End: 2022-01-24
Payer: MEDICARE

## 2022-01-26 ENCOUNTER — OFFICE VISIT (OUTPATIENT)
Dept: CARDIOLOGY | Facility: CLINIC | Age: 72
End: 2022-01-26
Payer: MEDICARE

## 2022-01-26 ENCOUNTER — HOSPITAL ENCOUNTER (OUTPATIENT)
Dept: CARDIOLOGY | Facility: HOSPITAL | Age: 72
Discharge: HOME | End: 2022-01-26
Attending: INTERNAL MEDICINE
Payer: MEDICARE

## 2022-01-26 VITALS
SYSTOLIC BLOOD PRESSURE: 112 MMHG | DIASTOLIC BLOOD PRESSURE: 70 MMHG | WEIGHT: 162.06 LBS | BODY MASS INDEX: 28.71 KG/M2 | OXYGEN SATURATION: 96 % | HEIGHT: 63 IN | RESPIRATION RATE: 16 BRPM | HEART RATE: 55 BPM

## 2022-01-26 DIAGNOSIS — I25.10 CORONARY ARTERY DISEASE INVOLVING NATIVE CORONARY ARTERY OF NATIVE HEART WITHOUT ANGINA PECTORIS: Primary | ICD-10-CM

## 2022-01-26 DIAGNOSIS — I47.19 PAROXYSMAL ATRIAL TACHYCARDIA: ICD-10-CM

## 2022-01-26 DIAGNOSIS — E11.59 HYPERTENSION ASSOCIATED WITH DIABETES: ICD-10-CM

## 2022-01-26 DIAGNOSIS — I70.0 ABDOMINAL AORTIC ATHEROSCLEROSIS: ICD-10-CM

## 2022-01-26 DIAGNOSIS — I10 ESSENTIAL HYPERTENSION: ICD-10-CM

## 2022-01-26 DIAGNOSIS — I47.10 SVT (SUPRAVENTRICULAR TACHYCARDIA): ICD-10-CM

## 2022-01-26 DIAGNOSIS — R00.2 PALPITATIONS: ICD-10-CM

## 2022-01-26 DIAGNOSIS — I15.2 HYPERTENSION ASSOCIATED WITH DIABETES: ICD-10-CM

## 2022-01-26 DIAGNOSIS — I70.0 ATHEROSCLEROSIS OF AORTA: ICD-10-CM

## 2022-01-26 DIAGNOSIS — I50.32 CHRONIC HEART FAILURE WITH PRESERVED EJECTION FRACTION: ICD-10-CM

## 2022-01-26 DIAGNOSIS — E11.69 MIXED DIABETIC HYPERLIPIDEMIA ASSOCIATED WITH TYPE 2 DIABETES MELLITUS: ICD-10-CM

## 2022-01-26 DIAGNOSIS — E78.2 MIXED DIABETIC HYPERLIPIDEMIA ASSOCIATED WITH TYPE 2 DIABETES MELLITUS: ICD-10-CM

## 2022-01-26 PROCEDURE — 99214 PR OFFICE/OUTPT VISIT, EST, LEVL IV, 30-39 MIN: ICD-10-PCS | Mod: S$PBB,,, | Performed by: INTERNAL MEDICINE

## 2022-01-26 PROCEDURE — 93010 EKG 12-LEAD: ICD-10-PCS | Mod: ,,, | Performed by: INTERNAL MEDICINE

## 2022-01-26 PROCEDURE — 99214 OFFICE O/P EST MOD 30 MIN: CPT | Mod: S$PBB,,, | Performed by: INTERNAL MEDICINE

## 2022-01-26 PROCEDURE — 99999 PR PBB SHADOW E&M-EST. PATIENT-LVL IV: CPT | Mod: PBBFAC,,, | Performed by: INTERNAL MEDICINE

## 2022-01-26 PROCEDURE — 99999 PR PBB SHADOW E&M-EST. PATIENT-LVL IV: ICD-10-PCS | Mod: PBBFAC,,, | Performed by: INTERNAL MEDICINE

## 2022-01-26 PROCEDURE — 99214 OFFICE O/P EST MOD 30 MIN: CPT | Mod: PBBFAC | Performed by: INTERNAL MEDICINE

## 2022-01-26 PROCEDURE — 93005 ELECTROCARDIOGRAM TRACING: CPT

## 2022-01-26 PROCEDURE — 93010 ELECTROCARDIOGRAM REPORT: CPT | Mod: ,,, | Performed by: INTERNAL MEDICINE

## 2022-01-26 RX ORDER — ASPIRIN 81 MG/1
81 TABLET ORAL DAILY
Qty: 90 TABLET | Refills: 1 | Status: SHIPPED | OUTPATIENT
Start: 2022-01-26 | End: 2023-02-16 | Stop reason: SDUPTHER

## 2022-01-26 NOTE — PROGRESS NOTES
Subjective:   Patient ID:  Venus Caldwell is a 72 y.o. female who presents for cardiac consult of No chief complaint on file.      Follow-up      The patient came in today for cardiac consult of No chief complaint on file.    Referring Physician: Sourav Hernandez MD   Reason for initial consult: HTN      Venus Caldwell is a 72 y.o. female with current medical conditions HFpEF, HTN, PSVT, DM, obesity, OA, uterine CA, GERD presents to follow up CV eval.     5/2/19  Bp has improved with Norvasc 5 mg, recently started by Dr. Hernandez. Has been feeling more weak and fatigue, SBP was in upper 90s lately. She had LHC with minor artery blockage no stents placed, she thinks she had CP but strong FH of CAD. She does get palpitations, a few times a day, lasts one or two beats and she feels ok.   Prior cardiologist Dr. Hannah. Does snore, no prior sleep study. Has gained > 25 lbs in last 6 months, pre-DM.     8/6/19  ECHO with grade 1 DD otherwise normal BIV function. BP has been fluctuating at times. Is on digital HTN program. She has not had sleep study. NO recent heart monitor. Overall active.     1/2/19  Frequent SVT runs, doubled Toprol to 100 mg twice a day. Sleep study negative. She had two bottles of valsartan, given by Dr. Hannah but wanted to take it off due to recall. She still feels palpitations occasionally worse with talking at times, she did not have much palpitations on valsartan. Will refer to EP.     7/2/20  Had eval by Dr. Torres - symptomatic SVT. He recommended EPS/RFA for definitive therapy. She will have SVT ablation - will hold metoprolol 3 days prior. Overall has been feeling well, rarely feels palpitations. No major heart racing. She has gained some weight lately.   ECG - NSR,    9/9/21  Follow up since 7/2020. She has shoulder pain. She did not have SVT ablation. She is unsure why she postponed/canceleld the ablation. She is feeling more palpitations at times now. Her HR occ goes over 100.      Recent Readings 2021   SBP (mmHg) 114 138 136 144 106   DBP (mmHg) 62 75 69 75 65   Pulse 65 66 67 59 68     22  BP and HR stable today. Pt's  was sched with me too but was positive COVID, he had upcoming throat surgery. Pt and wife are not symptomatic. She went to ER last month for bronchitis and low BP improved with IVF and steroids.     ECG - sinus piper V rate 55, anteroinferior TWI - old    Patient feels no chest pain, no sob, no leg swelling, no PND,  no syncope, no CNS symptoms.    Patient has fairly good exercise tolerance.    Patient is compliant with medications.    ECG - NSR, inferior infarct    FH - father had MI in age 58, brother  from MI 58    TEST DESCRIPTION   1-CARDIO EVENT RECORDING FROM 19 TO 19    2-INDICATIONS- PALPITATIONS    CONCLUSIONS   Patient had a min HR of 54 bpm, max HR of 200 bpm, and avg HR of 78  bpm. Predominant underlying rhythm was Sinus Rhythm. 93  Supraventricular Tachycardia runs occurred, the run with the fastest  interval lasting 12 mins 38 secs with a max rate of 200 bpm, the longest  lasting 44 mins 15 secs with an avg rate of 123 bpm. Supraventricular  Tachycardia was detected within +/- 45 seconds of symptomatic patient  event(s). Isolated SVEs were rare (<1.0%), SVE Couplets were rare  (<1.0%), and SVE Triplets were rare (<1.0%). Isolated VEs were rare  (<1.0%, 970), VE Couplets were rare (<1.0%, 12), and VE Triplets were  rare (<1.0%, 1).    This document has been electronically    SIGNED BY: Jarrett Hussein MD On: 2019 09:28    2d ECHO  2019  CONCLUSIONS     1 - Concentric hypertrophy.     2 - No wall motion abnormalities.     3 - Normal left ventricular systolic function (EF 60-65%).     4 - Impaired LV relaxation, normal LAP (grade 1 diastolic dysfunction).     5 - Normal right ventricular systolic function .     6 - The estimated PA systolic pressure is greater than 22 mmHg.        Past Medical History:   Diagnosis Date    Arthritis     Cancer, uterine     Coronary artery disease     Diabetes mellitus     prediabetes    Diabetes mellitus, type 2     Digestive disorder     DM (diabetes mellitus) 08/2019    BS doesn't check 12/16/2019    DM (diabetes mellitus) 08/2019    BS doesn't check 06/29/2021    Hypertension     Multiple thyroid nodules 7/8/2021    Ovarian cancer     Sciatica     Sleep apnea        Past Surgical History:   Procedure Laterality Date    CHOLECYSTECTOMY      COLONOSCOPY N/A 8/1/2018    Procedure: COLONOSCOPY;  Surgeon: Yrn Hunter III, MD;  Location: [facility] ENDO;  Service: Endoscopy;  Laterality: N/A;    ESOPHAGOGASTRODUODENOSCOPY N/A 3/17/2021    Procedure: EGD (ESOPHAGOGASTRODUODENOSCOPY) (Dr. EMILIO billings);  Surgeon: Coy Ortiz MD;  Location: [facility];  Service: Endoscopy;  Laterality: N/A;    HYSTERECTOMY      JOINT REPLACEMENT Right     hip replacement    SURGICAL EXCISION OF ANAL LESION N/A 8/29/2018    Procedure: EXCISION, LESION, ANUS;  Surgeon: Yrn Balderrama MD;  Location: [facility] OR;  Service: General;  Laterality: N/A;    TOTAL KNEE ARTHROPLASTY Bilateral        Social History     Tobacco Use    Smoking status: Never Smoker    Smokeless tobacco: Never Used   Substance Use Topics    Alcohol use: No    Drug use: No       Family History   Problem Relation Age of Onset    Diabetes Mother     Cataracts Mother     Diabetes Brother     Cataracts Maternal Grandmother     Breast cancer Maternal Aunt        Patient's Medications   New Prescriptions    No medications on file   Previous Medications    ALBUTEROL (PROVENTIL/VENTOLIN HFA) 90 MCG/ACTUATION INHALER    Inhale 1-2 puffs into the lungs every 6 (six) hours as needed for Wheezing. Rescue    AMLODIPINE (NORVASC) 5 MG TABLET    Take 1 tablet (5 mg total) by mouth once daily.    ATORVASTATIN (LIPITOR) 40 MG TABLET    Take 1 tablet (40 mg total) by mouth every evening.     AZELASTINE (ASTELIN) 137 MCG (0.1 %) NASAL SPRAY    1 spray (137 mcg total) by Nasal route 2 (two) times daily.    BETAMETHASONE DIPROPIONATE (DIPROLENE) 0.05 % CREAM    as needed.     CANDESARTAN (ATACAND) 16 MG TABLET    Take 1 tablet (16 mg total) by mouth once daily.    CETIRIZINE (ZYRTEC) 10 MG TABLET    Take 10 mg by mouth once daily.    CHOLECALCIFEROL, VITAMIN D3, 5,000 UNIT TAB    Take 1,000 Units by mouth once daily.     GABAPENTIN (NEURONTIN) 300 MG CAPSULE    Take 1 capsule (300 mg total) by mouth 3 (three) times daily. 300 mg TID    HYDROCODONE-ACETAMINOPHEN (NORCO)  MG PER TABLET    Take 1 tablet by mouth. Takes 0.5 tab prn    HYDROCORTISONE 2.5 % CREAM    Apply to rash twice daily as needed for rash    IPRATROPIUM-ALBUTEROL (COMBIVENT)  MCG/ACTUATION INHALER    Inhale 1 puff into the lungs every 6 (six) hours as needed for Wheezing or Shortness of Breath (or Cough). Rescue    KETOCONAZOLE (NIZORAL) 2 % CREAM    Apply to rash twice daily as needed for rash    MELOXICAM (MOBIC) 7.5 MG TABLET    Take 1 tablet (7.5 mg total) by mouth once daily.    METFORMIN (GLUCOPHAGE) 500 MG TABLET    Take 1 tablet (500 mg total) by mouth daily with breakfast.    METOPROLOL SUCCINATE (TOPROL-XL) 100 MG 24 HR TABLET    Take 1 tablet (100 mg total) by mouth once daily.    MUPIROCIN (BACTROBAN) 2 % OINTMENT    Apply topically 3 (three) times daily.    OMEPRAZOLE (PRILOSEC) 20 MG CAPSULE    Take 20 mg by mouth once daily.    VALACYCLOVIR (VALTREX) 500 MG TABLET    Take 500 mg by mouth as needed.    Modified Medications    Modified Medication Previous Medication    ASPIRIN (ECOTRIN) 81 MG EC TABLET aspirin (ECOTRIN) 81 MG EC tablet       Take 1 tablet (81 mg total) by mouth once daily.    Take 1 tablet (81 mg total) by mouth once daily.   Discontinued Medications    No medications on file       Review of Systems   Constitutional: Positive for malaise/fatigue.   HENT: Negative.    Eyes: Negative.    Respiratory:  "Negative.    Cardiovascular: Positive for palpitations.   Gastrointestinal: Negative.    Genitourinary: Negative.    Musculoskeletal: Negative.    Skin: Negative.    Neurological: Negative.    Endo/Heme/Allergies: Negative.    Psychiatric/Behavioral: Negative.    All 12 systems otherwise negative.      Wt Readings from Last 3 Encounters:   01/26/22 73.5 kg (162 lb 0.6 oz)   12/22/21 79.4 kg (175 lb)   12/12/21 81.2 kg (179 lb)     Temp Readings from Last 3 Encounters:   12/22/21 97.9 °F (36.6 °C) (Oral)   12/12/21 98.7 °F (37.1 °C) (Oral)   09/27/21 97.9 °F (36.6 °C)     BP Readings from Last 3 Encounters:   01/26/22 112/70   12/22/21 (!) 131/59   12/12/21 132/75     Pulse Readings from Last 3 Encounters:   01/26/22 (!) 55   12/22/21 66   12/12/21 60       /70 (BP Location: Left arm, Patient Position: Sitting, BP Method: Medium (Manual))   Pulse (!) 55   Resp 16   Ht 5' 3" (1.6 m)   Wt 73.5 kg (162 lb 0.6 oz)   SpO2 96%   BMI 28.70 kg/m²     Objective:   Physical Exam  Constitutional:       General: She is not in acute distress.     Appearance: She is well-developed. She is not diaphoretic.   HENT:      Head: Normocephalic and atraumatic.      Mouth/Throat:      Pharynx: No oropharyngeal exudate.   Eyes:      General: No scleral icterus.        Right eye: No discharge.         Left eye: No discharge.   Pulmonary:      Effort: Pulmonary effort is normal. No respiratory distress.   Skin:     Coloration: Skin is not pale.      Findings: No erythema or rash.   Neurological:      Mental Status: She is alert and oriented to person, place, and time.   Psychiatric:         Behavior: Behavior normal.         Thought Content: Thought content normal.         Judgment: Judgment normal.         Lab Results   Component Value Date     12/22/2021    K 3.6 12/22/2021     12/22/2021    CO2 28 12/22/2021    BUN 26 (H) 12/22/2021    CREATININE 0.8 12/22/2021     (H) 12/22/2021    HGBA1C 6.7 (H) 07/08/2021 "    AST 12 12/22/2021    ALT 17 12/22/2021    ALBUMIN 3.7 12/22/2021    PROT 6.7 12/22/2021    BILITOT 0.7 12/22/2021    WBC 11.43 12/22/2021    HGB 12.7 12/22/2021    HCT 38.3 12/22/2021    MCV 89 12/22/2021     12/22/2021    INR 1.0 06/14/2018    TSH 0.848 08/16/2021    CHOL 127 08/16/2021    HDL 46 08/16/2021    LDLCALC 56.8 (L) 08/16/2021    TRIG 121 08/16/2021     Assessment:      1. Coronary artery disease involving native coronary artery of native heart without angina pectoris    2. Paroxysmal atrial tachycardia    3. Chronic heart failure with preserved ejection fraction    4. SVT (supraventricular tachycardia)    5. Palpitations    6. Atherosclerosis of aorta    7. Abdominal aortic atherosclerosis    8. Hypertension associated with diabetes    9. Mixed diabetic hyperlipidemia associated with type 2 diabetes mellitus    10. Essential hypertension        Plan:   1. CAD, non obs with abd atherosclerosis   - no CP  - cont asa, statin, BB    2. HTN - occ low   - Bp well controlled  - monitor for low BP    3. Palpitations - intermittent with PSVT  - cont BB  - did not have ablation yet, needs to follow up with Dr. Torres    4. Sleep apnea symptoms  - sleep study - negative in 2019    5. HFpEF  - low salt diet  - euvolemic     6. DM/obesity - A1c 7.1 --> 6.7  - cont tx per PCP  - rec weight loss with diet/exercise     Thank you for allowing me to participate in this patient's care. Please do not hesitate to contact me with any questions or concerns. Consult note has been forwarded to the referral physician.

## 2022-02-16 ENCOUNTER — LAB VISIT (OUTPATIENT)
Dept: LAB | Facility: HOSPITAL | Age: 72
End: 2022-02-16
Attending: FAMILY MEDICINE
Payer: MEDICARE

## 2022-02-16 ENCOUNTER — OFFICE VISIT (OUTPATIENT)
Dept: INTERNAL MEDICINE | Facility: CLINIC | Age: 72
End: 2022-02-16
Payer: MEDICARE

## 2022-02-16 VITALS
BODY MASS INDEX: 29.49 KG/M2 | OXYGEN SATURATION: 96 % | SYSTOLIC BLOOD PRESSURE: 124 MMHG | HEART RATE: 70 BPM | WEIGHT: 166.44 LBS | DIASTOLIC BLOOD PRESSURE: 76 MMHG | HEIGHT: 63 IN | TEMPERATURE: 98 F

## 2022-02-16 DIAGNOSIS — I25.10 CORONARY ARTERY DISEASE INVOLVING NATIVE CORONARY ARTERY OF NATIVE HEART WITHOUT ANGINA PECTORIS: ICD-10-CM

## 2022-02-16 DIAGNOSIS — E78.2 MIXED DIABETIC HYPERLIPIDEMIA ASSOCIATED WITH TYPE 2 DIABETES MELLITUS: ICD-10-CM

## 2022-02-16 DIAGNOSIS — I47.10 SVT (SUPRAVENTRICULAR TACHYCARDIA): ICD-10-CM

## 2022-02-16 DIAGNOSIS — I15.2 HYPERTENSION ASSOCIATED WITH DIABETES: ICD-10-CM

## 2022-02-16 DIAGNOSIS — E11.69 MIXED DIABETIC HYPERLIPIDEMIA ASSOCIATED WITH TYPE 2 DIABETES MELLITUS: ICD-10-CM

## 2022-02-16 DIAGNOSIS — I47.19 PAROXYSMAL ATRIAL TACHYCARDIA: ICD-10-CM

## 2022-02-16 DIAGNOSIS — I70.0 ATHEROSCLEROSIS OF AORTA: ICD-10-CM

## 2022-02-16 DIAGNOSIS — E11.49 TYPE II DIABETES MELLITUS WITH NEUROLOGICAL MANIFESTATIONS: Primary | ICD-10-CM

## 2022-02-16 DIAGNOSIS — Z12.11 SCREENING FOR COLON CANCER: ICD-10-CM

## 2022-02-16 DIAGNOSIS — I50.32 CHRONIC HEART FAILURE WITH PRESERVED EJECTION FRACTION: ICD-10-CM

## 2022-02-16 DIAGNOSIS — Z86.010 HISTORY OF COLON POLYPS: ICD-10-CM

## 2022-02-16 DIAGNOSIS — E11.49 TYPE II DIABETES MELLITUS WITH NEUROLOGICAL MANIFESTATIONS: ICD-10-CM

## 2022-02-16 DIAGNOSIS — E11.59 HYPERTENSION ASSOCIATED WITH DIABETES: ICD-10-CM

## 2022-02-16 PROBLEM — E11.9 ENCOUNTER FOR COMPREHENSIVE DIABETIC FOOT EXAMINATION, TYPE 2 DIABETES MELLITUS: Status: RESOLVED | Noted: 2019-12-16 | Resolved: 2022-02-16

## 2022-02-16 PROBLEM — E11.9 TYPE 2 DIABETES MELLITUS WITHOUT COMPLICATION, WITHOUT LONG-TERM CURRENT USE OF INSULIN: Chronic | Status: RESOLVED | Noted: 2019-12-16 | Resolved: 2022-02-16

## 2022-02-16 PROBLEM — R00.2 PALPITATIONS: Status: RESOLVED | Noted: 2019-05-02 | Resolved: 2022-02-16

## 2022-02-16 PROBLEM — D64.9 NORMOCYTIC ANEMIA: Status: ACTIVE | Noted: 2020-10-08

## 2022-02-16 PROBLEM — E66.09 CLASS 1 OBESITY DUE TO EXCESS CALORIES WITH SERIOUS COMORBIDITY AND BODY MASS INDEX (BMI) OF 30.0 TO 30.9 IN ADULT: Status: RESOLVED | Noted: 2019-10-08 | Resolved: 2022-02-16

## 2022-02-16 PROBLEM — E66.811 CLASS 1 OBESITY DUE TO EXCESS CALORIES WITH SERIOUS COMORBIDITY AND BODY MASS INDEX (BMI) OF 30.0 TO 30.9 IN ADULT: Status: RESOLVED | Noted: 2019-10-08 | Resolved: 2022-02-16

## 2022-02-16 PROBLEM — D50.8 IRON DEFICIENCY ANEMIA SECONDARY TO INADEQUATE DIETARY IRON INTAKE: Status: RESOLVED | Noted: 2020-10-12 | Resolved: 2022-02-16

## 2022-02-16 PROBLEM — D64.9 NORMOCYTIC ANEMIA: Status: RESOLVED | Noted: 2020-10-08 | Resolved: 2022-02-16

## 2022-02-16 PROBLEM — D73.89 NODULE OF SPLEEN: Status: RESOLVED | Noted: 2021-07-08 | Resolved: 2022-02-16

## 2022-02-16 LAB
ESTIMATED AVG GLUCOSE: 137 MG/DL (ref 68–131)
HBA1C MFR BLD: 6.4 % (ref 4–5.6)

## 2022-02-16 PROCEDURE — 99999 PR PBB SHADOW E&M-EST. PATIENT-LVL IV: ICD-10-PCS | Mod: PBBFAC,,, | Performed by: FAMILY MEDICINE

## 2022-02-16 PROCEDURE — 99214 PR OFFICE/OUTPT VISIT, EST, LEVL IV, 30-39 MIN: ICD-10-PCS | Mod: S$PBB,,, | Performed by: FAMILY MEDICINE

## 2022-02-16 PROCEDURE — 99214 OFFICE O/P EST MOD 30 MIN: CPT | Mod: PBBFAC | Performed by: FAMILY MEDICINE

## 2022-02-16 PROCEDURE — 99999 PR PBB SHADOW E&M-EST. PATIENT-LVL IV: CPT | Mod: PBBFAC,,, | Performed by: FAMILY MEDICINE

## 2022-02-16 PROCEDURE — 83036 HEMOGLOBIN GLYCOSYLATED A1C: CPT | Performed by: FAMILY MEDICINE

## 2022-02-16 PROCEDURE — 36415 COLL VENOUS BLD VENIPUNCTURE: CPT | Performed by: FAMILY MEDICINE

## 2022-02-16 PROCEDURE — 99214 OFFICE O/P EST MOD 30 MIN: CPT | Mod: S$PBB,,, | Performed by: FAMILY MEDICINE

## 2022-02-16 RX ORDER — GABAPENTIN 300 MG/1
300 CAPSULE ORAL 3 TIMES DAILY
Qty: 270 CAPSULE | Refills: 3 | Status: SHIPPED | OUTPATIENT
Start: 2022-02-16 | End: 2023-02-16 | Stop reason: SDUPTHER

## 2022-02-16 RX ORDER — METFORMIN HYDROCHLORIDE 500 MG/1
500 TABLET ORAL
Qty: 90 TABLET | Refills: 3 | Status: SHIPPED | OUTPATIENT
Start: 2022-02-16 | End: 2023-02-16 | Stop reason: SDUPTHER

## 2022-02-24 ENCOUNTER — PATIENT MESSAGE (OUTPATIENT)
Dept: CARDIOLOGY | Facility: CLINIC | Age: 72
End: 2022-02-24
Payer: MEDICARE

## 2022-02-24 DIAGNOSIS — I10 ESSENTIAL HYPERTENSION: Chronic | ICD-10-CM

## 2022-02-25 RX ORDER — CANDESARTAN 16 MG/1
16 TABLET ORAL DAILY
Qty: 90 TABLET | Refills: 1 | Status: SHIPPED | OUTPATIENT
Start: 2022-02-25 | End: 2022-07-26 | Stop reason: SDUPTHER

## 2022-02-25 NOTE — PROGRESS NOTES
Subjective:   Patient ID: Venus Caldwell is a 72 y.o. female.  Chief Complaint:  No chief complaint on file.    Patient presents for 6 month follow-up on chronic medical conditions     Medical history for:  - Hypertension with CHF. On amlodipine 5 mg daily, Atacand 16 mg daily, Toprol- mg daily.  Reports compliance.  Denies side effects.  Blood pressure controlled.  No shortness of breath or swelling.  - SVT with PAT.  Followed by Cardiology.  Stable on beta-blocker.  - Coronary artery disease with known hyperlipidemia and aortic atherosclerosis.  Last /2021.  On aspirin 81 mg daily and Lipitor 40 mg daily.  Reports compliance.  Denies side effects.  No chest pain claudication.    - Diabetes mellitus.  Last A1c 6.7% July 2021.  Microalbumin also negative.  Foot exam and exam up-to-date.  On metformin 500 mg daily.  Reports compliance.  Denies symptoms hypo hyperglycemia.  Needs repeat A1c.  Eye exam microalbumin up-to-date.  Foot exam done annually by Podiatry  [facility] changes of her spine and likely underlying neuropathy.  Reports increased numbness and tingling in her feet.  Reviewed medication list and taking gabapentin 300 mg but only twice a day not 3 times a day as prescribed.  - Thyroid nodules.  Asymptomatic.  TSH normal 12 months ago.  Never obtained thyroid ultrasound. TSH normal August 2021.  - Splenic nodule.  Low density.  Incidental finding on CT chest for follow-up on her pulmonary nodules.  No additional evaluation needed.  - Chronic restrictive lung disease, interstitial fibrosis, pulmonary nodule.  Followed by pulmonology.  Cough stable and controlled on current inhaler regimen.  - history colon polyps and diverticulosis.  Last colonoscopy 2018 repeat recommended in 5 years.  However, has had 2-3 bouts of recurrent abdominal T in September 2021 stool was positive for blood recommend repeat colonoscopy earlier than 2023    Health  "maintenance up-to-date    No additional complaints concerns today        Review of Systems   Constitutional: Negative for activity change, chills, diaphoresis, fatigue, fever and unexpected weight change.   HENT: Negative for hearing loss, rhinorrhea and trouble swallowing.    Eyes: Negative for discharge and visual disturbance.   Respiratory: Negative for cough, chest tightness, shortness of breath and wheezing.    Cardiovascular: Negative for chest pain, palpitations and leg swelling.   Gastrointestinal: Positive for abdominal pain. Negative for abdominal distention, blood in stool, constipation, diarrhea, nausea and vomiting.   Endocrine: Negative for polydipsia, polyphagia and polyuria.   Genitourinary: Negative for difficulty urinating, dysuria, flank pain, frequency, hematuria, menstrual problem and urgency.   Musculoskeletal: Positive for arthralgias and back pain. Negative for joint swelling, myalgias and neck pain.   Skin: Negative for rash.   Neurological: Positive for numbness. Negative for dizziness, syncope, weakness, light-headedness and headaches.   Psychiatric/Behavioral: Negative for confusion, dysphoric mood and sleep disturbance. The patient is not nervous/anxious.      Objective:   /76 (BP Location: Left arm, Patient Position: Sitting, BP Method: Large (Manual))   Pulse 70   Temp 98.2 °F (36.8 °C) (Oral)   Ht 5' 3" (1.6 m)   Wt 75.5 kg (166 lb 7.2 oz)   SpO2 96%   BMI 29.48 kg/m²     Physical Exam  Vitals and nursing note reviewed.   Constitutional:       Appearance: Normal appearance. She is well-developed and normal weight.   Eyes:      General: No scleral icterus.     Conjunctiva/sclera: Conjunctivae normal.      Right eye: Right conjunctiva is not injected.      Left eye: Left conjunctiva is not injected.   Neck:      Thyroid: No thyroid mass, thyromegaly or thyroid tenderness.      Vascular: Normal carotid pulses. No carotid bruit or JVD.   Cardiovascular:      Rate and Rhythm: " Normal rate and regular rhythm.      Pulses:           Radial pulses are 2+ on the right side and 2+ on the left side.        Dorsalis pedis pulses are 2+ on the right side and 2+ on the left side.        Posterior tibial pulses are 2+ on the right side and 2+ on the left side.      Heart sounds: Normal heart sounds. No murmur heard.  No friction rub. No gallop.    Pulmonary:      Effort: Pulmonary effort is normal.      Breath sounds: Normal breath sounds. No wheezing, rhonchi or rales.   Abdominal:      General: There is no distension.      Palpations: Abdomen is soft. There is no hepatomegaly.      Tenderness: There is no abdominal tenderness. There is no guarding or rebound.   Musculoskeletal:      Right lower leg: No edema.      Left lower leg: No edema.      Right foot: Normal range of motion. No deformity or bunion.      Left foot: Normal range of motion. No deformity or bunion.   Feet:      Right foot:      Protective Sensation: 5 sites tested. 5 sites sensed.      Skin integrity: Dry skin present. No ulcer, blister, skin breakdown, erythema, warmth or fissure.      Toenail Condition: Right toenails are abnormally thick.      Left foot:      Protective Sensation: 5 sites tested. 5 sites sensed.      Skin integrity: Callus and dry skin present. No ulcer, blister, skin breakdown, erythema, warmth or fissure.      Toenail Condition: Left toenails are abnormally thick.   Skin:     General: Skin is warm and dry.      Capillary Refill: Capillary refill takes less than 2 seconds.      Findings: No rash.   Neurological:      Mental Status: She is alert.      Coordination: Coordination is intact. Coordination normal.      Gait: Gait is intact. Gait normal.   Psychiatric:         Attention and Perception: Attention normal.         Mood and Affect: Mood and affect normal.         Speech: Speech normal.         Behavior: Behavior normal. Behavior is cooperative.         Thought Content: Thought content normal.          Cognition and Memory: Cognition normal.         Judgment: Judgment normal.       Assessment:       ICD-10-CM ICD-9-CM   1. Type II diabetes mellitus with neurological manifestations  E11.49 250.60   2. Hypertension associated with diabetes  E11.59 250.80    I15.2 401.9   3. Chronic heart failure with preserved ejection fraction  I50.32 428.9   4. SVT (supraventricular tachycardia)  I47.1 427.89   5. Paroxysmal atrial tachycardia  I47.1 427.0   6. Mixed diabetic hyperlipidemia associated with type 2 diabetes mellitus  E11.69 250.80    E78.2 272.2   7. Coronary artery disease involving native coronary artery of native heart without angina pectoris  I25.10 414.01   8. Atherosclerosis of aorta  I70.0 440.0   9. History of colon polyps  Z86.010 V12.72   10. Screening for colon cancer  Z12.11 V76.51     Plan:   Type II diabetes mellitus with neurological manifestations  -     gabapentin (NEURONTIN) 300 MG capsule; Take 1 capsule (300 mg total) by mouth 3 (three) times daily.  Dispense: 270 capsule; Refill: 3  -     metFORMIN (GLUCOPHAGE) 500 MG tablet; Take 1 tablet (500 mg total) by mouth daily with breakfast.  Dispense: 90 tablet; Refill: 3  -     Hemoglobin A1C; Future; Expected date: 02/16/2022  If A1c less than 7%, continue metformin 500 mg daily  If A1c greater than 7%, increase metformin 500 mg twice a day  Eye exam up-to-date  Microalbumin up-to-date  Foot exam stable Port/calluses but no breakdown  Follow-up Podiatry as scheduled    Hypertension associated with diabetes  Chronic heart failure with preserved ejection fraction  Controlled. Stable.  Asymptomatic.  BP at goal.  Continue current medications    SVT (supraventricular tachycardia)  Paroxysmal atrial tachycardia  Stable.  Asymptomatic.  Rate controlled  Continue current dose beta-blocker    Mixed diabetic hyperlipidemia associated with type 2 diabetes mellitus  Coronary artery disease involving native coronary artery of native heart without angina  pectoris  Atherosclerosis of aorta  Controlled.  Stable.  Asymptomatic.  LDL at goal.  Continue current medications    History of colon polyps  Screening for colon cancer  -     Case Request Endoscopy: COLONOSCOPY  With recent episode of symptomatic blood in his stools and increasing/recurrent vague abdominal pain recommend repeat colonoscopy prior to 2023  Patient agreeable  Referral ordered    Follow up with any/all specialists scheduled  Return to clinic 6 months for annual physical exam or sooner if needed       Yes

## 2022-03-07 ENCOUNTER — OFFICE VISIT (OUTPATIENT)
Dept: PODIATRY | Facility: CLINIC | Age: 72
End: 2022-03-07
Payer: MEDICARE

## 2022-03-07 VITALS — BODY MASS INDEX: 29.49 KG/M2 | HEIGHT: 63 IN | WEIGHT: 166.44 LBS

## 2022-03-07 DIAGNOSIS — L60.2 ONYCHOGRYPHOSIS: ICD-10-CM

## 2022-03-07 DIAGNOSIS — E11.49 TYPE 2 DIABETES MELLITUS WITH NEUROLOGICAL MANIFESTATION: Primary | ICD-10-CM

## 2022-03-07 PROCEDURE — 11721 DEBRIDE NAIL 6 OR MORE: CPT | Mod: Q9,S$PBB,, | Performed by: PODIATRIST

## 2022-03-07 PROCEDURE — 11721 PR DEBRIDEMENT OF NAILS, 6 OR MORE: ICD-10-PCS | Mod: Q9,S$PBB,, | Performed by: PODIATRIST

## 2022-03-07 PROCEDURE — 99499 NO LOS: ICD-10-PCS | Mod: S$PBB,,, | Performed by: PODIATRIST

## 2022-03-07 PROCEDURE — 99214 OFFICE O/P EST MOD 30 MIN: CPT | Mod: PBBFAC | Performed by: PODIATRIST

## 2022-03-07 PROCEDURE — 99999 PR PBB SHADOW E&M-EST. PATIENT-LVL IV: CPT | Mod: PBBFAC,,, | Performed by: PODIATRIST

## 2022-03-07 PROCEDURE — 99999 PR PBB SHADOW E&M-EST. PATIENT-LVL IV: ICD-10-PCS | Mod: PBBFAC,,, | Performed by: PODIATRIST

## 2022-03-07 PROCEDURE — 99499 UNLISTED E&M SERVICE: CPT | Mod: S$PBB,,, | Performed by: PODIATRIST

## 2022-03-07 PROCEDURE — 11721 DEBRIDE NAIL 6 OR MORE: CPT | Mod: Q9,PBBFAC | Performed by: PODIATRIST

## 2022-03-07 NOTE — PROGRESS NOTES
Subjective:       Patient ID: Venus Caldwell is a 72 y.o. female.    Chief Complaint: Diabetic Foot Exam (Coming in for a diabetic foot exam, rates pain 0/10, diabetic, wearing socks and shoes, last seen PCP Dr. Hernandez on 02/16/2022.)      HPI: Patient presents to the office today with the chief complaint of elongated, thickened and dystrophic nail plates to the B/L foot.  This patient is a Diabetic Type II, complicated with Peripheral Neuropathy. Patient does follow with Primary Care and/or Endocrinology for management of Diabetes Mellitus. This patient's PMD is Sourav Hernandez MD. This patient last saw his/her primary care provider on 02/16/2022.  Patient relates 0/10 pain to the right and left foot.     Hemoglobin A1C   Date Value Ref Range Status   02/16/2022 6.4 (H) 4.0 - 5.6 % Final     Comment:     ADA Screening Guidelines:  5.7-6.4%  Consistent with prediabetes  >or=6.5%  Consistent with diabetes    High levels of fetal hemoglobin interfere with the HbA1C  assay. Heterozygous hemoglobin variants (HbS, HgC, etc)do  not significantly interfere with this assay.   However, presence of multiple variants may affect accuracy.     07/08/2021 6.7 (H) 4.0 - 5.6 % Final     Comment:     ADA Screening Guidelines:  5.7-6.4%  Consistent with prediabetes  >or=6.5%  Consistent with diabetes    High levels of fetal hemoglobin interfere with the HbA1C  assay. Heterozygous hemoglobin variants (HbS, HgC, etc)do  not significantly interfere with this assay.   However, presence of multiple variants may affect accuracy.     08/13/2020 7.2 (H) 4.0 - 5.6 % Final     Comment:     ADA Screening Guidelines:  5.7-6.4%  Consistent with prediabetes  >or=6.5%  Consistent with diabetes  High levels of fetal hemoglobin interfere with the HbA1C  assay. Heterozygous hemoglobin variants (HbS, HgC, etc)do  not significantly interfere with this assay.   However, presence of multiple variants may affect accuracy.     .     Review of  patient's allergies indicates:   Allergen Reactions    Buprenorphine Rash       Past Medical History:   Diagnosis Date    Arthritis     Cancer, uterine     Coronary artery disease     Diabetes mellitus     prediabetes    Diabetes mellitus, type 2     Digestive disorder     DM (diabetes mellitus) 08/2019    BS doesn't check 12/16/2019    DM (diabetes mellitus) 08/2019    BS doesn't check 06/29/2021    Hypertension     Multiple thyroid nodules 7/8/2021    Ovarian cancer     Sciatica     Sleep apnea        Family History   Problem Relation Age of Onset    Diabetes Mother     Cataracts Mother     Diabetes Brother     Cataracts Maternal Grandmother     Breast cancer Maternal Aunt        Social History     Socioeconomic History    Marital status:     Number of children: 1   Occupational History    Occupation: Retired   Tobacco Use    Smoking status: Never Smoker    Smokeless tobacco: Never Used   Substance and Sexual Activity    Alcohol use: No    Drug use: No    Sexual activity: Not Currently     Social Determinants of Health     Financial Resource Strain: Low Risk     Difficulty of Paying Living Expenses: Not hard at all   Food Insecurity: No Food Insecurity    Worried About Running Out of Food in the Last Year: Never true    Ran Out of Food in the Last Year: Never true   Transportation Needs: No Transportation Needs    Lack of Transportation (Medical): No    Lack of Transportation (Non-Medical): No   Physical Activity: Inactive    Days of Exercise per Week: 0 days    Minutes of Exercise per Session: 0 min   Stress: No Stress Concern Present    Feeling of Stress : Not at all   Social Connections: Moderately Isolated    Frequency of Communication with Friends and Family: Twice a week    Frequency of Social Gatherings with Friends and Family: More than three times a week    Attends Pentecostalism Services: Never    Active Member of Clubs or Organizations: No    Attends Club or  "Organization Meetings: Never    Marital Status:    Housing Stability: Low Risk     Unable to Pay for Housing in the Last Year: No    Number of Places Lived in the Last Year: 1    Unstable Housing in the Last Year: No       Past Surgical History:   Procedure Laterality Date    CHOLECYSTECTOMY      COLONOSCOPY N/A 8/1/2018    Procedure: COLONOSCOPY;  Surgeon: Yrn Hunter III, MD;  Location: [facility] ENDO;  Service: Endoscopy;  Laterality: N/A;    ESOPHAGOGASTRODUODENOSCOPY N/A 3/17/2021    Procedure: EGD (ESOPHAGOGASTRODUODENOSCOPY) (Dr. EMILIO billings);  Surgeon: Coy Ortiz MD;  Location: [facility] ENDO;  Service: Endoscopy;  Laterality: N/A;    HYSTERECTOMY      JOINT REPLACEMENT Right     hip replacement    SURGICAL EXCISION OF ANAL LESION N/A 8/29/2018    Procedure: EXCISION, LESION, ANUS;  Surgeon: Yrn Balderrama MD;  Location: [facility] OR;  Service: General;  Laterality: N/A;    TOTAL KNEE ARTHROPLASTY Bilateral        Review of Systems       Objective:   Ht 5' 3" (1.6 m)   Wt 75.5 kg (166 lb 7.2 oz)   BMI 29.48 kg/m²     Physical Exam  LOWER EXTREMITY PHYSICAL EXAMINATION    VASCULAR:  The right dorsalis pedis pulse 2/4 and the right posterior tibial pulse 2/4.  The left dorsalis pedis pulse 2/4 and posterior tibial pulse on the left is 2/4.  Capillary refill is intact.  Pedal hair growth intact.     NEUROLOGY: Protective sensation is not intact to the left and right plantar surfaces of the foot and digits, as the patient has no sensation/detection at greater than 4 distinct points of contact with 5.07 [place] Fritz monofilament. Sensation to light touch is intact on the left and right foot. Proprioception is intact, bilateral. Sensation to pin prick is reduced to absent. Vibratory sensation is diminished.    DERMATOLOGY:  Skin is supple, moist, intact.  There is no signs of callusing, ulcerations, other lesions identified to the dorsal or plantar aspect of the right or left foot.  " The R1, 2, 5 and left L1,2, 5 are thickened, discolored dystrophic.  There is subungual debris.  Nail plates have area of dark discoloration.  The remaining nails 3-4 on the right foot and the left foot are elongated but of normal color, thickness, and texture.   There is no signs of ingrowing into the medial or lateral borders.  There is no evidence of wounds or skin breakdown.  No edema or erythema.  No obvious lacerations or fissuring.  Interdigital spaces are clean, dry, intact.  No rashes or scars appreciated.    ORTHOPEDIC: Manual Muscle Testing is 5/5 in all planes on the left and right, without pains, with and without resistance. Gait pattern is non-antalgic.    Assessment:     1. 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  Greater than 50% of this visit spent on counseling and coordination of care. Greater than 15 minutes of a 20 minute appointment spent on education about the diabetic foot, neuropathy, and prevention of limb loss.  Shoe inspection. Diabetic Foot Education. Patient reminded of the importance of good nutrition and blood sugar control to help prevent podiatric complications of diabetes. Patient instructed on proper foot hygeine. We discussed wearing proper and supportive shoe gear, daily foot inspections, never walking barefooted or sock footed, never putting sharp instruments to feet which can cause major complications associated with infection, ulcers, lacerations.      Dystrophic nail plates, as outlined above (R#1,2,5  ; L#1,2,5 ), are sharply debrided with double action nail nipper, and/or with the assistance of a mechanical rotary elia, with removal of all offending nail and nail border(s), for reduction of pains.  Nails are reduced in terms of length, width and girth with removal of subungual debris to facilitate pain free weight bearing and ambulation. The elongated nails as outlined in the objective portion of this note, were trimmed to appropriate length, with a double action nail nipper, for alleviation/reduction of pains as well. Follow up in approx. 3-4 months.        Future Appointments   Date Time Provider Department Center   6/7/2022  9:30 AM Halina Vidales DPM [facility]   7/26/2022  2:20 PM Maikol Garcia MD [facility]   8/16/2022 10:00 AM Sourav Hernandez MD [facility]

## 2022-04-05 ENCOUNTER — PATIENT MESSAGE (OUTPATIENT)
Dept: OTHER | Facility: OTHER | Age: 72
End: 2022-04-05
Payer: MEDICARE

## 2022-04-06 ENCOUNTER — PATIENT MESSAGE (OUTPATIENT)
Dept: ADMINISTRATIVE | Facility: OTHER | Age: 72
End: 2022-04-06
Payer: MEDICARE

## 2022-04-21 ENCOUNTER — TELEPHONE (OUTPATIENT)
Dept: ADMINISTRATIVE | Facility: HOSPITAL | Age: 72
End: 2022-04-21
Payer: MEDICARE

## 2022-04-21 DIAGNOSIS — Z86.010 HISTORY OF COLON POLYPS: Primary | ICD-10-CM

## 2022-04-21 DIAGNOSIS — Z12.11 COLON CANCER SCREENING: ICD-10-CM

## 2022-04-22 ENCOUNTER — PATIENT MESSAGE (OUTPATIENT)
Dept: INTERNAL MEDICINE | Facility: CLINIC | Age: 72
End: 2022-04-22
Payer: MEDICARE

## 2022-04-22 ENCOUNTER — PATIENT MESSAGE (OUTPATIENT)
Dept: OTHER | Facility: OTHER | Age: 72
End: 2022-04-22
Payer: MEDICARE

## 2022-05-03 ENCOUNTER — PATIENT MESSAGE (OUTPATIENT)
Dept: OTHER | Facility: OTHER | Age: 72
End: 2022-05-03
Payer: MEDICARE

## 2022-05-24 ENCOUNTER — HOSPITAL ENCOUNTER (OUTPATIENT)
Dept: PREADMISSION TESTING | Facility: HOSPITAL | Age: 72
Discharge: HOME | End: 2022-05-24
Attending: FAMILY MEDICINE
Payer: MEDICARE

## 2022-05-24 DIAGNOSIS — Z86.010 HISTORY OF COLON POLYPS: Primary | ICD-10-CM

## 2022-05-24 DIAGNOSIS — Z12.11 COLON CANCER SCREENING: ICD-10-CM

## 2022-05-24 RX ORDER — POLYETHYLENE GLYCOL 3350, SODIUM SULFATE ANHYDROUS, SODIUM BICARBONATE, SODIUM CHLORIDE, POTASSIUM CHLORIDE 236; 22.74; 6.74; 5.86; 2.97 G/4L; G/4L; G/4L; G/4L; G/4L
4 POWDER, FOR SOLUTION ORAL ONCE
Qty: 4000 ML | Refills: 0 | Status: SHIPPED | OUTPATIENT
Start: 2022-05-24 | End: 2022-05-24

## 2022-05-25 ENCOUNTER — IMMUNIZATION (OUTPATIENT)
Dept: PRIMARY CARE CLINIC | Facility: CLINIC | Age: 72
End: 2022-05-25
Payer: MEDICARE

## 2022-05-25 DIAGNOSIS — Z23 NEED FOR VACCINATION: Primary | ICD-10-CM

## 2022-05-25 PROCEDURE — 91305 COVID-19, MRNA, LNP-S, PF, 30 MCG/0.3 ML DOSE VACCINE (PFIZER): CPT | Mod: PBBFAC | Performed by: FAMILY MEDICINE

## 2022-06-07 ENCOUNTER — OFFICE VISIT (OUTPATIENT)
Dept: PODIATRY | Facility: CLINIC | Age: 72
End: 2022-06-07
Payer: MEDICARE

## 2022-06-07 VITALS — WEIGHT: 166.44 LBS | BODY MASS INDEX: 29.49 KG/M2 | HEIGHT: 63 IN

## 2022-06-07 DIAGNOSIS — L60.3 NAIL DYSTROPHY: ICD-10-CM

## 2022-06-07 DIAGNOSIS — E11.49 TYPE 2 DIABETES MELLITUS WITH NEUROLOGICAL MANIFESTATION: Primary | ICD-10-CM

## 2022-06-07 PROCEDURE — 99999 PR PBB SHADOW E&M-EST. PATIENT-LVL IV: ICD-10-PCS | Mod: PBBFAC,,, | Performed by: PODIATRIST

## 2022-06-07 PROCEDURE — 99214 OFFICE O/P EST MOD 30 MIN: CPT | Mod: PBBFAC | Performed by: PODIATRIST

## 2022-06-07 PROCEDURE — 11721 DEBRIDE NAIL 6 OR MORE: CPT | Mod: Q9,S$PBB,, | Performed by: PODIATRIST

## 2022-06-07 PROCEDURE — 99499 NO LOS: ICD-10-PCS | Mod: S$PBB,,, | Performed by: PODIATRIST

## 2022-06-07 PROCEDURE — 99499 UNLISTED E&M SERVICE: CPT | Mod: S$PBB,,, | Performed by: PODIATRIST

## 2022-06-07 PROCEDURE — 99999 PR PBB SHADOW E&M-EST. PATIENT-LVL IV: CPT | Mod: PBBFAC,,, | Performed by: PODIATRIST

## 2022-06-07 PROCEDURE — 11721 DEBRIDE NAIL 6 OR MORE: CPT | Mod: Q9,PBBFAC | Performed by: PODIATRIST

## 2022-06-07 PROCEDURE — 11721 PR DEBRIDEMENT OF NAILS, 6 OR MORE: ICD-10-PCS | Mod: Q9,S$PBB,, | Performed by: PODIATRIST

## 2022-06-07 NOTE — PROGRESS NOTES
Subjective:       Patient ID: Venus Caldwell is a 72 y.o. female.    Chief Complaint: Nail Care (Coming in for 3 month DNC, rates pain 0/10, diabetic, wearing slippers, last seen PCP Dr. Hernandez on 02/28/2022.)      HPI: Patient presents to the office today with the chief complaint of elongated, thickened and dystrophic nail plates to the B/L foot. This patient is a Diabetic Type II. Patient does follow with Primary Care and/or Endocrinology for management of Diabetes Mellitus. This patient's PMD is Sourav Hernandez MD. This patient last saw his/her primary care provider on 02/28/2022.     Hemoglobin A1C   Date Value Ref Range Status   02/16/2022 6.4 (H) 4.0 - 5.6 % Final     Comment:     ADA Screening Guidelines:  5.7-6.4%  Consistent with prediabetes  >or=6.5%  Consistent with diabetes    High levels of fetal hemoglobin interfere with the HbA1C  assay. Heterozygous hemoglobin variants (HbS, HgC, etc)do  not significantly interfere with this assay.   However, presence of multiple variants may affect accuracy.     07/08/2021 6.7 (H) 4.0 - 5.6 % Final     Comment:     ADA Screening Guidelines:  5.7-6.4%  Consistent with prediabetes  >or=6.5%  Consistent with diabetes    High levels of fetal hemoglobin interfere with the HbA1C  assay. Heterozygous hemoglobin variants (HbS, HgC, etc)do  not significantly interfere with this assay.   However, presence of multiple variants may affect accuracy.     08/13/2020 7.2 (H) 4.0 - 5.6 % Final     Comment:     ADA Screening Guidelines:  5.7-6.4%  Consistent with prediabetes  >or=6.5%  Consistent with diabetes  High levels of fetal hemoglobin interfere with the HbA1C  assay. Heterozygous hemoglobin variants (HbS, HgC, etc)do  not significantly interfere with this assay.   However, presence of multiple variants may affect accuracy.     .     Review of patient's allergies indicates:   Allergen Reactions    Sulfa (sulfonamide antibiotics) Anaphylaxis     Pt became hypoxic  after taking sulfa drugs as a child      Buprenorphine Rash       Past Medical History:   Diagnosis Date    Arthritis     Cancer, uterine     Coronary artery disease     Diabetes mellitus     prediabetes    Diabetes mellitus, type 2     Digestive disorder     DM (diabetes mellitus) 08/2019    BS doesn't check 12/16/2019    DM (diabetes mellitus) 08/2019    BS doesn't check 06/29/2021    Hypertension     Multiple thyroid nodules 7/8/2021    Ovarian cancer     Sciatica     Sleep apnea        Family History   Problem Relation Age of Onset    Diabetes Mother     Cataracts Mother     Diabetes Brother     Cataracts Maternal Grandmother     Breast cancer Maternal Aunt        Social History     Socioeconomic History    Marital status:     Number of children: 1   Occupational History    Occupation: Retired   Tobacco Use    Smoking status: Never Smoker    Smokeless tobacco: Never Used   Substance and Sexual Activity    Alcohol use: No    Drug use: No    Sexual activity: Not Currently     Social Determinants of Health     Financial Resource Strain: Low Risk     Difficulty of Paying Living Expenses: Not hard at all   Food Insecurity: No Food Insecurity    Worried About Running Out of Food in the Last Year: Never true    Ran Out of Food in the Last Year: Never true   Transportation Needs: No Transportation Needs    Lack of Transportation (Medical): No    Lack of Transportation (Non-Medical): No   Physical Activity: Inactive    Days of Exercise per Week: 0 days    Minutes of Exercise per Session: 0 min   Stress: No Stress Concern Present    Feeling of Stress : Not at all   Social Connections: Moderately Isolated    Frequency of Communication with Friends and Family: Twice a week    Frequency of Social Gatherings with Friends and Family: More than three times a week    Attends Sabianism Services: Never    Active Member of Clubs or Organizations: No    Attends Club or Organization  "Meetings: Never    Marital Status:    Housing Stability: Low Risk     Unable to Pay for Housing in the Last Year: No    Number of Places Lived in the Last Year: 1    Unstable Housing in the Last Year: No       Past Surgical History:   Procedure Laterality Date    CHOLECYSTECTOMY      COLONOSCOPY N/A 8/1/2018    Procedure: COLONOSCOPY;  Surgeon: Yrn Hunter III, MD;  Location: [facility] ENDO;  Service: Endoscopy;  Laterality: N/A;    ESOPHAGOGASTRODUODENOSCOPY N/A 3/17/2021    Procedure: EGD (ESOPHAGOGASTRODUODENOSCOPY) (Dr. EMILIO billings);  Surgeon: Coy Ortiz MD;  Location: [facility] ENDO;  Service: Endoscopy;  Laterality: N/A;    HYSTERECTOMY      JOINT REPLACEMENT Right     hip replacement    SURGICAL EXCISION OF ANAL LESION N/A 8/29/2018    Procedure: EXCISION, LESION, ANUS;  Surgeon: Yrn Balderrama MD;  Location: [facility] OR;  Service: General;  Laterality: N/A;    TOTAL KNEE ARTHROPLASTY Bilateral        Review of Systems       Objective:   Ht 5' 3" (1.6 m)   Wt 75.5 kg (166 lb 7.2 oz)   BMI 29.48 kg/m²     Physical Exam  LOWER EXTREMITY PHYSICAL EXAMINATION    VASCULAR:  The right dorsalis pedis pulse 2/4 and the right posterior tibial pulse 2/4.  The left dorsalis pedis pulse 2/4 and posterior tibial pulse on the left is 2/4.  Capillary refill is intact.  Pedal hair growth intact    NEUROLOGY: Protective sensation is not intact to the left and right plantar surfaces of the foot and digits, as the patient has no sensation/detection at greater than 4 distinct points of contact with 5.07 [place] Fritz monofilament. Sensation to light touch is intact on the left and right foot. Proprioception is intact, bilateral. Sensation to pin prick is reduced to absent. Vibratory sensation is diminished.    DERMATOLOGY:  Skin is supple, moist, intact.  There is no signs of callusing, ulcerations, other lesions identified to the dorsal or plantar aspect of the right or left foot.  The R1, 2, 5 " and left L1,2, 5 are thickened, discolored dystrophic.  There is subungual debris.  Nail plates have area of dark discoloration.  The remaining nails 3-4 on the right foot and the left foot are elongated but of normal color, thickness, and texture.   There is no signs of ingrowing into the medial or lateral borders.  There is no evidence of wounds or skin breakdown.  No edema or erythema.  No obvious lacerations or fissuring.  Interdigital spaces are clean, dry, intact.  No rashes or scars appreciated.    ORTHOPEDIC: Manual Muscle Testing is 5/5 in all planes on the left and right, without pains, with and without resistance. Gait pattern is non-antalgic.    Assessment:     1. 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  Greater than 50% of this visit spent on counseling and coordination of care. Greater than 15 minutes of a 20 minute appointment spent on education about the diabetic foot, neuropathy, and prevention of limb loss.  Shoe inspection. Diabetic Foot Education. Patient reminded of the importance of good nutrition and blood sugar control to help prevent podiatric complications of diabetes. Patient instructed on proper foot hygeine. We discussed wearing proper and supportive shoe gear, daily foot inspections, never walking barefooted or sock footed, never putting sharp instruments to feet which can cause major complications associated with infection, ulcers, lacerations.      Dystrophic nail plates, as outlined above (R#1,2,5  ; L#1,2,5 ), are sharply debrided with double action nail nipper, and/or with the assistance of a mechanical rotary elia, with removal of all offending nail and nail border(s), for reduction of pains. Nails are  reduced in terms of length, width and girth with removal of subungual debris to facilitate pain free weight bearing and ambulation. The elongated nails as outlined in the objective portion of this note, were trimmed to appropriate length, with a double action nail nipper, for alleviation/reduction of pains as well. Follow up in approx. 3-4 months.        Future Appointments   Date Time Provider Department Center   7/26/2022  2:20 PM Maikol Garcia MD [facility]   8/16/2022 10:00 AM Sourav Hernandez MD [facility]   9/7/2022  9:45 AM Halina Vidales DPM [facility]

## 2022-06-30 ENCOUNTER — ANESTHESIA (OUTPATIENT)
Dept: ENDOSCOPY | Facility: HOSPITAL | Age: 72
End: 2022-06-30
Payer: MEDICARE

## 2022-06-30 ENCOUNTER — ANESTHESIA EVENT (OUTPATIENT)
Dept: ENDOSCOPY | Facility: HOSPITAL | Age: 72
End: 2022-06-30
Payer: MEDICARE

## 2022-06-30 ENCOUNTER — HOSPITAL ENCOUNTER (OUTPATIENT)
Facility: HOSPITAL | Age: 72
Discharge: HOME | End: 2022-06-30
Attending: INTERNAL MEDICINE | Admitting: INTERNAL MEDICINE
Payer: MEDICARE

## 2022-06-30 DIAGNOSIS — Z12.11 COLON CANCER SCREENING: Primary | ICD-10-CM

## 2022-06-30 LAB
GLUCOSE SERPL-MCNC: 134 MG/DL (ref 70–110)
POCT GLUCOSE: 134 MG/DL (ref 70–110)

## 2022-06-30 PROCEDURE — 37000008 HC ANESTHESIA 1ST 15 MINUTES: Performed by: INTERNAL MEDICINE

## 2022-06-30 PROCEDURE — G0105 COLORECTAL SCRN; HI RISK IND: ICD-10-PCS | Mod: ,,, | Performed by: INTERNAL MEDICINE

## 2022-06-30 PROCEDURE — 37000009 HC ANESTHESIA EA ADD 15 MINS: Performed by: INTERNAL MEDICINE

## 2022-06-30 PROCEDURE — 63600175 PHARM REV CODE 636 W HCPCS: Performed by: NURSE ANESTHETIST, CERTIFIED REGISTERED

## 2022-06-30 PROCEDURE — 25000003 PHARM REV CODE 250: Performed by: NURSE ANESTHETIST, CERTIFIED REGISTERED

## 2022-06-30 PROCEDURE — G0105 COLORECTAL SCRN; HI RISK IND: HCPCS | Mod: ,,, | Performed by: INTERNAL MEDICINE

## 2022-06-30 PROCEDURE — G0105 COLORECTAL SCRN; HI RISK IND: HCPCS | Performed by: INTERNAL MEDICINE

## 2022-06-30 RX ORDER — PROPOFOL 10 MG/ML
VIAL (ML) INTRAVENOUS
Status: DISCONTINUED | OUTPATIENT
Start: 2022-06-30 | End: 2022-06-30

## 2022-06-30 RX ORDER — LIDOCAINE HYDROCHLORIDE 20 MG/ML
INJECTION INTRAVENOUS
Status: DISCONTINUED | OUTPATIENT
Start: 2022-06-30 | End: 2022-06-30

## 2022-06-30 RX ADMIN — PROPOFOL 30 MG: 10 INJECTION, EMULSION INTRAVENOUS at 12:06

## 2022-06-30 RX ADMIN — SODIUM CHLORIDE, SODIUM LACTATE, POTASSIUM CHLORIDE, AND CALCIUM CHLORIDE: .6; .31; .03; .02 INJECTION, SOLUTION INTRAVENOUS at 12:06

## 2022-06-30 RX ADMIN — Medication 50 MG: at 12:06

## 2022-06-30 RX ADMIN — PROPOFOL 80 MG: 10 INJECTION, EMULSION INTRAVENOUS at 12:06

## 2022-06-30 NOTE — PROVATION PATIENT INSTRUCTIONS
Discharge Summary/Instructions after an Endoscopic Procedure  Patient Name: Venus Caldwell  Patient MRN: [id number]  Patient YOB: 1950 Thursday, June 30, 2022 Mica Rodriguez MD  Dear patient,  As a result of recent federal legislation (The Federal Cures Act), you may   receive lab or pathology results from your procedure in your [facility]   account before your physician is able to contact you. Your physician or   their representative will relay the results to you with their   recommendations at their soonest availability.  Thank you,  RESTRICTIONS:  During your procedure today, you received medications for sedation.  These   medications may affect your judgment, balance and coordination.  Therefore,   for 24 hours, you have the following restrictions:   - DO NOT drive a car, operate machinery, make legal/financial decisions,   sign important papers or drink alcohol.    ACTIVITY:  Today: no heavy lifting, straining or running due to procedural   sedation/anesthesia.  The following day: return to full activity including work.  DIET:  Eat and drink normally unless instructed otherwise.     TREATMENT FOR COMMON SIDE EFFECTS:  - Mild abdominal pain, nausea, belching, bloating or excessive gas:  rest,   eat lightly and use a heating pad.  - Sore Throat: treat with throat lozenges and/or gargle with warm salt   water.  - Because air was used during the procedure, expelling large amounts of air   from your rectum or belching is normal.  - If a bowel prep was taken, you may not have a bowel movement for 1-3 days.    This is normal.  SYMPTOMS TO WATCH FOR AND REPORT TO YOUR PHYSICIAN:  1. Abdominal pain or bloating, other than gas cramps.  2. Chest pain.  3. Back pain.  4. Signs of infection such as: chills or fever occurring within 24 hours   after the procedure.  5. Rectal bleeding, which would show as bright red, maroon, or black stools.   (A tablespoon of blood from the rectum is not serious, especially if    hemorrhoids are present.)  6. Vomiting.  7. Weakness or dizziness.  GO DIRECTLY TO THE NEAREST EMERGENCY ROOM IF YOU HAVE ANY OF THE FOLLOWING:      Difficulty breathing              Chills and/or fever over 101 F   Persistent vomiting and/or vomiting blood   Severe abdominal pain   Severe chest pain   Black, tarry stools   Bleeding- more than one tablespoon   Any other symptom or condition that you feel may need urgent attention  Your doctor recommends these additional instructions:  If any biopsies were taken, your doctors clinic will contact you in 1 to 2   weeks with any results.  - Patient has a contact number available for emergencies.  The signs and   symptoms of potential delayed complications were discussed with the   patient.  Return to normal activities tomorrow.  Written discharge   instructions were provided to the patient.   - Discharge patient to home (via wheelchair).   - Resume previous diet today.   - Continue present medications.   - Repeat colonoscopy is not recommended due to current age (66 years or   older) for screening purposes.  For questions, problems or results please call your physician Mica Rodriguez MD at Work:  [phone]  If you have any questions about the above instructions, call the GI   department at [phone] or call the endoscopy unit at [phone]   from 7am until 3 pm.  [facility] [place], EMERGENCY ROOM PHONE NUMBER:   [phone]  IF A COMPLICATION OR EMERGENCY SITUATION ARISES AND YOU ARE UNABLE TO REACH   YOUR PHYSICIAN - GO DIRECTLY TO THE EMERGENCY ROOM.  I have read or have had read to me these discharge instructions for my   procedure and have received a written copy.  I understand these   instructions and will follow-up with my physician if I have any questions.     __________________________________       _____________________________________  Nurse Signature                                          Patient/Designated   Responsible  Party Signature  MD Mica Philippe MD  6/30/2022 12:46:40 PM  This report has been verified and signed electronically.  Dear patient,  As a result of recent federal legislation (The Federal Cures Act), you may   receive lab or pathology results from your procedure in your [facility]   account before your physician is able to contact you. Your physician or   their representative will relay the results to you with their   recommendations at their soonest availability.  Thank you,  PROVATION

## 2022-06-30 NOTE — ANESTHESIA POSTPROCEDURE EVALUATION
Anesthesia Post Evaluation    Patient: Venus Morin Hill    Procedure(s) Performed: Procedure(s) (LRB):  COLONOSCOPY (N/A)    Final Anesthesia Type: MAC      Patient location during evaluation: PACU  Patient participation: Yes- Able to Participate  Level of consciousness: awake and alert  Post-procedure vital signs: reviewed and stable  Pain management: adequate  Airway patency: patent    PONV status at discharge: No PONV  Anesthetic complications: no      Cardiovascular status: blood pressure returned to baseline  Respiratory status: unassisted and room air  Hydration status: euvolemic  Follow-up not needed.          Vitals Value Taken Time   BP  06/30/22 1248   Temp  06/30/22 1248   Pulse  06/30/22 1248   Resp  06/30/22 1248   SpO2  06/30/22 1248         No case tracking events are documented in the log.      Pain/Micheal Score: No data recorded

## 2022-06-30 NOTE — ANESTHESIA PREPROCEDURE EVALUATION
06/30/2022  Venus Caldwell is a 72 y.o., female.      Pre-op Assessment    I have reviewed the Patient Summary Reports.     I have reviewed the Nursing Notes. I have reviewed the NPO Status.   I have reviewed the Medications.     Review of Systems  Anesthesia Hx:  No problems with previous Anesthesia  Denies Family Hx of Anesthesia complications.   Denies Personal Hx of Anesthesia complications.   Social:  Non-Smoker    Hematology/Oncology:  Hematology Normal        EENT/Dental:EENT/Dental Normal   Cardiovascular:   Hypertension CAD      Pulmonary:   Sleep Apnea, CPAP    Renal/:  Renal/ Normal     Hepatic/GI:   GERD    Musculoskeletal:   Arthritis     Neurological:   Neuromuscular Disease,    Endocrine:   Diabetes, type 2    Dermatological:  Skin Normal    Psych:  Psychiatric Normal           Physical Exam  General: Well nourished, Cooperative, Alert and Oriented    Airway:  Mallampati: II   Mouth Opening: Normal  TM Distance: Normal  Tongue: Normal  Neck ROM: Normal ROM    Dental:  Intact    Chest/Lungs:  Clear to auscultation, Normal Respiratory Rate    Heart:  Rate: Normal  Rhythm: Regular Rhythm        Anesthesia Plan  Type of Anesthesia, risks & benefits discussed:    Anesthesia Type: MAC  Intra-op Monitoring Plan: Standard ASA Monitors  Induction:  IV  Informed Consent: Informed consent signed with the Patient and all parties understand the risks and agree with anesthesia plan.  All questions answered. Patient consented to blood products? No  ASA Score: 3  Day of Surgery Review of History & Physical: H&P Update referred to the surgeon/provider.I have interviewed and examined the patient. I have reviewed the patient's H&P dated: There are no significant changes.     Ready For Surgery From Anesthesia Perspective.     .

## 2022-06-30 NOTE — TRANSFER OF CARE
"Anesthesia Transfer of Care Note    Patient: Venus Caldwell    Procedure(s) Performed: Procedure(s) (LRB):  COLONOSCOPY (N/A)    Patient location: PACU    Anesthesia Type: MAC    Transport from OR: Transported from OR on room air with adequate spontaneous ventilation    Post pain: adequate analgesia    Post assessment: no apparent anesthetic complications    Post vital signs: stable    Level of consciousness: responds to stimulation    Nausea/Vomiting: no nausea/vomiting    Complications: none    Transfer of care protocol was followed      Last vitals:   Visit Vitals  /80   Pulse 69   Temp 36.7 °C (98.1 °F)   Resp 18   Ht 5' 3" (1.6 m)   Wt 73.5 kg (162 lb)   SpO2 (!) 94%   Breastfeeding No   BMI 28.70 kg/m²     "

## 2022-06-30 NOTE — H&P
PRE PROCEDURE H&P    Patient Name: Venus Caldwell  MRN: [id number]  : 1950  Date of Procedure:  2022  Referring Physician: Mica Rodriguez MD  Primary Physician: Sourav Hernandez MD  Procedure Physician: Mica Rodriguez MD       Planned Procedure: Colonoscopy  Diagnosis: previous adenomatous polyp  Chief Complaint: Same as above    HPI: Patient is an 72 y.o. female is here for the above.     Last colonoscopy:   Family history: negative   Anticoagulation: none     Past Medical History:   Past Medical History:   Diagnosis Date    Arthritis     Cancer, uterine     Coronary artery disease     Diabetes mellitus     prediabetes    Diabetes mellitus, type 2     Digestive disorder     DM (diabetes mellitus) 2019    BS doesn't check 2019    DM (diabetes mellitus) 2019    BS doesn't check 2021    Hypertension     Multiple thyroid nodules 2021    Ovarian cancer     Sciatica     Sleep apnea         Past Surgical History:  Past Surgical History:   Procedure Laterality Date    CHOLECYSTECTOMY      COLONOSCOPY N/A 2018    Procedure: COLONOSCOPY;  Surgeon: Yrn Hunter III, MD;  Location: [facility];  Service: Endoscopy;  Laterality: N/A;    ESOPHAGOGASTRODUODENOSCOPY N/A 3/17/2021    Procedure: EGD (ESOPHAGOGASTRODUODENOSCOPY) (Dr. EMILIO billings);  Surgeon: Coy Ortiz MD;  Location: [facility];  Service: Endoscopy;  Laterality: N/A;    HYSTERECTOMY      JOINT REPLACEMENT Right     hip replacement    SURGICAL EXCISION OF ANAL LESION N/A 2018    Procedure: EXCISION, LESION, ANUS;  Surgeon: Yrn Balderrama MD;  Location: [facility];  Service: General;  Laterality: N/A;    TOTAL KNEE ARTHROPLASTY Bilateral         Home Medications:  Prior to Admission medications    Medication Sig Start Date End Date Taking? Authorizing Provider   amLODIPine (NORVASC) 5 MG tablet Take 1 tablet (5 mg total) by mouth once daily. 21 Yes Sourav Hernandez MD    atorvastatin (LIPITOR) 40 MG tablet Take 1 tablet (40 mg total) by mouth every evening. 8/2/21 8/2/22 Yes Sourav Hernandez MD   azelastine (ASTELIN) 137 mcg (0.1 %) nasal spray 1 spray (137 mcg total) by Nasal route 2 (two) times daily. 8/26/20  Yes Darien Farrell MD   betamethasone dipropionate (DIPROLENE) 0.05 % cream as needed.  1/26/18  Yes Historical Provider   candesartan (ATACAND) 16 MG tablet Take 1 tablet (16 mg total) by mouth once daily. 2/25/22 2/25/23 Yes Maikol Garcia MD   cetirizine (ZYRTEC) 10 MG tablet Take 10 mg by mouth once daily.   Yes Historical Provider   cholecalciferol, vitamin D3, 5,000 unit Tab Take 1,000 Units by mouth once daily.    Yes Historical Provider   gabapentin (NEURONTIN) 300 MG capsule Take 1 capsule (300 mg total) by mouth 3 (three) times daily. 2/16/22 2/16/23 Yes Sourav Hernandez MD   hydrocortisone 2.5 % cream Apply to rash twice daily as needed for rash 1/5/21  Yes    ketoconazole (NIZORAL) 2 % cream Apply to rash twice daily as needed for rash 1/5/21  Yes    meloxicam (MOBIC) 7.5 MG tablet Take 1 tablet (7.5 mg total) by mouth once daily. 12/22/21 12/22/22 Yes Sourav Hernandez MD   metFORMIN (GLUCOPHAGE) 500 MG tablet Take 1 tablet (500 mg total) by mouth daily with breakfast. 2/16/22 2/16/23 Yes Sourav Hernandez MD   metoprolol succinate (TOPROL-XL) 100 MG 24 hr tablet Take 1 tablet (100 mg total) by mouth once daily. 6/7/22 6/7/23 Yes Maikol Garcia MD   mupirocin (BACTROBAN) 2 % ointment Apply topically 3 (three) times daily. 9/15/21  Yes Sourav Hernandez MD   omeprazole (PRILOSEC) 20 MG capsule Take 20 mg by mouth once daily.   Yes Historical Provider   albuterol (PROVENTIL/VENTOLIN HFA) 90 mcg/actuation inhaler Inhale 1-2 puffs into the lungs every 6 (six) hours as needed for Wheezing. Rescue 12/12/21   Abida Fraser PA-C   aspirin (ECOTRIN) 81 MG EC tablet Take 1 tablet (81 mg total) by mouth once daily. 1/26/22 1/26/23  Maikol Garcia MD    ipratropium-albuteroL (COMBIVENT)  mcg/actuation inhaler Inhale 1 puff into the lungs every 6 (six) hours as needed for Wheezing or Shortness of Breath (or Cough). Rescue 12/8/20   Sourav Hernandez MD   valACYclovir (VALTREX) 500 MG tablet Take 500 mg by mouth as needed.  1/18/18   Historical Provider        Allergies:  Review of patient's allergies indicates:   Allergen Reactions    Sulfa (sulfonamide antibiotics) Anaphylaxis     Pt became hypoxic after taking sulfa drugs as a child      Buprenorphine Rash        Social History:   Social History     Socioeconomic History    Marital status:     Number of children: 1   Occupational History    Occupation: Retired   Tobacco Use    Smoking status: Never Smoker    Smokeless tobacco: Never Used   Substance and Sexual Activity    Alcohol use: No    Drug use: No    Sexual activity: Not Currently     Social Determinants of Health     Financial Resource Strain: Low Risk     Difficulty of Paying Living Expenses: Not hard at all   Food Insecurity: No Food Insecurity    Worried About Running Out of Food in the Last Year: Never true    Ran Out of Food in the Last Year: Never true   Transportation Needs: No Transportation Needs    Lack of Transportation (Medical): No    Lack of Transportation (Non-Medical): No   Physical Activity: Inactive    Days of Exercise per Week: 0 days    Minutes of Exercise per Session: 0 min   Stress: No Stress Concern Present    Feeling of Stress : Not at all   Social Connections: Moderately Isolated    Frequency of Communication with Friends and Family: Twice a week    Frequency of Social Gatherings with Friends and Family: More than three times a week    Attends Tenriism Services: Never    Active Member of Clubs or Organizations: No    Attends Club or Organization Meetings: Never    Marital Status:    Housing Stability: Low Risk     Unable to Pay for Housing in the Last Year: No    Number of Places  "Lived in the Last Year: 1    Unstable Housing in the Last Year: No       Family History:  Family History   Problem Relation Age of Onset    Diabetes Mother     Cataracts Mother     Diabetes Brother     Cataracts Maternal Grandmother     Breast cancer Maternal Aunt        ROS: No acute cardiac events, no acute respiratory complaints.     Physical Exam (all patients):    /80   Pulse 69   Temp 98.1 °F (36.7 °C)   Resp 18   Ht 5' 3" (1.6 m)   Wt 73.5 kg (162 lb)   SpO2 (!) 94%   Breastfeeding No   BMI 28.70 kg/m²   Lungs: Clear to auscultation bilaterally, respirations unlabored  Heart: Regular rate and rhythm, S1 and S2 normal, no obvious murmurs  Abdomen:         Soft, non-tender, bowel sounds normal, no masses, no organomegaly    Lab Results   Component Value Date    WBC 11.43 12/22/2021    MCV 89 12/22/2021    RDW 13.1 12/22/2021     12/22/2021    INR 1.0 06/14/2018     (H) 12/22/2021    HGBA1C 6.4 (H) 02/16/2022    BUN 26 (H) 12/22/2021     12/22/2021    K 3.6 12/22/2021     12/22/2021        SEDATION PLAN: per anesthesia      History reviewed, vital signs satisfactory, cardiopulmonary status satisfactory, sedation options, risks and plans have been discussed with the patient  All their questions were answered and the patient agrees to the sedation procedures as planned and the patient is deemed an appropriate candidate for the sedation as planned.    Procedure explained to patient, informed consent obtained and placed in chart.    Mica Rodriguez  6/30/2022  12:21 PM   "

## 2022-07-01 ENCOUNTER — PATIENT MESSAGE (OUTPATIENT)
Dept: OTHER | Facility: OTHER | Age: 72
End: 2022-07-01
Payer: MEDICARE

## 2022-07-05 ENCOUNTER — OFFICE VISIT (OUTPATIENT)
Dept: OTOLARYNGOLOGY | Facility: CLINIC | Age: 72
End: 2022-07-05
Payer: MEDICARE

## 2022-07-05 VITALS
HEART RATE: 64 BPM | RESPIRATION RATE: 17 BRPM | HEIGHT: 63 IN | OXYGEN SATURATION: 97 % | DIASTOLIC BLOOD PRESSURE: 75 MMHG | SYSTOLIC BLOOD PRESSURE: 136 MMHG | BODY MASS INDEX: 28.7 KG/M2 | TEMPERATURE: 97 F | WEIGHT: 162 LBS

## 2022-07-05 VITALS — HEIGHT: 63 IN | BODY MASS INDEX: 28.9 KG/M2 | WEIGHT: 163.13 LBS

## 2022-07-05 DIAGNOSIS — H69.93 ETD (EUSTACHIAN TUBE DYSFUNCTION), BILATERAL: Primary | ICD-10-CM

## 2022-07-05 PROCEDURE — 99203 OFFICE O/P NEW LOW 30 MIN: CPT | Mod: S$PBB,,, | Performed by: STUDENT IN AN ORGANIZED HEALTH CARE EDUCATION/TRAINING PROGRAM

## 2022-07-05 PROCEDURE — 99999 PR PBB SHADOW E&M-EST. PATIENT-LVL III: ICD-10-PCS | Mod: PBBFAC,,, | Performed by: STUDENT IN AN ORGANIZED HEALTH CARE EDUCATION/TRAINING PROGRAM

## 2022-07-05 PROCEDURE — 99203 PR OFFICE/OUTPT VISIT, NEW, LEVL III, 30-44 MIN: ICD-10-PCS | Mod: S$PBB,,, | Performed by: STUDENT IN AN ORGANIZED HEALTH CARE EDUCATION/TRAINING PROGRAM

## 2022-07-05 PROCEDURE — 99213 OFFICE O/P EST LOW 20 MIN: CPT | Mod: PBBFAC | Performed by: STUDENT IN AN ORGANIZED HEALTH CARE EDUCATION/TRAINING PROGRAM

## 2022-07-05 PROCEDURE — 99999 PR PBB SHADOW E&M-EST. PATIENT-LVL III: CPT | Mod: PBBFAC,,, | Performed by: STUDENT IN AN ORGANIZED HEALTH CARE EDUCATION/TRAINING PROGRAM

## 2022-07-05 RX ORDER — FLUTICASONE PROPIONATE 50 MCG
1 SPRAY, SUSPENSION (ML) NASAL DAILY
Qty: 16 G | Refills: 0 | Status: SHIPPED | OUTPATIENT
Start: 2022-07-05 | End: 2023-02-16

## 2022-07-05 RX ORDER — POLYETHYLENE GLYCOL-3350 AND ELECTROLYTES 236; 6.74; 5.86; 2.97; 22.74 G/274.31G; G/274.31G; G/274.31G; G/274.31G; G/274.31G
4000 POWDER, FOR SOLUTION ORAL
COMMUNITY
Start: 2022-05-24 | End: 2023-08-16

## 2022-07-05 RX ORDER — TRIAMCINOLONE ACETONIDE 1 MG/G
CREAM TOPICAL
COMMUNITY
Start: 2022-06-11 | End: 2023-08-16

## 2022-07-05 NOTE — PROGRESS NOTES
Chief complaint:   Chief Complaint   Patient presents with    Otalgia     Had a sharp pain in Right ear           Referring Provider:  Aaareferral Self  No address on file    History of Present Illness:     Ms. Caldwell is a 72 y.o. female presenting for evaluation of right ear pain. Onset of this chief complaint was about 1 week ago. Started after allergy flare with yard work. Pain was intermittent, improving. Additional symptoms that also have been associated are none. The patient has tried nothing without relief.  The patient denies otorrhea, vertigo, tinnitus, hearing loss.     Using zrytec for allergies. Previously used flonase, not in some time.     The patient denies significant eustachian tube risk factors: ear pressure, ear pain, sensation of clogging, ear symptoms with a cold or sinusitis, popping or crackling sensation, ringing in the ear, and muffled hearing.     The patient also denies pain deep within the ear, tenderness around the ear canal or pre-auricular area, or headaches.       The patient denies significant hearing loss risk factors, ototoxic medication exposure, chronic vestibular suppressant use, head/ facial/ anirudh trauma, and otologic surgery.      Review of Systems     A complete 10 point ROS was completed and are positive as per above HPI.    Otherwise negative for fever, diplopia, chest pain, shortness of breath, vomiting, blood in urine, joint pain, skin rash, seizures and unusual bleeding.       History        Past Medical History:   Past Medical History:   Diagnosis Date    Arthritis     Cancer, uterine     Coronary artery disease     Diabetes mellitus     prediabetes    Diabetes mellitus, type 2     Digestive disorder     DM (diabetes mellitus) 08/2019    BS doesn't check 12/16/2019    DM (diabetes mellitus) 08/2019    BS doesn't check 06/29/2021    Hypertension     Multiple thyroid nodules 7/8/2021    Ovarian cancer     Sciatica     Sleep apnea     .          Past Surgical  History:  Past Surgical History:   Procedure Laterality Date    CHOLECYSTECTOMY      COLONOSCOPY N/A 8/1/2018    Procedure: COLONOSCOPY;  Surgeon: Yrn Hunter III, MD;  Location: [facility] ENDO;  Service: Endoscopy;  Laterality: N/A;    COLONOSCOPY N/A 6/30/2022    Procedure: COLONOSCOPY;  Surgeon: Mica Rodriguez MD;  Location: [facility] ENDO;  Service: Endoscopy;  Laterality: N/A;    ESOPHAGOGASTRODUODENOSCOPY N/A 3/17/2021    Procedure: EGD (ESOPHAGOGASTRODUODENOSCOPY) (Dr. EMILIO billings);  Surgeon: Coy Ortiz MD;  [facility] END[facility];  Service: Endoscopy;  Laterality: N/A;    HYSTERECTOMY      JOINT REPLACEMENT Right     hip replacement    SURGICAL EXCISION OF ANAL LESION N/A 8/29/2018    Procedure: EXCISION, LESION, ANUS;  Surgeon: Yrn Balderrama MD;  Location: [facility] OR;  Service: General;  Laterality: N/A;    TOTAL KNEE ARTHROPLASTY Bilateral    .         Medications: Medication list was reviewed. She  has a current medication list which includes the following prescription(s): albuterol, amlodipine, aspirin, atorvastatin, azelastine, betamethasone dipropionate, candesartan, cetirizine, cholecalciferol (vitamin d3), gabapentin, gavilyte-g, hydrocortisone, ipratropium-albuterol, ketoconazole, meloxicam, metformin, metoprolol succinate, mupirocin, omeprazole, triamcinolone acetonide 0.1%, and valacyclovir.         Allergies:   Review of patient's allergies indicates:   Allergen Reactions    Sulfa (sulfonamide antibiotics) Anaphylaxis     Pt became hypoxic after taking sulfa drugs as a child      Buprenorphine Rash            Family history: family history includes Breast cancer in her maternal aunt; Cataracts in her maternal grandmother and mother; Diabetes in her brother and mother.         Social History          Alcohol use:  reports no history of alcohol use.            Tobacco:  reports that she has never smoked. She has never used smokeless tobacco.         Please see the patient's  intake form for full details of past medical history, past surgical history, family history, social history and review of systems. ?This information was reviewed by me and noted.      Physical Examination     General: Well developed, well nourished, well hydrated. Verbal with a strong voice and not dysphonic.     Head/Face: Normocephalic, atraumatic. No scars or lesions. Facial musculature equal.     Eyes: No scleral icterus or conjunctival hemorrhage. EOMI. PERRLA.     Ears:     · Right ear: No gross deformity. EAC is clear of debris and erythema. The TM is intact with a pneumatized middle ear. No signs of retraction, fluid or infection.      · Left ear: No gross deformity. EAC is clear of debris and erythema. The TM is intact with a pneumatized middle ear. No signs of retraction, fluid or infection.     512 Hz Tuning  Fork:      Story midline     Rinne Right air conduction >bone conduction     Rinne Left air conduction >bone conduction     Nose: No gross deformity or lesions. No purulent discharge. No significant NSD.      Mouth/Oropharynx: Lips without any lesions. No mucosal lesions within the oropharynx. No tonsillar exudate or lesions. Pharyngeal walls symmetrical. Uvula midline. Tongue midline without lesions.     Neck: Trachea midline. No masses. No thyromegaly or nodules palpated.     Lymphatic: No lymphadenopathy in the neck.     Extremities: No cyanosis. Warm and well-perfused.     Skin: No scars or lesions on face or neck.      Neurologic: Moving all extremities without gross abnormality.CN II-XII grossly intact. House-Brackmann 1/6. No signs of nystagmus.      Psych: Alert and oriented to person, place, and time with an appropriate mood and affect.        Assessment     ETD (Eustachian tube dysfunction), bilateral     Plan:      We had a long discussion regarding the anatomy and function of the eustachian tube. We discussed that the eustachian tube acts as a pump to keep the appropriate amount of  pressure behind the ear drum.  I gave the patient a prescription for a nasal steroid spray (Flonase) to be used on a daily basis, and we discussed that it will take 2-3 weeks of daily use to achieve maximal effectiveness.    Return clinic visit in about 4 weeks. Sooner if symptoms worsen.        Grant Vitale MD  [facility]   [facility] [address]  [place] [address]  P: [phone]  F: [phone]

## 2022-07-06 ENCOUNTER — TELEPHONE (OUTPATIENT)
Dept: HEMATOLOGY/ONCOLOGY | Facility: CLINIC | Age: 72
End: 2022-07-06
Payer: MEDICARE

## 2022-07-07 ENCOUNTER — PATIENT MESSAGE (OUTPATIENT)
Dept: OTHER | Facility: OTHER | Age: 72
End: 2022-07-07
Payer: MEDICARE

## 2022-07-08 ENCOUNTER — PES CALL (OUTPATIENT)
Dept: ADMINISTRATIVE | Facility: CLINIC | Age: 72
End: 2022-07-08
Payer: MEDICARE

## 2022-07-11 ENCOUNTER — PATIENT MESSAGE (OUTPATIENT)
Dept: OTHER | Facility: OTHER | Age: 72
End: 2022-07-11
Payer: MEDICARE

## 2022-07-11 DIAGNOSIS — I10 ESSENTIAL HYPERTENSION: Chronic | ICD-10-CM

## 2022-07-11 RX ORDER — AMLODIPINE BESYLATE 5 MG/1
TABLET ORAL
Qty: 90 TABLET | Refills: 2 | Status: SHIPPED | OUTPATIENT
Start: 2022-07-11 | End: 2022-07-26 | Stop reason: SDUPTHER

## 2022-07-11 NOTE — TELEPHONE ENCOUNTER
No new care gaps identified.  [facility] Embedded Care Gaps. Reference number: [id number]. 7/11/2022   2:38:55 AM CDT

## 2022-07-12 NOTE — TELEPHONE ENCOUNTER
Refill Decision Note   Venus Caldwell  is requesting a refill authorization.  Brief Assessment and Rationale for Refill:  Approve     Medication Therapy Plan:       Medication Reconciliation Completed: No   Comments:     No Care Gaps recommended.     Note composed:8:42 PM 07/11/2022

## 2022-07-13 ENCOUNTER — PES CALL (OUTPATIENT)
Dept: ADMINISTRATIVE | Facility: CLINIC | Age: 72
End: 2022-07-13
Payer: MEDICARE

## 2022-07-20 ENCOUNTER — TELEPHONE (OUTPATIENT)
Dept: HEMATOLOGY/ONCOLOGY | Facility: CLINIC | Age: 72
End: 2022-07-20
Payer: MEDICARE

## 2022-07-26 ENCOUNTER — OFFICE VISIT (OUTPATIENT)
Dept: CARDIOLOGY | Facility: CLINIC | Age: 72
End: 2022-07-26
Payer: MEDICARE

## 2022-07-26 VITALS
SYSTOLIC BLOOD PRESSURE: 125 MMHG | DIASTOLIC BLOOD PRESSURE: 70 MMHG | WEIGHT: 163.38 LBS | HEIGHT: 63 IN | HEART RATE: 57 BPM | BODY MASS INDEX: 28.95 KG/M2

## 2022-07-26 DIAGNOSIS — I10 ESSENTIAL HYPERTENSION: ICD-10-CM

## 2022-07-26 DIAGNOSIS — I70.0 ATHEROSCLEROSIS OF AORTA: ICD-10-CM

## 2022-07-26 DIAGNOSIS — E11.69 MIXED DIABETIC HYPERLIPIDEMIA ASSOCIATED WITH TYPE 2 DIABETES MELLITUS: ICD-10-CM

## 2022-07-26 DIAGNOSIS — I15.2 HYPERTENSION ASSOCIATED WITH DIABETES: ICD-10-CM

## 2022-07-26 DIAGNOSIS — I47.10 SVT (SUPRAVENTRICULAR TACHYCARDIA): ICD-10-CM

## 2022-07-26 DIAGNOSIS — R00.2 PALPITATIONS: ICD-10-CM

## 2022-07-26 DIAGNOSIS — Z86.16 HISTORY OF COVID-19: ICD-10-CM

## 2022-07-26 DIAGNOSIS — I47.19 PAROXYSMAL ATRIAL TACHYCARDIA: Primary | ICD-10-CM

## 2022-07-26 DIAGNOSIS — E11.59 HYPERTENSION ASSOCIATED WITH DIABETES: ICD-10-CM

## 2022-07-26 DIAGNOSIS — E78.2 MIXED DIABETIC HYPERLIPIDEMIA ASSOCIATED WITH TYPE 2 DIABETES MELLITUS: ICD-10-CM

## 2022-07-26 DIAGNOSIS — I70.0 ABDOMINAL AORTIC ATHEROSCLEROSIS: ICD-10-CM

## 2022-07-26 DIAGNOSIS — I25.10 CORONARY ARTERY DISEASE INVOLVING NATIVE CORONARY ARTERY OF NATIVE HEART WITHOUT ANGINA PECTORIS: ICD-10-CM

## 2022-07-26 DIAGNOSIS — I50.32 CHRONIC HEART FAILURE WITH PRESERVED EJECTION FRACTION: ICD-10-CM

## 2022-07-26 PROCEDURE — 99214 OFFICE O/P EST MOD 30 MIN: CPT | Mod: S$PBB,,, | Performed by: INTERNAL MEDICINE

## 2022-07-26 PROCEDURE — 99214 PR OFFICE/OUTPT VISIT, EST, LEVL IV, 30-39 MIN: ICD-10-PCS | Mod: S$PBB,,, | Performed by: INTERNAL MEDICINE

## 2022-07-26 PROCEDURE — 99214 OFFICE O/P EST MOD 30 MIN: CPT | Mod: PBBFAC | Performed by: INTERNAL MEDICINE

## 2022-07-26 PROCEDURE — 99999 PR PBB SHADOW E&M-EST. PATIENT-LVL IV: ICD-10-PCS | Mod: PBBFAC,,, | Performed by: INTERNAL MEDICINE

## 2022-07-26 PROCEDURE — 99999 PR PBB SHADOW E&M-EST. PATIENT-LVL IV: CPT | Mod: PBBFAC,,, | Performed by: INTERNAL MEDICINE

## 2022-07-26 RX ORDER — METOPROLOL SUCCINATE 100 MG/1
100 TABLET, EXTENDED RELEASE ORAL DAILY
Qty: 90 TABLET | Refills: 3 | Status: SHIPPED | OUTPATIENT
Start: 2022-07-26 | End: 2023-02-01 | Stop reason: SDUPTHER

## 2022-07-26 RX ORDER — CANDESARTAN 16 MG/1
16 TABLET ORAL DAILY
Qty: 90 TABLET | Refills: 3 | Status: SHIPPED | OUTPATIENT
Start: 2022-07-26 | End: 2023-02-01 | Stop reason: SDUPTHER

## 2022-07-26 RX ORDER — AMLODIPINE BESYLATE 5 MG/1
5 TABLET ORAL DAILY
Qty: 90 TABLET | Refills: 3 | Status: SHIPPED | OUTPATIENT
Start: 2022-07-26 | End: 2023-02-01 | Stop reason: SDUPTHER

## 2022-07-26 NOTE — PROGRESS NOTES
Subjective:   Patient ID:  Venus Caldwell is a 72 y.o. female who presents for cardiac consult of No chief complaint on file.      Follow-up      The patient came in today for cardiac consult of No chief complaint on file.    Referring Physician: Sourav Hernandez MD   Reason for initial consult: HTN      Venus Caldwell is a 72 y.o. female with current medical conditions HFpEF, HTN, PSVT, DM, obesity, OA, uterine CA, GERD presents to follow up CV eval.     5/2/19  Bp has improved with Norvasc 5 mg, recently started by Dr. Hernandez. Has been feeling more weak and fatigue, SBP was in upper 90s lately. She had LHC with minor artery blockage no stents placed, she thinks she had CP but strong FH of CAD. She does get palpitations, a few times a day, lasts one or two beats and she feels ok.   Prior cardiologist Dr. Hannah. Does snore, no prior sleep study. Has gained > 25 lbs in last 6 months, pre-DM.     8/6/19  ECHO with grade 1 DD otherwise normal BIV function. BP has been fluctuating at times. Is on digital HTN program. She has not had sleep study. NO recent heart monitor. Overall active.     1/2/19  Frequent SVT runs, doubled Toprol to 100 mg twice a day. Sleep study negative. She had two bottles of valsartan, given by Dr. Hannah but wanted to take it off due to recall. She still feels palpitations occasionally worse with talking at times, she did not have much palpitations on valsartan. Will refer to EP.     7/2/20  Had eval by Dr. Torres - symptomatic SVT. He recommended EPS/RFA for definitive therapy. She will have SVT ablation - will hold metoprolol 3 days prior. Overall has been feeling well, rarely feels palpitations. No major heart racing. She has gained some weight lately.   ECG - NSR,    9/9/21  Follow up since 7/2020. She has shoulder pain. She did not have SVT ablation. She is unsure why she postponed/canceleld the ablation. She is feeling more palpitations at times now. Her HR occ goes over 100.      Recent Readings 2021   SBP (mmHg) 114 138 136 144 106   DBP (mmHg) 62 75 69 75 65   Pulse 65 66 67 59 68     22  BP and HR stable today. Pt's  was sched with me too but was positive COVID, he had upcoming throat surgery. Pt and wife are not symptomatic. She went to ER last month for bronchitis and low BP improved with IVF and steroids.   ECG - sinus piper V rate 55, anteroinferior TWI - old    22    Recent Readings 2022   SBP (mmHg) 106 123 127 159 132   DBP (mmHg) 62 71 68 76 70   Pulse 64 62 61 61 61       BP and HR well controlled today. BMI 28 - 163 lbs. She occ does not eat much, A1c has improved, cannot digest peanuts had been to ER in past for abd pain ?diverticulitis. She had abd symptoms recently with eating walnuts.     Patient feels no chest pain, no sob, no leg swelling, no PND,  no syncope, no CNS symptoms.    Patient has fairly good exercise tolerance.    Patient is compliant with medications.    ECG - NSR, inferior infarct    FH - father had MI in age 58, brother  from MI 58    TEST DESCRIPTION   1-CARDIO EVENT RECORDING FROM 19 TO 19    2-INDICATIONS- PALPITATIONS    CONCLUSIONS   Patient had a min HR of 54 bpm, max HR of 200 bpm, and avg HR of 78  bpm. Predominant underlying rhythm was Sinus Rhythm. 93  Supraventricular Tachycardia runs occurred, the run with the fastest  interval lasting 12 mins 38 secs with a max rate of 200 bpm, the longest  lasting 44 mins 15 secs with an avg rate of 123 bpm. Supraventricular  Tachycardia was detected within +/- 45 seconds of symptomatic patient  event(s). Isolated SVEs were rare (<1.0%), SVE Couplets were rare  (<1.0%), and SVE Triplets were rare (<1.0%). Isolated VEs were rare  (<1.0%, 970), VE Couplets were rare (<1.0%, 12), and VE Triplets were  rare (<1.0%, 1).    This document has been electronically    SIGNED BY: Jarrett Hussein,  MD On: 09/16/2019 09:28    2d ECHO  05/11/2019  CONCLUSIONS     1 - Concentric hypertrophy.     2 - No wall motion abnormalities.     3 - Normal left ventricular systolic function (EF 60-65%).     4 - Impaired LV relaxation, normal LAP (grade 1 diastolic dysfunction).     5 - Normal right ventricular systolic function .     6 - The estimated PA systolic pressure is greater than 22 mmHg.       Past Medical History:   Diagnosis Date    Arthritis     Cancer, uterine     Coronary artery disease     Diabetes mellitus     prediabetes    Diabetes mellitus, type 2     Digestive disorder     DM (diabetes mellitus) 08/2019    BS doesn't check 12/16/2019    DM (diabetes mellitus) 08/2019    BS doesn't check 06/29/2021    Hypertension     Multiple thyroid nodules 7/8/2021    Ovarian cancer     Sciatica     Sleep apnea        Past Surgical History:   Procedure Laterality Date    CHOLECYSTECTOMY      COLONOSCOPY N/A 8/1/2018    Procedure: COLONOSCOPY;  Surgeon: Yrn Hunter III, MD;  Location: [facility];  Service: Endoscopy;  Laterality: N/A;    COLONOSCOPY N/A 6/30/2022    Procedure: COLONOSCOPY;  Surgeon: Mica Rodriguez MD;  Location: [facility];  Service: Endoscopy;  Laterality: N/A;    ESOPHAGOGASTRODUODENOSCOPY N/A 3/17/2021    Procedure: EGD (ESOPHAGOGASTRODUODENOSCOPY) (Dr. EMILIO billings);  Surgeon: Coy Ortiz MD;  [facility];  Service: Endoscopy;  Laterality: N/A;    HYSTERECTOMY      JOINT REPLACEMENT Right     hip replacement    SURGICAL EXCISION OF ANAL LESION N/A 8/29/2018    Procedure: EXCISION, LESION, ANUS;  Surgeon: Yrn Balderrama MD;  Location: [facility];  Service: General;  Laterality: N/A;    TOTAL KNEE ARTHROPLASTY Bilateral        Social History     Tobacco Use    Smoking status: Never Smoker    Smokeless tobacco: Never Used   Substance Use Topics    Alcohol use: No    Drug use: No       Family History   Problem Relation Age of Onset    Diabetes Mother      Cataracts Mother     Diabetes Brother     Cataracts Maternal Grandmother     Breast cancer Maternal Aunt        Patient's Medications   New Prescriptions    No medications on file   Previous Medications    ALBUTEROL (PROVENTIL/VENTOLIN HFA) 90 MCG/ACTUATION INHALER    Inhale 1-2 puffs into the lungs every 6 (six) hours as needed for Wheezing. Rescue    ASPIRIN (ECOTRIN) 81 MG EC TABLET    Take 1 tablet (81 mg total) by mouth once daily.    ATORVASTATIN (LIPITOR) 40 MG TABLET    Take 1 tablet (40 mg total) by mouth every evening.    AZELASTINE (ASTELIN) 137 MCG (0.1 %) NASAL SPRAY    1 spray (137 mcg total) by Nasal route 2 (two) times daily.    BETAMETHASONE DIPROPIONATE (DIPROLENE) 0.05 % CREAM    as needed.     CETIRIZINE (ZYRTEC) 10 MG TABLET    Take 10 mg by mouth once daily.    CHOLECALCIFEROL, VITAMIN D3, 5,000 UNIT TAB    Take 1,000 Units by mouth once daily.     FLUTICASONE PROPIONATE (FLONASE) 50 MCG/ACTUATION NASAL SPRAY    1 spray (50 mcg total) by Each Nostril route once daily.    GABAPENTIN (NEURONTIN) 300 MG CAPSULE    Take 1 capsule (300 mg total) by mouth 3 (three) times daily.    GAVILYTE-G 236-22.74-6.74 -5.86 GRAM SUSPENSION    Take 4,000 mLs by mouth.    HYDROCORTISONE 2.5 % CREAM    Apply to rash twice daily as needed for rash    IPRATROPIUM-ALBUTEROL (COMBIVENT)  MCG/ACTUATION INHALER    Inhale 1 puff into the lungs every 6 (six) hours as needed for Wheezing or Shortness of Breath (or Cough). Rescue    KETOCONAZOLE (NIZORAL) 2 % CREAM    Apply to rash twice daily as needed for rash    MELOXICAM (MOBIC) 7.5 MG TABLET    Take 1 tablet (7.5 mg total) by mouth once daily.    METFORMIN (GLUCOPHAGE) 500 MG TABLET    Take 1 tablet (500 mg total) by mouth daily with breakfast.    MUPIROCIN (BACTROBAN) 2 % OINTMENT    Apply topically 3 (three) times daily.    OMEPRAZOLE (PRILOSEC) 20 MG CAPSULE    Take 20 mg by mouth once daily.    TRIAMCINOLONE ACETONIDE 0.1% (KENALOG) 0.1 % CREAM     "SMARTSI Application Topical 2-3 Times Daily    VALACYCLOVIR (VALTREX) 500 MG TABLET    Take 500 mg by mouth as needed.    Modified Medications    Modified Medication Previous Medication    AMLODIPINE (NORVASC) 5 MG TABLET amLODIPine (NORVASC) 5 MG tablet       Take 1 tablet (5 mg total) by mouth once daily.    TAKE 1 TABLET DAILY    CANDESARTAN (ATACAND) 16 MG TABLET candesartan (ATACAND) 16 MG tablet       Take 1 tablet (16 mg total) by mouth once daily.    Take 1 tablet (16 mg total) by mouth once daily.    METOPROLOL SUCCINATE (TOPROL-XL) 100 MG 24 HR TABLET metoprolol succinate (TOPROL-XL) 100 MG 24 hr tablet       Take 1 tablet (100 mg total) by mouth once daily.    Take 1 tablet (100 mg total) by mouth once daily.   Discontinued Medications    No medications on file       Review of Systems   Constitutional: Positive for malaise/fatigue.   HENT: Negative.    Eyes: Negative.    Respiratory: Negative.    Cardiovascular: Positive for palpitations.   Gastrointestinal: Negative.    Genitourinary: Negative.    Musculoskeletal: Negative.    Skin: Negative.    Neurological: Negative.    Endo/Heme/Allergies: Negative.    Psychiatric/Behavioral: Negative.    All 12 systems otherwise negative.      Wt Readings from Last 3 Encounters:   22 74.1 kg (163 lb 5.8 oz)   22 74 kg (163 lb 2.3 oz)   22 73.5 kg (162 lb)     Temp Readings from Last 3 Encounters:   22 97.3 °F (36.3 °C) (Temporal)   22 98.2 °F (36.8 °C) (Oral)   21 97.9 °F (36.6 °C) (Oral)     BP Readings from Last 3 Encounters:   22 125/70   22 136/75   22 124/76     Pulse Readings from Last 3 Encounters:   22 (!) 57   22 64   22 70       /70   Pulse (!) 57   Ht 5' 3" (1.6 m)   Wt 74.1 kg (163 lb 5.8 oz)   BMI 28.94 kg/m²     Objective:   Physical Exam  Constitutional:       General: She is not in acute distress.     Appearance: She is well-developed. She is not diaphoretic.   HENT: "      Head: Normocephalic and atraumatic.      Mouth/Throat:      Pharynx: No oropharyngeal exudate.   Eyes:      General: No scleral icterus.        Right eye: No discharge.         Left eye: No discharge.   Pulmonary:      Effort: Pulmonary effort is normal. No respiratory distress.   Skin:     Coloration: Skin is not pale.      Findings: No erythema or rash.   Neurological:      Mental Status: She is alert and oriented to person, place, and time.   Psychiatric:         Behavior: Behavior normal.         Thought Content: Thought content normal.         Judgment: Judgment normal.         Lab Results   Component Value Date     12/22/2021    K 3.6 12/22/2021     12/22/2021    CO2 28 12/22/2021    BUN 26 (H) 12/22/2021    CREATININE 0.8 12/22/2021     (H) 12/22/2021    HGBA1C 6.4 (H) 02/16/2022    AST 12 12/22/2021    ALT 17 12/22/2021    ALBUMIN 3.7 12/22/2021    PROT 6.7 12/22/2021    BILITOT 0.7 12/22/2021    WBC 11.43 12/22/2021    HGB 12.7 12/22/2021    HCT 38.3 12/22/2021    MCV 89 12/22/2021     12/22/2021    INR 1.0 06/14/2018    TSH 0.848 08/16/2021    CHOL 127 08/16/2021    HDL 46 08/16/2021    LDLCALC 56.8 (L) 08/16/2021    TRIG 121 08/16/2021     Assessment:      1. Paroxysmal atrial tachycardia    2. Coronary artery disease involving native coronary artery of native heart without angina pectoris    3. Chronic heart failure with preserved ejection fraction    4. SVT (supraventricular tachycardia)    5. Palpitations    6. Atherosclerosis of aorta    7. Abdominal aortic atherosclerosis    8. Hypertension associated with diabetes    9. Mixed diabetic hyperlipidemia associated with type 2 diabetes mellitus    10. Essential hypertension    11. BMI 28.0-28.9,adult    12. History of COVID-19        Plan:   1. CAD, non obs with abd and aortic atherosclerosis   - no CP  - cont asa, statin, BB    2. HTN   - Bp well controlled  - monitor for low BP    3. Palpitations - intermittent with  PSVT  - cont BB  -  follow up with Dr. Torres    4. Sleep apnea symptoms  - sleep study - negative in 2019    5. HFpEF  - low salt diet  - euvolemic     6. DM/overweight - A1c 7.1 --> 6.7 --> 6.4; BMI 28 - 163 lbs.   - cont tx per PCP  - cont weight loss with diet/exercise     7. HLD - well controlled   - cont statin    8. COVID 19  - s/p tx  - stable now, but more tired   - may need further iron infusion     Thank you for allowing me to participate in this patient's care. Please do not hesitate to contact me with any questions or concerns. Consult note has been forwarded to the referral physician.

## 2022-07-27 ENCOUNTER — OFFICE VISIT (OUTPATIENT)
Dept: HEMATOLOGY/ONCOLOGY | Facility: CLINIC | Age: 72
End: 2022-07-27
Payer: MEDICARE

## 2022-07-27 ENCOUNTER — LAB VISIT (OUTPATIENT)
Dept: LAB | Facility: HOSPITAL | Age: 72
End: 2022-07-27
Attending: NURSE PRACTITIONER
Payer: MEDICARE

## 2022-07-27 VITALS
HEART RATE: 57 BPM | DIASTOLIC BLOOD PRESSURE: 72 MMHG | TEMPERATURE: 98 F | OXYGEN SATURATION: 97 % | RESPIRATION RATE: 20 BRPM | SYSTOLIC BLOOD PRESSURE: 107 MMHG | HEIGHT: 63 IN | WEIGHT: 163.56 LBS | BODY MASS INDEX: 28.98 KG/M2

## 2022-07-27 DIAGNOSIS — D50.8 IRON DEFICIENCY ANEMIA SECONDARY TO INADEQUATE DIETARY IRON INTAKE: ICD-10-CM

## 2022-07-27 DIAGNOSIS — D50.8 IRON DEFICIENCY ANEMIA SECONDARY TO INADEQUATE DIETARY IRON INTAKE: Primary | ICD-10-CM

## 2022-07-27 LAB
ALBUMIN SERPL BCP-MCNC: 3.8 G/DL (ref 3.5–5.2)
ALP SERPL-CCNC: 102 U/L (ref 55–135)
ALT SERPL W/O P-5'-P-CCNC: 21 U/L (ref 10–44)
ANION GAP SERPL CALC-SCNC: 11 MMOL/L (ref 8–16)
AST SERPL-CCNC: 16 U/L (ref 10–40)
BASOPHILS # BLD AUTO: 0.06 K/UL (ref 0–0.2)
BASOPHILS NFR BLD: 0.8 % (ref 0–1.9)
BILIRUB SERPL-MCNC: 0.6 MG/DL (ref 0.1–1)
BUN SERPL-MCNC: 28 MG/DL (ref 8–23)
CALCIUM SERPL-MCNC: 9.4 MG/DL (ref 8.7–10.5)
CHLORIDE SERPL-SCNC: 108 MMOL/L (ref 95–110)
CO2 SERPL-SCNC: 23 MMOL/L (ref 23–29)
CREAT SERPL-MCNC: 0.7 MG/DL (ref 0.5–1.4)
DIFFERENTIAL METHOD: ABNORMAL
EOSINOPHIL # BLD AUTO: 0.5 K/UL (ref 0–0.5)
EOSINOPHIL NFR BLD: 6.8 % (ref 0–8)
ERYTHROCYTE [DISTWIDTH] IN BLOOD BY AUTOMATED COUNT: 13.5 % (ref 11.5–14.5)
EST. GFR  (AFRICAN AMERICAN): >60 ML/MIN/1.73 M^2
EST. GFR  (NON AFRICAN AMERICAN): >60 ML/MIN/1.73 M^2
FERRITIN SERPL-MCNC: 130 NG/ML (ref 20–300)
GLUCOSE SERPL-MCNC: 85 MG/DL (ref 70–110)
HCT VFR BLD AUTO: 36.3 % (ref 37–48.5)
HGB BLD-MCNC: 11.9 G/DL (ref 12–16)
IMM GRANULOCYTES # BLD AUTO: 0.02 K/UL (ref 0–0.04)
IMM GRANULOCYTES NFR BLD AUTO: 0.3 % (ref 0–0.5)
IRON SERPL-MCNC: 42 UG/DL (ref 30–160)
LYMPHOCYTES # BLD AUTO: 2.2 K/UL (ref 1–4.8)
LYMPHOCYTES NFR BLD: 29 % (ref 18–48)
MCH RBC QN AUTO: 29.2 PG (ref 27–31)
MCHC RBC AUTO-ENTMCNC: 32.8 G/DL (ref 32–36)
MCV RBC AUTO: 89 FL (ref 82–98)
MONOCYTES # BLD AUTO: 0.6 K/UL (ref 0.3–1)
MONOCYTES NFR BLD: 7.9 % (ref 4–15)
NEUTROPHILS # BLD AUTO: 4.2 K/UL (ref 1.8–7.7)
NEUTROPHILS NFR BLD: 55.2 % (ref 38–73)
NRBC BLD-RTO: 0 /100 WBC
PLATELET # BLD AUTO: 297 K/UL (ref 150–450)
PMV BLD AUTO: 10 FL (ref 9.2–12.9)
POTASSIUM SERPL-SCNC: 4.3 MMOL/L (ref 3.5–5.1)
PROT SERPL-MCNC: 6.9 G/DL (ref 6–8.4)
RBC # BLD AUTO: 4.07 M/UL (ref 4–5.4)
SATURATED IRON: 13 % (ref 20–50)
SODIUM SERPL-SCNC: 142 MMOL/L (ref 136–145)
TOTAL IRON BINDING CAPACITY: 336 UG/DL (ref 250–450)
TRANSFERRIN SERPL-MCNC: 227 MG/DL (ref 200–375)
WBC # BLD AUTO: 7.68 K/UL (ref 3.9–12.7)

## 2022-07-27 PROCEDURE — 99999 PR PBB SHADOW E&M-EST. PATIENT-LVL V: CPT | Mod: PBBFAC,,, | Performed by: NURSE PRACTITIONER

## 2022-07-27 PROCEDURE — 36415 COLL VENOUS BLD VENIPUNCTURE: CPT | Performed by: NURSE PRACTITIONER

## 2022-07-27 PROCEDURE — 85025 COMPLETE CBC W/AUTO DIFF WBC: CPT | Performed by: NURSE PRACTITIONER

## 2022-07-27 PROCEDURE — 84466 ASSAY OF TRANSFERRIN: CPT | Performed by: NURSE PRACTITIONER

## 2022-07-27 PROCEDURE — 99215 OFFICE O/P EST HI 40 MIN: CPT | Mod: PBBFAC | Performed by: NURSE PRACTITIONER

## 2022-07-27 PROCEDURE — 99999 PR PBB SHADOW E&M-EST. PATIENT-LVL V: ICD-10-PCS | Mod: PBBFAC,,, | Performed by: NURSE PRACTITIONER

## 2022-07-27 PROCEDURE — 80053 COMPREHEN METABOLIC PANEL: CPT | Performed by: NURSE PRACTITIONER

## 2022-07-27 PROCEDURE — 82728 ASSAY OF FERRITIN: CPT | Performed by: NURSE PRACTITIONER

## 2022-07-27 PROCEDURE — 99214 PR OFFICE/OUTPT VISIT, EST, LEVL IV, 30-39 MIN: ICD-10-PCS | Mod: S$PBB,,, | Performed by: NURSE PRACTITIONER

## 2022-07-27 PROCEDURE — 99214 OFFICE O/P EST MOD 30 MIN: CPT | Mod: S$PBB,,, | Performed by: NURSE PRACTITIONER

## 2022-07-27 NOTE — ASSESSMENT & PLAN NOTE
Iron deficiency anemia status post IV iron therapy with improvement noted in hemoglobin from 9 grams/deciliter to above 12 g per dL. Iron studies also showed improvement in iron saturation, serum iron level and iron storage capacity.      Upper GI endoscopy from 03/17/2021 was unremarkable no evidence of H pylori.

## 2022-07-27 NOTE — PROGRESS NOTES
"Subjective:       Patient ID: Venus Caldwell is a 72 y.o. female.    Chief Complaint:   1. Iron deficiency anemia secondary to inadequate dietary iron intake       Current Treatment:   None    Treatment History:  IV iron therapy x2    HPI: This is a 72-year-old female with medical history significant for endometrial cancer (@ 58 yrs), diabetes type 2, hypertension who was referred to us by Dr. Farrell due to recently noted iron deficiency anemia in 10/2020.    Review of most recent iron studies performed on 10/8/2020 showed serum iron of 29, iron saturation of 6% and ferritin level of 14 ng per cc.  CBC from August 2020 showed hemoglobin of 9.5 grams/deciliter, hematocrit of 33.0 however normal MCV.  There was also noticeable thrombocytosis with platelet count at 405k.    Colonoscopy performed in August 2018 showed a one 4mm polyp in the transverse colon that was noted to be benign-hyperplastic polyp.  There was also diverticulosis at the hepatic flexure.  Recommended 5 year repeat which will be 2023.  During the colonoscopy, erectile nodule was noted that was biopsied and showed to be simple inclusion cyst.    She received 2 doses of IV iron with improvement of iron stores.      No pertinent family history or occupational history.    Her primary Hematologist/Oncologist is Dr. Campbell.    Interval History: Patient presents for follow up on labs. She presents alone and has no complaints. She reports being a little tired but attributes that to age. She states she feels much better than she did when she was anemic and iron deficient. She takes oral iron supplementation haphazardly; she admits to having difficulty remembering to take supplements. No labs drawn to review; patient states her lab appointment "went away". Will order labs to re-evaluate her status.     Social History     Socioeconomic History    Marital status:     Number of children: 1   Occupational History    Occupation: Retired   Tobacco " Use    Smoking status: Never Smoker    Smokeless tobacco: Never Used   Substance and Sexual Activity    Alcohol use: No    Drug use: No    Sexual activity: Not Currently     Social Determinants of Health     Financial Resource Strain: Low Risk     Difficulty of Paying Living Expenses: Not hard at all   Food Insecurity: No Food Insecurity    Worried About Running Out of Food in the Last Year: Never true    Ran Out of Food in the Last Year: Never true   Transportation Needs: No Transportation Needs    Lack of Transportation (Medical): No    Lack of Transportation (Non-Medical): No   Physical Activity: Inactive    Days of Exercise per Week: 0 days    Minutes of Exercise per Session: 0 min   Stress: No Stress Concern Present    Feeling of Stress : Not at all   Social Connections: Unknown    Frequency of Communication with Friends and Family: More than three times a week    Frequency of Social Gatherings with Friends and Family: More than three times a week    Active Member of Clubs or Organizations: No    Attends Club or Organization Meetings: Never    Marital Status:    Housing Stability: Low Risk     Unable to Pay for Housing in the Last Year: No    Number of Places Lived in the Last Year: 1    Unstable Housing in the Last Year: No     Past Medical History:   Diagnosis Date    Arthritis     Cancer, uterine     Coronary artery disease     Diabetes mellitus     prediabetes    Diabetes mellitus, type 2     Digestive disorder     DM (diabetes mellitus) 08/2019    BS doesn't check 12/16/2019    DM (diabetes mellitus) 08/2019    BS doesn't check 06/29/2021    Hypertension     Multiple thyroid nodules 7/8/2021    Ovarian cancer     Sciatica     Sleep apnea      Family History   Problem Relation Age of Onset    Diabetes Mother     Cataracts Mother     Diabetes Brother     Cataracts Maternal Grandmother     Breast cancer Maternal Aunt      Past Surgical History:   Procedure  Laterality Date    CHOLECYSTECTOMY      COLONOSCOPY N/A 8/1/2018    Procedure: COLONOSCOPY;  Surgeon: Yrn Hunter III, MD;  Location: [facility];  Service: Endoscopy;  Laterality: N/A;    COLONOSCOPY N/A 6/30/2022    Procedure: COLONOSCOPY;  Surgeon: Mica Rodriguez MD;  Location: [facility];  Service: Endoscopy;  Laterality: N/A;    ESOPHAGOGASTRODUODENOSCOPY N/A 3/17/2021    Procedure: EGD (ESOPHAGOGASTRODUODENOSCOPY) (Dr. EMILIO billings);  Surgeon: Coy Ortiz MD;  Location: [facility];  Service: Endoscopy;  Laterality: N/A;    HYSTERECTOMY      JOINT REPLACEMENT Right     hip replacement    SURGICAL EXCISION OF ANAL LESION N/A 8/29/2018    Procedure: EXCISION, LESION, ANUS;  Surgeon: Yrn Balderrama MD;  Location: [facility];  Service: General;  Laterality: N/A;    TOTAL KNEE ARTHROPLASTY Bilateral      Review of Systems   Constitutional: Negative for appetite change and fatigue.   HENT: Negative for mouth sores, rhinorrhea and sore throat.    Eyes: Negative.    Respiratory: Negative.    Cardiovascular: Negative.    Gastrointestinal: Negative for constipation, diarrhea, nausea and vomiting.   Endocrine: Negative.    Genitourinary: Negative.    Musculoskeletal: Negative.    Integumentary:  Negative.   Allergic/Immunologic: Negative.    Neurological: Negative for weakness and numbness.   Hematological: Negative.    Psychiatric/Behavioral: Negative.        Medication List with Changes/Refills   Current Medications    ALBUTEROL (PROVENTIL/VENTOLIN HFA) 90 MCG/ACTUATION INHALER    Inhale 1-2 puffs into the lungs every 6 (six) hours as needed for Wheezing. Rescue    AMLODIPINE (NORVASC) 5 MG TABLET    Take 1 tablet (5 mg total) by mouth once daily.    ASPIRIN (ECOTRIN) 81 MG EC TABLET    Take 1 tablet (81 mg total) by mouth once daily.    ATORVASTATIN (LIPITOR) 40 MG TABLET    Take 1 tablet (40 mg total) by mouth every evening.    AZELASTINE (ASTELIN) 137 MCG (0.1 %) NASAL SPRAY    1 spray (137 mcg  total) by Nasal route 2 (two) times daily.    BETAMETHASONE DIPROPIONATE (DIPROLENE) 0.05 % CREAM    as needed.     CANDESARTAN (ATACAND) 16 MG TABLET    Take 1 tablet (16 mg total) by mouth once daily.    CETIRIZINE (ZYRTEC) 10 MG TABLET    Take 10 mg by mouth once daily.    CHOLECALCIFEROL, VITAMIN D3, 5,000 UNIT TAB    Take 1,000 Units by mouth once daily.     FLUTICASONE PROPIONATE (FLONASE) 50 MCG/ACTUATION NASAL SPRAY    1 spray (50 mcg total) by Each Nostril route once daily.    GABAPENTIN (NEURONTIN) 300 MG CAPSULE    Take 1 capsule (300 mg total) by mouth 3 (three) times daily.    GAVILYTE-G 236-22.74-6.74 -5.86 GRAM SUSPENSION    Take 4,000 mLs by mouth.    HYDROCORTISONE 2.5 % CREAM    Apply to rash twice daily as needed for rash    IPRATROPIUM-ALBUTEROL (COMBIVENT)  MCG/ACTUATION INHALER    Inhale 1 puff into the lungs every 6 (six) hours as needed for Wheezing or Shortness of Breath (or Cough). Rescue    KETOCONAZOLE (NIZORAL) 2 % CREAM    Apply to rash twice daily as needed for rash    MELOXICAM (MOBIC) 7.5 MG TABLET    Take 1 tablet (7.5 mg total) by mouth once daily.    METFORMIN (GLUCOPHAGE) 500 MG TABLET    Take 1 tablet (500 mg total) by mouth daily with breakfast.    METOPROLOL SUCCINATE (TOPROL-XL) 100 MG 24 HR TABLET    Take 1 tablet (100 mg total) by mouth once daily.    MUPIROCIN (BACTROBAN) 2 % OINTMENT    Apply topically 3 (three) times daily.    OMEPRAZOLE (PRILOSEC) 20 MG CAPSULE    Take 20 mg by mouth once daily.    TRIAMCINOLONE ACETONIDE 0.1% (KENALOG) 0.1 % CREAM    SMARTSI Application Topical 2-3 Times Daily    VALACYCLOVIR (VALTREX) 500 MG TABLET    Take 500 mg by mouth as needed.      Objective:     Vitals:    22 1345   BP: 107/72   Pulse: (!) 57   Resp: 20   Temp: 97.8 °F (36.6 °C)     Physical Exam  Vitals reviewed.   Constitutional:       Appearance: Normal appearance.   HENT:      Head: Normocephalic.      Mouth/Throat:      Comments: Wearing mask    Eyes:       Extraocular Movements: Extraocular movements intact.      Pupils: Pupils are equal, round, and reactive to light.   Cardiovascular:      Rate and Rhythm: Normal rate and regular rhythm.      Heart sounds: Normal heart sounds.   Pulmonary:      Effort: Pulmonary effort is normal.      Breath sounds: Normal breath sounds.   Abdominal:      General: Bowel sounds are normal.      Palpations: Abdomen is soft.      Comments: rounded     Genitourinary:     Comments: deferred    Musculoskeletal:         General: Normal range of motion.      Cervical back: Normal range of motion and neck supple.   Skin:     General: Skin is warm and dry.   Neurological:      Mental Status: She is alert and oriented to person, place, and time.   Psychiatric:         Behavior: Behavior normal.         Thought Content: Thought content normal.          (1) Restricted in physically strenuous activity, ambulatory and able to do work of light nature  Assessment:     Problem List Items Addressed This Visit        Oncology    Iron deficiency anemia secondary to inadequate dietary iron intake - Primary     Iron deficiency anemia status post IV iron therapy with improvement noted in hemoglobin from 9 grams/deciliter to above 12 g per dL. Iron studies also showed improvement in iron saturation, serum iron level and iron storage capacity.      Upper GI endoscopy from 03/17/2021 was unremarkable no evidence of H pylori.                Plan:     Iron deficiency anemia secondary to inadequate dietary iron intake    No recent labs to review.   Check CBC, CMP, iron profile, ferritin today; will communicate results and recommendations to patient through [employer] per her request.   Follow up in 6 months with iron profile, ferritin, CBC and Comprehensive Metabolic Panel.    I will review assessment/plan with collaborating physician Dr. Campbell.    RAF Yoder

## 2022-07-29 ENCOUNTER — PATIENT MESSAGE (OUTPATIENT)
Dept: HEMATOLOGY/ONCOLOGY | Facility: CLINIC | Age: 72
End: 2022-07-29
Payer: MEDICARE

## 2022-07-30 ENCOUNTER — PES CALL (OUTPATIENT)
Dept: ADMINISTRATIVE | Facility: CLINIC | Age: 72
End: 2022-07-30
Payer: MEDICARE

## 2022-08-16 ENCOUNTER — LAB VISIT (OUTPATIENT)
Dept: LAB | Facility: HOSPITAL | Age: 72
End: 2022-08-16
Attending: FAMILY MEDICINE
Payer: MEDICARE

## 2022-08-16 ENCOUNTER — OFFICE VISIT (OUTPATIENT)
Dept: INTERNAL MEDICINE | Facility: CLINIC | Age: 72
End: 2022-08-16
Payer: MEDICARE

## 2022-08-16 VITALS
SYSTOLIC BLOOD PRESSURE: 104 MMHG | DIASTOLIC BLOOD PRESSURE: 72 MMHG | WEIGHT: 165.56 LBS | TEMPERATURE: 99 F | HEART RATE: 74 BPM | OXYGEN SATURATION: 95 % | BODY MASS INDEX: 29.34 KG/M2 | HEIGHT: 63 IN

## 2022-08-16 DIAGNOSIS — Z00.00 ANNUAL PHYSICAL EXAM: Primary | ICD-10-CM

## 2022-08-16 DIAGNOSIS — E04.2 MULTIPLE THYROID NODULES: ICD-10-CM

## 2022-08-16 DIAGNOSIS — I47.10 SVT (SUPRAVENTRICULAR TACHYCARDIA): Chronic | ICD-10-CM

## 2022-08-16 DIAGNOSIS — I70.0 ATHEROSCLEROSIS OF AORTA: Chronic | ICD-10-CM

## 2022-08-16 DIAGNOSIS — Z00.00 ANNUAL PHYSICAL EXAM: ICD-10-CM

## 2022-08-16 DIAGNOSIS — E11.49 TYPE II DIABETES MELLITUS WITH NEUROLOGICAL MANIFESTATIONS: Chronic | ICD-10-CM

## 2022-08-16 DIAGNOSIS — Z12.31 ENCOUNTER FOR SCREENING MAMMOGRAM FOR MALIGNANT NEOPLASM OF BREAST: ICD-10-CM

## 2022-08-16 DIAGNOSIS — E11.59 HYPERTENSION ASSOCIATED WITH DIABETES: Chronic | ICD-10-CM

## 2022-08-16 DIAGNOSIS — J30.89 CHRONIC NON-SEASONAL ALLERGIC RHINITIS: Chronic | ICD-10-CM

## 2022-08-16 DIAGNOSIS — E78.2 MIXED DIABETIC HYPERLIPIDEMIA ASSOCIATED WITH TYPE 2 DIABETES MELLITUS: Chronic | ICD-10-CM

## 2022-08-16 DIAGNOSIS — E11.69 MIXED DIABETIC HYPERLIPIDEMIA ASSOCIATED WITH TYPE 2 DIABETES MELLITUS: Chronic | ICD-10-CM

## 2022-08-16 DIAGNOSIS — I25.10 CORONARY ARTERY DISEASE INVOLVING NATIVE CORONARY ARTERY OF NATIVE HEART WITHOUT ANGINA PECTORIS: Chronic | ICD-10-CM

## 2022-08-16 DIAGNOSIS — I50.32 CHRONIC HEART FAILURE WITH PRESERVED EJECTION FRACTION: Chronic | ICD-10-CM

## 2022-08-16 DIAGNOSIS — K21.9 GASTROESOPHAGEAL REFLUX DISEASE WITHOUT ESOPHAGITIS: Chronic | ICD-10-CM

## 2022-08-16 DIAGNOSIS — I47.19 PAROXYSMAL ATRIAL TACHYCARDIA: Chronic | ICD-10-CM

## 2022-08-16 DIAGNOSIS — I15.2 HYPERTENSION ASSOCIATED WITH DIABETES: Chronic | ICD-10-CM

## 2022-08-16 PROBLEM — L60.3 NAIL DYSTROPHY: Status: RESOLVED | Noted: 2019-12-16 | Resolved: 2022-08-16

## 2022-08-16 PROBLEM — M85.80 OSTEOPENIA: Chronic | Status: ACTIVE | Noted: 2020-08-13

## 2022-08-16 LAB
CHOLEST SERPL-MCNC: 133 MG/DL (ref 120–199)
CHOLEST/HDLC SERPL: 3.2 {RATIO} (ref 2–5)
ESTIMATED AVG GLUCOSE: 131 MG/DL (ref 68–131)
HBA1C MFR BLD: 6.2 % (ref 4–5.6)
HDLC SERPL-MCNC: 41 MG/DL (ref 40–75)
HDLC SERPL: 30.8 % (ref 20–50)
LDLC SERPL CALC-MCNC: 55.8 MG/DL (ref 63–159)
NONHDLC SERPL-MCNC: 92 MG/DL
TRIGL SERPL-MCNC: 181 MG/DL (ref 30–150)
TSH SERPL DL<=0.005 MIU/L-ACNC: 0.55 UIU/ML (ref 0.4–4)

## 2022-08-16 PROCEDURE — 99999 PR PBB SHADOW E&M-EST. PATIENT-LVL V: ICD-10-PCS | Mod: PBBFAC,,, | Performed by: FAMILY MEDICINE

## 2022-08-16 PROCEDURE — 84443 ASSAY THYROID STIM HORMONE: CPT | Performed by: FAMILY MEDICINE

## 2022-08-16 PROCEDURE — 99215 OFFICE O/P EST HI 40 MIN: CPT | Mod: PBBFAC | Performed by: FAMILY MEDICINE

## 2022-08-16 PROCEDURE — 83036 HEMOGLOBIN GLYCOSYLATED A1C: CPT | Performed by: FAMILY MEDICINE

## 2022-08-16 PROCEDURE — 80061 LIPID PANEL: CPT | Performed by: FAMILY MEDICINE

## 2022-08-16 PROCEDURE — 36415 COLL VENOUS BLD VENIPUNCTURE: CPT | Performed by: FAMILY MEDICINE

## 2022-08-16 PROCEDURE — 99215 PR OFFICE/OUTPT VISIT, EST, LEVL V, 40-54 MIN: ICD-10-PCS | Mod: S$PBB,,, | Performed by: FAMILY MEDICINE

## 2022-08-16 PROCEDURE — 99999 PR PBB SHADOW E&M-EST. PATIENT-LVL V: CPT | Mod: PBBFAC,,, | Performed by: FAMILY MEDICINE

## 2022-08-16 PROCEDURE — 99215 OFFICE O/P EST HI 40 MIN: CPT | Mod: S$PBB,,, | Performed by: FAMILY MEDICINE

## 2022-08-16 NOTE — PROGRESS NOTES
Subjective:   Patient ID: Venus Caldwell is a 72 y.o. female.  Chief Complaint:  Follow-up    Patient presents for Annual Physical Exam    Last visit February 2022  A1c 6.4%     Medical history for:  - Hypertension with CHF. On amlodipine 5 mg daily, Atacand 16 mg daily, Toprol- mg daily.  Reports compliance.  Denies side effects.  Blood pressure controlled.  No shortness of breath or swelling.  - SVT with PAT.  Followed by Cardiology.  Stable on beta-blocker.  - Coronary artery disease with known hyperlipidemia and aortic atherosclerosis.  Last LDL at goal. On aspirin 81 mg daily and Lipitor 40 mg daily.  Reports compliance.  Denies side effects.  No chest pain, claudication.    - Diabetes mellitus.  Last A1c 6.4% On metformin 500 mg daily.  Reports compliance.  Denies symptoms hypo/hyperglycemia.  Needs repeat A1c, microalbumin, and eye exam.  Foot exam by Podiatry June 2022.  - Chronic low back pain with degenerative arthritis changes of her spine and likely underlying neuropathy.  Reports increased numbness and tingling in her feet.  Reviewed medication list and taking gabapentin 300 mg but only twice a day not 3 times a day as prescribed.  - Thyroid nodules.  Asymptomatic.  TSH normal 12 months ago.  Due for repeat thyroid ultrasound for survaillance of the right nodule as recommended on last year's imaging  - Splenic nodule.  Low density.  Incidental finding on CT chest for follow-up on her pulmonary nodules.  No additional evaluation needed.  - Chronic restrictive lung disease, interstitial fibrosis, pulmonary nodule.  Followed by pulmonology.  Cough stable and controlled on current inhaler regimen.  -  History colon polyps and diverticulosis. Colonoscopy performed in June 2022, normal    Recent CBC with stable/improved anemia and hemoglobin/hematocrit 11.9/36.3  Recent CMP done by specialist normal     Health maintenance needs include overdue:  Mammogram      No new complaints or concerns  "today        Review of Systems   Constitutional: Negative for activity change and unexpected weight change.   HENT: Negative for hearing loss, rhinorrhea and trouble swallowing.    Eyes: Negative for discharge and visual disturbance.   Respiratory: Negative for chest tightness, shortness of breath and wheezing.    Cardiovascular: Negative for palpitations and leg swelling.   Gastrointestinal: Negative for abdominal distention, blood in stool, constipation and diarrhea.   Endocrine: Negative for polydipsia, polyphagia and polyuria.   Genitourinary: Negative for difficulty urinating, dysuria, flank pain, frequency, hematuria, menstrual problem and urgency.   Musculoskeletal: Positive for back pain.   Skin: Positive for color change, pallor and wound.   Neurological: Negative for dizziness, syncope and light-headedness.   Psychiatric/Behavioral: Negative for confusion, dysphoric mood and sleep disturbance. The patient is not nervous/anxious.      Objective:   /72 (BP Location: Left arm, Patient Position: Sitting, BP Method: Large (Manual))   Pulse 74   Temp 98.9 °F (37.2 °C) (Tympanic)   Ht 5' 3" (1.6 m)   Wt 75.1 kg (165 lb 9.1 oz)   SpO2 95%   BMI 29.33 kg/m²     Physical Exam  Vitals and nursing note reviewed.   Constitutional:       Appearance: Normal appearance. She is well-developed and overweight.   Eyes:      General: No scleral icterus.     Conjunctiva/sclera: Conjunctivae normal.      Right eye: Right conjunctiva is not injected.      Left eye: Left conjunctiva is not injected.   Neck:      Thyroid: No thyroid mass, thyromegaly or thyroid tenderness.      Vascular: Normal carotid pulses. No carotid bruit or JVD.   Cardiovascular:      Rate and Rhythm: Normal rate and regular rhythm.      Pulses:           Radial pulses are 2+ on the right side and 2+ on the left side.        Dorsalis pedis pulses are 2+ on the right side and 2+ on the left side.        Posterior tibial pulses are 2+ on the right " side and 2+ on the left side.      Heart sounds: Normal heart sounds. No murmur heard.    No friction rub. No gallop.   Pulmonary:      Effort: Pulmonary effort is normal.      Breath sounds: Normal breath sounds. No wheezing, rhonchi or rales.   Abdominal:      General: There is no distension.      Palpations: Abdomen is soft. There is no hepatomegaly.      Tenderness: There is no abdominal tenderness. There is no guarding or rebound.   Musculoskeletal:      Right lower leg: No edema.      Left lower leg: No edema.      Right foot: Normal range of motion. No deformity or bunion.      Left foot: Normal range of motion. No deformity or bunion.   Feet:      Right foot:      Protective Sensation: 5 sites tested. 5 sites sensed.      Skin integrity: Dry skin present. No ulcer, blister, skin breakdown, erythema, warmth or fissure.      Toenail Condition: Right toenails are abnormally thick.      Left foot:      Protective Sensation: 5 sites tested. 5 sites sensed.      Skin integrity: Callus and dry skin present. No ulcer, blister, skin breakdown, erythema, warmth or fissure.      Toenail Condition: Left toenails are abnormally thick.   Skin:     General: Skin is warm and dry.      Capillary Refill: Capillary refill takes less than 2 seconds.      Findings: No rash.   Neurological:      Mental Status: She is alert.      Coordination: Coordination is intact. Coordination normal.      Gait: Gait is intact. Gait normal.   Psychiatric:         Attention and Perception: Attention normal.         Mood and Affect: Mood and affect normal.         Speech: Speech normal.         Behavior: Behavior normal. Behavior is cooperative.         Thought Content: Thought content normal.         Cognition and Memory: Cognition normal.         Judgment: Judgment normal.       Assessment:       ICD-10-CM ICD-9-CM   1. Annual physical exam  Z00.00 V70.0   2. Encounter for screening mammogram for malignant neoplasm of breast  Z12.31 V76.12   3.  Type II diabetes mellitus with neurological manifestations  E11.49 250.60   4. Hypertension associated with diabetes  E11.59 250.80    I15.2 401.9   5. Chronic heart failure with preserved ejection fraction  I50.32 428.9   6. Paroxysmal atrial tachycardia  I47.1 427.0   7. SVT (supraventricular tachycardia)  I47.1 427.89   8. Mixed diabetic hyperlipidemia associated with type 2 diabetes mellitus  E11.69 250.80    E78.2 272.2   9. Coronary artery disease involving native coronary artery of native heart without angina pectoris  I25.10 414.01   10. Atherosclerosis of aorta  I70.0 440.0   11. Multiple thyroid nodules  E04.2 241.1   12. Chronic non-seasonal allergic rhinitis  J30.89 477.9   13. Gastroesophageal reflux disease without esophagitis  K21.9 530.81     Plan:   Annual physical exam  Encounter for screening mammogram for malignant neoplasm of breast  -     Hemoglobin A1C; Future; Expected date: 08/16/2022  -     Lipid Panel; Future; Expected date: 08/16/2022  -     TSH; Future; Expected date: 08/16/2022  -     Mammo Digital Screening Bilat w/ Samson; Future; Expected date: 08/16/2022  Blood pressure normal.  BMI 29.  Remainder exam stable.  Check labs.  Treat as indicated.  Colon cancer screening up-to-date  Mammogram ordered  Tetanus booster up-to-date  COVID vaccine and booster up-to-date  Completed shingles vaccine series  Pneumonia vaccine up-to-date  Flu vaccine advised next season    Type II diabetes mellitus with neurological manifestations  -     Hemoglobin A1C; Future; Expected date: 08/16/2022  -     Microalbumin/Creatinine Ratio, Urine; Future; Expected date: 08/16/2022  -     Ambulatory referral/consult to Ophthalmology; Future; Expected date: 08/23/2022  Repeat A1c, microalbumin today   A1c 6.2% and well controlled  Microalbumin negative  Continue Metformin 500 mg once daily   Recheck A1c 6 months  Eye exam ordered  Foot exam stable    Hypertension associated with diabetes  Chronic heart failure with  preserved ejection fraction  Controlled. Stable.  Asymptomatic.  BP at goal.  Continue current medications    Paroxysmal atrial tachycardia  SVT (supraventricular tachycardia)  Stable.  Asymptomatic.  Rate controlled  Continue current medications    Mixed diabetic hyperlipidemia associated with type 2 diabetes mellitus  Coronary artery disease involving native coronary artery of native heart without angina pectoris  Atherosclerosis of aorta  -     Lipid Panel; Future; Expected date: 08/16/2022  Controlled.  Stable.  Asymptomatic. LDL at goal.  Continue aspirin 81 mg daily  Continue Lipitor 40 mg daily    Multiple thyroid nodules  -     TSH; Future; Expected date: 08/16/2022  -     US Soft Tissue Head Neck Thyroid; Future; Expected date: 08/16/2022  TSH normal  Obtain annual follow-up ultrasound as recommended    Chronic non-seasonal allergic rhinitis  Stable.  Symptoms controlled.  Continue nonsedating antihistamine as needed    Gastroesophageal reflux disease without esophagitis  Stable.  Symptoms controlled.  Continue Prilosec 20 mg daily    Follow up with any/all specialists as scheduled    Return to clinic 6 months or sooner as needed    - Sung Zamora MS4    I hereby acknowledge that I am relying upon documentation authored by a medical student working under my supervision and further I hereby attest that I have verified the student documentation or findings by personally performing the physical exam and medical decision making activities of the Evaluation and Management service to be billed.  Sourav Hernandez     40+ minutes of total time spent on the encounter, which includes face to face time and non-face to face time preparing to see the patient (eg, review of tests), Obtaining and/or reviewing separately obtained history, documenting clinical information in the electronic or other health record, independently interpreting results (not separately reported) and communicating results to the  patient/family/caregiver, or Care coordination (not separately reported).

## 2022-08-18 ENCOUNTER — NURSE TRIAGE (OUTPATIENT)
Dept: ADMINISTRATIVE | Facility: CLINIC | Age: 72
End: 2022-08-18
Payer: MEDICARE

## 2022-08-18 ENCOUNTER — TELEPHONE (OUTPATIENT)
Dept: INTERNAL MEDICINE | Facility: CLINIC | Age: 72
End: 2022-08-18
Payer: MEDICARE

## 2022-08-18 NOTE — TELEPHONE ENCOUNTER
OOC Rn   Gene,  CG calling about wife.   BG   266  PCP Sourav Salas.    Patient reports dizziness.  Care advise is home care and to call back for further or new symptoms Patient,    Patient VU.    Reason for Disposition   Blood glucose 240 - 300 mg/dL (13.3 - 16.7 mmol/L)    Additional Information   Negative: Unconscious or difficult to awaken   Negative: Acting confused (e.g., disoriented, slurred speech)   Negative: Very weak (can't stand)   Negative: Sounds like a life-threatening emergency to the triager   Negative: Vomiting and signs of dehydration (e.g., very dry mouth, lightheaded, dark urine)   Negative: Blood glucose > 240 mg/dL (13.3 mmol/L) and rapid breathing   Negative: Blood glucose > 500 mg/dL (27.8 mmol/L)   Negative: Blood glucose > 240 mg/dL (13.3 mmol/L) AND urine ketones moderate-large (or more than 1+)   Negative: Blood glucose > 240 mg/dL (13.3 mmol/L) and blood ketones > 1.4 mmol/L   Negative: Blood glucose > 240 mg/dL (13.3 mmol/L) AND vomiting AND unable to check for ketones (in blood or urine)   Negative: Vomiting lasting > 4 hours   Negative: Patient sounds very sick or weak to the triager   Negative: Fever > 100.4 F (38.0 C)   Negative: Caller has URGENT medication or insulin pump question and triager unable to answer question   Negative: Blood glucose > 400 mg/dL (22.2 mmol/L)   Negative: Blood glucose > 300 mg/dL (16.7 mmol/L) AND two or more times in a row   Negative: Urine ketones moderate - large (or blood ketones > 1.4 mmol/L)   Negative: New-onset diabetes mellitus suspected (e.g., frequent urination, weak, weight loss)   Negative: Symptoms of high blood sugar (e.g., frequent urination, weak, weight loss) and not able to test blood glucose   Negative: Patient wants to be seen   Negative: Caller has NON-URGENT medication question about med that PCP prescribed and triager unable to answer question   Negative: Blood glucose > 300 mg/dL (16.7  mmol/L)    Protocols used: DIABETES - HIGH BLOOD SUGAR-A-OH

## 2022-08-18 NOTE — TELEPHONE ENCOUNTER
Spoke with pt  (Saleem); he stated pt blood sugar was 266 @ 9am.. they called triage nurse line; pt checked her blood glucose again and its 268 (9:57am); states pt is light headed & dizzy ; MA explained to  that he might have to take pt to nearest urgent care due to PCP wasn't in clinic; Saleem verbalized understanding stated he would keep office updated /LD

## 2022-08-22 NOTE — TELEPHONE ENCOUNTER
Patient's most recent A1c was well controlled   Please contact her and see if she is feeling better and whether or not she is continuing to have elevated glucose readings

## 2022-08-23 ENCOUNTER — TELEPHONE (OUTPATIENT)
Dept: INTERNAL MEDICINE | Facility: CLINIC | Age: 72
End: 2022-08-23
Payer: MEDICARE

## 2022-08-23 NOTE — TELEPHONE ENCOUNTER
Spoke with pt  (Saleem); Saleem stated pt is feeling much better and they are continuing to monitor her glucose readings; Gene states glucose reading has been a roller coaster; last reading was 130 /LD

## 2022-08-30 ENCOUNTER — HOSPITAL ENCOUNTER (OUTPATIENT)
Dept: RADIOLOGY | Facility: HOSPITAL | Age: 72
Discharge: HOME | End: 2022-08-30
Attending: FAMILY MEDICINE
Payer: MEDICARE

## 2022-08-30 DIAGNOSIS — Z00.00 ANNUAL PHYSICAL EXAM: ICD-10-CM

## 2022-08-30 DIAGNOSIS — E04.2 MULTIPLE THYROID NODULES: ICD-10-CM

## 2022-08-30 DIAGNOSIS — Z12.31 ENCOUNTER FOR SCREENING MAMMOGRAM FOR MALIGNANT NEOPLASM OF BREAST: ICD-10-CM

## 2022-08-30 PROCEDURE — 77067 SCR MAMMO BI INCL CAD: CPT | Mod: TC

## 2022-08-30 PROCEDURE — 77067 MAMMO DIGITAL SCREENING BILAT WITH TOMO: ICD-10-PCS | Mod: 26,,, | Performed by: RADIOLOGY

## 2022-08-30 PROCEDURE — 76536 US SOFT TISSUE HEAD NECK THYROID: ICD-10-PCS | Mod: 26,,, | Performed by: RADIOLOGY

## 2022-08-30 PROCEDURE — 76536 US EXAM OF HEAD AND NECK: CPT | Mod: TC

## 2022-08-30 PROCEDURE — 77063 MAMMO DIGITAL SCREENING BILAT WITH TOMO: ICD-10-PCS | Mod: 26,,, | Performed by: RADIOLOGY

## 2022-08-30 PROCEDURE — 77067 SCR MAMMO BI INCL CAD: CPT | Mod: 26,,, | Performed by: RADIOLOGY

## 2022-08-30 PROCEDURE — 77063 BREAST TOMOSYNTHESIS BI: CPT | Mod: 26,,, | Performed by: RADIOLOGY

## 2022-08-30 PROCEDURE — 76536 US EXAM OF HEAD AND NECK: CPT | Mod: 26,,, | Performed by: RADIOLOGY

## 2022-08-30 PROCEDURE — 77063 BREAST TOMOSYNTHESIS BI: CPT | Mod: TC

## 2022-09-07 ENCOUNTER — OFFICE VISIT (OUTPATIENT)
Dept: PODIATRY | Facility: CLINIC | Age: 72
End: 2022-09-07
Payer: MEDICARE

## 2022-09-07 DIAGNOSIS — E11.49 TYPE 2 DIABETES MELLITUS WITH NEUROLOGICAL MANIFESTATION: Primary | ICD-10-CM

## 2022-09-07 DIAGNOSIS — L60.3 NAIL DYSTROPHY: ICD-10-CM

## 2022-09-07 DIAGNOSIS — L84 CORN OR CALLUS: ICD-10-CM

## 2022-09-07 PROCEDURE — 11721 DEBRIDE NAIL 6 OR MORE: CPT | Mod: Q9,59,S$PBB, | Performed by: PODIATRIST

## 2022-09-07 PROCEDURE — 99499 NO LOS: ICD-10-PCS | Mod: S$PBB,,, | Performed by: PODIATRIST

## 2022-09-07 PROCEDURE — 99999 PR PBB SHADOW E&M-EST. PATIENT-LVL III: CPT | Mod: PBBFAC,,, | Performed by: PODIATRIST

## 2022-09-07 PROCEDURE — 11056 PR TRIM BENIGN HYPERKERATOTIC SKIN LESION,2-4: ICD-10-PCS | Mod: Q9,S$PBB,, | Performed by: PODIATRIST

## 2022-09-07 PROCEDURE — 11056 PARNG/CUTG B9 HYPRKR LES 2-4: CPT | Mod: Q9,PBBFAC | Performed by: PODIATRIST

## 2022-09-07 PROCEDURE — 99213 OFFICE O/P EST LOW 20 MIN: CPT | Mod: PBBFAC | Performed by: PODIATRIST

## 2022-09-07 PROCEDURE — 99499 UNLISTED E&M SERVICE: CPT | Mod: S$PBB,,, | Performed by: PODIATRIST

## 2022-09-07 PROCEDURE — 11056 PARNG/CUTG B9 HYPRKR LES 2-4: CPT | Mod: Q9,S$PBB,, | Performed by: PODIATRIST

## 2022-09-07 PROCEDURE — 99999 PR PBB SHADOW E&M-EST. PATIENT-LVL III: ICD-10-PCS | Mod: PBBFAC,,, | Performed by: PODIATRIST

## 2022-09-07 PROCEDURE — 11721 DEBRIDE NAIL 6 OR MORE: CPT | Mod: Q9,59,PBBFAC | Performed by: PODIATRIST

## 2022-09-07 PROCEDURE — 11721 PR DEBRIDEMENT OF NAILS, 6 OR MORE: ICD-10-PCS | Mod: Q9,59,S$PBB, | Performed by: PODIATRIST

## 2022-09-07 NOTE — PROGRESS NOTES
Subjective:       Patient ID: Venus Caldwell is a 72 y.o. female.    Chief Complaint: Routine Foot Care (Patient is a diabetic and was last seen on 8.16.22 by Dr. Sourav Hernandez. She denies pain at present and is wearing socks and closed toe tennis shoes. )      HPI: Patient presents to the office today with the chief complaint of elongated, thickened and dystrophic nail plates to the B/L foot. Patient also complains of painful calluses to the left and/or right foot. This patient is a Diabetic Type II, complicated with  Peripheral Neuropathy. Patient does follow with Primary Care and/or Endocrinology for management of Diabetes Mellitus. This patient's PMD is Sourav Hernandez MD. This patient last saw his/her primary care provider on 08/16/2022.     Hemoglobin A1C   Date Value Ref Range Status   08/16/2022 6.2 (H) 4.0 - 5.6 % Final     Comment:     ADA Screening Guidelines:  5.7-6.4%  Consistent with prediabetes  >or=6.5%  Consistent with diabetes    High levels of fetal hemoglobin interfere with the HbA1C  assay. Heterozygous hemoglobin variants (HbS, HgC, etc)do  not significantly interfere with this assay.   However, presence of multiple variants may affect accuracy.     02/16/2022 6.4 (H) 4.0 - 5.6 % Final     Comment:     ADA Screening Guidelines:  5.7-6.4%  Consistent with prediabetes  >or=6.5%  Consistent with diabetes    High levels of fetal hemoglobin interfere with the HbA1C  assay. Heterozygous hemoglobin variants (HbS, HgC, etc)do  not significantly interfere with this assay.   However, presence of multiple variants may affect accuracy.     07/08/2021 6.7 (H) 4.0 - 5.6 % Final     Comment:     ADA Screening Guidelines:  5.7-6.4%  Consistent with prediabetes  >or=6.5%  Consistent with diabetes    High levels of fetal hemoglobin interfere with the HbA1C  assay. Heterozygous hemoglobin variants (HbS, HgC, etc)do  not significantly interfere with this assay.   However, presence of multiple variants  may affect accuracy.     .     Review of patient's allergies indicates:   Allergen Reactions    Sulfa (sulfonamide antibiotics) Anaphylaxis     Pt became hypoxic after taking sulfa drugs as a child      Buprenorphine Rash       Past Medical History:   Diagnosis Date    Arthritis     Cancer, uterine     Coronary artery disease     Diabetes mellitus     prediabetes    Diabetes mellitus, type 2     Digestive disorder     DM (diabetes mellitus) 08/2019    BS doesn't check 12/16/2019    DM (diabetes mellitus) 08/2019    BS doesn't check 06/29/2021    Hypertension     Multiple thyroid nodules 7/8/2021    Ovarian cancer     Sciatica     Sleep apnea        Family History   Problem Relation Age of Onset    Diabetes Mother     Cataracts Mother     Diabetes Brother     Cataracts Maternal Grandmother     Breast cancer Maternal Aunt        Social History     Socioeconomic History    Marital status:     Number of children: 1   Occupational History    Occupation: Retired   Tobacco Use    Smoking status: Never    Smokeless tobacco: Never   Substance and Sexual Activity    Alcohol use: No    Drug use: No    Sexual activity: Not Currently     Social Determinants of Health     Financial Resource Strain: Low Risk     Difficulty of Paying Living Expenses: Not hard at all   Food Insecurity: No Food Insecurity    Worried About Running Out of Food in the Last Year: Never true    Ran Out of Food in the Last Year: Never true   Transportation Needs: No Transportation Needs    Lack of Transportation (Medical): No    Lack of Transportation (Non-Medical): No   Physical Activity: Inactive    Days of Exercise per Week: 0 days    Minutes of Exercise per Session: 0 min   Stress: No Stress Concern Present    Feeling of Stress : Only a little   Social Connections: Unknown    Frequency of Communication with Friends and Family: More than three times a week    Frequency of Social Gatherings with Friends and Family: Patient refused    Active Member  of Clubs or Organizations: No    Attends Club or Organization Meetings: Never    Marital Status:    Housing Stability: Low Risk     Unable to Pay for Housing in the Last Year: No    Number of Places Lived in the Last Year: 1    Unstable Housing in the Last Year: No       Past Surgical History:   Procedure Laterality Date    CHOLECYSTECTOMY      COLONOSCOPY N/A 8/1/2018    Procedure: COLONOSCOPY;  Surgeon: Yrn Hunter III, MD;  Location: [facility] ENDO;  Service: Endoscopy;  Laterality: N/A;    COLONOSCOPY N/A 6/30/2022    Procedure: COLONOSCOPY;  Surgeon: Mica Rodriguez MD;  Location: [facility] ENDO;  Service: Endoscopy;  Laterality: N/A;    ESOPHAGOGASTRODUODENOSCOPY N/A 3/17/2021    Procedure: EGD (ESOPHAGOGASTRODUODENOSCOPY) (Dr. EMILIO billings);  Surgeon: Coy Ortiz MD;  Location: [facility] ENDO;  Service: Endoscopy;  Laterality: N/A;    HYSTERECTOMY      JOINT REPLACEMENT Right     hip replacement    SURGICAL EXCISION OF ANAL LESION N/A 8/29/2018    Procedure: EXCISION, LESION, ANUS;  Surgeon: Yrn Balderrama MD;  Location: [facility] OR;  Service: General;  Laterality: N/A;    TOTAL KNEE ARTHROPLASTY Bilateral        Review of Systems       Objective:   There were no vitals taken for this visit.    Physical Exam  LOWER EXTREMITY PHYSICAL EXAMINATION    VASCULAR:  The right dorsalis pedis pulse 2/4 and the right posterior tibial pulse 2/4.  The left dorsalis pedis pulse 2/4 and posterior tibial pulse on the left is 2/4.  Capillary refill is intact.  Pedal hair growth intact     NEUROLOGY: Protective sensation is not intact to the left and right plantar surfaces of the foot and digits, as the patient has no sensation/detection at greater than 4 distinct points of contact with 5.07 [place] Fritz monofilament. Sensation to light touch is intact on the left and right foot. Proprioception is intact, bilateral. Sensation to pin prick is reduced to absent. Vibratory sensation is diminished.    DERMATOLOGY:   Skin is supple, moist, intact.  Callus formation present to the right foot x2 in the left foot x1.  No underlying signs of ulceration.  The R1, 2, 5 and left L1,2, 5 are thickened, discolored dystrophic.  There is subungual debris.  Nail plates have area of dark discoloration.  The remaining nails 3-4 on the right foot and the left foot are elongated but of normal color, thickness, and texture.   There is no signs of ingrowing into the medial or lateral borders.  There is no evidence of wounds or skin breakdown.  No edema or erythema.  No obvious lacerations or fissuring.  Interdigital spaces are clean, dry, intact.  No rashes or scars appreciated.    ORTHOPEDIC: Manual Muscle Testing is 5/5 in all planes on the left and right, without pains, with and without resistance. Gait pattern is non-antalgic.    Assessment:     1. 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 Corn or callus    3. Nail dystrophy        Plan:     Type 2 diabetes mellitus with neurological manifestation    Corn or callus    Nail dystrophy        Thorough discussion is had with the patient this afternoon, concerning the diagnosis, its etiology, and the treatment algorithm at present.  Greater than 50% of this visit spent on counseling and coordination of care. Greater than 15 minutes of a 20 minute appointment spent on education about the diabetic foot, neuropathy, and prevention of limb loss.  Shoe inspection. Diabetic Foot Education. Patient reminded of the importance of good nutrition and blood sugar control to help prevent podiatric complications of diabetes. Patient instructed on proper foot hygeine. We discussed wearing proper and supportive shoe gear, daily foot inspections, never walking barefooted or sock footed, never putting sharp instruments to feet which can cause major complications associated with infection, ulcers, lacerations.      Hypertrophic skin formation x2, as outlined within the examination portion of  this note, is surgically debrided with sharp #10/#15 blade, to alleviate discomfort with weight bearing and ambulation, and to lessen the possibility of skin complications, e.g., ulceration due to pressure. No ulceration(s) is are noted with/post debridement. The lesion is completed healed and resolved. No evidence of infection.     Dystrophic nail plates, as outlined above (R#1,2,5  ; L#1,2,5 ), are sharply debrided with double action nail nipper, and/or with the assistance of a mechanical rotary elia, with removal of all offending nail and nail border(s), for reduction of pains. Nails are reduced in terms of length, width and girth with removal of subungual debris to facilitate pain free weight bearing and ambulation. The elongated nails as outlined in the objective portion of this note, were trimmed to appropriate length, with a double action nail nipper, for alleviation/reduction of pains as well. Follow up in approx. 3-4 months.        Future Appointments   Date Time Provider Department Center   9/14/2022  9:15 AM Mark Armstrong OD [facility]   12/21/2022 10:15 AM Halina Vidales DPM [facility] POD [facility]   2/1/2023  2:20 PM Maikol Garcia MD [facility]IO [facility]   2/16/2023 10:20 AM Sourav Hernandez MD Counts include [facility] beds at the [facility]

## 2022-09-14 ENCOUNTER — OFFICE VISIT (OUTPATIENT)
Dept: OPHTHALMOLOGY | Facility: CLINIC | Age: 72
End: 2022-09-14
Payer: MEDICARE

## 2022-09-14 DIAGNOSIS — E11.9 DIABETES MELLITUS TYPE 2 WITHOUT RETINOPATHY: Primary | ICD-10-CM

## 2022-09-14 DIAGNOSIS — E11.49 TYPE II DIABETES MELLITUS WITH NEUROLOGICAL MANIFESTATIONS: Chronic | ICD-10-CM

## 2022-09-14 DIAGNOSIS — E11.36 DIABETIC CATARACT: ICD-10-CM

## 2022-09-14 DIAGNOSIS — H52.4 BILATERAL PRESBYOPIA: ICD-10-CM

## 2022-09-14 DIAGNOSIS — I15.2 HYPERTENSION ASSOCIATED WITH DIABETES: ICD-10-CM

## 2022-09-14 DIAGNOSIS — E11.59 HYPERTENSION ASSOCIATED WITH DIABETES: ICD-10-CM

## 2022-09-14 PROCEDURE — 92014 COMPRE OPH EXAM EST PT 1/>: CPT | Mod: S$PBB,,, | Performed by: OPTOMETRIST

## 2022-09-14 PROCEDURE — 92015 PR REFRACTION: ICD-10-PCS | Mod: ,,, | Performed by: OPTOMETRIST

## 2022-09-14 PROCEDURE — 99999 PR PBB SHADOW E&M-EST. PATIENT-LVL III: CPT | Mod: PBBFAC,,, | Performed by: OPTOMETRIST

## 2022-09-14 PROCEDURE — 99213 OFFICE O/P EST LOW 20 MIN: CPT | Mod: PBBFAC | Performed by: OPTOMETRIST

## 2022-09-14 PROCEDURE — 92014 PR EYE EXAM, EST PATIENT,COMPREHESV: ICD-10-PCS | Mod: S$PBB,,, | Performed by: OPTOMETRIST

## 2022-09-14 PROCEDURE — 99999 PR PBB SHADOW E&M-EST. PATIENT-LVL III: ICD-10-PCS | Mod: PBBFAC,,, | Performed by: OPTOMETRIST

## 2022-09-14 PROCEDURE — 92015 DETERMINE REFRACTIVE STATE: CPT | Mod: ,,, | Performed by: OPTOMETRIST

## 2022-09-14 NOTE — PROGRESS NOTES
HPI     Diabetes     Additional comments: Yearly diabetic exam           Comments    Dm since 2019  No sx's          Last edited by Betty Cheatham on 9/14/2022  9:13 AM.            Assessment /Plan     For exam results, see Encounter Report.    Diabetes mellitus type 2 without retinopathy    Type II diabetes mellitus with neurological manifestations  -     Ambulatory referral/consult to Ophthalmology    Diabetic cataract    Hypertension associated with diabetes    Bilateral presbyopia      No Background Diabetic Retinopathy    No HTN Retinopathy    Significant cataracts OU, discussed cataract surgery including Specialty IOL's  Failed glare test OU, refer to Dr Pierre  Consult Ophthalmology for cataract evaluation at next available appointment.

## 2022-09-15 ENCOUNTER — TELEPHONE (OUTPATIENT)
Dept: OPHTHALMOLOGY | Facility: CLINIC | Age: 72
End: 2022-09-15
Payer: MEDICARE

## 2022-09-15 ENCOUNTER — PATIENT MESSAGE (OUTPATIENT)
Dept: OPHTHALMOLOGY | Facility: CLINIC | Age: 72
End: 2022-09-15
Payer: MEDICARE

## 2022-09-15 NOTE — TELEPHONE ENCOUNTER
Called to schedule cat liseth, no answer-- vm box full. Will send message through my chart      ----- Message from Mark Armstrong OD sent at 9/14/2022  9:53 AM CDT -----  Cat eval

## 2022-09-27 ENCOUNTER — PATIENT MESSAGE (OUTPATIENT)
Dept: OPHTHALMOLOGY | Facility: CLINIC | Age: 72
End: 2022-09-27
Payer: MEDICARE

## 2022-10-05 ENCOUNTER — PATIENT MESSAGE (OUTPATIENT)
Dept: INTERNAL MEDICINE | Facility: CLINIC | Age: 72
End: 2022-10-05
Payer: MEDICARE

## 2022-10-20 ENCOUNTER — PES CALL (OUTPATIENT)
Dept: ADMINISTRATIVE | Facility: CLINIC | Age: 72
End: 2022-10-20
Payer: MEDICARE

## 2022-10-21 ENCOUNTER — IMMUNIZATION (OUTPATIENT)
Dept: PRIMARY CARE CLINIC | Facility: CLINIC | Age: 72
End: 2022-10-21
Payer: MEDICARE

## 2022-10-21 DIAGNOSIS — Z23 NEED FOR VACCINATION: Primary | ICD-10-CM

## 2022-10-21 PROCEDURE — 91312 COVID-19, MRNA, LNP-S, BIVALENT BOOSTER, PF, 30 MCG/0.3 ML DOSE: CPT | Mod: PBBFAC | Performed by: FAMILY MEDICINE

## 2022-10-21 PROCEDURE — 0124A COVID-19, MRNA, LNP-S, BIVALENT BOOSTER, PF, 30 MCG/0.3 ML DOSE: CPT | Mod: CV19,PBBFAC | Performed by: FAMILY MEDICINE

## 2022-10-29 ENCOUNTER — IMMUNIZATION (OUTPATIENT)
Dept: INTERNAL MEDICINE | Facility: CLINIC | Age: 72
End: 2022-10-29
Payer: MEDICARE

## 2022-10-29 PROCEDURE — G0008 ADMIN INFLUENZA VIRUS VAC: HCPCS | Mod: PBBFAC

## 2022-10-31 ENCOUNTER — PATIENT MESSAGE (OUTPATIENT)
Dept: OTHER | Facility: OTHER | Age: 72
End: 2022-10-31
Payer: MEDICARE

## 2022-11-02 ENCOUNTER — OFFICE VISIT (OUTPATIENT)
Dept: OPHTHALMOLOGY | Facility: CLINIC | Age: 72
End: 2022-11-02
Payer: MEDICARE

## 2022-11-02 DIAGNOSIS — E11.36 DIABETIC CATARACT OF RIGHT EYE: Primary | ICD-10-CM

## 2022-11-02 DIAGNOSIS — E11.36 DIABETIC CATARACT OF LEFT EYE: ICD-10-CM

## 2022-11-02 DIAGNOSIS — E11.9 DIABETES MELLITUS TYPE 2 WITHOUT RETINOPATHY: ICD-10-CM

## 2022-11-02 LAB
LEFT EYE DM RETINOPATHY: NEGATIVE
RIGHT EYE DM RETINOPATHY: NEGATIVE

## 2022-11-02 PROCEDURE — 92136 IOL MASTER - OD - RIGHT EYE: ICD-10-PCS | Mod: 26,S$PBB,RT, | Performed by: OPHTHALMOLOGY

## 2022-11-02 PROCEDURE — 99999 PR PBB SHADOW E&M-EST. PATIENT-LVL III: ICD-10-PCS | Mod: PBBFAC,,, | Performed by: OPHTHALMOLOGY

## 2022-11-02 PROCEDURE — 99999 PR PBB SHADOW E&M-EST. PATIENT-LVL III: CPT | Mod: PBBFAC,,, | Performed by: OPHTHALMOLOGY

## 2022-11-02 PROCEDURE — 99204 PR OFFICE/OUTPT VISIT, NEW, LEVL IV, 45-59 MIN: ICD-10-PCS | Mod: S$PBB,,, | Performed by: OPHTHALMOLOGY

## 2022-11-02 PROCEDURE — 99204 OFFICE O/P NEW MOD 45 MIN: CPT | Mod: S$PBB,,, | Performed by: OPHTHALMOLOGY

## 2022-11-02 PROCEDURE — 99213 OFFICE O/P EST LOW 20 MIN: CPT | Mod: PBBFAC | Performed by: OPHTHALMOLOGY

## 2022-11-02 PROCEDURE — 92136 OPHTHALMIC BIOMETRY: CPT | Mod: PBBFAC,RT | Performed by: OPHTHALMOLOGY

## 2022-11-02 NOTE — PROGRESS NOTES
HPI     Cataract            Comments: Referred by TRF for a cataract evaluation    Lab Results       Component                Value               Date                       HGBA1C                   6.2 (H)             08/16/2022                 Patient states VA has decreased at all ranges OD>OS over the last several   years gradually, images appear dull and hazy along with glare sensitivity   that has patient no longer driving at night.          Comments       TRF REFERRAL    1. CATS OU  2. +DM II          Last edited by Leila Chase, Patient Care Assistant on 11/2/2022  8:57   AM.            Assessment /Plan     For exam results, see Encounter Report.      ICD-10-CM ICD-9-CM    1. Diabetic cataract of right eye  E11.36 250.50 Ambulatory Referral to External Surgery     366.41 IOL Master - OD - Right Eye    Visually Significant Cataract OD  Patient reports decreased vision consistent with the clinical amount of lenticular opacity, which reaches the level of visual significance and affects activities of daily living including reading and glare. Risks, benefits, and alternatives to cataract surgery were discussed.   The pt expressed a desire to proceed with surgery with the potential for some reasonable degree of visual improvement.    Discussed IOL options and refractive outcomes for this patient.    Topography reviewed yes  History of Refractive surgery no     Phaco right eye, +13.5 SY60WF   Block  monofocal IOL.  Will aim for distance  Anticoagulant status: off asa at this time    DROPLESS surgery discussed and chosen - specifically that the eye may have a red area , usually under the lower lid that is due to a blood vessel breaking during the injection. Explained that the redness will resolve without complications usually over 7-10 days or so.     Explained that patient may need glasses after surgery.  Discussed that vision may be limited by near/ diabetes      Referral to [facility] for  Ophthalmic surgery    Schedule post op visit #2 with Dr Pierre      2. Diabetic cataract of left eye  E11.36 250.50 Will likely need surgery in the future, monitor at this time      366.41       3. Diabetes mellitus type 2 without retinopathy  E11.9 250.00 Diabetes with no diabetic retinopathy on dilated exam.   Reviewed diabetic eye precautions including excellent blood sugar control, and importance of regular follow up.

## 2022-11-09 ENCOUNTER — DOCUMENTATION ONLY (OUTPATIENT)
Dept: OPHTHALMOLOGY | Facility: CLINIC | Age: 72
End: 2022-11-09
Payer: MEDICARE

## 2022-11-09 NOTE — PROGRESS NOTES
Short Stay Record    CC: Blurry Vision     Impression: Visually significant cataract with reasonable expectation for visual improvement Right Eye    External Exam  Last iop 15 OU    Right Left   External Normal Normal     Slit Lamp Exam     Right Left   Lids/Lashes Normal Normal   Conjunctiva/Sclera White and quiet White and quiet   Cornea Clear Clear   Anterior Chamber Deep and quiet Deep and quiet   Iris Round and reactive Round and reactive   Lens 1+ brunescent Nuclear sclerosis, Cortical spokes 2+ Cortical cataract   Vitreous Posterior vitreous detachment Posterior vitreous detachment     Fundus Exam     Right Left   Disc Normal Normal   C/D Ratio 0.3 0.3   Macula No Diabetic Retinopathy No Diabetic Retinopathy   Vessels Normal Normal   Periphery No Diabetic Retinopathy No Diabetic Retinopathy       Manifest Refraction     Sphere Cylinder [place] Dist VA   Right -5.00 +1.25 050 20/50   Left -5.00 +1.00 150 20/40       Planned Procedure:   Topography reviewed yes  History of Refractive surgery no      Phaco right eye, +13.5 SY60WF   Block  monofocal IOL.  Will aim for distance  Anticoagulant status: off asa at this time     DROPLESS          Lens Selection OD verified _____           Previous IOL OS _____    Patient cleared for ophthalmic surgery.     Discharge Summary:    Admitting Diagnosis: diabetic  /  OD    Discharge Diagnosis: Pseudophakia OD    Procedure: Phacoemulsification of cataract with intraocular lens implant OD    Complications: None  Discharge Condition: Stable    Discharge instructions: Follow post-operative discharge instruction sheet given by provider.    Follow-up: Return to clinic next day or as scheduled.       HPI:  Venus Caldwell is a 72 y.o. female who presents for evaluation prior to ophthalmic surgery, left eye. Patient reports of blurred vision at distance and near with and without correction. Patient reports of glare at night reducing function and safety, and complains of needed  additional light to read.     Past Medical History:   Diagnosis Date    Arthritis     Cancer, uterine     Coronary artery disease     Diabetes mellitus     prediabetes    Diabetes mellitus, type 2     Digestive disorder     DM (diabetes mellitus) 08/2019    BS doesn't check 12/16/2019    DM (diabetes mellitus) 08/2019    BS doesn't check 06/29/2021    Hypertension     Multiple thyroid nodules 7/8/2021    Ovarian cancer     Sciatica     Sleep apnea      Past Surgical History:   Procedure Laterality Date    CHOLECYSTECTOMY      COLONOSCOPY N/A 8/1/2018    Procedure: COLONOSCOPY;  Surgeon: Yrn Hunter III, MD;  Location: [facility] ENDO;  Service: Endoscopy;  Laterality: N/A;    COLONOSCOPY N/A 6/30/2022    Procedure: COLONOSCOPY;  Surgeon: Mica Rodriguez MD;  Location: [facility] ENDO;  Service: Endoscopy;  Laterality: N/A;    ESOPHAGOGASTRODUODENOSCOPY N/A 3/17/2021    Procedure: EGD (ESOPHAGOGASTRODUODENOSCOPY) (Dr. EMILIO billings);  Surgeon: Coy Ortiz MD;  Location: [facility];  Service: Endoscopy;  Laterality: N/A;    HYSTERECTOMY      JOINT REPLACEMENT Right     hip replacement    SURGICAL EXCISION OF ANAL LESION N/A 8/29/2018    Procedure: EXCISION, LESION, ANUS;  Surgeon: Yrn Balderrama MD;  Location: [facility] OR;  Service: General;  Laterality: N/A;    TOTAL KNEE ARTHROPLASTY Bilateral      Social History     Tobacco Use    Smoking status: Never    Smokeless tobacco: Never   Substance Use Topics    Alcohol use: No     Family History   Problem Relation Age of Onset    Diabetes Mother     Cataracts Mother     Diabetes Brother     Cataracts Maternal Grandmother     Breast cancer Maternal Aunt      Review of patient's allergies indicates:   Allergen Reactions    Sulfa (sulfonamide antibiotics) Anaphylaxis     Pt became hypoxic after taking sulfa drugs as a child      Buprenorphine Rash         Current Outpatient Medications:     albuterol (PROVENTIL/VENTOLIN HFA) 90 mcg/actuation inhaler, Inhale 1-2 puffs  into the lungs every 6 (six) hours as needed for Wheezing. Rescue, Disp: 18 g, Rfl: 0    amLODIPine (NORVASC) 5 MG tablet, Take 1 tablet (5 mg total) by mouth once daily., Disp: 90 tablet, Rfl: 3    aspirin (ECOTRIN) 81 MG EC tablet, Take 1 tablet (81 mg total) by mouth once daily., Disp: 90 tablet, Rfl: 1    atorvastatin (LIPITOR) 40 MG tablet, Take 1 tablet (40 mg total) by mouth every evening., Disp: 90 tablet, Rfl: 3    azelastine (ASTELIN) 137 mcg (0.1 %) nasal spray, 1 spray (137 mcg total) by Nasal route 2 (two) times daily., Disp: 30 mL, Rfl: 11    betamethasone dipropionate (DIPROLENE) 0.05 % cream, as needed. , Disp: , Rfl:     candesartan (ATACAND) 16 MG tablet, Take 1 tablet (16 mg total) by mouth once daily., Disp: 90 tablet, Rfl: 3    cetirizine (ZYRTEC) 10 MG tablet, Take 10 mg by mouth once daily., Disp: , Rfl:     cholecalciferol, vitamin D3, 5,000 unit Tab, Take 1,000 Units by mouth once daily. , Disp: , Rfl:     fluticasone propionate (FLONASE) 50 mcg/actuation nasal spray, 1 spray (50 mcg total) by Each Nostril route once daily., Disp: 16 g, Rfl: 0    gabapentin (NEURONTIN) 300 MG capsule, Take 1 capsule (300 mg total) by mouth 3 (three) times daily., Disp: 270 capsule, Rfl: 3    GAVILYTE-G 236-22.74-6.74 -5.86 gram suspension, Take 4,000 mLs by mouth., Disp: , Rfl:     hydrocortisone 2.5 % cream, Apply to rash twice daily as needed for rash, Disp: 60 g, Rfl: 4    ipratropium-albuteroL (COMBIVENT)  mcg/actuation inhaler, Inhale 1 puff into the lungs every 6 (six) hours as needed for Wheezing or Shortness of Breath (or Cough). Rescue, Disp: 4 g, Rfl: 0    ketoconazole (NIZORAL) 2 % cream, Apply to rash twice daily as needed for rash, Disp: 60 g, Rfl: 4    meloxicam (MOBIC) 7.5 MG tablet, TAKE 1 TABLET DAILY, Disp: 90 tablet, Rfl: 3    metFORMIN (GLUCOPHAGE) 500 MG tablet, Take 1 tablet (500 mg total) by mouth daily with breakfast., Disp: 90 tablet, Rfl: 3    metoprolol succinate (TOPROL-XL)  100 MG 24 hr tablet, Take 1 tablet (100 mg total) by mouth once daily., Disp: 90 tablet, Rfl: 3    mupirocin (BACTROBAN) 2 % ointment, Apply topically 3 (three) times daily., Disp: 22 g, Rfl: 0    omeprazole (PRILOSEC) 20 MG capsule, Take 20 mg by mouth once daily., Disp: , Rfl:     triamcinolone acetonide 0.1% (KENALOG) 0.1 % cream, SMARTSI Application Topical 2-3 Times Daily, Disp: , Rfl:     valACYclovir (VALTREX) 500 MG tablet, Take 500 mg by mouth as needed. , Disp: , Rfl:     Review of Systems:  A comprehensive review of systems was negative.    Physical Exam:  General Appearance:    A&Ox3, no distress, appears stated age   Head:    Normocephalic, without obvious abnormality, atraumatic   Eyes:    PERRL, EOM's intact   Back:     Symmetric, no curvature   Lungs:     Respirations unlabored   Chest Wall:    No tenderness or deformity    Heart:  Abdomen:  Extremities:  Skin:    S1 and S2 present    Soft, non-tender    Extremities normal, atraumatic    Skin color, texture, turgor normal

## 2022-11-17 ENCOUNTER — OFFICE VISIT (OUTPATIENT)
Dept: OPHTHALMOLOGY | Facility: CLINIC | Age: 72
End: 2022-11-17
Payer: MEDICARE

## 2022-11-17 ENCOUNTER — OUTSIDE PLACE OF SERVICE (OUTPATIENT)
Dept: OPHTHALMOLOGY | Facility: CLINIC | Age: 72
End: 2022-11-17

## 2022-11-17 DIAGNOSIS — Z98.890 POST-OPERATIVE STATE: Primary | ICD-10-CM

## 2022-11-17 DIAGNOSIS — Z98.41 CATARACT EXTRACTION STATUS OF EYE, RIGHT: ICD-10-CM

## 2022-11-17 PROCEDURE — 99024 POSTOP FOLLOW-UP VISIT: CPT | Mod: POP,,, | Performed by: OPHTHALMOLOGY

## 2022-11-17 PROCEDURE — 66984 XCAPSL CTRC RMVL W/O ECP: CPT | Mod: RT,,, | Performed by: OPHTHALMOLOGY

## 2022-11-17 PROCEDURE — 99213 OFFICE O/P EST LOW 20 MIN: CPT | Mod: PBBFAC | Performed by: OPHTHALMOLOGY

## 2022-11-17 PROCEDURE — 99024 PR POST-OP FOLLOW-UP VISIT: ICD-10-PCS | Mod: POP,,, | Performed by: OPHTHALMOLOGY

## 2022-11-17 PROCEDURE — 99999 PR PBB SHADOW E&M-EST. PATIENT-LVL III: ICD-10-PCS | Mod: PBBFAC,,, | Performed by: OPHTHALMOLOGY

## 2022-11-17 PROCEDURE — 99999 PR PBB SHADOW E&M-EST. PATIENT-LVL III: CPT | Mod: PBBFAC,,, | Performed by: OPHTHALMOLOGY

## 2022-11-17 PROCEDURE — 66984 PR REMOVAL, CATARACT, W/INSRT INTRAOC LENS, W/O ENDO CYCLO: ICD-10-PCS | Mod: RT,,, | Performed by: OPHTHALMOLOGY

## 2022-11-17 NOTE — PROGRESS NOTES
HPI     Post-op Evaluation            Comments: Patient reports for same day PO. Reports of occ irritation, va   improvement overall. Drop less surgery. 0/10 pain scale.          Comments    TRF REFERRAL    1. PCIOL OD 11/17/22  2. +DM II      DROP LESS            Last edited by Jacques Dos Santos on 11/17/2022 12:35 PM.            Assessment /Plan     For exam results, see Encounter Report.      ICD-10-CM ICD-9-CM    1. Post-operative state  Z98.890 V45.89       2. Cataract extraction status of eye, right  Z98.41 V45.61           PO same day S/P Phaco/IOL  right eye DROPLESS  Doing well.  Recommend systane qpm due to striae     Advised to use artificial tears and gel if needed for irritation    Reinstructed in importance of absolute compliance with Post-OP instructions including medications, shield at bedtime, and limitation of activities. Follow up appointments in approximately one and six weeks or call immediately for increased pain, redness or vision loss.

## 2022-11-25 ENCOUNTER — OFFICE VISIT (OUTPATIENT)
Dept: OPHTHALMOLOGY | Facility: CLINIC | Age: 72
End: 2022-11-25
Payer: MEDICARE

## 2022-11-25 DIAGNOSIS — Z98.41 CATARACT EXTRACTION STATUS OF EYE, RIGHT: Primary | ICD-10-CM

## 2022-11-25 PROCEDURE — 99213 OFFICE O/P EST LOW 20 MIN: CPT | Mod: PBBFAC | Performed by: OPTOMETRIST

## 2022-11-25 PROCEDURE — 99024 POSTOP FOLLOW-UP VISIT: CPT | Mod: POP,,, | Performed by: OPTOMETRIST

## 2022-11-25 PROCEDURE — 99999 PR PBB SHADOW E&M-EST. PATIENT-LVL III: CPT | Mod: PBBFAC,,, | Performed by: OPTOMETRIST

## 2022-11-25 PROCEDURE — 99999 PR PBB SHADOW E&M-EST. PATIENT-LVL III: ICD-10-PCS | Mod: PBBFAC,,, | Performed by: OPTOMETRIST

## 2022-11-25 PROCEDURE — 99024 PR POST-OP FOLLOW-UP VISIT: ICD-10-PCS | Mod: POP,,, | Performed by: OPTOMETRIST

## 2022-11-28 NOTE — PROGRESS NOTES
HPI     Post-op Evaluation            Comments: Pt reports for 1 week post op PCIOL OD. Denies any pain, but   having some irritation in lower corner near tear duct. Va stable. No   drops, but using AT prn.          Comments    1. PCIOL OD 11/17/22/ SY60WF 13.5/ CDE: 24.97  2. +DM II      DROP LESS            Last edited by Ramírez Rasheed on 11/25/2022  2:47 PM.            Assessment /Plan     For exam results, see Encounter Report.    Cataract extraction status of eye, right    Impression/Plan  POW#1 S/P CEIOL OD : Doing well with no evidence of infection. Healing well    Dropless    Pt given and instructed in one week postop instructions. Can resume normal activitites and d/c eye shield. OTC reading glasses can be used until evaluated for final MR. Follow up in one month or PRN pain, redness, vision loss, or other concerns.    RTC with Dr WHITE in 3 weeks for POM#1 OD. Sooner if any changes to vision or worsening symptoms.

## 2022-12-12 ENCOUNTER — TELEPHONE (OUTPATIENT)
Dept: ADMINISTRATIVE | Facility: HOSPITAL | Age: 72
End: 2022-12-12
Payer: MEDICARE

## 2022-12-20 ENCOUNTER — OFFICE VISIT (OUTPATIENT)
Dept: OPHTHALMOLOGY | Facility: CLINIC | Age: 72
End: 2022-12-20
Payer: MEDICARE

## 2022-12-20 DIAGNOSIS — Z98.890 POST-OPERATIVE STATE: Primary | ICD-10-CM

## 2022-12-20 DIAGNOSIS — Z98.41 CATARACT EXTRACTION STATUS OF EYE, RIGHT: ICD-10-CM

## 2022-12-20 DIAGNOSIS — E11.49 TYPE II DIABETES MELLITUS WITH NEUROLOGICAL MANIFESTATIONS: ICD-10-CM

## 2022-12-20 DIAGNOSIS — E11.36 DIABETIC CATARACT OF LEFT EYE: ICD-10-CM

## 2022-12-20 PROCEDURE — 99213 OFFICE O/P EST LOW 20 MIN: CPT | Mod: PBBFAC | Performed by: OPHTHALMOLOGY

## 2022-12-20 PROCEDURE — 99999 PR PBB SHADOW E&M-EST. PATIENT-LVL III: CPT | Mod: PBBFAC,,, | Performed by: OPHTHALMOLOGY

## 2022-12-20 PROCEDURE — 92136 OPHTHALMIC BIOMETRY: CPT | Mod: PBBFAC,LT | Performed by: OPHTHALMOLOGY

## 2022-12-20 PROCEDURE — 99999 PR PBB SHADOW E&M-EST. PATIENT-LVL III: ICD-10-PCS | Mod: PBBFAC,,, | Performed by: OPHTHALMOLOGY

## 2022-12-20 PROCEDURE — 92136 IOL MASTER - OS - LEFT EYE: ICD-10-PCS | Mod: 26,S$PBB,LT, | Performed by: OPHTHALMOLOGY

## 2022-12-20 PROCEDURE — 99024 PR POST-OP FOLLOW-UP VISIT: ICD-10-PCS | Mod: POP,,, | Performed by: OPHTHALMOLOGY

## 2022-12-20 PROCEDURE — 99024 POSTOP FOLLOW-UP VISIT: CPT | Mod: POP,,, | Performed by: OPHTHALMOLOGY

## 2022-12-20 NOTE — PROGRESS NOTES
HPI     Post-op Evaluation            Comments: S/p PCIOL OD 11/17/22          Comments    Patient states that va is great OD - a little discomfort between the 2   eyes when not wearing glasses - no pain/ discomfort OU - using Art tears   for occas dryness OD      TRF REFERRAL    1. PCIOL OD 11/17/22/ SY60WF 13.5/ CDE: 24.97  2. +DM II      DROP LESS            Last edited by Marilyn Rehman MA on 12/20/2022 11:20 AM.            Assessment /Plan     For exam results, see Encounter Report.      ICD-10-CM ICD-9-CM    1. Post-operative state  Z98.890 V45.89 S/p Phaco OD- doing well       2. Cataract extraction status of eye, right  Z98.41 V45.61       3. Diabetic cataract of left eye  E11.36 250.50 Patient reports decreased vision in the fellow eye consistent with the clinical amount of lenticular opacity, which reaches the level of visual significance and affects activities of daily living including reading and glare. Risks, benefits, and alternatives to cataract surgery were discussed and pt desired to schedule cataract surgery. Pt was consented and the biometry and lens options were reviewed.    Topography reviewed yes   History of Refractive surgery none     Phaco left +14.0 SY60WF  Block   Requests a monofocal  IOL.  Will aim for distance    Anticoagulant status none     Explained that patient may need glasses after surgery.  Discussed that vision may be limited by retina/ astigmatism       Schedule post op visit #2 with TRF        366.41       4. Type II diabetes mellitus with neurological manifestations  E11.49 250.60 FOLLOW

## 2022-12-21 ENCOUNTER — OFFICE VISIT (OUTPATIENT)
Dept: PODIATRY | Facility: CLINIC | Age: 72
End: 2022-12-21
Payer: MEDICARE

## 2022-12-21 DIAGNOSIS — E11.49 TYPE 2 DIABETES MELLITUS WITH NEUROLOGICAL MANIFESTATION: Primary | ICD-10-CM

## 2022-12-21 DIAGNOSIS — L60.3 NAIL DYSTROPHY: ICD-10-CM

## 2022-12-21 PROCEDURE — 99999 PR PBB SHADOW E&M-EST. PATIENT-LVL III: CPT | Mod: PBBFAC,,, | Performed by: PODIATRIST

## 2022-12-21 PROCEDURE — 11721 DEBRIDE NAIL 6 OR MORE: CPT | Mod: Q9,PBBFAC | Performed by: PODIATRIST

## 2022-12-21 PROCEDURE — 99999 PR PBB SHADOW E&M-EST. PATIENT-LVL III: ICD-10-PCS | Mod: PBBFAC,,, | Performed by: PODIATRIST

## 2022-12-21 PROCEDURE — 99499 UNLISTED E&M SERVICE: CPT | Mod: S$PBB,,, | Performed by: PODIATRIST

## 2022-12-21 PROCEDURE — 11721 PR DEBRIDEMENT OF NAILS, 6 OR MORE: ICD-10-PCS | Mod: Q9,S$PBB,, | Performed by: PODIATRIST

## 2022-12-21 PROCEDURE — 99213 OFFICE O/P EST LOW 20 MIN: CPT | Mod: PBBFAC | Performed by: PODIATRIST

## 2022-12-21 PROCEDURE — 99499 NO LOS: ICD-10-PCS | Mod: S$PBB,,, | Performed by: PODIATRIST

## 2022-12-21 PROCEDURE — 11721 DEBRIDE NAIL 6 OR MORE: CPT | Mod: Q9,S$PBB,, | Performed by: PODIATRIST

## 2022-12-23 ENCOUNTER — DOCUMENTATION ONLY (OUTPATIENT)
Dept: OPHTHALMOLOGY | Facility: CLINIC | Age: 72
End: 2022-12-23
Payer: MEDICARE

## 2022-12-23 NOTE — PROGRESS NOTES
Short Stay Record    CC: Blurry Vision     Impression: Visually significant cataract with reasonable expectation for visual improvement Left Eye  Slit Lamp and Fundus Exam    External Exam  LAST IOP 13--11    Right Left   External Normal Normal     Slit Lamp Exam     Right Left   Lids/Lashes Normal Normal   Conjunctiva/Sclera White and quiet White and quiet   Cornea Clear Clear   Anterior Chamber Deep and quiet Deep and quiet   Iris Round and reactive Round and reactive   Lens Posterior chamber intraocular lens 2+ Cortical cataract   Vitreous Posterior vitreous detachment Posterior vitreous detachment           Right Left    Disc Normal Normal   C/D Ratio 0.3 0.3   Macula No Diabetic Retinopathy No Diabetic Retinopathy   Vessels Normal Normal   Periphery No Diabetic Retinopathy No Diabetic Retinopathy       Manifest Refraction     Sphere Cylinder [place] Dist VA   Right -0.50 +0.75 060 20/20   Left -4.00 +0.50 150 20/25           Planned Procedure:   Topography reviewed yes   History of Refractive surgery none      Phaco left +14.0 SY60WF  Block   Requests a monofocal  IOL.  Will aim for distance     Anticoagulant status none      Explained that patient may need glasses after surgery.  Discussed that vision may be limited by retina/ astigmatism              Lens Selection OS verified _____             Previous IOL OD +13.5 SY60WF    Patient cleared for ophthalmic surgery.     Discharge Summary:    Admitting Diagnosis: DIABETIC CAT  /  OS    Discharge Diagnosis: Pseudophakia OS    Procedure: Phacoemulsification of cataract with intraocular lens implant OS    Complications: None  Discharge Condition: Stable    Discharge instructions: Follow post-operative discharge instruction sheet given by provider.    Follow-up: Return to clinic next day or as scheduled.       HPI:  Venus Caldwell is a 72 y.o. female who presents for evaluation prior to ophthalmic surgery, left eye. Patient reports of blurred vision at  distance and near with and without correction. Patient reports of glare at night reducing function and safety, and complains of needed additional light to read.     Past Medical History:   Diagnosis Date    Arthritis     Cancer, uterine     Coronary artery disease     Diabetes mellitus     prediabetes    Diabetes mellitus, type 2     Digestive disorder     DM (diabetes mellitus) 08/2019    BS doesn't check 12/16/2019    DM (diabetes mellitus) 08/2019    BS doesn't check 06/29/2021    Hypertension     Multiple thyroid nodules 7/8/2021    Ovarian cancer     Sciatica     Sleep apnea      Past Surgical History:   Procedure Laterality Date    CHOLECYSTECTOMY      COLONOSCOPY N/A 8/1/2018    Procedure: COLONOSCOPY;  Surgeon: Yrn Hunter III, MD;  Location: [facility];  Service: Endoscopy;  Laterality: N/A;    COLONOSCOPY N/A 6/30/2022    Procedure: COLONOSCOPY;  Surgeon: Mica Rodriguez MD;  Location: [facility];  Service: Endoscopy;  Laterality: N/A;    ESOPHAGOGASTRODUODENOSCOPY N/A 3/17/2021    Procedure: EGD (ESOPHAGOGASTRODUODENOSCOPY) (Dr. EMILIO billings);  Surgeon: Coy Ortiz MD;  Location: [facility];  Service: Endoscopy;  Laterality: N/A;    HYSTERECTOMY      JOINT REPLACEMENT Right     hip replacement    SURGICAL EXCISION OF ANAL LESION N/A 8/29/2018    Procedure: EXCISION, LESION, ANUS;  Surgeon: Yrn Badlerrama MD;  Location: [facility];  Service: General;  Laterality: N/A;    TOTAL KNEE ARTHROPLASTY Bilateral        Review of patient's allergies indicates:   Allergen Reactions    Sulfa (sulfonamide antibiotics) Anaphylaxis     Pt became hypoxic after taking sulfa drugs as a child      Buprenorphine Rash         Current Outpatient Medications:     albuterol (PROVENTIL/VENTOLIN HFA) 90 mcg/actuation inhaler, Inhale 1-2 puffs into the lungs every 6 (six) hours as needed for Wheezing. Rescue, Disp: 18 g, Rfl: 0    amLODIPine (NORVASC) 5 MG tablet, Take 1 tablet (5 mg total) by mouth once daily.,  Disp: 90 tablet, Rfl: 3    aspirin (ECOTRIN) 81 MG EC tablet, Take 1 tablet (81 mg total) by mouth once daily., Disp: 90 tablet, Rfl: 1    atorvastatin (LIPITOR) 40 MG tablet, Take 1 tablet (40 mg total) by mouth every evening., Disp: 90 tablet, Rfl: 3    azelastine (ASTELIN) 137 mcg (0.1 %) nasal spray, 1 spray (137 mcg total) by Nasal route 2 (two) times daily., Disp: 30 mL, Rfl: 11    betamethasone dipropionate (DIPROLENE) 0.05 % cream, as needed. , Disp: , Rfl:     candesartan (ATACAND) 16 MG tablet, Take 1 tablet (16 mg total) by mouth once daily., Disp: 90 tablet, Rfl: 3    cetirizine (ZYRTEC) 10 MG tablet, Take 10 mg by mouth once daily., Disp: , Rfl:     cholecalciferol, vitamin D3, 5,000 unit Tab, Take 1,000 Units by mouth once daily. , Disp: , Rfl:     fluticasone propionate (FLONASE) 50 mcg/actuation nasal spray, 1 spray (50 mcg total) by Each Nostril route once daily., Disp: 16 g, Rfl: 0    gabapentin (NEURONTIN) 300 MG capsule, Take 1 capsule (300 mg total) by mouth 3 (three) times daily., Disp: 270 capsule, Rfl: 3    GAVILYTE-G 236-22.74-6.74 -5.86 gram suspension, Take 4,000 mLs by mouth., Disp: , Rfl:     hydrocortisone 2.5 % cream, Apply to rash twice daily as needed for rash, Disp: 60 g, Rfl: 4    ipratropium-albuteroL (COMBIVENT)  mcg/actuation inhaler, Inhale 1 puff into the lungs every 6 (six) hours as needed for Wheezing or Shortness of Breath (or Cough). Rescue, Disp: 4 g, Rfl: 0    ketoconazole (NIZORAL) 2 % cream, Apply to rash twice daily as needed for rash, Disp: 60 g, Rfl: 4    meloxicam (MOBIC) 7.5 MG tablet, TAKE 1 TABLET DAILY, Disp: 90 tablet, Rfl: 3    metFORMIN (GLUCOPHAGE) 500 MG tablet, Take 1 tablet (500 mg total) by mouth daily with breakfast., Disp: 90 tablet, Rfl: 3    metoprolol succinate (TOPROL-XL) 100 MG 24 hr tablet, Take 1 tablet (100 mg total) by mouth once daily., Disp: 90 tablet, Rfl: 3    mupirocin (BACTROBAN) 2 % ointment, Apply topically 3 (three) times  daily., Disp: 22 g, Rfl: 0    omeprazole (PRILOSEC) 20 MG capsule, Take 20 mg by mouth once daily., Disp: , Rfl:     triamcinolone acetonide 0.1% (KENALOG) 0.1 % cream, SMARTSI Application Topical 2-3 Times Daily, Disp: , Rfl:     valACYclovir (VALTREX) 500 MG tablet, Take 500 mg by mouth as needed. , Disp: , Rfl:     Review of Systems:  A comprehensive review of systems was negative.    Physical Exam:  General Appearance:    A&Ox3, no distress, appears stated age   Head:    Normocephalic, without obvious abnormality, atraumatic   Eyes:    PERRL, EOM's intact   Back:     Symmetric, no curvature   Lungs:     Respirations unlabored   Chest Wall:    No tenderness or deformity    Heart:  Abdomen:  Extremities:  Skin:    S1 and S2 present    Soft, non-tender    Extremities normal, atraumatic    Skin color, texture, turgor normal

## 2023-01-09 ENCOUNTER — PES CALL (OUTPATIENT)
Dept: ADMINISTRATIVE | Facility: CLINIC | Age: 73
End: 2023-01-09
Payer: MEDICARE

## 2023-01-09 NOTE — ASSESSMENT & PLAN NOTE
Iron deficiency anemia status post IV iron therapy with improvement noted in hemoglobin from 9 grams/deciliter to above 12 g per dL. Iron studies also showed improvement in iron saturation, serum iron level and iron storage capacity.      Upper GI endoscopy from 3/17/2021 was unremarkable no evidence of H pylori.

## 2023-01-09 NOTE — PROGRESS NOTES
Subjective:       Patient ID: Venus Caldwell is a 72 y.o. female.    Chief Complaint:   1. Iron deficiency anemia secondary to inadequate dietary iron intake          Current Treatment:  None     Treatment History:  IV iron therapy x 2    HPI: This is a 72-year-old female with medical history significant for endometrial cancer (@ 58 yrs), diabetes type 2, hypertension who was referred to us by Dr. Farrell due to recently noted iron deficiency anemia in 10/2020.     Review of most recent iron studies performed on 10/8/2020 showed serum iron of 29, iron saturation of 6% and ferritin level of 14 ng per cc.  CBC from August 2020 showed hemoglobin of 9.5 grams/deciliter, hematocrit of 33.0 however normal MCV.  There was also noticeable thrombocytosis with platelet count at 405k.     Colonoscopy performed in August 2018 showed a one 4mm polyp in the transverse colon that was noted to be benign-hyperplastic polyp.  There was also diverticulosis at the hepatic flexure.  Recommended 5 year repeat which will be 2023.  During the colonoscopy, erectile nodule was noted that was biopsied and showed to be simple inclusion cyst.     She received 2 doses of IV iron with improvement of iron stores.      No pertinent family history or occupational history.    Interval History: Patient presents for follow up on labs. She presents alone and reports fatigue; she recently experienced GI upset and diarrhea after eating popcorn and peanuts. Per most recent colonoscopy, patient has diverticulosis at the hepatic flexure and should avoid foods with seeds and nuts. RBC, H&H have dropped slightly; iron studies pending at time of visit. Will communicate if results require intervention. Patient reports having resumed oral iron a week ago; advised her to continue. Will plan to recheck iron levels in 3 months to determine response.     Reviewed labs with patient:   CBC:   Recent Labs   Lab 01/12/23  1340   WBC 8.00   RBC 3.98 L   Hemoglobin 11.7  L   Hematocrit 35.3 L   Platelets 332   MCV 89   MCH 29.4   MCHC 33.1     CMP:  Recent Labs   Lab 01/12/23  1340   Glucose 128 H   Calcium 9.2   Albumin 3.3 L   Total Protein 7.0   Sodium 144   Potassium 3.6   CO2 25   Chloride 107   BUN 17   Creatinine 0.7   Alkaline Phosphatase 116   ALT 27   AST 20   Total Bilirubin 0.6       Social History     Socioeconomic History    Marital status:     Number of children: 1   Occupational History    Occupation: Retired   Tobacco Use    Smoking status: Never    Smokeless tobacco: Never   Substance and Sexual Activity    Alcohol use: No    Drug use: No    Sexual activity: Not Currently     Social Determinants of Health     Financial Resource Strain: Low Risk     Difficulty of Paying Living Expenses: Not hard at all   Food Insecurity: No Food Insecurity    Worried About Running Out of Food in the Last Year: Never true    Ran Out of Food in the Last Year: Never true   Transportation Needs: No Transportation Needs    Lack of Transportation (Medical): No    Lack of Transportation (Non-Medical): No   Physical Activity: Unknown    Days of Exercise per Week: Patient refused   Stress: Unknown    Feeling of Stress : Patient refused   Social Connections: Unknown    Frequency of Communication with Friends and Family: More than three times a week    Frequency of Social Gatherings with Friends and Family: Patient refused    Active Member of Clubs or Organizations: No    Attends Club or Organization Meetings: Patient refused    Marital Status:    Housing Stability: Low Risk     Unable to Pay for Housing in the Last Year: No    Number of Places Lived in the Last Year: 1    Unstable Housing in the Last Year: No     Past Medical History:   Diagnosis Date    Arthritis     Cancer, uterine     Coronary artery disease     Diabetes mellitus     prediabetes    Diabetes mellitus, type 2     Digestive disorder     DM (diabetes mellitus) 08/2019    BS doesn't check 12/16/2019    DM  (diabetes mellitus) 08/2019    BS doesn't check 06/29/2021    Hypertension     Multiple thyroid nodules 7/8/2021    Ovarian cancer     Sciatica     Sleep apnea      Family History   Problem Relation Age of Onset    Diabetes Mother     Cataracts Mother     Diabetes Brother     Cataracts Maternal Grandmother     Breast cancer Maternal Aunt      Past Surgical History:   Procedure Laterality Date    CHOLECYSTECTOMY      COLONOSCOPY N/A 8/1/2018    Procedure: COLONOSCOPY;  Surgeon: Yrn Hunter III, MD;  Location: [facility] ENDO;  Service: Endoscopy;  Laterality: N/A;    COLONOSCOPY N/A 6/30/2022    Procedure: COLONOSCOPY;  Surgeon: Mica Rodriguez MD;  Location: [facility] ENDO;  Service: Endoscopy;  Laterality: N/A;    ESOPHAGOGASTRODUODENOSCOPY N/A 3/17/2021    Procedure: EGD (ESOPHAGOGASTRODUODENOSCOPY) (Dr. EMILIO billings);  Surgeon: Coy Ortiz MD;  Location: [facility];  Service: Endoscopy;  Laterality: N/A;    HYSTERECTOMY      JOINT REPLACEMENT Right     hip replacement    SURGICAL EXCISION OF ANAL LESION N/A 8/29/2018    Procedure: EXCISION, LESION, ANUS;  Surgeon: Yrn Balderrama MD;  Location: [facility] OR;  Service: General;  Laterality: N/A;    TOTAL KNEE ARTHROPLASTY Bilateral      Review of Systems   Constitutional:  Positive for fatigue. Negative for appetite change.   HENT:  Negative for mouth sores, rhinorrhea and sore throat.    Eyes: Negative.    Respiratory: Negative.     Cardiovascular: Negative.    Gastrointestinal:  Negative for constipation, diarrhea, nausea and vomiting.   Endocrine: Negative.    Genitourinary: Negative.    Musculoskeletal: Negative.    Integumentary:  Negative.   Allergic/Immunologic: Negative.    Neurological:  Negative for weakness and numbness.   Hematological: Negative.    Psychiatric/Behavioral: Negative.         Medication List with Changes/Refills   Current Medications    ALBUTEROL (PROVENTIL/VENTOLIN HFA) 90 MCG/ACTUATION INHALER    Inhale 1-2 puffs into the lungs  every 6 (six) hours as needed for Wheezing. Rescue    AMLODIPINE (NORVASC) 5 MG TABLET    Take 1 tablet (5 mg total) by mouth once daily.    ASPIRIN (ECOTRIN) 81 MG EC TABLET    Take 1 tablet (81 mg total) by mouth once daily.    ATORVASTATIN (LIPITOR) 40 MG TABLET    Take 1 tablet (40 mg total) by mouth every evening.    AZELASTINE (ASTELIN) 137 MCG (0.1 %) NASAL SPRAY    1 spray (137 mcg total) by Nasal route 2 (two) times daily.    BETAMETHASONE DIPROPIONATE (DIPROLENE) 0.05 % CREAM    as needed.     CANDESARTAN (ATACAND) 16 MG TABLET    Take 1 tablet (16 mg total) by mouth once daily.    CETIRIZINE (ZYRTEC) 10 MG TABLET    Take 10 mg by mouth once daily.    CHOLECALCIFEROL, VITAMIN D3, 5,000 UNIT TAB    Take 1,000 Units by mouth once daily.     FLUTICASONE PROPIONATE (FLONASE) 50 MCG/ACTUATION NASAL SPRAY    1 spray (50 mcg total) by Each Nostril route once daily.    GABAPENTIN (NEURONTIN) 300 MG CAPSULE    Take 1 capsule (300 mg total) by mouth 3 (three) times daily.    GAVILYTE-G 236-22.74-6.74 -5.86 GRAM SUSPENSION    Take 4,000 mLs by mouth.    HYDROCORTISONE 2.5 % CREAM    Apply to rash twice daily as needed for rash    IPRATROPIUM-ALBUTEROL (COMBIVENT)  MCG/ACTUATION INHALER    Inhale 1 puff into the lungs every 6 (six) hours as needed for Wheezing or Shortness of Breath (or Cough). Rescue    KETOCONAZOLE (NIZORAL) 2 % CREAM    Apply to rash twice daily as needed for rash    MELOXICAM (MOBIC) 7.5 MG TABLET    TAKE 1 TABLET DAILY    METFORMIN (GLUCOPHAGE) 500 MG TABLET    Take 1 tablet (500 mg total) by mouth daily with breakfast.    METOPROLOL SUCCINATE (TOPROL-XL) 100 MG 24 HR TABLET    Take 1 tablet (100 mg total) by mouth once daily.    MUPIROCIN (BACTROBAN) 2 % OINTMENT    Apply topically 3 (three) times daily.    OMEPRAZOLE (PRILOSEC) 20 MG CAPSULE    Take 20 mg by mouth once daily.    TRIAMCINOLONE ACETONIDE 0.1% (KENALOG) 0.1 % CREAM    SMARTSI Application Topical 2-3 Times Daily     VALACYCLOVIR (VALTREX) 500 MG TABLET    Take 500 mg by mouth as needed.      Objective:     Vitals:    01/12/23 1405   BP: 114/74   Pulse: 62   Temp: 97.4 °F (36.3 °C)     Physical Exam  Vitals reviewed.   Constitutional:       Appearance: Normal appearance.   HENT:      Head: Normocephalic.      Mouth/Throat:      Comments: Wearing mask    Eyes:      Extraocular Movements: Extraocular movements intact.      Pupils: Pupils are equal, round, and reactive to light.      Comments: Glasses       Cardiovascular:      Rate and Rhythm: Normal rate and regular rhythm.      Heart sounds: Normal heart sounds.   Pulmonary:      Effort: Pulmonary effort is normal.      Breath sounds: Normal breath sounds.   Abdominal:      General: Bowel sounds are normal.      Palpations: Abdomen is soft.      Comments: rounded     Genitourinary:     Comments: deferred    Musculoskeletal:         General: Normal range of motion.      Cervical back: Normal range of motion and neck supple.   Skin:     General: Skin is warm and dry.   Neurological:      Mental Status: She is alert and oriented to person, place, and time.   Psychiatric:         Behavior: Behavior normal.         Thought Content: Thought content normal.        (0) Fully active, able to carry on all predisease performance without restriction  Assessment:     Problem List Items Addressed This Visit          Oncology    Iron deficiency anemia secondary to inadequate dietary iron intake - Primary     Iron deficiency anemia status post IV iron therapy with improvement noted in hemoglobin from 9 grams/deciliter to above 12 g per dL. Iron studies also showed improvement in iron saturation, serum iron level and iron storage capacity.      Upper GI endoscopy from 3/17/2021 was unremarkable no evidence of H pylori.           Plan:     Iron deficiency anemia secondary to inadequate dietary iron intake    Labs reviewed; anemia stable and iron studies pending.   Continue oral iron  supplementation daily with vitamin C and without caffeine.   Should constipation result, patient may change to every other day.   Follow up in  3 months  with  iron profile, ferritin, and CBC.    Route Chart for Scheduling    Med Onc Chart Routing      Follow up with physician    Follow up with SUSAN 3 months. see plan note   Infusion scheduling note    Injection scheduling note    Labs CBC, CMP, ferritin and iron and TIBC   Lab interval:  see plan note   Imaging None      Pharmacy appointment No pharmacy appointment needed      Other referrals No additional referrals needed        I will review assessment/plan with collaborating physician.      RAF Yoder

## 2023-01-12 ENCOUNTER — OFFICE VISIT (OUTPATIENT)
Dept: HEMATOLOGY/ONCOLOGY | Facility: CLINIC | Age: 73
End: 2023-01-12
Payer: MEDICARE

## 2023-01-12 ENCOUNTER — LAB VISIT (OUTPATIENT)
Dept: LAB | Facility: HOSPITAL | Age: 73
End: 2023-01-12
Attending: NURSE PRACTITIONER
Payer: MEDICARE

## 2023-01-12 VITALS
WEIGHT: 161.63 LBS | HEIGHT: 63 IN | TEMPERATURE: 97 F | BODY MASS INDEX: 28.64 KG/M2 | SYSTOLIC BLOOD PRESSURE: 114 MMHG | HEART RATE: 62 BPM | DIASTOLIC BLOOD PRESSURE: 74 MMHG | OXYGEN SATURATION: 98 %

## 2023-01-12 DIAGNOSIS — D50.8 IRON DEFICIENCY ANEMIA SECONDARY TO INADEQUATE DIETARY IRON INTAKE: Primary | ICD-10-CM

## 2023-01-12 DIAGNOSIS — D50.8 IRON DEFICIENCY ANEMIA SECONDARY TO INADEQUATE DIETARY IRON INTAKE: ICD-10-CM

## 2023-01-12 LAB
ALBUMIN SERPL BCP-MCNC: 3.3 G/DL (ref 3.5–5.2)
ALP SERPL-CCNC: 116 U/L (ref 55–135)
ALT SERPL W/O P-5'-P-CCNC: 27 U/L (ref 10–44)
ANION GAP SERPL CALC-SCNC: 12 MMOL/L (ref 8–16)
AST SERPL-CCNC: 20 U/L (ref 10–40)
BASOPHILS # BLD AUTO: 0.06 K/UL (ref 0–0.2)
BASOPHILS NFR BLD: 0.8 % (ref 0–1.9)
BILIRUB SERPL-MCNC: 0.6 MG/DL (ref 0.1–1)
BUN SERPL-MCNC: 17 MG/DL (ref 8–23)
CALCIUM SERPL-MCNC: 9.2 MG/DL (ref 8.7–10.5)
CHLORIDE SERPL-SCNC: 107 MMOL/L (ref 95–110)
CO2 SERPL-SCNC: 25 MMOL/L (ref 23–29)
CREAT SERPL-MCNC: 0.7 MG/DL (ref 0.5–1.4)
DIFFERENTIAL METHOD: ABNORMAL
EOSINOPHIL # BLD AUTO: 0.6 K/UL (ref 0–0.5)
EOSINOPHIL NFR BLD: 7.1 % (ref 0–8)
ERYTHROCYTE [DISTWIDTH] IN BLOOD BY AUTOMATED COUNT: 13.1 % (ref 11.5–14.5)
EST. GFR  (NO RACE VARIABLE): >60 ML/MIN/1.73 M^2
GLUCOSE SERPL-MCNC: 128 MG/DL (ref 70–110)
HCT VFR BLD AUTO: 35.3 % (ref 37–48.5)
HGB BLD-MCNC: 11.7 G/DL (ref 12–16)
IMM GRANULOCYTES # BLD AUTO: 0.02 K/UL (ref 0–0.04)
IMM GRANULOCYTES NFR BLD AUTO: 0.3 % (ref 0–0.5)
LYMPHOCYTES # BLD AUTO: 1.9 K/UL (ref 1–4.8)
LYMPHOCYTES NFR BLD: 23.9 % (ref 18–48)
MCH RBC QN AUTO: 29.4 PG (ref 27–31)
MCHC RBC AUTO-ENTMCNC: 33.1 G/DL (ref 32–36)
MCV RBC AUTO: 89 FL (ref 82–98)
MONOCYTES # BLD AUTO: 0.6 K/UL (ref 0.3–1)
MONOCYTES NFR BLD: 7.4 % (ref 4–15)
NEUTROPHILS # BLD AUTO: 4.9 K/UL (ref 1.8–7.7)
NEUTROPHILS NFR BLD: 60.5 % (ref 38–73)
NRBC BLD-RTO: 0 /100 WBC
PLATELET # BLD AUTO: 332 K/UL (ref 150–450)
PMV BLD AUTO: 9.6 FL (ref 9.2–12.9)
POTASSIUM SERPL-SCNC: 3.6 MMOL/L (ref 3.5–5.1)
PROT SERPL-MCNC: 7 G/DL (ref 6–8.4)
RBC # BLD AUTO: 3.98 M/UL (ref 4–5.4)
SODIUM SERPL-SCNC: 144 MMOL/L (ref 136–145)
WBC # BLD AUTO: 8 K/UL (ref 3.9–12.7)

## 2023-01-12 PROCEDURE — 80053 COMPREHEN METABOLIC PANEL: CPT | Performed by: NURSE PRACTITIONER

## 2023-01-12 PROCEDURE — 99999 PR PBB SHADOW E&M-EST. PATIENT-LVL IV: CPT | Mod: PBBFAC,,, | Performed by: NURSE PRACTITIONER

## 2023-01-12 PROCEDURE — 99999 PR PBB SHADOW E&M-EST. PATIENT-LVL IV: ICD-10-PCS | Mod: PBBFAC,,, | Performed by: NURSE PRACTITIONER

## 2023-01-12 PROCEDURE — 82728 ASSAY OF FERRITIN: CPT | Performed by: NURSE PRACTITIONER

## 2023-01-12 PROCEDURE — 99213 OFFICE O/P EST LOW 20 MIN: CPT | Mod: S$PBB,,, | Performed by: NURSE PRACTITIONER

## 2023-01-12 PROCEDURE — 36415 COLL VENOUS BLD VENIPUNCTURE: CPT | Performed by: NURSE PRACTITIONER

## 2023-01-12 PROCEDURE — 99213 PR OFFICE/OUTPT VISIT, EST, LEVL III, 20-29 MIN: ICD-10-PCS | Mod: S$PBB,,, | Performed by: NURSE PRACTITIONER

## 2023-01-12 PROCEDURE — 84466 ASSAY OF TRANSFERRIN: CPT | Performed by: NURSE PRACTITIONER

## 2023-01-12 PROCEDURE — 85025 COMPLETE CBC W/AUTO DIFF WBC: CPT | Performed by: NURSE PRACTITIONER

## 2023-01-12 PROCEDURE — 99214 OFFICE O/P EST MOD 30 MIN: CPT | Mod: PBBFAC | Performed by: NURSE PRACTITIONER

## 2023-01-12 NOTE — PLAN OF CARE
Patient adequately sedated, time out done and agreed by all staff.   See anesthesia notes for vitals signs and medications.   Spray Paint Technique: No Medical Necessity Information: It is in your best interest to select a reason for this procedure from the list below. All of these items fulfill various CMS LCD requirements except the new and changing color options. Detail Level: Detailed Consent: The patient's consent was obtained including but not limited to risks of crusting, scabbing, blistering, scarring, darker or lighter pigmentary change, recurrence, incomplete removal and infection. Show Topical Anesthesia Variable?: Yes Post-Care Instructions: I reviewed with the patient in detail post-care instructions. Patient is to wear sunprotection, and avoid picking at any of the treated lesions. Pt may apply Vaseline to crusted or scabbing areas. Spray Paint Text: The liquid nitrogen was applied to the skin utilizing a spray paint frosting technique. Medical Necessity Clause: This procedure was medically necessary because the lesions that were treated were:

## 2023-01-13 LAB
FERRITIN SERPL-MCNC: 181 NG/ML (ref 20–300)
IRON SERPL-MCNC: 45 UG/DL (ref 30–160)
SATURATED IRON: 17 % (ref 20–50)
TOTAL IRON BINDING CAPACITY: 269 UG/DL (ref 250–450)
TRANSFERRIN SERPL-MCNC: 182 MG/DL (ref 200–375)

## 2023-01-18 ENCOUNTER — HOSPITAL ENCOUNTER (OUTPATIENT)
Dept: RADIOLOGY | Facility: HOSPITAL | Age: 73
Discharge: HOME | End: 2023-01-18
Payer: MEDICARE

## 2023-01-18 ENCOUNTER — OFFICE VISIT (OUTPATIENT)
Dept: INTERNAL MEDICINE | Facility: CLINIC | Age: 73
End: 2023-01-18
Payer: MEDICARE

## 2023-01-18 VITALS
HEIGHT: 63 IN | BODY MASS INDEX: 28.56 KG/M2 | TEMPERATURE: 98 F | WEIGHT: 161.19 LBS | SYSTOLIC BLOOD PRESSURE: 124 MMHG | DIASTOLIC BLOOD PRESSURE: 78 MMHG | HEART RATE: 60 BPM | OXYGEN SATURATION: 96 %

## 2023-01-18 DIAGNOSIS — R05.3 CHRONIC COUGH: ICD-10-CM

## 2023-01-18 DIAGNOSIS — E11.59 HYPERTENSION ASSOCIATED WITH DIABETES: ICD-10-CM

## 2023-01-18 DIAGNOSIS — I15.2 HYPERTENSION ASSOCIATED WITH DIABETES: ICD-10-CM

## 2023-01-18 DIAGNOSIS — E11.9 TYPE 2 DIABETES MELLITUS WITHOUT COMPLICATION, WITHOUT LONG-TERM CURRENT USE OF INSULIN: ICD-10-CM

## 2023-01-18 DIAGNOSIS — J30.89 CHRONIC NON-SEASONAL ALLERGIC RHINITIS: ICD-10-CM

## 2023-01-18 DIAGNOSIS — J40 BRONCHITIS: Primary | ICD-10-CM

## 2023-01-18 PROCEDURE — 71046 X-RAY EXAM CHEST 2 VIEWS: CPT | Mod: 26,,, | Performed by: RADIOLOGY

## 2023-01-18 PROCEDURE — 99215 OFFICE O/P EST HI 40 MIN: CPT | Mod: PBBFAC,25

## 2023-01-18 PROCEDURE — 99214 PR OFFICE/OUTPT VISIT, EST, LEVL IV, 30-39 MIN: ICD-10-PCS | Mod: S$PBB,,,

## 2023-01-18 PROCEDURE — 99999 PR PBB SHADOW E&M-EST. PATIENT-LVL V: ICD-10-PCS | Mod: PBBFAC,,,

## 2023-01-18 PROCEDURE — 99214 OFFICE O/P EST MOD 30 MIN: CPT | Mod: S$PBB,,,

## 2023-01-18 PROCEDURE — 99999 PR PBB SHADOW E&M-EST. PATIENT-LVL V: CPT | Mod: PBBFAC,,,

## 2023-01-18 PROCEDURE — 71046 XR CHEST PA AND LATERAL: ICD-10-PCS | Mod: 26,,, | Performed by: RADIOLOGY

## 2023-01-18 PROCEDURE — 71046 X-RAY EXAM CHEST 2 VIEWS: CPT | Mod: TC

## 2023-01-18 RX ORDER — METHYLPREDNISOLONE 4 MG/1
TABLET ORAL
Qty: 21 EACH | Refills: 0 | Status: SHIPPED | OUTPATIENT
Start: 2023-01-18 | End: 2023-02-01

## 2023-01-18 RX ORDER — AZITHROMYCIN 250 MG/1
TABLET, FILM COATED ORAL
Qty: 6 TABLET | Refills: 0 | Status: SHIPPED | OUTPATIENT
Start: 2023-01-18 | End: 2023-02-16

## 2023-01-18 NOTE — PROGRESS NOTES
[facility]  01/18/2023  [id number]    Sourav Hernandez MD  Patient Care Team:  Sourav Hernandez MD as PCP - General (Family Medicine)  Gwen Burger MD as Consulting Physician (Physical Medicine and Rehabilitation)  Sourav Hernandez MD as Hypertension Digital Medicine Responsible Provider (Family Medicine)  Shahrzad Domingo as Digital Medicine Health   Álvaro Campbell MD (Inactive) as Consulting Physician (Hematology and Oncology)  Tkeyah Bazin, PharmD as Hypertension Digital Medicine Clinician  Medicare Shared Savings Program as Hypertension Digital Medicine Contract  Maikol Garcia MD as Consulting Physician (Cardiology)  Halina Vidales DPM as Consulting Physician (Podiatry)  Mark Armstrong OD as Consulting Physician (Optometry)  Sourav Hernandez MD as Diabetes Digital Medicine Responsible Provider (Family Medicine)  Tkeyah Bazin, PharmD as Diabetes Digital Medicine Clinician  Medicare Shared Savings Program as Diabetes Digital Medicine Contract          Visit Type:an urgent visit for a new problem    Chief Complaint:  Chief Complaint   Patient presents with    Cough     X 2 weeks, denies any other symptoms       History of Present Illness:    Pt has been coughing for almost 2 weeks  It is worse at night when she lays flat  Negative for SOB or CP     Using flonase and zyrtec daily     History:  Past Medical History:   Diagnosis Date    Arthritis     Cancer, uterine     Coronary artery disease     Diabetes mellitus     prediabetes    Diabetes mellitus, type 2     Digestive disorder     DM (diabetes mellitus) 08/2019    BS doesn't check 12/16/2019    DM (diabetes mellitus) 08/2019    BS doesn't check 06/29/2021    Hypertension     Multiple thyroid nodules 7/8/2021    Ovarian cancer     Sciatica     Sleep apnea      Past Surgical History:   Procedure Laterality Date    CHOLECYSTECTOMY      COLONOSCOPY N/A 8/1/2018    Procedure: COLONOSCOPY;  Surgeon: Yrn Hunter III, MD;  Location: [facility];   Service: Endoscopy;  Laterality: N/A;    COLONOSCOPY N/A 6/30/2022    Procedure: COLONOSCOPY;  Surgeon: Mica Rodriguez MD;  Location: [facility];  Service: Endoscopy;  Laterality: N/A;    ESOPHAGOGASTRODUODENOSCOPY N/A 3/17/2021    Procedure: EGD (ESOPHAGOGASTRODUODENOSCOPY) (Dr. EMILIO billings);  Surgeon: Coy Ortiz MD;  Location: [facility];  Service: Endoscopy;  Laterality: N/A;    HYSTERECTOMY      JOINT REPLACEMENT Right     hip replacement    SURGICAL EXCISION OF ANAL LESION N/A 8/29/2018    Procedure: EXCISION, LESION, ANUS;  Surgeon: Yrn Balderrama MD;  Location: [facility];  Service: General;  Laterality: N/A;    TOTAL KNEE ARTHROPLASTY Bilateral      Family History   Problem Relation Age of Onset    Diabetes Mother     Cataracts Mother     Diabetes Brother     Cataracts Maternal Grandmother     Breast cancer Maternal Aunt      Social History     Socioeconomic History    Marital status:     Number of children: 1   Occupational History    Occupation: Retired   Tobacco Use    Smoking status: Never    Smokeless tobacco: Never   Substance and Sexual Activity    Alcohol use: No    Drug use: No    Sexual activity: Not Currently     Social Determinants of Health     Financial Resource Strain: Low Risk     Difficulty of Paying Living Expenses: Not hard at all   Food Insecurity: No Food Insecurity    Worried About Running Out of Food in the Last Year: Never true    Ran Out of Food in the Last Year: Never true   Transportation Needs: No Transportation Needs    Lack of Transportation (Medical): No    Lack of Transportation (Non-Medical): No   Physical Activity: Unknown    Days of Exercise per Week: Patient refused   Stress: Unknown    Feeling of Stress : Patient refused   Social Connections: Unknown    Frequency of Communication with Friends and Family: More than three times a week    Frequency of Social Gatherings with Friends and Family: Patient refused    Active Member of Clubs or Organizations:  No    Attends Club or Organization Meetings: Patient refused    Marital Status:    Housing Stability: Low Risk     Unable to Pay for Housing in the Last Year: No    Number of Places Lived in the Last Year: 1    Unstable Housing in the Last Year: No     Patient Active Problem List   Diagnosis    Hypertension associated with diabetes    Mixed diabetic hyperlipidemia associated with type 2 diabetes mellitus    Gastroesophageal reflux disease without esophagitis    Primary osteoarthritis involving multiple joints    Chronic non-seasonal allergic rhinitis    Coronary artery disease involving native coronary artery of native heart without angina pectoris    Paroxysmal atrial tachycardia    Atherosclerosis of aorta    Sciatica    Chronic heart failure with preserved ejection fraction    NICKI on CPAP    SVT (supraventricular tachycardia)    Osteopenia    Incidental pulmonary nodule, less than or equal to 3mm    Iron deficiency anemia secondary to inadequate dietary iron intake    Multiple thyroid nodules    Nodule of spleen    Type II diabetes mellitus with neurological manifestations     Review of patient's allergies indicates:   Allergen Reactions    Sulfa (sulfonamide antibiotics) Anaphylaxis     Pt became hypoxic after taking sulfa drugs as a child      Buprenorphine Rash       The following were reviewed at this visit: active problem list, medication list, allergies, family history, social history, and health maintenance.    Medications:  Current Outpatient Medications on File Prior to Visit   Medication Sig Dispense Refill    albuterol (PROVENTIL/VENTOLIN HFA) 90 mcg/actuation inhaler Inhale 1-2 puffs into the lungs every 6 (six) hours as needed for Wheezing. Rescue 18 g 0    amLODIPine (NORVASC) 5 MG tablet Take 1 tablet (5 mg total) by mouth once daily. 90 tablet 3    aspirin (ECOTRIN) 81 MG EC tablet Take 1 tablet (81 mg total) by mouth once daily. 90 tablet 1    atorvastatin (LIPITOR) 40 MG tablet Take 1  tablet (40 mg total) by mouth every evening. 90 tablet 3    azelastine (ASTELIN) 137 mcg (0.1 %) nasal spray 1 spray (137 mcg total) by Nasal route 2 (two) times daily. 30 mL 11    betamethasone dipropionate (DIPROLENE) 0.05 % cream as needed.       candesartan (ATACAND) 16 MG tablet Take 1 tablet (16 mg total) by mouth once daily. 90 tablet 3    cetirizine (ZYRTEC) 10 MG tablet Take 10 mg by mouth once daily.      cholecalciferol, vitamin D3, 5,000 unit Tab Take 1,000 Units by mouth once daily.       fluticasone propionate (FLONASE) 50 mcg/actuation nasal spray 1 spray (50 mcg total) by Each Nostril route once daily. 16 g 0    gabapentin (NEURONTIN) 300 MG capsule Take 1 capsule (300 mg total) by mouth 3 (three) times daily. 270 capsule 3    GAVILYTE-G 236-22.74-6.74 -5.86 gram suspension Take 4,000 mLs by mouth.      hydrocortisone 2.5 % cream Apply to rash twice daily as needed for rash 60 g 4    ipratropium-albuteroL (COMBIVENT)  mcg/actuation inhaler Inhale 1 puff into the lungs every 6 (six) hours as needed for Wheezing or Shortness of Breath (or Cough). Rescue 4 g 0    ketoconazole (NIZORAL) 2 % cream Apply to rash twice daily as needed for rash 60 g 4    meloxicam (MOBIC) 7.5 MG tablet TAKE 1 TABLET DAILY 90 tablet 3    metFORMIN (GLUCOPHAGE) 500 MG tablet Take 1 tablet (500 mg total) by mouth daily with breakfast. 90 tablet 3    metoprolol succinate (TOPROL-XL) 100 MG 24 hr tablet Take 1 tablet (100 mg total) by mouth once daily. 90 tablet 3    mupirocin (BACTROBAN) 2 % ointment Apply topically 3 (three) times daily. 22 g 0    omeprazole (PRILOSEC) 20 MG capsule Take 20 mg by mouth once daily.      triamcinolone acetonide 0.1% (KENALOG) 0.1 % cream SMARTSI Application Topical 2-3 Times Daily      valACYclovir (VALTREX) 500 MG tablet Take 500 mg by mouth as needed.        No current facility-administered medications on file prior to visit.       Medications have been reviewed and reconciled with  patient at this visit.  Barriers to medications reviewed with patient.    Adverse reactions to current medications reviewed with patient..    Over the counter medications reviewed and reconciled with patient.    Exam:  Wt Readings from Last 3 Encounters:   01/18/23 73.1 kg (161 lb 2.5 oz)   01/12/23 73.3 kg (161 lb 9.6 oz)   08/16/22 75.1 kg (165 lb 9.1 oz)     Temp Readings from Last 3 Encounters:   01/18/23 98.1 °F (36.7 °C) (Tympanic)   01/12/23 97.4 °F (36.3 °C) (Temporal)   08/16/22 98.9 °F (37.2 °C) (Tympanic)     BP Readings from Last 3 Encounters:   01/18/23 124/78   01/12/23 114/74   08/16/22 104/72     Pulse Readings from Last 3 Encounters:   01/18/23 60   01/12/23 62   08/16/22 74     Body mass index is 29.01 kg/m².      Review of Systems   Constitutional:  Positive for malaise/fatigue.   HENT:  Positive for congestion and sinus pain.    Respiratory:  Positive for cough. Negative for shortness of breath and wheezing.    Cardiovascular:  Negative for chest pain and palpitations.   Physical Exam  Vitals and nursing note reviewed.   Constitutional:       General: She is not in acute distress.     Appearance: She is well-developed. She is not diaphoretic.   HENT:      Head: Normocephalic and atraumatic.      Right Ear: External ear normal.      Left Ear: External ear normal.      Nose: Congestion and rhinorrhea present.   Eyes:      General:         Right eye: No discharge.         Left eye: No discharge.      Conjunctiva/sclera: Conjunctivae normal.      Pupils: Pupils are equal, round, and reactive to light.   Cardiovascular:      Rate and Rhythm: Normal rate and regular rhythm.      Heart sounds: Normal heart sounds. No murmur heard.  Pulmonary:      Effort: No respiratory distress.      Breath sounds: Examination of the right-upper field reveals rales. Examination of the left-upper field reveals rales. Rales present. No wheezing.   Abdominal:      General: Bowel sounds are normal.   Neurological:       Mental Status: She is alert and oriented to person, place, and time.   Psychiatric:         Behavior: Behavior normal.         Thought Content: Thought content normal.         Judgment: Judgment normal.       Laboratory Reviewed ({Yes)  Lab Results   Component Value Date    WBC 8.00 01/12/2023    HGB 11.7 (L) 01/12/2023    HCT 35.3 (L) 01/12/2023     01/12/2023    CHOL 133 08/16/2022    TRIG 181 (H) 08/16/2022    HDL 41 08/16/2022    ALT 27 01/12/2023    AST 20 01/12/2023     01/12/2023    K 3.6 01/12/2023     01/12/2023    CREATININE 0.7 01/12/2023    BUN 17 01/12/2023    CO2 25 01/12/2023    TSH 0.554 08/16/2022    INR 1.0 06/14/2018    HGBA1C 6.2 (H) 08/16/2022         Venus was seen today for cough.    Diagnoses and all orders for this visit:    Bronchitis  -     methylPREDNISolone (MEDROL DOSEPACK) 4 mg tablet; use as directed  -     azithromycin (Z-LARON) 250 MG tablet; Take 2 tablets by mouth on day 1; Take 1 tablet by mouth on days 2-5    Chronic cough  -     X-Ray Chest PA And Lateral; Future    Type 2 diabetes mellitus without complication, without long-term current use of insulin  Lab Results   Component Value Date    HGBA1C 6.2 (H) 08/16/2022        Hypertension associated with diabetes  At visit, Blood pressure is at goal. Continue current medications      Chronic non-seasonal allergic rhinitis  Cont using antihistamine and flonase     BG have been well controlled  Will give dose pack to help with symptoms  Pt would like CXR     Pt has an inhaler at home  Encouraged to resume using it as needed       Care Plan/Goals: Reviewed    Goals         80 <= Glucose <= 180 (pt-stated)       Blood Pressure < 130/80       Exercise at least 150 minutes per week.       HEMOGLOBIN A1C < 7.0       Increase water intake       Take at least one BP reading per week at various times of the day             Follow up: No follow-ups on file.    After visit summary was printed and given to patient upon  discharge today.  Patient goals and care plan are included in After Visit Summary.

## 2023-01-24 ENCOUNTER — TELEPHONE (OUTPATIENT)
Dept: ADMINISTRATIVE | Facility: HOSPITAL | Age: 73
End: 2023-01-24
Payer: MEDICARE

## 2023-01-26 ENCOUNTER — OUTSIDE PLACE OF SERVICE (OUTPATIENT)
Dept: OPHTHALMOLOGY | Facility: CLINIC | Age: 73
End: 2023-01-26
Payer: MEDICARE

## 2023-01-26 PROCEDURE — 66984 XCAPSL CTRC RMVL W/O ECP: CPT | Mod: 79,LT,, | Performed by: OPHTHALMOLOGY

## 2023-01-26 PROCEDURE — 66984 PR REMOVAL, CATARACT, W/INSRT INTRAOC LENS, W/O ENDO CYCLO: ICD-10-PCS | Mod: 79,LT,, | Performed by: OPHTHALMOLOGY

## 2023-01-27 ENCOUNTER — OFFICE VISIT (OUTPATIENT)
Dept: OPHTHALMOLOGY | Facility: CLINIC | Age: 73
End: 2023-01-27
Payer: MEDICARE

## 2023-01-27 DIAGNOSIS — Z98.42 CATARACT EXTRACTION STATUS, LEFT: ICD-10-CM

## 2023-01-27 DIAGNOSIS — Z98.890 POST-OPERATIVE STATE: Primary | ICD-10-CM

## 2023-01-27 PROCEDURE — 99024 PR POST-OP FOLLOW-UP VISIT: ICD-10-PCS | Mod: POP,,, | Performed by: OPHTHALMOLOGY

## 2023-01-27 PROCEDURE — 99999 PR PBB SHADOW E&M-EST. PATIENT-LVL III: ICD-10-PCS | Mod: PBBFAC,,, | Performed by: OPHTHALMOLOGY

## 2023-01-27 PROCEDURE — 99999 PR PBB SHADOW E&M-EST. PATIENT-LVL III: CPT | Mod: PBBFAC,,, | Performed by: OPHTHALMOLOGY

## 2023-01-27 PROCEDURE — 99213 OFFICE O/P EST LOW 20 MIN: CPT | Mod: PBBFAC | Performed by: OPHTHALMOLOGY

## 2023-01-27 PROCEDURE — 99024 POSTOP FOLLOW-UP VISIT: CPT | Mod: POP,,, | Performed by: OPHTHALMOLOGY

## 2023-01-27 NOTE — PROGRESS NOTES
HPI     Post-op Evaluation            Comments: Patient here for 1 day post op. Patient Patient states vision   is great this morning. Patient had questions about readers as well.   Patient denies pain and discomfort.           Comments    TRF REFERRAL    1. PCIOL OD 11/17/22/ SY60WF 13.5/ CDE: 24.97  PCIOL OS 1/26/23  2. +DM II      DROP LESS          Last edited by David Mcnally on 1/27/2023  8:08 AM.            Assessment /Plan     For exam results, see Encounter Report.      ICD-10-CM ICD-9-CM    1. Post-operative state  Z98.890 V45.89       2. Cataract extraction status, left  Z98.42 V45.61           PO Day 1 S/P Phaco/IOL  left eye DROPLESS  Doing well.    Advised to use artificial tears and gel if needed for irritation    Reinstructed in importance of absolute compliance with Post-OP instructions including medications, shield at bedtime, and limitation of activities. Follow up appointments in approximately one and six weeks or call immediately for increased pain, redness or vision loss.

## 2023-02-01 ENCOUNTER — OFFICE VISIT (OUTPATIENT)
Dept: CARDIOLOGY | Facility: CLINIC | Age: 73
End: 2023-02-01
Payer: MEDICARE

## 2023-02-01 VITALS
HEART RATE: 56 BPM | BODY MASS INDEX: 29.66 KG/M2 | SYSTOLIC BLOOD PRESSURE: 136 MMHG | DIASTOLIC BLOOD PRESSURE: 80 MMHG | WEIGHT: 161.19 LBS | OXYGEN SATURATION: 98 % | HEIGHT: 62 IN

## 2023-02-01 DIAGNOSIS — I15.2 HYPERTENSION ASSOCIATED WITH DIABETES: ICD-10-CM

## 2023-02-01 DIAGNOSIS — I70.0 ATHEROSCLEROSIS OF AORTA: ICD-10-CM

## 2023-02-01 DIAGNOSIS — I10 ESSENTIAL HYPERTENSION: ICD-10-CM

## 2023-02-01 DIAGNOSIS — E11.59 HYPERTENSION ASSOCIATED WITH DIABETES: ICD-10-CM

## 2023-02-01 DIAGNOSIS — Z86.16 HISTORY OF COVID-19: ICD-10-CM

## 2023-02-01 DIAGNOSIS — I50.32 CHRONIC HEART FAILURE WITH PRESERVED EJECTION FRACTION: ICD-10-CM

## 2023-02-01 DIAGNOSIS — I47.10 SVT (SUPRAVENTRICULAR TACHYCARDIA): ICD-10-CM

## 2023-02-01 DIAGNOSIS — R00.2 PALPITATIONS: ICD-10-CM

## 2023-02-01 DIAGNOSIS — E11.69 MIXED DIABETIC HYPERLIPIDEMIA ASSOCIATED WITH TYPE 2 DIABETES MELLITUS: ICD-10-CM

## 2023-02-01 DIAGNOSIS — I70.0 ABDOMINAL AORTIC ATHEROSCLEROSIS: ICD-10-CM

## 2023-02-01 DIAGNOSIS — E78.2 MIXED DIABETIC HYPERLIPIDEMIA ASSOCIATED WITH TYPE 2 DIABETES MELLITUS: ICD-10-CM

## 2023-02-01 DIAGNOSIS — I47.19 PAROXYSMAL ATRIAL TACHYCARDIA: ICD-10-CM

## 2023-02-01 DIAGNOSIS — I25.10 CORONARY ARTERY DISEASE INVOLVING NATIVE CORONARY ARTERY OF NATIVE HEART WITHOUT ANGINA PECTORIS: Primary | ICD-10-CM

## 2023-02-01 DIAGNOSIS — K21.9 GASTROESOPHAGEAL REFLUX DISEASE WITHOUT ESOPHAGITIS: Chronic | ICD-10-CM

## 2023-02-01 DIAGNOSIS — E11.69 TYPE 2 DIABETES MELLITUS WITH OTHER SPECIFIED COMPLICATION, WITHOUT LONG-TERM CURRENT USE OF INSULIN: ICD-10-CM

## 2023-02-01 PROCEDURE — 99214 OFFICE O/P EST MOD 30 MIN: CPT | Mod: PBBFAC | Performed by: INTERNAL MEDICINE

## 2023-02-01 PROCEDURE — 99214 OFFICE O/P EST MOD 30 MIN: CPT | Mod: S$PBB,,, | Performed by: INTERNAL MEDICINE

## 2023-02-01 PROCEDURE — 99999 PR PBB SHADOW E&M-EST. PATIENT-LVL IV: CPT | Mod: PBBFAC,,, | Performed by: INTERNAL MEDICINE

## 2023-02-01 PROCEDURE — 99214 PR OFFICE/OUTPT VISIT, EST, LEVL IV, 30-39 MIN: ICD-10-PCS | Mod: S$PBB,,, | Performed by: INTERNAL MEDICINE

## 2023-02-01 PROCEDURE — 99999 PR PBB SHADOW E&M-EST. PATIENT-LVL IV: ICD-10-PCS | Mod: PBBFAC,,, | Performed by: INTERNAL MEDICINE

## 2023-02-01 RX ORDER — CANDESARTAN 16 MG/1
16 TABLET ORAL DAILY
Qty: 90 TABLET | Refills: 3 | Status: SHIPPED | OUTPATIENT
Start: 2023-02-01 | End: 2024-03-28

## 2023-02-01 RX ORDER — AMLODIPINE BESYLATE 5 MG/1
5 TABLET ORAL DAILY
Qty: 90 TABLET | Refills: 3 | Status: SHIPPED | OUTPATIENT
Start: 2023-02-01 | End: 2024-01-02 | Stop reason: SDUPTHER

## 2023-02-01 RX ORDER — METOPROLOL SUCCINATE 100 MG/1
100 TABLET, EXTENDED RELEASE ORAL DAILY
Qty: 90 TABLET | Refills: 3 | Status: SHIPPED | OUTPATIENT
Start: 2023-02-01 | End: 2024-03-28

## 2023-02-01 RX ORDER — SEMAGLUTIDE 1.34 MG/ML
0.25 INJECTION, SOLUTION SUBCUTANEOUS
Qty: 1 PEN | Refills: 1 | Status: SHIPPED | OUTPATIENT
Start: 2023-02-01 | End: 2023-02-16

## 2023-02-01 NOTE — PROGRESS NOTES
Patient is having an mri, ordered by neurology and would like 2 xanax pills , due to his claustrophobia Subjective:   Patient ID:  Venus Caldwell is a 73 y.o. female who presents for cardiac consult of Coronary Artery Disease and Tachycardia        The patient came in today for cardiac consult of Coronary Artery Disease and Tachycardia    Referring Physician: Sourav Hernandez MD   Reason for initial consult: HTN      Venus Caldwell is a 73 y.o. female with current medical conditions HFpEF, HTN, PSVT, DM, obesity, OA, uterine CA, GERD presents to follow up CV eval.     5/2/19  Bp has improved with Norvasc 5 mg, recently started by Dr. Hernandez. Has been feeling more weak and fatigue, SBP was in upper 90s lately. She had LHC with minor artery blockage no stents placed, she thinks she had CP but strong FH of CAD. She does get palpitations, a few times a day, lasts one or two beats and she feels ok.   Prior cardiologist Dr. Hannah. Does snore, no prior sleep study. Has gained > 25 lbs in last 6 months, pre-DM.     8/6/19  ECHO with grade 1 DD otherwise normal BIV function. BP has been fluctuating at times. Is on digital HTN program. She has not had sleep study. NO recent heart monitor. Overall active.     1/2/19  Frequent SVT runs, doubled Toprol to 100 mg twice a day. Sleep study negative. She had two bottles of valsartan, given by Dr. Hannah but wanted to take it off due to recall. She still feels palpitations occasionally worse with talking at times, she did not have much palpitations on valsartan. Will refer to EP.     7/2/20  Had eval by Dr. Torres - symptomatic SVT. He recommended EPS/RFA for definitive therapy. She will have SVT ablation - will hold metoprolol 3 days prior. Overall has been feeling well, rarely feels palpitations. No major heart racing. She has gained some weight lately.   ECG - NSR,    9/9/21  Follow up since 7/2020. She has shoulder pain. She did not have SVT ablation. She is unsure why she postponed/canceleld the ablation. She is feeling more palpitations at times now. Her HR occ goes  over 100.     Recent Readings 2021   SBP (mmHg) 114 138 136 144 106   DBP (mmHg) 62 75 69 75 65   Pulse 65 66 67 59 68     22  BP and HR stable today. Pt's  was sched with me too but was positive COVID, he had upcoming throat surgery. Pt and wife are not symptomatic. She went to ER last month for bronchitis and low BP improved with IVF and steroids.   ECG - sinus piper V rate 55, anteroinferior TWI - old    22    Recent Readings 2022   SBP (mmHg) 106 123 127 159 132   DBP (mmHg) 62 71 68 76 70   Pulse 64 62 61 61 61       BP and HR well controlled today. BMI 28 - 163 lbs. She occ does not eat much, A1c has improved, cannot digest peanuts had been to ER in past for abd pain ?diverticulitis. She had abd symptoms recently with eating walnuts.     23    Recent Readings 2022   SBP (mmHg) 116 112 124 159 125   DBP (mmHg) 70 66 74 91 74   Pulse 58 60 66 63 63     Recent iron levels remains low but mildly improved from prior. Lipids - LDL stable, mildly inc TGs. BP and HR stable. She had b/l cataracts surgery did well. She had some     Patient feels no chest pain, no sob, no leg swelling, no PND,  no syncope, no CNS symptoms.    Patient has fairly good exercise tolerance.    Patient is compliant with medications.    Prior ECG - NSR, inferior infarct    FH - father had MI in age 58, brother  from MI 58    TEST DESCRIPTION   1-CARDIO EVENT RECORDING FROM 19 TO 19    2-INDICATIONS- PALPITATIONS    CONCLUSIONS   Patient had a min HR of 54 bpm, max HR of 200 bpm, and avg HR of 78  bpm. Predominant underlying rhythm was Sinus Rhythm. 93  Supraventricular Tachycardia runs occurred, the run with the fastest  interval lasting 12 mins 38 secs with a max rate of 200 bpm, the longest  lasting 44 mins 15 secs with an avg rate of 123 bpm. Supraventricular  Tachycardia  was detected within +/- 45 seconds of symptomatic patient  event(s). Isolated SVEs were rare (<1.0%), SVE Couplets were rare  (<1.0%), and SVE Triplets were rare (<1.0%). Isolated VEs were rare  (<1.0%, 970), VE Couplets were rare (<1.0%, 12), and VE Triplets were  rare (<1.0%, 1).    This document has been electronically    SIGNED BY: Jarrett Hussein MD On: 09/16/2019 09:28    2d ECHO  05/11/2019  CONCLUSIONS     1 - Concentric hypertrophy.     2 - No wall motion abnormalities.     3 - Normal left ventricular systolic function (EF 60-65%).     4 - Impaired LV relaxation, normal LAP (grade 1 diastolic dysfunction).     5 - Normal right ventricular systolic function .     6 - The estimated PA systolic pressure is greater than 22 mmHg.       Past Medical History:   Diagnosis Date    Arthritis     Cancer, uterine     Coronary artery disease     Diabetes mellitus     prediabetes    Diabetes mellitus, type 2     Digestive disorder     DM (diabetes mellitus) 08/2019    BS doesn't check 12/16/2019    DM (diabetes mellitus) 08/2019    BS doesn't check 06/29/2021    Hypertension     Multiple thyroid nodules 7/8/2021    Ovarian cancer     Sciatica     Sleep apnea        Past Surgical History:   Procedure Laterality Date    CHOLECYSTECTOMY      COLONOSCOPY N/A 8/1/2018    Procedure: COLONOSCOPY;  Surgeon: Yrn Hunter III, MD;  Location: [facility];  Service: Endoscopy;  Laterality: N/A;    COLONOSCOPY N/A 6/30/2022    Procedure: COLONOSCOPY;  Surgeon: Mica Rodriguez MD;  Location: [facility];  Service: Endoscopy;  Laterality: N/A;    ESOPHAGOGASTRODUODENOSCOPY N/A 3/17/2021    Procedure: EGD (ESOPHAGOGASTRODUODENOSCOPY) (Dr. EMILIO billings);  Surgeon: Coy Ortiz MD;  Location: [facility];  Service: Endoscopy;  Laterality: N/A;    HYSTERECTOMY      JOINT REPLACEMENT Right     hip replacement    SURGICAL EXCISION OF ANAL LESION N/A 8/29/2018    Procedure: EXCISION, LESION, ANUS;  Surgeon: Ynr Balderrama MD;   Location: [facility] OR;  Service: General;  Laterality: N/A;    TOTAL KNEE ARTHROPLASTY Bilateral        Social History     Tobacco Use    Smoking status: Never    Smokeless tobacco: Never   Substance Use Topics    Alcohol use: No    Drug use: No       Family History   Problem Relation Age of Onset    Diabetes Mother     Cataracts Mother     Diabetes Brother     Cataracts Maternal Grandmother     Breast cancer Maternal Aunt        Patient's Medications   New Prescriptions    No medications on file   Previous Medications    AMLODIPINE (NORVASC) 5 MG TABLET    Take 1 tablet (5 mg total) by mouth once daily.    ASPIRIN (ECOTRIN) 81 MG EC TABLET    Take 1 tablet (81 mg total) by mouth once daily.    ATORVASTATIN (LIPITOR) 40 MG TABLET    Take 1 tablet (40 mg total) by mouth every evening.    AZELASTINE (ASTELIN) 137 MCG (0.1 %) NASAL SPRAY    1 spray (137 mcg total) by Nasal route 2 (two) times daily.    BETAMETHASONE DIPROPIONATE (DIPROLENE) 0.05 % CREAM    as needed.     CANDESARTAN (ATACAND) 16 MG TABLET    Take 1 tablet (16 mg total) by mouth once daily.    CETIRIZINE (ZYRTEC) 10 MG TABLET    Take 10 mg by mouth once daily.    CHOLECALCIFEROL, VITAMIN D3, 5,000 UNIT TAB    Take 1,000 Units by mouth once daily.     FLUTICASONE PROPIONATE (FLONASE) 50 MCG/ACTUATION NASAL SPRAY    1 spray (50 mcg total) by Each Nostril route once daily.    GABAPENTIN (NEURONTIN) 300 MG CAPSULE    Take 1 capsule (300 mg total) by mouth 3 (three) times daily.    GAVILYTE-G 236-22.74-6.74 -5.86 GRAM SUSPENSION    Take 4,000 mLs by mouth.    HYDROCORTISONE 2.5 % CREAM    Apply to rash twice daily as needed for rash    IPRATROPIUM-ALBUTEROL (COMBIVENT)  MCG/ACTUATION INHALER    Inhale 1 puff into the lungs every 6 (six) hours as needed for Wheezing or Shortness of Breath (or Cough). Rescue    KETOCONAZOLE (NIZORAL) 2 % CREAM    Apply to rash twice daily as needed for rash    MELOXICAM (MOBIC) 7.5 MG TABLET    TAKE 1 TABLET DAILY     "METFORMIN (GLUCOPHAGE) 500 MG TABLET    Take 1 tablet (500 mg total) by mouth daily with breakfast.    METOPROLOL SUCCINATE (TOPROL-XL) 100 MG 24 HR TABLET    Take 1 tablet (100 mg total) by mouth once daily.    MUPIROCIN (BACTROBAN) 2 % OINTMENT    Apply topically 3 (three) times daily.    OMEPRAZOLE (PRILOSEC) 20 MG CAPSULE    Take 20 mg by mouth once daily.    TRIAMCINOLONE ACETONIDE 0.1% (KENALOG) 0.1 % CREAM    SMARTSI Application Topical 2-3 Times Daily    VALACYCLOVIR (VALTREX) 500 MG TABLET    Take 500 mg by mouth as needed.    Modified Medications    No medications on file   Discontinued Medications    ALBUTEROL (PROVENTIL/VENTOLIN HFA) 90 MCG/ACTUATION INHALER    Inhale 1-2 puffs into the lungs every 6 (six) hours as needed for Wheezing. Rescue    METHYLPREDNISOLONE (MEDROL DOSEPACK) 4 MG TABLET    use as directed       Review of Systems   Constitutional:  Positive for malaise/fatigue.   HENT: Negative.     Eyes: Negative.    Respiratory: Negative.     Cardiovascular:  Positive for palpitations.   Gastrointestinal: Negative.    Genitourinary: Negative.    Musculoskeletal: Negative.    Skin: Negative.    Neurological: Negative.    Endo/Heme/Allergies: Negative.    Psychiatric/Behavioral: Negative.     All 12 systems otherwise negative.    Wt Readings from Last 3 Encounters:   23 73.1 kg (161 lb 2.5 oz)   23 73.1 kg (161 lb 2.5 oz)   23 73.3 kg (161 lb 9.6 oz)     Temp Readings from Last 3 Encounters:   23 98.1 °F (36.7 °C) (Tympanic)   23 97.4 °F (36.3 °C) (Temporal)   22 98.9 °F (37.2 °C) (Tympanic)     BP Readings from Last 3 Encounters:   23 136/80   23 124/78   23 114/74     Pulse Readings from Last 3 Encounters:   23 (!) 56   23 60   23 62       /80   Pulse (!) 56   Ht 5' 2" (1.575 m)   Wt 73.1 kg (161 lb 2.5 oz)   SpO2 98%   BMI 29.48 kg/m²     Objective:   Physical Exam  Constitutional:       General: She is not in " acute distress.     Appearance: She is well-developed. She is not diaphoretic.   HENT:      Head: Normocephalic and atraumatic.      Mouth/Throat:      Pharynx: No oropharyngeal exudate.   Eyes:      General: No scleral icterus.        Right eye: No discharge.         Left eye: No discharge.   Pulmonary:      Effort: Pulmonary effort is normal. No respiratory distress.   Skin:     Coloration: Skin is not pale.      Findings: No erythema or rash.   Neurological:      Mental Status: She is alert and oriented to person, place, and time.   Psychiatric:         Behavior: Behavior normal.         Thought Content: Thought content normal.         Judgment: Judgment normal.       Lab Results   Component Value Date     01/12/2023    K 3.6 01/12/2023     01/12/2023    CO2 25 01/12/2023    BUN 17 01/12/2023    CREATININE 0.7 01/12/2023     (H) 01/12/2023    HGBA1C 6.2 (H) 08/16/2022    AST 20 01/12/2023    ALT 27 01/12/2023    ALBUMIN 3.3 (L) 01/12/2023    PROT 7.0 01/12/2023    BILITOT 0.6 01/12/2023    WBC 8.00 01/12/2023    HGB 11.7 (L) 01/12/2023    HCT 35.3 (L) 01/12/2023    MCV 89 01/12/2023     01/12/2023    INR 1.0 06/14/2018    TSH 0.554 08/16/2022    CHOL 133 08/16/2022    HDL 41 08/16/2022    LDLCALC 55.8 (L) 08/16/2022    TRIG 181 (H) 08/16/2022     Assessment:      1. Coronary artery disease involving native coronary artery of native heart without angina pectoris    2. Paroxysmal atrial tachycardia    3. Chronic heart failure with preserved ejection fraction    4. Palpitations    5. SVT (supraventricular tachycardia)    6. Atherosclerosis of aorta    7. Hypertension associated with diabetes    8. Abdominal aortic atherosclerosis    9. Mixed diabetic hyperlipidemia associated with type 2 diabetes mellitus    10. Essential hypertension    11. BMI 28.0-28.9,adult    12. History of COVID-19          Plan:   1. CAD, non obs with abd and aortic atherosclerosis   - no CP  - cont asa, statin,  BB    2. HTN   - Bp well controlled  - monitor for low BP    3. Palpitations - intermittent with PSVT/PAT  - cont BB  -  follow up with Dr. Torres    4. Sleep apnea symptoms  - sleep study - negative in 2019    5. HFpEF  - low salt diet  - euvolemic     6. DM2/overweight - A1c 7.1 --> 6.7 --> 6.4 --> 6.2; BMI 28 - 163 lbs.  --> 161 lbs   - cont tx per PCP  - cont weight loss with diet/exercise   - start Ozempic 0.25 - insurance needs to approve - through [facility]    7. HLD   - cont statin  - TGs mildly elevated     8. h/o  COVID 19; FE def anemia   - s/p tx  - stable now, but more tired   - may need further iron infusion - taking iron orally QOD    9. GERD  - cont PPI    Thank you for allowing me to participate in this patient's care. Please do not hesitate to contact me with any questions or concerns. Consult note has been forwarded to the referral physician.

## 2023-02-09 ENCOUNTER — TELEPHONE (OUTPATIENT)
Dept: CARDIOTHORACIC SURGERY | Facility: CLINIC | Age: 73
End: 2023-02-09
Payer: MEDICARE

## 2023-02-09 NOTE — TELEPHONE ENCOUNTER
----- Message from Kvng Saldana MA sent at 2/9/2023  4:23 PM CST -----  Contact: Camille Munguia  Can you assist with this?  ----- Message -----  From: Josette Soler  Sent: 2/9/2023   3:39 PM CST  To: Jose Lassiter Staff    Camille Munguia is calling [place] [facility] Please call [phone]  Reference Key # [id number]

## 2023-02-09 NOTE — TELEPHONE ENCOUNTER
Initiated PA for Ozempic through CoverMyMeds.    ePA case completed - No action taken. See previous response.      Trulicity is a covered medication under the patients plan. At this time they do not cover Ozempic or Mounjaro.

## 2023-02-10 ENCOUNTER — OFFICE VISIT (OUTPATIENT)
Dept: INTERNAL MEDICINE | Facility: CLINIC | Age: 73
End: 2023-02-10
Payer: MEDICARE

## 2023-02-10 ENCOUNTER — OFFICE VISIT (OUTPATIENT)
Dept: OPHTHALMOLOGY | Facility: CLINIC | Age: 73
End: 2023-02-10
Payer: MEDICARE

## 2023-02-10 VITALS
HEIGHT: 62 IN | OXYGEN SATURATION: 97 % | SYSTOLIC BLOOD PRESSURE: 136 MMHG | HEART RATE: 62 BPM | WEIGHT: 163.38 LBS | DIASTOLIC BLOOD PRESSURE: 76 MMHG | BODY MASS INDEX: 30.07 KG/M2

## 2023-02-10 DIAGNOSIS — Z96.1 PSEUDOPHAKIA OF BOTH EYES: ICD-10-CM

## 2023-02-10 DIAGNOSIS — I47.10 SVT (SUPRAVENTRICULAR TACHYCARDIA): Chronic | ICD-10-CM

## 2023-02-10 DIAGNOSIS — D69.2 SENILE PURPURA: ICD-10-CM

## 2023-02-10 DIAGNOSIS — I70.0 ATHEROSCLEROSIS OF AORTA: Chronic | ICD-10-CM

## 2023-02-10 DIAGNOSIS — Z00.00 ENCOUNTER FOR PREVENTIVE HEALTH EXAMINATION: Primary | ICD-10-CM

## 2023-02-10 DIAGNOSIS — Z98.890 POST-OPERATIVE STATE: Primary | ICD-10-CM

## 2023-02-10 DIAGNOSIS — E78.5 HYPERLIPIDEMIA ASSOCIATED WITH TYPE 2 DIABETES MELLITUS: ICD-10-CM

## 2023-02-10 DIAGNOSIS — R91.1 PULMONARY NODULE: ICD-10-CM

## 2023-02-10 DIAGNOSIS — Z85.42 HISTORY OF ENDOMETRIAL CANCER: ICD-10-CM

## 2023-02-10 DIAGNOSIS — J98.4 CRLD (CHRONIC RESTRICTIVE LUNG DISEASE): ICD-10-CM

## 2023-02-10 DIAGNOSIS — E11.49 TYPE II DIABETES MELLITUS WITH NEUROLOGICAL MANIFESTATIONS: ICD-10-CM

## 2023-02-10 DIAGNOSIS — E04.2 MULTIPLE THYROID NODULES: ICD-10-CM

## 2023-02-10 DIAGNOSIS — G47.33 OSA (OBSTRUCTIVE SLEEP APNEA): ICD-10-CM

## 2023-02-10 DIAGNOSIS — I47.19 PAROXYSMAL ATRIAL TACHYCARDIA: Chronic | ICD-10-CM

## 2023-02-10 DIAGNOSIS — I50.32 CHRONIC HEART FAILURE WITH PRESERVED EJECTION FRACTION: Chronic | ICD-10-CM

## 2023-02-10 DIAGNOSIS — D73.89 NODULE OF SPLEEN: ICD-10-CM

## 2023-02-10 DIAGNOSIS — M85.80 OSTEOPENIA, UNSPECIFIED LOCATION: ICD-10-CM

## 2023-02-10 DIAGNOSIS — E11.69 HYPERLIPIDEMIA ASSOCIATED WITH TYPE 2 DIABETES MELLITUS: ICD-10-CM

## 2023-02-10 DIAGNOSIS — I10 HYPERTENSION, UNSPECIFIED TYPE: ICD-10-CM

## 2023-02-10 DIAGNOSIS — D64.9 ANEMIA, UNSPECIFIED TYPE: ICD-10-CM

## 2023-02-10 DIAGNOSIS — I25.10 CORONARY ARTERY DISEASE INVOLVING NATIVE CORONARY ARTERY OF NATIVE HEART WITHOUT ANGINA PECTORIS: Chronic | ICD-10-CM

## 2023-02-10 PROCEDURE — G0439 PR MEDICARE ANNUAL WELLNESS SUBSEQUENT VISIT: ICD-10-PCS | Mod: ,,, | Performed by: NURSE PRACTITIONER

## 2023-02-10 PROCEDURE — 99024 PR POST-OP FOLLOW-UP VISIT: ICD-10-PCS | Mod: POP,,, | Performed by: OPTOMETRIST

## 2023-02-10 PROCEDURE — 99999 PR PBB SHADOW E&M-EST. PATIENT-LVL V: ICD-10-PCS | Mod: PBBFAC,,, | Performed by: NURSE PRACTITIONER

## 2023-02-10 PROCEDURE — 99024 POSTOP FOLLOW-UP VISIT: CPT | Mod: POP,,, | Performed by: OPTOMETRIST

## 2023-02-10 PROCEDURE — 99999 PR PBB SHADOW E&M-EST. PATIENT-LVL II: ICD-10-PCS | Mod: PBBFAC,,, | Performed by: OPTOMETRIST

## 2023-02-10 PROCEDURE — 99212 OFFICE O/P EST SF 10 MIN: CPT | Mod: PBBFAC,27 | Performed by: OPTOMETRIST

## 2023-02-10 PROCEDURE — G0439 PPPS, SUBSEQ VISIT: HCPCS | Mod: ,,, | Performed by: NURSE PRACTITIONER

## 2023-02-10 PROCEDURE — 99999 PR PBB SHADOW E&M-EST. PATIENT-LVL V: CPT | Mod: PBBFAC,,, | Performed by: NURSE PRACTITIONER

## 2023-02-10 PROCEDURE — 99215 OFFICE O/P EST HI 40 MIN: CPT | Mod: PBBFAC | Performed by: NURSE PRACTITIONER

## 2023-02-10 PROCEDURE — 99999 PR PBB SHADOW E&M-EST. PATIENT-LVL II: CPT | Mod: PBBFAC,,, | Performed by: OPTOMETRIST

## 2023-02-10 RX ORDER — LANOLIN ALCOHOL/MO/W.PET/CERES
1 CREAM (GRAM) TOPICAL
COMMUNITY

## 2023-02-10 NOTE — PROGRESS NOTES
HPI     Post-op Evaluation     Additional comments: Pt here for 1 wk PCIOL OS PO. No pain or discomfort.   [facility] stable.            Comments    1. PCIOL OD 11/17/22/ SY60WF 13.5/ CDE: 24.97  PCIOL OS 1/26/23/ SY60WF 14.0/ CDE: 23.80  2. +DM II            Last edited by Eben Simmons MA on 2/10/2023 10:00 AM.            Assessment /Plan     For exam results, see Encounter Report.    Post-operative state    Pseudophakia of both eyes      Stable IOL OU.  No cell or flare OU    RTC 4 weeks recheck

## 2023-02-10 NOTE — PROGRESS NOTES
"  Venus Caldwell presented for a  Medicare AWV and comprehensive Health Risk Assessment today. The following components were reviewed and updated:    Medical history  Family History  Social history  Allergies and Current Medications  Health Risk Assessment  Health Maintenance  Care Team         ** See Completed Assessments for Annual Wellness Visit within the encounter summary.**         The following assessments were completed:  Living Situation  CAGE  Depression Screening  Timed Get Up and Go  Whisper Test  Cognitive Function Screening  Nutrition Screening  ADL Screening  PAQ Screening        Vitals:    02/10/23 0852   BP: 136/76   Pulse: 62   SpO2: 97%   Weight: 74.1 kg (163 lb 5.8 oz)   Height: 5' 2.4" (1.585 m)     Body mass index is 29.5 kg/m².  Physical Exam  Vitals and nursing note reviewed.   Constitutional:       Appearance: She is well-developed.   HENT:      Head: Normocephalic.   Cardiovascular:      Rate and Rhythm: Normal rate and regular rhythm.      Heart sounds: Normal heart sounds.   Pulmonary:      Effort: Pulmonary effort is normal. No respiratory distress.      Breath sounds: Normal breath sounds.   Abdominal:      Palpations: Abdomen is soft. There is no mass.      Tenderness: There is no abdominal tenderness.   Musculoskeletal:         General: Normal range of motion.   Skin:     General: Skin is warm and dry.   Neurological:      Mental Status: She is alert and oriented to person, place, and time.      Motor: No abnormal muscle tone.   Psychiatric:         Speech: Speech normal.         Behavior: Behavior normal.             Diagnoses and health risks identified today and associated recommendations/orders:    1. Encounter for preventive health examination     Review for Opioid Screening: Pt does not have Rx for Opioids      Review for Substance Use Disorders: Patient does not use substance  per chart     Reports cardiac conditions are stable.    Encouraged healthy diet and exercise as tolerated "       2. Chronic heart failure with preserved ejection fraction  Discussed s/s of heart failure (patient denies any s/s) and advised to follow up with cardiologist/ER (if severe) if occur. Patient expressed understanding.    Stable. Continue current treatment plan as previously prescribed with your  cardiologist.     3. SVT (supraventricular tachycardia)  Stable. Continue current treatment plan as previously prescribed with your  cardiologist.     4. Paroxysmal atrial tachycardia  Stable. Continue current treatment plan as previously prescribed with your  cardiologist.     5. Atherosclerosis of aorta  Ct 8/20  Stable. Continue current treatment plan as previously prescribed with your  cardiologist.     6. Hyperlipidemia associated with type 2 diabetes mellitus  A1c 6.2  Lipid-triglycerides elevated  Continue current treatment plan as previously prescribed with your  pcp and cardiologist.     7. Type II diabetes mellitus with neurological manifestations  Continue current treatment plan as previously prescribed with your  pcp     8. Senile purpura  Chronic  See pic for documentation  Advised to follow up with PCP for further evaluation and recommendations. Patient expressed understanding.      9. Coronary artery disease involving native coronary artery of native heart without angina pectoris  Continue current treatment plan as previously prescribed with your  cardiologist.     10. Hypertension, unspecified type  Continue current treatment plan as previously prescribed with your  pcp and cardiologist.     11. CRLD (chronic restrictive lung disease)  Continue current treatment plan as previously prescribed with your  pulmonologist.     12. Pulmonary nodule  Ct 8/20  Continue current treatment plan as previously prescribed with your  pulmonologist.     13. NICKI (obstructive sleep apnea)  Discussed risks associated with untreated sleep apnea  Declines scheduling pulmonary apt at this time. She will discuss with  PCP  Advised to follow up with PCP/pulmonologist for further evaluation and recommendations. Patient expressed understanding.      14. Nodule of spleen  Ct 8/20  Continue current treatment plan as previously prescribed with your  pcp     15. Multiple thyroid nodules  US 8/22  Continue current treatment plan as previously prescribed with your  pcp     16. Osteopenia, unspecified location  Dexa 8/20  Continue current treatment plan as previously prescribed with your  pcp     17. Anemia, unspecified type  Continue current treatment plan as previously prescribed with your  hematologist.     18. History of endometrial cancer  Continue current treatment plan as previously prescribed with your  oncologist.       Provided Venus with a 5-10 year written screening schedule and personal prevention plan. Recommendations were developed using the USPSTF age appropriate recommendations. Education, counseling, and referrals were provided as needed. After Visit Summary printed and given to patient which includes a list of additional screenings\tests needed.    Follow up in about 1 year (around 2/10/2024) for awv.    Sierra Cervantes, NP  I offered to discuss advanced care planning, including how to pick a person who would make decisions for you if you were unable to make them for yourself, called a health care power of , and what kind of decisions you might make such as use of life sustaining treatments such as ventilators and tube feeding when faced with a life limiting illness recorded on a living will that they will need to know. (How you want to be cared for as you near the end of your natural life)     X Patient is interested in learning more about how to make advanced directives.  I provided them paperwork and offered to discuss this with them.

## 2023-02-10 NOTE — PATIENT INSTRUCTIONS
Counseling and Referral of Other Preventative  (Italic type indicates deductible and co-insurance are waived)    Patient Name: Venus Caldwell  Today's Date: 2/10/2023    Health Maintenance       Date Due Completion Date    Aspirin/Antiplatelet Therapy Never done ---    Hemoglobin A1c 02/16/2023 8/16/2022    DEXA Scan 08/13/2023 8/13/2020    Diabetes Urine Screening 08/16/2023 8/16/2022    Foot Exam 08/16/2023 8/16/2022    Override on 12/30/2020: Done    Lipid Panel 08/16/2023 8/16/2022    Mammogram 08/30/2023 8/30/2022    Eye Exam 11/02/2023 11/2/2022    Override on 12/16/2019: Done    High Dose Statin 02/01/2024 2/1/2023    TETANUS VACCINE 09/10/2031 9/10/2021        No orders of the defined types were placed in this encounter.    The following information is provided to all patients.  This information is to help you find resources for any of the problems found today that may be affecting your health:                Living healthy guide: www.[facility].[place]      Understanding Diabetes: www.diabetes.org      Eating healthy: www.cdc.gov/healthyweight      CDC home safety checklist: www.cdc.gov/steadi/patient.html      Agency on Aging: www.goea.[place].[facility]      Alcoholics anonymous (AA): www.aa.org      Physical Activity: www.vik.nih.gov/wd9cukx      Tobacco use: www.quitwithusla.org

## 2023-02-16 ENCOUNTER — LAB VISIT (OUTPATIENT)
Dept: LAB | Facility: HOSPITAL | Age: 73
End: 2023-02-16
Attending: FAMILY MEDICINE
Payer: MEDICARE

## 2023-02-16 ENCOUNTER — OFFICE VISIT (OUTPATIENT)
Dept: INTERNAL MEDICINE | Facility: CLINIC | Age: 73
End: 2023-02-16
Payer: MEDICARE

## 2023-02-16 VITALS
HEIGHT: 62 IN | SYSTOLIC BLOOD PRESSURE: 126 MMHG | OXYGEN SATURATION: 96 % | HEART RATE: 64 BPM | TEMPERATURE: 98 F | WEIGHT: 161.38 LBS | BODY MASS INDEX: 29.7 KG/M2 | DIASTOLIC BLOOD PRESSURE: 80 MMHG

## 2023-02-16 DIAGNOSIS — E78.2 MIXED DIABETIC HYPERLIPIDEMIA ASSOCIATED WITH TYPE 2 DIABETES MELLITUS: Chronic | ICD-10-CM

## 2023-02-16 DIAGNOSIS — I15.2 HYPERTENSION ASSOCIATED WITH DIABETES: Chronic | ICD-10-CM

## 2023-02-16 DIAGNOSIS — E11.69 MIXED DIABETIC HYPERLIPIDEMIA ASSOCIATED WITH TYPE 2 DIABETES MELLITUS: Chronic | ICD-10-CM

## 2023-02-16 DIAGNOSIS — I25.10 CORONARY ARTERY DISEASE INVOLVING NATIVE CORONARY ARTERY OF NATIVE HEART WITHOUT ANGINA PECTORIS: Chronic | ICD-10-CM

## 2023-02-16 DIAGNOSIS — E11.59 HYPERTENSION ASSOCIATED WITH DIABETES: Chronic | ICD-10-CM

## 2023-02-16 DIAGNOSIS — E66.9 OBESITY (BMI 30.0-34.9): ICD-10-CM

## 2023-02-16 DIAGNOSIS — I50.32 CHRONIC HEART FAILURE WITH PRESERVED EJECTION FRACTION: Chronic | ICD-10-CM

## 2023-02-16 DIAGNOSIS — E11.49 TYPE II DIABETES MELLITUS WITH NEUROLOGICAL MANIFESTATIONS: Primary | ICD-10-CM

## 2023-02-16 DIAGNOSIS — I70.0 ATHEROSCLEROSIS OF AORTA: Chronic | ICD-10-CM

## 2023-02-16 DIAGNOSIS — I47.19 PAROXYSMAL ATRIAL TACHYCARDIA: Chronic | ICD-10-CM

## 2023-02-16 DIAGNOSIS — E11.49 TYPE II DIABETES MELLITUS WITH NEUROLOGICAL MANIFESTATIONS: ICD-10-CM

## 2023-02-16 DIAGNOSIS — I47.10 SVT (SUPRAVENTRICULAR TACHYCARDIA): Chronic | ICD-10-CM

## 2023-02-16 LAB
ESTIMATED AVG GLUCOSE: 140 MG/DL (ref 68–131)
HBA1C MFR BLD: 6.5 % (ref 4–5.6)

## 2023-02-16 PROCEDURE — 83036 HEMOGLOBIN GLYCOSYLATED A1C: CPT | Performed by: FAMILY MEDICINE

## 2023-02-16 PROCEDURE — 99999 PR PBB SHADOW E&M-EST. PATIENT-LVL IV: ICD-10-PCS | Mod: PBBFAC,,, | Performed by: FAMILY MEDICINE

## 2023-02-16 PROCEDURE — 99214 OFFICE O/P EST MOD 30 MIN: CPT | Mod: PBBFAC | Performed by: FAMILY MEDICINE

## 2023-02-16 PROCEDURE — 99214 OFFICE O/P EST MOD 30 MIN: CPT | Mod: S$PBB,,, | Performed by: FAMILY MEDICINE

## 2023-02-16 PROCEDURE — 99999 PR PBB SHADOW E&M-EST. PATIENT-LVL IV: CPT | Mod: PBBFAC,,, | Performed by: FAMILY MEDICINE

## 2023-02-16 PROCEDURE — 36415 COLL VENOUS BLD VENIPUNCTURE: CPT | Performed by: FAMILY MEDICINE

## 2023-02-16 PROCEDURE — 99214 PR OFFICE/OUTPT VISIT, EST, LEVL IV, 30-39 MIN: ICD-10-PCS | Mod: S$PBB,,, | Performed by: FAMILY MEDICINE

## 2023-02-16 RX ORDER — ASPIRIN 81 MG/1
81 TABLET ORAL DAILY
Start: 2023-02-16

## 2023-02-16 RX ORDER — DULAGLUTIDE 0.75 MG/.5ML
0.75 INJECTION, SOLUTION SUBCUTANEOUS
Qty: 12 PEN | Refills: 3 | Status: SHIPPED | OUTPATIENT
Start: 2023-02-16 | End: 2024-01-22

## 2023-02-16 RX ORDER — GABAPENTIN 300 MG/1
300 CAPSULE ORAL 3 TIMES DAILY
Qty: 270 CAPSULE | Refills: 3 | Status: SHIPPED | OUTPATIENT
Start: 2023-02-16 | End: 2024-03-28

## 2023-02-16 RX ORDER — METFORMIN HYDROCHLORIDE 500 MG/1
500 TABLET ORAL
Qty: 90 TABLET | Refills: 3 | Status: SHIPPED | OUTPATIENT
Start: 2023-02-16 | End: 2024-02-16

## 2023-02-16 NOTE — PROGRESS NOTES
Subjective:   Patient ID: Venus Caldwell is a 73 y.o. female.  Chief Complaint:  Follow-up    Presents for follow-up on chronic medical conditions    Last visit August 2022 for annual physical exam   Thyroid testing normal  Lipid panel with LDL at goal less than 100  A1c 6.2% and diabetes mellitus remains well controlled  Continue all current medications  Recheck A1c 6 months  Recheck lipid panel 1 year    Medical history for:  - Hypertension with CHF. On amlodipine 5 mg daily, Atacand 16 mg daily, Toprol- mg daily.  Reports compliance.  Denies side effects.  Blood pressure controlled.  No shortness of breath or swelling.  - SVT with PAT.  Followed by Cardiology.  Stable on beta-blocker.  - Coronary artery disease with known hyperlipidemia and aortic atherosclerosis.  Last LDL at goal. On aspirin 81 mg daily and Lipitor 40 mg daily.  Reports compliance.  Denies side effects.  No chest pain, claudication.  Cardiology recommended prescribe Ozempic for patient at last visit due to coexisting diabetes and coronary artery disease.  Medication not on formulary.  - Diabetes mellitus.  Last A1c 6.2% On metformin 500 mg daily.  Reports compliance.  Denies symptoms hypo/hyperglycemia.  Eye exam, foot exam, microalbumin up-to-date.  Needs repeat A1c.  Needs repeat A1c.  - Chronic low back pain with degenerative arthritis changes of her spine and likely underlying neuropathy.  Currently stable on gabapentin 3 mg 3 times a day   - Thyroid nodules.  Asymptomatic.  Last TSH normal.  Stable on repeat imaging August 2022.  - Splenic nodule.  Low density.  Incidental finding on CT chest for follow-up on her pulmonary nodules.  No additional evaluation needed.  - Chronic restrictive lung disease, interstitial fibrosis, pulmonary nodule.  Followed by pulmonology.  Cough stable and controlled on current inhaler regimen.  -  History colon polyps and diverticulosis. Colonoscopy performed in June 2022, normal     Health  maintenance up-to-date     No new complaints or concerns today      Current Outpatient Medications:     amLODIPine (NORVASC) 5 MG tablet, Take 1 tablet (5 mg total) by mouth once daily., Disp: 90 tablet, Rfl: 3    atorvastatin (LIPITOR) 40 MG tablet, Take 1 tablet (40 mg total) by mouth every evening., Disp: 90 tablet, Rfl: 3    azelastine (ASTELIN) 137 mcg (0.1 %) nasal spray, 1 spray (137 mcg total) by Nasal route 2 (two) times daily., Disp: 30 mL, Rfl: 11    benzocaine (HURRICAINE) 20 % Gel, Use as directed in the mouth or throat 4 (four) times daily as needed., Disp: , Rfl:     betamethasone dipropionate (DIPROLENE) 0.05 % cream, as needed. , Disp: , Rfl:     candesartan (ATACAND) 16 MG tablet, Take 1 tablet (16 mg total) by mouth once daily., Disp: 90 tablet, Rfl: 3    cetirizine (ZYRTEC) 10 MG tablet, Take 10 mg by mouth once daily., Disp: , Rfl:     cholecalciferol, vitamin D3, 5,000 unit Tab, Take 1,000 Units by mouth once daily. , Disp: , Rfl:     ferrous sulfate (FEOSOL) Tab tablet, Take 1 tablet by mouth daily with breakfast., Disp: , Rfl:     GAVILYTE-G 236-22.74-6.74 -5.86 gram suspension, Take 4,000 mLs by mouth., Disp: , Rfl:     hydrocortisone 2.5 % cream, Apply to rash twice daily as needed for rash, Disp: 60 g, Rfl: 4    ketoconazole (NIZORAL) 2 % cream, Apply to rash twice daily as needed for rash, Disp: 60 g, Rfl: 4    meloxicam (MOBIC) 7.5 MG tablet, TAKE 1 TABLET DAILY, Disp: 90 tablet, Rfl: 3    metoprolol succinate (TOPROL-XL) 100 MG 24 hr tablet, Take 1 tablet (100 mg total) by mouth once daily., Disp: 90 tablet, Rfl: 3    mupirocin (BACTROBAN) 2 % ointment, Apply topically 3 (three) times daily., Disp: 22 g, Rfl: 0    omeprazole (PRILOSEC) 20 MG capsule, Take 20 mg by mouth once daily., Disp: , Rfl:     triamcinolone acetonide 0.1% (KENALOG) 0.1 % cream, SMARTSI Application Topical 2-3 Times Daily, Disp: , Rfl:     valACYclovir (VALTREX) 500 MG tablet, Take 500 mg by mouth as needed. ,  "Disp: , Rfl:     aspirin (ECOTRIN) 81 MG EC tablet, Take 1 tablet (81 mg total) by mouth once daily., Disp: , Rfl:     dulaglutide (TRULICITY) 0.75 mg/0.5 mL pen injector, Inject 0.75 mg into the skin every 7 days., Disp: 12 pen, Rfl: 3    gabapentin (NEURONTIN) 300 MG capsule, Take 1 capsule (300 mg total) by mouth 3 (three) times daily., Disp: 270 capsule, Rfl: 3    metFORMIN (GLUCOPHAGE) 500 MG tablet, Take 1 tablet (500 mg total) by mouth daily with breakfast., Disp: 90 tablet, Rfl: 3    Review of Systems   Constitutional:  Negative for activity change, chills, diaphoresis, fatigue, fever and unexpected weight change.   HENT:  Negative for hearing loss, rhinorrhea and trouble swallowing.    Eyes:  Negative for discharge and visual disturbance.   Respiratory:  Negative for cough, chest tightness, shortness of breath and wheezing.    Cardiovascular:  Negative for chest pain, palpitations and leg swelling.   Gastrointestinal:  Negative for abdominal distention, abdominal pain, blood in stool, constipation, diarrhea, nausea and vomiting.   Endocrine: Negative for polydipsia, polyphagia and polyuria.   Genitourinary:  Negative for difficulty urinating, dysuria, flank pain, frequency, hematuria, menstrual problem and urgency.   Musculoskeletal:  Positive for back pain. Negative for myalgias.   Skin:  Negative for rash.   Neurological:  Negative for dizziness, syncope, weakness, light-headedness, numbness and headaches.   Psychiatric/Behavioral:  Negative for confusion, dysphoric mood and sleep disturbance. The patient is not nervous/anxious.      Objective:   /80 (BP Location: Right arm, Patient Position: Sitting, BP Method: Small (Manual))   Pulse 64   Temp 97.6 °F (36.4 °C) (Tympanic)   Ht 5' 2.4" (1.585 m)   Wt 73.2 kg (161 lb 6 oz)   SpO2 96%   BMI 29.14 kg/m²     Physical Exam  Vitals and nursing note reviewed.   Constitutional:       Appearance: Normal appearance. She is well-developed and " overweight.   Neck:      Vascular: No JVD.   Cardiovascular:      Rate and Rhythm: Normal rate and regular rhythm.      Heart sounds: Normal heart sounds.   Pulmonary:      Effort: Pulmonary effort is normal.      Breath sounds: Normal breath sounds.   Musculoskeletal:      Right lower leg: No edema.      Left lower leg: No edema.   Skin:     Findings: No rash.   Psychiatric:         Mood and Affect: Mood and affect normal.       Assessment:       ICD-10-CM ICD-9-CM   1. Type II diabetes mellitus with neurological manifestations  E11.49 250.60   2. Hypertension associated with diabetes  E11.59 250.80    I15.2 401.9   3. Chronic heart failure with preserved ejection fraction  I50.32 428.9   4. Mixed diabetic hyperlipidemia associated with type 2 diabetes mellitus  E11.69 250.80    E78.2 272.2   5. Coronary artery disease involving native coronary artery of native heart without angina pectoris  I25.10 414.01   6. Atherosclerosis of aorta  I70.0 440.0   7. Paroxysmal atrial tachycardia  I47.1 427.0   8. SVT (supraventricular tachycardia)  I47.1 427.89   9. Obesity (BMI 30.0-34.9)  E66.9 278.00     Plan:   Type II diabetes mellitus with neurological manifestations  -     Hemoglobin A1C; Future; Expected date: 02/16/2023  -     gabapentin (NEURONTIN) 300 MG capsule; Take 1 capsule (300 mg total) by mouth 3 (three) times daily.  Dispense: 270 capsule; Refill: 3  -     metFORMIN (GLUCOPHAGE) 500 MG tablet; Take 1 tablet (500 mg total) by mouth daily with breakfast.  Dispense: 90 tablet; Refill: 3  -     dulaglutide (TRULICITY) 0.75 mg/0.5 mL pen injector; Inject 0.75 mg into the skin every 7 days.  Dispense: 12 pen; Refill: 3  Stable.  Asymptomatic.  Last A1c at goal.    Recheck A1c today  Continue metformin 500 mg daily   Ozempic not on formulary  Try and see if can get Trulicity covered   Eye exam up-to-date  Microalbumin up-to-date  Foot exam stable     Hypertension associated with diabetes  Chronic heart failure with  preserved ejection fraction  Controlled.  Stable.  Asymptomatic.  BP at goal.    Continue current medication     Mixed diabetic hyperlipidemia associated with type 2 diabetes mellitus  Coronary artery disease involving native coronary artery of native heart without angina pectoris  Atherosclerosis of aorta  -     aspirin (ECOTRIN) 81 MG EC tablet; Take 1 tablet (81 mg total) by mouth once daily.  Controlled.  Stable.  Asymptomatic.  LDL at goal.    Continue Lipitor 40 mg daily  Continue aspirin 81 mg daily   See if can obtain coverage for Trulicity injections  Follow-up cardiology as scheduled    Paroxysmal atrial tachycardia  SVT (supraventricular tachycardia)  Stable.  Asymptomatic.    Continue current medications   Follow-up cardiology as scheduled     Obesity (BMI 30.0-34.9)  -     dulaglutide (TRULICITY) 0.75 mg/0.5 mL pen injector; Inject 0.75 mg into the skin every 7 days.  Dispense: 12 pen; Refill: 3    Return to clinic 6 months for annual physical exam or sooner if needed

## 2023-03-08 ENCOUNTER — HOSPITAL ENCOUNTER (EMERGENCY)
Facility: HOSPITAL | Age: 73
Discharge: HOME | End: 2023-03-08
Attending: EMERGENCY MEDICINE
Payer: MEDICARE

## 2023-03-08 VITALS
RESPIRATION RATE: 17 BRPM | HEIGHT: 62 IN | TEMPERATURE: 98 F | SYSTOLIC BLOOD PRESSURE: 173 MMHG | HEART RATE: 72 BPM | OXYGEN SATURATION: 98 % | DIASTOLIC BLOOD PRESSURE: 70 MMHG | BODY MASS INDEX: 29.52 KG/M2
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
## 2023-03-08 DIAGNOSIS — S00.03XA CONTUSION OF SCALP, INITIAL ENCOUNTER: Primary | ICD-10-CM

## 2023-03-08 PROCEDURE — 99284 EMERGENCY DEPT VISIT MOD MDM: CPT | Mod: 25

## 2023-03-08 NOTE — ED PROVIDER NOTES
"SCRIBE #1 NOTE: I, Ammy Jasen, am scribing for, and in the presence of, Liz Singletary MD. I have scribed the entire note.      History      Chief Complaint   Patient presents with    Fall     Pt tripped over her dog and hit her head on the table on the way down. Denies LOC. Denies blood thinners       Review of patient's allergies indicates:   Allergen Reactions    Sulfa (sulfonamide antibiotics) Anaphylaxis     Pt became hypoxic after taking sulfa drugs as a child      Buprenorphine Rash        HPI   HPI    3/8/2023, 4:56 PM   History obtained from the patient      History of Present Illness: Venus Caldwell is a 73 y.o. female patient with a PMHx of ovarian cancer, CAD, DM2, HTN, and multiple thyroid nodules who presents to the Emergency Department for an evaluation because she fell PTA. Pt reports that her  tripped over their dog which caused him to collide with her and make her fall over. She states that she hit her head on the floor during the fall. She denies LOC or taking blood thinners. Now, the only c/o a "knot" to the top of her head. Symptoms are constant and moderate in severity. No mitigating or exacerbating factors reported. No associated sxs reported. Patient denies any N/V/D, neck pain, back pain, arthralgias, myalgias, weakness, numbness, and all other sxs at this time. No prior Tx reported. No further complaints or concerns at this time.         Arrival mode: EMS    PCP: Sourav Hernandez MD       Past Medical History:  Past Medical History:   Diagnosis Date    Arthritis     Cancer, uterine     Coronary artery disease     Diabetes mellitus     prediabetes    Diabetes mellitus, type 2     Digestive disorder     DM (diabetes mellitus) 08/2019    BS doesn't check 12/16/2019    DM (diabetes mellitus) 08/2019    BS doesn't check 06/29/2021    Hypertension     Multiple thyroid nodules 7/8/2021    Ovarian cancer     Sciatica     Sleep apnea        Past Surgical History:  Past Surgical " "History:   Procedure Laterality Date    CATARACT EXTRACTION W/  INTRAOCULAR LENS IMPLANT Right 11/17/2022    CATARACT EXTRACTION W/  INTRAOCULAR LENS IMPLANT Left 01/26/2023    CHOLECYSTECTOMY      COLONOSCOPY N/A 08/01/2018    Procedure: COLONOSCOPY;  Surgeon: Yrn Hunter III, MD;  Location: [facility];  Service: Endoscopy;  Laterality: N/A;    COLONOSCOPY N/A 06/30/2022    Procedure: COLONOSCOPY;  Surgeon: Mica Rodriguez MD;  Location: [facility];  Service: Endoscopy;  Laterality: N/A;    ESOPHAGOGASTRODUODENOSCOPY N/A 03/17/2021    Procedure: EGD (ESOPHAGOGASTRODUODENOSCOPY) (Dr. EMILIO billings);  Surgeon: Coy Ortiz MD;  Location: [facility];  Service: Endoscopy;  Laterality: N/A;    HYSTERECTOMY      JOINT REPLACEMENT Right     hip replacement    SURGICAL EXCISION OF ANAL LESION N/A 08/29/2018    Procedure: EXCISION, LESION, ANUS;  Surgeon: Yrn Balderrama MD;  Location: [facility];  Service: General;  Laterality: N/A;    TOTAL KNEE ARTHROPLASTY Bilateral          Family History:  Family History   Problem Relation Age of Onset    Diabetes Mother     Cataracts Mother     Diabetes Brother     Cataracts Maternal Grandmother     Breast cancer Maternal Aunt        Social History:  Social History     Tobacco Use    Smoking status: Never    Smokeless tobacco: Never   Substance and Sexual Activity    Alcohol use: No    Drug use: No    Sexual activity: Not Currently       ROS   Review of Systems   Constitutional:  Negative for fever.   HENT:  Negative for sore throat.         (+) "knot" to the top of the head   Respiratory:  Negative for shortness of breath.    Cardiovascular:  Negative for chest pain.   Gastrointestinal:  Negative for diarrhea, nausea and vomiting.   Genitourinary:  Negative for dysuria.   Musculoskeletal:  Negative for arthralgias, back pain, myalgias and neck pain.   Skin:  Negative for rash.   Neurological:  Negative for weakness and numbness.        (-) LOC   Hematological:  Does not " "bruise/bleed easily.   All other systems reviewed and are negative.    Physical Exam      Initial Vitals [03/08/23 1626]   BP Pulse Resp Temp SpO2   (!) 168/80 68 16 98.3 °F (36.8 °C) 99 %      MAP       --          Physical Exam  Nursing Notes and Vital Signs Reviewed.  Constitutional: Patient is in no acute distress. Well-developed and well-nourished.  Head: There is a small contusion to the top of the head. Normocephalic.  Eyes: PERRL. EOM intact. Conjunctivae are not pale. No scleral icterus.  ENT: Mucous membranes are moist. Oropharynx is clear and symmetric.    Neck: Supple. Full ROM. No lymphadenopathy.  Cardiovascular: Regular rate. Regular rhythm. No murmurs, rubs, or gallops. Distal pulses are 2+ and symmetric.  Pulmonary/Chest: No respiratory distress. Clear to auscultation bilaterally. No wheezing or rales.  Abdominal: Soft and non-distended.  There is no tenderness.  No rebound, guarding, or rigidity.  Musculoskeletal: Moves all extremities. No obvious deformities. No edema.  Skin: Warm and dry.  Neurological:  Alert, awake, and appropriate.  Normal speech.  No acute focal neurological deficits are appreciated.  Psychiatric: Normal affect. Good eye contact. Appropriate in content.    ED Course    Procedures  ED Vital Signs:  Vitals:    03/08/23 1626 03/08/23 1806   BP: (!) 168/80 (!) 173/70   Pulse: 68 72   Resp: 16 17   Temp: 98.3 °F (36.8 °C) 98.2 °F (36.8 °C)   TempSrc: Oral Oral   SpO2: 99% 98%   Height: 5' 2" (1.575 m)        Abnormal Lab Results:  Labs Reviewed - No data to display       Imaging Results:  Imaging Results              CT Head Without Contrast (Final result)  Result time 03/08/23 17:46:06      Final result by Finn Rodriguez MD (03/08/23 17:46:06)                   Impression:      1.  Negative for acute intracranial process. Negative for hemorrhage, or skull fracture.    2.  Cerebral atrophy noted.  Intracranial atherosclerotic disease noted.  Small vessel ischemic changes " noted.    3.  Mild superior scalp swelling.    4.  Nonemergent findings as described above.    All CT scans at this facility are performed  using dose modulation techniques as appropriate to performed exam including the following:  automated exposure control; adjustment of mA and/or kV according to the patients size (this includes techniques or standardized protocols for targeted exams where dose is matched to indication/reason for exam: i.e. extremities or head);  iterative reconstruction technique.      Electronically signed by: Finn Rodriguez MD  Date:    03/08/2023  Time:    17:46               Narrative:    EXAMINATION:  CT HEAD WITHOUT CONTRAST    CLINICAL HISTORY:  Head trauma, minor (Age >= 65y);    TECHNIQUE:  Axial images through the brain and posterior fossa were obtained without the use of IV contrast.  Sagittal and coronal reconstructions are provided for review.    COMPARISON:  No comparison studies are available.    FINDINGS:  The ventricles are midline and the CSF spaces are prominent.  The gray-white matter junction is well preserved. Negative for intracranial vascular abnormalities. Negative for mass, mass effect, cerebral edema, hemorrhage or abnormal fluid collections.  Intracranial atherosclerotic disease noted.  Small vessel ischemic changes noted.  Left basal ganglia calcifications.  Hyperostosis frontalis interna.    Mild superior scalp swelling.  The skull and scalp are  intact.  Hypoplastic right mastoid air cells.  The rest of the paranasal sinuses, mastoid air cells, middle ears and ear canals are clear. The globes are intact.                                              The Emergency Provider reviewed the vital signs and test results, which are outlined above.    ED Discussion     6:01 PM: Reassessed pt at this time.   Discussed with pt all pertinent ED information and results. Discussed pt dx and plan of tx. Gave pt all f/u and return to the ED instructions. All questions and concerns  were addressed at this time. Pt expresses understanding of information and instructions, and is comfortable with plan to discharge. Pt is stable for discharge.    I discussed with patient and/or family/caretaker that evaluation in the ED does not suggest any emergent or life threatening medical conditions requiring immediate intervention beyond what was provided in the ED, and I believe patient is safe for discharge.  Regardless, an unremarkable evaluation in the ED does not preclude the development or presence of a serious of life threatening condition. As such, patient was instructed to return immediately for any worsening or change in current symptoms.           ED Medication(s):  Medications - No data to display     Follow-up Information       Sourav Hernandez MD. Schedule an appointment as soon as possible for a visit in 2 days.    Specialty: Family Medicine  Why: Return to the Emergency Room, If symptoms worsen  Contact information:  [id number]  [place] [address]  [phone]                             Discharge Medication List as of 3/8/2023  5:56 PM            Medical Decision Making    Medical Decision Making:   Clinical Tests:   Radiological Study: Ordered and Reviewed  ED Management:  Had mechanical fall at home, mild contusion noted to scalp, no laceration or bleeding, not on blood thinners, no concussion symptoms, no other complaints or injuries, CT head reviewed no acute process noted, stable for discharge.          Scribe Attestation:   Scribe #1: I performed the above scribed service and the documentation accurately describes the services I performed. I attest to the accuracy of the note.    Attending:   Physician Attestation Statement for Scribe #1: I, Liz Singletary MD, personally performed the services described in this documentation, as scribed by Ammy Aggarwal, in my presence, and it is both accurate and complete.          Clinical Impression       ICD-10-CM ICD-9-CM   1.  Contusion of scalp, initial encounter  S00.03XA 920   2. Accident due to mechanical fall without injury, initial encounter  W19.XXXA E888.9       Disposition:   Disposition: Discharged  Condition: Stable       Liz Singletary MD  03/08/23 5144

## 2023-03-09 ENCOUNTER — PATIENT MESSAGE (OUTPATIENT)
Dept: INTERNAL MEDICINE | Facility: CLINIC | Age: 73
End: 2023-03-09
Payer: MEDICARE

## 2023-03-09 NOTE — ED NOTES
Patient identifiers verified and correct for [facility].    LOC: The patient is awake, alert and aware of environment with an appropriate affect, the patient is oriented x 3 and speaking appropriately.  APPEARANCE: Patient resting comfortably and in no acute distress, patient is clean and well groomed, patient's clothing is properly fastened.  SKIN: The skin is warm and dry, color consistent with ethnicity, patient has normal skin turgor and moist mucus membranes, skin intact, no breakdown or bruising noted.  MUSCULOSKELETAL: Patient moving all extremities spontaneously.  RESPIRATORY: Airway is open and patent, respirations are spontaneous.  CARDIAC: Patient has a normal rate, no periphreal edema noted, capillary refill < 3 seconds.  ABDOMEN: Soft and non tender to palpation.

## 2023-03-15 ENCOUNTER — OFFICE VISIT (OUTPATIENT)
Dept: OPHTHALMOLOGY | Facility: CLINIC | Age: 73
End: 2023-03-15
Payer: MEDICARE

## 2023-03-15 ENCOUNTER — PATIENT MESSAGE (OUTPATIENT)
Dept: OPHTHALMOLOGY | Facility: CLINIC | Age: 73
End: 2023-03-15

## 2023-03-15 DIAGNOSIS — Z98.890 POST-OPERATIVE STATE: Primary | ICD-10-CM

## 2023-03-15 DIAGNOSIS — Z96.1 PSEUDOPHAKIA OF BOTH EYES: ICD-10-CM

## 2023-03-15 PROCEDURE — 99024 POSTOP FOLLOW-UP VISIT: CPT | Mod: POP,,, | Performed by: OPTOMETRIST

## 2023-03-15 PROCEDURE — 99024 PR POST-OP FOLLOW-UP VISIT: ICD-10-PCS | Mod: POP,,, | Performed by: OPTOMETRIST

## 2023-03-15 PROCEDURE — 92015 PR REFRACTION: ICD-10-PCS | Mod: ,,, | Performed by: OPTOMETRIST

## 2023-03-15 PROCEDURE — 99999 PR PBB SHADOW E&M-EST. PATIENT-LVL III: CPT | Mod: PBBFAC,,, | Performed by: OPTOMETRIST

## 2023-03-15 PROCEDURE — 92015 DETERMINE REFRACTIVE STATE: CPT | Mod: ,,, | Performed by: OPTOMETRIST

## 2023-03-15 PROCEDURE — 99999 PR PBB SHADOW E&M-EST. PATIENT-LVL III: ICD-10-PCS | Mod: PBBFAC,,, | Performed by: OPTOMETRIST

## 2023-03-15 PROCEDURE — 99213 OFFICE O/P EST LOW 20 MIN: CPT | Mod: PBBFAC | Performed by: OPTOMETRIST

## 2023-03-15 NOTE — PROGRESS NOTES
HPI     Post-op Evaluation     Additional comments: Sp PCIOL OU ([facility])  Sp PCIOL OD 11/17/22  Sp PCIOL OS 01/26/23           Comments    Pt states that she had a sore area outer corner OD.  Felt tender off and   on. Hasn't happened in about a week.          Last edited by Promise Garcia MA on 3/15/2023 10:23 AM.            Assessment /Plan     For exam results, see Encounter Report.    Post-operative state    Pseudophakia of both eyes      Healing well  No cell or flare present    Dispense Final Rx for glasses.  RTC 6 months DFE  Discussed above and answered questions.

## 2023-03-22 ENCOUNTER — OFFICE VISIT (OUTPATIENT)
Dept: INTERNAL MEDICINE | Facility: CLINIC | Age: 73
End: 2023-03-22
Payer: MEDICARE

## 2023-03-22 ENCOUNTER — OFFICE VISIT (OUTPATIENT)
Dept: PODIATRY | Facility: CLINIC | Age: 73
End: 2023-03-22
Payer: MEDICARE

## 2023-03-22 VITALS
TEMPERATURE: 98 F | HEIGHT: 62 IN | OXYGEN SATURATION: 97 % | WEIGHT: 161.19 LBS | BODY MASS INDEX: 29.66 KG/M2 | SYSTOLIC BLOOD PRESSURE: 128 MMHG | HEART RATE: 63 BPM | DIASTOLIC BLOOD PRESSURE: 70 MMHG

## 2023-03-22 DIAGNOSIS — L60.3 NAIL DYSTROPHY: ICD-10-CM

## 2023-03-22 DIAGNOSIS — S09.90XD TRAUMATIC INJURY OF HEAD, SUBSEQUENT ENCOUNTER: ICD-10-CM

## 2023-03-22 DIAGNOSIS — I50.32 CHRONIC HEART FAILURE WITH PRESERVED EJECTION FRACTION: Chronic | ICD-10-CM

## 2023-03-22 DIAGNOSIS — E78.2 MIXED DIABETIC HYPERLIPIDEMIA ASSOCIATED WITH TYPE 2 DIABETES MELLITUS: Chronic | ICD-10-CM
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
## 2023-03-22 DIAGNOSIS — E11.69 MIXED DIABETIC HYPERLIPIDEMIA ASSOCIATED WITH TYPE 2 DIABETES MELLITUS: Chronic | ICD-10-CM

## 2023-03-22 DIAGNOSIS — I25.10 CORONARY ARTERY DISEASE INVOLVING NATIVE CORONARY ARTERY OF NATIVE HEART WITHOUT ANGINA PECTORIS: Chronic | ICD-10-CM

## 2023-03-22 DIAGNOSIS — E11.49 TYPE II DIABETES MELLITUS WITH NEUROLOGICAL MANIFESTATIONS: Chronic | ICD-10-CM

## 2023-03-22 DIAGNOSIS — E11.49 TYPE 2 DIABETES MELLITUS WITH NEUROLOGICAL MANIFESTATION: Primary | ICD-10-CM

## 2023-03-22 DIAGNOSIS — S00.03XD CONTUSION OF SCALP, SUBSEQUENT ENCOUNTER: ICD-10-CM

## 2023-03-22 DIAGNOSIS — I70.0 ATHEROSCLEROSIS OF AORTA: Chronic | ICD-10-CM

## 2023-03-22 PROCEDURE — 99499 UNLISTED E&M SERVICE: CPT | Mod: S$PBB,,, | Performed by: PODIATRIST

## 2023-03-22 PROCEDURE — 11721 PR DEBRIDEMENT OF NAILS, 6 OR MORE: ICD-10-PCS | Mod: Q9,S$PBB,, | Performed by: PODIATRIST

## 2023-03-22 PROCEDURE — 11721 DEBRIDE NAIL 6 OR MORE: CPT | Mod: Q9,S$PBB,, | Performed by: PODIATRIST

## 2023-03-22 PROCEDURE — 99499 NO LOS: ICD-10-PCS | Mod: S$PBB,,, | Performed by: PODIATRIST

## 2023-03-22 PROCEDURE — 99214 OFFICE O/P EST MOD 30 MIN: CPT | Mod: PBBFAC,27 | Performed by: FAMILY MEDICINE

## 2023-03-22 PROCEDURE — 99213 OFFICE O/P EST LOW 20 MIN: CPT | Mod: 25,PBBFAC | Performed by: PODIATRIST

## 2023-03-22 PROCEDURE — 99214 OFFICE O/P EST MOD 30 MIN: CPT | Mod: S$PBB,,, | Performed by: FAMILY MEDICINE

## 2023-03-22 PROCEDURE — 99999 PR PBB SHADOW E&M-EST. PATIENT-LVL III: ICD-10-PCS | Mod: PBBFAC,,, | Performed by: PODIATRIST

## 2023-03-22 PROCEDURE — 99999 PR PBB SHADOW E&M-EST. PATIENT-LVL IV: CPT | Mod: PBBFAC,,, | Performed by: FAMILY MEDICINE

## 2023-03-22 PROCEDURE — 99999 PR PBB SHADOW E&M-EST. PATIENT-LVL IV: ICD-10-PCS | Mod: PBBFAC,,, | Performed by: FAMILY MEDICINE

## 2023-03-22 PROCEDURE — 99999 PR PBB SHADOW E&M-EST. PATIENT-LVL III: CPT | Mod: PBBFAC,,, | Performed by: PODIATRIST

## 2023-03-22 PROCEDURE — 11721 DEBRIDE NAIL 6 OR MORE: CPT | Mod: Q9,PBBFAC | Performed by: PODIATRIST

## 2023-03-22 PROCEDURE — 99214 PR OFFICE/OUTPT VISIT, EST, LEVL IV, 30-39 MIN: ICD-10-PCS | Mod: S$PBB,,, | Performed by: FAMILY MEDICINE

## 2023-03-22 NOTE — PROGRESS NOTES
Subjective:   Patient ID: Venus Caldwell is a 73 y.o. female.  Chief Complaint:  ER follow up    Presents for Emergency Department follow up   Patient had a mechanical fall  Her  tripped over their dog then fell into her, causing her to fall backwards on her head  Reports mild tenderness and swelling where she hit her head but no other symptoms  Both tenderness and swelling are significantly improved since her ER visit  She did not sustain any other injuries  CT head in the ED incidentally noted intracranial atherosclerotic disease and mild atrophy, but no acute hemorrhage or fracture    Only question/concern today is whether or not she should be taking Trulicity as recommended by her cardiologist prescribed by me last visit since her A1c is 6.5% diabetes mellitus well controlled on metformin 500 mg daily  We were able to obtain approval and she was able to obtain medication, she just never started it  In addition to diabetes she does have coexisting coronary artery disease, aortic atherosclerosis, CHF which are currently asymptomatic and she continues to deny any chest pain, shortness breast swelling claudication      Current Outpatient Medications:     amLODIPine (NORVASC) 5 MG tablet, Take 1 tablet (5 mg total) by mouth once daily., Disp: 90 tablet, Rfl: 3    aspirin (ECOTRIN) 81 MG EC tablet, Take 1 tablet (81 mg total) by mouth once daily., Disp: , Rfl:     atorvastatin (LIPITOR) 40 MG tablet, Take 1 tablet (40 mg total) by mouth every evening., Disp: 90 tablet, Rfl: 3    azelastine (ASTELIN) 137 mcg (0.1 %) nasal spray, 1 spray (137 mcg total) by Nasal route 2 (two) times daily., Disp: 30 mL, Rfl: 11    benzocaine (HURRICAINE) 20 % Gel, Use as directed in the mouth or throat 4 (four) times daily as needed., Disp: , Rfl:     betamethasone dipropionate (DIPROLENE) 0.05 % cream, as needed. , Disp: , Rfl:     candesartan (ATACAND) 16 MG tablet, Take 1 tablet (16 mg total) by mouth once daily., Disp: 90  tablet, Rfl: 3    cetirizine (ZYRTEC) 10 MG tablet, Take 10 mg by mouth once daily., Disp: , Rfl:     cholecalciferol, vitamin D3, 5,000 unit Tab, Take 1,000 Units by mouth once daily. , Disp: , Rfl:     dulaglutide (TRULICITY) 0.75 mg/0.5 mL pen injector, Inject 0.75 mg into the skin every 7 days., Disp: 12 pen, Rfl: 3    ferrous sulfate (FEOSOL) Tab tablet, Take 1 tablet by mouth daily with breakfast., Disp: , Rfl:     gabapentin (NEURONTIN) 300 MG capsule, Take 1 capsule (300 mg total) by mouth 3 (three) times daily., Disp: 270 capsule, Rfl: 3    GAVILYTE-G 236-22.74-6.74 -5.86 gram suspension, Take 4,000 mLs by mouth., Disp: , Rfl:     hydrocortisone 2.5 % cream, Apply to rash twice daily as needed for rash, Disp: 60 g, Rfl: 4    ketoconazole (NIZORAL) 2 % cream, Apply to rash twice daily as needed for rash, Disp: 60 g, Rfl: 4    meloxicam (MOBIC) 7.5 MG tablet, TAKE 1 TABLET DAILY, Disp: 90 tablet, Rfl: 3    metFORMIN (GLUCOPHAGE) 500 MG tablet, Take 1 tablet (500 mg total) by mouth daily with breakfast., Disp: 90 tablet, Rfl: 3    metoprolol succinate (TOPROL-XL) 100 MG 24 hr tablet, Take 1 tablet (100 mg total) by mouth once daily., Disp: 90 tablet, Rfl: 3    mupirocin (BACTROBAN) 2 % ointment, Apply topically 3 (three) times daily., Disp: 22 g, Rfl: 0    omeprazole (PRILOSEC) 20 MG capsule, Take 20 mg by mouth once daily., Disp: , Rfl:     triamcinolone acetonide 0.1% (KENALOG) 0.1 % cream, SMARTSI Application Topical 2-3 Times Daily, Disp: , Rfl:     valACYclovir (VALTREX) 500 MG tablet, Take 500 mg by mouth as needed. , Disp: , Rfl:     Review of Systems   Constitutional:  Negative for chills, diaphoresis, fatigue and fever.   Eyes:  Negative for visual disturbance.   Respiratory:  Negative for cough, chest tightness, shortness of breath and wheezing.    Cardiovascular:  Negative for chest pain, palpitations and leg swelling.   Gastrointestinal:  Negative for abdominal distention, abdominal pain,  "constipation, diarrhea, nausea and vomiting.   Endocrine: Negative for polydipsia, polyphagia and polyuria.   Genitourinary:  Negative for difficulty urinating, dysuria, flank pain, frequency, hematuria and urgency.   Musculoskeletal:  Positive for back pain (Chronic, stable, not worsened by her recent fall). Negative for myalgias.   Skin:  Negative for rash.   Neurological:  Negative for dizziness, tremors, seizures, syncope, facial asymmetry, speech difficulty, weakness, light-headedness, numbness and headaches.   Psychiatric/Behavioral:  Negative for sleep disturbance. The patient is not nervous/anxious.      Objective:   /70 (BP Location: Left arm, Patient Position: Sitting, BP Method: Small (Manual))   Pulse 63   Temp 98.1 °F (36.7 °C) (Tympanic)   Ht 5' 2" (1.575 m)   Wt 73.1 kg (161 lb 2.5 oz)   SpO2 97%   BMI 29.48 kg/m²     Physical Exam  Vitals and nursing note reviewed.   Constitutional:       Appearance: Normal appearance. She is well-developed and overweight.   HENT:      Head:      Comments:   Mild size minimally tender hematoma/contusion left temporoparietal area  Cardiovascular:      Rate and Rhythm: Normal rate and regular rhythm.   Pulmonary:      Effort: Pulmonary effort is normal.   Musculoskeletal:      Right lower leg: No edema.      Left lower leg: No edema.   Skin:     Findings: No rash.   Neurological:      General: No focal deficit present.      Mental Status: She is alert and oriented to person, place, and time. Mental status is at baseline.   Psychiatric:         Mood and Affect: Mood and affect normal.     Assessment:       ICD-10-CM ICD-9-CM   1. Fall, subsequent encounter  W19.XXXD V58.89     E888.9   2. Traumatic injury of head, subsequent encounter  S09.90XD V58.89     959.01   3. Contusion of scalp, subsequent encounter  S00.03XD V58.89     920   4. Coronary artery disease involving native coronary artery of native heart without angina pectoris  I25.10 414.01   5. " Atherosclerosis of aorta  I70.0 440.0   6. Mixed diabetic hyperlipidemia associated with type 2 diabetes mellitus  E11.69 250.80    E78.2 272.2   7. Chronic heart failure with preserved ejection fraction  I50.32 428.9   8. Type II diabetes mellitus with neurological manifestations  E11.49 250.60     Plan:   Fall, subsequent encounter  Traumatic injury of head, subsequent encounter  Fall was accidental   No additional evaluation or treatment needed     Contusion of scalp, subsequent encounter  Decreasing in size, pain, and tenderness daily  No significant change in overall cognitive function   No additional evaluation treatment needed     Coronary artery disease involving native coronary artery of native heart without angina pectoris  Atherosclerosis of aorta  Mixed diabetic hyperlipidemia associated with type 2 diabetes mellitus  Chronic heart failure with preserved ejection fraction  Type II diabetes mellitus with neurological manifestations  Rediscussed decreased 10 year cardiovascular death and decreased 10 year cardiovascular events in diabetics with known disease who take GLP1 injections   Patient expresses agreement understanding   Agrees to start Trulicity 0.75 mg weekly injections as prescribed    Return to clinic as scheduled/as needed    Divine Zavala, MS4    I hereby acknowledge that I am relying upon documentation authored by a medical student working under my supervision and further I hereby attest that I have verified the student documentation or findings by personally performing the physical exam and medical decision making activities of the Evaluation and Management service to be billed.  Sourav Hernandez

## 2023-03-22 NOTE — PROGRESS NOTES
Subjective:       Patient ID: Venus Caldwell is a 73 y.o. female.    Chief Complaint: Routine Foot Care (Patient is a diabetic and was last seen on 2.16.23 by Dr. Sourav Hernandez. She denies pain at present and is wearing socks and closed toe shoes. )      HPI: Patient presents to the office today with the chief complaint of elongated, thickened and dystrophic nail plates to the B/L foot. Patient also complains of painful calluses to the left and/or right foot. This patient is a Diabetic Type II, complicated with  Peripheral Neuropathy. Patient does follow with Primary Care and/or Endocrinology for management of Diabetes Mellitus. This patient's PMD is Sourav Hernandez MD. This patient last saw his/her primary care provider on 02/16/2023     Hemoglobin A1C   Date Value Ref Range Status   02/16/2023 6.5 (H) 4.0 - 5.6 % Final     Comment:     ADA Screening Guidelines:  5.7-6.4%  Consistent with prediabetes  >or=6.5%  Consistent with diabetes    High levels of fetal hemoglobin interfere with the HbA1C  assay. Heterozygous hemoglobin variants (HbS, HgC, etc)do  not significantly interfere with this assay.   However, presence of multiple variants may affect accuracy.     08/16/2022 6.2 (H) 4.0 - 5.6 % Final     Comment:     ADA Screening Guidelines:  5.7-6.4%  Consistent with prediabetes  >or=6.5%  Consistent with diabetes    High levels of fetal hemoglobin interfere with the HbA1C  assay. Heterozygous hemoglobin variants (HbS, HgC, etc)do  not significantly interfere with this assay.   However, presence of multiple variants may affect accuracy.     02/16/2022 6.4 (H) 4.0 - 5.6 % Final     Comment:     ADA Screening Guidelines:  5.7-6.4%  Consistent with prediabetes  >or=6.5%  Consistent with diabetes    High levels of fetal hemoglobin interfere with the HbA1C  assay. Heterozygous hemoglobin variants (HbS, HgC, etc)do  not significantly interfere with this assay.   However, presence of multiple variants may affect  accuracy.     .     Review of patient's allergies indicates:   Allergen Reactions    Sulfa (sulfonamide antibiotics) Anaphylaxis     Pt became hypoxic after taking sulfa drugs as a child      Buprenorphine Rash       Past Medical History:   Diagnosis Date    Arthritis     Cancer, uterine     Coronary artery disease     Diabetes mellitus     prediabetes    Diabetes mellitus, type 2     Digestive disorder     DM (diabetes mellitus) 08/2019    BS doesn't check 12/16/2019    DM (diabetes mellitus) 08/2019    BS doesn't check 06/29/2021    Hypertension     Multiple thyroid nodules 7/8/2021    Ovarian cancer     Sciatica     Sleep apnea        Family History   Problem Relation Age of Onset    Diabetes Mother     Cataracts Mother     Diabetes Brother     Cataracts Maternal Grandmother     Breast cancer Maternal Aunt        Social History     Socioeconomic History    Marital status:     Number of children: 1   Occupational History    Occupation: Retired   Tobacco Use    Smoking status: Never    Smokeless tobacco: Never   Substance and Sexual Activity    Alcohol use: No    Drug use: No    Sexual activity: Not Currently     Social Determinants of Health     Financial Resource Strain: Low Risk     Difficulty of Paying Living Expenses: Not hard at all   Food Insecurity: No Food Insecurity    Worried About Running Out of Food in the Last Year: Never true    Ran Out of Food in the Last Year: Never true   Transportation Needs: No Transportation Needs    Lack of Transportation (Medical): No    Lack of Transportation (Non-Medical): No   Physical Activity: Inactive    Days of Exercise per Week: 0 days    Minutes of Exercise per Session: 0 min   Stress: No Stress Concern Present    Feeling of Stress : Not at all   Social Connections: Moderately Integrated    Frequency of Communication with Friends and Family: More than three times a week    Frequency of Social Gatherings with Friends and Family: More than three times a week     Attends Denominational Services: Never    Active Member of Clubs or Organizations: Yes    Attends Club or Organization Meetings: Never    Marital Status:    Housing Stability: Low Risk     Unable to Pay for Housing in the Last Year: No    Number of Places Lived in the Last Year: 1    Unstable Housing in the Last Year: No       Past Surgical History:   Procedure Laterality Date    CATARACT EXTRACTION W/  INTRAOCULAR LENS IMPLANT Right 11/17/2022    CATARACT EXTRACTION W/  INTRAOCULAR LENS IMPLANT Left 01/26/2023    CHOLECYSTECTOMY      COLONOSCOPY N/A 08/01/2018    Procedure: COLONOSCOPY;  Surgeon: Yrn Hunter III, MD;  Location: [facility];  Service: Endoscopy;  Laterality: N/A;    COLONOSCOPY N/A 06/30/2022    Procedure: COLONOSCOPY;  Surgeon: Mica Rodriguez MD;  Location: [facility];  Service: Endoscopy;  Laterality: N/A;    ESOPHAGOGASTRODUODENOSCOPY N/A 03/17/2021    Procedure: EGD (ESOPHAGOGASTRODUODENOSCOPY) (Dr. EMILIO billings);  Surgeon: Coy Ortiz MD;  Location: [facility];  Service: Endoscopy;  Laterality: N/A;    HYSTERECTOMY      JOINT REPLACEMENT Right     hip replacement    SURGICAL EXCISION OF ANAL LESION N/A 08/29/2018    Procedure: EXCISION, LESION, ANUS;  Surgeon: Yrn Balderrama MD;  Location: [facility];  Service: General;  Laterality: N/A;    TOTAL KNEE ARTHROPLASTY Bilateral        Review of Systems       Objective:   There were no vitals taken for this visit.    Physical Exam  LOWER EXTREMITY PHYSICAL EXAMINATION    VASCULAR:  The right dorsalis pedis pulse 2/4 and the right posterior tibial pulse 2/4.  The left dorsalis pedis pulse 2/4 and posterior tibial pulse on the left is 2/4.  Capillary refill is intact.  Pedal hair growth intact     NEUROLOGY: Protective sensation is not intact to the left and right plantar surfaces of the foot and digits, as the patient has no sensation/detection at greater than 4 distinct points of contact with 5.07 [place] Fritz monofilament.  Sensation to light touch is intact on the left and right foot. Proprioception is intact, bilateral. Sensation to pin prick is reduced to absent. Vibratory sensation is diminished.    DERMATOLOGY:  Skin is supple, moist, intact.  The R1, 2, 5 and left L1,2, 5 are thickened, discolored dystrophic.  There is subungual debris.  Nail plates have area of dark discoloration.  The remaining nails 3-4 on the right foot and the left foot are elongated but of normal color, thickness, and texture.   There is no signs of ingrowing into the medial or lateral borders.  There is no evidence of wounds or skin breakdown.  No edema or erythema.  No obvious lacerations or fissuring.  Interdigital spaces are clean, dry, intact.  No rashes or scars appreciated.    ORTHOPEDIC: Manual Muscle Testing is 5/5 in all planes on the left and right, without pains, with and without resistance. Gait pattern is non-antalgic.    Assessment:     1. 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 Nail dystrophy      Plan:     Type 2 diabetes mellitus with neurological manifestation    Nail dystrophy      Thorough discussion is had with the patient this afternoon, concerning the diagnosis, its etiology, and the treatment algorithm at present.  Greater than 50% of this visit spent on counseling and coordination of care. Greater than 15 minutes of a 20 minute appointment spent on education about the diabetic foot, neuropathy, and prevention of limb loss.  Shoe inspection. Diabetic Foot Education. Patient reminded of the importance of good nutrition and blood sugar control to help prevent podiatric complications of diabetes. Patient instructed on proper foot hygeine. We discussed wearing proper and supportive shoe gear, daily foot inspections, never walking barefooted or sock footed, never putting sharp instruments to feet which can cause major complications associated with infection, ulcers, lacerations.      Dystrophic nail plates, as  outlined above (R#1,2,5  ; L#1,2,5 ), are sharply debrided with double action nail nipper, and/or with the assistance of a mechanical rotary elia, with removal of all offending nail and nail border(s), for reduction of pains. Nails are reduced in terms of length, width and girth with removal of subungual debris to facilitate pain free weight bearing and ambulation. The elongated nails as outlined in the objective portion of this note, were trimmed to appropriate length, with a double action nail nipper, for alleviation/reduction of pains as well. Follow up in approx. 3-4 months.    Future Appointments   Date Time Provider Department Center   3/22/2023  2:40 PM Sourav Hernandez MD [facility] [place]   4/10/2023 11:20 [facility] [place]   4/12/2023  1:30 PM [facility] Yoder [facility] [place]   7/5/2023  8:45 AM Halina Vidales DPM [facility]   8/16/2023 10:40 AM Sourav Hernandez MD [facility] [place]   8/17/2023  1:40 PM Maikol Garcia MD [facility]

## 2023-04-10 ENCOUNTER — LAB VISIT (OUTPATIENT)
Dept: LAB | Facility: HOSPITAL | Age: 73
End: 2023-04-10
Attending: NURSE PRACTITIONER
Payer: MEDICARE

## 2023-04-10 DIAGNOSIS — D50.8 IRON DEFICIENCY ANEMIA SECONDARY TO INADEQUATE DIETARY IRON INTAKE: ICD-10-CM

## 2023-04-10 LAB
BASOPHILS # BLD AUTO: 0.04 K/UL (ref 0–0.2)
BASOPHILS NFR BLD: 0.6 % (ref 0–1.9)
DIFFERENTIAL METHOD: NORMAL
EOSINOPHIL # BLD AUTO: 0.3 K/UL (ref 0–0.5)
EOSINOPHIL NFR BLD: 4.3 % (ref 0–8)
ERYTHROCYTE [DISTWIDTH] IN BLOOD BY AUTOMATED COUNT: 13.1 % (ref 11.5–14.5)
FERRITIN SERPL-MCNC: 115 NG/ML (ref 20–300)
HCT VFR BLD AUTO: 38.3 % (ref 37–48.5)
HGB BLD-MCNC: 12.7 G/DL (ref 12–16)
IMM GRANULOCYTES # BLD AUTO: 0.02 K/UL (ref 0–0.04)
IMM GRANULOCYTES NFR BLD AUTO: 0.3 % (ref 0–0.5)
IRON SERPL-MCNC: 53 UG/DL (ref 30–160)
LYMPHOCYTES # BLD AUTO: 1.6 K/UL (ref 1–4.8)
LYMPHOCYTES NFR BLD: 24 % (ref 18–48)
MCH RBC QN AUTO: 29.3 PG (ref 27–31)
MCHC RBC AUTO-ENTMCNC: 33.2 G/DL (ref 32–36)
MCV RBC AUTO: 89 FL (ref 82–98)
MONOCYTES # BLD AUTO: 0.4 K/UL (ref 0.3–1)
MONOCYTES NFR BLD: 6 % (ref 4–15)
NEUTROPHILS # BLD AUTO: 4.4 K/UL (ref 1.8–7.7)
NEUTROPHILS NFR BLD: 64.8 % (ref 38–73)
NRBC BLD-RTO: 0 /100 WBC
PLATELET # BLD AUTO: 294 K/UL (ref 150–450)
PMV BLD AUTO: 10.5 FL (ref 9.2–12.9)
RBC # BLD AUTO: 4.33 M/UL (ref 4–5.4)
SATURATED IRON: 17 % (ref 20–50)
TOTAL IRON BINDING CAPACITY: 312 UG/DL (ref 250–450)
TRANSFERRIN SERPL-MCNC: 211 MG/DL (ref 200–375)
WBC # BLD AUTO: 6.71 K/UL (ref 3.9–12.7)

## 2023-04-10 PROCEDURE — 36415 COLL VENOUS BLD VENIPUNCTURE: CPT | Performed by: NURSE PRACTITIONER

## 2023-04-10 PROCEDURE — 85025 COMPLETE CBC W/AUTO DIFF WBC: CPT | Performed by: NURSE PRACTITIONER

## 2023-04-10 PROCEDURE — 84466 ASSAY OF TRANSFERRIN: CPT | Performed by: NURSE PRACTITIONER

## 2023-04-10 PROCEDURE — 82728 ASSAY OF FERRITIN: CPT | Performed by: NURSE PRACTITIONER

## 2023-04-11 ENCOUNTER — PATIENT OUTREACH (OUTPATIENT)
Dept: ADMINISTRATIVE | Facility: HOSPITAL | Age: 73
End: 2023-04-11
Payer: MEDICARE

## 2023-04-11 NOTE — PROGRESS NOTES
Subjective:       Patient ID: Venus Caldwell is a 73 y.o. female.    Chief Complaint:   1. Iron deficiency anemia secondary to inadequate dietary iron intake          Current Treatment:  Oral iron supplementation every other day    Treatment History:  IV iron therapy x 2  Oral iron supplementation daily    HPI: This is a 72-year-old female with medical history significant for endometrial cancer (@ 58 yrs), diabetes type 2, hypertension who was referred to us by Dr. Farrell due to recently noted iron deficiency anemia in 10/2020.     Review of most recent iron studies performed on 10/8/2020 showed serum iron of 29, iron saturation of 6% and ferritin level of 14 ng per cc.  CBC from August 2020 showed hemoglobin of 9.5 grams/deciliter, hematocrit of 33.0 however normal MCV.  There was also noticeable thrombocytosis with platelet count at 405k.     Colonoscopy performed in August 2018 showed a one 4mm polyp in the transverse colon that was noted to be benign-hyperplastic polyp.  There was also diverticulosis at the hepatic flexure.  Recommended 5 year repeat which will be 2023.  During the colonoscopy, erectile nodule was noted that was biopsied and showed to be simple inclusion cyst.     She received 2 doses of IV iron with improvement of iron stores.     At her follow up in 1/2023, RBC & H&H had dropped slightly; she admitted to eating popcorn and peanuts and complained of subsequent GI upset and diarrhea. Reviewed most recent colonoscopy results with her that indicate she has diverticulosis. Discussed foods to avoid.      No pertinent family history or occupational history.    Interval History: Patient presents for follow up on oral iron. She presents alone and reports feeling tired all the time. She admits to not taking iron every day; she did not have vitamin C to take with it and it was causing her mild constipation. She also admits to not wearing her new CPAP to sleep; she gets up to urinate about 4 times a  night, and she has not setup her new CPAP to her machine. She previously had the apparatus that covered her whole face; she now has the kind that just covers her nose. She states she does not sleep well during the night. She also reports not being adherent to Trulicity because she became ill the night before she had planned to take it. She reports eating popcorn at the movies Saturday night; she experienced significant abdominal pain and cramping until she vomited 3 times. Advised her to resume oral iron every other day with Centrum which has vitamin C in it; she is apprehensive about taking it with orange juice due to the sugar content. Also advised her to setup her new CPAP and begin wearing it nightly so that she can get restful sleep; educated her on sleep apnea and some of the symptoms. Discussed results of her most recent colonoscopy to include diverticulosis; discussed foods to avoid including peanuts, popcorn, kiwi, strawberry, and any foods with seeds or kernels. Explained that diverticulitis can result in GI bleeding which would lead to iron deficiency anemia and worsen her symptoms. She verbalizes understanding and agrees.     Reviewed labs with patient:   CBC:   Recent Labs   Lab 04/10/23  1054   WBC 6.71   RBC 4.33   Hemoglobin 12.7   Hematocrit 38.3   Platelets 294   MCV 89   MCH 29.3   MCHC 33.2     CMP:  Recent Labs   Lab 01/12/23  1340   Glucose 128 H   Calcium 9.2   Albumin 3.3 L   Total Protein 7.0   Sodium 144   Potassium 3.6   CO2 25   Chloride 107   BUN 17   Creatinine 0.7   Alkaline Phosphatase 116   ALT 27   AST 20   Total Bilirubin 0.6     Lab Results   Component Value Date    IRON 53 04/10/2023    TRANSFERRIN 211 04/10/2023    TIBC 312 04/10/2023    FESATURATED 17 (L) 04/10/2023      Lab Results   Component Value Date    FERRITIN 115 04/10/2023     Social History     Socioeconomic History    Marital status:     Number of children: 1   Occupational History    Occupation: Retired    Tobacco Use    Smoking status: Never    Smokeless tobacco: Never   Substance and Sexual Activity    Alcohol use: No    Drug use: No    Sexual activity: Not Currently     Social Determinants of Health     Financial Resource Strain: Low Risk     Difficulty of Paying Living Expenses: Not hard at all   Food Insecurity: No Food Insecurity    Worried About Running Out of Food in the Last Year: Never true    Ran Out of Food in the Last Year: Never true   Transportation Needs: No Transportation Needs    Lack of Transportation (Medical): No    Lack of Transportation (Non-Medical): No   Physical Activity: Insufficiently Active    Days of Exercise per Week: 3 days    Minutes of Exercise per Session: 30 min   Stress: No Stress Concern Present    Feeling of Stress : Only a little   Social Connections: Unknown    Frequency of Communication with Friends and Family: Patient refused    Frequency of Social Gatherings with Friends and Family: Patient refused    Attends [employer] Services: Never    Active Member of Clubs or Organizations: No    Attends Club or Organization Meetings: Patient refused    Marital Status:    Housing Stability: Low Risk     Unable to Pay for Housing in the Last Year: No    Number of Places Lived in the Last Year: 1    Unstable Housing in the Last Year: No     Past Medical History:   Diagnosis Date    Arthritis     Cancer, uterine     Coronary artery disease     Diabetes mellitus     prediabetes    Diabetes mellitus, type 2     Digestive disorder     DM (diabetes mellitus) 08/2019    BS doesn't check 12/16/2019    DM (diabetes mellitus) 08/2019    BS doesn't check 06/29/2021    Hypertension     Multiple thyroid nodules 7/8/2021    Ovarian cancer     Sciatica     Sleep apnea      Family History   Problem Relation Age of Onset    Diabetes Mother     Cataracts Mother     Diabetes Brother     Cataracts Maternal Grandmother     Breast cancer Maternal Aunt      Past Surgical History:   Procedure Laterality  Date    CATARACT EXTRACTION W/  INTRAOCULAR LENS IMPLANT Right 11/17/2022    CATARACT EXTRACTION W/  INTRAOCULAR LENS IMPLANT Left 01/26/2023    CHOLECYSTECTOMY      COLONOSCOPY N/A 08/01/2018    Procedure: COLONOSCOPY;  Surgeon: Yrn Hunter III, MD;  Location: [facility] ENDO;  Service: Endoscopy;  Laterality: N/A;    COLONOSCOPY N/A 06/30/2022    Procedure: COLONOSCOPY;  Surgeon: Mica Rodriguez MD;  Location: [facility] ENDO;  Service: Endoscopy;  Laterality: N/A;    ESOPHAGOGASTRODUODENOSCOPY N/A 03/17/2021    Procedure: EGD (ESOPHAGOGASTRODUODENOSCOPY) (Dr. EMILIO billings);  Surgeon: Coy Ortiz MD;  Location: [facility] ENDO;  Service: Endoscopy;  Laterality: N/A;    HYSTERECTOMY      JOINT REPLACEMENT Right     hip replacement    SURGICAL EXCISION OF ANAL LESION N/A 08/29/2018    Procedure: EXCISION, LESION, ANUS;  Surgeon: Yrn Balderrama MD;  Location: [facility] OR;  Service: General;  Laterality: N/A;    TOTAL KNEE ARTHROPLASTY Bilateral      Review of Systems   Constitutional:  Positive for fatigue. Negative for appetite change.   HENT:  Negative for mouth sores, rhinorrhea and sore throat.    Eyes: Negative.    Respiratory: Negative.     Cardiovascular: Negative.    Gastrointestinal:  Negative for constipation, diarrhea, nausea and vomiting.   Endocrine: Negative.    Genitourinary: Negative.    Musculoskeletal: Negative.    Integumentary:  Negative.   Allergic/Immunologic: Negative.    Neurological:  Negative for weakness and numbness.   Hematological: Negative.    Psychiatric/Behavioral:  Positive for sleep disturbance (doesn't sleep well).        Medication List with Changes/Refills   Current Medications    AMLODIPINE (NORVASC) 5 MG TABLET    Take 1 tablet (5 mg total) by mouth once daily.    ASPIRIN (ECOTRIN) 81 MG EC TABLET    Take 1 tablet (81 mg total) by mouth once daily.    ATORVASTATIN (LIPITOR) 40 MG TABLET    Take 1 tablet (40 mg total) by mouth every evening.    AZELASTINE (ASTELIN) 137 MCG (0.1  %) NASAL SPRAY    1 spray (137 mcg total) by Nasal route 2 (two) times daily.    BENZOCAINE (HURRICAINE) 20 % GEL    Use as directed in the mouth or throat 4 (four) times daily as needed.    BETAMETHASONE DIPROPIONATE (DIPROLENE) 0.05 % CREAM    as needed.     CANDESARTAN (ATACAND) 16 MG TABLET    Take 1 tablet (16 mg total) by mouth once daily.    CETIRIZINE (ZYRTEC) 10 MG TABLET    Take 10 mg by mouth once daily.    CHOLECALCIFEROL, VITAMIN D3, 5,000 UNIT TAB    Take 1,000 Units by mouth once daily.     DULAGLUTIDE (TRULICITY) 0.75 MG/0.5 ML PEN INJECTOR    Inject 0.75 mg into the skin every 7 days.    FERROUS SULFATE (FEOSOL) TAB TABLET    Take 1 tablet by mouth daily with breakfast.    GABAPENTIN (NEURONTIN) 300 MG CAPSULE    Take 1 capsule (300 mg total) by mouth 3 (three) times daily.    GAVILYTE-G 236-22.74-6.74 -5.86 GRAM SUSPENSION    Take 4,000 mLs by mouth.    HYDROCORTISONE 2.5 % CREAM    Apply to rash twice daily as needed for rash    KETOCONAZOLE (NIZORAL) 2 % CREAM    Apply to rash twice daily as needed for rash    MELOXICAM (MOBIC) 7.5 MG TABLET    TAKE 1 TABLET DAILY    METFORMIN (GLUCOPHAGE) 500 MG TABLET    Take 1 tablet (500 mg total) by mouth daily with breakfast.    METOPROLOL SUCCINATE (TOPROL-XL) 100 MG 24 HR TABLET    Take 1 tablet (100 mg total) by mouth once daily.    MUPIROCIN (BACTROBAN) 2 % OINTMENT    Apply topically 3 (three) times daily.    OMEPRAZOLE (PRILOSEC) 20 MG CAPSULE    Take 20 mg by mouth once daily.    TRIAMCINOLONE ACETONIDE 0.1% (KENALOG) 0.1 % CREAM    SMARTSI Application Topical 2-3 Times Daily    VALACYCLOVIR (VALTREX) 500 MG TABLET    Take 500 mg by mouth as needed.      Objective:     Vitals:    23 1314   BP: 118/74   Pulse: (!) 56   Resp: 18   Temp: 97.8 °F (36.6 °C)     Physical Exam  Vitals reviewed.   Constitutional:       Appearance: Normal appearance.   HENT:      Head: Normocephalic.      Mouth/Throat:      Comments: Wearing mask    Eyes:       Extraocular Movements: Extraocular movements intact.      Pupils: Pupils are equal, round, and reactive to light.      Comments: Glasses       Cardiovascular:      Rate and Rhythm: Normal rate and regular rhythm.      Heart sounds: Normal heart sounds.   Pulmonary:      Effort: Pulmonary effort is normal.      Breath sounds: Normal breath sounds.   Abdominal:      General: Bowel sounds are normal.      Palpations: Abdomen is soft.      Comments: rounded     Genitourinary:     Comments: deferred    Musculoskeletal:         General: Normal range of motion.      Cervical back: Normal range of motion and neck supple.   Skin:     General: Skin is warm and dry.   Neurological:      Mental Status: She is alert and oriented to person, place, and time.   Psychiatric:         Behavior: Behavior normal.         Thought Content: Thought content normal.        (0) Fully active, able to carry on all predisease performance without restriction  Assessment:     Problem List Items Addressed This Visit          Oncology    Iron deficiency anemia secondary to inadequate dietary iron intake - Primary     Iron deficiency anemia status post IV iron therapy with improvement noted in hemoglobin from 9 grams/deciliter to above 12 g per dL. Iron studies also showed improvement in iron saturation, serum iron level and iron storage capacity.      Upper GI endoscopy from 3/17/2021 was unremarkable no evidence of H pylori.             Plan:     Iron deficiency anemia secondary to inadequate dietary iron intake    Labs reviewed; anemia resolved; iron studies stable.   Resume oral iron supplementation every other day with vitamin C and without caffeine.   Patient to begin wearing CPAP nightly as directed.   Educated patient on diverticulosis and foods to avoid.   Follow up in  3 months  with  iron profile, ferritin, and CBC.    Route Chart for Scheduling    Med Onc Chart Routing      Follow up with physician    Follow up with SUSAN 3 months.    Infusion scheduling note    Injection scheduling note    Labs CBC, ferritin and iron and TIBC   Scheduling:  Preferred lab:  Lab interval:  3 months, 2 days prior   Imaging None      Pharmacy appointment No pharmacy appointment needed      Other referrals  No additional referrals needed          I will review assessment/plan with collaborating physician.      RAF Yoder

## 2023-04-12 ENCOUNTER — OFFICE VISIT (OUTPATIENT)
Dept: HEMATOLOGY/ONCOLOGY | Facility: CLINIC | Age: 73
End: 2023-04-12
Payer: MEDICARE

## 2023-04-12 VITALS
SYSTOLIC BLOOD PRESSURE: 118 MMHG | DIASTOLIC BLOOD PRESSURE: 74 MMHG | WEIGHT: 160.94 LBS | OXYGEN SATURATION: 97 % | HEIGHT: 63 IN | HEART RATE: 56 BPM | BODY MASS INDEX: 28.52 KG/M2 | TEMPERATURE: 98 F | RESPIRATION RATE: 18 BRPM

## 2023-04-12 DIAGNOSIS — D50.8 IRON DEFICIENCY ANEMIA SECONDARY TO INADEQUATE DIETARY IRON INTAKE: Primary | ICD-10-CM

## 2023-04-12 PROCEDURE — 99214 PR OFFICE/OUTPT VISIT, EST, LEVL IV, 30-39 MIN: ICD-10-PCS | Mod: S$PBB,,, | Performed by: NURSE PRACTITIONER

## 2023-04-12 PROCEDURE — 99215 OFFICE O/P EST HI 40 MIN: CPT | Mod: PBBFAC | Performed by: NURSE PRACTITIONER

## 2023-04-12 PROCEDURE — 99999 PR PBB SHADOW E&M-EST. PATIENT-LVL V: CPT | Mod: PBBFAC,,, | Performed by: NURSE PRACTITIONER

## 2023-04-12 PROCEDURE — 99214 OFFICE O/P EST MOD 30 MIN: CPT | Mod: S$PBB,,, | Performed by: NURSE PRACTITIONER

## 2023-04-12 PROCEDURE — 99999 PR PBB SHADOW E&M-EST. PATIENT-LVL V: ICD-10-PCS | Mod: PBBFAC,,, | Performed by: NURSE PRACTITIONER

## 2023-05-04 ENCOUNTER — OFFICE VISIT (OUTPATIENT)
Dept: INTERNAL MEDICINE | Facility: CLINIC | Age: 73
End: 2023-05-04
Payer: MEDICARE

## 2023-05-04 VITALS
OXYGEN SATURATION: 97 % | WEIGHT: 156.94 LBS | BODY MASS INDEX: 27.81 KG/M2 | HEIGHT: 63 IN | SYSTOLIC BLOOD PRESSURE: 126 MMHG | DIASTOLIC BLOOD PRESSURE: 76 MMHG | HEART RATE: 64 BPM | TEMPERATURE: 98 F

## 2023-05-04 DIAGNOSIS — B96.89 ACUTE BACTERIAL BRONCHITIS: ICD-10-CM

## 2023-05-04 DIAGNOSIS — J20.8 ACUTE BACTERIAL BRONCHITIS: ICD-10-CM

## 2023-05-04 DIAGNOSIS — J01.90 ACUTE BACTERIAL RHINOSINUSITIS: Primary | ICD-10-CM

## 2023-05-04 DIAGNOSIS — B96.89 ACUTE BACTERIAL RHINOSINUSITIS: Primary | ICD-10-CM

## 2023-05-04 PROCEDURE — 99214 OFFICE O/P EST MOD 30 MIN: CPT | Mod: S$PBB,,, | Performed by: FAMILY MEDICINE

## 2023-05-04 PROCEDURE — 99213 OFFICE O/P EST LOW 20 MIN: CPT | Mod: PBBFAC | Performed by: FAMILY MEDICINE

## 2023-05-04 PROCEDURE — 99214 PR OFFICE/OUTPT VISIT, EST, LEVL IV, 30-39 MIN: ICD-10-PCS | Mod: S$PBB,,, | Performed by: FAMILY MEDICINE

## 2023-05-04 PROCEDURE — 99999 PR PBB SHADOW E&M-EST. PATIENT-LVL III: CPT | Mod: PBBFAC,,, | Performed by: FAMILY MEDICINE

## 2023-05-04 PROCEDURE — 99999 PR PBB SHADOW E&M-EST. PATIENT-LVL III: ICD-10-PCS | Mod: PBBFAC,,, | Performed by: FAMILY MEDICINE

## 2023-05-04 RX ORDER — ALBUTEROL SULFATE 90 UG/1
2 AEROSOL, METERED RESPIRATORY (INHALATION) EVERY 6 HOURS PRN
Qty: 18 G | Refills: 0 | Status: SHIPPED | OUTPATIENT
Start: 2023-05-04 | End: 2023-07-12

## 2023-05-04 RX ORDER — PROMETHAZINE HYDROCHLORIDE AND DEXTROMETHORPHAN HYDROBROMIDE 6.25; 15 MG/5ML; MG/5ML
5 SYRUP ORAL EVERY 6 HOURS PRN
Qty: 180 ML | Refills: 0 | Status: SHIPPED | OUTPATIENT
Start: 2023-05-04 | End: 2023-08-16

## 2023-05-04 RX ORDER — MOXIFLOXACIN HYDROCHLORIDE 400 MG/1
400 TABLET ORAL DAILY
Qty: 5 TABLET | Refills: 0 | Status: SHIPPED | OUTPATIENT
Start: 2023-05-04 | End: 2023-05-09

## 2023-05-04 NOTE — PROGRESS NOTES
Subjective:   Patient ID: Venus Caldwell is a 73 y.o. female.  Chief Complaint:  Sinus Problem    Presents for evaluation of chronic sinusitis and bronchitis  Evaluated at urgent care 5-7 days ago  Had symptoms 7 to 10 days prior to urgent care visit   Treated with doxycycline and Bromfed DM   No overall improvement in symptoms  Specifically worsening right-sided maxillary and facial sinus pressure with cough but is now more productive, increased fatigue, and mild increased shortness of breath      Current Outpatient Medications:     amLODIPine (NORVASC) 5 MG tablet, Take 1 tablet (5 mg total) by mouth once daily., Disp: 90 tablet, Rfl: 3    aspirin (ECOTRIN) 81 MG EC tablet, Take 1 tablet (81 mg total) by mouth once daily., Disp: , Rfl:     atorvastatin (LIPITOR) 40 MG tablet, Take 1 tablet (40 mg total) by mouth every evening., Disp: 90 tablet, Rfl: 3    azelastine (ASTELIN) 137 mcg (0.1 %) nasal spray, 1 spray (137 mcg total) by Nasal route 2 (two) times daily., Disp: 30 mL, Rfl: 11    benzocaine (HURRICAINE) 20 % Gel, Use as directed in the mouth or throat 4 (four) times daily as needed., Disp: , Rfl:     betamethasone dipropionate (DIPROLENE) 0.05 % cream, as needed. , Disp: , Rfl:     candesartan (ATACAND) 16 MG tablet, Take 1 tablet (16 mg total) by mouth once daily., Disp: 90 tablet, Rfl: 3    cetirizine (ZYRTEC) 10 MG tablet, Take 10 mg by mouth once daily., Disp: , Rfl:     cholecalciferol, vitamin D3, 5,000 unit Tab, Take 1,000 Units by mouth once daily. , Disp: , Rfl:     dulaglutide (TRULICITY) 0.75 mg/0.5 mL pen injector, Inject 0.75 mg into the skin every 7 days., Disp: 12 pen, Rfl: 3    ferrous sulfate (FEOSOL) Tab tablet, Take 1 tablet by mouth daily with breakfast., Disp: , Rfl:     gabapentin (NEURONTIN) 300 MG capsule, Take 1 capsule (300 mg total) by mouth 3 (three) times daily., Disp: 270 capsule, Rfl: 3    GAVILYTE-G 236-22.74-6.74 -5.86 gram suspension, Take 4,000 mLs by mouth., Disp: ,  Rfl:     hydrocortisone 2.5 % cream, Apply to rash twice daily as needed for rash, Disp: 60 g, Rfl: 4    ketoconazole (NIZORAL) 2 % cream, Apply to rash twice daily as needed for rash, Disp: 60 g, Rfl: 4    meloxicam (MOBIC) 7.5 MG tablet, TAKE 1 TABLET DAILY, Disp: 90 tablet, Rfl: 3    metFORMIN (GLUCOPHAGE) 500 MG tablet, Take 1 tablet (500 mg total) by mouth daily with breakfast., Disp: 90 tablet, Rfl: 3    metoprolol succinate (TOPROL-XL) 100 MG 24 hr tablet, Take 1 tablet (100 mg total) by mouth once daily., Disp: 90 tablet, Rfl: 3    mupirocin (BACTROBAN) 2 % ointment, Apply topically 3 (three) times daily., Disp: 22 g, Rfl: 0    omeprazole (PRILOSEC) 20 MG capsule, Take 20 mg by mouth once daily., Disp: , Rfl:     triamcinolone acetonide 0.1% (KENALOG) 0.1 % cream, SMARTSI Application Topical 2-3 Times Daily, Disp: , Rfl:     valACYclovir (VALTREX) 500 MG tablet, Take 500 mg by mouth as needed. , Disp: , Rfl:     albuterol (PROVENTIL/VENTOLIN HFA) 90 mcg/actuation inhaler, Inhale 2 puffs into the lungs every 6 (six) hours as needed (Cough)., Disp: 18 g, Rfl: 0    moxifloxacin (AVELOX) 400 mg tablet, Take 1 tablet (400 mg total) by mouth once daily. for 5 days, Disp: 5 tablet, Rfl: 0    promethazine-dextromethorphan (PROMETHAZINE-DM) 6.25-15 mg/5 mL Syrp, Take 5 mLs by mouth every 6 (six) hours as needed (Cough)., Disp: 180 mL, Rfl: 0    Review of Systems   Constitutional:  Negative for activity change, chills, diaphoresis, fatigue, fever and unexpected weight change.   HENT:  Positive for congestion, postnasal drip, rhinorrhea, sinus pressure and sinus pain. Negative for dental problem, ear discharge, ear pain, hearing loss, sneezing, sore throat, tinnitus, trouble swallowing and voice change.    Eyes:  Negative for pain, discharge, redness, itching and visual disturbance.   Respiratory:  Positive for cough, chest tightness and wheezing. Negative for choking, shortness of breath and stridor.   "  Cardiovascular:  Positive for chest pain and palpitations. Negative for leg swelling.   Gastrointestinal:  Negative for abdominal pain, blood in stool, constipation, diarrhea, nausea and vomiting.   Endocrine: Negative for polydipsia and polyuria.   Genitourinary:  Negative for difficulty urinating, dysuria and hematuria.   Musculoskeletal:  Negative for arthralgias, joint swelling, myalgias, neck pain and neck stiffness.   Skin:  Negative for rash.   Neurological:  Positive for weakness. Negative for headaches.   Psychiatric/Behavioral:  Negative for dysphoric mood.      Objective:   /76 (BP Location: Right arm, Patient Position: Sitting, BP Method: Large (Manual))   Pulse 64   Temp 98.2 °F (36.8 °C) (Tympanic)   Ht 5' 3" (1.6 m)   Wt 71.2 kg (156 lb 15.5 oz)   SpO2 97%   BMI 27.81 kg/m²     Physical Exam  Vitals and nursing note reviewed.   Constitutional:       General: She is not in acute distress.     Appearance: Normal appearance. She is well-developed. She is ill-appearing. She is not toxic-appearing or diaphoretic.   HENT:      Right Ear: Hearing, tympanic membrane, ear canal and external ear normal.      Left Ear: Hearing, tympanic membrane, ear canal and external ear normal.      Nose: Mucosal edema, congestion and rhinorrhea present.      Right Turbinates: Enlarged and swollen.      Left Turbinates: Enlarged and swollen.      Right Sinus: Maxillary sinus tenderness present. No frontal sinus tenderness.      Left Sinus: No maxillary sinus tenderness or frontal sinus tenderness.      Mouth/Throat:      Pharynx: Oropharynx is clear. Uvula midline. No pharyngeal swelling, oropharyngeal exudate or posterior oropharyngeal erythema.   Eyes:      Conjunctiva/sclera: Conjunctivae normal.      Right eye: Right conjunctiva is not injected.      Left eye: Left conjunctiva is not injected.   Cardiovascular:      Rate and Rhythm: Normal rate and regular rhythm.      Heart sounds: Normal heart sounds. No " murmur heard.    No friction rub. No gallop.   Pulmonary:      Effort: Pulmonary effort is normal.      Breath sounds: Rhonchi present. No wheezing or rales.   Lymphadenopathy:      Cervical: No cervical adenopathy.   Skin:     General: Skin is warm and dry.      Findings: No rash.     Assessment:       ICD-10-CM ICD-9-CM   1. Acute bacterial rhinosinusitis  J01.90 461.9    B96.89    2. Acute bacterial bronchitis  J20.8 466.0    B96.89 041.9     Plan:   Acute bacterial rhinosinusitis  Acute bacterial bronchitis  -     albuterol (PROVENTIL/VENTOLIN HFA) 90 mcg/actuation inhaler; Inhale 2 puffs into the lungs every 6 (six) hours as needed (Cough).  Dispense: 18 g; Refill: 0  -     promethazine-dextromethorphan (PROMETHAZINE-DM) 6.25-15 mg/5 mL Syrp; Take 5 mLs by mouth every 6 (six) hours as needed (Cough).  Dispense: 180 mL; Refill: 0  -     moxifloxacin (AVELOX) 400 mg tablet; Take 1 tablet (400 mg total) by mouth once daily. for 5 days  Dispense: 5 tablet; Refill: 0    Persistent bacterial rhinosinusitis and bronchitis symptoms despite treatment with doxycycline   Treat with Avelox 40 mg daily for 5 days   Albuterol and Phenergan DM as needed for symptoms   Return to clinic as scheduled/as needed

## 2023-06-28 ENCOUNTER — TELEPHONE (OUTPATIENT)
Dept: PODIATRY | Facility: CLINIC | Age: 73
End: 2023-06-28
Payer: MEDICARE

## 2023-07-10 ENCOUNTER — LAB VISIT (OUTPATIENT)
Dept: LAB | Facility: HOSPITAL | Age: 73
End: 2023-07-10
Attending: NURSE PRACTITIONER
Payer: MEDICARE

## 2023-07-10 DIAGNOSIS — D50.8 IRON DEFICIENCY ANEMIA SECONDARY TO INADEQUATE DIETARY IRON INTAKE: ICD-10-CM

## 2023-07-10 LAB
BASOPHILS # BLD AUTO: 0.05 K/UL (ref 0–0.2)
BASOPHILS NFR BLD: 0.6 % (ref 0–1.9)
DIFFERENTIAL METHOD: ABNORMAL
EOSINOPHIL # BLD AUTO: 0.4 K/UL (ref 0–0.5)
EOSINOPHIL NFR BLD: 4.8 % (ref 0–8)
ERYTHROCYTE [DISTWIDTH] IN BLOOD BY AUTOMATED COUNT: 13.4 % (ref 11.5–14.5)
FERRITIN SERPL-MCNC: 125 NG/ML (ref 20–300)
HCT VFR BLD AUTO: 36.8 % (ref 37–48.5)
HGB BLD-MCNC: 12 G/DL (ref 12–16)
IMM GRANULOCYTES # BLD AUTO: 0.03 K/UL (ref 0–0.04)
IMM GRANULOCYTES NFR BLD AUTO: 0.3 % (ref 0–0.5)
IRON SERPL-MCNC: 66 UG/DL (ref 30–160)
LYMPHOCYTES # BLD AUTO: 2.2 K/UL (ref 1–4.8)
LYMPHOCYTES NFR BLD: 24.2 % (ref 18–48)
MCH RBC QN AUTO: 28.9 PG (ref 27–31)
MCHC RBC AUTO-ENTMCNC: 32.6 G/DL (ref 32–36)
MCV RBC AUTO: 89 FL (ref 82–98)
MONOCYTES # BLD AUTO: 0.6 K/UL (ref 0.3–1)
MONOCYTES NFR BLD: 6.6 % (ref 4–15)
NEUTROPHILS # BLD AUTO: 5.6 K/UL (ref 1.8–7.7)
NEUTROPHILS NFR BLD: 63.5 % (ref 38–73)
NRBC BLD-RTO: 0 /100 WBC
PLATELET # BLD AUTO: 295 K/UL (ref 150–450)
PMV BLD AUTO: 9.9 FL (ref 9.2–12.9)
RBC # BLD AUTO: 4.15 M/UL (ref 4–5.4)
SATURATED IRON: 22 % (ref 20–50)
TOTAL IRON BINDING CAPACITY: 297 UG/DL (ref 250–450)
TRANSFERRIN SERPL-MCNC: 201 MG/DL (ref 200–375)
WBC # BLD AUTO: 8.88 K/UL (ref 3.9–12.7)

## 2023-07-10 PROCEDURE — 36415 COLL VENOUS BLD VENIPUNCTURE: CPT | Performed by: NURSE PRACTITIONER

## 2023-07-10 PROCEDURE — 85025 COMPLETE CBC W/AUTO DIFF WBC: CPT | Performed by: NURSE PRACTITIONER

## 2023-07-10 PROCEDURE — 82728 ASSAY OF FERRITIN: CPT | Performed by: NURSE PRACTITIONER

## 2023-07-10 PROCEDURE — 84466 ASSAY OF TRANSFERRIN: CPT | Performed by: NURSE PRACTITIONER

## 2023-07-10 NOTE — PROGRESS NOTES
Subjective:       Patient ID: Venus Caldwell is a 73 y.o. female.    Chief Complaint:   1. Iron deficiency anemia secondary to inadequate dietary iron intake  Ferritin    Iron and TIBC    CBC Auto Differential        Current Treatment:  Oral iron supplementation every other day    Treatment History:  IV iron therapy x 2  Oral iron supplementation daily    HPI: This is a 73-year-old female with endometrial cancer (@ 58 yrs), diabetes type 2, hypertension who was referred to us by Dr. Farrell due to recently noted iron deficiency anemia in 10/2020.     Review of most recent iron studies performed on 10/8/2020 showed serum iron of 29, iron saturation of 6% and ferritin level of 14 ng per cc. CBC from August 2020 showed hemoglobin of 9.5 grams/deciliter, hematocrit of 33.0 however normal MCV.  There was also noticeable thrombocytosis with platelet count at 405k.     Colonoscopy performed in August 2018 showed a one 4mm polyp in the transverse colon that was noted to be benign-hyperplastic polyp.  There was also diverticulosis at the hepatic flexure.  Recommended 5 year repeat which will be 2023.  During the colonoscopy, erectile nodule was noted that was biopsied and showed to be simple inclusion cyst.     She received 2 doses of IV iron with improvement of iron stores.     At her follow up in 1/2023, RBC & H&H had dropped slightly; she admitted to eating popcorn and peanuts and complained of subsequent GI upset and diarrhea. Reviewed most recent colonoscopy results with her that indicate she has diverticulosis. Discussed foods to avoid.      No pertinent family history or occupational history.    Interval History: Patient presents for follow up on oral iron. She presents alone and reports adherence to oral iron every other day with no GI upset or constipation. Slight decrease in RBC, H&H; iron studies WNL. Advised her to continue oral iron every other day. Also discussed increasing her dietary intake of foods high  in iron. She mentions that she has gotten away from eating meats. She mentions that she will eat a steak from [employer]. Discussed that she can get iron from dried beans, beets, and dark green leafy vegetables. She is amenable to this.     Reviewed labs with patient:   CBC:   Recent Labs   Lab 07/10/23  1245   WBC 8.88   RBC 4.15   Hemoglobin 12.0   Hematocrit 36.8 L   Platelets 295   MCV 89   MCH 28.9   MCHC 32.6     CMP:  Recent Labs   Lab 01/12/23  1340   Glucose 128 H   Calcium 9.2   Albumin 3.3 L   Total Protein 7.0   Sodium 144   Potassium 3.6   CO2 25   Chloride 107   BUN 17   Creatinine 0.7   Alkaline Phosphatase 116   ALT 27   AST 20   Total Bilirubin 0.6     Lab Results   Component Value Date    IRON 66 07/10/2023    TRANSFERRIN 201 07/10/2023    TIBC 297 07/10/2023    FESATURATED 22 07/10/2023      Lab Results   Component Value Date    FERRITIN 125 07/10/2023     Social History     Socioeconomic History    Marital status:     Number of children: 1   Occupational History    Occupation: Retired   Tobacco Use    Smoking status: Never    Smokeless tobacco: Never   Substance and Sexual Activity    Alcohol use: No    Drug use: No    Sexual activity: Not Currently     Social Determinants of Health     Financial Resource Strain: Low Risk     Difficulty of Paying Living Expenses: Not hard at all   Food Insecurity: No Food Insecurity    Worried About Running Out of Food in the Last Year: Never true    Ran Out of Food in the Last Year: Never true   Transportation Needs: No Transportation Needs    Lack of Transportation (Medical): No    Lack of Transportation (Non-Medical): No   Physical Activity: Unknown    Days of Exercise per Week: Patient refused    Minutes of Exercise per Session: 30 min   Stress: Unknown    Feeling of Stress : Patient refused   Social Connections: Moderately Isolated    Frequency of Communication with Friends and Family: More than three times a week    Frequency of Social Gatherings with  Friends and Family: Patient refused    Attends Catholic Services: Never    Active Member of Clubs or Organizations: No    Attends Club or Organization Meetings: Patient refused    Marital Status:    Housing Stability: Low Risk     Unable to Pay for Housing in the Last Year: No    Number of Places Lived in the Last Year: 1    Unstable Housing in the Last Year: No     Past Medical History:   Diagnosis Date    Arthritis     Cancer, uterine     Coronary artery disease     Diabetes mellitus     prediabetes    Diabetes mellitus, type 2     Digestive disorder     DM (diabetes mellitus) 08/2019    BS doesn't check 12/16/2019    DM (diabetes mellitus) 08/2019    BS doesn't check 06/29/2021    Hypertension     Multiple thyroid nodules 7/8/2021    Ovarian cancer     Sciatica     Sleep apnea      Family History   Problem Relation Age of Onset    Diabetes Mother     Cataracts Mother     Diabetes Brother     Cataracts Maternal Grandmother     Breast cancer Maternal Aunt      Past Surgical History:   Procedure Laterality Date    CATARACT EXTRACTION W/  INTRAOCULAR LENS IMPLANT Right 11/17/2022    CATARACT EXTRACTION W/  INTRAOCULAR LENS IMPLANT Left 01/26/2023    CHOLECYSTECTOMY      COLONOSCOPY N/A 08/01/2018    Procedure: COLONOSCOPY;  Surgeon: Yrn Hunter III, MD;  Location: [facility];  Service: Endoscopy;  Laterality: N/A;    COLONOSCOPY N/A 06/30/2022    Procedure: COLONOSCOPY;  Surgeon: Mica Rodriguez MD;  Location: [facility];  Service: Endoscopy;  Laterality: N/A;    ESOPHAGOGASTRODUODENOSCOPY N/A 03/17/2021    Procedure: EGD (ESOPHAGOGASTRODUODENOSCOPY) (Dr. EMILIO billings);  Surgeon: Coy Ortiz MD;  Location: [facility];  Service: Endoscopy;  Laterality: N/A;    HYSTERECTOMY      JOINT REPLACEMENT Right     hip replacement    SURGICAL EXCISION OF ANAL LESION N/A 08/29/2018    Procedure: EXCISION, LESION, ANUS;  Surgeon: Yrn Balderrama MD;  Location: [facility];  Service: General;  Laterality: N/A;     TOTAL KNEE ARTHROPLASTY Bilateral      Review of Systems   Constitutional:  Positive for fatigue. Negative for appetite change.   HENT:  Negative for mouth sores, rhinorrhea and sore throat.    Eyes: Negative.    Respiratory: Negative.     Cardiovascular: Negative.    Gastrointestinal:  Negative for constipation, diarrhea, nausea and vomiting.   Endocrine: Negative.    Genitourinary: Negative.    Musculoskeletal:  Positive for back pain.   Integumentary:  Negative.   Allergic/Immunologic: Negative.    Neurological:  Negative for weakness and numbness.   Hematological: Negative.    Psychiatric/Behavioral:  Positive for sleep disturbance (doesn't sleep well).        Medication List with Changes/Refills   Current Medications    AMLODIPINE (NORVASC) 5 MG TABLET    Take 1 tablet (5 mg total) by mouth once daily.    ASPIRIN (ECOTRIN) 81 MG EC TABLET    Take 1 tablet (81 mg total) by mouth once daily.    ATORVASTATIN (LIPITOR) 40 MG TABLET    Take 1 tablet (40 mg total) by mouth every evening.    AZELASTINE (ASTELIN) 137 MCG (0.1 %) NASAL SPRAY    1 spray (137 mcg total) by Nasal route 2 (two) times daily.    BENZOCAINE (HURRICAINE) 20 % GEL    Use as directed in the mouth or throat 4 (four) times daily as needed.    BETAMETHASONE DIPROPIONATE (DIPROLENE) 0.05 % CREAM    as needed.     CANDESARTAN (ATACAND) 16 MG TABLET    Take 1 tablet (16 mg total) by mouth once daily.    CETIRIZINE (ZYRTEC) 10 MG TABLET    Take 10 mg by mouth once daily.    CHOLECALCIFEROL, VITAMIN D3, 5,000 UNIT TAB    Take 1,000 Units by mouth once daily.     DULAGLUTIDE (TRULICITY) 0.75 MG/0.5 ML PEN INJECTOR    Inject 0.75 mg into the skin every 7 days.    FERROUS SULFATE (FEOSOL) TAB TABLET    Take 1 tablet by mouth daily with breakfast.    GABAPENTIN (NEURONTIN) 300 MG CAPSULE    Take 1 capsule (300 mg total) by mouth 3 (three) times daily.    GAVILYTE-G 236-22.74-6.74 -5.86 GRAM SUSPENSION    Take 4,000 mLs by mouth.    HYDROCORTISONE 2.5 %  CREAM    Apply to rash twice daily as needed for rash    KETOCONAZOLE (NIZORAL) 2 % CREAM    Apply to rash twice daily as needed for rash    MELOXICAM (MOBIC) 7.5 MG TABLET    TAKE 1 TABLET DAILY    METFORMIN (GLUCOPHAGE) 500 MG TABLET    Take 1 tablet (500 mg total) by mouth daily with breakfast.    METOPROLOL SUCCINATE (TOPROL-XL) 100 MG 24 HR TABLET    Take 1 tablet (100 mg total) by mouth once daily.    MUPIROCIN (BACTROBAN) 2 % OINTMENT    Apply topically 3 (three) times daily.    OMEPRAZOLE (PRILOSEC) 20 MG CAPSULE    Take 20 mg by mouth once daily.    PROMETHAZINE-DEXTROMETHORPHAN (PROMETHAZINE-DM) 6.25-15 MG/5 ML SYRP    Take 5 mLs by mouth every 6 (six) hours as needed (Cough).    TRIAMCINOLONE ACETONIDE 0.1% (KENALOG) 0.1 % CREAM    SMARTSI Application Topical 2-3 Times Daily    VALACYCLOVIR (VALTREX) 500 MG TABLET    Take 500 mg by mouth as needed.    Discontinued Medications    ALBUTEROL (PROVENTIL/VENTOLIN HFA) 90 MCG/ACTUATION INHALER    Inhale 2 puffs into the lungs every 6 (six) hours as needed (Cough).     Objective:     Vitals:    23 1332   BP: 110/68   Pulse: 66   Temp: 96.7 °F (35.9 °C)     Physical Exam  Vitals reviewed.   Constitutional:       Appearance: Normal appearance.   HENT:      Head: Normocephalic.      Mouth/Throat:      Comments: Wearing mask    Eyes:      Extraocular Movements: Extraocular movements intact.      Pupils: Pupils are equal, round, and reactive to light.      Comments: Glasses       Cardiovascular:      Rate and Rhythm: Normal rate and regular rhythm.      Heart sounds: Normal heart sounds.   Pulmonary:      Effort: Pulmonary effort is normal.      Breath sounds: Normal breath sounds.   Abdominal:      General: Bowel sounds are normal.      Palpations: Abdomen is soft.      Comments: rounded     Genitourinary:     Comments: deferred    Musculoskeletal:         General: Normal range of motion.      Cervical back: Normal range of motion and neck supple.   Skin:      General: Skin is warm and dry.   Neurological:      Mental Status: She is alert and oriented to person, place, and time.   Psychiatric:         Behavior: Behavior normal.         Thought Content: Thought content normal.        (0) Fully active, able to carry on all predisease performance without restriction  Assessment:     Problem List Items Addressed This Visit          Oncology    Iron deficiency anemia secondary to inadequate dietary iron intake - Primary     Iron deficiency anemia status post IV iron therapy with improvement noted in hemoglobin from 9 grams/deciliter to above 12 g per dL. Iron studies also showed improvement in iron saturation, serum iron level and iron storage capacity.      Upper GI endoscopy from 3/17/2021 was unremarkable no evidence of H pylori.     Currently on oral iron every other day. Iron studies WNL. Patient to continue oral iron every other day and increase her dietary intake of foods high in iron.          Relevant Orders    Ferritin    Iron and TIBC    CBC Auto Differential     Plan:     Iron deficiency anemia secondary to inadequate dietary iron intake  -     Ferritin; Future; Expected date: 10/12/2023  -     Iron and TIBC; Future; Expected date: 10/12/2023  -     CBC Auto Differential; Future; Expected date: 10/12/2023    Labs reviewed; iron deficiency resolved.   Continue oral iron supplementation every other day with vitamin C and without caffeine.   Increase dietary intake of iron rich foods.   Patient to begin wearing CPAP nightly as directed.   Educated patient on diverticulosis and foods to avoid.   Follow up in 3 months  with  iron profile, ferritin, and CBC.    Route Chart for Scheduling    Med Onc Chart Routing      Follow up with physician    Follow up with SUSAN 3 months. Janee at [place]   Infusion scheduling note    Injection scheduling note    Labs CBC, ferritin and iron and TIBC   Scheduling:  Preferred lab:  Lab interval:  in 3 months, 2 days prior at [place]    Imaging None      Pharmacy appointment No pharmacy appointment needed      Other referrals     No additional referrals needed          I will review assessment/plan with collaborating physician.      RAF Yoder

## 2023-07-10 NOTE — ASSESSMENT & PLAN NOTE
Iron deficiency anemia status post IV iron therapy with improvement noted in hemoglobin from 9 grams/deciliter to above 12 g per dL. Iron studies also showed improvement in iron saturation, serum iron level and iron storage capacity.      Upper GI endoscopy from 3/17/2021 was unremarkable no evidence of H pylori.     Currently on oral iron every other day. Iron studies WNL. Patient to continue oral iron every other day and increase her dietary intake of foods high in iron.

## 2023-07-12 ENCOUNTER — OFFICE VISIT (OUTPATIENT)
Dept: HEMATOLOGY/ONCOLOGY | Facility: CLINIC | Age: 73
End: 2023-07-12
Payer: MEDICARE

## 2023-07-12 VITALS
WEIGHT: 155.63 LBS | SYSTOLIC BLOOD PRESSURE: 110 MMHG | HEART RATE: 66 BPM | OXYGEN SATURATION: 96 % | HEIGHT: 63 IN | DIASTOLIC BLOOD PRESSURE: 68 MMHG | BODY MASS INDEX: 27.57 KG/M2 | TEMPERATURE: 97 F

## 2023-07-12 DIAGNOSIS — D50.8 IRON DEFICIENCY ANEMIA SECONDARY TO INADEQUATE DIETARY IRON INTAKE: Primary | ICD-10-CM

## 2023-07-12 PROCEDURE — 99214 PR OFFICE/OUTPT VISIT, EST, LEVL IV, 30-39 MIN: ICD-10-PCS | Mod: S$PBB,,, | Performed by: NURSE PRACTITIONER

## 2023-07-12 PROCEDURE — 99214 OFFICE O/P EST MOD 30 MIN: CPT | Mod: S$PBB,,, | Performed by: NURSE PRACTITIONER

## 2023-07-12 PROCEDURE — 99999 PR PBB SHADOW E&M-EST. PATIENT-LVL IV: ICD-10-PCS | Mod: PBBFAC,,, | Performed by: NURSE PRACTITIONER

## 2023-07-12 PROCEDURE — 99214 OFFICE O/P EST MOD 30 MIN: CPT | Mod: PBBFAC | Performed by: NURSE PRACTITIONER

## 2023-07-12 PROCEDURE — 99999 PR PBB SHADOW E&M-EST. PATIENT-LVL IV: CPT | Mod: PBBFAC,,, | Performed by: NURSE PRACTITIONER

## 2023-07-17 ENCOUNTER — OFFICE VISIT (OUTPATIENT)
Dept: PODIATRY | Facility: CLINIC | Age: 73
End: 2023-07-17
Payer: MEDICARE

## 2023-07-17 DIAGNOSIS — E11.49 TYPE 2 DIABETES MELLITUS WITH NEUROLOGICAL MANIFESTATION: Primary | ICD-10-CM

## 2023-07-17 DIAGNOSIS — L60.3 NAIL DYSTROPHY: ICD-10-CM

## 2023-07-17 DIAGNOSIS — S90.422A BLISTER OF LEFT GREAT TOE, INITIAL ENCOUNTER: ICD-10-CM

## 2023-07-17 PROCEDURE — 99999 PR PBB SHADOW E&M-EST. PATIENT-LVL I: CPT | Mod: PBBFAC,,, | Performed by: PODIATRIST

## 2023-07-17 PROCEDURE — 99213 PR OFFICE/OUTPT VISIT, EST, LEVL III, 20-29 MIN: ICD-10-PCS | Mod: 25,S$PBB,, | Performed by: PODIATRIST

## 2023-07-17 PROCEDURE — 11721 PR DEBRIDEMENT OF NAILS, 6 OR MORE: ICD-10-PCS | Mod: Q9,S$PBB,, | Performed by: PODIATRIST

## 2023-07-17 PROCEDURE — 99999 PR PBB SHADOW E&M-EST. PATIENT-LVL I: ICD-10-PCS | Mod: PBBFAC,,, | Performed by: PODIATRIST

## 2023-07-17 PROCEDURE — 11721 DEBRIDE NAIL 6 OR MORE: CPT | Mod: Q9,S$PBB,, | Performed by: PODIATRIST

## 2023-07-17 PROCEDURE — 11721 DEBRIDE NAIL 6 OR MORE: CPT | Mod: Q9,PBBFAC | Performed by: PODIATRIST

## 2023-07-17 PROCEDURE — 99211 OFF/OP EST MAY X REQ PHY/QHP: CPT | Mod: PBBFAC | Performed by: PODIATRIST

## 2023-07-17 PROCEDURE — 99213 OFFICE O/P EST LOW 20 MIN: CPT | Mod: 25,S$PBB,, | Performed by: PODIATRIST

## 2023-07-17 NOTE — PROGRESS NOTES
Subjective:       Patient ID: Venus Caldwell is a 73 y.o. female.    Chief Complaint: Routine Foot Care (5.4.23 last seen PCP Dr. Sourav Hernandez. She denies pain at present and is wearing sandals. )      HPI: Patient presents to the office today with the chief complaint of elongated, thickened and dystrophic nail plates to the B/L foot. Patient also complains of new blister formation to the medial aspect of the left great toe at the 1st metatarsophalangeal. This patient is a Diabetic Type II, complicated with  Peripheral Neuropathy. Patient does follow with Primary Care and/or Endocrinology for management of Diabetes Mellitus. This patient's PMD is Sourav Hernandez MD. This patient last saw his/her primary care provider on 5/04/2023.    Hemoglobin A1C   Date Value Ref Range Status   02/16/2023 6.5 (H) 4.0 - 5.6 % Final     Comment:     ADA Screening Guidelines:  5.7-6.4%  Consistent with prediabetes  >or=6.5%  Consistent with diabetes    High levels of fetal hemoglobin interfere with the HbA1C  assay. Heterozygous hemoglobin variants (HbS, HgC, etc)do  not significantly interfere with this assay.   However, presence of multiple variants may affect accuracy.     08/16/2022 6.2 (H) 4.0 - 5.6 % Final     Comment:     ADA Screening Guidelines:  5.7-6.4%  Consistent with prediabetes  >or=6.5%  Consistent with diabetes    High levels of fetal hemoglobin interfere with the HbA1C  assay. Heterozygous hemoglobin variants (HbS, HgC, etc)do  not significantly interfere with this assay.   However, presence of multiple variants may affect accuracy.     02/16/2022 6.4 (H) 4.0 - 5.6 % Final     Comment:     ADA Screening Guidelines:  5.7-6.4%  Consistent with prediabetes  >or=6.5%  Consistent with diabetes    High levels of fetal hemoglobin interfere with the HbA1C  assay. Heterozygous hemoglobin variants (HbS, HgC, etc)do  not significantly interfere with this assay.   However, presence of multiple variants may affect  accuracy.     .     Review of patient's allergies indicates:   Allergen Reactions    Sulfa (sulfonamide antibiotics) Anaphylaxis     Pt became hypoxic after taking sulfa drugs as a child      Buprenorphine Rash       Past Medical History:   Diagnosis Date    Arthritis     Cancer, uterine     Coronary artery disease     Diabetes mellitus     prediabetes    Diabetes mellitus, type 2     Digestive disorder     DM (diabetes mellitus) 08/2019    BS doesn't check 12/16/2019    DM (diabetes mellitus) 08/2019    BS doesn't check 06/29/2021    Hypertension     Multiple thyroid nodules 7/8/2021    Ovarian cancer     Sciatica     Sleep apnea        Family History   Problem Relation Age of Onset    Diabetes Mother     Cataracts Mother     Diabetes Brother     Cataracts Maternal Grandmother     Breast cancer Maternal Aunt        Social History     Socioeconomic History    Marital status:     Number of children: 1   Occupational History    Occupation: Retired   Tobacco Use    Smoking status: Never    Smokeless tobacco: Never   Substance and Sexual Activity    Alcohol use: No    Drug use: No    Sexual activity: Not Currently     Social Determinants of Health     Financial Resource Strain: Low Risk     Difficulty of Paying Living Expenses: Not hard at all   Food Insecurity: No Food Insecurity    Worried About Running Out of Food in the Last Year: Never true    Ran Out of Food in the Last Year: Never true   Transportation Needs: No Transportation Needs    Lack of Transportation (Medical): No    Lack of Transportation (Non-Medical): No   Physical Activity: Unknown    Days of Exercise per Week: Patient refused    Minutes of Exercise per Session: 30 min   Stress: Unknown    Feeling of Stress : Patient refused   Social Connections: Moderately Isolated    Frequency of Communication with Friends and Family: More than three times a week    Frequency of Social Gatherings with Friends and Family: Patient refused    Attends [employer]  Services: Never    Active Member of Clubs or Organizations: No    Attends Club or Organization Meetings: Patient refused    Marital Status:    Housing Stability: Low Risk     Unable to Pay for Housing in the Last Year: No    Number of Places Lived in the Last Year: 1    Unstable Housing in the Last Year: No       Past Surgical History:   Procedure Laterality Date    CATARACT EXTRACTION W/  INTRAOCULAR LENS IMPLANT Right 11/17/2022    CATARACT EXTRACTION W/  INTRAOCULAR LENS IMPLANT Left 01/26/2023    CHOLECYSTECTOMY      COLONOSCOPY N/A 08/01/2018    Procedure: COLONOSCOPY;  Surgeon: Yrn Hunter III, MD;  Location: [facility];  Service: Endoscopy;  Laterality: N/A;    COLONOSCOPY N/A 06/30/2022    Procedure: COLONOSCOPY;  Surgeon: Mica Rodriguez MD;  Location: [facility];  Service: Endoscopy;  Laterality: N/A;    ESOPHAGOGASTRODUODENOSCOPY N/A 03/17/2021    Procedure: EGD (ESOPHAGOGASTRODUODENOSCOPY) (Dr. EMILIO billings);  Surgeon: Coy Ortiz MD;  Location: [facility];  Service: Endoscopy;  Laterality: N/A;    HYSTERECTOMY      JOINT REPLACEMENT Right     hip replacement    SURGICAL EXCISION OF ANAL LESION N/A 08/29/2018    Procedure: EXCISION, LESION, ANUS;  Surgeon: Yrn Balderrama MD;  Location: [facility];  Service: General;  Laterality: N/A;    TOTAL KNEE ARTHROPLASTY Bilateral        Review of Systems       Objective:   There were no vitals taken for this visit.    Physical Exam  LOWER EXTREMITY PHYSICAL EXAMINATION    VASCULAR:  The right dorsalis pedis pulse 2/4 and the right posterior tibial pulse 2/4.  The left dorsalis pedis pulse 2/4 and posterior tibial pulse on the left is 2/4.  Capillary refill is intact.  Pedal hair growth intact     NEUROLOGY: Protective sensation is not intact to the left and right plantar surfaces of the foot and digits, as the patient has no sensation/detection at greater than 4 distinct points of contact with 5.07 Lewis Fritz monofilament. Sensation to  light touch is intact on the left and right foot. Proprioception is intact, bilateral. Sensation to pin prick is reduced to absent. Vibratory sensation is diminished.    DERMATOLOGY:  Skin is supple, moist, intact.  Superficial blister present to the medial aspect of the left 1st metatarsal head.  No evidence of fat exposure.  No signs of surrounding erythema.  The R1, 2, 5 and left L1,2, 5 are thickened, discolored dystrophic.  There is subungual debris.  Nail plates have area of dark discoloration.  The remaining nails 3-4 on the right foot and the left foot are elongated but of normal color, thickness, and texture.   There is no signs of ingrowing into the medial or lateral borders.  There is no evidence of wounds or skin breakdown.  No edema or erythema.  No obvious lacerations or fissuring.  Interdigital spaces are clean, dry, intact.  No rashes or scars appreciated.    ORTHOPEDIC: Manual Muscle Testing is 5/5 in all planes on the left and right, without pains, with and without resistance. Gait pattern is non-antalgic.    Assessment:     1. 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 Nail dystrophy    3. 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  Greater than 50% of this visit spent on counseling and coordination of care. Greater than 15 minutes of a 20 minute appointment spent on education about the diabetic foot, neuropathy, and prevention of limb loss.  Shoe inspection. Diabetic Foot Education. Patient reminded of the importance of good nutrition and blood sugar control to help prevent podiatric complications of diabetes. Patient instructed on proper foot hygeine. We discussed wearing proper and supportive  shoe gear, daily foot inspections, never walking barefooted or sock footed, never putting sharp instruments to feet which can cause major complications associated with infection, ulcers, lacerations.      Dystrophic nail plates, as outlined above (R#1,2,5  ; L#1,2,5 ), are sharply debrided with double action nail nipper, and/or with the assistance of a mechanical rotary elia, with removal of all offending nail and nail border(s), for reduction of pains. Nails are reduced in terms of length, width and girth with removal of subungual debris to facilitate pain free weight bearing and ambulation. The elongated nails as outlined in the objective portion of this note, were trimmed to appropriate length, with a double action nail nipper, for alleviation/reduction of pains as well. Follow up in approx. 3-4 months.    Recommend to utilize appropriate antibiotic cream/minute honey to the great toe in conjunction with a Band-Aid to allow this to heal appropriately.  No need for oral antibiotics.  Continue to watch and monitor the area.    Future Appointments   Date Time Provider Department Center   8/16/2023 10:40 AM Sourav Hernandez MD [facility] [place]   8/17/2023  1:40 PM Maikol Garcia MD [facility]   10/10/2023  1:10 PM LABORATORY, [facility] FERRARO [facility]   10/12/2023  2:00 PM RAF Yoder [facility]   11/1/2023 12:30 PM Halina Vidales DPM [facility]

## 2023-08-07 DIAGNOSIS — E11.69 MIXED DIABETIC HYPERLIPIDEMIA ASSOCIATED WITH TYPE 2 DIABETES MELLITUS: ICD-10-CM

## 2023-08-07 DIAGNOSIS — E78.2 MIXED DIABETIC HYPERLIPIDEMIA ASSOCIATED WITH TYPE 2 DIABETES MELLITUS: ICD-10-CM

## 2023-08-07 RX ORDER — ATORVASTATIN CALCIUM 40 MG/1
40 TABLET, FILM COATED ORAL NIGHTLY
Qty: 90 TABLET | Refills: 0 | Status: SHIPPED | OUTPATIENT
Start: 2023-08-07 | End: 2023-08-16 | Stop reason: SDUPTHER

## 2023-08-07 NOTE — TELEPHONE ENCOUNTER
Care Due:                  Date            Visit Type   Department     Provider  --------------------------------------------------------------------------------                                MYCHART                              FOLLOWUP/OF  [facility] INTERNAL  Last Visit: 05-      [facility] VISIT   MEDICINE       Sourav Hernandez                              EP -                              PRIMARY      [facility] INTERNAL  Next Visit: 08-      CARE ([facility])   MEDICINE       Sourav Hernandez                                                            Last  Test          Frequency    Reason                     Performed    Due Date  --------------------------------------------------------------------------------    HBA1C.......  6 months...  dulaglutide, metFORMIN...  02- 08-    Lipid Panel.  12 months..  atorvastatin.............  08- 08-    [employer] Embedded Care Due Messages. Reference number: [id number].   8/07/2023 2:54:26 AM CDT

## 2023-08-07 NOTE — TELEPHONE ENCOUNTER
Provider Staff:  Action required for this patient     Please see care gap opportunities below in Care Due Message.    Thanks!  [facility]     Appointments      Date Provider   Last Visit   5/4/2023 Sourav Hernandez MD   Next Visit   8/16/2023 Sourav Hernandez MD     Refill Decision Note   Venus Caldwell  is requesting a refill authorization.  Brief Assessment and Rationale for Refill:  Approve     Medication Therapy Plan:         Comments:     Note composed:8:27 AM 08/07/2023

## 2023-08-15 DIAGNOSIS — I15.2 HYPERTENSION ASSOCIATED WITH DIABETES: Primary | Chronic | ICD-10-CM

## 2023-08-15 DIAGNOSIS — E11.59 HYPERTENSION ASSOCIATED WITH DIABETES: Primary | Chronic | ICD-10-CM

## 2023-08-16 ENCOUNTER — OFFICE VISIT (OUTPATIENT)
Dept: INTERNAL MEDICINE | Facility: CLINIC | Age: 73
End: 2023-08-16
Payer: MEDICARE

## 2023-08-16 VITALS
TEMPERATURE: 99 F | SYSTOLIC BLOOD PRESSURE: 116 MMHG | BODY MASS INDEX: 27.34 KG/M2 | WEIGHT: 154.31 LBS | DIASTOLIC BLOOD PRESSURE: 80 MMHG | HEIGHT: 63 IN | HEART RATE: 77 BPM | OXYGEN SATURATION: 96 %

## 2023-08-16 DIAGNOSIS — I15.2 HYPERTENSION ASSOCIATED WITH DIABETES: Chronic | ICD-10-CM

## 2023-08-16 DIAGNOSIS — I25.10 CORONARY ARTERY DISEASE INVOLVING NATIVE CORONARY ARTERY OF NATIVE HEART WITHOUT ANGINA PECTORIS: Chronic | ICD-10-CM

## 2023-08-16 DIAGNOSIS — E11.59 HYPERTENSION ASSOCIATED WITH DIABETES: Chronic | ICD-10-CM

## 2023-08-16 DIAGNOSIS — I47.10 SVT (SUPRAVENTRICULAR TACHYCARDIA): Chronic | ICD-10-CM

## 2023-08-16 DIAGNOSIS — K21.9 GASTROESOPHAGEAL REFLUX DISEASE WITHOUT ESOPHAGITIS: Chronic | ICD-10-CM

## 2023-08-16 DIAGNOSIS — J30.89 CHRONIC NON-SEASONAL ALLERGIC RHINITIS: Chronic | ICD-10-CM

## 2023-08-16 DIAGNOSIS — I47.19 PAROXYSMAL ATRIAL TACHYCARDIA: Chronic | ICD-10-CM

## 2023-08-16 DIAGNOSIS — M85.89 OSTEOPENIA OF MULTIPLE SITES: Chronic | ICD-10-CM

## 2023-08-16 DIAGNOSIS — G47.33 OSA ON CPAP: ICD-10-CM

## 2023-08-16 DIAGNOSIS — Z00.00 ANNUAL PHYSICAL EXAM: Primary | ICD-10-CM

## 2023-08-16 DIAGNOSIS — E11.49 TYPE II DIABETES MELLITUS WITH NEUROLOGICAL MANIFESTATIONS: Chronic | ICD-10-CM

## 2023-08-16 DIAGNOSIS — M54.31 SCIATICA OF RIGHT SIDE: ICD-10-CM

## 2023-08-16 DIAGNOSIS — Z12.31 ENCOUNTER FOR SCREENING MAMMOGRAM FOR MALIGNANT NEOPLASM OF BREAST: ICD-10-CM

## 2023-08-16 DIAGNOSIS — I70.0 ATHEROSCLEROSIS OF AORTA: Chronic | ICD-10-CM

## 2023-08-16 DIAGNOSIS — D50.8 IRON DEFICIENCY ANEMIA SECONDARY TO INADEQUATE DIETARY IRON INTAKE: ICD-10-CM

## 2023-08-16 DIAGNOSIS — E11.69 MIXED DIABETIC HYPERLIPIDEMIA ASSOCIATED WITH TYPE 2 DIABETES MELLITUS: Chronic | ICD-10-CM

## 2023-08-16 DIAGNOSIS — I50.32 CHRONIC HEART FAILURE WITH PRESERVED EJECTION FRACTION: Chronic | ICD-10-CM

## 2023-08-16 DIAGNOSIS — E78.2 MIXED DIABETIC HYPERLIPIDEMIA ASSOCIATED WITH TYPE 2 DIABETES MELLITUS: Chronic | ICD-10-CM

## 2023-08-16 DIAGNOSIS — M15.9 PRIMARY OSTEOARTHRITIS INVOLVING MULTIPLE JOINTS: ICD-10-CM

## 2023-08-16 PROCEDURE — 99215 OFFICE O/P EST HI 40 MIN: CPT | Mod: PBBFAC | Performed by: FAMILY MEDICINE

## 2023-08-16 PROCEDURE — 99999 PR PBB SHADOW E&M-EST. PATIENT-LVL V: CPT | Mod: PBBFAC,,, | Performed by: FAMILY MEDICINE

## 2023-08-16 PROCEDURE — 99214 PR OFFICE/OUTPT VISIT, EST, LEVL IV, 30-39 MIN: ICD-10-PCS | Mod: S$PBB,,, | Performed by: FAMILY MEDICINE

## 2023-08-16 PROCEDURE — 99214 OFFICE O/P EST MOD 30 MIN: CPT | Mod: S$PBB,,, | Performed by: FAMILY MEDICINE

## 2023-08-16 PROCEDURE — 99999 PR PBB SHADOW E&M-EST. PATIENT-LVL V: ICD-10-PCS | Mod: PBBFAC,,, | Performed by: FAMILY MEDICINE

## 2023-08-16 RX ORDER — ATORVASTATIN CALCIUM 40 MG/1
40 TABLET, FILM COATED ORAL NIGHTLY
Qty: 90 TABLET | Refills: 3 | Status: SHIPPED | OUTPATIENT
Start: 2023-08-16 | End: 2023-08-16 | Stop reason: SDUPTHER

## 2023-08-16 RX ORDER — ATORVASTATIN CALCIUM 40 MG/1
40 TABLET, FILM COATED ORAL NIGHTLY
Qty: 90 TABLET | Refills: 3 | Status: SHIPPED | OUTPATIENT
Start: 2023-08-16 | End: 2024-08-15

## 2023-08-16 NOTE — PROGRESS NOTES
Subjective:   Patient ID: Venus aCldwell is a 73 y.o. female.  Chief Complaint:  Follow-up    Presents for annual physical exam and follow-up on chronic medical conditions     Medical history for:  - Hypertension with CHF. On amlodipine 5 mg daily, Atacand 16 mg daily, Toprol- mg daily.  Reports compliance.  Denies side effects.  Blood pressure controlled.  No shortness of breath or swelling.  - SVT with PAT.  Followed by Cardiology.  Stable on beta-blocker.  - Coronary artery disease with known hyperlipidemia and aortic atherosclerosis.  Last LDL at goal. Lipitor 40 mg daily. Was on aspirin 81 mg, but stopped taking it, will restart it. Reports compliance.  Denies side effects.  No chest pain, claudication.  Cardiology recommended prescribe Ozempic for patient at last visit due to coexisting diabetes and coronary artery disease.  Medication not on formulary.  - Diabetes mellitus.  Last A1c 6.5% On metformin 500 mg daily. On Trulicity, causes some GI side effects which she tolerates.  Reports compliance.  Denies symptoms hypo/hyperglycemia.  Eye exam, foot exam, microalbumin up-to-date.  Needs repeat A1c.   - Chronic low back pain with degenerative arthritis changes of her spine and likely underlying neuropathy.  Currently stable on meloxicam 7.5 mg daily gabapentin 300 mg 3 times a day   - Thyroid nodules.  Asymptomatic.  Last TSH normal.  Stable on repeat imaging August 2022.  - Splenic nodule.  Low density.  Incidental finding on CT chest for follow-up on her pulmonary nodules.  No additional evaluation needed.  - Chronic restrictive lung disease, interstitial fibrosis, pulmonary nodule.  Followed by pulmonology.  Cough stable and controlled on current inhaler regimen.  -  History colon polyps and diverticulosis. Colonoscopy performed in June 2022, normal     Health maintenance up-to-date:  DEXA  Mammogram     No new complaints or concerns today    Review of Systems   Constitutional:  Negative for  "chills, diaphoresis, fatigue and fever.   Eyes:  Negative for visual disturbance.   Respiratory:  Negative for cough, chest tightness, shortness of breath and wheezing.    Cardiovascular:  Negative for chest pain, palpitations and leg swelling.   Gastrointestinal:  Negative for abdominal distention, abdominal pain, constipation, diarrhea, nausea and vomiting.   Endocrine: Negative for polydipsia, polyphagia and polyuria.   Genitourinary:  Negative for difficulty urinating, dysuria, flank pain, frequency, hematuria and urgency.   Musculoskeletal:  Positive for back pain. Negative for myalgias.   Skin:  Negative for rash.   Neurological:  Negative for dizziness, syncope, weakness, light-headedness, numbness and headaches.   Psychiatric/Behavioral:  Negative for sleep disturbance. The patient is not nervous/anxious.        Objective:   /80 (BP Location: Left arm, Patient Position: Sitting, BP Method: Large (Manual))   Pulse 77   Temp 98.5 °F (36.9 °C) (Tympanic)   Ht 5' 3" (1.6 m)   Wt 70 kg (154 lb 5.2 oz)   SpO2 96%   BMI 27.34 kg/m²     Physical Exam  Vitals and nursing note reviewed.   Constitutional:       Appearance: Normal appearance. She is well-developed and normal weight.   Neck:      Vascular: No carotid bruit or JVD.   Cardiovascular:      Rate and Rhythm: Normal rate and regular rhythm.      Pulses:           Dorsalis pedis pulses are 2+ on the right side and 2+ on the left side.        Posterior tibial pulses are 2+ on the right side and 2+ on the left side.      Heart sounds: Normal heart sounds.   Pulmonary:      Effort: Pulmonary effort is normal.      Breath sounds: Normal breath sounds.   Musculoskeletal:      Right foot: Normal range of motion. No deformity or bunion.      Left foot: Normal range of motion. No deformity or bunion.   Feet:      Right foot:      Protective Sensation: 5 sites tested.  5 sites sensed.      Skin integrity: No ulcer, blister, skin breakdown, erythema, warmth, " callus, dry skin or fissure.      Toenail Condition: Right toenails are normal.      Left foot:      Protective Sensation: 5 sites tested.  5 sites sensed.      Skin integrity: No ulcer, blister, skin breakdown, erythema, warmth, callus, dry skin or fissure.      Toenail Condition: Left toenails are normal.   Skin:     Findings: No rash.   Psychiatric:         Mood and Affect: Mood and affect normal.     The  Assessment:       ICD-10-CM ICD-9-CM   1. Annual physical exam  Z00.00 V70.0   2. Encounter for screening mammogram for malignant neoplasm of breast  Z12.31 V76.12   3. Type II diabetes mellitus with neurological manifestations  E11.49 250.60   4. Hypertension associated with diabetes  E11.59 250.80    I15.2 401.9   5. Chronic heart failure with preserved ejection fraction  I50.32 428.9   6. Paroxysmal atrial tachycardia  I47.1 427.0   7. SVT (supraventricular tachycardia)  I47.1 427.89   8. Mixed diabetic hyperlipidemia associated with type 2 diabetes mellitus  E11.69 250.80    E78.2 272.2   9. Coronary artery disease involving native coronary artery of native heart without angina pectoris  I25.10 414.01   10. Atherosclerosis of aorta  I70.0 440.0   11. Gastroesophageal reflux disease without esophagitis  K21.9 530.81   12. Primary osteoarthritis involving multiple joints  M15.9 715.98   13. Sciatica of right side  M54.31 724.3   14. Chronic non-seasonal allergic rhinitis  J30.89 477.9   15. Iron deficiency anemia secondary to inadequate dietary iron intake  D50.8 280.1   16. Osteopenia of multiple sites  M85.89 733.90   17. NICKI on CPAP  G47.33 327.23    Z99.89 V46.8     Plan:   Annual physical exam  Encounter for screening for cardiovascular disorders  Encounter for screening mammogram for malignant neoplasm of breast  -     DXA Bone Density Axial Skeleton 1 or more sites; Future; Expected date: 08/31/2023  -     Mammo Digital Screening Bilat w/ Samson; Future; Expected date: 08/31/2023  Blood pressure normal.   BMI 28.  Remainder exam stable.    Check labs.  Treat as indicated.    Mammogram and bone density ordered     Type II diabetes mellitus with neurological manifestations  -     Hemoglobin A1C; Future; Expected date: 08/16/2023  -     Microalbumin/Creatinine Ratio, Urine; Future; Expected date: 08/16/2023  Check A1c   Adjust diabetes medications if indicated   If microalbumin positive on a/Arb   Foot exam stable   Eye exam up-to-date     Hypertension associated with diabetes  Chronic heart failure with preserved ejection fraction  Stable.  Asymptomatic.  BP at goal.    Continue current medications     Paroxysmal atrial tachycardia  SVT (supraventricular tachycardia)  Stable.  Asymptomatic.  No recurrent SVT.    Continue current medications   Follow-up cardiology as scheduled     Mixed diabetic hyperlipidemia associated with type 2 diabetes mellitus  Coronary artery disease involving native coronary artery of native heart without angina pectoris  Atherosclerosis of aorta  -     atorvastatin (LIPITOR) 40 MG tablet; Take 1 tablet (40 mg total) by mouth every evening.  Dispense: 90 tablet; Refill: 3  Stable.  Asymptomatic.  Last LDL at goal.    Adjust Lipitor 40 mg daily if needed     Gastroesophageal reflux disease without esophagitis  Stable   Continue current medications     Primary osteoarthritis involving multiple joints  Sciatica of right side  Stable.  Pain adult controlled.    Continue meloxicam and gabapentin     Chronic non-seasonal allergic rhinitis  Controlled.  Stable.    Continue current medication     Iron deficiency anemia secondary to inadequate dietary iron intake  Asymptomatic   Resolved/improved on last CBC   Continue multivitamins     NICKI on CPAP  Stable   Encouraged to continue CPAP use     Osteopenia of multiple sites  -     DXA Bone Density Axial Skeleton 1 or more sites; Future; Expected date: 08/31/2023  Treat with bisphosphonate if indicated     Return to clinic 6 months or sooner as needed    I  hereby acknowledge that I am relying upon documentation authored by a medical student working under my supervision and further I hereby attest that I have verified the student documentation or findings by personally re-performing the physical exam and medical decision making activities of the Evaluation and Management service to be billed.  Sourav Hernandez

## 2023-08-17 ENCOUNTER — OFFICE VISIT (OUTPATIENT)
Dept: CARDIOLOGY | Facility: CLINIC | Age: 73
End: 2023-08-17
Payer: MEDICARE

## 2023-08-17 ENCOUNTER — HOSPITAL ENCOUNTER (OUTPATIENT)
Dept: CARDIOLOGY | Facility: HOSPITAL | Age: 73
Discharge: HOME | End: 2023-08-17
Attending: INTERNAL MEDICINE
Payer: MEDICARE

## 2023-08-17 VITALS
WEIGHT: 153.88 LBS | DIASTOLIC BLOOD PRESSURE: 80 MMHG | HEIGHT: 62 IN | SYSTOLIC BLOOD PRESSURE: 124 MMHG | HEART RATE: 67 BPM | RESPIRATION RATE: 16 BRPM | BODY MASS INDEX: 28.32 KG/M2

## 2023-08-17 DIAGNOSIS — I47.10 SVT (SUPRAVENTRICULAR TACHYCARDIA): ICD-10-CM

## 2023-08-17 DIAGNOSIS — E78.2 MIXED DIABETIC HYPERLIPIDEMIA ASSOCIATED WITH TYPE 2 DIABETES MELLITUS: ICD-10-CM

## 2023-08-17 DIAGNOSIS — I10 ESSENTIAL HYPERTENSION: ICD-10-CM

## 2023-08-17 DIAGNOSIS — I50.32 CHRONIC HEART FAILURE WITH PRESERVED EJECTION FRACTION: ICD-10-CM

## 2023-08-17 DIAGNOSIS — E11.59 HYPERTENSION ASSOCIATED WITH DIABETES: Primary | ICD-10-CM

## 2023-08-17 DIAGNOSIS — D50.8 IRON DEFICIENCY ANEMIA SECONDARY TO INADEQUATE DIETARY IRON INTAKE: ICD-10-CM

## 2023-08-17 DIAGNOSIS — K21.9 GASTROESOPHAGEAL REFLUX DISEASE WITHOUT ESOPHAGITIS: ICD-10-CM

## 2023-08-17 DIAGNOSIS — I70.0 ABDOMINAL AORTIC ATHEROSCLEROSIS: ICD-10-CM

## 2023-08-17 DIAGNOSIS — I15.2 HYPERTENSION ASSOCIATED WITH DIABETES: ICD-10-CM

## 2023-08-17 DIAGNOSIS — R00.2 PALPITATIONS: ICD-10-CM

## 2023-08-17 DIAGNOSIS — E11.69 MIXED DIABETIC HYPERLIPIDEMIA ASSOCIATED WITH TYPE 2 DIABETES MELLITUS: ICD-10-CM

## 2023-08-17 DIAGNOSIS — I15.2 HYPERTENSION ASSOCIATED WITH DIABETES: Primary | ICD-10-CM

## 2023-08-17 DIAGNOSIS — I25.10 CORONARY ARTERY DISEASE INVOLVING NATIVE CORONARY ARTERY OF NATIVE HEART WITHOUT ANGINA PECTORIS: ICD-10-CM

## 2023-08-17 DIAGNOSIS — Z86.16 HISTORY OF COVID-19: ICD-10-CM

## 2023-08-17 DIAGNOSIS — E11.59 HYPERTENSION ASSOCIATED WITH DIABETES: ICD-10-CM

## 2023-08-17 DIAGNOSIS — E11.69 TYPE 2 DIABETES MELLITUS WITH OTHER SPECIFIED COMPLICATION, WITHOUT LONG-TERM CURRENT USE OF INSULIN: ICD-10-CM

## 2023-08-17 DIAGNOSIS — I70.0 ATHEROSCLEROSIS OF AORTA: ICD-10-CM

## 2023-08-17 DIAGNOSIS — I47.19 PAROXYSMAL ATRIAL TACHYCARDIA: ICD-10-CM

## 2023-08-17 PROCEDURE — 93010 ELECTROCARDIOGRAM REPORT: CPT | Mod: ,,, | Performed by: INTERNAL MEDICINE

## 2023-08-17 PROCEDURE — 93005 ELECTROCARDIOGRAM TRACING: CPT

## 2023-08-17 PROCEDURE — 99215 OFFICE O/P EST HI 40 MIN: CPT | Mod: PBBFAC | Performed by: INTERNAL MEDICINE

## 2023-08-17 PROCEDURE — 99999 PR PBB SHADOW E&M-EST. PATIENT-LVL V: ICD-10-PCS | Mod: PBBFAC,,, | Performed by: INTERNAL MEDICINE

## 2023-08-17 PROCEDURE — 93010 EKG 12-LEAD: ICD-10-PCS | Mod: ,,, | Performed by: INTERNAL MEDICINE

## 2023-08-17 PROCEDURE — 99214 PR OFFICE/OUTPT VISIT, EST, LEVL IV, 30-39 MIN: ICD-10-PCS | Mod: S$PBB,,, | Performed by: INTERNAL MEDICINE

## 2023-08-17 PROCEDURE — 99999 PR PBB SHADOW E&M-EST. PATIENT-LVL V: CPT | Mod: PBBFAC,,, | Performed by: INTERNAL MEDICINE

## 2023-08-17 PROCEDURE — 99214 OFFICE O/P EST MOD 30 MIN: CPT | Mod: S$PBB,,, | Performed by: INTERNAL MEDICINE

## 2023-08-17 NOTE — PROGRESS NOTES
Subjective:   Patient ID:  Venus Caldwell is a 73 y.o. female who presents for cardiac consult of Coronary Artery Disease        The patient came in today for cardiac consult of Coronary Artery Disease    Referring Physician: Sourav Hernandez MD   Reason for initial consult: HTN      Venus Caldwell is a 73 y.o. female with current medical conditions HFpEF, HTN, PSVT, DM, obesity, OA, uterine CA, GERD presents to follow up CV eval.     5/2/19  Bp has improved with Norvasc 5 mg, recently started by Dr. Hernandez. Has been feeling more weak and fatigue, SBP was in upper 90s lately. She had LHC with minor artery blockage no stents placed, she thinks she had CP but strong FH of CAD. She does get palpitations, a few times a day, lasts one or two beats and she feels ok.   Prior cardiologist Dr. Hannah. Does snore, no prior sleep study. Has gained > 25 lbs in last 6 months, pre-DM.     8/6/19  ECHO with grade 1 DD otherwise normal BIV function. BP has been fluctuating at times. Is on digital HTN program. She has not had sleep study. NO recent heart monitor. Overall active.     1/2/19  Frequent SVT runs, doubled Toprol to 100 mg twice a day. Sleep study negative. She had two bottles of valsartan, given by Dr. Hannah but wanted to take it off due to recall. She still feels palpitations occasionally worse with talking at times, she did not have much palpitations on valsartan. Will refer to EP.     7/2/20  Had eval by Dr. Torres - symptomatic SVT. He recommended EPS/RFA for definitive therapy. She will have SVT ablation - will hold metoprolol 3 days prior. Overall has been feeling well, rarely feels palpitations. No major heart racing. She has gained some weight lately.   ECG - NSR,    9/9/21  Follow up since 7/2020. She has shoulder pain. She did not have SVT ablation. She is unsure why she postponed/canceleld the ablation. She is feeling more palpitations at times now. Her HR occ goes over 100.     Recent Readings  9/9/2021 9/7/2021 8/24/2021 8/22/2021 8/19/2021   SBP (mmHg) 114 138 136 144 106   DBP (mmHg) 62 75 69 75 65   Pulse 65 66 67 59 68     1/26/22  BP and HR stable today. Pt's  was sched with me too but was positive COVID, he had upcoming throat surgery. Pt and wife are not symptomatic. She went to ER last month for bronchitis and low BP improved with IVF and steroids.   ECG - sinus piper V rate 55, anteroinferior TWI - old    7/26/22    Recent Readings 6/29/2022 6/28/2022 6/27/2022 6/27/2022 6/24/2022   SBP (mmHg) 106 123 127 159 132   DBP (mmHg) 62 71 68 76 70   Pulse 64 62 61 61 61       BP and HR well controlled today. BMI 28 - 163 lbs. She occ does not eat much, A1c has improved, cannot digest peanuts had been to ER in past for abd pain ?diverticulitis. She had abd symptoms recently with eating walnuts.     2/1/23    Recent Readings 1/18/2023 1/17/2023 11/25/2022 11/13/2022 11/11/2022   SBP (mmHg) 116 112 124 159 125   DBP (mmHg) 70 66 74 91 74   Pulse 58 60 66 63 63     Recent iron levels remains low but mildly improved from prior. Lipids - LDL stable, mildly inc TGs. BP and HR stable. She had b/l cataracts surgery did well.       8/27/23    Recent Readings 8/15/2023 8/14/2023 8/14/2023 8/13/2023 8/8/2023   SBP (mmHg) 132 144 154 104 119   DBP (mmHg) 71 79 78 64 67   Pulse 62 67 61 74 65   Patient Comments OMRON 122 75 64 sugar high. grape juice and early am cut the back yard.... OMRON 123 73 71 OMRON 143 72 63. earlier this am102 61 70 OMRON. feeling very tired this am OMRON 91 65 73 BPPILLS yes 1COFFEE. no BREAKFAST YET OMRON 114 63 67 hamburger/SUPPER no pm BPPILLS YET....     BP recently labile - 100s - 150s systolics. BP and HR today stable. BMI 28 - 153 lbs. She has been on Trulicity, occ has stomach issues, has nausea/pain. Has occ palpitations.   ECG - NSR, 1st deg AVB    Patient feels no chest pain, no sob, no leg swelling, no PND,  no syncope, no CNS symptoms.    Patient has fairly good exercise  tolerance.    Patient is compliant with medications.    FH - father had MI in age 58, brother  from MI 58    Prior ECG - NSR, inferior infarct      TEST DESCRIPTION   1-CARDIO EVENT RECORDING FROM 19 TO 19    2-INDICATIONS- PALPITATIONS    CONCLUSIONS   Patient had a min HR of 54 bpm, max HR of 200 bpm, and avg HR of 78  bpm. Predominant underlying rhythm was Sinus Rhythm. 93  Supraventricular Tachycardia runs occurred, the run with the fastest  interval lasting 12 mins 38 secs with a max rate of 200 bpm, the longest  lasting 44 mins 15 secs with an avg rate of 123 bpm. Supraventricular  Tachycardia was detected within +/- 45 seconds of symptomatic patient  event(s). Isolated SVEs were rare (<1.0%), SVE Couplets were rare  (<1.0%), and SVE Triplets were rare (<1.0%). Isolated VEs were rare  (<1.0%, 970), VE Couplets were rare (<1.0%, 12), and VE Triplets were  rare (<1.0%, 1).    This document has been electronically    SIGNED BY: Jarrett Hussein MD On: 2019 09:28    2d ECHO  2019  CONCLUSIONS     1 - Concentric hypertrophy.     2 - No wall motion abnormalities.     3 - Normal left ventricular systolic function (EF 60-65%).     4 - Impaired LV relaxation, normal LAP (grade 1 diastolic dysfunction).     5 - Normal right ventricular systolic function .     6 - The estimated PA systolic pressure is greater than 22 mmHg.       Past Medical History:   Diagnosis Date    Arthritis     Cancer, uterine     Coronary artery disease     Diabetes mellitus     prediabetes    Diabetes mellitus, type 2     Digestive disorder     DM (diabetes mellitus) 2019    BS doesn't check 2019    DM (diabetes mellitus) 2019    BS doesn't check 2021    Hypertension     Multiple thyroid nodules 2021    Ovarian cancer     Sciatica     Sleep apnea        Past Surgical History:   Procedure Laterality Date    CATARACT EXTRACTION W/  INTRAOCULAR LENS IMPLANT Right 2022    CATARACT EXTRACTION W/   INTRAOCULAR LENS IMPLANT Left 01/26/2023    CHOLECYSTECTOMY      COLONOSCOPY N/A 08/01/2018    Procedure: COLONOSCOPY;  Surgeon: Yrn Hunter III, MD;  Location: [facility] ENDO;  Service: Endoscopy;  Laterality: N/A;    COLONOSCOPY N/A 06/30/2022    Procedure: COLONOSCOPY;  Surgeon: Mica Rodriguez MD;  Location: [facility] ENDO;  Service: Endoscopy;  Laterality: N/A;    ESOPHAGOGASTRODUODENOSCOPY N/A 03/17/2021    Procedure: EGD (ESOPHAGOGASTRODUODENOSCOPY) (Dr. EMILIO billings);  Surgeon: Coy Ortiz MD;  Location: [facility] ENDO;  Service: Endoscopy;  Laterality: N/A;    HYSTERECTOMY      JOINT REPLACEMENT Right     hip replacement    SURGICAL EXCISION OF ANAL LESION N/A 08/29/2018    Procedure: EXCISION, LESION, ANUS;  Surgeon: Yrn Balderrama MD;  Location: [facility] OR;  Service: General;  Laterality: N/A;    TOTAL KNEE ARTHROPLASTY Bilateral        Social History     Tobacco Use    Smoking status: Never    Smokeless tobacco: Never   Substance Use Topics    Alcohol use: No    Drug use: No       Family History   Problem Relation Age of Onset    Diabetes Mother     Cataracts Mother     Diabetes Brother     Cataracts Maternal Grandmother     Breast cancer Maternal Aunt        Patient's Medications   New Prescriptions    No medications on file   Previous Medications    AMLODIPINE (NORVASC) 5 MG TABLET    Take 1 tablet (5 mg total) by mouth once daily.    ASPIRIN (ECOTRIN) 81 MG EC TABLET    Take 1 tablet (81 mg total) by mouth once daily.    ATORVASTATIN (LIPITOR) 40 MG TABLET    Take 1 tablet (40 mg total) by mouth every evening.    AZELASTINE (ASTELIN) 137 MCG (0.1 %) NASAL SPRAY    1 spray (137 mcg total) by Nasal route 2 (two) times daily.    BENZOCAINE (HURRICAINE) 20 % GEL    Use as directed in the mouth or throat 4 (four) times daily as needed.    BETAMETHASONE DIPROPIONATE (DIPROLENE) 0.05 % CREAM    as needed.     CANDESARTAN (ATACAND) 16 MG TABLET    Take 1 tablet (16 mg total) by mouth once daily.     "CETIRIZINE (ZYRTEC) 10 MG TABLET    Take 10 mg by mouth once daily.    DULAGLUTIDE (TRULICITY) 0.75 MG/0.5 ML PEN INJECTOR    Inject 0.75 mg into the skin every 7 days.    FERROUS SULFATE (FEOSOL) TAB TABLET    Take 1 tablet by mouth daily with breakfast.    GABAPENTIN (NEURONTIN) 300 MG CAPSULE    Take 1 capsule (300 mg total) by mouth 3 (three) times daily.    MELOXICAM (MOBIC) 7.5 MG TABLET    TAKE 1 TABLET DAILY    METFORMIN (GLUCOPHAGE) 500 MG TABLET    Take 1 tablet (500 mg total) by mouth daily with breakfast.    METOPROLOL SUCCINATE (TOPROL-XL) 100 MG 24 HR TABLET    Take 1 tablet (100 mg total) by mouth once daily.    OMEPRAZOLE (PRILOSEC) 20 MG CAPSULE    Take 20 mg by mouth once daily.    VALACYCLOVIR (VALTREX) 500 MG TABLET    Take 500 mg by mouth as needed.    Modified Medications    No medications on file   Discontinued Medications    No medications on file       Review of Systems   Constitutional:  Positive for malaise/fatigue.   HENT: Negative.     Eyes: Negative.    Respiratory: Negative.     Cardiovascular:  Positive for palpitations.   Gastrointestinal: Negative.    Genitourinary: Negative.    Musculoskeletal: Negative.    Skin: Negative.    Neurological: Negative.    Endo/Heme/Allergies: Negative.    Psychiatric/Behavioral: Negative.     All 12 systems otherwise negative.      Wt Readings from Last 3 Encounters:   08/17/23 69.8 kg (153 lb 14.1 oz)   08/16/23 70 kg (154 lb 5.2 oz)   07/12/23 70.6 kg (155 lb 10.3 oz)     Temp Readings from Last 3 Encounters:   08/16/23 98.5 °F (36.9 °C) (Tympanic)   07/12/23 96.7 °F (35.9 °C) (Temporal)   05/04/23 98.2 °F (36.8 °C) (Tympanic)     BP Readings from Last 3 Encounters:   08/17/23 124/80   08/16/23 116/80   07/12/23 110/68     Pulse Readings from Last 3 Encounters:   08/17/23 67   08/16/23 77   07/12/23 66       /80   Pulse 67   Resp 16   Ht 5' 2" (1.575 m)   Wt 69.8 kg (153 lb 14.1 oz)   BMI 28.15 kg/m²     Objective:   Physical " Exam  Constitutional:       General: She is not in acute distress.     Appearance: She is well-developed. She is not diaphoretic.   HENT:      Head: Normocephalic and atraumatic.      Mouth/Throat:      Pharynx: No oropharyngeal exudate.   Eyes:      General: No scleral icterus.        Right eye: No discharge.         Left eye: No discharge.   Pulmonary:      Effort: Pulmonary effort is normal. No respiratory distress.   Skin:     Coloration: Skin is not pale.      Findings: No erythema or rash.   Neurological:      Mental Status: She is alert and oriented to person, place, and time.   Psychiatric:         Behavior: Behavior normal.         Thought Content: Thought content normal.         Judgment: Judgment normal.         Lab Results   Component Value Date     01/12/2023    K 3.6 01/12/2023     01/12/2023    CO2 25 01/12/2023    BUN 17 01/12/2023    CREATININE 0.7 01/12/2023     (H) 01/12/2023    HGBA1C 6.5 (H) 02/16/2023    AST 20 01/12/2023    ALT 27 01/12/2023    ALBUMIN 3.3 (L) 01/12/2023    PROT 7.0 01/12/2023    BILITOT 0.6 01/12/2023    WBC 8.88 07/10/2023    HGB 12.0 07/10/2023    HCT 36.8 (L) 07/10/2023    MCV 89 07/10/2023     07/10/2023    INR 1.0 06/14/2018    TSH 0.554 08/16/2022    CHOL 133 08/16/2022    HDL 41 08/16/2022    LDLCALC 55.8 (L) 08/16/2022    TRIG 181 (H) 08/16/2022     Assessment:      1. Hypertension associated with diabetes    2. Paroxysmal atrial tachycardia    3. Coronary artery disease involving native coronary artery of native heart without angina pectoris    4. Chronic heart failure with preserved ejection fraction    5. Palpitations    6. SVT (supraventricular tachycardia)    7. Atherosclerosis of aorta    8. Abdominal aortic atherosclerosis    9. Mixed diabetic hyperlipidemia associated with type 2 diabetes mellitus    10. Essential hypertension    11. History of COVID-19    12. Gastroesophageal reflux disease without esophagitis    13. Type 2 diabetes  mellitus with other specified complication, without long-term current use of insulin    14. Iron deficiency anemia secondary to inadequate dietary iron intake            Plan:   1. CAD, non obs with abd and aortic atherosclerosis   - no CP  - cont asa, statin, BB    2. HTN - occ labile   - Bp well controlled  - monitor for low BP    3. Palpitations - intermittent with PSVT/PAT  - cont BB  -  follow up with Dr. Torres    4. Sleep apnea symptoms  - sleep study - negative in 2019    5. HFpEF  - low salt diet  - euvolemic     6. DM2/overweight - A1c 7.1 --> 6.7 --> 6.4 --> 6.2; BMI 28 - 163 lbs.  --> 161 lbs --> 153 lbs.   - cont tx per PCP  - cont weight loss with diet/exercise   - Ozempic 0.25 - insurance needs to approve - [facility] - on Trulicity now    7. HLD   - cont statin  - TGs mildly elevated     8. h/o  COVID 19; FE def anemia   - s/p tx  - stable now, but more tired   - may need further iron infusion - taking iron orally QOD    9. GERD  - cont PPI    Thank you for allowing me to participate in this patient's care. Please do not hesitate to contact me with any questions or concerns. Consult note has been forwarded to the referral physician.

## 2023-08-18 ENCOUNTER — LAB VISIT (OUTPATIENT)
Dept: LAB | Facility: HOSPITAL | Age: 73
End: 2023-08-18
Attending: FAMILY MEDICINE
Payer: MEDICARE

## 2023-08-18 DIAGNOSIS — E11.49 TYPE II DIABETES MELLITUS WITH NEUROLOGICAL MANIFESTATIONS: ICD-10-CM

## 2023-08-18 LAB
ALBUMIN/CREAT UR: 18.5 UG/MG (ref 0–30)
CREAT UR-MCNC: 195 MG/DL (ref 15–325)
ESTIMATED AVG GLUCOSE: 114 MG/DL (ref 68–131)
HBA1C MFR BLD: 5.6 % (ref 4–5.6)
MICROALBUMIN UR DL<=1MG/L-MCNC: 36 UG/ML

## 2023-08-18 PROCEDURE — 83036 HEMOGLOBIN GLYCOSYLATED A1C: CPT | Performed by: FAMILY MEDICINE

## 2023-08-18 PROCEDURE — 36415 COLL VENOUS BLD VENIPUNCTURE: CPT | Performed by: FAMILY MEDICINE

## 2023-08-18 PROCEDURE — 82570 ASSAY OF URINE CREATININE: CPT | Performed by: FAMILY MEDICINE

## 2023-08-31 ENCOUNTER — APPOINTMENT (OUTPATIENT)
Dept: RADIOLOGY | Facility: HOSPITAL | Age: 73
End: 2023-08-31
Attending: FAMILY MEDICINE
Payer: MEDICARE

## 2023-08-31 DIAGNOSIS — M85.89 OSTEOPENIA OF MULTIPLE SITES: ICD-10-CM

## 2023-08-31 DIAGNOSIS — Z00.00 ANNUAL PHYSICAL EXAM: ICD-10-CM

## 2023-08-31 PROCEDURE — 77080 DXA BONE DENSITY AXIAL: CPT | Mod: TC

## 2023-08-31 PROCEDURE — 77080 DXA BONE DENSITY AXIAL SKELETON 1 OR MORE SITES: ICD-10-PCS | Mod: 26,,, | Performed by: RADIOLOGY

## 2023-08-31 PROCEDURE — 77080 DXA BONE DENSITY AXIAL: CPT | Mod: 26,,, | Performed by: RADIOLOGY

## 2023-10-10 ENCOUNTER — LAB VISIT (OUTPATIENT)
Dept: LAB | Facility: HOSPITAL | Age: 73
End: 2023-10-10
Attending: NURSE PRACTITIONER
Payer: MEDICARE

## 2023-10-10 DIAGNOSIS — D50.8 IRON DEFICIENCY ANEMIA SECONDARY TO INADEQUATE DIETARY IRON INTAKE: ICD-10-CM

## 2023-10-10 LAB
BASOPHILS # BLD AUTO: 0.05 K/UL (ref 0–0.2)
BASOPHILS NFR BLD: 0.6 % (ref 0–1.9)
DIFFERENTIAL METHOD: ABNORMAL
EOSINOPHIL # BLD AUTO: 0.6 K/UL (ref 0–0.5)
EOSINOPHIL NFR BLD: 7.1 % (ref 0–8)
ERYTHROCYTE [DISTWIDTH] IN BLOOD BY AUTOMATED COUNT: 13.1 % (ref 11.5–14.5)
HCT VFR BLD AUTO: 37.2 % (ref 37–48.5)
HGB BLD-MCNC: 12.4 G/DL (ref 12–16)
IMM GRANULOCYTES # BLD AUTO: 0.02 K/UL (ref 0–0.04)
IMM GRANULOCYTES NFR BLD AUTO: 0.2 % (ref 0–0.5)
LYMPHOCYTES # BLD AUTO: 1.7 K/UL (ref 1–4.8)
LYMPHOCYTES NFR BLD: 20.6 % (ref 18–48)
MCH RBC QN AUTO: 29.5 PG (ref 27–31)
MCHC RBC AUTO-ENTMCNC: 33.3 G/DL (ref 32–36)
MCV RBC AUTO: 89 FL (ref 82–98)
MONOCYTES # BLD AUTO: 0.4 K/UL (ref 0.3–1)
MONOCYTES NFR BLD: 5.2 % (ref 4–15)
NEUTROPHILS # BLD AUTO: 5.3 K/UL (ref 1.8–7.7)
NEUTROPHILS NFR BLD: 66.3 % (ref 38–73)
NRBC BLD-RTO: 0 /100 WBC
PLATELET # BLD AUTO: 277 K/UL (ref 150–450)
PMV BLD AUTO: 9.9 FL (ref 9.2–12.9)
RBC # BLD AUTO: 4.2 M/UL (ref 4–5.4)
WBC # BLD AUTO: 8.04 K/UL (ref 3.9–12.7)

## 2023-10-10 PROCEDURE — 85025 COMPLETE CBC W/AUTO DIFF WBC: CPT | Performed by: NURSE PRACTITIONER

## 2023-10-10 PROCEDURE — 36415 COLL VENOUS BLD VENIPUNCTURE: CPT | Performed by: NURSE PRACTITIONER

## 2023-10-10 PROCEDURE — 84466 ASSAY OF TRANSFERRIN: CPT | Performed by: NURSE PRACTITIONER

## 2023-10-10 PROCEDURE — 83540 ASSAY OF IRON: CPT | Performed by: NURSE PRACTITIONER

## 2023-10-10 PROCEDURE — 82728 ASSAY OF FERRITIN: CPT | Performed by: NURSE PRACTITIONER

## 2023-10-11 LAB
FERRITIN SERPL-MCNC: 149 NG/ML (ref 20–300)
IRON SERPL-MCNC: 71 UG/DL (ref 30–160)
SATURATED IRON: 24 % (ref 20–50)
TOTAL IRON BINDING CAPACITY: 299 UG/DL (ref 250–450)
TRANSFERRIN SERPL-MCNC: 202 MG/DL (ref 200–375)

## 2023-10-11 NOTE — PROGRESS NOTES
Subjective:       Patient ID: Venus Caldwell is a 73 y.o. female.    Chief Complaint:   1. Iron deficiency anemia secondary to inadequate dietary iron intake          Current Treatment:  Oral iron supplementation every other day    Treatment History:  IV iron therapy x 2        Oral iron supplementation daily    HPI: This is a 73-year-old female with endometrial cancer (@ 58 yrs), diabetes type 2, hypertension who was referred to us by Dr. Farrell due to recently noted iron deficiency anemia in 10/2020.     Review of most recent iron studies performed on 10/8/2020 showed serum iron of 29, iron saturation of 6% and ferritin level of 14 ng per cc. CBC from August 2020 showed hemoglobin of 9.5 grams/deciliter, hematocrit of 33.0 however normal MCV.  There was also noticeable thrombocytosis with platelet count at 405k.     Colonoscopy performed in August 2018 showed a one 4mm polyp in the transverse colon that was noted to be benign-hyperplastic polyp.  There was also diverticulosis at the hepatic flexure.  Recommended 5 year repeat which will be 2023.  During the colonoscopy, erectile nodule was noted that was biopsied and showed to be simple inclusion cyst.     She received 2 doses of IV iron with improvement of iron stores.     At her follow up in 1/2023, RBC & H&H had dropped slightly; she admitted to eating popcorn and peanuts and complained of subsequent GI upset and diarrhea. Reviewed most recent colonoscopy results with her that indicate she has diverticulosis. Discussed foods to avoid.      No pertinent family history or occupational history.    Interval History: Patient presents for follow up on oral iron. She presents alone and reports adherence to oral iron supplementation. She states she is still tired; she has energy to do things in the morning but is tired by the afternoon. She admits that she falls asleep in her chair and wakes during the night to urinate. She does not wear her CPAP because of getting  up frequently through the night but states she knows she needs to. Discussed that disturbed sleep and sleep apnea break your rest and can result in being tired. She verbalizes understanding.    Reviewed labs with patient:   CBC:   Recent Labs   Lab 10/10/23  1225   WBC 8.04   RBC 4.20   Hemoglobin 12.4   Hematocrit 37.2   Platelets 277   MCV 89   MCH 29.5   MCHC 33.3     CMP:  Recent Labs   Lab 01/12/23  1340   Glucose 128 H   Calcium 9.2   Albumin 3.3 L   Total Protein 7.0   Sodium 144   Potassium 3.6   CO2 25   Chloride 107   BUN 17   Creatinine 0.7   Alkaline Phosphatase 116   ALT 27   AST 20   Total Bilirubin 0.6     Lab Results   Component Value Date    IRON 71 10/10/2023    TRANSFERRIN 202 10/10/2023    TIBC 299 10/10/2023    FESATURATED 24 10/10/2023      Lab Results   Component Value Date    FERRITIN 149 10/10/2023     Social History     Socioeconomic History    Marital status:     Number of children: 1   Occupational History    Occupation: Retired   Tobacco Use    Smoking status: Never    Smokeless tobacco: Never   Substance and Sexual Activity    Alcohol use: No    Drug use: No    Sexual activity: Not Currently     Social Determinants of Health     Financial Resource Strain: Unknown (10/12/2023)    Overall Financial Resource Strain (CARDIA)     Difficulty of Paying Living Expenses: Patient refused   Food Insecurity: Unknown (10/12/2023)    Hunger Vital Sign     Worried About Running Out of Food in the Last Year: Patient refused     Ran Out of Food in the Last Year: Patient refused   Transportation Needs: Unknown (10/12/2023)    PRAPARE - Transportation     Lack of Transportation (Medical): Patient refused     Lack of Transportation (Non-Medical): Patient refused   Physical Activity: Unknown (10/12/2023)    Exercise Vital Sign     Days of Exercise per Week: Patient refused     Minutes of Exercise per Session: 30 min   Stress: Unknown (10/12/2023)    [employer] -  Occupational Stress Questionnaire     Feeling of Stress : Patient refused   Social Connections: Unknown (10/12/2023)    Social Connection and Isolation Panel [NHANES]     Frequency of Communication with Friends and Family: Patient refused     Frequency of Social Gatherings with Friends and Family: Patient refused     Attends Caodaism Services: Never     Active Member of Clubs or Organizations: Patient refused     Attends Club or Organization Meetings: Patient refused     Marital Status: Patient refused   Housing Stability: Unknown (10/12/2023)    Housing Stability Vital Sign     Unable to Pay for Housing in the Last Year: Patient refused     Number of Places Lived in the Last Year: 1     Unstable Housing in the Last Year: Patient refused     Past Medical History:   Diagnosis Date    Arthritis     Cancer, uterine     Coronary artery disease     Diabetes mellitus     prediabetes    Diabetes mellitus, type 2     Digestive disorder     DM (diabetes mellitus) 08/2019    BS doesn't check 12/16/2019    DM (diabetes mellitus) 08/2019    BS doesn't check 06/29/2021    Hypertension     Multiple thyroid nodules 7/8/2021    Ovarian cancer     Sciatica     Sleep apnea      Family History   Problem Relation Age of Onset    Diabetes Mother     Cataracts Mother     Diabetes Brother     Cataracts Maternal Grandmother     Breast cancer Maternal Aunt      Past Surgical History:   Procedure Laterality Date    CATARACT EXTRACTION W/  INTRAOCULAR LENS IMPLANT Right 11/17/2022    CATARACT EXTRACTION W/  INTRAOCULAR LENS IMPLANT Left 01/26/2023    CHOLECYSTECTOMY      COLONOSCOPY N/A 08/01/2018    Procedure: COLONOSCOPY;  Surgeon: Yrn Hunter III, MD;  Location: [facility];  Service: Endoscopy;  Laterality: N/A;    COLONOSCOPY N/A 06/30/2022    Procedure: COLONOSCOPY;  Surgeon: Mica Rodriguez MD;  Location: [facility];  Service: Endoscopy;  Laterality: N/A;    ESOPHAGOGASTRODUODENOSCOPY N/A 03/17/2021    Procedure: EGD  (ESOPHAGOGASTRODUODENOSCOPY) (Dr. JHAVERI referral);  Surgeon: Coy Ortiz MD;  Location: [facility];  Service: Endoscopy;  Laterality: N/A;    HYSTERECTOMY      JOINT REPLACEMENT Right     hip replacement    SURGICAL EXCISION OF ANAL LESION N/A 08/29/2018    Procedure: EXCISION, LESION, ANUS;  Surgeon: Yrn Balderrama MD;  Location: [facility];  Service: General;  Laterality: N/A;    TOTAL KNEE ARTHROPLASTY Bilateral      Review of Systems   Constitutional:  Positive for fatigue. Negative for appetite change.   HENT:  Negative for mouth sores, rhinorrhea and sore throat.    Eyes: Negative.    Respiratory: Negative.     Cardiovascular: Negative.    Gastrointestinal:  Negative for constipation, diarrhea, nausea and vomiting.   Endocrine: Negative.    Genitourinary: Negative.    Musculoskeletal:  Positive for back pain.   Integumentary:  Negative.   Allergic/Immunologic: Negative.    Neurological:  Negative for weakness and numbness.   Hematological: Negative.    Psychiatric/Behavioral:  Positive for sleep disturbance (doesn't sleep well).        Medication List with Changes/Refills   Current Medications    AMLODIPINE (NORVASC) 5 MG TABLET    Take 1 tablet (5 mg total) by mouth once daily.    ASPIRIN (ECOTRIN) 81 MG EC TABLET    Take 1 tablet (81 mg total) by mouth once daily.    ATORVASTATIN (LIPITOR) 40 MG TABLET    Take 1 tablet (40 mg total) by mouth every evening.    AZELASTINE (ASTELIN) 137 MCG (0.1 %) NASAL SPRAY    1 spray (137 mcg total) by Nasal route 2 (two) times daily.    BENZOCAINE (HURRICAINE) 20 % GEL    Use as directed in the mouth or throat 4 (four) times daily as needed.    BETAMETHASONE DIPROPIONATE (DIPROLENE) 0.05 % CREAM    as needed.     CANDESARTAN (ATACAND) 16 MG TABLET    Take 1 tablet (16 mg total) by mouth once daily.    CETIRIZINE (ZYRTEC) 10 MG TABLET    Take 10 mg by mouth once daily.    DULAGLUTIDE (TRULICITY) 0.75 MG/0.5 ML PEN INJECTOR    Inject 0.75 mg into the skin every 7  days.    FERROUS SULFATE (FEOSOL) TAB TABLET    Take 1 tablet by mouth daily with breakfast.    GABAPENTIN (NEURONTIN) 300 MG CAPSULE    Take 1 capsule (300 mg total) by mouth 3 (three) times daily.    MELOXICAM (MOBIC) 7.5 MG TABLET    TAKE 1 TABLET DAILY    METFORMIN (GLUCOPHAGE) 500 MG TABLET    Take 1 tablet (500 mg total) by mouth daily with breakfast.    METOPROLOL SUCCINATE (TOPROL-XL) 100 MG 24 HR TABLET    Take 1 tablet (100 mg total) by mouth once daily.    OMEPRAZOLE (PRILOSEC) 20 MG CAPSULE    Take 20 mg by mouth once daily.    VALACYCLOVIR (VALTREX) 500 MG TABLET    Take 500 mg by mouth as needed.      Objective:     Vitals:    10/12/23 1414   BP: 117/72   Pulse: 73   Temp: 97.3 °F (36.3 °C)     Physical Exam  Vitals reviewed.   Constitutional:       Appearance: Normal appearance.   HENT:      Head: Normocephalic.   Eyes:      Extraocular Movements: Extraocular movements intact.      Pupils: Pupils are equal, round, and reactive to light.      Comments: Glasses       Cardiovascular:      Rate and Rhythm: Normal rate and regular rhythm.      Heart sounds: Normal heart sounds.   Pulmonary:      Effort: Pulmonary effort is normal.      Breath sounds: Normal breath sounds.   Abdominal:      General: Bowel sounds are normal.      Palpations: Abdomen is soft.      Comments: rounded     Genitourinary:     Comments: deferred    Musculoskeletal:         General: Normal range of motion.      Cervical back: Normal range of motion and neck supple.   Skin:     General: Skin is warm and dry.   Neurological:      Mental Status: She is alert and oriented to person, place, and time.   Psychiatric:         Behavior: Behavior normal.         Thought Content: Thought content normal.          (0) Fully active, able to carry on all predisease performance without restriction  Assessment:     Problem List Items Addressed This Visit          Oncology    Iron deficiency anemia secondary to inadequate dietary iron intake -  Primary     Iron deficiency anemia status post IV iron therapy with improvement noted in hemoglobin from 9 grams/deciliter to above 12 g per dL. Iron studies also showed improvement in iron saturation, serum iron level and iron storage capacity.      Upper GI endoscopy from 3/17/2021 was unremarkable no evidence of H pylori.     Currently on oral iron every other day. Iron studies WNL. Patient to continue oral iron every other day and increase her dietary intake of foods high in iron.           Plan:     Iron deficiency anemia secondary to inadequate dietary iron intake    Labs reviewed; iron deficiency and anemia resolved.   Continue oral iron supplementation every other day with vitamin C and without caffeine.   Increase dietary intake of iron rich foods.   Patient to begin wearing CPAP nightly as directed.   Educated patient on diverticulosis and foods to avoid.   Follow up in 3 months  with  iron profile, ferritin, and CBC.    Route Chart for Scheduling    Med Onc Chart Routing      Follow up with physician    Follow up with SUSAN 3 months.   Infusion scheduling note    Injection scheduling note    Labs CBC, ferritin and iron and TIBC   Scheduling:  Preferred lab:  Lab interval:  in 3 months, 2 days prior   Imaging None      Pharmacy appointment No pharmacy appointment needed      Other referrals       No additional referrals needed              I will review assessment/plan with collaborating physician.      RAF Yoder

## 2023-10-12 ENCOUNTER — HOSPITAL ENCOUNTER (OUTPATIENT)
Dept: RADIOLOGY | Facility: HOSPITAL | Age: 73
Discharge: HOME | End: 2023-10-12
Attending: FAMILY MEDICINE
Payer: MEDICARE

## 2023-10-12 ENCOUNTER — OFFICE VISIT (OUTPATIENT)
Dept: HEMATOLOGY/ONCOLOGY | Facility: CLINIC | Age: 73
End: 2023-10-12
Payer: MEDICARE

## 2023-10-12 VITALS — HEIGHT: 62 IN | BODY MASS INDEX: 28.32 KG/M2 | WEIGHT: 153.88 LBS

## 2023-10-12 VITALS
OXYGEN SATURATION: 98 % | BODY MASS INDEX: 27.15 KG/M2 | WEIGHT: 153.25 LBS | HEART RATE: 73 BPM | DIASTOLIC BLOOD PRESSURE: 72 MMHG | SYSTOLIC BLOOD PRESSURE: 117 MMHG | HEIGHT: 63 IN | TEMPERATURE: 97 F

## 2023-10-12 DIAGNOSIS — Z00.00 ANNUAL PHYSICAL EXAM: ICD-10-CM

## 2023-10-12 DIAGNOSIS — Z12.31 ENCOUNTER FOR SCREENING MAMMOGRAM FOR MALIGNANT NEOPLASM OF BREAST: ICD-10-CM

## 2023-10-12 DIAGNOSIS — D50.8 IRON DEFICIENCY ANEMIA SECONDARY TO INADEQUATE DIETARY IRON INTAKE: Primary | ICD-10-CM

## 2023-10-12 PROCEDURE — 77063 MAMMO DIGITAL SCREENING BILAT WITH TOMO: ICD-10-PCS | Mod: 26,,, | Performed by: RADIOLOGY

## 2023-10-12 PROCEDURE — 77067 SCR MAMMO BI INCL CAD: CPT | Mod: 26,,, | Performed by: RADIOLOGY

## 2023-10-12 PROCEDURE — 99214 PR OFFICE/OUTPT VISIT, EST, LEVL IV, 30-39 MIN: ICD-10-PCS | Mod: S$PBB,,, | Performed by: NURSE PRACTITIONER

## 2023-10-12 PROCEDURE — 99999 PR PBB SHADOW E&M-EST. PATIENT-LVL IV: ICD-10-PCS | Mod: PBBFAC,,, | Performed by: NURSE PRACTITIONER

## 2023-10-12 PROCEDURE — 77067 MAMMO DIGITAL SCREENING BILAT WITH TOMO: ICD-10-PCS | Mod: 26,,, | Performed by: RADIOLOGY

## 2023-10-12 PROCEDURE — 99999 PR PBB SHADOW E&M-EST. PATIENT-LVL IV: CPT | Mod: PBBFAC,,, | Performed by: NURSE PRACTITIONER

## 2023-10-12 PROCEDURE — 77067 SCR MAMMO BI INCL CAD: CPT | Mod: TC

## 2023-10-12 PROCEDURE — 99214 OFFICE O/P EST MOD 30 MIN: CPT | Mod: PBBFAC | Performed by: NURSE PRACTITIONER

## 2023-10-12 PROCEDURE — 77063 BREAST TOMOSYNTHESIS BI: CPT | Mod: 26,,, | Performed by: RADIOLOGY

## 2023-10-12 PROCEDURE — 99214 OFFICE O/P EST MOD 30 MIN: CPT | Mod: S$PBB,,, | Performed by: NURSE PRACTITIONER

## 2024-01-02 DIAGNOSIS — I10 ESSENTIAL HYPERTENSION: ICD-10-CM

## 2024-01-02 RX ORDER — AMLODIPINE BESYLATE 5 MG/1
5 TABLET ORAL DAILY
Qty: 90 TABLET | Refills: 3 | Status: SHIPPED | OUTPATIENT
Start: 2024-01-02

## 2024-01-22 DIAGNOSIS — E66.9 OBESITY (BMI 30.0-34.9): ICD-10-CM

## 2024-01-22 DIAGNOSIS — I25.10 CORONARY ARTERY DISEASE INVOLVING NATIVE CORONARY ARTERY OF NATIVE HEART WITHOUT ANGINA PECTORIS: Chronic | ICD-10-CM

## 2024-01-22 DIAGNOSIS — E11.49 TYPE II DIABETES MELLITUS WITH NEUROLOGICAL MANIFESTATIONS: ICD-10-CM

## 2024-01-22 RX ORDER — DULAGLUTIDE 0.75 MG/.5ML
INJECTION, SOLUTION SUBCUTANEOUS
Qty: 12 PEN | Refills: 0 | Status: SHIPPED | OUTPATIENT
Start: 2024-01-22 | End: 2024-04-24 | Stop reason: SDUPTHER

## 2024-01-22 NOTE — TELEPHONE ENCOUNTER
Care Due:                  Date            Visit Type   Department     Provider  --------------------------------------------------------------------------------                                EP -                              PRIMARY      [facility] INTER[facility]  Last Visit: 08-      CARE ([facility])   MEDICINE       Sourav Hernandez  Next Visit: None Scheduled  None         None Found                                                            Last  Test          Frequency    Reason                     Performed    Due Date  --------------------------------------------------------------------------------    CMP.........  12 months..  atorvastatin, meloxicam,   01- 01-                             metFORMIN................    HBA1C.......  6 months...  dulaglutide, metFORMIN...  08- 02-    Lipid Panel.  12 months..  atorvastatin.............  08- 08-    [facility] Embedded Care Due Messages. Reference number: [id number].   1/22/2024 2:08:46 AM CST

## 2024-01-22 NOTE — TELEPHONE ENCOUNTER
Provider Staff:  Action required for this patient    Requires labs      Please see care gap opportunities below in Care Due Message.    Thanks!  [facility]     Appointments      Date Provider   Last Visit   8/16/2023 Sourav Hernandez MD   Next Visit   Visit date not found Sourav Hernandez MD     Refill Decision Note   Venus Caldwell  is requesting a refill authorization.  Brief Assessment and Rationale for Refill:  Approve     Medication Therapy Plan:         Comments:     Note composed:2:47 PM 01/22/2024

## 2024-02-08 ENCOUNTER — LAB VISIT (OUTPATIENT)
Dept: LAB | Facility: HOSPITAL | Age: 74
End: 2024-02-08
Attending: FAMILY MEDICINE
Payer: MEDICARE

## 2024-02-08 DIAGNOSIS — E11.49 TYPE II DIABETES MELLITUS WITH NEUROLOGICAL MANIFESTATIONS: ICD-10-CM

## 2024-02-08 DIAGNOSIS — D50.8 IRON DEFICIENCY ANEMIA SECONDARY TO INADEQUATE DIETARY IRON INTAKE: ICD-10-CM

## 2024-02-08 LAB
BASOPHILS # BLD AUTO: 0.06 K/UL (ref 0–0.2)
BASOPHILS NFR BLD: 0.8 % (ref 0–1.9)
DIFFERENTIAL METHOD BLD: NORMAL
EOSINOPHIL # BLD AUTO: 0.4 K/UL (ref 0–0.5)
EOSINOPHIL NFR BLD: 4.7 % (ref 0–8)
ERYTHROCYTE [DISTWIDTH] IN BLOOD BY AUTOMATED COUNT: 12.7 % (ref 11.5–14.5)
ESTIMATED AVG GLUCOSE: 120 MG/DL (ref 68–131)
FERRITIN SERPL-MCNC: 100 NG/ML (ref 20–300)
HBA1C MFR BLD: 5.8 % (ref 4–5.6)
HCT VFR BLD AUTO: 37.1 % (ref 37–48.5)
HGB BLD-MCNC: 12.3 G/DL (ref 12–16)
IMM GRANULOCYTES # BLD AUTO: 0.02 K/UL (ref 0–0.04)
IMM GRANULOCYTES NFR BLD AUTO: 0.3 % (ref 0–0.5)
IRON SERPL-MCNC: 40 UG/DL (ref 30–160)
LYMPHOCYTES # BLD AUTO: 1.8 K/UL (ref 1–4.8)
LYMPHOCYTES NFR BLD: 22.7 % (ref 18–48)
MCH RBC QN AUTO: 29.2 PG (ref 27–31)
MCHC RBC AUTO-ENTMCNC: 33.2 G/DL (ref 32–36)
MCV RBC AUTO: 88 FL (ref 82–98)
MONOCYTES # BLD AUTO: 0.5 K/UL (ref 0.3–1)
MONOCYTES NFR BLD: 6.8 % (ref 4–15)
NEUTROPHILS # BLD AUTO: 5.1 K/UL (ref 1.8–7.7)
NEUTROPHILS NFR BLD: 64.7 % (ref 38–73)
NRBC BLD-RTO: 0 /100 WBC
PLATELET # BLD AUTO: 311 K/UL (ref 150–450)
PMV BLD AUTO: 9.6 FL (ref 9.2–12.9)
RBC # BLD AUTO: 4.21 M/UL (ref 4–5.4)
SATURATED IRON: 12 % (ref 20–50)
TOTAL IRON BINDING CAPACITY: 324 UG/DL (ref 250–450)
TRANSFERRIN SERPL-MCNC: 219 MG/DL (ref 200–375)
WBC # BLD AUTO: 7.92 K/UL (ref 3.9–12.7)

## 2024-02-08 PROCEDURE — 85025 COMPLETE CBC W/AUTO DIFF WBC: CPT | Performed by: NURSE PRACTITIONER

## 2024-02-08 PROCEDURE — 82728 ASSAY OF FERRITIN: CPT | Performed by: NURSE PRACTITIONER

## 2024-02-08 PROCEDURE — 36415 COLL VENOUS BLD VENIPUNCTURE: CPT | Performed by: NURSE PRACTITIONER

## 2024-02-08 PROCEDURE — 83036 HEMOGLOBIN GLYCOSYLATED A1C: CPT | Performed by: FAMILY MEDICINE

## 2024-02-08 PROCEDURE — 83540 ASSAY OF IRON: CPT | Performed by: NURSE PRACTITIONER

## 2024-02-12 ENCOUNTER — OFFICE VISIT (OUTPATIENT)
Dept: HEMATOLOGY/ONCOLOGY | Facility: CLINIC | Age: 74
End: 2024-02-12
Payer: MEDICARE

## 2024-02-12 VITALS
HEIGHT: 63 IN | HEART RATE: 63 BPM | BODY MASS INDEX: 27.77 KG/M2 | DIASTOLIC BLOOD PRESSURE: 74 MMHG | SYSTOLIC BLOOD PRESSURE: 127 MMHG | TEMPERATURE: 98 F | WEIGHT: 156.75 LBS

## 2024-02-12 DIAGNOSIS — D50.8 IRON DEFICIENCY ANEMIA SECONDARY TO INADEQUATE DIETARY IRON INTAKE: Primary | ICD-10-CM

## 2024-02-12 PROCEDURE — 99214 OFFICE O/P EST MOD 30 MIN: CPT | Mod: S$PBB,,, | Performed by: NURSE PRACTITIONER

## 2024-02-12 PROCEDURE — 99999 PR PBB SHADOW E&M-EST. PATIENT-LVL III: CPT | Mod: PBBFAC,,, | Performed by: NURSE PRACTITIONER

## 2024-02-12 PROCEDURE — 99213 OFFICE O/P EST LOW 20 MIN: CPT | Mod: PBBFAC | Performed by: NURSE PRACTITIONER

## 2024-02-12 RX ORDER — MUPIROCIN 20 MG/G
OINTMENT TOPICAL 2 TIMES DAILY PRN
COMMUNITY
Start: 2023-12-07

## 2024-02-12 RX ORDER — ACETAMINOPHEN 500 MG
1 TABLET ORAL EVERY MORNING
COMMUNITY

## 2024-02-12 RX ORDER — VALSARTAN 320 MG/1
1 TABLET ORAL EVERY MORNING
COMMUNITY
End: 2024-04-24

## 2024-02-12 RX ORDER — HYDROCORTISONE 25 MG/G
1 CREAM TOPICAL 2 TIMES DAILY PRN
COMMUNITY
Start: 2023-12-07

## 2024-02-12 NOTE — PROGRESS NOTES
Subjective:       Patient ID: Venus Caldwell is a 74 y.o. female.    Chief Complaint:   1. Iron deficiency anemia secondary to inadequate dietary iron intake          Current Treatment:  OTC oral iron supplementation every other day    Treatment History:  IV iron therapy x 2        Oral iron supplementation daily    HPI: This is a 74-year-old female with endometrial cancer (@ 58 yrs), diabetes type 2, hypertension who was referred to us by Dr. Farrell due to recently noted iron deficiency anemia in 10/2020.     Review of most recent iron studies performed on 10/8/2020 showed serum iron of 29, iron saturation of 6% and ferritin level of 14 ng per cc. CBC from August 2020 showed hemoglobin of 9.5 grams/deciliter, hematocrit of 33.0 however normal MCV.  There was also noticeable thrombocytosis with platelet count at 405k.     Colonoscopy performed in August 2018 showed a one 4mm polyp in the transverse colon that was noted to be benign-hyperplastic polyp.  There was also diverticulosis at the hepatic flexure.  Recommended 5 year repeat which will be 2023.  During the colonoscopy, erectile nodule was noted that was biopsied and showed to be simple inclusion cyst.     She received 2 doses of IV iron with improvement of iron stores.     At her follow up in 1/2023, RBC & H&H had dropped slightly; she admitted to eating popcorn and peanuts and complained of subsequent GI upset and diarrhea. Reviewed most recent colonoscopy results with her that indicate she has diverticulosis. Discussed foods to avoid.      No pertinent family history or occupational history.    Interval History: Patient presents for follow up on oral iron. She presents alone and reports left sided sinus issues and right sided lower back pain. She has been helping to care for her brother who was diagnosed with cancer and has been undergoing XRT treatment. She admits that she stopped taking oral iron because it caused constipation. Advised her she can  take it daily with a stool softener or take it every other day. She is amenable to resuming it.     Reviewed labs with patient:   CBC:   Recent Labs   Lab 02/08/24  1013   WBC 7.92   RBC 4.21   Hemoglobin 12.3   Hematocrit 37.1   Platelets 311   MCV 88   MCH 29.2   MCHC 33.2     CMP:  Recent Labs   Lab 01/12/23  1340   Glucose 128 H   Calcium 9.2   Albumin 3.3 L   Total Protein 7.0   Sodium 144   Potassium 3.6   CO2 25   Chloride 107   BUN 17   Creatinine 0.7   Alkaline Phosphatase 116   ALT 27   AST 20   Total Bilirubin 0.6     Lab Results   Component Value Date    IRON 40 02/08/2024    TRANSFERRIN 219 02/08/2024    TIBC 324 02/08/2024    FESATURATED 12 (L) 02/08/2024      Lab Results   Component Value Date    FERRITIN 100 02/08/2024     Social History     Socioeconomic History    Marital status:     Number of children: 1   Occupational History    Occupation: Retired   Tobacco Use    Smoking status: Never    Smokeless tobacco: Never   Substance and Sexual Activity    Alcohol use: No    Drug use: No    Sexual activity: Not Currently     Social Determinants of Health     Financial Resource Strain: Patient Declined (10/12/2023)    Overall Financial Resource Strain (CARDIA)     Difficulty of Paying Living Expenses: Patient declined   Food Insecurity: Patient Declined (10/12/2023)    Hunger Vital Sign     Worried About Running Out of Food in the Last Year: Patient declined     Ran Out of Food in the Last Year: Patient declined   Transportation Needs: Patient Declined (10/12/2023)    PRAPARE - Transportation     Lack of Transportation (Medical): Patient declined     Lack of Transportation (Non-Medical): Patient declined   Physical Activity: Unknown (10/12/2023)    Exercise Vital Sign     Days of Exercise per Week: Patient declined     Minutes of Exercise per Session: 30 min   Stress: Patient Declined (10/12/2023)    [employer] [place] [employer] - Occupational Stress Questionnaire     Feeling of Stress  : Patient declined   Social Connections: Unknown (10/12/2023)    Social Connection and Isolation Panel [NHANES]     Frequency of Communication with Friends and Family: Patient declined     Frequency of Social Gatherings with Friends and Family: Patient declined     Active Member of Clubs or Organizations: Patient declined     Attends Club or Organization Meetings: Patient declined     Marital Status: Patient declined   Housing Stability: Unknown (10/12/2023)    Housing Stability Vital Sign     Unable to Pay for Housing in the Last Year: Patient refused     Number of Places Lived in the Last Year: 1     Unstable Housing in the Last Year: Patient refused     Past Medical History:   Diagnosis Date    Arthritis     Cancer, uterine     Coronary artery disease     Diabetes mellitus     prediabetes    Diabetes mellitus, type 2     Digestive disorder     DM (diabetes mellitus) 08/2019    BS doesn't check 12/16/2019    DM (diabetes mellitus) 08/2019    BS doesn't check 06/29/2021    Hypertension     Multiple thyroid nodules 7/8/2021    Ovarian cancer     Sciatica     Sleep apnea      Family History   Problem Relation Age of Onset    Diabetes Mother     Cataracts Mother     Diabetes Brother     Cataracts Maternal Grandmother     Breast cancer Maternal Aunt      Past Surgical History:   Procedure Laterality Date    CATARACT EXTRACTION W/  INTRAOCULAR LENS IMPLANT Right 11/17/2022    CATARACT EXTRACTION W/  INTRAOCULAR LENS IMPLANT Left 01/26/2023    CHOLECYSTECTOMY      COLONOSCOPY N/A 08/01/2018    Procedure: COLONOSCOPY;  Surgeon: Yrn Hunter III, MD;  Location: [facility];  Service: Endoscopy;  Laterality: N/A;    COLONOSCOPY N/A 06/30/2022    Procedure: COLONOSCOPY;  Surgeon: Mica Rodriguez MD;  Location: [facility];  Service: Endoscopy;  Laterality: N/A;    ESOPHAGOGASTRODUODENOSCOPY N/A 03/17/2021    Procedure: EGD (ESOPHAGOGASTRODUODENOSCOPY) (Dr. EMILIO billings);  Surgeon: Coy Ortiz MD;  [facility]  "ENDO;  Service: Endoscopy;  Laterality: N/A;    HYSTERECTOMY      JOINT REPLACEMENT Right     hip replacement    SURGICAL EXCISION OF ANAL LESION N/A 08/29/2018    Procedure: EXCISION, LESION, ANUS;  Surgeon: Yrn Balderrama MD;  Location: [facility] OR;  Service: General;  Laterality: N/A;    TOTAL KNEE ARTHROPLASTY Bilateral      Review of Systems   Constitutional:  Positive for fatigue. Negative for appetite change.   HENT:  Positive for sinus pressure/congestion (left eye). Negative for mouth sores, rhinorrhea and sore throat.         Left ear "stopped up"   Eyes: Negative.    Respiratory: Negative.     Cardiovascular: Negative.    Gastrointestinal:  Negative for constipation, diarrhea, nausea and vomiting.   Endocrine: Negative.    Genitourinary: Negative.    Musculoskeletal:  Positive for back pain (right lower).   Integumentary:  Negative.   Allergic/Immunologic: Negative.    Neurological:  Negative for weakness and numbness.   Hematological: Negative.    Psychiatric/Behavioral:  Positive for sleep disturbance (doesn't sleep well).        Medication List with Changes/Refills   Current Medications    AMLODIPINE (NORVASC) 5 MG TABLET    Take 1 tablet (5 mg total) by mouth once daily.    ASPIRIN (ECOTRIN) 81 MG EC TABLET    Take 1 tablet (81 mg total) by mouth once daily.    ATORVASTATIN (LIPITOR) 40 MG TABLET    Take 1 tablet (40 mg total) by mouth every evening.    AZELASTINE (ASTELIN) 137 MCG (0.1 %) NASAL SPRAY    1 spray (137 mcg total) by Nasal route 2 (two) times daily.    BENZOCAINE (HURRICAINE) 20 % GEL    Use as directed in the mouth or throat 4 (four) times daily as needed.    BETAMETHASONE DIPROPIONATE (DIPROLENE) 0.05 % CREAM    as needed.     CANDESARTAN (ATACAND) 16 MG TABLET    Take 1 tablet (16 mg total) by mouth once daily.    CETIRIZINE (ZYRTEC) 10 MG TABLET    Take 10 mg by mouth once daily.    CHOLECALCIFEROL, VITAMIN D3, 125 MCG (5,000 UNIT) TAB    Take 1 tablet by mouth every morning.    " FERROUS SULFATE (FEOSOL) TAB TABLET    Take 1 tablet by mouth daily with breakfast.    GABAPENTIN (NEURONTIN) 300 MG CAPSULE    Take 1 capsule (300 mg total) by mouth 3 (three) times daily.    HYDROCORTISONE 2.5 % CREAM    1 application  2 (two) times daily as needed. Apply to affected area    MELOXICAM (MOBIC) 7.5 MG TABLET    TAKE 1 TABLET DAILY    METFORMIN (GLUCOPHAGE) 500 MG TABLET    Take 1 tablet (500 mg total) by mouth daily with breakfast.    METOPROLOL SUCCINATE (TOPROL-XL) 100 MG 24 HR TABLET    Take 1 tablet (100 mg total) by mouth once daily.    MUPIROCIN (BACTROBAN) 2 % OINTMENT    Apply topically 2 (two) times daily as needed.    OMEPRAZOLE (PRILOSEC) 20 MG CAPSULE    Take 20 mg by mouth once daily.    TRULICITY 0.75 MG/0.5 ML PEN INJECTOR    INJECT 0.75 MG UNDER THE SKIN EVERY 7 DAYS    VALACYCLOVIR (VALTREX) 500 MG TABLET    Take 500 mg by mouth as needed.     VALSARTAN (DIOVAN) 320 MG TABLET    Take 1 tablet by mouth every morning.     Objective:     Vitals:    02/12/24 1349   BP: 127/74   Pulse: 63   Temp: 97.7 °F (36.5 °C)     Physical Exam  Vitals reviewed.   Constitutional:       Appearance: Normal appearance.   HENT:      Head: Normocephalic.   Eyes:      Extraocular Movements: Extraocular movements intact.      Pupils: Pupils are equal, round, and reactive to light.      Comments: Glasses       Cardiovascular:      Rate and Rhythm: Normal rate and regular rhythm.      Heart sounds: Normal heart sounds.   Pulmonary:      Effort: Pulmonary effort is normal.      Breath sounds: Normal breath sounds.   Abdominal:      General: Bowel sounds are normal.      Palpations: Abdomen is soft.      Comments: rounded     Genitourinary:     Comments: deferred    Musculoskeletal:         General: Normal range of motion.      Cervical back: Normal range of motion and neck supple.   Skin:     General: Skin is warm and dry.   Neurological:      Mental Status: She is alert and oriented to person, place, and  time.   Psychiatric:         Behavior: Behavior normal.         Thought Content: Thought content normal.          (0) Fully active, able to carry on all predisease performance without restriction  Assessment:     Problem List Items Addressed This Visit          Oncology    Iron deficiency anemia secondary to inadequate dietary iron intake - Primary     Iron deficiency anemia status post IV iron therapy with improvement noted in hemoglobin from 9 grams/deciliter to above 12 g per dL. Iron studies also showed improvement in iron saturation, serum iron level and iron storage capacity.      Upper GI endoscopy from 3/17/2021 was unremarkable no evidence of H pylori.     Currently on oral iron every other day. Iron studies WNL. Patient to continue oral iron every other day and increase her dietary intake of foods high in iron.           Plan:     Iron deficiency anemia secondary to inadequate dietary iron intake    Labs reviewed; anemia resolved; iron deficiency recurrent.   Resume OTC oral iron supplementation every other day with vitamin C and without caffeine.   Increase dietary intake of iron rich foods.   Patient to begin wearing CPAP nightly as directed.   Educated patient on diverticulosis and foods to avoid.   Follow up in 3 months  with  iron profile, ferritin, and CBC.    Route Chart for Scheduling    Med Onc Chart Routing      Follow up with physician    Follow up with SUSAN 3 months.   Infusion scheduling note    Injection scheduling note    Labs CBC, ferritin and iron and TIBC   Scheduling:  Preferred lab:  Lab interval:  in 3 months, 2 days prior   Imaging None      Pharmacy appointment No pharmacy appointment needed      Other referrals       No additional referrals needed              I will review assessment/plan with collaborating physician.      RAF Yoder

## 2024-02-27 DIAGNOSIS — Z00.00 ENCOUNTER FOR MEDICARE ANNUAL WELLNESS EXAM: ICD-10-CM

## 2024-03-14 DIAGNOSIS — I25.10 CORONARY ARTERY DISEASE INVOLVING NATIVE CORONARY ARTERY OF NATIVE HEART WITHOUT ANGINA PECTORIS: ICD-10-CM

## 2024-03-14 DIAGNOSIS — I15.2 HYPERTENSION ASSOCIATED WITH DIABETES: Primary | ICD-10-CM

## 2024-03-14 DIAGNOSIS — E11.59 HYPERTENSION ASSOCIATED WITH DIABETES: Primary | ICD-10-CM

## 2024-03-28 ENCOUNTER — HOSPITAL ENCOUNTER (OUTPATIENT)
Dept: CARDIOLOGY | Facility: HOSPITAL | Age: 74
Discharge: HOME | End: 2024-03-28
Attending: INTERNAL MEDICINE
Payer: MEDICARE

## 2024-03-28 ENCOUNTER — OFFICE VISIT (OUTPATIENT)
Dept: CARDIOLOGY | Facility: CLINIC | Age: 74
End: 2024-03-28
Payer: MEDICARE

## 2024-03-28 VITALS
DIASTOLIC BLOOD PRESSURE: 74 MMHG | WEIGHT: 157.44 LBS | HEIGHT: 63 IN | HEART RATE: 73 BPM | OXYGEN SATURATION: 98 % | SYSTOLIC BLOOD PRESSURE: 110 MMHG | BODY MASS INDEX: 27.89 KG/M2

## 2024-03-28 DIAGNOSIS — I47.10 SVT (SUPRAVENTRICULAR TACHYCARDIA): ICD-10-CM

## 2024-03-28 DIAGNOSIS — I15.2 HYPERTENSION ASSOCIATED WITH DIABETES: ICD-10-CM

## 2024-03-28 DIAGNOSIS — E11.69 MIXED DIABETIC HYPERLIPIDEMIA ASSOCIATED WITH TYPE 2 DIABETES MELLITUS: ICD-10-CM

## 2024-03-28 DIAGNOSIS — R00.2 PALPITATIONS: ICD-10-CM

## 2024-03-28 DIAGNOSIS — I50.32 CHRONIC HEART FAILURE WITH PRESERVED EJECTION FRACTION: ICD-10-CM

## 2024-03-28 DIAGNOSIS — I70.0 ABDOMINAL AORTIC ATHEROSCLEROSIS: ICD-10-CM

## 2024-03-28 DIAGNOSIS — I25.10 CORONARY ARTERY DISEASE INVOLVING NATIVE CORONARY ARTERY OF NATIVE HEART WITHOUT ANGINA PECTORIS: ICD-10-CM

## 2024-03-28 DIAGNOSIS — I70.0 ATHEROSCLEROSIS OF AORTA: ICD-10-CM

## 2024-03-28 DIAGNOSIS — I47.19 PAROXYSMAL ATRIAL TACHYCARDIA: ICD-10-CM

## 2024-03-28 DIAGNOSIS — E78.2 MIXED DIABETIC HYPERLIPIDEMIA ASSOCIATED WITH TYPE 2 DIABETES MELLITUS: ICD-10-CM

## 2024-03-28 DIAGNOSIS — E11.59 HYPERTENSION ASSOCIATED WITH DIABETES: ICD-10-CM

## 2024-03-28 DIAGNOSIS — I25.10 CORONARY ARTERY DISEASE INVOLVING NATIVE CORONARY ARTERY OF NATIVE HEART WITHOUT ANGINA PECTORIS: Primary | ICD-10-CM

## 2024-03-28 DIAGNOSIS — I10 ESSENTIAL HYPERTENSION: ICD-10-CM

## 2024-03-28 DIAGNOSIS — Z86.16 HISTORY OF COVID-19: ICD-10-CM

## 2024-03-28 PROCEDURE — G2211 COMPLEX E/M VISIT ADD ON: HCPCS | Mod: S$PBB,,, | Performed by: INTERNAL MEDICINE

## 2024-03-28 PROCEDURE — 93005 ELECTROCARDIOGRAM TRACING: CPT

## 2024-03-28 PROCEDURE — 99214 OFFICE O/P EST MOD 30 MIN: CPT | Mod: PBBFAC,25 | Performed by: INTERNAL MEDICINE

## 2024-03-28 PROCEDURE — 93010 ELECTROCARDIOGRAM REPORT: CPT | Mod: ,,, | Performed by: INTERNAL MEDICINE

## 2024-03-28 PROCEDURE — 99999 PR PBB SHADOW E&M-EST. PATIENT-LVL IV: CPT | Mod: PBBFAC,,, | Performed by: INTERNAL MEDICINE

## 2024-03-28 PROCEDURE — 99214 OFFICE O/P EST MOD 30 MIN: CPT | Mod: S$PBB,,, | Performed by: INTERNAL MEDICINE

## 2024-03-28 NOTE — PROGRESS NOTES
Subjective:   Patient ID:  Venus Caldwell is a 74 y.o. female who presents for cardiac consult of No chief complaint on file.        The patient came in today for cardiac consult of No chief complaint on file.    Referring Physician: Sourav Hernandez MD   Reason for initial consult: HTN      Venus Caldwell is a 74 y.o. female with current medical conditions HFpEF, HTN, PSVT, DM, obesity, OA, uterine CA, GERD presents to follow up CV eval.     23    Recent Readings 8/15/2023 2023 2023 2023 2023   SBP (mmHg) 132 144 154 104 119   DBP (mmHg) 71 79 78 64 67   Pulse 62 67 61 74 65   Patient Comments OMRON 122 75 64 sugar high. grape juice and early am cut the back yard.... OMRON 123 73 71 OMRON 143 72 63. earlier this am102 61 70 OMRON. feeling very tired this am OMRON 91 65 73 BPPILLS yes 1COFFEE. no BREAKFAST YET OMRON 114 63 67 hamburger/SUPPER no pm BPPILLS YET....     BP recently labile - 100s - 150s systolics. BP and HR today stable. BMI 28 - 153 lbs. She has been on Trulicity, occ has stomach issues, has nausea/pain. Has occ palpitations.   ECG - NSR, 1st deg AVB      3/28/24  BP and HR stable. BMI 27.   ECG - NSR, PACs, LVH, poor RWP      Patient is compliant with medications.    FH - father had MI in age 58, brother  from MI 58    Prior ECG - NSR, inferior infarct      TEST DESCRIPTION   1-CARDIO EVENT RECORDING FROM 19 TO 19    2-INDICATIONS- PALPITATIONS    CONCLUSIONS   Patient had a min HR of 54 bpm, max HR of 200 bpm, and avg HR of 78  bpm. Predominant underlying rhythm was Sinus Rhythm. 93  Supraventricular Tachycardia runs occurred, the run with the fastest  interval lasting 12 mins 38 secs with a max rate of 200 bpm, the longest  lasting 44 mins 15 secs with an avg rate of 123 bpm. Supraventricular  Tachycardia was detected within +/- 45 seconds of symptomatic patient  event(s). Isolated SVEs were rare (<1.0%), SVE Couplets were rare  (<1.0%), and SVE  Triplets were rare (<1.0%). Isolated VEs were rare  (<1.0%, 970), VE Couplets were rare (<1.0%, 12), and VE Triplets were  rare (<1.0%, 1).    This document has been electronically    SIGNED BY: Jarrett Hussein MD On: 09/16/2019 09:28    2d ECHO  05/11/2019  CONCLUSIONS     1 - Concentric hypertrophy.     2 - No wall motion abnormalities.     3 - Normal left ventricular systolic function (EF 60-65%).     4 - Impaired LV relaxation, normal LAP (grade 1 diastolic dysfunction).     5 - Normal right ventricular systolic function .     6 - The estimated PA systolic pressure is greater than 22 mmHg.       Past Medical History:   Diagnosis Date    Arthritis     Cancer, uterine     Coronary artery disease     Diabetes mellitus     prediabetes    Diabetes mellitus, type 2     Digestive disorder     DM (diabetes mellitus) 08/2019    BS doesn't check 12/16/2019    DM (diabetes mellitus) 08/2019    BS doesn't check 06/29/2021    Hypertension     Multiple thyroid nodules 7/8/2021    Ovarian cancer     Sciatica     Sleep apnea        Past Surgical History:   Procedure Laterality Date    CATARACT EXTRACTION W/  INTRAOCULAR LENS IMPLANT Right 11/17/2022    CATARACT EXTRACTION W/  INTRAOCULAR LENS IMPLANT Left 01/26/2023    CHOLECYSTECTOMY      COLONOSCOPY N/A 08/01/2018    Procedure: COLONOSCOPY;  Surgeon: Yrn Hunter III, MD;  Location: [facility];  Service: Endoscopy;  Laterality: N/A;    COLONOSCOPY N/A 06/30/2022    Procedure: COLONOSCOPY;  Surgeon: Mica Rodriguez MD;  Location: [facility];  Service: Endoscopy;  Laterality: N/A;    ESOPHAGOGASTRODUODENOSCOPY N/A 03/17/2021    Procedure: EGD (ESOPHAGOGASTRODUODENOSCOPY) (Dr. EMILIO billings);  Surgeon: Coy Ortiz MD;  Location: [facility];  Service: Endoscopy;  Laterality: N/A;    HYSTERECTOMY      JOINT REPLACEMENT Right     hip replacement    SURGICAL EXCISION OF ANAL LESION N/A 08/29/2018    Procedure: EXCISION, LESION, ANUS;  Surgeon: Yrn Balderrama MD;   Location: [facility] OR;  Service: General;  Laterality: N/A;    TOTAL KNEE ARTHROPLASTY Bilateral        Social History     Tobacco Use    Smoking status: Never    Smokeless tobacco: Never   Substance Use Topics    Alcohol use: No    Drug use: No       Family History   Problem Relation Age of Onset    Diabetes Mother     Cataracts Mother     Diabetes Brother     Cataracts Maternal Grandmother     Breast cancer Maternal Aunt        Patient's Medications   New Prescriptions    No medications on file   Previous Medications    AMLODIPINE (NORVASC) 5 MG TABLET    Take 1 tablet (5 mg total) by mouth once daily.    ASPIRIN (ECOTRIN) 81 MG EC TABLET    Take 1 tablet (81 mg total) by mouth once daily.    ATORVASTATIN (LIPITOR) 40 MG TABLET    Take 1 tablet (40 mg total) by mouth every evening.    AZELASTINE (ASTELIN) 137 MCG (0.1 %) NASAL SPRAY    1 spray (137 mcg total) by Nasal route 2 (two) times daily.    BENZOCAINE (HURRICAINE) 20 % GEL    Use as directed in the mouth or throat 4 (four) times daily as needed.    BETAMETHASONE DIPROPIONATE (DIPROLENE) 0.05 % CREAM    as needed.     CANDESARTAN (ATACAND) 16 MG TABLET    Take 1 tablet (16 mg total) by mouth once daily.    CETIRIZINE (ZYRTEC) 10 MG TABLET    Take 10 mg by mouth once daily.    CHOLECALCIFEROL, VITAMIN D3, 125 MCG (5,000 UNIT) TAB    Take 1 tablet by mouth every morning.    FERROUS SULFATE (FEOSOL) TAB TABLET    Take 1 tablet by mouth daily with breakfast.    GABAPENTIN (NEURONTIN) 300 MG CAPSULE    Take 1 capsule (300 mg total) by mouth 3 (three) times daily.    HYDROCORTISONE 2.5 % CREAM    1 application  2 (two) times daily as needed. Apply to affected area    MELOXICAM (MOBIC) 7.5 MG TABLET    TAKE 1 TABLET DAILY    METFORMIN (GLUCOPHAGE) 500 MG TABLET    Take 1 tablet (500 mg total) by mouth daily with breakfast.    METOPROLOL SUCCINATE (TOPROL-XL) 100 MG 24 HR TABLET    Take 1 tablet (100 mg total) by mouth once daily.    MUPIROCIN (BACTROBAN) 2 % OINTMENT    " Apply topically 2 (two) times daily as needed.    OMEPRAZOLE (PRILOSEC) 20 MG CAPSULE    Take 20 mg by mouth once daily.    TRULICITY 0.75 MG/0.5 ML PEN INJECTOR    INJECT 0.75 MG UNDER THE SKIN EVERY 7 DAYS    VALACYCLOVIR (VALTREX) 500 MG TABLET    Take 500 mg by mouth as needed.     VALSARTAN (DIOVAN) 320 MG TABLET    Take 1 tablet by mouth every morning.   Modified Medications    No medications on file   Discontinued Medications    No medications on file       Review of Systems   Constitutional:  Positive for malaise/fatigue.   HENT: Negative.     Eyes: Negative.    Respiratory: Negative.     Cardiovascular:  Positive for palpitations.   Gastrointestinal: Negative.    Genitourinary: Negative.    Musculoskeletal: Negative.    Skin: Negative.    Neurological: Negative.    Endo/Heme/Allergies: Negative.    Psychiatric/Behavioral: Negative.     All 12 systems otherwise negative.      Wt Readings from Last 3 Encounters:   03/28/24 71.4 kg (157 lb 6.5 oz)   02/12/24 71.1 kg (156 lb 12 oz)   10/12/23 69.8 kg (153 lb 14.1 oz)     Temp Readings from Last 3 Encounters:   02/12/24 97.7 °F (36.5 °C) (Temporal)   10/12/23 97.3 °F (36.3 °C) (Temporal)   08/16/23 98.5 °F (36.9 °C) (Tympanic)     BP Readings from Last 3 Encounters:   03/28/24 110/74   02/12/24 127/74   10/12/23 117/72     Pulse Readings from Last 3 Encounters:   03/28/24 73   02/12/24 63   10/12/23 73       /74 (BP Location: Left arm, Patient Position: Sitting, BP Method: Medium (Manual))   Pulse 73   Ht 5' 3" (1.6 m)   Wt 71.4 kg (157 lb 6.5 oz)   SpO2 98%   BMI 27.88 kg/m²     Objective:   Physical Exam  Constitutional:       General: She is not in acute distress.     Appearance: She is well-developed. She is not diaphoretic.   HENT:      Head: Normocephalic and atraumatic.      Mouth/Throat:      Pharynx: No oropharyngeal exudate.   Eyes:      General: No scleral icterus.        Right eye: No discharge.         Left eye: No discharge. "   Pulmonary:      Effort: Pulmonary effort is normal. No respiratory distress.   Skin:     Coloration: Skin is not pale.      Findings: No erythema or rash.   Neurological:      Mental Status: She is alert and oriented to person, place, and time.   Psychiatric:         Behavior: Behavior normal.         Thought Content: Thought content normal.         Judgment: Judgment normal.         Lab Results   Component Value Date     01/12/2023    K 3.6 01/12/2023     01/12/2023    CO2 25 01/12/2023    BUN 17 01/12/2023    CREATININE 0.7 01/12/2023     (H) 01/12/2023    HGBA1C 5.8 (H) 02/08/2024    AST 20 01/12/2023    ALT 27 01/12/2023    ALBUMIN 3.3 (L) 01/12/2023    PROT 7.0 01/12/2023    BILITOT 0.6 01/12/2023    WBC 7.92 02/08/2024    HGB 12.3 02/08/2024    HCT 37.1 02/08/2024    MCV 88 02/08/2024     02/08/2024    INR 1.0 06/14/2018    TSH 0.554 08/16/2022    CHOL 133 08/16/2022    HDL 41 08/16/2022    LDLCALC 55.8 (L) 08/16/2022    TRIG 181 (H) 08/16/2022     Assessment:      1. Coronary artery disease involving native coronary artery of native heart without angina pectoris    2. Chronic heart failure with preserved ejection fraction    3. Atherosclerosis of aorta    4. Paroxysmal atrial tachycardia    5. Palpitations    6. SVT (supraventricular tachycardia)    7. Hypertension associated with diabetes    8. Mixed diabetic hyperlipidemia associated with type 2 diabetes mellitus    9. Abdominal aortic atherosclerosis    10. History of COVID-19    11. Essential hypertension            Plan:   1. CAD, non obs with abd and aortic atherosclerosis   - no CP  - cont asa, statin, BB    2. HTN - occ labile   - Bp well controlled  - monitor for low BP    3. Palpitations - intermittent with PSVT/PAT  - cont BB  -  follow up with Dr. Torres    4. Sleep apnea symptoms  - sleep study - negative in 2019    5. HFpEF  - low salt diet  - euvolemic     6. DM2/overweight - A1c 7.1 --> 6.7 --> 6.4 --> 6.2; BMI 28  - 163 lbs.  --> 161 lbs --> 153 lbs.  --> 157 lbs   - cont tx per PCP  - cont weight loss with diet/exercise   - Ozempic 0.25 - insurance needs to approve - [facility] - on Trulicity now    7. HLD   - cont statin  - TGs mildly elevated     8. h/o  COVID 19; FE def anemia   - s/p tx  - stable now, but more tired   - may need further iron infusion - taking iron orally QOD    9. GERD  - cont PPI    Visit today included increased complexity associated with the care of the episodic problem HTN addressed and managing the longitudinal care of the patient due to the serious and/or complex managed problem(s) .      Thank you for allowing me to participate in this patient's care. Please do not hesitate to contact me with any questions or concerns. Consult note has been forwarded to the referral physician.

## 2024-03-29 LAB
OHS QRS DURATION: 72 MS
OHS QTC CALCULATION: 396 MS

## 2024-04-03 ENCOUNTER — PATIENT MESSAGE (OUTPATIENT)
Dept: PULMONOLOGY | Facility: CLINIC | Age: 74
End: 2024-04-03
Payer: MEDICARE

## 2024-04-24 DIAGNOSIS — I25.10 CORONARY ARTERY DISEASE INVOLVING NATIVE CORONARY ARTERY OF NATIVE HEART WITHOUT ANGINA PECTORIS: Chronic | ICD-10-CM

## 2024-04-24 DIAGNOSIS — E11.49 TYPE II DIABETES MELLITUS WITH NEUROLOGICAL MANIFESTATIONS: ICD-10-CM

## 2024-04-24 DIAGNOSIS — I10 ESSENTIAL HYPERTENSION: ICD-10-CM

## 2024-04-24 DIAGNOSIS — E66.9 OBESITY (BMI 30.0-34.9): ICD-10-CM

## 2024-04-24 RX ORDER — DULAGLUTIDE 0.75 MG/.5ML
INJECTION, SOLUTION SUBCUTANEOUS
Refills: 3 | OUTPATIENT
Start: 2024-04-24

## 2024-04-24 RX ORDER — DULAGLUTIDE 0.75 MG/.5ML
0.75 INJECTION, SOLUTION SUBCUTANEOUS
Qty: 12 PEN | Refills: 3 | Status: SHIPPED | OUTPATIENT
Start: 2024-04-24 | End: 2025-04-24

## 2024-04-24 RX ORDER — METOPROLOL SUCCINATE 100 MG/1
100 TABLET, EXTENDED RELEASE ORAL DAILY
Qty: 90 TABLET | Refills: 3 | Status: SHIPPED | OUTPATIENT
Start: 2024-04-24 | End: 2025-04-24

## 2024-04-24 RX ORDER — CANDESARTAN 16 MG/1
16 TABLET ORAL DAILY
Qty: 90 TABLET | Refills: 3 | Status: SHIPPED | OUTPATIENT
Start: 2024-04-24 | End: 2025-04-24

## 2024-04-24 NOTE — TELEPHONE ENCOUNTER
No care due was identified.  [facility] Embedded Care Due Messages. Reference number: [id number].   4/24/2024 8:54:13 AM CDT

## 2024-04-24 NOTE — TELEPHONE ENCOUNTER
Care Due:                  Date            Visit Type   Department     Provider  --------------------------------------------------------------------------------                                EP -                              PRIMARY      [facility] INTERNAL  Last Visit: 08-      [facility]   MEDICINE       Sourav Hernandez  Next Visit: None Scheduled  None         None Found                                                            Last  Test          Frequency    Reason                     Performed    Due Date  --------------------------------------------------------------------------------    CMP.........  12 months..  atorvastatin, meloxicam,   01- 01-                             metFORMIN................    Lipid Panel.  12 months..  atorvastatin.............  08- 08-    [facility] Embedded Care Due Messages. Reference number: [id number].   4/24/2024 8:45:38 AM CDT

## 2024-04-25 RX ORDER — GABAPENTIN 300 MG/1
300 CAPSULE ORAL 3 TIMES DAILY
Qty: 270 CAPSULE | Refills: 3 | Status: SHIPPED | OUTPATIENT
Start: 2024-04-25

## 2024-04-25 NOTE — TELEPHONE ENCOUNTER
Refill Routing Note   Medication(s) are not appropriate for processing by [facility] for the following reason(s):        Outside of protocol    [facility] action(s):  Route  Quick Discontinue     Requires labs : Yes - CMP & lipid panel outdated            Appointments  past 12m or future 3m with PCP    Date Provider   Last Visit   8/16/2023 Sourav Hernandez MD   Next Visit   4/24/2024 Sourav Hernandez MD   ED visits in past 90 days: 0        Note composed:7:47 PM 04/24/2024

## 2024-05-13 ENCOUNTER — LAB VISIT (OUTPATIENT)
Dept: LAB | Facility: HOSPITAL | Age: 74
End: 2024-05-13
Attending: NURSE PRACTITIONER
Payer: MEDICARE

## 2024-05-13 DIAGNOSIS — D50.8 IRON DEFICIENCY ANEMIA SECONDARY TO INADEQUATE DIETARY IRON INTAKE: ICD-10-CM

## 2024-05-13 LAB
BASOPHILS # BLD AUTO: 0.07 K/UL (ref 0–0.2)
BASOPHILS NFR BLD: 1.1 % (ref 0–1.9)
DIFFERENTIAL METHOD BLD: NORMAL
EOSINOPHIL # BLD AUTO: 0.3 K/UL (ref 0–0.5)
EOSINOPHIL NFR BLD: 5.4 % (ref 0–8)
ERYTHROCYTE [DISTWIDTH] IN BLOOD BY AUTOMATED COUNT: 13.4 % (ref 11.5–14.5)
FERRITIN SERPL-MCNC: 92 NG/ML (ref 20–300)
HCT VFR BLD AUTO: 37.1 % (ref 37–48.5)
HGB BLD-MCNC: 12.3 G/DL (ref 12–16)
IMM GRANULOCYTES # BLD AUTO: 0.01 K/UL (ref 0–0.04)
IMM GRANULOCYTES NFR BLD AUTO: 0.2 % (ref 0–0.5)
IRON SERPL-MCNC: 67 UG/DL (ref 30–160)
LYMPHOCYTES # BLD AUTO: 1.7 K/UL (ref 1–4.8)
LYMPHOCYTES NFR BLD: 27 % (ref 18–48)
MCH RBC QN AUTO: 29.5 PG (ref 27–31)
MCHC RBC AUTO-ENTMCNC: 33.2 G/DL (ref 32–36)
MCV RBC AUTO: 89 FL (ref 82–98)
MONOCYTES # BLD AUTO: 0.4 K/UL (ref 0.3–1)
MONOCYTES NFR BLD: 6.5 % (ref 4–15)
NEUTROPHILS # BLD AUTO: 3.8 K/UL (ref 1.8–7.7)
NEUTROPHILS NFR BLD: 59.8 % (ref 38–73)
NRBC BLD-RTO: 0 /100 WBC
PLATELET # BLD AUTO: 275 K/UL (ref 150–450)
PMV BLD AUTO: 9.8 FL (ref 9.2–12.9)
RBC # BLD AUTO: 4.17 M/UL (ref 4–5.4)
SATURATED IRON: 22 % (ref 20–50)
TOTAL IRON BINDING CAPACITY: 305 UG/DL (ref 250–450)
TRANSFERRIN SERPL-MCNC: 206 MG/DL (ref 200–375)
WBC # BLD AUTO: 6.27 K/UL (ref 3.9–12.7)

## 2024-05-13 PROCEDURE — 85025 COMPLETE CBC W/AUTO DIFF WBC: CPT | Performed by: NURSE PRACTITIONER

## 2024-05-13 PROCEDURE — 36415 COLL VENOUS BLD VENIPUNCTURE: CPT | Performed by: NURSE PRACTITIONER

## 2024-05-13 PROCEDURE — 82728 ASSAY OF FERRITIN: CPT | Performed by: NURSE PRACTITIONER

## 2024-05-13 PROCEDURE — 83540 ASSAY OF IRON: CPT | Performed by: NURSE PRACTITIONER

## 2024-05-14 NOTE — PROGRESS NOTES
Subjective:       Patient ID: Venus Caldwell is a 74 y.o. female.    Chief Complaint: Anemia    HPI: Ms. Caldwell is a 74 year old female who is following up for her iron def anemia. She is taking oral iron every other day. She has been treated with IV iron, last one. Upper GI endoscopy from 3/17/2021 was unremarkable no evidence of H pylori. Colonoscopy 6/2022 normal,. No repeat recommended.      Today:  answering most question,s patient has sinus headache. Otherwise she has been good. She continues to take oral iron every other day. Previously oral iron daily caused constipation, every other day is much better.    Social History     Socioeconomic History    Marital status:     Number of children: 1   Occupational History    Occupation: Retired   Tobacco Use    Smoking status: Never    Smokeless tobacco: Never   Substance and Sexual Activity    Alcohol use: No    Drug use: No    Sexual activity: Not Currently     Social Determinants of Health     Financial Resource Strain: Low Risk  (5/14/2024)    Overall Financial Resource Strain (CARDIA)     Difficulty of Paying Living Expenses: Not hard at all   Food Insecurity: No Food Insecurity (5/14/2024)    Hunger Vital Sign     Worried About Running Out of Food in the Last Year: Never true     Ran Out of Food in the Last Year: Never true   Transportation Needs: No Transportation Needs (5/14/2024)    PRAPARE - Transportation     Lack of Transportation (Medical): No     Lack of Transportation (Non-Medical): No   Physical Activity: Unknown (10/12/2023)    Exercise Vital Sign     Days of Exercise per Week: Patient declined   Stress: No Stress Concern Present (5/14/2024)    [employer] - Occupational Stress Questionnaire     Feeling of Stress : Not at all       Past Medical History:   Diagnosis Date    Arthritis     Cancer, uterine     Coronary artery disease     Diabetes mellitus     prediabetes    Diabetes mellitus, type 2      Digestive disorder     DM (diabetes mellitus) 08/2019    BS doesn't check 12/16/2019    DM (diabetes mellitus) 08/2019    BS doesn't check 06/29/2021    Hypertension     Multiple thyroid nodules 7/8/2021    Ovarian cancer     Sciatica     Sleep apnea        Family History   Problem Relation Name Age of Onset    Diabetes Mother      Cataracts Mother      Diabetes Brother      Cataracts Maternal Grandmother      Breast cancer Maternal Aunt         Past Surgical History:   Procedure Laterality Date    CATARACT EXTRACTION W/  INTRAOCULAR LENS IMPLANT Right 11/17/2022    CATARACT EXTRACTION W/  INTRAOCULAR LENS IMPLANT Left 01/26/2023    CHOLECYSTECTOMY      COLONOSCOPY N/A 08/01/2018    Procedure: COLONOSCOPY;  Surgeon: Yrn Hunter III, MD;  Location: [facility];  Service: Endoscopy;  Laterality: N/A;    COLONOSCOPY N/A 06/30/2022    Procedure: COLONOSCOPY;  Surgeon: Mica Rodriguez MD;  Location: [facility];  Service: Endoscopy;  Laterality: N/A;    ESOPHAGOGASTRODUODENOSCOPY N/A 03/17/2021    Procedure: EGD (ESOPHAGOGASTRODUODENOSCOPY) (Dr. EMILIO billings);  Surgeon: Coy Ortiz MD;  Location: [facility];  Service: Endoscopy;  Laterality: N/A;    HYSTERECTOMY      JOINT REPLACEMENT Right     hip replacement    SURGICAL EXCISION OF ANAL LESION N/A 08/29/2018    Procedure: EXCISION, LESION, ANUS;  Surgeon: Yrn Balderrama MD;  Location: [facility];  Service: General;  Laterality: N/A;    TOTAL KNEE ARTHROPLASTY Bilateral        Review of Systems   Constitutional:  Negative for appetite change, fatigue, fever and unexpected weight change.   HENT:  Negative for nosebleeds.    Gastrointestinal:  Negative for anal bleeding, blood in stool, constipation, diarrhea, nausea and vomiting.   Hematological:  Does not bruise/bleed easily.         Medication List with Changes/Refills   Current Medications    AMLODIPINE (NORVASC) 5 MG TABLET    Take 1 tablet (5 mg total) by mouth once daily.    ASPIRIN (ECOTRIN) 81 MG EC  TABLET    Take 1 tablet (81 mg total) by mouth once daily.    ATORVASTATIN (LIPITOR) 40 MG TABLET    Take 1 tablet (40 mg total) by mouth every evening.    AZELASTINE (ASTELIN) 137 MCG (0.1 %) NASAL SPRAY    1 spray (137 mcg total) by Nasal route 2 (two) times daily.    BENZOCAINE (HURRICAINE) 20 % GEL    Use as directed in the mouth or throat 4 (four) times daily as needed.    BETAMETHASONE DIPROPIONATE (DIPROLENE) 0.05 % CREAM    as needed.     CANDESARTAN (ATACAND) 16 MG TABLET    Take 1 tablet (16 mg total) by mouth once daily.    CETIRIZINE (ZYRTEC) 10 MG TABLET    Take 10 mg by mouth once daily.    CHOLECALCIFEROL, VITAMIN D3, 125 MCG (5,000 UNIT) TAB    Take 1 tablet by mouth every morning.    DULAGLUTIDE (TRULICITY) 0.75 MG/0.5 ML PEN INJECTOR    Inject 0.75 mg into the skin every 7 days.    FERROUS SULFATE (FEOSOL) TAB TABLET    Take 1 tablet by mouth daily with breakfast.    GABAPENTIN (NEURONTIN) 300 MG CAPSULE    TAKE 1 CAPSULE THREE TIMES A DAY    HYDROCORTISONE 2.5 % CREAM    1 application  2 (two) times daily as needed. Apply to affected area    MELOXICAM (MOBIC) 7.5 MG TABLET    TAKE 1 TABLET DAILY    METFORMIN (GLUCOPHAGE) 500 MG TABLET    Take 1 tablet (500 mg total) by mouth daily with breakfast.    METOPROLOL SUCCINATE (TOPROL-XL) 100 MG 24 HR TABLET    Take 1 tablet (100 mg total) by mouth once daily.    MUPIROCIN (BACTROBAN) 2 % OINTMENT    Apply topically 2 (two) times daily as needed.    OMEPRAZOLE (PRILOSEC) 20 MG CAPSULE    Take 20 mg by mouth once daily.    VALACYCLOVIR (VALTREX) 500 MG TABLET    Take 500 mg by mouth as needed.      Objective:   There were no vitals filed for this visit.    Physical Exam     Physical exam limited due to video visit    Labs/Results:  Lab Results   Component Value Date    WBC 6.27 05/13/2024    RBC 4.17 05/13/2024    HGB 12.3 05/13/2024    HCT 37.1 05/13/2024    MCV 89 05/13/2024    MCH 29.5 05/13/2024    MCHC 33.2 05/13/2024    RDW 13.4 05/13/2024    PLT  275 05/13/2024    MPV 9.8 05/13/2024    GRAN 3.8 05/13/2024    GRAN 59.8 05/13/2024    LYMPH 1.7 05/13/2024    LYMPH 27.0 05/13/2024    MONO 0.4 05/13/2024    MONO 6.5 05/13/2024    EOS 0.3 05/13/2024    BASO 0.07 05/13/2024    EOSINOPHIL 5.4 05/13/2024    BASOPHIL 1.1 05/13/2024       Latest Reference Range & Units 05/13/24 10:58   Iron 30 - 160 ug/dL 67   TIBC 250 - 450 ug/dL 305   Saturated Iron 20 - 50 % 22   Transferrin 200 - 375 mg/dL 206   Ferritin 20.0 - 300.0 ng/mL 92     Upper GI endoscopy from 3/17/2021 was unremarkable no evidence of H pylori   Assessment:     Problem List Items Addressed This Visit          Oncology    Iron deficiency anemia secondary to inadequate dietary iron intake - Primary    Relevant Orders    CBC Auto Differential    Comprehensive Metabolic Panel    Ferritin    Iron and TIBC    Nodule of spleen     Plan:     Iron deficiency anemia secondary to inadequate dietary iron intake  --encouraged to incorporate iron rich foods into diet  --Upper GI endoscopy from 3/17/2021 was unremarkable no evidence of H pylori. Colonoscopy 6/2022 normal,. No repeat recommended.    --previously treated with iv iron  --continue with oral iron as tolerated, q other day  --hgb: 12.3g/dl-no anemia noted  --iron: 67, sat: 22%, ferritin: 92-iron repleted    Follow-Up: 9 months with cbc cmp iron/tibc ferritin prior--VV ok    Yesica Garrett PA-C  Hematology Oncology    The patient location is: home  The chief complaint leading to consultation is: anemia  Visit type:  Synchronous audio video  Face to Face time with patient:10  minutes of total time spent on the encounter, which includes face to face time and non-face to face time preparing to see the patient (eg, review of tests), Obtaining and/or reviewing separately obtained history, Documenting clinical information in the electronic or other health record, Independently interpreting results (not separately reported) and communicating results to the  patient/family/caregiver, or Care coordination (not separately reported).   Each patient to whom he or she provides medical services by telemedicine is:  (1) informed of the relationship between the provider and patient and the respective role of any other health care provider with respect to management of the patient; and (2) notified that he or she may decline to receive medical services     Route Chart for Scheduling    Med Onc Chart Routing      Follow up with physician    Follow up with SUSAN . 9 months with cbc cmp iron/tibc ferritin prior --VV ok   Infusion scheduling note    Injection scheduling note    Labs Ferritin, CMP, CBC and iron and TIBC   Scheduling:  Preferred lab:  Lab interval:     Imaging    Pharmacy appointment    Other referrals

## 2024-05-15 ENCOUNTER — OFFICE VISIT (OUTPATIENT)
Dept: HEMATOLOGY/ONCOLOGY | Facility: CLINIC | Age: 74
End: 2024-05-15
Payer: MEDICARE

## 2024-05-15 DIAGNOSIS — D50.8 IRON DEFICIENCY ANEMIA SECONDARY TO INADEQUATE DIETARY IRON INTAKE: Primary | ICD-10-CM

## 2024-05-15 DIAGNOSIS — D73.89 NODULE OF SPLEEN: ICD-10-CM

## 2024-05-15 PROCEDURE — 99212 OFFICE O/P EST SF 10 MIN: CPT | Mod: 95,,,

## 2024-06-16 ENCOUNTER — HOSPITAL ENCOUNTER (EMERGENCY)
Facility: HOSPITAL | Age: 74
Discharge: HOME | End: 2024-06-16
Attending: EMERGENCY MEDICINE
Payer: MEDICARE

## 2024-06-16 VITALS
HEIGHT: 63 IN | TEMPERATURE: 98 F | BODY MASS INDEX: 27.88 KG/M2 | HEART RATE: 67 BPM | DIASTOLIC BLOOD PRESSURE: 64 MMHG | OXYGEN SATURATION: 97 % | SYSTOLIC BLOOD PRESSURE: 140 MMHG | RESPIRATION RATE: 18 BRPM

## 2024-06-16 DIAGNOSIS — R11.2 NAUSEA VOMITING AND DIARRHEA: Primary | ICD-10-CM

## 2024-06-16 DIAGNOSIS — R19.7 NAUSEA VOMITING AND DIARRHEA: Primary | ICD-10-CM

## 2024-06-16 DIAGNOSIS — R00.2 PALPITATIONS: ICD-10-CM

## 2024-06-16 LAB
ALBUMIN SERPL BCP-MCNC: 3.8 G/DL (ref 3.5–5.2)
ALP SERPL-CCNC: 99 U/L (ref 55–135)
ALT SERPL W/O P-5'-P-CCNC: 13 U/L (ref 10–44)
ANION GAP SERPL CALC-SCNC: 16 MMOL/L (ref 8–16)
AST SERPL-CCNC: 16 U/L (ref 10–40)
BACTERIA #/AREA URNS HPF: ABNORMAL /HPF
BASOPHILS # BLD AUTO: 0.05 K/UL (ref 0–0.2)
BASOPHILS NFR BLD: 0.4 % (ref 0–1.9)
BILIRUB SERPL-MCNC: 1.7 MG/DL (ref 0.1–1)
BILIRUB UR QL STRIP: NEGATIVE
BUN SERPL-MCNC: 30 MG/DL (ref 8–23)
CALCIUM SERPL-MCNC: 9.8 MG/DL (ref 8.7–10.5)
CHLORIDE SERPL-SCNC: 104 MMOL/L (ref 95–110)
CLARITY UR: ABNORMAL
CO2 SERPL-SCNC: 24 MMOL/L (ref 23–29)
COLOR UR: YELLOW
CREAT SERPL-MCNC: 0.9 MG/DL (ref 0.5–1.4)
DIFFERENTIAL METHOD BLD: ABNORMAL
EOSINOPHIL # BLD AUTO: 0 K/UL (ref 0–0.5)
EOSINOPHIL NFR BLD: 0.3 % (ref 0–8)
ERYTHROCYTE [DISTWIDTH] IN BLOOD BY AUTOMATED COUNT: 13.4 % (ref 11.5–14.5)
EST. GFR  (NO RACE VARIABLE): >60 ML/MIN/1.73 M^2
GLUCOSE SERPL-MCNC: 167 MG/DL (ref 70–110)
GLUCOSE UR QL STRIP: ABNORMAL
HCT VFR BLD AUTO: 42 % (ref 37–48.5)
HCV AB SERPL QL IA: NEGATIVE
HEP C VIRUS HOLD SPECIMEN: NORMAL
HGB BLD-MCNC: 13.9 G/DL (ref 12–16)
HGB UR QL STRIP: NEGATIVE
HIV 1+2 AB+HIV1 P24 AG SERPL QL IA: NEGATIVE
HYALINE CASTS #/AREA URNS LPF: 6 /LPF
IMM GRANULOCYTES # BLD AUTO: 0.04 K/UL (ref 0–0.04)
IMM GRANULOCYTES NFR BLD AUTO: 0.3 % (ref 0–0.5)
KETONES UR QL STRIP: ABNORMAL
LEUKOCYTE ESTERASE UR QL STRIP: ABNORMAL
LIPASE SERPL-CCNC: 12 U/L (ref 4–60)
LYMPHOCYTES # BLD AUTO: 1.4 K/UL (ref 1–4.8)
LYMPHOCYTES NFR BLD: 11.8 % (ref 18–48)
MCH RBC QN AUTO: 29 PG (ref 27–31)
MCHC RBC AUTO-ENTMCNC: 33.1 G/DL (ref 32–36)
MCV RBC AUTO: 88 FL (ref 82–98)
MICROSCOPIC COMMENT: ABNORMAL
MONOCYTES # BLD AUTO: 0.7 K/UL (ref 0.3–1)
MONOCYTES NFR BLD: 5.8 % (ref 4–15)
NEUTROPHILS # BLD AUTO: 9.5 K/UL (ref 1.8–7.7)
NEUTROPHILS NFR BLD: 81.4 % (ref 38–73)
NITRITE UR QL STRIP: NEGATIVE
NRBC BLD-RTO: 0 /100 WBC
OHS QRS DURATION: 74 MS
OHS QTC CALCULATION: 417 MS
PH UR STRIP: 6 [PH] (ref 5–8)
PLATELET # BLD AUTO: 355 K/UL (ref 150–450)
PMV BLD AUTO: 10.1 FL (ref 9.2–12.9)
POCT GLUCOSE: 174 MG/DL (ref 70–110)
POTASSIUM SERPL-SCNC: 3.9 MMOL/L (ref 3.5–5.1)
PROT SERPL-MCNC: 7.7 G/DL (ref 6–8.4)
PROT UR QL STRIP: ABNORMAL
RBC # BLD AUTO: 4.79 M/UL (ref 4–5.4)
RBC #/AREA URNS HPF: 3 /HPF (ref 0–4)
SODIUM SERPL-SCNC: 144 MMOL/L (ref 136–145)
SP GR UR STRIP: 1.03 (ref 1–1.03)
SQUAMOUS #/AREA URNS HPF: 7 /HPF
URN SPEC COLLECT METH UR: ABNORMAL
UROBILINOGEN UR STRIP-ACNC: ABNORMAL EU/DL
WBC # BLD AUTO: 11.67 K/UL (ref 3.9–12.7)
WBC #/AREA URNS HPF: 18 /HPF (ref 0–5)

## 2024-06-16 PROCEDURE — 82962 GLUCOSE BLOOD TEST: CPT

## 2024-06-16 PROCEDURE — 86803 HEPATITIS C AB TEST: CPT | Performed by: EMERGENCY MEDICINE

## 2024-06-16 PROCEDURE — 83690 ASSAY OF LIPASE: CPT | Performed by: EMERGENCY MEDICINE

## 2024-06-16 PROCEDURE — 99284 EMERGENCY DEPT VISIT MOD MDM: CPT | Mod: 25

## 2024-06-16 PROCEDURE — 85025 COMPLETE CBC W/AUTO DIFF WBC: CPT | Performed by: EMERGENCY MEDICINE

## 2024-06-16 PROCEDURE — 87389 HIV-1 AG W/HIV-1&-2 AB AG IA: CPT | Performed by: EMERGENCY MEDICINE

## 2024-06-16 PROCEDURE — 25000003 PHARM REV CODE 250: Performed by: EMERGENCY MEDICINE

## 2024-06-16 PROCEDURE — 63600175 PHARM REV CODE 636 W HCPCS: Performed by: EMERGENCY MEDICINE

## 2024-06-16 PROCEDURE — 81000 URINALYSIS NONAUTO W/SCOPE: CPT | Performed by: EMERGENCY MEDICINE

## 2024-06-16 PROCEDURE — 96374 THER/PROPH/DIAG INJ IV PUSH: CPT

## 2024-06-16 PROCEDURE — 80053 COMPREHEN METABOLIC PANEL: CPT | Performed by: EMERGENCY MEDICINE

## 2024-06-16 PROCEDURE — 93005 ELECTROCARDIOGRAM TRACING: CPT

## 2024-06-16 PROCEDURE — 96361 HYDRATE IV INFUSION ADD-ON: CPT

## 2024-06-16 PROCEDURE — 93010 ELECTROCARDIOGRAM REPORT: CPT | Mod: ,,, | Performed by: INTERNAL MEDICINE

## 2024-06-16 PROCEDURE — 87086 URINE CULTURE/COLONY COUNT: CPT | Performed by: EMERGENCY MEDICINE

## 2024-06-16 RX ORDER — ONDANSETRON 4 MG/1
4 TABLET, FILM COATED ORAL EVERY 6 HOURS
Qty: 30 TABLET | Refills: 0 | Status: SHIPPED | OUTPATIENT
Start: 2024-06-16

## 2024-06-16 RX ORDER — MORPHINE SULFATE 4 MG/ML
4 INJECTION, SOLUTION INTRAMUSCULAR; INTRAVENOUS
Status: DISCONTINUED | OUTPATIENT
Start: 2024-06-16 | End: 2024-06-16 | Stop reason: HOSPADM

## 2024-06-16 RX ORDER — ONDANSETRON HYDROCHLORIDE 2 MG/ML
4 INJECTION, SOLUTION INTRAVENOUS
Status: COMPLETED | OUTPATIENT
Start: 2024-06-16 | End: 2024-06-16

## 2024-06-16 RX ADMIN — SODIUM CHLORIDE 1000 ML: 9 INJECTION, SOLUTION INTRAVENOUS at 12:06

## 2024-06-16 RX ADMIN — ONDANSETRON 4 MG: 2 INJECTION INTRAMUSCULAR; INTRAVENOUS at 10:06

## 2024-06-16 RX ADMIN — SODIUM CHLORIDE 1000 ML: 9 INJECTION, SOLUTION INTRAVENOUS at 10:06

## 2024-06-16 NOTE — ED PROVIDER NOTES
SCRIBE #1 NOTE: I, Min Pretty, am scribing for, and in the presence of, Donna Salinas MD. I have scribed the entire note.       History     Chief Complaint   Patient presents with    Vomiting     Patient c/o nausea and vomiting x 4 days. Patient denies diarrhea, fever, sob. Patient also c/o weakness.     Review of patient's allergies indicates:   Allergen Reactions    Sulfa (sulfonamide antibiotics) Anaphylaxis     Pt became hypoxic after taking sulfa drugs as a child      Buprenorphine Rash         History of Present Illness     HPI    6/16/2024, 10:02 AM  History obtained from the patient      History of Present Illness: Venus Caldwell is a 74 y.o. female patient with a PMHx of DM and HTN who presents to the Emergency Department for evaluation of N/V onset 6/12  now with weakness and lightheadedness .  Pt is unable to hold down water as of this morning. Pt reports associated symptoms of decreased appetite and diarrhea. She denies any sick contact.  She reports occasional abdominal cramping but no overt abdominal pain.    Pt reports psx of hysterectomy and cholecystectomy. No further complaints or concerns at this time.       Arrival mode: Personal vehicle    PCP: Sourav Hernandez MD        Past Medical History:  Past Medical History:   Diagnosis Date    Arthritis     Cancer, uterine     Coronary artery disease     Diabetes mellitus     prediabetes    Diabetes mellitus, type 2     Digestive disorder     DM (diabetes mellitus) 08/2019    BS doesn't check 12/16/2019    DM (diabetes mellitus) 08/2019    BS doesn't check 06/29/2021    Hypertension     Multiple thyroid nodules 7/8/2021    Ovarian cancer     Sciatica     Sleep apnea        Past Surgical History:  Past Surgical History:   Procedure Laterality Date    CATARACT EXTRACTION W/  INTRAOCULAR LENS IMPLANT Right 11/17/2022    CATARACT EXTRACTION W/  INTRAOCULAR LENS IMPLANT Left 01/26/2023    CHOLECYSTECTOMY      COLONOSCOPY N/A 08/01/2018     Procedure: COLONOSCOPY;  Surgeon: Yrn Hunter III, MD;  Location: [facility];  Service: Endoscopy;  Laterality: N/A;    COLONOSCOPY N/A 06/30/2022    Procedure: COLONOSCOPY;  Surgeon: Mica Rodriguez MD;  Location: [facility];  Service: Endoscopy;  Laterality: N/A;    ESOPHAGOGASTRODUODENOSCOPY N/A 03/17/2021    Procedure: EGD (ESOPHAGOGASTRODUODENOSCOPY) (Dr. EMILIO billings);  Surgeon: Coy Ortiz MD;  Location: [facility] ENDO;  Service: Endoscopy;  Laterality: N/A;    HYSTERECTOMY      JOINT REPLACEMENT Right     hip replacement    SURGICAL EXCISION OF ANAL LESION N/A 08/29/2018    Procedure: EXCISION, LESION, ANUS;  Surgeon: Yrn Balderrama MD;  Location[facility];  Service: General;  Laterality: N/A;    TOTAL KNEE ARTHROPLASTY Bilateral          Family History:  Family History   Problem Relation Name Age of Onset    Diabetes Mother      Cataracts Mother      Diabetes Brother      Cataracts Maternal Grandmother      Breast cancer Maternal Aunt         Social History:  Social History     Tobacco Use    Smoking status: Never    Smokeless tobacco: Never   Substance and Sexual Activity    Alcohol use: No    Drug use: No    Sexual activity: Not Currently        Review of Systems     Review of Systems   Constitutional:  Positive for appetite change (decrease).   HENT:  Negative for sore throat.    Respiratory:  Negative for cough.    Cardiovascular:  Negative for chest pain.   Gastrointestinal:  Positive for diarrhea, nausea and vomiting. Negative for abdominal distention and abdominal pain.   Genitourinary:  Negative for dysuria.   Musculoskeletal:  Negative for back pain.   Skin:  Negative for rash.   Neurological:  Negative for light-headedness.   Hematological:  Does not bruise/bleed easily.   All other systems reviewed and are negative.       Physical Exam     Initial Vitals [06/16/24 0951]   BP Pulse Resp Temp SpO2   92/65 88 12 97.6 °F (36.4 °C) 97 %      MAP       --          Physical Exam  Nursing  "Notes and Vital Signs Reviewed.  Constitutional: Patient is in no apparent distress. Dehydrated and weak.  Head: Atraumatic. Normocephalic.  Eyes: PERRL. EOM intact. Conjunctivae are not pale. No scleral icterus.  ENT: Mucous membranes are moist.   Neck: Supple. Full ROM.   Cardiovascular: Regular rate. Regular rhythm. No murmurs, rubs, or gallops. Pulmonary/Chest: No respiratory distress. Clear to auscultation bilaterally. No wheezing or rales.  Abdominal: Soft and non-distended with no abd pain on exam  No rebound, guarding, or rigidity. Good bowel sounds.  Genitourinary: No CVA tenderness  Musculoskeletal: Moves all extremities. No obvious deformities. No edema.   Skin: Warm and dry.  Neurological:  Alert, awake, and appropriate.  Normal speech.  No acute focal neurological deficits are appreciated.  Psychiatric: Normal affect. Good eye contact. Appropriate in content.     ED Course   Procedures  ED Vital Signs:  Vitals:    06/16/24 0951 06/16/24 1005 06/16/24 1032 06/16/24 1100   BP: 92/65 (!) 137/91 105/60 130/66   Pulse: 88 81 70 71   Resp: 12 19 20 18   Temp: 97.6 °F (36.4 °C)      TempSrc: Oral      SpO2: 97% 98% 95% (!) 93%   Height: (S) 5' 3" (1.6 m)       06/16/24 1130 06/16/24 1200 06/16/24 1230 06/16/24 1300   BP: 118/67 114/62 (!) 142/69 (!) 143/70   Pulse: 71 71 74 68   Resp: 15 20 20 20   Temp:       TempSrc:       SpO2: (!) 94% (!) 94% 98% 99%   Height:        06/16/24 1432   BP: (!) 140/64   Pulse: 67   Resp: 18   Temp: 98.2 °F (36.8 °C)   TempSrc:    SpO2: 97%   Height:        Abnormal Lab Results:  Labs Reviewed   CBC W/ AUTO DIFFERENTIAL - Abnormal; Notable for the following components:       Result Value    Gran # (ANC) 9.5 (*)     Gran % 81.4 (*)     Lymph % 11.8 (*)     All other components within normal limits    Narrative:     Release to patient->Immediate   COMPREHENSIVE METABOLIC PANEL - Abnormal; Notable for the following components:    Glucose 167 (*)     BUN 30 (*)     Total Bilirubin " 1.7 (*)     All other components within normal limits    Narrative:     Release to patient->Immediate   URINALYSIS, REFLEX TO URINE CULTURE - Abnormal; Notable for the following components:    Appearance, UA Hazy (*)     Protein, UA 2+ (*)     Glucose, UA Trace (*)     Ketones, UA Trace (*)     Urobilinogen, UA 2.0-3.0 (*)     Leukocytes, UA 2+ (*)     All other components within normal limits    Narrative:     Specimen Source->Urine   URINALYSIS MICROSCOPIC - Abnormal; Notable for the following components:    WBC, UA 18 (*)     Hyaline Casts, UA 6 (*)     All other components within normal limits    Narrative:     Specimen Source->Urine   POCT GLUCOSE - Abnormal; Notable for the following components:    POCT Glucose 174 (*)     All other components within normal limits   CULTURE, URINE   HIV 1 / 2 ANTIBODY    Narrative:     Release to patient->Immediate   HEPATITIS C ANTIBODY    Narrative:     Release to patient->Immediate   HEP C VIRUS HOLD SPECIMEN    Narrative:     Release to patient->Immediate   LIPASE    Narrative:     Release to patient->Immediate        All Lab Results:  Results for orders placed or performed during the hospital encounter of 06/16/24   HIV 1/2 Ag/Ab (4th Gen)   Result Value Ref Range    HIV 1/2 Ag/Ab Negative Negative   Hepatitis C Antibody   Result Value Ref Range    Hepatitis C Ab Negative Negative   HCV Virus Hold Specimen   Result Value Ref Range    HEP C Virus Hold Specimen Hold for HCV sendout    CBC W/ AUTO DIFFERENTIAL   Result Value Ref Range    WBC 11.67 3.90 - 12.70 K/uL    RBC 4.79 4.00 - 5.40 M/uL    Hemoglobin 13.9 12.0 - 16.0 g/dL    Hematocrit 42.0 37.0 - 48.5 %    MCV 88 82 - 98 fL    MCH 29.0 27.0 - 31.0 pg    MCHC 33.1 32.0 - 36.0 g/dL    RDW 13.4 11.5 - 14.5 %    Platelets 355 150 - 450 K/uL    MPV 10.1 9.2 - 12.9 fL    Immature Granulocytes 0.3 0.0 - 0.5 %    Gran # (ANC) 9.5 (H) 1.8 - 7.7 K/uL    Immature Grans (Abs) 0.04 0.00 - 0.04 K/uL    Lymph # 1.4 1.0 - 4.8 K/uL     Mono # 0.7 0.3 - 1.0 K/uL    Eos # 0.0 0.0 - 0.5 K/uL    Baso # 0.05 0.00 - 0.20 K/uL    nRBC 0 0 /100 WBC    Gran % 81.4 (H) 38.0 - 73.0 %    Lymph % 11.8 (L) 18.0 - 48.0 %    Mono % 5.8 4.0 - 15.0 %    Eosinophil % 0.3 0.0 - 8.0 %    Basophil % 0.4 0.0 - 1.9 %    Differential Method Automated    Comp. Metabolic Panel   Result Value Ref Range    Sodium 144 136 - 145 mmol/L    Potassium 3.9 3.5 - 5.1 mmol/L    Chloride 104 95 - 110 mmol/L    CO2 24 23 - 29 mmol/L    Glucose 167 (H) 70 - 110 mg/dL    BUN 30 (H) 8 - 23 mg/dL    Creatinine 0.9 0.5 - 1.4 mg/dL    Calcium 9.8 8.7 - 10.5 mg/dL    Total Protein 7.7 6.0 - 8.4 g/dL    Albumin 3.8 3.5 - 5.2 g/dL    Total Bilirubin 1.7 (H) 0.1 - 1.0 mg/dL    Alkaline Phosphatase 99 55 - 135 U/L    AST 16 10 - 40 U/L    ALT 13 10 - 44 U/L    eGFR >60 >60 mL/min/1.73 m^2    Anion Gap 16 8 - 16 mmol/L   Lipase   Result Value Ref Range    Lipase 12 4 - 60 U/L   Urinalysis, Reflex to Urine Culture Urine, Clean Catch    Specimen: Urine   Result Value Ref Range    Specimen UA Urine, Clean Catch     Color, UA Yellow Yellow, Straw, Sarina    Appearance, UA Hazy (A) Clear    pH, UA 6.0 5.0 - 8.0    Specific Gravity, UA 1.030 1.005 - 1.030    Protein, UA 2+ (A) Negative    Glucose, UA Trace (A) Negative    Ketones, UA Trace (A) Negative    Bilirubin (UA) Negative Negative    Occult Blood UA Negative Negative    Nitrite, UA Negative Negative    Urobilinogen, UA 2.0-3.0 (A) <2.0 EU/dL    Leukocytes, UA 2+ (A) Negative   Urinalysis Microscopic   Result Value Ref Range    RBC, UA 3 0 - 4 /hpf    WBC, UA 18 (H) 0 - 5 /hpf    Bacteria Rare None-Occ /hpf    Squam Epithel, UA 7 /hpf    Hyaline Casts, UA 6 (A) 0-1/lpf /lpf    Microscopic Comment SEE COMMENT    EKG 12-lead   Result Value Ref Range    QRS Duration 74 ms    OHS QTC Calculation 417 ms   POCT glucose   Result Value Ref Range    POCT Glucose 174 (H) 70 - 110 mg/dL         Imaging Results:  Imaging Results    None          The EKG was  ordered, reviewed, and independently interpreted by the ED provider.  Interpretation time: 10:26 AM  Rate: 75 BPM  Rhythm: normal sinus rhythm  Interpretation: MVC for LVH, may be normal variant (R in aVL). Inferior infarct, age undetermined. No STEMI.           The Emergency Provider reviewed the vital signs and test results, which are outlined above.     ED Discussion     11:52 AM: Re-evaluated pt. Pt is resting comfortably and is in no acute distress.  Pt's condition has improved, no abdominal pain and no vomiting since initial exam.  D/w pt all pertinent results. D/w pt any concerns expressed at this time. Answered all questions. Pt expresses understanding at this time.    2:25 PM: Reassessed pt at this time. Discussed with pt all pertinent ED information and results. Discussed pt dx and plan of tx. Gave pt all f/u and return to the ED instructions. All questions and concerns were addressed at this time. Pt expresses understanding of information and instructions, and is comfortable with plan to discharge. Pt is stable for discharge.    I discussed with patient and/or family/caretaker that evaluation in the ED does not suggest any emergent or life threatening medical conditions requiring immediate intervention beyond what was provided in the ED, and I believe patient is safe for discharge.  Regardless, an unremarkable evaluation in the ED does not preclude the development or presence of a serious of life threatening condition. As such, patient was instructed to return immediately for any worsening or change in current symptoms.         Medical Decision Making  Normal CBC BNP with elevated BUN at 30, creatinine is normal, glucose 167, lipase normal, UA with 18 white blood cells but she does have 7 epithelial cells.  No CT scan of the abdomen pelvis done in the emergency room she had no abdominal pain on exam.  She did receive 2 L of normal saline in the emergency room and Zofran for nausea and morphine for pain.   At this time patient and her has been do feel comfortable going home.  They will follow up with the primary care doctor.  Will plan to prescribe some Zofran.  Patient reports she has no dysuria hematuria urgency frequency or decreased stream.  At this time do not feel she has a urinary tract infection as it was not a cath UA but a clean-catch.  She also had 7 epithelial cells so more consistent with a contamination.    Amount and/or Complexity of Data Reviewed  Independent Historian: spouse  Labs: ordered. Decision-making details documented in ED Course.  ECG/medicine tests: ordered and independent interpretation performed. Decision-making details documented in ED Course.    Risk  Prescription drug management.  Risk Details: Differential diagnosis;  Gastroenteritis, Bowel obstruction, Colitis, Diverticulitis, Cholecystitis, Appendicitis, Perforated bowel, Herniation, Infectious etiology, UTI, Pyelonephritis,  Biliary obstruction, kidney stone                   ED Medication(s):  Medications   sodium chloride 0.9% bolus 1,000 mL 1,000 mL (0 mLs Intravenous Stopped 6/16/24 1240)   ondansetron injection 4 mg (4 mg Intravenous Given 6/16/24 1030)   sodium chloride 0.9% bolus 1,000 mL 1,000 mL (0 mLs Intravenous Stopped 6/16/24 1343)       Discharge Medication List as of 6/16/2024  2:21 PM        START taking these medications    Details   ondansetron (ZOFRAN) 4 MG tablet Take 1 tablet (4 mg total) by mouth every 6 (six) hours., Starting Sun 6/16/2024, Normal              Follow-up Information       Sourav Hernandez MD. Schedule an appointment as soon as possible for a visit in 1 day.    Specialty: Family Medicine  Contact information:  [id number]  [place] [address]  [phone]                                 Scribe Attestation:   Scribe #1: I performed the above scribed service and the documentation accurately describes the services I performed. I attest to the accuracy of the note.     Attending:    Physician Attestation Statement for Scribe #1: I, Donna Salinas MD, personally performed the services described in this documentation, as scribed by Min Pretty, in my presence, and it is both accurate and complete.           Clinical Impression       ICD-10-CM ICD-9-CM   1. Nausea vomiting and diarrhea  R11.2 787.91    R19.7 787.01   2. Palpitations  R00.2 785.1       Disposition:   Disposition: Discharged  Condition: Stable         Donna Salinas MD  06/16/24 0472

## 2024-06-17 LAB — BACTERIA UR CULT: NORMAL

## 2024-06-19 ENCOUNTER — TELEPHONE (OUTPATIENT)
Dept: INTERNAL MEDICINE | Facility: CLINIC | Age: 74
End: 2024-06-19
Payer: MEDICARE

## 2024-06-19 NOTE — TELEPHONE ENCOUNTER
Attempt to contact pt regarding appt; MA canceled appt due to PCP out sick; unable to lvm box was full /LD

## 2024-06-20 ENCOUNTER — TELEPHONE (OUTPATIENT)
Dept: INTERNAL MEDICINE | Facility: CLINIC | Age: 74
End: 2024-06-20
Payer: MEDICARE

## 2024-06-20 NOTE — TELEPHONE ENCOUNTER
Supervisor spoke with pt spouse regarding canceled appt ; we did schedule with NP due to Pcp being out of office; spouse was fine with that date /LD

## 2024-06-20 NOTE — TELEPHONE ENCOUNTER
----- Message from Cristel Levine sent at 6/20/2024  2:44 PM CDT -----  Contact: Venus  Type:  Patient Returning Call    Who Called:Venus  Who Left Message for Patient:Tyron  Does the patient know what this is regarding?: a cancelled appt  Would the patient rather a call back or a response via [facility]?  Call back   Best Call Back Number:[phone]  Additional Information:     Thanks   Am

## 2024-06-25 ENCOUNTER — OFFICE VISIT (OUTPATIENT)
Dept: INTERNAL MEDICINE | Facility: CLINIC | Age: 74
End: 2024-06-25
Payer: MEDICARE

## 2024-06-25 VITALS
OXYGEN SATURATION: 96 % | DIASTOLIC BLOOD PRESSURE: 76 MMHG | WEIGHT: 156.31 LBS | BODY MASS INDEX: 27.7 KG/M2 | TEMPERATURE: 98 F | HEIGHT: 63 IN | HEART RATE: 71 BPM | SYSTOLIC BLOOD PRESSURE: 120 MMHG

## 2024-06-25 DIAGNOSIS — R19.7 NAUSEA VOMITING AND DIARRHEA: ICD-10-CM

## 2024-06-25 DIAGNOSIS — Z09 FOLLOW-UP EXAM: Primary | ICD-10-CM

## 2024-06-25 DIAGNOSIS — R11.2 NAUSEA VOMITING AND DIARRHEA: ICD-10-CM

## 2024-06-25 DIAGNOSIS — R10.9 RIGHT SIDED ABDOMINAL PAIN: ICD-10-CM

## 2024-06-25 PROCEDURE — 99999 PR PBB SHADOW E&M-EST. PATIENT-LVL IV: CPT | Mod: PBBFAC,,, | Performed by: NURSE PRACTITIONER

## 2024-06-25 PROCEDURE — 99214 OFFICE O/P EST MOD 30 MIN: CPT | Mod: PBBFAC | Performed by: NURSE PRACTITIONER

## 2024-06-25 PROCEDURE — 99214 OFFICE O/P EST MOD 30 MIN: CPT | Mod: S$PBB,,, | Performed by: NURSE PRACTITIONER

## 2024-06-25 NOTE — PROGRESS NOTES
Subjective:       Patient ID: Venus Caldwell is a 74 y.o. female.    Chief Complaint: Follow-up    Follow-up  Associated symptoms include abdominal pain. Pertinent negatives include no arthralgias, chest pain, chills, congestion, coughing, diaphoresis, fatigue, fever, headaches, joint swelling, nausea, neck pain, numbness, sore throat, vomiting or weakness.       Pt seen in ED for n/v/d abd pain.   Given fluids and nausea meds  Hx of colitis in past  Colon study 2 years ago- normal no further required  Pt reports she still has some discomfort under belly button and R side of abd. Hx of cholecystectomy      Past Medical History:   Diagnosis Date    Arthritis     Cancer, uterine     Coronary artery disease     Diabetes mellitus     prediabetes    Diabetes mellitus, type 2     Digestive disorder     DM (diabetes mellitus) 08/2019    BS doesn't check 12/16/2019    DM (diabetes mellitus) 08/2019    BS doesn't check 06/29/2021    Hypertension     Multiple thyroid nodules 7/8/2021    Ovarian cancer     Sciatica     Sleep apnea      Past Surgical History:   Procedure Laterality Date    CATARACT EXTRACTION W/  INTRAOCULAR LENS IMPLANT Right 11/17/2022    CATARACT EXTRACTION W/  INTRAOCULAR LENS IMPLANT Left 01/26/2023    CHOLECYSTECTOMY      COLONOSCOPY N/A 08/01/2018    Procedure: COLONOSCOPY;  Surgeon: Yrn Hunter III, MD;  Location: [facility];  Service: Endoscopy;  Laterality: N/A;    COLONOSCOPY N/A 06/30/2022    Procedure: COLONOSCOPY;  Surgeon: Mica Rodriguez MD;  Location: [facility];  Service: Endoscopy;  Laterality: N/A;    ESOPHAGOGASTRODUODENOSCOPY N/A 03/17/2021    Procedure: EGD (ESOPHAGOGASTRODUODENOSCOPY) (Dr. EMILIO billings);  Surgeon: Coy Ortiz MD;  Location: [facility];  Service: Endoscopy;  Laterality: N/A;    HYSTERECTOMY      JOINT REPLACEMENT Right     hip replacement    SURGICAL EXCISION OF ANAL LESION N/A 08/29/2018    Procedure: EXCISION, LESION, ANUS;  Surgeon: Yrn  MD Conchis;  Location: [facility];  Service: General;  Laterality: N/A;    TOTAL KNEE ARTHROPLASTY Bilateral      Social History     Socioeconomic History    Marital status:     Number of children: 1   Occupational History    Occupation: Retired   Tobacco Use    Smoking status: Never    Smokeless tobacco: Never   Substance and Sexual Activity    Alcohol use: No    Drug use: No    Sexual activity: Not Currently     Social Determinants of Health     Financial Resource Strain: Low Risk  (5/14/2024)    Overall Financial Resource Strain (CARDIA)     Difficulty of Paying Living Expenses: Not hard at all   Food Insecurity: No Food Insecurity (5/14/2024)    Hunger Vital Sign     Worried About Running Out of Food in the Last Year: Never true     Ran Out of Food in the Last Year: Never true   Transportation Needs: No Transportation Needs (5/14/2024)    PRAPARE - Transportation     Lack of Transportation (Medical): No     Lack of Transportation (Non-Medical): No   Physical Activity: Unknown (10/12/2023)    Exercise Vital Sign     Days of Exercise per Week: Patient declined   Stress: No Stress Concern Present (5/14/2024)    [employer] - Occupational Stress Questionnaire     Feeling of Stress : Not at all   Housing Stability: Unknown (5/14/2024)    Housing Stability Vital Sign     Unable to Pay for Housing in the Last Year: No     Review of patient's allergies indicates:   Allergen Reactions    Sulfa (sulfonamide antibiotics) Anaphylaxis     Pt became hypoxic after taking sulfa drugs as a child      Buprenorphine Rash     Current Outpatient Medications   Medication Sig    amLODIPine (NORVASC) 5 MG tablet Take 1 tablet (5 mg total) by mouth once daily.    aspirin (ECOTRIN) 81 MG EC tablet Take 1 tablet (81 mg total) by mouth once daily.    atorvastatin (LIPITOR) 40 MG tablet Take 1 tablet (40 mg total) by mouth every evening.    azelastine (ASTELIN) 137 mcg (0.1 %) nasal spray 1 spray (137 mcg  total) by Nasal route 2 (two) times daily.    benzocaine (HURRICAINE) 20 % Gel Use as directed in the mouth or throat 4 (four) times daily as needed.    betamethasone dipropionate (DIPROLENE) 0.05 % cream as needed.     candesartan (ATACAND) 16 MG tablet Take 1 tablet (16 mg total) by mouth once daily.    cetirizine (ZYRTEC) 10 MG tablet Take 10 mg by mouth once daily.    cholecalciferol, vitamin D3, 125 mcg (5,000 unit) Tab Take 1 tablet by mouth every morning.    dulaglutide (TRULICITY) 0.75 mg/0.5 mL pen injector Inject 0.75 mg into the skin every 7 days.    gabapentin (NEURONTIN) 300 MG capsule TAKE 1 CAPSULE THREE TIMES A DAY    meloxicam (MOBIC) 7.5 MG tablet TAKE 1 TABLET DAILY    metoprolol succinate (TOPROL-XL) 100 MG 24 hr tablet Take 1 tablet (100 mg total) by mouth once daily.    omeprazole (PRILOSEC) 20 MG capsule Take 20 mg by mouth once daily.    ondansetron (ZOFRAN) 4 MG tablet Take 1 tablet (4 mg total) by mouth every 6 (six) hours.    valACYclovir (VALTREX) 500 MG tablet Take 500 mg by mouth as needed.     ferrous sulfate (FEOSOL) Tab tablet Take 1 tablet by mouth daily with breakfast.    hydrocortisone 2.5 % cream 1 application  2 (two) times daily as needed. Apply to affected area    metFORMIN (GLUCOPHAGE) 500 MG tablet Take 1 tablet (500 mg total) by mouth daily with breakfast. (Patient not taking: Reported on 3/28/2024)    mupirocin (BACTROBAN) 2 % ointment Apply topically 2 (two) times daily as needed.     No current facility-administered medications for this visit.           Review of Systems   Constitutional:  Negative for activity change, appetite change, chills, diaphoresis, fatigue, fever and unexpected weight change.   HENT:  Negative for congestion, ear pain, postnasal drip, rhinorrhea, sinus pressure, sinus pain, sneezing, sore throat, tinnitus, trouble swallowing and voice change.    Eyes:  Negative for photophobia, pain and visual disturbance.   Respiratory:  Negative for cough,  chest tightness, shortness of breath and wheezing.    Cardiovascular:  Negative for chest pain, palpitations and leg swelling.   Gastrointestinal:  Positive for abdominal pain. Negative for abdominal distention, constipation, diarrhea, nausea and vomiting.   Genitourinary:  Negative for decreased urine volume, difficulty urinating, dysuria, flank pain, frequency, hematuria and urgency.   Musculoskeletal:  Negative for arthralgias, back pain, joint swelling, neck pain and neck stiffness.   Allergic/Immunologic: Negative for immunocompromised state.   Neurological:  Negative for dizziness, tremors, seizures, syncope, facial asymmetry, speech difficulty, weakness, light-headedness, numbness and headaches.   Hematological:  Negative for adenopathy. Does not bruise/bleed easily.   Psychiatric/Behavioral:  Negative for confusion and sleep disturbance.        Objective:      Physical Exam  Vitals reviewed.   Cardiovascular:      Rate and Rhythm: Normal rate and regular rhythm.      Heart sounds: Normal heart sounds.   Pulmonary:      Effort: Pulmonary effort is normal.      Breath sounds: Normal breath sounds.   Abdominal:      Tenderness: There is abdominal tenderness in the right lower quadrant and periumbilical area.   Neurological:      Mental Status: She is alert.         Assessment:     Vitals:    06/25/24 1136   BP: 120/76   Pulse: 71   Temp: 98.1 °F (36.7 °C)         1. Follow-up exam    2. Nausea vomiting and diarrhea    3. Right sided abdominal pain        Plan:   Follow-up exam    Nausea vomiting and diarrhea  Comments:  resolved    Right sided abdominal pain  -     US Abdomen Limited; Future; Expected date: 06/25/2024

## 2024-06-26 ENCOUNTER — HOSPITAL ENCOUNTER (OUTPATIENT)
Dept: RADIOLOGY | Facility: HOSPITAL | Age: 74
Discharge: HOME | End: 2024-06-26
Attending: NURSE PRACTITIONER
Payer: MEDICARE

## 2024-06-26 DIAGNOSIS — R10.9 RIGHT SIDED ABDOMINAL PAIN: ICD-10-CM

## 2024-06-26 PROCEDURE — 76705 ECHO EXAM OF ABDOMEN: CPT | Mod: TC

## 2024-06-26 PROCEDURE — 76705 ECHO EXAM OF ABDOMEN: CPT | Mod: 26,,, | Performed by: RADIOLOGY

## 2024-06-28 DIAGNOSIS — R93.2 ABNORMAL LIVER ULTRASOUND: Primary | ICD-10-CM

## 2024-07-10 DIAGNOSIS — I70.0 ATHEROSCLEROSIS OF AORTA: Chronic | ICD-10-CM

## 2024-07-10 DIAGNOSIS — E11.69 MIXED DIABETIC HYPERLIPIDEMIA ASSOCIATED WITH TYPE 2 DIABETES MELLITUS: Chronic | ICD-10-CM

## 2024-07-10 DIAGNOSIS — E78.2 MIXED DIABETIC HYPERLIPIDEMIA ASSOCIATED WITH TYPE 2 DIABETES MELLITUS: Chronic | ICD-10-CM

## 2024-07-10 DIAGNOSIS — I25.10 CORONARY ARTERY DISEASE INVOLVING NATIVE CORONARY ARTERY OF NATIVE HEART WITHOUT ANGINA PECTORIS: Chronic | ICD-10-CM

## 2024-07-10 NOTE — TELEPHONE ENCOUNTER
No care due was identified.  [facility] Embedded Care Due Messages. Reference number: [id number].   7/10/2024 10:32:51 AM CDT

## 2024-07-10 NOTE — TELEPHONE ENCOUNTER
Care Due:                  Date            Visit Type   Department     Provider  --------------------------------------------------------------------------------                                EP -                              PRIMARY      [facility] INTER[facility]  Last Visit: 08-      CARE ([facility])   MEDICINE       Sourav Hernandez  Next Visit: None Scheduled  None         None Found                                                            Last  Test          Frequency    Reason                     Performed    Due Date  --------------------------------------------------------------------------------    Office Visit  12 months..  meloxicam................  08-   08-    HBA1C.......  6 months...  dulaglutide, metFORMIN...  02- 08-    Lipid Panel.  12 months..  atorvastatin.............  08- 08-    [facility] Embedded Care Due Messages. Reference number: [id number].   7/10/2024 10:31:50 AM CDT

## 2024-07-15 RX ORDER — ATORVASTATIN CALCIUM 40 MG/1
40 TABLET, FILM COATED ORAL NIGHTLY
Qty: 90 TABLET | Refills: 3 | Status: SHIPPED | OUTPATIENT
Start: 2024-07-15 | End: 2025-07-15

## 2024-07-15 RX ORDER — MELOXICAM 7.5 MG/1
7.5 TABLET ORAL DAILY
Qty: 90 TABLET | Refills: 3 | Status: SHIPPED | OUTPATIENT
Start: 2024-07-15

## 2024-07-17 ENCOUNTER — PATIENT MESSAGE (OUTPATIENT)
Dept: INTERNAL MEDICINE | Facility: CLINIC | Age: 74
End: 2024-07-17
Payer: MEDICARE

## 2024-08-19 ENCOUNTER — PATIENT MESSAGE (OUTPATIENT)
Dept: ADMINISTRATIVE | Facility: HOSPITAL | Age: 74
End: 2024-08-19
Payer: MEDICARE

## 2024-08-21 ENCOUNTER — PATIENT OUTREACH (OUTPATIENT)
Dept: ADMINISTRATIVE | Facility: HOSPITAL | Age: 74
End: 2024-08-21
Payer: MEDICARE

## 2024-08-21 DIAGNOSIS — I15.2 HYPERTENSION ASSOCIATED WITH DIABETES: Chronic | ICD-10-CM

## 2024-08-21 DIAGNOSIS — Z12.31 ENCOUNTER FOR SCREENING MAMMOGRAM FOR MALIGNANT NEOPLASM OF BREAST: Primary | ICD-10-CM

## 2024-08-21 DIAGNOSIS — E11.59 HYPERTENSION ASSOCIATED WITH DIABETES: Chronic | ICD-10-CM

## 2024-08-21 NOTE — PROGRESS NOTES
Replying to Campaign Questionnaire for Overdue HM: Mammogram, Labs & DM Eye Exam    Labs & urine scheduled 8/27/2024.   Mammogram order placed and scheduled 10/14/2024.   Dm eye exam scheduled 10/4/2024.   Portal message sent to pt.

## 2024-08-26 ENCOUNTER — PATIENT OUTREACH (OUTPATIENT)
Dept: ADMINISTRATIVE | Facility: HOSPITAL | Age: 74
End: 2024-08-26
Payer: MEDICARE

## 2024-09-11 NOTE — ADDENDUM NOTE
Addended by: ALEJANDRO KILLIAN on: 10/15/2019 08:10 AM     Modules accepted: Orders    
Addended by: CARMEN DAMON on: 10/16/2019 02:35 PM     Modules accepted: Orders    
not applicable

## 2024-10-01 ENCOUNTER — PATIENT OUTREACH (OUTPATIENT)
Dept: ADMINISTRATIVE | Facility: HOSPITAL | Age: 74
End: 2024-10-01
Payer: MEDICARE

## 2024-10-01 DIAGNOSIS — E11.49 TYPE II DIABETES MELLITUS WITH NEUROLOGICAL MANIFESTATIONS: Primary | Chronic | ICD-10-CM

## 2024-10-01 NOTE — PROGRESS NOTES
VBHM Score: 3     Urine Screening  Hemoglobin A1c  Foot Exam    Influenza Vaccine                  Health Maintenance Topic(s) Outreach Outcomes & Actions Taken:    Lab(s) - Outreach Outcomes & Actions Taken  : Microalbumin order placed and linked, Ha1c/Lipid panel linked.  Scheduled pt appt same day as mammogram appt    Diabetic Foot Exam - Outreach Outcomes & Actions Taken  : Upcoming PCP appt already scheduled 10.23.24, added to appt note on schedule         Additional Notes:  Spoke to pt she agreed to Fasting blood work appt same day as Mammogram appt, scheduled and linked needed DM labs, will send message to Hem/Onc to verify if they recommend any labs at that time, due to previous future orders noted.

## 2024-10-01 NOTE — Clinical Note
Sahara Robert,  I scheduled this pt to have Fasting labs for some of her overdue DM labs on 10.14.24, I noticed you had future orders listed, would you like her to have any add'l labs done at that visit? FYI she will be fasting.  Just wanted to let you know incase you needed her to have any add'l.  Have a good day! Yvonne STILL, [facility] Care Coordination Department [facility]

## 2024-10-04 ENCOUNTER — PATIENT MESSAGE (OUTPATIENT)
Dept: OPHTHALMOLOGY | Facility: CLINIC | Age: 74
End: 2024-10-04

## 2024-10-04 ENCOUNTER — OFFICE VISIT (OUTPATIENT)
Dept: OPHTHALMOLOGY | Facility: CLINIC | Age: 74
End: 2024-10-04
Payer: MEDICARE

## 2024-10-04 DIAGNOSIS — Z96.1 PSEUDOPHAKIA OF BOTH EYES: ICD-10-CM

## 2024-10-04 DIAGNOSIS — H52.7 REFRACTIVE ERRORS: ICD-10-CM

## 2024-10-04 DIAGNOSIS — E11.9 DIABETES MELLITUS TYPE 2 WITHOUT RETINOPATHY: Primary | ICD-10-CM

## 2024-10-04 PROCEDURE — 99999 PR PBB SHADOW E&M-EST. PATIENT-LVL III: CPT | Mod: PBBFAC,,, | Performed by: OPTOMETRIST

## 2024-10-04 PROCEDURE — 99213 OFFICE O/P EST LOW 20 MIN: CPT | Mod: PBBFAC | Performed by: OPTOMETRIST

## 2024-10-04 NOTE — PROGRESS NOTES
SUBJECTIVE  Venus Caldwell is 74 y.o. female  Uncorrected distance visual acuity was 20/20 -1 in the right eye and 20/30 +1 in the left eye. Corrected near visual acuity was J1 in the right eye and J1 in the left eye.   Chief Complaint   Patient presents with    Diabetic Eye Exam     TRF REFERRAL    1. PCIOL OD 11/17/22/ SY60WF 13.5/ CDE: 24.97  PCIOL OS 1/26/23/ SY60WF 14.0/ CDE: 23.80  2. +DM II      DROP LESS  Lab Results       Component                Value               Date                       HGBA1C                   5.8 (H)             02/08/2024               Dry Eye     Sleeps with a wet wash towel on her eyes too help her sleep   OU feels a little dry   OS drips water often        Refraction     Would like a good pair of reading glasses made           HPI     Diabetic Eye Exam     Additional comments: TRF REFERRAL    1. PCIOL OD 11/17/22/ SY60WF 13.5/ CDE: 24.97  PCIOL OS 1/26/23/ SY60WF 14.0/ CDE: 23.80  2. +DM II      DROP LESS  Lab Results       Component                Value               Date                       HGBA1C                   5.8 (H)             02/08/2024                      Dry Eye     Additional comments: Sleeps with a wet wash towel on her eyes too help   her sleep   OU feels a little dry   OS drips water often               Refraction     Additional comments: Would like a good pair of reading glasses made            Comments    Pt here for annual DM eye exam  Pt states she has not been checking her BS   Pt states VA is good   Denies flashes and floaters          Last edited by David Mcnally on 10/4/2024  1:46 PM.         Assessment /Plan :  1. Diabetes mellitus type 2 without retinopathy  No Background Diabetic Retinopathy  Strict BG control, f/u w/ PCP, and annual DFE  Stressed importance of DM control to preserve vision     2. Pseudophakia of both eyes  Well-centered, stable IOL OU. Monitor annually.     3. Refractive errors  Dispense Final Rx for glasses.  RTC 1  year  Discussed above and answered questions.

## 2024-10-07 ENCOUNTER — PATIENT OUTREACH (OUTPATIENT)
Dept: ADMINISTRATIVE | Facility: HOSPITAL | Age: 74
End: 2024-10-07
Payer: MEDICARE

## 2024-10-14 ENCOUNTER — HOSPITAL ENCOUNTER (OUTPATIENT)
Dept: RADIOLOGY | Facility: HOSPITAL | Age: 74
Discharge: HOME | End: 2024-10-14
Attending: FAMILY MEDICINE
Payer: MEDICARE

## 2024-10-14 VITALS — BODY MASS INDEX: 27.7 KG/M2 | HEIGHT: 63 IN | WEIGHT: 156.31 LBS

## 2024-10-14 DIAGNOSIS — Z12.31 ENCOUNTER FOR SCREENING MAMMOGRAM FOR MALIGNANT NEOPLASM OF BREAST: ICD-10-CM

## 2024-10-14 PROCEDURE — 77063 BREAST TOMOSYNTHESIS BI: CPT | Mod: 26,,, | Performed by: RADIOLOGY

## 2024-10-14 PROCEDURE — 77067 SCR MAMMO BI INCL CAD: CPT | Mod: 26,,, | Performed by: RADIOLOGY

## 2024-10-14 PROCEDURE — 77067 SCR MAMMO BI INCL CAD: CPT | Mod: TC

## 2024-10-23 ENCOUNTER — OFFICE VISIT (OUTPATIENT)
Dept: INTERNAL MEDICINE | Facility: CLINIC | Age: 74
End: 2024-10-23
Payer: MEDICARE

## 2024-10-23 VITALS
BODY MASS INDEX: 26.99 KG/M2 | SYSTOLIC BLOOD PRESSURE: 116 MMHG | OXYGEN SATURATION: 96 % | HEIGHT: 63 IN | WEIGHT: 152.31 LBS | DIASTOLIC BLOOD PRESSURE: 68 MMHG | HEART RATE: 73 BPM | TEMPERATURE: 98 F

## 2024-10-23 DIAGNOSIS — Z00.00 ANNUAL PHYSICAL EXAM: Primary | ICD-10-CM

## 2024-10-23 DIAGNOSIS — I70.0 ATHEROSCLEROSIS OF AORTA: Chronic | ICD-10-CM

## 2024-10-23 DIAGNOSIS — E11.69 MIXED DIABETIC HYPERLIPIDEMIA ASSOCIATED WITH TYPE 2 DIABETES MELLITUS: Chronic | ICD-10-CM

## 2024-10-23 DIAGNOSIS — I50.32 CHRONIC HEART FAILURE WITH PRESERVED EJECTION FRACTION: Chronic | ICD-10-CM

## 2024-10-23 DIAGNOSIS — E78.2 MIXED DIABETIC HYPERLIPIDEMIA ASSOCIATED WITH TYPE 2 DIABETES MELLITUS: Chronic | ICD-10-CM

## 2024-10-23 DIAGNOSIS — Z23 NEED FOR INFLUENZA VACCINATION: ICD-10-CM

## 2024-10-23 DIAGNOSIS — I15.2 HYPERTENSION ASSOCIATED WITH DIABETES: Chronic | ICD-10-CM

## 2024-10-23 DIAGNOSIS — I47.10 SVT (SUPRAVENTRICULAR TACHYCARDIA): Chronic | ICD-10-CM

## 2024-10-23 DIAGNOSIS — E11.49 TYPE II DIABETES MELLITUS WITH NEUROLOGICAL MANIFESTATIONS: Chronic | ICD-10-CM

## 2024-10-23 DIAGNOSIS — R93.2 ABNORMAL ULTRASOUND OF LIVER: ICD-10-CM

## 2024-10-23 DIAGNOSIS — E11.59 HYPERTENSION ASSOCIATED WITH DIABETES: Chronic | ICD-10-CM

## 2024-10-23 DIAGNOSIS — I47.19 PAROXYSMAL ATRIAL TACHYCARDIA: Chronic | ICD-10-CM

## 2024-10-23 DIAGNOSIS — I25.10 CORONARY ARTERY DISEASE INVOLVING NATIVE CORONARY ARTERY OF NATIVE HEART WITHOUT ANGINA PECTORIS: Chronic | ICD-10-CM

## 2024-10-23 PROCEDURE — 99999PBSHW PR PBB SHADOW TECHNICAL ONLY FILED TO HB: Mod: PBBFAC,,,

## 2024-10-23 PROCEDURE — 99999 PR PBB SHADOW E&M-EST. PATIENT-LVL IV: CPT | Mod: PBBFAC,,, | Performed by: FAMILY MEDICINE

## 2024-10-23 PROCEDURE — 99214 OFFICE O/P EST MOD 30 MIN: CPT | Mod: PBBFAC,25 | Performed by: FAMILY MEDICINE

## 2024-10-23 PROCEDURE — G0008 ADMIN INFLUENZA VIRUS VAC: HCPCS | Mod: PBBFAC

## 2024-10-23 PROCEDURE — 90653 IIV ADJUVANT VACCINE IM: CPT | Mod: PBBFAC

## 2024-10-23 RX ADMIN — INFLUENZA A VIRUS A/VICTORIA/4897/2022 IVR-238 (H1N1) ANTIGEN (FORMALDEHYDE INACTIVATED), INFLUENZA A VIRUS A/THAILAND/8/2022 IVR-237 (H3N2) ANTIGEN (FORMALDEHYDE INACTIVATED), INFLUENZA B VIRUS B/AUSTRIA/1359417/2021 BVR-26 ANTIGEN (FORMALDEHYDE INACTIVATED) 0.5 ML: 15; 15; 15 INJECTION, SUSPENSION INTRAMUSCULAR at 03:10

## 2024-10-23 NOTE — PROGRESS NOTES
Subjective:   Patient ID: Venus Caldwell is a 74 y.o. female.  Chief Complaint:  Annual Exam    Presents for annual physical exam and follow-up on chronic medical conditions      Medical History:  - Hypertension with CHF. On amlodipine 5 mg daily, Atacand 16 mg daily, Toprol- mg daily.  Reports compliance.  Denies side effects.  Blood pressure controlled.  No shortness of breath or swelling.  - SVT with PAT.  Followed by Cardiology.  Stable on beta-blocker.  - Coronary artery disease with known hyperlipidemia and aortic atherosclerosis.  Most recent LDL 66.  On aspirin 81 mg daily and Lipitor 40 mg daily. Reports compliance.  Denies side effects.  No chest pain or claudication.    - Diabetes mellitus.  Most recent A1c 5.7%.  Well controlled.  Remains off metformin.  On Trulicity  On Trulicity 0.75 mg weekly injection. Reports compliance.  Denies symptoms hypo/hyperglycemia.  Eye exam, microalbumin, and A1c up-to-date.  Needs foot exam.  - Chronic low back pain with degenerative arthritis changes of her spine and likely underlying neuropathy.  Currently stable on meloxicam 7.5 mg daily gabapentin 300 mg 3 times a day   - Thyroid nodules.  Asymptomatic.  Last TSH normal.  Stable on repeat imaging August 2022.  - Splenic nodule.  Low density.  Incidental finding on CT chest for follow-up on her pulmonary nodules.  No additional evaluation needed.  - Chronic restrictive lung disease, interstitial fibrosis, pulmonary nodule.  Followed by pulmonology.  Cough stable and controlled on current inhaler regimen.  - History colon polyps and diverticulosis. Colonoscopy performed in June 2022, normal.  Colon cancer screening discontinue based on age.     Health maintenance needs include flu vaccine and COVID booster, although had COVID infection within past 30 days.     Only complaint/concern today is follow-up from June 2024 visit for recurrent nausea and vomiting and generalized abdominal pain   Ultrasound done  "showed following findings:  The liver demonstrates slightly coarse echotexture which could reflect mild cirrhosis with no focal sonographic abnormality.   The gallbladder is surgically absent.   The common bile duct is prominent at 8 mm which can be seen postcholecystectomy.   The portal vein shows a normal direction of flow and waveform.    The body of the pancreas is not unusual in appearance.    The remainder is not well seen due to bowel gas.    Spleen is upper limits in size measuring up to 12 cm.    IVC is unremarkable.  Liver function tests have remained normal   Previous amylase and lipase normal   Currently asymptomatic  Was told might need referral to hepatology        Review of Systems   Constitutional:  Negative for chills, diaphoresis, fatigue and fever.   Eyes:  Negative for visual disturbance.   Respiratory:  Negative for cough, chest tightness, shortness of breath and wheezing.    Cardiovascular:  Negative for chest pain, palpitations and leg swelling.   Gastrointestinal:  Negative for abdominal distention, abdominal pain, constipation, diarrhea, nausea and vomiting.   Endocrine: Negative for polydipsia, polyphagia and polyuria.   Genitourinary:  Negative for difficulty urinating, dysuria, flank pain, frequency, hematuria and urgency.   Musculoskeletal:  Positive for back pain. Negative for myalgias.   Skin:  Negative for rash.   Neurological:  Negative for dizziness, syncope, weakness, light-headedness, numbness and headaches.   Psychiatric/Behavioral:  Negative for sleep disturbance. The patient is not nervous/anxious.        Objective:   /68 (BP Location: Right arm, Patient Position: Sitting)   Pulse 73   Temp 98.3 °F (36.8 °C) (Tympanic)   Ht 5' 3" (1.6 m)   Wt 69.1 kg (152 lb 5.4 oz)   SpO2 96%   BMI 26.99 kg/m²     Physical Exam  Vitals and nursing note reviewed.   Constitutional:       Appearance: Normal appearance. She is well-developed and overweight.   Eyes:      General: No " scleral icterus.     Conjunctiva/sclera: Conjunctivae normal.   Neck:      Vascular: No carotid bruit or JVD.   Cardiovascular:      Rate and Rhythm: Normal rate and regular rhythm.      Pulses:           Dorsalis pedis pulses are 2+ on the right side and 2+ on the left side.        Posterior tibial pulses are 2+ on the right side and 2+ on the left side.      Heart sounds: Normal heart sounds.   Pulmonary:      Effort: Pulmonary effort is normal.      Breath sounds: Normal breath sounds.   Abdominal:      General: There is no distension.      Palpations: Abdomen is soft. There is no hepatomegaly.      Tenderness: There is no abdominal tenderness. There is no guarding or rebound.   Musculoskeletal:      Right lower leg: No edema.      Left lower leg: No edema.      Right foot: Normal range of motion. No deformity or bunion.      Left foot: Normal range of motion. No deformity or bunion.   Feet:      Right foot:      Protective Sensation: 5 sites tested.  3 sites sensed.      Skin integrity: No ulcer, blister, skin breakdown, erythema, warmth, callus, dry skin or fissure.      Toenail Condition: Right toenails are normal.      Left foot:      Protective Sensation: 5 sites tested.  3 sites sensed.      Skin integrity: No ulcer, blister, skin breakdown, erythema, warmth, callus, dry skin or fissure.      Toenail Condition: Left toenails are normal.   Skin:     Findings: No rash.   Neurological:      Mental Status: She is alert.   Psychiatric:         Mood and Affect: Mood and affect normal.       Assessment:       ICD-10-CM ICD-9-CM   1. Annual physical exam  Z00.00 V70.0   2. Need for influenza vaccination  Z23 V04.81   3. Type II diabetes mellitus with neurological manifestations  E11.49 250.60   4. Mixed diabetic hyperlipidemia associated with type 2 diabetes mellitus  E11.69 250.80    E78.2 272.2   5. Coronary artery disease involving native coronary artery of native heart without angina pectoris  I25.10 414.01    6. Atherosclerosis of aorta  I70.0 440.0   7. Hypertension associated with diabetes  E11.59 250.80    I15.2 401.9   8. Chronic heart failure with preserved ejection fraction  I50.32 428.9   9. SVT (supraventricular tachycardia)  I47.10 427.89   10. Paroxysmal atrial tachycardia  I47.19 427.0   11. Abnormal ultrasound of liver  R93.2 793.3     Plan:   Annual physical exam  Need for influenza vaccination  -     influenza (adjuvanted) (Fluad) 45 mcg/0.5 mL IM vaccine (> or = 66 yo) 0.5 mL  Blood pressure normal.  BMI 27.    Reviewed all labs done prior to today's visit.    Colon cancer screening discontinue based on age and inability to tolerate prep  Breast cancer screening up-to-date   Tetanus booster, RSV vaccine, pneumonia vaccine, and shingles vaccines up-to-date   Flu vaccine today   COVID booster within next 30-90 days based on recent COVID infection     Type II diabetes mellitus with neurological manifestations  Controlled rate stable.  Asymptomatic.  A1c at goal.    Continue Trulicity 0.75 mg weekly injections   Eye exam up-to-date   Microalbumin up-to-date   Foot exam up-to-date     Mixed diabetic hyperlipidemia associated with type 2 diabetes mellitus  Coronary artery disease involving native coronary artery of native heart without angina pectoris  Atherosclerosis of aorta  Controlled rate stable.  Asymptomatic.  LDL at goal.    Continue aspirin 81 mg daily   Continue Lipitor 40 mg daily     Hypertension associated with diabetes  Chronic heart failure with preserved ejection fraction  Controlled rate stable.  Asymptomatic.  No signs volume overload.    Continue amlodipine 10 mg daily   Continue atacand 160 mg daily   Continue Toprol-XL 50 mg daily    SVT (supraventricular tachycardia)  Paroxysmal atrial tachycardia  Asymptomatic   Rate well controlled   Continue Toprol-XL 50 mg daily     Abnormal ultrasound of liver  -     E-Consult to Hepatology Outpatient    Return to clinic 6 months or sooner as  needed    40 minutes of total time spent on the encounter, which includes face to face time and non-face to face time preparing to see the patient (eg, review of tests), Obtaining and/or reviewing separately obtained history, documenting clinical information in the electronic or other health record, independently interpreting results (not separately reported) and communicating results to the patient/family/caregiver, or Care coordination (not separately reported)    Visit today included increased complexity associated with managing the longitudinal care of the patient due to the serious and/or complex managed problem(s) listed above.

## 2024-10-24 ENCOUNTER — E-CONSULT (OUTPATIENT)
Dept: HEPATOLOGY | Facility: CLINIC | Age: 74
End: 2024-10-24
Payer: MEDICARE

## 2024-10-24 DIAGNOSIS — K74.00 LIVER FIBROSIS: Primary | ICD-10-CM

## 2024-10-24 NOTE — CONSULTS
[facility] - Hepatology  Response for E-Consult     Patient Name: Venus Caldwell  MRN: [id number]  Primary Care Provider: Sourav Hernandez MD   Requesting Provider: Sourav Hernandez MD  E-Consult to Hepatology Outpatient  Consult performed by: Nick Hughes MD  Consult ordered by: Sourav Hernandez MD          After evaluation of your eConsult clinical questions, I believe the patient should be scheduled for an office visit in our specialty due to advanced liver fibrosis which need further evaluation. We will try with virtual visit to get her quicker    Total time of Consultation: 10 minute    *This eConsult is based on the clinical data available to me and is furnished without benefit of a physician examination.  The eConsult will need to be interpreted in light of any clinical issues of changes in patient status not available to me at the time rime of filing this eConsults.  Significant changes in patient condition of level of acuity should result in a referral for in person consultation and reevaluation.  Please alert me if you have any furth questions.     Thank you for this eConsult referral.     Nick Hughes MD  [facility] - Hepatology

## 2024-10-28 DIAGNOSIS — R93.2 ABNORMAL LIVER ULTRASOUND: Primary | ICD-10-CM

## 2024-11-01 ENCOUNTER — NURSE TRIAGE (OUTPATIENT)
Dept: ADMINISTRATIVE | Facility: CLINIC | Age: 74
End: 2024-11-01
Payer: MEDICARE

## 2024-11-05 ENCOUNTER — OFFICE VISIT (OUTPATIENT)
Dept: CARDIOLOGY | Facility: CLINIC | Age: 74
End: 2024-11-05
Payer: MEDICARE

## 2024-11-05 VITALS
HEIGHT: 63 IN | WEIGHT: 151.69 LBS | SYSTOLIC BLOOD PRESSURE: 144 MMHG | HEART RATE: 62 BPM | BODY MASS INDEX: 26.88 KG/M2 | OXYGEN SATURATION: 98 % | DIASTOLIC BLOOD PRESSURE: 82 MMHG

## 2024-11-05 DIAGNOSIS — Z86.16 HISTORY OF COVID-19: ICD-10-CM

## 2024-11-05 DIAGNOSIS — I47.19 PAROXYSMAL ATRIAL TACHYCARDIA: ICD-10-CM

## 2024-11-05 DIAGNOSIS — E11.59 HYPERTENSION ASSOCIATED WITH DIABETES: ICD-10-CM

## 2024-11-05 DIAGNOSIS — R00.2 PALPITATIONS: ICD-10-CM

## 2024-11-05 DIAGNOSIS — I15.2 HYPERTENSION ASSOCIATED WITH DIABETES: ICD-10-CM

## 2024-11-05 DIAGNOSIS — I10 ESSENTIAL HYPERTENSION: Primary | ICD-10-CM

## 2024-11-05 DIAGNOSIS — I25.10 CORONARY ARTERY DISEASE INVOLVING NATIVE CORONARY ARTERY OF NATIVE HEART WITHOUT ANGINA PECTORIS: Chronic | ICD-10-CM

## 2024-11-05 DIAGNOSIS — I50.32 CHRONIC HEART FAILURE WITH PRESERVED EJECTION FRACTION: ICD-10-CM

## 2024-11-05 DIAGNOSIS — E11.69 TYPE 2 DIABETES MELLITUS WITH OTHER SPECIFIED COMPLICATION, WITHOUT LONG-TERM CURRENT USE OF INSULIN: ICD-10-CM

## 2024-11-05 DIAGNOSIS — I70.0 ABDOMINAL AORTIC ATHEROSCLEROSIS: ICD-10-CM

## 2024-11-05 DIAGNOSIS — I70.0 ATHEROSCLEROSIS OF AORTA: ICD-10-CM

## 2024-11-05 DIAGNOSIS — E11.69 MIXED DIABETIC HYPERLIPIDEMIA ASSOCIATED WITH TYPE 2 DIABETES MELLITUS: ICD-10-CM

## 2024-11-05 DIAGNOSIS — I47.10 SVT (SUPRAVENTRICULAR TACHYCARDIA): ICD-10-CM

## 2024-11-05 DIAGNOSIS — E78.2 MIXED DIABETIC HYPERLIPIDEMIA ASSOCIATED WITH TYPE 2 DIABETES MELLITUS: ICD-10-CM

## 2024-11-05 DIAGNOSIS — K21.9 GASTROESOPHAGEAL REFLUX DISEASE WITHOUT ESOPHAGITIS: ICD-10-CM

## 2024-11-05 PROCEDURE — 99213 OFFICE O/P EST LOW 20 MIN: CPT | Mod: PBBFAC | Performed by: INTERNAL MEDICINE

## 2024-11-05 PROCEDURE — 99999 PR PBB SHADOW E&M-EST. PATIENT-LVL III: CPT | Mod: PBBFAC,,, | Performed by: INTERNAL MEDICINE

## 2024-11-05 RX ORDER — METOPROLOL SUCCINATE 100 MG/1
100 TABLET, EXTENDED RELEASE ORAL DAILY
Qty: 90 TABLET | Refills: 3 | Status: SHIPPED | OUTPATIENT
Start: 2024-11-05 | End: 2025-11-05

## 2024-11-05 RX ORDER — CANDESARTAN 16 MG/1
16 TABLET ORAL DAILY
Qty: 90 TABLET | Refills: 3 | Status: SHIPPED | OUTPATIENT
Start: 2024-11-05 | End: 2025-11-05

## 2024-11-05 RX ORDER — ASPIRIN 81 MG/1
81 TABLET ORAL DAILY
Qty: 90 TABLET | Refills: 3 | Status: SHIPPED | OUTPATIENT
Start: 2024-11-05

## 2024-11-05 RX ORDER — ATORVASTATIN CALCIUM 40 MG/1
40 TABLET, FILM COATED ORAL NIGHTLY
Qty: 90 TABLET | Refills: 3 | Status: SHIPPED | OUTPATIENT
Start: 2024-11-05 | End: 2025-11-05

## 2024-11-05 RX ORDER — AMLODIPINE BESYLATE 5 MG/1
5 TABLET ORAL DAILY
Qty: 90 TABLET | Refills: 3 | Status: SHIPPED | OUTPATIENT
Start: 2024-11-05

## 2024-11-05 NOTE — PROGRESS NOTES
Subjective:   Patient ID:  Venus Caldwell is a 74 y.o. female who presents for cardiac consult of No chief complaint on file.        The patient came in today for cardiac consult of No chief complaint on file.    Referring Physician: Sourav Hernandez MD   Reason for initial consult: HTN      Venus Caldwell is a 74 y.o. female with current medical conditions HFpEF, HTN, PSVT, DM, obesity, OA, uterine CA, GERD presents to follow up CV eval.       3/28/24  BP and HR stable. BMI 27.   ECG - NSR, PACs, LVH, poor RWP    24  BP mildly elevated 140s/80s. HR 60s. BMI 26 - 151 lbs   Her brother had cancer and living with her which is more stressful.         FH - father had MI in age 58, brother  from MI 58    Prior ECG - NSR, inferior infarct      TEST DESCRIPTION   1-CARDIO EVENT RECORDING FROM 19 TO 19    2-INDICATIONS- PALPITATIONS    CONCLUSIONS   Patient had a min HR of 54 bpm, max HR of 200 bpm, and avg HR of 78  bpm. Predominant underlying rhythm was Sinus Rhythm. 93  Supraventricular Tachycardia runs occurred, the run with the fastest  interval lasting 12 mins 38 secs with a max rate of 200 bpm, the longest  lasting 44 mins 15 secs with an avg rate of 123 bpm. Supraventricular  Tachycardia was detected within +/- 45 seconds of symptomatic patient  event(s). Isolated SVEs were rare (<1.0%), SVE Couplets were rare  (<1.0%), and SVE Triplets were rare (<1.0%). Isolated VEs were rare  (<1.0%, 970), VE Couplets were rare (<1.0%, 12), and VE Triplets were  rare (<1.0%, 1).    This document has been electronically    SIGNED BY: Jarrett Hussein MD On: 2019 09:28    2d ECHO  2019  CONCLUSIONS     1 - Concentric hypertrophy.     2 - No wall motion abnormalities.     3 - Normal left ventricular systolic function (EF 60-65%).     4 - Impaired LV relaxation, normal LAP (grade 1 diastolic dysfunction).     5 - Normal right ventricular systolic function .     6 - The estimated PA  systolic pressure is greater than 22 mmHg.       Past Medical History:   Diagnosis Date    Arthritis     Cancer, uterine     Coronary artery disease     Diabetes mellitus     prediabetes    Diabetes mellitus, type 2     Digestive disorder     DM (diabetes mellitus) 08/2019    BS doesn't check 12/16/2019    DM (diabetes mellitus) 08/2019    BS doesn't check 06/29/2021    Hypertension     Multiple thyroid nodules 7/8/2021    Ovarian cancer     Sciatica     Sleep apnea        Past Surgical History:   Procedure Laterality Date    CATARACT EXTRACTION W/  INTRAOCULAR LENS IMPLANT Right 11/17/2022    CATARACT EXTRACTION W/  INTRAOCULAR LENS IMPLANT Left 01/26/2023    CHOLECYSTECTOMY      COLONOSCOPY N/A 08/01/2018    Procedure: COLONOSCOPY;  Surgeon: Yrn Hunter III, MD;  Location: [facility];  Service: Endoscopy;  Laterality: N/A;    COLONOSCOPY N/A 06/30/2022    Procedure: COLONOSCOPY;  Surgeon: Mica Rodriguez MD;  Location: [facility];  Service: Endoscopy;  Laterality: N/A;    ESOPHAGOGASTRODUODENOSCOPY N/A 03/17/2021    Procedure: EGD (ESOPHAGOGASTRODUODENOSCOPY) (Dr. EMILIO billings);  Surgeon: Coy Ortiz MD;  Location: [facility];  Service: Endoscopy;  Laterality: N/A;    HYSTERECTOMY      JOINT REPLACEMENT Right     hip replacement    SURGICAL EXCISION OF ANAL LESION N/A 08/29/2018    Procedure: EXCISION, LESION, ANUS;  Surgeon: Yrn Balderrama MD;  Location: [facility];  Service: General;  Laterality: N/A;    TOTAL KNEE ARTHROPLASTY Bilateral        Social History     Tobacco Use    Smoking status: Never    Smokeless tobacco: Never   Substance Use Topics    Alcohol use: No    Drug use: No       Family History   Problem Relation Name Age of Onset    Diabetes Mother      Cataracts Mother      Diabetes Brother      Cataracts Maternal Grandmother      Breast cancer Maternal Aunt         Patient's Medications   New Prescriptions    No medications on file   Previous Medications    AMLODIPINE (NORVASC) 5 MG  TABLET    Take 1 tablet (5 mg total) by mouth once daily.    ASPIRIN (ECOTRIN) 81 MG EC TABLET    Take 1 tablet (81 mg total) by mouth once daily.    ATORVASTATIN (LIPITOR) 40 MG TABLET    Take 1 tablet (40 mg total) by mouth every evening.    AZELASTINE (ASTELIN) 137 MCG (0.1 %) NASAL SPRAY    1 spray (137 mcg total) by Nasal route 2 (two) times daily.    BENZOCAINE (HURRICAINE) 20 % GEL    Use as directed in the mouth or throat 4 (four) times daily as needed.    BETAMETHASONE DIPROPIONATE (DIPROLENE) 0.05 % CREAM    as needed.     CANDESARTAN (ATACAND) 16 MG TABLET    Take 1 tablet (16 mg total) by mouth once daily.    CETIRIZINE (ZYRTEC) 10 MG TABLET    Take 10 mg by mouth once daily.    CHOLECALCIFEROL, VITAMIN D3, 125 MCG (5,000 UNIT) TAB    Take 1 tablet by mouth every morning.    DULAGLUTIDE (TRULICITY) 0.75 MG/0.5 ML PEN INJECTOR    Inject 0.75 mg into the skin every 7 days.    GABAPENTIN (NEURONTIN) 300 MG CAPSULE    TAKE 1 CAPSULE THREE TIMES A DAY    MELOXICAM (MOBIC) 7.5 MG TABLET    Take 1 tablet (7.5 mg total) by mouth once daily.    METOPROLOL SUCCINATE (TOPROL-XL) 100 MG 24 HR TABLET    Take 1 tablet (100 mg total) by mouth once daily.    OMEPRAZOLE (PRILOSEC) 20 MG CAPSULE    Take 20 mg by mouth once daily.    ONDANSETRON (ZOFRAN) 4 MG TABLET    Take 1 tablet (4 mg total) by mouth every 6 (six) hours.    VALACYCLOVIR (VALTREX) 500 MG TABLET    Take 500 mg by mouth as needed.    Modified Medications    No medications on file   Discontinued Medications    No medications on file       Review of Systems   Constitutional:  Positive for malaise/fatigue.   HENT: Negative.     Eyes: Negative.    Respiratory: Negative.     Cardiovascular:  Positive for palpitations.   Gastrointestinal: Negative.    Genitourinary: Negative.    Musculoskeletal: Negative.    Skin: Negative.    Neurological: Negative.    Endo/Heme/Allergies: Negative.    Psychiatric/Behavioral: Negative.     All 12 systems otherwise  "negative.      Wt Readings from Last 3 Encounters:   11/05/24 68.8 kg (151 lb 10.8 oz)   10/23/24 69.1 kg (152 lb 5.4 oz)   10/14/24 70.9 kg (156 lb 4.9 oz)     Temp Readings from Last 3 Encounters:   10/23/24 98.3 °F (36.8 °C) (Tympanic)   06/25/24 98.1 °F (36.7 °C) (Tympanic)   06/16/24 98.2 °F (36.8 °C)     BP Readings from Last 3 Encounters:   11/05/24 (!) 144/82   10/23/24 116/68   06/25/24 120/76     Pulse Readings from Last 3 Encounters:   11/05/24 62   10/23/24 73   06/25/24 71       BP (!) 144/82 (BP Location: Right arm, Patient Position: Sitting)   Pulse 62   Ht 5' 3" (1.6 m)   Wt 68.8 kg (151 lb 10.8 oz)   SpO2 98%   BMI 26.87 kg/m²     Objective:   Physical Exam  Constitutional:       General: She is not in acute distress.     Appearance: She is well-developed. She is not diaphoretic.   HENT:      Head: Normocephalic and atraumatic.      Mouth/Throat:      Pharynx: No oropharyngeal exudate.   Eyes:      General: No scleral icterus.        Right eye: No discharge.         Left eye: No discharge.   Pulmonary:      Effort: Pulmonary effort is normal. No respiratory distress.   Skin:     Coloration: Skin is not pale.      Findings: No erythema or rash.   Neurological:      Mental Status: She is alert and oriented to person, place, and time.   Psychiatric:         Behavior: Behavior normal.         Thought Content: Thought content normal.         Judgment: Judgment normal.         Lab Results   Component Value Date     10/14/2024    K 4.2 10/14/2024     10/14/2024    CO2 25 10/14/2024    BUN 18 10/14/2024    CREATININE 0.7 10/14/2024     10/14/2024    HGBA1C 5.7 (H) 10/14/2024    AST 13 10/14/2024    ALT 9 (L) 10/14/2024    ALBUMIN 3.4 (L) 10/14/2024    PROT 6.9 10/14/2024    BILITOT 0.8 10/14/2024    WBC 6.95 10/14/2024    HGB 11.6 (L) 10/14/2024    HCT 35.3 (L) 10/14/2024    MCV 89 10/14/2024     10/14/2024    INR 1.0 06/14/2018    TSH 0.554 08/16/2022    CHOL 124 10/14/2024 "    HDL 37 (L) 10/14/2024    LDLCALC 66.6 10/14/2024    TRIG 102 10/14/2024     Assessment:      1. Essential hypertension    2. Hypertension associated with diabetes    3. Chronic heart failure with preserved ejection fraction    4. Atherosclerosis of aorta    5. Paroxysmal atrial tachycardia    6. SVT (supraventricular tachycardia)    7. Palpitations    8. Abdominal aortic atherosclerosis    9. History of COVID-19    10. Mixed diabetic hyperlipidemia associated with type 2 diabetes mellitus    11. Gastroesophageal reflux disease without esophagitis    12. Type 2 diabetes mellitus with other specified complication, without long-term current use of insulin    13. BMI 28.0-28.9,adult          Plan:   1. CAD, non obs with abd and aortic atherosclerosis   - no CP  - cont asa, statin, BB    2. HTN - occ labile   - Bp well controlled  - monitor for low BP    3. Palpitations - intermittent with PSVT/PAT  - cont BB - was off BB for few days and had another SVT  -  follow up with Dr. Torres    4. Sleep apnea symptoms  - sleep study - negative in 2019    5. HFpEF  - low salt diet  - euvolemic     6. DM2/overweight - A1c 7.1 --> 6.7 --> 6.4 --> 6.2; BMI 28 - 163 lbs.  --> 161 lbs --> 153 lbs.  --> 157 lbs   - cont tx per PCP  - cont weight loss with diet/exercise   - Ozempic 0.25 - insurance needs to approve - [facility] - on Trulicity now    7. HLD   - cont statin  - TGs mildly elevated     8. h/o  COVID 19; FE def anemia   - s/p tx  - stable now, but more tired   - may need further iron infusion - taking iron orally QOD    9. GERD  - cont PPI    Visit today included increased complexity associated with the care of the episodic problem HTN addressed and managing the longitudinal care of the patient due to the serious and/or complex managed problem(s) .      Thank you for allowing me to participate in this patient's care. Please do not hesitate to contact me with any questions or concerns. Consult note has been forwarded to  the referral physician.

## 2024-12-15 ENCOUNTER — HOSPITAL ENCOUNTER (EMERGENCY)
Facility: HOSPITAL | Age: 74
Discharge: HOME | End: 2024-12-15
Attending: EMERGENCY MEDICINE
Payer: MEDICARE

## 2024-12-15 VITALS
BODY MASS INDEX: 29.95 KG/M2 | HEIGHT: 63 IN | OXYGEN SATURATION: 100 % | WEIGHT: 169 LBS | HEART RATE: 78 BPM | RESPIRATION RATE: 18 BRPM | TEMPERATURE: 98 F | SYSTOLIC BLOOD PRESSURE: 150 MMHG | DIASTOLIC BLOOD PRESSURE: 92 MMHG

## 2024-12-15 DIAGNOSIS — S02.2XXA CLOSED FRACTURE OF NASAL BONE, INITIAL ENCOUNTER: Primary | ICD-10-CM

## 2024-12-15 DIAGNOSIS — S89.92XA LEFT KNEE INJURY: ICD-10-CM

## 2024-12-15 DIAGNOSIS — Z91.81 HISTORY OF FALL: ICD-10-CM

## 2024-12-15 PROCEDURE — 99284 EMERGENCY DEPT VISIT MOD MDM: CPT | Mod: 25

## 2024-12-15 NOTE — ED PROVIDER NOTES
SCRIBE #1 NOTE: I, Semaj Hernandez, am scribing for, and in the presence of, Liz Singletary MD. I have scribed the entire note.       History     Chief Complaint   Patient presents with    Fall     EMS reports a trip and fall striking her face on the concrete floor. She is also complaining of left knee pain. Denies LOC, no blood thinners. Swelling to the nose noted.      Review of patient's allergies indicates:   Allergen Reactions    Sulfa (sulfonamide antibiotics) Anaphylaxis     Pt became hypoxic after taking sulfa drugs as a child      Buprenorphine Rash         History of Present Illness     HPI    12/15/2024, 11:59 AM  History obtained from the patient      History of Present Illness: Venus Caldwell is a 74 y.o. female patient with a PMHx of HTN, DM type II, and ovarian cancer who presents to the Emergency Department for evaluation of trip and fall injuries which onset this morning. Pt struck her face on concrete floor. Pt complains of nose and left knee pain. No active epistaxis Denies any LOC. Denies any blood thinner usage. Symptoms are constant and moderate in severity. No mitigating or exacerbating factors reported. No additional associated sxs. Patient denies any head injury, no dizziness, noCP, abd pain, N/V, neck pain, back pain, SOB, and all other sxs at this time. No Prior Tx. No further complaints or concerns at this time.       Arrival mode: EMS    PCP: Sourav Hernandez MD        Past Medical History:  Past Medical History:   Diagnosis Date    Arthritis     Cancer, uterine     Coronary artery disease     Diabetes mellitus     prediabetes    Diabetes mellitus, type 2     Digestive disorder     DM (diabetes mellitus) 08/2019    BS doesn't check 12/16/2019    DM (diabetes mellitus) 08/2019    BS doesn't check 06/29/2021    Hypertension     Multiple thyroid nodules 7/8/2021    Ovarian cancer     Sciatica     Sleep apnea        Past Surgical History:  Past Surgical History:   Procedure  Laterality Date    CATARACT EXTRACTION W/  INTRAOCULAR LENS IMPLANT Right 11/17/2022    CATARACT EXTRACTION W/  INTRAOCULAR LENS IMPLANT Left 01/26/2023    CHOLECYSTECTOMY      COLONOSCOPY N/A 08/01/2018    Procedure: COLONOSCOPY;  Surgeon: Yrn Hunter III, MD;  Location: [facility] ENDO;  Service: Endoscopy;  Laterality: N/A;    COLONOSCOPY N/A 06/30/2022    Procedure: COLONOSCOPY;  Surgeon: Mica Rodriguez MD;  Location: [facility] ENDO;  Service: Endoscopy;  Laterality: N/A;    ESOPHAGOGASTRODUODENOSCOPY N/A 03/17/2021    Procedure: EGD (ESOPHAGOGASTRODUODENOSCOPY) (Dr. EMILIO billings);  Surgeon: Coy Ortiz MD;  Location: [facility] ENDO;  Service: Endoscopy;  Laterality: N/A;    HYSTERECTOMY      JOINT REPLACEMENT Right     hip replacement    SURGICAL EXCISION OF ANAL LESION N/A 08/29/2018    Procedure: EXCISION, LESION, ANUS;  Surgeon: Yrn Balderrama MD;  Location: [facility] OR;  Service: General;  Laterality: N/A;    TOTAL KNEE ARTHROPLASTY Bilateral          Family History:  Family History   Problem Relation Name Age of Onset    Diabetes Mother      Cataracts Mother      Diabetes Brother      Cataracts Maternal Grandmother      Breast cancer Maternal Aunt         Social History:  Social History     Tobacco Use    Smoking status: Never    Smokeless tobacco: Never   Substance and Sexual Activity    Alcohol use: No    Drug use: No    Sexual activity: Not Currently        Review of Systems     Review of Systems   Constitutional:  Negative for chills and fever.   HENT:  Negative for congestion.         (+) nose pain with a wound   Respiratory:  Negative for shortness of breath.    Cardiovascular:  Negative for chest pain.   Gastrointestinal:  Negative for abdominal pain, nausea and vomiting.   Genitourinary:  Negative for dysuria.   Musculoskeletal:  Positive for arthralgias (left knee). Negative for back pain and neck pain.   Skin:  Negative for rash.   Neurological:  Negative for dizziness, seizures, weakness,  "light-headedness, numbness and headaches.        (-) LOC     Hematological:  Does not bruise/bleed easily.   All other systems reviewed and are negative.       Physical Exam     Initial Vitals [12/15/24 1123]   BP Pulse Resp Temp SpO2   (!) 156/94 80 16 98.3 °F (36.8 °C) 100 %      MAP       --          Physical Exam  Nursing Notes and Vital Signs Reviewed.  Constitutional: Patient is in no acute distress. Well-developed and well-nourished.  Head: Atraumatic. Normocephalic.  Eyes: PERRL. EOM intact. Conjunctivae are not pale. No scleral icterus.  ENT: Mucous membranes are moist. Oropharynx is clear and symmetric. Mild soft tissue swelling to bridge of nose; no obvious bony deformity. No epistaxis. No septal hematoma. No overlying laceration.   Neck: Supple. Full ROM. No lymphadenopathy.  Cardiovascular: Regular rate. Regular rhythm. No murmurs, rubs, or gallops. Distal pulses are 2+ and symmetric.  Pulmonary/Chest: No respiratory distress. Clear to auscultation bilaterally. No wheezing or rales.  Abdominal: Soft and non-distended.  There is no tenderness.  No rebound, guarding, or rigidity. Good bowel sounds.  Genitourinary: No CVA tenderness  Musculoskeletal: Moves all extremities. No obvious deformities. Soft tissue swelling to left knee with mild bruising noted; no bony deformity. No loss of ROM.  Skin: Warm and dry.  Neurological:  Alert, awake, and appropriate.  Normal speech.  No acute focal neurological deficits are appreciated.  Psychiatric: Normal affect. Good eye contact. Appropriate in content.     ED Course   Procedures  ED Vital Signs:  Vitals:    12/15/24 1123 12/15/24 1130 12/15/24 1131 12/15/24 1205   BP: (!) 156/94  (!) 149/80 (!) 150/92   Pulse: 80 80 80 78   Resp: 16  18 18   Temp: 98.3 °F (36.8 °C)  98.3 °F (36.8 °C) 98.3 °F (36.8 °C)   TempSrc: Oral  Oral Oral   SpO2: 100%  100% 100%   Weight:   76.7 kg (169 lb)    Height:   5' 3" (1.6 m)        Abnormal Lab Results:  Labs Reviewed - No data to " display       Imaging Results:  Imaging Results              CT Maxillofacial Without Contrast (Final result)  Result time 12/15/24 13:05:14      Final result by Alex Greenwood MD (12/15/24 13:05:14)                   Impression:      Acute fractures of the bilateral nasal bones with adjacent soft tissue swelling.    Remaining maxillofacial structures appear intact.    All CT scans at this facility use dose modulation, iterative reconstruction, and/or weight based dosing when appropriate to reduce radiation dose to as low as reasonable achievable.      Electronically signed by: Alex Greenwood  Date:    12/15/2024  Time:    13:05               Narrative:    EXAMINATION:  CT MAXILLOFACIAL WITHOUT CONTRAST    CLINICAL HISTORY:  Nasal fracture suspected;    TECHNIQUE:  Low dose axial images, sagittal and coronal reformations were obtained through the face.  Contrast was not administered.    COMPARISON:  None.    FINDINGS:  Facial bones: Irregularity of the tips of both nasal bones compatible with acute fracture.  The nasal septum is intact and appears midline.    Remaining maxillofacial structures appear intact without additional acute displaced fracture.    Subcutaneous soft tissues: Soft tissue swelling about the nose.  Remaining facial soft tissues appear within normal limits.    Orbits: Orbital walls are intact.  Postsurgical changes of both lenses.  Globes are normal in appearance without proptosis.  Intraorbital fat is maintained.    Paranasal sinuses: Trace mucosal thickening, but otherwise largely clear.    Aerodigestive tract: Within normal limits.    Lymph nodes: No pathologic lymphadenopathy.    Salivary glands: Within normal limits.    Vascular structures: Right-sided carotid bifurcation atherosclerotic disease.    Skull base: No acute abnormality.    Cervical spine (imaged portions): Degenerative changes of the cervical spine with partial fusion of the C2 and C3 vertebral bodies.  No acute osseous  abnormality.    Other findings: N/A                                       X-Ray Knee Complete 4 or More Views Left (Final result)  Result time 12/15/24 12:26:35      Final result by Alex Greenwood MD (12/15/24 12:26:35)                   Impression:      No acute radiographic abnormality as above.      Electronically signed by: Alex Greenwood  Date:    12/15/2024  Time:    12:26               Narrative:    EXAMINATION:  XR KNEE COMP 4 OR MORE VIEWS LEFT    CLINICAL HISTORY:  Unspecified injury of left lower leg, initial encounter    TECHNIQUE:  AP, Lateral, Sunrise and Tunnel views of the left knee were preformed    COMPARISON:  None    FINDINGS:  Postsurgical changes of a total no flea replacement.  Operative hardware appears intact and in place.  No evidence of acute displaced fracture.  Joint spaces appear maintained.  No joint effusion.  No radiopaque foreign body.                                                The Emergency Provider reviewed the vital signs and test results, which are outlined above.     ED Discussion       1:16 PM: Reassessed pt at this time.  Discussed with pt all pertinent ED information and results. Discussed pt dx and plan of tx. Gave pt all f/u and return to the ED instructions. All questions and concerns were addressed at this time. Pt expresses understanding of information and instructions, and is comfortable with plan to discharge. Pt is stable for discharge.    I discussed with patient and/or family/caretaker that evaluation in the ED does not suggest any emergent or life threatening medical conditions requiring immediate intervention beyond what was provided in the ED, and I believe patient is safe for discharge.  Regardless, an unremarkable evaluation in the ED does not preclude the development or presence of a serious of life threatening condition. As such, patient was instructed to return immediately for any worsening or change in current symptoms.         Medical Decision  Making  DDX: 1. Nasal bone fx 2. Knee Fx 3. Knee sprain    CT consistent with nasal bone fx, no epistaxis, xray left knee otherwise negative for acute trauma, reassurance provided, ENT referral placed for outpatient follow up    Amount and/or Complexity of Data Reviewed  Radiology: ordered. Decision-making details documented in ED Course.                ED Medication(s):  Medications - No data to display    Discharge Medication List as of 12/15/2024  1:16 PM           Follow-up Information       Otolaryngology. Schedule an appointment as soon as possible for a visit in 2 days.    Specialty: Otolaryngology  Why: Return to the Emergency Room, If symptoms worsen  Contact information:  [address] [place] [id number]  [phone]                               Scribe Attestation:   Scribe #1: I performed the above scribed service and the documentation accurately describes the services I performed. I attest to the accuracy of the note.     Attending:   Physician Attestation Statement for Scribe #1: I, Liz Singletary MD, personally performed the services described in this documentation, as scribed by Semaj Hernandez, in my presence, and it is both accurate and complete.           Clinical Impression       ICD-10-CM ICD-9-CM   1. Closed fracture of nasal bone, initial encounter  S02.2XXA 802.0   2. Left knee injury  S89.92XA 959.7   3. History of fall  Z91.81 V15.88       Disposition:   Disposition: Discharged  Condition: Stable        Liz Singletary MD  12/19/24 0941       Liz Singletary MD  12/19/24 0927

## 2024-12-17 ENCOUNTER — OFFICE VISIT (OUTPATIENT)
Dept: INTERNAL MEDICINE | Facility: CLINIC | Age: 74
End: 2024-12-17
Payer: MEDICARE

## 2024-12-17 VITALS — DIASTOLIC BLOOD PRESSURE: 76 MMHG | SYSTOLIC BLOOD PRESSURE: 138 MMHG

## 2024-12-17 DIAGNOSIS — S02.2XXA CLOSED FRACTURE OF NASAL BONE, INITIAL ENCOUNTER: Primary | ICD-10-CM

## 2024-12-17 DIAGNOSIS — M25.462 SWELLING OF LEFT KNEE JOINT: ICD-10-CM

## 2024-12-17 DIAGNOSIS — S80.02XA CONTUSION OF LEFT KNEE, INITIAL ENCOUNTER: ICD-10-CM

## 2024-12-17 PROCEDURE — 99999 PR PBB SHADOW E&M-EST. PATIENT-LVL IV: CPT | Mod: PBBFAC,,, | Performed by: NURSE PRACTITIONER

## 2024-12-17 PROCEDURE — G2211 COMPLEX E/M VISIT ADD ON: HCPCS | Mod: S$PBB,,, | Performed by: NURSE PRACTITIONER

## 2024-12-17 PROCEDURE — 99213 OFFICE O/P EST LOW 20 MIN: CPT | Mod: S$PBB,,, | Performed by: NURSE PRACTITIONER

## 2024-12-17 PROCEDURE — 99214 OFFICE O/P EST MOD 30 MIN: CPT | Mod: PBBFAC | Performed by: NURSE PRACTITIONER

## 2024-12-17 RX ORDER — ACETAMINOPHEN AND CODEINE PHOSPHATE 300; 30 MG/1; MG/1
1 TABLET ORAL EVERY 6 HOURS PRN
Qty: 12 TABLET | Refills: 0 | Status: SHIPPED | OUTPATIENT
Start: 2024-12-17

## 2024-12-17 NOTE — PATIENT INSTRUCTIONS
Rest your affected extremity as much as possible.  Keep extremity elevated when possible.  Take tylenol 1000 mg every 6 hours as needed for pain. You may alternate   Apply ice packs/frozen peas to affected site four times daily for 15-20 minutes each time.  May apply ace wrap as needed for support of injured extremity and compression to reduce swelling.   Follow up with your primary care provider if symptoms do not improve within a few days or sooner for any worsening.   Go to the ER immediately for any numbness, weakness, tingling, color change, sudden pain and swelling, or for any other new and concerning symptoms.

## 2024-12-19 NOTE — PROGRESS NOTES
Subjective:      Patient ID: Venus Caldwell is a 74 y.o. female.    Chief Complaint: Follow-up    History of Present Illness    CHIEF COMPLAINT:  Venus presents today for evaluation after a fall.    HISTORY OF PRESENT ILLNESS:  She fell while turning the TV in the living room, tripping over a heater plugged under a table. She sustained two nasal fractures resulting in sinus pain affecting her eyes and ears. Pain occurs only with palpation of the nose. Her knee is swollen with underlying pain, described as annoying and aching, rated 2/10. She slept in a chair for two nights following the fall and reports difficulty sleeping.    PAST MEDICAL HISTORY:  History of bilateral knee replacements approximately 10 years ago, hip replacement, chronic sciatic nerve pain, and chronic sinus problems.    SOCIAL HISTORY:  Recent death of brother from cancer. Never smoker.      ROS:  General: -fever, -chills, -fatigue, -weight gain, -weight loss  Eyes: -vision changes, -redness, -discharge, +eye pain  ENT: -ear pain, -nasal congestion, -sore throat  Cardiovascular: -chest pain, -palpitations, -lower extremity edema  Respiratory: -cough, -shortness of breath  Gastrointestinal: -abdominal pain, -nausea, -vomiting, -diarrhea, -constipation, -blood in stool  Genitourinary: -dysuria, -hematuria, -frequency  Musculoskeletal: -joint pain, -muscle pain  Skin: -rash, -lesion  Neurological: -headache, -dizziness, -numbness, -tingling  Psychiatric: -anxiety, -depression, +sleep difficulty          Patient Active Problem List   Diagnosis    Hypertension associated with diabetes    Mixed diabetic hyperlipidemia associated with type 2 diabetes mellitus    Gastroesophageal reflux disease without esophagitis    Primary osteoarthritis involving multiple joints    Chronic non-seasonal allergic rhinitis    Coronary artery disease involving native coronary artery of native heart without angina pectoris    Paroxysmal atrial tachycardia     Atherosclerosis of aorta    Sciatica    Chronic heart failure with preserved ejection fraction    NICKI on CPAP    SVT (supraventricular tachycardia)    Osteopenia    Incidental pulmonary nodule, less than or equal to 3mm    Iron deficiency anemia secondary to inadequate dietary iron intake    Multiple thyroid nodules    Nodule of spleen    Type II diabetes mellitus with neurological manifestations         Current Outpatient Medications:     amLODIPine (NORVASC) 5 MG tablet, Take 1 tablet (5 mg total) by mouth once daily., Disp: 90 tablet, Rfl: 3    aspirin (ECOTRIN) 81 MG EC tablet, Take 1 tablet (81 mg total) by mouth once daily., Disp: 90 tablet, Rfl: 3    atorvastatin (LIPITOR) 40 MG tablet, Take 1 tablet (40 mg total) by mouth every evening., Disp: 90 tablet, Rfl: 3    azelastine (ASTELIN) 137 mcg (0.1 %) nasal spray, 1 spray (137 mcg total) by Nasal route 2 (two) times daily., Disp: 30 mL, Rfl: 11    benzocaine (HURRICAINE) 20 % Gel, Use as directed in the mouth or throat 4 (four) times daily as needed., Disp: , Rfl:     betamethasone dipropionate (DIPROLENE) 0.05 % cream, as needed. , Disp: , Rfl:     candesartan (ATACAND) 16 MG tablet, Take 1 tablet (16 mg total) by mouth once daily., Disp: 90 tablet, Rfl: 3    cetirizine (ZYRTEC) 10 MG tablet, Take 10 mg by mouth once daily., Disp: , Rfl:     cholecalciferol, vitamin D3, 125 mcg (5,000 unit) Tab, Take 1 tablet by mouth every morning., Disp: , Rfl:     dulaglutide (TRULICITY) 0.75 mg/0.5 mL pen injector, Inject 0.75 mg into the skin every 7 days., Disp: 12 pen , Rfl: 3    gabapentin (NEURONTIN) 300 MG capsule, TAKE 1 CAPSULE THREE TIMES A DAY, Disp: 270 capsule, Rfl: 3    meloxicam (MOBIC) 7.5 MG tablet, Take 1 tablet (7.5 mg total) by mouth once daily., Disp: 90 tablet, Rfl: 3    metoprolol succinate (TOPROL-XL) 100 MG 24 hr tablet, Take 1 tablet (100 mg total) by mouth once daily., Disp: 90 tablet, Rfl: 3    omeprazole (PRILOSEC) 20 MG capsule, Take 20 mg by  mouth once daily., Disp: , Rfl:     ondansetron (ZOFRAN) 4 MG tablet, Take 1 tablet (4 mg total) by mouth every 6 (six) hours., Disp: 30 tablet, Rfl: 0    valACYclovir (VALTREX) 500 MG tablet, Take 500 mg by mouth as needed. , Disp: , Rfl:     acetaminophen-codeine 300-30mg (TYLENOL #3) 300-30 mg Tab, Take 1 tablet by mouth every 6 (six) hours as needed (pain)., Disp: 12 tablet, Rfl: 0      Objective:   /76     Physical Exam             Physical Exam  Vitals and nursing note reviewed.   Constitutional:       General: She is awake. She is not in acute distress.     Appearance: Normal appearance. She is well-developed and well-groomed. She is not ill-appearing, toxic-appearing or diaphoretic.   HENT:      Head: Normocephalic and atraumatic.      Right Ear: Tympanic membrane and external ear normal.      Left Ear: External ear normal.      Nose: Nasal tenderness present. No nasal deformity or rhinorrhea.      Right Nostril: No epistaxis.      Left Nostril: No epistaxis.   Eyes:      Conjunctiva/sclera: Conjunctivae normal.      Pupils: Pupils are equal, round, and reactive to light.   Cardiovascular:      Rate and Rhythm: Normal rate and regular rhythm.   Pulmonary:      Effort: Pulmonary effort is normal. No respiratory distress.      Breath sounds: No stridor. No wheezing, rhonchi or rales.   Abdominal:      General: Abdomen is flat. Bowel sounds are normal.      Palpations: Abdomen is soft.   Musculoskeletal:         General: No swelling, deformity or signs of injury.      Cervical back: Normal range of motion and neck supple.      Right lower leg: Normal. No edema.      Left lower leg: Swelling and tenderness present. 2+ Edema present.   Skin:     General: Skin is warm and dry.      Capillary Refill: Capillary refill takes less than 2 seconds.   Neurological:      General: No focal deficit present.      Mental Status: She is alert and oriented to person, place, and time.      Gait: Gait abnormal.    Psychiatric:         Attention and Perception: Attention and perception normal.         Mood and Affect: Mood and affect normal.         Speech: Speech normal.         Behavior: Behavior normal. Behavior is cooperative.         Thought Content: Thought content normal.         Cognition and Memory: Cognition normal.          Assessment/Plan :   1. Closed fracture of nasal bone, initial encounter  -     acetaminophen-codeine 300-30mg (TYLENOL #3) 300-30 mg Tab; Take 1 tablet by mouth every 6 (six) hours as needed (pain).  Dispense: 12 tablet; Refill: 0    2. Contusion of left knee, initial encounter  -     POCT Apply ace wrap    3. Swelling of left knee joint         Assessment & Plan    Assessed recent fall resulting in nasal fractures and knee swelling  Considered history of knee replacements and ongoing sinus problems  Evaluated blood pressure, noting improvement from previous elevated reading  Determined conservative management appropriate for current injuries    NASAL FRACTURE AND INJURY:  - Discussed proper use of cold compresses for both nasal and knee injuries.  - Venus to apply cold compresses to nose.  - Venus to keep ENT appointment on 12/31/2024 at 1030    KNEE PAIN AND INJURY:  - Explained importance of elevating leg above heart level to reduce swelling.  - Venus to elevate affected leg above heart level using pillows.  - Venus to apply ice to knee for 20 minutes, 3 times daily.  - Venus can consider using an ACE wrap for knee support.  - Started Tylenol No. 3 for severe pain, as needed.  - Continued Meloxicam for mild to moderate pain.    GENERAL RECOVERY:  - Recommend limiting physical activity to allow injuries to heal.    FOLLOW-UP:  - Follow up if knee swelling does not improve within a week.  - May need referral to orthopedist if symptoms persist.        Reviewed the [place]  and there are no inconsistencies therefore is eligible for refill.      [place] Board of Pharmacy Controlled  Prescription Drug Monitoring database was queried and showed no activity to suggest abuse, diversion, or other improper use of prescription medications.         No follow-ups on file.    This note was generated with the assistance of ambient listening technology. Verbal consent was obtained by the patient and accompanying visitor(s) for the recording of patient appointment to facilitate this note. I attest to having reviewed and edited the generated note for accuracy, though some syntax or spelling errors may persist. Please contact the author of this note for any clarification.

## 2024-12-31 ENCOUNTER — OFFICE VISIT (OUTPATIENT)
Dept: OTOLARYNGOLOGY | Facility: CLINIC | Age: 74
End: 2024-12-31
Payer: MEDICARE

## 2024-12-31 VITALS — BODY MASS INDEX: 26.95 KG/M2 | WEIGHT: 152.13 LBS

## 2024-12-31 DIAGNOSIS — J30.9 ALLERGIC RHINITIS, UNSPECIFIED SEASONALITY, UNSPECIFIED TRIGGER: Primary | ICD-10-CM

## 2024-12-31 DIAGNOSIS — S02.2XXA CLOSED FRACTURE OF NASAL BONE, INITIAL ENCOUNTER: ICD-10-CM

## 2024-12-31 PROCEDURE — 99213 OFFICE O/P EST LOW 20 MIN: CPT | Mod: S$PBB,,, | Performed by: STUDENT IN AN ORGANIZED HEALTH CARE EDUCATION/TRAINING PROGRAM

## 2024-12-31 PROCEDURE — 99212 OFFICE O/P EST SF 10 MIN: CPT | Mod: PBBFAC | Performed by: STUDENT IN AN ORGANIZED HEALTH CARE EDUCATION/TRAINING PROGRAM

## 2024-12-31 PROCEDURE — 99999 PR PBB SHADOW E&M-EST. PATIENT-LVL II: CPT | Mod: PBBFAC,,, | Performed by: STUDENT IN AN ORGANIZED HEALTH CARE EDUCATION/TRAINING PROGRAM

## 2024-12-31 NOTE — PROGRESS NOTES
Chief complaint:    Chief Complaint   Patient presents with    broken nose     Fell approx 2 weeks ago - xray done in ER              Referring Provider:  Liz Singletary MD  [address] [place] [address] [place] [id number]       History of present illness:     Venus Caldwell is a 74 y.o. female  presenting for evaluation of nasal fracture.     Injury happened on 12/16/24. Fell in her carport and landed on her face.   Initial symptoms included right brow pain, nasal pain, and nose bleeding.  Treatment has included OTC pain medication   Current symptoms include no persistent nasal obstruction, structural deformity, or nasal pain.    No bleeding disorders.   No prior nasal surgery.       History     Past Medical History:   Diagnosis Date    Arthritis     Cancer, uterine     Coronary artery disease     Diabetes mellitus     prediabetes    Diabetes mellitus, type 2     Digestive disorder     DM (diabetes mellitus) 08/2019    BS doesn't check 12/16/2019    DM (diabetes mellitus) 08/2019    BS doesn't check 06/29/2021    Hypertension     Multiple thyroid nodules 7/8/2021    Ovarian cancer     Sciatica     Sleep apnea         Past Surgical History:   Procedure Laterality Date    CATARACT EXTRACTION W/  INTRAOCULAR LENS IMPLANT Right 11/17/2022    CATARACT EXTRACTION W/  INTRAOCULAR LENS IMPLANT Left 01/26/2023    CHOLECYSTECTOMY      COLONOSCOPY N/A 08/01/2018    Procedure: COLONOSCOPY;  Surgeon: Yrn Hunter III, MD;  Location: [facility];  Service: Endoscopy;  Laterality: N/A;    COLONOSCOPY N/A 06/30/2022    Procedure: COLONOSCOPY;  Surgeon: Mica Rodriguez MD;  Location: [facility];  Service: Endoscopy;  Laterality: N/A;    ESOPHAGOGASTRODUODENOSCOPY N/A 03/17/2021    Procedure: EGD (ESOPHAGOGASTRODUODENOSCOPY) (Dr. EMILIO billings);  Surgeon: Coy Ortiz MD;  Location: [facility];  Service: Endoscopy;  Laterality: N/A;    HYSTERECTOMY      JOINT REPLACEMENT Right     hip replacement    SURGICAL  EXCISION OF ANAL LESION N/A 08/29/2018    Procedure: EXCISION, LESION, ANUS;  Surgeon: Yrn Balderrama MD;  Location: [facility];  Service: General;  Laterality: N/A;    TOTAL KNEE ARTHROPLASTY Bilateral           Medications:   Current Outpatient Medications on File Prior to Visit   Medication Sig Dispense Refill    acetaminophen-codeine 300-30mg (TYLENOL #3) 300-30 mg Tab Take 1 tablet by mouth every 6 (six) hours as needed (pain). 12 tablet 0    amLODIPine (NORVASC) 5 MG tablet Take 1 tablet (5 mg total) by mouth once daily. 90 tablet 3    aspirin (ECOTRIN) 81 MG EC tablet Take 1 tablet (81 mg total) by mouth once daily. 90 tablet 3    atorvastatin (LIPITOR) 40 MG tablet Take 1 tablet (40 mg total) by mouth every evening. 90 tablet 3    azelastine (ASTELIN) 137 mcg (0.1 %) nasal spray 1 spray (137 mcg total) by Nasal route 2 (two) times daily. 30 mL 11    benzocaine (HURRICAINE) 20 % Gel Use as directed in the mouth or throat 4 (four) times daily as needed.      betamethasone dipropionate (DIPROLENE) 0.05 % cream as needed.       candesartan (ATACAND) 16 MG tablet Take 1 tablet (16 mg total) by mouth once daily. 90 tablet 3    cetirizine (ZYRTEC) 10 MG tablet Take 10 mg by mouth once daily.      cholecalciferol, vitamin D3, 125 mcg (5,000 unit) Tab Take 1 tablet by mouth every morning.      dulaglutide (TRULICITY) 0.75 mg/0.5 mL pen injector Inject 0.75 mg into the skin every 7 days. 12 pen 3    gabapentin (NEURONTIN) 300 MG capsule TAKE 1 CAPSULE THREE TIMES A  capsule 3    meloxicam (MOBIC) 7.5 MG tablet Take 1 tablet (7.5 mg total) by mouth once daily. 90 tablet 3    metoprolol succinate (TOPROL-XL) 100 MG 24 hr tablet Take 1 tablet (100 mg total) by mouth once daily. 90 tablet 3    omeprazole (PRILOSEC) 20 MG capsule Take 20 mg by mouth once daily.      ondansetron (ZOFRAN) 4 MG tablet Take 1 tablet (4 mg total) by mouth every 6 (six) hours. 30 tablet 0    valACYclovir (VALTREX) 500 MG tablet Take 500  mg by mouth as needed.        No current facility-administered medications on file prior to visit.       Review of patient's allergies indicates:   Allergen Reactions    Sulfa (sulfonamide antibiotics) Anaphylaxis     Pt became hypoxic after taking sulfa drugs as a child      Buprenorphine Rash         Family History   Problem Relation Name Age of Onset    Diabetes Mother      Cataracts Mother      Diabetes Brother      Cataracts Maternal Grandmother      Breast cancer Maternal Aunt         Social History     Tobacco Use    Smoking status: Never    Smokeless tobacco: Never   Substance Use Topics    Alcohol use: No    Drug use: No             Physical Examination      Vitals: There were no vitals taken for this visit.      General: Well developed, well nourished, well hydrated.     Voice: no dysphonia, no dysarthria      Head/Face: Normocephalic, atraumatic. No scars or lesions. Facial musculature equal.     Eyes: No scleral icterus or conjunctival hemorrhage. EOMI. PERRLA.     Ears:     Right ear: No gross deformity. EAC is clear of debris and erythema. TM are intact with a pneumatized middle ear. No signs of retraction, fluid or infection.      Left ear: No gross deformity. EAC is clear of debris and erythema. TM are intact with a pneumatized middle ear. No signs of retraction, fluid or infection.      Nose: no palpable step offs or mobile segments. No septal hematoma. There is no septal deviation.     Mouth/Oropharynx: Lips without any lesions. No mucosal lesions within the oropharynx. No tonsillar exudate or lesions. Pharyngeal walls symmetrical. Uvula midline. Tongue midline without lesions.     Neck: Trachea midline. No masses. No thyromegaly or nodules palpated.     Lymphatic: No lymphadenopathy in the neck.     Extremities: No cyanosis. Warm and well-perfused.     Skin: No scars or lesions on face or neck.      Neurologic: Moving all extremities without gross abnormality.CN II-XII grossly intact.  House-Brackmann 1/6. No signs of nystagmus.          Data reviewed      Review of records:      I reviewed records from the referring provider's office visits, including the history, workup, and/or treatment of this problem thus far.      Imaging:      I have independently reviewed the following imaging with the findings noted below:     CT maxillofacial      Minimally displaced comminuted nasal bone fracture  No septal fx or hematoma     Assessment/Plan:    1. Allergic rhinitis, unspecified seasonality, unspecified trigger    2. Closed fracture of nasal bone, initial encounter         Risks and benefits of CNR vs observation discussed.  Risks of discussed include persistent nasal obstruction, need for additional procedures, epistaxis, pain, cosmetic deformity, CSF leak, orbital injury.   The patient elected to proceed with observation.    Restart flonase for her AR      Grant Vitale MD  [facility]   [facility] - [address]  [place] [address]  P: [phone]  F: [phone]

## 2025-01-15 ENCOUNTER — TELEPHONE (OUTPATIENT)
Dept: HEPATOLOGY | Facility: CLINIC | Age: 75
End: 2025-01-15
Payer: MEDICARE

## 2025-01-15 NOTE — TELEPHONE ENCOUNTER
Due to impending weather and safety of our patients, reached out to patient and changed appointment to a virtual one.  The patient agreed with the change.

## 2025-01-20 PROBLEM — K76.0 METABOLIC DYSFUNCTION-ASSOCIATED STEATOTIC LIVER DISEASE (MASLD): Status: ACTIVE | Noted: 2025-01-20

## 2025-01-20 PROBLEM — K74.00 LIVER FIBROSIS: Status: ACTIVE | Noted: 2025-01-20

## 2025-01-20 NOTE — PROGRESS NOTES
Hepatology Note    PATIENT: Venus Caldwell  MRN: [id number]  DATE: 1/21/2025    Provider: Hepatologist - Dr Hughes  Urgency of review: non-urgent  Referring provider: Aaareferral Self    Diagnosis:   1. Liver fibrosis    2. Type II diabetes mellitus with neurological manifestations    3. Gastroesophageal reflux disease without esophagitis    4. Coronary artery disease involving native coronary artery of native heart without angina pectoris    5. Metabolic dysfunction-associated steatotic liver disease (MASLD)        Chief complaint:   Chief Complaint   Patient presents with    Hepatic Disease       Subjective:    Initial History: Venus Caldwell is a 74 y.o. female with HFpEF, HTN, PSVT, DM, obesity, OA, uterine CA, GERD who was referred to Hepatology Clinic for consultation of liver cirrhosis      01/21/2025   Patient has new diagnosis of  chronic liver disease on imaging. US 6/24 reported as Coarse echotexture of the liver with mild splenomegaly could reflect cirrhosis and mild portal hypertension.   She denies recent hematemesis, coffee ground emesis, melena, hematochezia, jaundice, confusion, LE edema, abdominal distension.        Prior Relevant History:    She  denies hepatotoxic medication    Review of systems:  A review of 12+ systems was conducted with pertinent positive and negative findings documented in HPI with all other systems reviewed and negative.      PFSH:  Past medical, family, and social history reviewed as documented in chart with pertinent positive medical, family, and social history detailed in HPI.    Past Medical History:   Past Medical History:   Diagnosis Date    Arthritis     Cancer, uterine     Coronary artery disease     Diabetes mellitus     prediabetes    Diabetes mellitus, type 2     Digestive disorder     DM (diabetes mellitus) 08/2019    BS doesn't check 12/16/2019    DM (diabetes mellitus) 08/2019    BS doesn't check 06/29/2021    Hypertension     Metabolic  dysfunction-associated steatotic liver disease (MASLD) 1/20/2025    Multiple thyroid nodules 7/8/2021    Ovarian cancer     Sciatica     Sleep apnea        Past Surgical HIstory:   Past Surgical History:   Procedure Laterality Date    CATARACT EXTRACTION W/  INTRAOCULAR LENS IMPLANT Right 11/17/2022    CATARACT EXTRACTION W/  INTRAOCULAR LENS IMPLANT Left 01/26/2023    CHOLECYSTECTOMY      COLONOSCOPY N/A 08/01/2018    Procedure: COLONOSCOPY;  Surgeon: Yrn Hunter III, MD;  Location: [facility] ENDO;  Service: Endoscopy;  Laterality: N/A;    COLONOSCOPY N/A 06/30/2022    Procedure: COLONOSCOPY;  Surgeon: Mica Rodriguez MD;  Location: [facility] ENDO;  Service: Endoscopy;  Laterality: N/A;    ESOPHAGOGASTRODUODENOSCOPY N/A 03/17/2021    Procedure: EGD (ESOPHAGOGASTRODUODENOSCOPY) (Dr. EMILIO billings);  Surgeon: Coy Ortiz MD;  Location: [facility] ENDO;  Service: Endoscopy;  Laterality: N/A;    HYSTERECTOMY      JOINT REPLACEMENT Right     hip replacement    SURGICAL EXCISION OF ANAL LESION N/A 08/29/2018    Procedure: EXCISION, LESION, ANUS;  Surgeon: Yrn Balderrama MD;  Location: [facility] OR;  Service: General;  Laterality: N/A;    TOTAL KNEE ARTHROPLASTY Bilateral        Family History:   Family History   Problem Relation Name Age of Onset    Diabetes Mother      Cataracts Mother      Diabetes Brother      Cataracts Maternal Grandmother      Breast cancer Maternal Aunt       She has no known family history of liver disease.     Social History:  reports that she has never smoked. She has never used smokeless tobacco. She reports that she does not drink alcohol and does not use drugs.    She has no history of Alcohol     She denies history of IV drug use/Tatto  She  denies high-risk sexual contacts, no raw seafood, no sick contacts      Allergies:  Review of patient's allergies indicates:   Allergen Reactions    Sulfa (sulfonamide antibiotics) Anaphylaxis     Pt became hypoxic after taking sulfa drugs as a child       Buprenorphine Rash       Medications:  Current Outpatient Medications   Medication Sig Dispense Refill    acetaminophen-codeine 300-30mg (TYLENOL #3) 300-30 mg Tab Take 1 tablet by mouth every 6 (six) hours as needed (pain). 12 tablet 0    amLODIPine (NORVASC) 5 MG tablet Take 1 tablet (5 mg total) by mouth once daily. 90 tablet 3    aspirin (ECOTRIN) 81 MG EC tablet Take 1 tablet (81 mg total) by mouth once daily. 90 tablet 3    atorvastatin (LIPITOR) 40 MG tablet Take 1 tablet (40 mg total) by mouth every evening. 90 tablet 3    azelastine (ASTELIN) 137 mcg (0.1 %) nasal spray 1 spray (137 mcg total) by Nasal route 2 (two) times daily. 30 mL 11    benzocaine (HURRICAINE) 20 % Gel Use as directed in the mouth or throat 4 (four) times daily as needed.      betamethasone dipropionate (DIPROLENE) 0.05 % cream as needed.       candesartan (ATACAND) 16 MG tablet Take 1 tablet (16 mg total) by mouth once daily. 90 tablet 3    cetirizine (ZYRTEC) 10 MG tablet Take 10 mg by mouth once daily.      cholecalciferol, vitamin D3, 125 mcg (5,000 unit) Tab Take 1 tablet by mouth every morning.      dulaglutide (TRULICITY) 0.75 mg/0.5 mL pen injector Inject 0.75 mg into the skin every 7 days. 12 pen 3    gabapentin (NEURONTIN) 300 MG capsule TAKE 1 CAPSULE THREE TIMES A  capsule 3    meloxicam (MOBIC) 7.5 MG tablet Take 1 tablet (7.5 mg total) by mouth once daily. 90 tablet 3    metoprolol succinate (TOPROL-XL) 100 MG 24 hr tablet Take 1 tablet (100 mg total) by mouth once daily. 90 tablet 3    omeprazole (PRILOSEC) 20 MG capsule Take 20 mg by mouth once daily.      ondansetron (ZOFRAN) 4 MG tablet Take 1 tablet (4 mg total) by mouth every 6 (six) hours. 30 tablet 0    valACYclovir (VALTREX) 500 MG tablet Take 500 mg by mouth as needed.        No current facility-administered medications for this visit.       Review of Systems   Constitutional:  Negative for fatigue, fever and unexpected weight change.   HENT:  Negative for  ear pain, nosebleeds and trouble swallowing.    Eyes:  Negative for discharge and redness.   Respiratory:  Negative for cough and shortness of breath.    Cardiovascular:  Negative for palpitations and leg swelling.   Gastrointestinal:  Negative for abdominal distention, abdominal pain, diarrhea and vomiting.   Endocrine: Negative for cold intolerance and polyuria.   Genitourinary:  Negative for flank pain and hematuria.   Musculoskeletal:  Negative for back pain.   Skin:  Negative for pallor.   Neurological:  Negative for seizures and headaches.   Hematological:  Does not bruise/bleed easily.   Psychiatric/Behavioral:  Negative for confusion and hallucinations.        Computed MELD 3.0 unavailable. One or more values for this score either were not found within the given timeframe or did not fit some other criterion.  Computed MELD-Na unavailable. One or more values for this score either were not found within the given timeframe or did not fit some other criterion.       Objective:      Vitals: There were no vitals filed for this visit.    Physical Exam  Constitutional:       Appearance: Normal appearance.   HENT:      Head: Normocephalic and atraumatic.      Right Ear: Tympanic membrane and external ear normal.      Left Ear: Tympanic membrane and external ear normal.      Mouth/Throat:      Mouth: Mucous membranes are moist.   Eyes:      Extraocular Movements: Extraocular movements intact.      Pupils: Pupils are equal, round, and reactive to light.   Cardiovascular:      Rate and Rhythm: Normal rate and regular rhythm.      Pulses: Normal pulses.   Pulmonary:      Effort: Pulmonary effort is normal.   Abdominal:      General: Bowel sounds are normal. There is no distension.      Palpations: Abdomen is soft.   Musculoskeletal:         General: No swelling or deformity. Normal range of motion.      Cervical back: Normal range of motion and neck supple.   Skin:     Coloration: Skin is not jaundiced.   Neurological:       General: No focal deficit present.      Mental Status: She is alert and oriented to person, place, and time.      Cranial Nerves: No cranial nerve deficit.   Psychiatric:         Mood and Affect: Mood normal.         Behavior: Behavior normal.         Laboratory Data:  No visits with results within 1 Week(s) from this visit.   Latest known visit with results is:   Lab Visit on 10/14/2024   Component Date Value Ref Range Status    Hemoglobin A1C 10/14/2024 5.7 (H)  4.0 - 5.6 % Final    Comment: ADA Screening Guidelines:  5.7-6.4%  Consistent with prediabetes  >or=6.5%  Consistent with diabetes    High levels of fetal hemoglobin interfere with the HbA1C  assay. Heterozygous hemoglobin variants (HbS, HgC, etc)do  not significantly interfere with this assay.   However, presence of multiple variants may affect accuracy.      Estimated Avg Glucose 10/14/2024 117  68 - 131 mg/dL Final    WBC 10/14/2024 6.95  3.90 - 12.70 K/uL Final    RBC 10/14/2024 3.98 (L)  4.00 - 5.40 M/uL Final    Hemoglobin 10/14/2024 11.6 (L)  12.0 - 16.0 g/dL Final    Hematocrit 10/14/2024 35.3 (L)  37.0 - 48.5 % Final    MCV 10/14/2024 89  82 - 98 fL Final    MCH 10/14/2024 29.1  27.0 - 31.0 pg Final    MCHC 10/14/2024 32.9  32.0 - 36.0 g/dL Final    RDW 10/14/2024 13.1  11.5 - 14.5 % Final    Platelets 10/14/2024 274  150 - 450 K/uL Final    MPV 10/14/2024 9.7  9.2 - 12.9 fL Final    Immature Granulocytes 10/14/2024 0.1  0.0 - 0.5 % Final    Gran # (ANC) 10/14/2024 4.6  1.8 - 7.7 K/uL Final    Immature Grans (Abs) 10/14/2024 0.01  0.00 - 0.04 K/uL Final    Comment: Mild elevation in immature granulocytes is non specific and   can be seen in a variety of conditions including stress response,   acute inflammation, trauma and pregnancy. Correlation with other   laboratory and clinical findings is essential.      Lymph # 10/14/2024 1.6  1.0 - 4.8 K/uL Final    Mono # 10/14/2024 0.4  0.3 - 1.0 K/uL Final    Eos # 10/14/2024 0.3  0.0 - 0.5 K/uL  Final    Baso # 10/14/2024 0.04  0.00 - 0.20 K/uL Final    nRBC 10/14/2024 0  0 /100 WBC Final    Gran % 10/14/2024 66.2  38.0 - 73.0 % Final    Lymph % 10/14/2024 22.6  18.0 - 48.0 % Final    Mono % 10/14/2024 5.9  4.0 - 15.0 % Final    Eosinophil % 10/14/2024 4.6  0.0 - 8.0 % Final    Basophil % 10/14/2024 0.6  0.0 - 1.9 % Final    Differential Method 10/14/2024 Automated   Final    Sodium 10/14/2024 142  136 - 145 mmol/L Final    Potassium 10/14/2024 4.2  3.5 - 5.1 mmol/L Final    Chloride 10/14/2024 108  95 - 110 mmol/L Final    CO2 10/14/2024 25  23 - 29 mmol/L Final    Glucose 10/14/2024 105  70 - 110 mg/dL Final    BUN 10/14/2024 18  8 - 23 mg/dL Final    Creatinine 10/14/2024 0.7  0.5 - 1.4 mg/dL Final    Calcium 10/14/2024 9.7  8.7 - 10.5 mg/dL Final    Total Protein 10/14/2024 6.9  6.0 - 8.4 g/dL Final    Albumin 10/14/2024 3.4 (L)  3.5 - 5.2 g/dL Final    Total Bilirubin 10/14/2024 0.8  0.1 - 1.0 mg/dL Final    Comment: For infants and newborns, interpretation of results should be based  on gestational age, weight and in agreement with clinical  observations.    Premature Infant recommended reference ranges:  Up to 24 hours.............<8.0 mg/dL  Up to 48 hours............<12.0 mg/dL  3-5 days..................<15.0 mg/dL  6-29 days.................<15.0 mg/dL      Alkaline Phosphatase 10/14/2024 84  55 - 135 U/L Final    AST 10/14/2024 13  10 - 40 U/L Final    ALT 10/14/2024 9 (L)  10 - 44 U/L Final    eGFR 10/14/2024 >60  >60 mL/min/1.73 m^2 Final    Anion Gap 10/14/2024 9  8 - 16 mmol/L Final    Ferritin 10/14/2024 156  20.0 - 300.0 ng/mL Final    Iron 10/14/2024 47  30 - 160 ug/dL Final    Transferrin 10/14/2024 182 (L)  200 - 375 mg/dL Final    TIBC 10/14/2024 269  250 - 450 ug/dL Final    Saturated Iron 10/14/2024 17 (L)  20 - 50 % Final    Cholesterol 10/14/2024 124  120 - 199 mg/dL Final    Comment: The National Cholesterol Education Program (NCEP) has set the  following guidelines (reference  "ranges) for Cholesterol:  Optimal.....................<200 mg/dL  Borderline High.............200-239 mg/dL  High........................> or = 240 mg/dL      Triglycerides 10/14/2024 102  30 - 150 mg/dL Final    Comment: The National Cholesterol Education Program (NCEP) has set the  following guidelines (reference values) for triglycerides:  Normal......................<150 mg/dL  Borderline High.............150-199 mg/dL  High........................200-499 mg/dL      HDL 10/14/2024 37 (L)  40 - 75 mg/dL Final    Comment: The National Cholesterol Education Program (NCEP) has set the  following guidelines (reference values) for HDL Cholesterol:  Low...............<40 mg/dL  Optimal...........>60 mg/dL      LDL Cholesterol 10/14/2024 66.6  63.0 - 159.0 mg/dL Final    Comment: The National Cholesterol Education Program (NCEP) has set the  following guidelines (reference values) for LDL Cholesterol:  Optimal.......................<130 mg/dL  Borderline High...............130-159 mg/dL  High..........................160-189 mg/dL  Very High.....................>190 mg/dL      HDL/Cholesterol Ratio 10/14/2024 29.8  20.0 - 50.0 % Final    Total Cholesterol/HDL Ratio 10/14/2024 3.4  2.0 - 5.0 Final    Non-HDL Cholesterol 10/14/2024 87  mg/dL Final    Comment: Risk category and Non-HDL cholesterol goals:  Coronary heart disease (CHD)or equivalent (10-year risk of CHD >20%):  Non-HDL cholesterol goal     <130 mg/dL  Two or more CHD risk factors and 10-year risk of CHD <= 20%:  Non-HDL cholesterol goal     <160 mg/dL  0 to 1 CHD risk factor:  Non-HDL cholesterol goal     <190 mg/dL      Microalbumin, Urine 10/14/2024 17.0  ug/mL Final    Creatinine, Urine 10/14/2024 140.0  15.0 - 325.0 mg/dL Final    Microalb/Creat Ratio 10/14/2024 12.1  0.0 - 30.0 ug/mg Final       Lab Results   Component Value Date    INR 1.0 06/14/2018       No results found for: "SMOOTHMUSCAB", "MITOAB"  Lab Results   Component Value Date    IRON 47 " "10/14/2024    TIBC 269 10/14/2024    FERRITIN 156 10/14/2024     Lab Results   Component Value Date    HEPCAB Negative 06/16/2024     Lab Results   Component Value Date    TSH 0.554 08/16/2022     Lab Results   Component Value Date    JUAN Negative 03/17/2005       No results found for: "ABORH"        Lab Results   Component Value Date    HGBA1C 5.7 (H) 10/14/2024     Lab Results   Component Value Date    CHOL 124 10/14/2024    CHOL 133 08/16/2022    CHOL 127 08/16/2021     Lab Results   Component Value Date    HDL 37 (L) 10/14/2024    HDL 41 08/16/2022    HDL 46 08/16/2021     Lab Results   Component Value Date    LDLCALC 66.6 10/14/2024    LDLCALC 55.8 (L) 08/16/2022    LDLCALC 56.8 (L) 08/16/2021     Lab Results   Component Value Date    TRIG 102 10/14/2024    TRIG 181 (H) 08/16/2022    TRIG 121 08/16/2021     Lab Results   Component Value Date    CHOLHDL 29.8 10/14/2024    CHOLHDL 30.8 08/16/2022    CHOLHDL 36.2 08/16/2021         I personally reviewed imaging studies and outside records..      Assessment:       1. Liver fibrosis    2. Type II diabetes mellitus with neurological manifestations    3. Gastroesophageal reflux disease without esophagitis    4. Coronary artery disease involving native coronary artery of native heart without angina pectoris    5. Metabolic dysfunction-associated steatotic liver disease (MASLD)          The patient's risk factors for MAFLD include:    Obesity/overweight                     yes There is no height or weight on file to calculate BMI.  Dyslipidemia                                yes  Insulin resistance/Diabetes         yes  Lab Results   Component Value Date    HGBA1C 5.7 (H) 10/14/2024                Plan:     Problem List Items Addressed This Visit          Cardiac/Vascular    Coronary artery disease involving native coronary artery of native heart without angina pectoris (Chronic)       Endocrine    Type II diabetes mellitus with neurological manifestations (Chronic)    "    GI    Gastroesophageal reflux disease without esophagitis (Chronic)    Liver fibrosis - Primary    Metabolic dysfunction-associated steatotic liver disease (MASLD)       Venus was seen today for hepatic disease.    Diagnoses and all orders for this visit:    Liver fibrosis    Type II diabetes mellitus with neurological manifestations    Gastroesophageal reflux disease without esophagitis    Coronary artery disease involving native coronary artery of native heart without angina pectoris    Metabolic dysfunction-associated steatotic liver disease (MASLD)        Liver fibrosis  MASLD related considering the Metabolic risk factors  Well compensated clinically  Complete the etiological work up   Fibroscan  Monitor the MELD score  Variceal surveillance--EGD with GI if stage 4 fibrosis on Fibroscan  HCC surveillance--Imaging and AFP every 6 month if cirrhosis  Dexa scan  Check Hepatitis A/B immune status and Immunization with PCP  if not immune    Coronary Artery Disease  -non obs with abd and aortic atherosclerosis   - Ok to continue statin, BB      NICKI  Risk factor for MASLD  Chronic; stable on current treatment plan; Encourage to discuss with PCP  Education given    DM  Risk factor for MASLD  Chronic; stable on current treatment plan; Encourage to discuss with PCP  Education given    GERD  Controlled on PPI        MAFLD  Fatty liver disease is a diagnosis of exclusion, will obtain additional labs to exclude autoimmune/infectitious etiology  Educated patient on spectrum of fatty liver disease and potential for cirrhosis if BAEZA present  --Plan checking serologies to rule out other causes of chronic liver diseases   -Will obtain fibroscan for evaluation of fibrosis  -Discussed new Medication for MASLD    I recommended her a more pragmatic approach to her medical problems which would include the following:  - life-style modifications: weight loss, daily exercise (30 mins of HIIT or cardio), low carb/low fat diet          Educated patient on spectrum of fatty liver disease and potential for cirrhosis if MASH present        Explored dietary habits and discussed dietary recommendations- Low calorie, low carbohydrate, high protein diet mediterranean with goal of loosing >3-5% to improve steatosis and >7-10% to improve histological changes if present  - control of diabetes or insulin resistance   - control of high cholesterol, if needed with statin drugs or other cholesterol-lowering agents  - coffee consumption (low caloric): 2-3 cups per day may reduce liver fat  -I will defer the management of the patient's dyslipidemia and diabetes to patient's primary care physician and/or endocrinologist   -Reduction of risk factors for CVD        Return to clinic in 6 months.    I have sent communication to the referring physician and/or primary care provider.        I performed this consultation using real-time Telehealth tools, including a live video connection between my location and the patient's location. Prior to initiating the consultation, I obtained informed verbal consent to perform this consultation using Telehealth tools and answered all the questions about the Telehealth interaction.    The patient location is: ([place]) Home  The chief complaint leading to consultation is:Cirrhosis     Visit type: audiovisual     Face to Face time with patient:20     40 minutes of total time spent on the encounter, which includes face to face time and non-face to face time preparing to see the patient (eg, review of tests), Obtaining and/or reviewing separately obtained history, Documenting clinical information in the electronic or other health record, Independently interpreting results (not separately reported) and communicating results to the patient/family/caregiver, or Care coordination (not separately reported).     Each patient to whom he or she provides medical services by telemedicine is:  (1) informed of the relationship between the physician and  patient and the respective role of any other health care provider with respect to management of the patient; and (2) notified that he or she may decline to receive medical services by telemedicine and may withdraw from such care at any time.    We discussed in depth the nature of the patient's disease, the management plan in details. I have provided the patient with an opportunity to ask questions and have all questions answered to his satisfaction.     Discussed with patient that it is likely that she will see results before Myself or my nurse are able to view them and report results due to the Cures Act passed 4/1/21. Results will be sent immediately to the patient who are enrolled in the patient portal. If results come through after business hours or on weekend, we will not see them until the next business day that we are in the office. If resulted during the business day, we will likely not be able to review them until after completing all patient visits in office that day.       Nick Hughes MD  Transplant Hepatologist and Gastroenterologist  [facility]

## 2025-01-21 ENCOUNTER — OFFICE VISIT (OUTPATIENT)
Dept: HEPATOLOGY | Facility: CLINIC | Age: 75
End: 2025-01-21
Payer: MEDICARE

## 2025-01-21 DIAGNOSIS — E11.49 TYPE II DIABETES MELLITUS WITH NEUROLOGICAL MANIFESTATIONS: Chronic | ICD-10-CM

## 2025-01-21 DIAGNOSIS — K76.0 METABOLIC DYSFUNCTION-ASSOCIATED STEATOTIC LIVER DISEASE (MASLD): ICD-10-CM

## 2025-01-21 DIAGNOSIS — K21.9 GASTROESOPHAGEAL REFLUX DISEASE WITHOUT ESOPHAGITIS: Chronic | ICD-10-CM

## 2025-01-21 DIAGNOSIS — I25.10 CORONARY ARTERY DISEASE INVOLVING NATIVE CORONARY ARTERY OF NATIVE HEART WITHOUT ANGINA PECTORIS: Chronic | ICD-10-CM

## 2025-01-21 DIAGNOSIS — K74.00 LIVER FIBROSIS: Primary | ICD-10-CM

## 2025-01-21 NOTE — PATIENT INSTRUCTIONS
MAFLD    Website with information about fatty liver and inflammation related to fatty liver (BAEZA) = [email or web address]  AND [email or web address]     Limit alcohol consumption  2.  Weight loss goal & 7-10%, referral for [facility] if interested. Also, if interested in a dietician visit to create a weight loss plan, contact the dietician team at [facility] at [email or web address] to schedule a visit to you can call [facility] in [place]: [phone] and the  will transfer the call to one of the dieticians to schedule an appointment. Or you can also call [phone] to schedule. They do offer video visits   3. Low carb/sugar, high fiber and protein diet.Try to limit your carb intake to LESS than 30-45 grams of carbs with a meal or LESS than 5-10 grams with any snack (total of any snack foods eaten during that time). Use MyFitness Pal krista to add up your carbs through the day. Do NOT drink any beverages with calories or carbs (this can lead to high blood sugar and weight gain). Also, some of our patients have been very successful with weight loss using the pre made/planned meal planning services that limit calories and portion size (one example is Sensible Meals but there are many out there)  4. Exercise, 5 days per week, 30 minutes per day, as tolerated  5. Recommend good cholesterol, blood pressure, blood sugar levels .      Management of patients with MASLD includes optimization of blood glucose control in patients with diabetes and treatment of hyperlipidemia. If after a year of adhering to these measures and effectively reducing your Body Mass Index plus decreasing the insulin resistance, you continue to have symptoms, we may need to start medications to treat MASLD if there is liver fibrosis. These medications are used to treat diabetes and work well for insulin resistance. Unfortunately, these medications can have side effects.      In some people, fatty liver can  progress to steatohepatitis (inflamatory fatty liver) and possibly to cirrhosis, putting one at increased risk for liver cancer, liver failure, and death. Therefore, the lifestyle changes are very important to decrease this risk.        Once again, we extend our sincere appreciation for giving us the opportunity to serve you. If there is anything else we can do to improve your experience or assist you further, please do not hesitate to reach out to us.       Thanks for trusting us with your healthcare needs and using [facility]. If you want to ask us a question, you can do so by replying to this message or by calling [phone]

## 2025-01-27 DIAGNOSIS — D50.8 IRON DEFICIENCY ANEMIA SECONDARY TO INADEQUATE DIETARY IRON INTAKE: Primary | ICD-10-CM

## 2025-02-11 ENCOUNTER — HOSPITAL ENCOUNTER (OUTPATIENT)
Dept: PREADMISSION TESTING | Facility: HOSPITAL | Age: 75
Discharge: HOME | End: 2025-02-11
Attending: INTERNAL MEDICINE
Payer: MEDICARE

## 2025-02-11 DIAGNOSIS — K74.00 LIVER FIBROSIS: Primary | ICD-10-CM

## 2025-02-20 ENCOUNTER — LAB VISIT (OUTPATIENT)
Dept: LAB | Facility: HOSPITAL | Age: 75
End: 2025-02-20
Payer: MEDICARE

## 2025-02-20 DIAGNOSIS — D50.8 IRON DEFICIENCY ANEMIA SECONDARY TO INADEQUATE DIETARY IRON INTAKE: ICD-10-CM

## 2025-02-20 DIAGNOSIS — K74.00 LIVER FIBROSIS: ICD-10-CM

## 2025-02-20 LAB
A1AT SERPL-MCNC: 165 MG/DL (ref 100–190)
AFP SERPL-MCNC: 4.2 NG/ML (ref 0–8.4)
ALBUMIN SERPL BCP-MCNC: 3.6 G/DL (ref 3.5–5.2)
ALP SERPL-CCNC: 99 U/L (ref 40–150)
ALT SERPL W/O P-5'-P-CCNC: 16 U/L (ref 10–44)
ANION GAP SERPL CALC-SCNC: 9 MMOL/L (ref 8–16)
AST SERPL-CCNC: 14 U/L (ref 10–40)
BASOPHILS # BLD AUTO: 0.05 K/UL (ref 0–0.2)
BASOPHILS NFR BLD: 0.7 % (ref 0–1.9)
BILIRUB SERPL-MCNC: 0.5 MG/DL (ref 0.1–1)
BUN SERPL-MCNC: 16 MG/DL (ref 8–23)
CALCIUM SERPL-MCNC: 9.5 MG/DL (ref 8.7–10.5)
CERULOPLASMIN SERPL-MCNC: 31 MG/DL (ref 15–45)
CHLORIDE SERPL-SCNC: 108 MMOL/L (ref 95–110)
CO2 SERPL-SCNC: 26 MMOL/L (ref 23–29)
CREAT SERPL-MCNC: 0.7 MG/DL (ref 0.5–1.4)
DIFFERENTIAL METHOD BLD: ABNORMAL
EOSINOPHIL # BLD AUTO: 0.4 K/UL (ref 0–0.5)
EOSINOPHIL NFR BLD: 6.1 % (ref 0–8)
ERYTHROCYTE [DISTWIDTH] IN BLOOD BY AUTOMATED COUNT: 13.2 % (ref 11.5–14.5)
EST. GFR  (NO RACE VARIABLE): >60 ML/MIN/1.73 M^2
FERRITIN SERPL-MCNC: 122 NG/ML (ref 20–300)
GLUCOSE SERPL-MCNC: 123 MG/DL (ref 70–110)
HAV IGG SER QL IA: REACTIVE
HBV SURFACE AG SERPL QL IA: NORMAL
HCT VFR BLD AUTO: 36.8 % (ref 37–48.5)
HCV AB SERPL QL IA: NORMAL
HGB BLD-MCNC: 11.9 G/DL (ref 12–16)
IMM GRANULOCYTES # BLD AUTO: 0.01 K/UL (ref 0–0.04)
IMM GRANULOCYTES NFR BLD AUTO: 0.1 % (ref 0–0.5)
INR PPP: 0.9 (ref 0.8–1.2)
IRON SERPL-MCNC: 44 UG/DL (ref 30–160)
LYMPHOCYTES # BLD AUTO: 1.5 K/UL (ref 1–4.8)
LYMPHOCYTES NFR BLD: 21.6 % (ref 18–48)
MCH RBC QN AUTO: 28.7 PG (ref 27–31)
MCHC RBC AUTO-ENTMCNC: 32.3 G/DL (ref 32–36)
MCV RBC AUTO: 89 FL (ref 82–98)
MONOCYTES # BLD AUTO: 0.3 K/UL (ref 0.3–1)
MONOCYTES NFR BLD: 4.5 % (ref 4–15)
NEUTROPHILS # BLD AUTO: 4.7 K/UL (ref 1.8–7.7)
NEUTROPHILS NFR BLD: 67 % (ref 38–73)
NRBC BLD-RTO: 0 /100 WBC
PLATELET # BLD AUTO: 313 K/UL (ref 150–450)
PMV BLD AUTO: 10 FL (ref 9.2–12.9)
POTASSIUM SERPL-SCNC: 3.7 MMOL/L (ref 3.5–5.1)
PROT SERPL-MCNC: 7 G/DL (ref 6–8.4)
PROTHROMBIN TIME: 10.7 SEC (ref 9–12.5)
RBC # BLD AUTO: 4.14 M/UL (ref 4–5.4)
SATURATED IRON: 15 % (ref 20–50)
SODIUM SERPL-SCNC: 143 MMOL/L (ref 136–145)
TOTAL IRON BINDING CAPACITY: 292 UG/DL (ref 250–450)
TRANSFERRIN SERPL-MCNC: 197 MG/DL (ref 200–375)
WBC # BLD AUTO: 7.08 K/UL (ref 3.9–12.7)

## 2025-02-20 PROCEDURE — 82728 ASSAY OF FERRITIN: CPT

## 2025-02-20 PROCEDURE — 86364 TISS TRNSGLTMNASE EA IG CLAS: CPT | Performed by: INTERNAL MEDICINE

## 2025-02-20 PROCEDURE — 82105 ALPHA-FETOPROTEIN SERUM: CPT | Performed by: INTERNAL MEDICINE

## 2025-02-20 PROCEDURE — 80053 COMPREHEN METABOLIC PANEL: CPT

## 2025-02-20 PROCEDURE — 85025 COMPLETE CBC W/AUTO DIFF WBC: CPT

## 2025-02-20 PROCEDURE — 86803 HEPATITIS C AB TEST: CPT | Performed by: INTERNAL MEDICINE

## 2025-02-20 PROCEDURE — 82390 ASSAY OF CERULOPLASMIN: CPT | Performed by: INTERNAL MEDICINE

## 2025-02-20 PROCEDURE — 85610 PROTHROMBIN TIME: CPT | Performed by: INTERNAL MEDICINE

## 2025-02-20 PROCEDURE — 86038 ANTINUCLEAR ANTIBODIES: CPT | Performed by: INTERNAL MEDICINE

## 2025-02-20 PROCEDURE — 87340 HEPATITIS B SURFACE AG IA: CPT | Performed by: INTERNAL MEDICINE

## 2025-02-20 PROCEDURE — 36415 COLL VENOUS BLD VENIPUNCTURE: CPT | Performed by: INTERNAL MEDICINE

## 2025-02-20 PROCEDURE — 86706 HEP B SURFACE ANTIBODY: CPT | Performed by: INTERNAL MEDICINE

## 2025-02-20 PROCEDURE — 84466 ASSAY OF TRANSFERRIN: CPT

## 2025-02-20 PROCEDURE — 86790 VIRUS ANTIBODY NOS: CPT | Performed by: INTERNAL MEDICINE

## 2025-02-21 LAB — ANA SER QL IF: NORMAL

## 2025-02-22 DIAGNOSIS — Z00.00 ENCOUNTER FOR MEDICARE ANNUAL WELLNESS EXAM: ICD-10-CM

## 2025-02-24 ENCOUNTER — OFFICE VISIT (OUTPATIENT)
Dept: HEMATOLOGY/ONCOLOGY | Facility: CLINIC | Age: 75
End: 2025-02-24
Payer: MEDICARE

## 2025-02-24 DIAGNOSIS — D50.8 IRON DEFICIENCY ANEMIA SECONDARY TO INADEQUATE DIETARY IRON INTAKE: Primary | ICD-10-CM

## 2025-02-24 PROCEDURE — 98005 SYNCH AUDIO-VIDEO EST LOW 20: CPT | Mod: 95,,,

## 2025-02-24 NOTE — PROGRESS NOTES
Subjective:       Patient ID: Venus Caldwell is a 75 y.o. female.    Chief Complaint: Anemia    HPI: Ms. Caldwell is a 75 year old female who is following up for her iron def anemia. She is taking oral iron every other day. She has been treated with IV iron, last one 10/2020. Upper GI endoscopy from 3/17/2021 was unremarkable no evidence of H pylori. Colonoscopy 6/2022 normal,. No repeat recommended.      Today: Otherwise she has been good. She continues to take oral iron every other day, but has been out of it for a couple of weeks and has noticed more fatigue when not taking. Previously oral iron daily caused constipation, every other day is much better.    Social History     Socioeconomic History    Marital status:     Number of children: 1   Occupational History    Occupation: Retired   Tobacco Use    Smoking status: Never    Smokeless tobacco: Never   Substance and Sexual Activity    Alcohol use: No    Drug use: No    Sexual activity: Not Currently     Social Drivers of Health     Financial Resource Strain: Low Risk  (11/5/2024)    Overall Financial Resource Strain (CARDIA)     Difficulty of Paying Living Expenses: Not hard at all   Food Insecurity: No Food Insecurity (11/5/2024)    Hunger Vital Sign     Worried About Running Out of Food in the Last Year: Never true     Ran Out of Food in the Last Year: Never true   Transportation Needs: No Transportation Needs (5/14/2024)    PRAPARE - Transportation     Lack of Transportation (Medical): No     Lack of Transportation (Non-Medical): No   Physical Activity: Unknown (11/5/2024)    Exercise Vital Sign     Days of Exercise per Week: Patient declined   Stress: No Stress Concern Present (11/5/2024)    [employer] [place] [employer] - Occupational Stress Questionnaire     Feeling of Stress : Not at all   Housing Stability: Unknown (11/5/2024)    Housing Stability Vital Sign     Unable to Pay for Housing in the Last Year: No       Past Medical History:    Diagnosis Date    Arthritis     Cancer, uterine     Coronary artery disease     Diabetes mellitus     prediabetes    Diabetes mellitus, type 2     Digestive disorder     DM (diabetes mellitus) 08/2019    BS doesn't check 12/16/2019    DM (diabetes mellitus) 08/2019    BS doesn't check 06/29/2021    Hypertension     Metabolic dysfunction-associated steatotic liver disease (MASLD) 1/20/2025    Multiple thyroid nodules 7/8/2021    Ovarian cancer     Sciatica     Sleep apnea        Family History   Problem Relation Name Age of Onset    Diabetes Mother      Cataracts Mother      Diabetes Brother      Cataracts Maternal Grandmother      Breast cancer Maternal Aunt         Past Surgical History:   Procedure Laterality Date    CATARACT EXTRACTION W/  INTRAOCULAR LENS IMPLANT Right 11/17/2022    CATARACT EXTRACTION W/  INTRAOCULAR LENS IMPLANT Left 01/26/2023    CHOLECYSTECTOMY      COLONOSCOPY N/A 08/01/2018    Procedure: COLONOSCOPY;  Surgeon: Yrn Hunter III, MD;  Location: [facility];  Service: Endoscopy;  Laterality: N/A;    COLONOSCOPY N/A 06/30/2022    Procedure: COLONOSCOPY;  Surgeon: Mica Rodriguez MD;  Location: [facility];  Service: Endoscopy;  Laterality: N/A;    ESOPHAGOGASTRODUODENOSCOPY N/A 03/17/2021    Procedure: EGD (ESOPHAGOGASTRODUODENOSCOPY) (Dr. EMILIO billings);  Surgeon: Coy Ortiz MD;  Location: [facility];  Service: Endoscopy;  Laterality: N/A;    HYSTERECTOMY      JOINT REPLACEMENT Right     hip replacement    SURGICAL EXCISION OF ANAL LESION N/A 08/29/2018    Procedure: EXCISION, LESION, ANUS;  Surgeon: Yrn Balderrama MD;  Location: [facility];  Service: General;  Laterality: N/A;    TOTAL KNEE ARTHROPLASTY Bilateral        Review of Systems   Constitutional:  Positive for fatigue. Negative for appetite change, fever and unexpected weight change.   HENT:  Negative for nosebleeds.    Gastrointestinal:  Negative for anal bleeding, blood in stool, constipation, diarrhea, nausea and  vomiting.   Hematological:  Does not bruise/bleed easily.         Medication List with Changes/Refills   Current Medications    ACETAMINOPHEN-CODEINE 300-30MG (TYLENOL #3) 300-30 MG TAB    Take 1 tablet by mouth every 6 (six) hours as needed (pain).    AMLODIPINE (NORVASC) 5 MG TABLET    Take 1 tablet (5 mg total) by mouth once daily.    ASPIRIN (ECOTRIN) 81 MG EC TABLET    Take 1 tablet (81 mg total) by mouth once daily.    ATORVASTATIN (LIPITOR) 40 MG TABLET    Take 1 tablet (40 mg total) by mouth every evening.    AZELASTINE (ASTELIN) 137 MCG (0.1 %) NASAL SPRAY    1 spray (137 mcg total) by Nasal route 2 (two) times daily.    BENZOCAINE (HURRICAINE) 20 % GEL    Use as directed in the mouth or throat 4 (four) times daily as needed.    BETAMETHASONE DIPROPIONATE (DIPROLENE) 0.05 % CREAM    as needed.     CANDESARTAN (ATACAND) 16 MG TABLET    Take 1 tablet (16 mg total) by mouth once daily.    CETIRIZINE (ZYRTEC) 10 MG TABLET    Take 10 mg by mouth once daily.    CHOLECALCIFEROL, VITAMIN D3, 125 MCG (5,000 UNIT) TAB    Take 1 tablet by mouth every morning.    DULAGLUTIDE (TRULICITY) 0.75 MG/0.5 ML PEN INJECTOR    Inject 0.75 mg into the skin every 7 days.    GABAPENTIN (NEURONTIN) 300 MG CAPSULE    TAKE 1 CAPSULE THREE TIMES A DAY    MELOXICAM (MOBIC) 7.5 MG TABLET    Take 1 tablet (7.5 mg total) by mouth once daily.    METOPROLOL SUCCINATE (TOPROL-XL) 100 MG 24 HR TABLET    Take 1 tablet (100 mg total) by mouth once daily.    OMEPRAZOLE (PRILOSEC) 20 MG CAPSULE    Take 20 mg by mouth once daily.    ONDANSETRON (ZOFRAN) 4 MG TABLET    Take 1 tablet (4 mg total) by mouth every 6 (six) hours.    VALACYCLOVIR (VALTREX) 500 MG TABLET    Take 500 mg by mouth as needed.      Objective:   There were no vitals filed for this visit.    Physical Exam  Constitutional:       General: She is not in acute distress.     Appearance: She is not ill-appearing, toxic-appearing or diaphoretic.   Neurological:      Mental Status: She  is alert.   Psychiatric:         Mood and Affect: Mood normal.          Physical exam limited due to video visit    Labs/Results:  Lab Results   Component Value Date    WBC 7.08 02/20/2025    RBC 4.14 02/20/2025    HGB 11.9 (L) 02/20/2025    HCT 36.8 (L) 02/20/2025    MCV 89 02/20/2025    MCH 28.7 02/20/2025    MCHC 32.3 02/20/2025    RDW 13.2 02/20/2025     02/20/2025    MPV 10.0 02/20/2025    GRAN 4.7 02/20/2025    GRAN 67.0 02/20/2025    LYMPH 1.5 02/20/2025    LYMPH 21.6 02/20/2025    MONO 0.3 02/20/2025    MONO 4.5 02/20/2025    EOS 0.4 02/20/2025    BASO 0.05 02/20/2025    EOSINOPHIL 6.1 02/20/2025    BASOPHIL 0.7 02/20/2025       Latest Reference Range & Units 02/20/25 10:01   Iron 30 - 160 ug/dL 44   TIBC 250 - 450 ug/dL 292   Saturated Iron 20 - 50 % 15 (L)   Transferrin 200 - 375 mg/dL 197 (L)   Ferritin 20.0 - 300.0 ng/mL 122     CMP  Sodium   Date Value Ref Range Status   02/20/2025 143 136 - 145 mmol/L Final     Potassium   Date Value Ref Range Status   02/20/2025 3.7 3.5 - 5.1 mmol/L Final     Chloride   Date Value Ref Range Status   02/20/2025 108 95 - 110 mmol/L Final     CO2   Date Value Ref Range Status   02/20/2025 26 23 - 29 mmol/L Final     Glucose   Date Value Ref Range Status   02/20/2025 123 (H) 70 - 110 mg/dL Final     BUN   Date Value Ref Range Status   02/20/2025 16 8 - 23 mg/dL Final     Creatinine   Date Value Ref Range Status   02/20/2025 0.7 0.5 - 1.4 mg/dL Final     Calcium   Date Value Ref Range Status   02/20/2025 9.5 8.7 - 10.5 mg/dL Final     Total Protein   Date Value Ref Range Status   02/20/2025 7.0 6.0 - 8.4 g/dL Final     Albumin   Date Value Ref Range Status   02/20/2025 3.6 3.5 - 5.2 g/dL Final     Total Bilirubin   Date Value Ref Range Status   02/20/2025 0.5 0.1 - 1.0 mg/dL Final     Comment:     For infants and newborns, interpretation of results should be based  on gestational age, weight and in agreement with clinical  observations.    Premature Infant  recommended reference ranges:  Up to 24 hours.............<8.0 mg/dL  Up to 48 hours............<12.0 mg/dL  3-5 days..................<15.0 mg/dL  6-29 days.................<15.0 mg/dL       Alkaline Phosphatase   Date Value Ref Range Status   02/20/2025 99 40 - 150 U/L Final     AST   Date Value Ref Range Status   02/20/2025 14 10 - 40 U/L Final     ALT   Date Value Ref Range Status   02/20/2025 16 10 - 44 U/L Final     Anion Gap   Date Value Ref Range Status   02/20/2025 9 8 - 16 mmol/L Final     eGFR   Date Value Ref Range Status   02/20/2025 >60 >60 mL/min/1.73 m^2 Final       Upper GI endoscopy from 3/17/2021 was unremarkable no evidence of H pylori   Assessment:     Problem List Items Addressed This Visit       Iron deficiency anemia secondary to inadequate dietary iron intake - Primary    Relevant Orders    CBC Auto Differential    Comprehensive Metabolic Panel    Ferritin    Iron and TIBC     Plan:     Iron deficiency anemia secondary to inadequate dietary iron intake  --encouraged to incorporate iron rich foods into diet  --Upper GI endoscopy from 3/17/2021 was unremarkable no evidence of H pylori. Colonoscopy 6/2022 normal,. No repeat recommended.    --previously treated with iv iron  --continue with oral iron as tolerated, q other day--> will start back on this. Would rather continue with oral than IV  --hgb: 11.9g/dl-no anemia noted  --iron: 44, sat: 15%, ferritin: 122-iron decreased    Follow-Up: 12 months with cbc cmp iron/tibc ferritin prior--VV ok    Yesica Garrett PA-C  Hematology Oncology    The patient location is: home  The chief complaint leading to consultation is: anemia  Visit type:  Synchronous audio video  Face to Face time with patient:15  minutes of total time spent on the encounter, which includes face to face time and non-face to face time preparing to see the patient (eg, review of tests), Obtaining and/or reviewing separately obtained history, Documenting clinical information in the  electronic or other health record, Independently interpreting results (not separately reported) and communicating results to the patient/family/caregiver, or Care coordination (not separately reported).   Each patient to whom he or she provides medical services by telemedicine is:  (1) informed of the relationship between the provider and patient and the respective role of any other health care provider with respect to management of the patient; and (2) notified that he or she may decline to receive medical services     Route Chart for Scheduling    Med Onc Chart Routing      Follow up with physician    Follow up with SUSAN . 1 year withcbc cmp iron/tibc ferritin prior -vv ok   Infusion scheduling note    Injection scheduling note    Labs CMP, ferritin, CBC and iron and TIBC   Scheduling:  Preferred lab:  Lab interval:     Imaging    Pharmacy appointment    Other referrals

## 2025-02-25 LAB
HBV SURFACE AB SER QL IA: NEGATIVE
HBV SURFACE AB SERPL IA-ACNC: <3 MIU/ML
TTG IGA SER-ACNC: 0.8 U/ML

## 2025-02-26 ENCOUNTER — ANESTHESIA EVENT (OUTPATIENT)
Dept: ENDOSCOPY | Facility: HOSPITAL | Age: 75
End: 2025-02-26
Payer: MEDICARE

## 2025-02-26 NOTE — ANESTHESIA PREPROCEDURE EVALUATION
02/26/2025  eVnus Caldwell is a 75 y.o., female.      Pre-op Assessment    I have reviewed the Patient Summary Reports.     I have reviewed the Nursing Notes. I have reviewed the NPO Status.   I have reviewed the Medications.     Review of Systems  Anesthesia Hx:   History of prior surgery of interest to airway management or planning:             Hematology/Oncology:    Oncology Normal                                   EENT/Dental:  EENT/Dental Normal           Cardiovascular:     Hypertension   CAD              ECG has been reviewed. EKG 6/24 NSR     Coronary Artery Disease:                            Hypertension         Pulmonary:        Sleep Apnea     Obstructive Sleep Apnea (NICKI).           Hepatic/GI:     GERD Liver Disease,   Taking GLP-1 Agonists     Gerd       Liver Disease        Musculoskeletal:  Arthritis        Arthritis          Neurological:    Neuromuscular Disease,           Arthritis                         Neuromuscular Disease   Endocrine:  Diabetes    Diabetes                      Dermatological:  Skin Normal    Psych:  Psychiatric Normal                    Physical Exam  General: Well nourished    Airway:  Mallampati: II / II  Mouth Opening: Normal  TM Distance: Normal  Tongue: Normal  Neck ROM: Normal ROM    Dental:  Intact        Anesthesia Plan  Type of Anesthesia, risks & benefits discussed:    Anesthesia Type: Gen Natural Airway  Intra-op Monitoring Plan: Standard ASA Monitors  Post Op Pain Control Plan: multimodal analgesia  Induction:  IV  Informed Consent: Informed consent signed with the Patient and all parties understand the risks and agree with anesthesia plan.  All questions answered. Patient consented to blood products? No  ASA Score: 3  Day of Surgery Review of History & Physical: H&P Update referred to the surgeon/provider.    Ready For Surgery From Anesthesia  Perspective.     .    Past Medical History:   Diagnosis Date    Arthritis     Cancer, uterine     Coronary artery disease     Diabetes mellitus     prediabetes    Diabetes mellitus, type 2     Digestive disorder     DM (diabetes mellitus) 08/2019    BS doesn't check 12/16/2019    DM (diabetes mellitus) 08/2019    BS doesn't check 06/29/2021    Hypertension     Metabolic dysfunction-associated steatotic liver disease (MASLD) 1/20/2025    Multiple thyroid nodules 7/8/2021    Ovarian cancer     Sciatica     Sleep apnea      Past Surgical History:   Procedure Laterality Date    CATARACT EXTRACTION W/  INTRAOCULAR LENS IMPLANT Right 11/17/2022    CATARACT EXTRACTION W/  INTRAOCULAR LENS IMPLANT Left 01/26/2023    CHOLECYSTECTOMY      COLONOSCOPY N/A 08/01/2018    Procedure: COLONOSCOPY;  Surgeon: Yrn Hunter III, MD;  Location: [facility];  Service: Endoscopy;  Laterality: N/A;    COLONOSCOPY N/A 06/30/2022    Procedure: COLONOSCOPY;  Surgeon: Mica Rodriguez MD;  Location: [facility];  Service: Endoscopy;  Laterality: N/A;    ESOPHAGOGASTRODUODENOSCOPY N/A 03/17/2021    Procedure: EGD (ESOPHAGOGASTRODUODENOSCOPY) (Dr. EMILIO billings);  Surgeon: Coy Ortiz MD;  Location: [facility];  Service: Endoscopy;  Laterality: N/A;    HYSTERECTOMY      JOINT REPLACEMENT Right     hip replacement    SURGICAL EXCISION OF ANAL LESION N/A 08/29/2018    Procedure: EXCISION, LESION, ANUS;  Surgeon: Yrn Balderrama MD;  Location: [facility];  Service: General;  Laterality: N/A;    TOTAL KNEE ARTHROPLASTY Bilateral      Medications Ordered Prior to Encounter[1]         [1]   Current Outpatient Medications on File Prior to Encounter   Medication Sig Dispense Refill    acetaminophen-codeine 300-30mg (TYLENOL #3) 300-30 mg Tab Take 1 tablet by mouth every 6 (six) hours as needed (pain). 12 tablet 0    amLODIPine (NORVASC) 5 MG tablet Take 1 tablet (5 mg total) by mouth once daily. 90 tablet 3    aspirin (ECOTRIN) 81 MG EC tablet  Take 1 tablet (81 mg total) by mouth once daily. 90 tablet 3    atorvastatin (LIPITOR) 40 MG tablet Take 1 tablet (40 mg total) by mouth every evening. 90 tablet 3    azelastine (ASTELIN) 137 mcg (0.1 %) nasal spray 1 spray (137 mcg total) by Nasal route 2 (two) times daily. 30 mL 11    benzocaine (HURRICAINE) 20 % Gel Use as directed in the mouth or throat 4 (four) times daily as needed.      betamethasone dipropionate (DIPROLENE) 0.05 % cream as needed.       candesartan (ATACAND) 16 MG tablet Take 1 tablet (16 mg total) by mouth once daily. 90 tablet 3    cetirizine (ZYRTEC) 10 MG tablet Take 10 mg by mouth once daily.      cholecalciferol, vitamin D3, 125 mcg (5,000 unit) Tab Take 1 tablet by mouth every morning.      dulaglutide (TRULICITY) 0.75 mg/0.5 mL pen injector Inject 0.75 mg into the skin every 7 days. 12 pen 3    gabapentin (NEURONTIN) 300 MG capsule TAKE 1 CAPSULE THREE TIMES A  capsule 3    meloxicam (MOBIC) 7.5 MG tablet Take 1 tablet (7.5 mg total) by mouth once daily. 90 tablet 3    metoprolol succinate (TOPROL-XL) 100 MG 24 hr tablet Take 1 tablet (100 mg total) by mouth once daily. 90 tablet 3    omeprazole (PRILOSEC) 20 MG capsule Take 20 mg by mouth once daily.      ondansetron (ZOFRAN) 4 MG tablet Take 1 tablet (4 mg total) by mouth every 6 (six) hours. 30 tablet 0    valACYclovir (VALTREX) 500 MG tablet Take 500 mg by mouth as needed.        No current facility-administered medications on file prior to encounter.

## 2025-02-27 ENCOUNTER — ANESTHESIA (OUTPATIENT)
Dept: ENDOSCOPY | Facility: HOSPITAL | Age: 75
End: 2025-02-27
Payer: MEDICARE

## 2025-02-27 ENCOUNTER — HOSPITAL ENCOUNTER (OUTPATIENT)
Dept: ENDOSCOPY | Facility: HOSPITAL | Age: 75
Discharge: HOME | End: 2025-02-27
Attending: INTERNAL MEDICINE
Payer: MEDICARE

## 2025-02-27 VITALS
TEMPERATURE: 97 F | OXYGEN SATURATION: 98 % | HEART RATE: 60 BPM | HEIGHT: 63 IN | WEIGHT: 154 LBS | BODY MASS INDEX: 27.29 KG/M2 | RESPIRATION RATE: 17 BRPM | SYSTOLIC BLOOD PRESSURE: 124 MMHG | DIASTOLIC BLOOD PRESSURE: 70 MMHG

## 2025-02-27 DIAGNOSIS — K74.69 OTHER CIRRHOSIS OF LIVER: Primary | ICD-10-CM

## 2025-02-27 DIAGNOSIS — K74.00 LIVER FIBROSIS: ICD-10-CM

## 2025-02-27 LAB — POCT GLUCOSE: 127 MG/DL (ref 70–110)

## 2025-02-27 PROCEDURE — 63600175 PHARM REV CODE 636 W HCPCS: Performed by: NURSE ANESTHETIST, CERTIFIED REGISTERED

## 2025-02-27 PROCEDURE — 27201012 HC FORCEPS, HOT/COLD, DISP

## 2025-02-27 PROCEDURE — 37000009 HC ANESTHESIA EA ADD 15 MINS

## 2025-02-27 PROCEDURE — 82962 GLUCOSE BLOOD TEST: CPT

## 2025-02-27 PROCEDURE — 37000008 HC ANESTHESIA 1ST 15 MINUTES

## 2025-02-27 RX ORDER — PROPOFOL 10 MG/ML
VIAL (ML) INTRAVENOUS
Status: DISCONTINUED | OUTPATIENT
Start: 2025-02-27 | End: 2025-02-27

## 2025-02-27 RX ORDER — SODIUM CHLORIDE 9 MG/ML
INJECTION, SOLUTION INTRAVENOUS CONTINUOUS
Status: DISCONTINUED | OUTPATIENT
Start: 2025-02-27 | End: 2025-02-28 | Stop reason: HOSPADM

## 2025-02-27 RX ORDER — LIDOCAINE HYDROCHLORIDE 20 MG/ML
INJECTION, SOLUTION EPIDURAL; INFILTRATION; INTRACAUDAL; PERINEURAL
Status: DISCONTINUED | OUTPATIENT
Start: 2025-02-27 | End: 2025-02-27

## 2025-02-27 RX ADMIN — PROPOFOL 20 MG: 10 INJECTION, EMULSION INTRAVENOUS at 08:02

## 2025-02-27 RX ADMIN — PROPOFOL 40 MG: 10 INJECTION, EMULSION INTRAVENOUS at 08:02

## 2025-02-27 RX ADMIN — LIDOCAINE HYDROCHLORIDE 70 MG: 20 INJECTION, SOLUTION EPIDURAL; INFILTRATION; INTRACAUDAL; PERINEURAL at 08:02

## 2025-02-27 RX ADMIN — PROPOFOL 50 MG: 10 INJECTION, EMULSION INTRAVENOUS at 08:02

## 2025-02-27 NOTE — DISCHARGE SUMMARY
[facility] - Endoscopy 1st Fl  Discharge Note  Short Stay    EGD      OUTCOME: Patient tolerated treatment/procedure well without complication and is now ready for discharge.    DISPOSITION: Home or Self Care    FINAL DIAGNOSIS:  <principal problem not specified>    FOLLOWUP: With primary care provider    DISCHARGE INSTRUCTIONS:  No discharge procedures on file.     TIME SPENT ON DISCHARGE: 20 minutes

## 2025-02-27 NOTE — TRANSFER OF CARE
"Anesthesia Transfer of Care Note    Patient: Venus Caldwell    Procedure(s) Performed: * No procedures listed *    Patient location: PACU    Anesthesia Type: general    Transport from OR: Transported from OR on room air with adequate spontaneous ventilation    Post pain: adequate analgesia    Post assessment: no apparent anesthetic complications and tolerated procedure well    Post vital signs: stable    Level of consciousness: sedated    Nausea/Vomiting: no nausea/vomiting    Complications: none    Transfer of care protocol was followed      Last vitals: Visit Vitals  /61 (BP Location: Right arm, Patient Position: Lying)   Pulse (!) 59   Temp 35.9 °C (96.6 °F) (Temporal)   Resp 16   Ht 5' 3" (1.6 m)   Wt 69.8 kg (153 lb 15.9 oz)   SpO2 98%   BMI 27.28 kg/m²     "

## 2025-02-27 NOTE — PLAN OF CARE
Discharge instructions reviewed with pt and spouse, handouts given, verbalized understanding with no further questions at this time. Dr. Hughes spoke to pt at bedside, reviewed procedure and answered questions aware they are awaiting biopsy results with MD telephone number provided per AVS sheet. VSS on RA, no pain or nausea noted, tolerating po fluids without difficulty, no other complaints noted. Fall precautions reviewed, consents in chart.

## 2025-02-27 NOTE — DISCHARGE INSTRUCTIONS
During your procedure today, you received medications for sedation.  These   medications may affect your judgment, balance and coordination.  Therefore,   for 24 hours, you have the following restrictions:   - DO NOT drive a car, operate machinery, make legal/financial decisions,   sign important papers or drink alcohol.    ACTIVITY:  Today: no heavy lifting, straining or running due to procedural   sedation/anesthesia.  The following day: return to full activity including work.  DIET:  Eat and drink normally unless instructed otherwise.                TREATMENT FOR COMMON SIDE EFFECTS:  - Mild abdominal pain, nausea, belching, bloating or excessive gas:  rest,   eat lightly and use a heating pad.  - Sore Throat: treat with throat lozenges and/or gargle with warm salt   water.  - Because air was used during the procedure, expelling large amounts of air   from your rectum or belching is normal.  - If a bowel prep was taken, you may not have a bowel movement for 1-3 days.    This is normal.  SYMPTOMS TO WATCH FOR AND REPORT TO YOUR PHYSICIAN:  1. Abdominal pain or bloating, other than gas cramps.  2. Chest pain.  3. Back pain.  4. Signs of infection such as: chills or fever occurring within 24 hours   after the procedure.  5. Rectal bleeding, which would show as bright red, maroon, or black stools.   (A tablespoon of blood from the rectum is not serious, especially if   hemorrhoids are present.)  6. Vomiting.  7. Weakness or dizziness.  GO DIRECTLY TO THE NEAREST EMERGENCY ROOM IF YOU HAVE ANY OF THE FOLLOWING:                 Difficulty breathing              Chills and/or fever over 101 F              Persistent vomiting and/or vomiting blood              Severe abdominal pain              Severe chest pain              Black, tarry stools              Bleeding- more than one tablespoon              Any other symptom or condition that you feel may need urgent attention    Your doctor recommends these additional  instructions:  If any biopsies were taken, your doctors clinic will contact you in 1 to 2   weeks with any results.  - Discharge patient to home.   - Resume previous diet.   - Continue present medications.    - Repeat colonoscopy in __ years for surveillance.   - Return to referring physician as previously scheduled.   - Patient has a contact number available for emergencies.  The signs and   symptoms of potential delayed complications were discussed with the   patient.  Return to normal activities tomorrow.  Written discharge   instructions were provided to the patient.  If you have any questions about the above instructions, call the GI   department at [phone] or call the endoscopy unit at [phone]   from 7am until 3 pm.  [facility] [place], EMERGENCY ROOM PHONE NUMBER:   [phone]  IF A COMPLICATION OR EMERGENCY SITUATION ARISES AND YOU ARE UNABLE TO REACH   YOUR PHYSICIAN - GO DIRECTLY TO THE EMERGENCY ROOM.  I have read or have had read to me these discharge instructions for my   procedure and have received a written copy.  I understand these   instructions and will follow-up with my physician if I have any questions.

## 2025-02-27 NOTE — H&P
Endoscopy History and Physical    PCP - Sourav Hernandez MD  Referring Physician - Nick Hughes MD  [address] [facility] [place] [address]      ASA - per anesthesia  Mallampati - per anesthesia  History of Anesthesia problems - no  Family history Anesthesia problems -  no   Plan of anesthesia - General    HPI  75 y.o. female    Planned Procedure: EGD  Diagnosis: cirrhosis  Chief Complaint: Same as above          ROS:  Constitutional: No fevers, chills, No weight loss  CV: No chest pain  Pulm: No cough, No shortness of breath  GI: see HPI    Medical History:  has a past medical history of Arthritis, Cancer, uterine, Coronary artery disease, Diabetes mellitus, Diabetes mellitus, type 2, Digestive disorder, DM (diabetes mellitus) (08/2019), DM (diabetes mellitus) (08/2019), Hypertension, Metabolic dysfunction-associated steatotic liver disease (MASLD) (1/20/2025), Multiple thyroid nodules (7/8/2021), Ovarian cancer, Sciatica, and Sleep apnea.    Surgical History:  has a past surgical history that includes Hysterectomy; Total knee arthroplasty (Bilateral); Colonoscopy (N/A, 08/01/2018); Cholecystectomy; Joint replacement (Right); Surgical excision of anal lesion (N/A, 08/29/2018); Esophagogastroduodenoscopy (N/A, 03/17/2021); Colonoscopy (N/A, 06/30/2022); Cataract extraction w/  intraocular lens implant (Right, 11/17/2022); and Cataract extraction w/  intraocular lens implant (Left, 01/26/2023).    Family History: family history includes Breast cancer in her maternal aunt; Cataracts in her maternal grandmother and mother; Diabetes in her brother and mother..    Social History:  reports that she has never smoked. She has never used smokeless tobacco. She reports that she does not drink alcohol and does not use drugs.    Review of patient's allergies indicates:   Allergen Reactions    Sulfa (sulfonamide antibiotics) Anaphylaxis     Pt became hypoxic after taking sulfa drugs as a child       Buprenorphine Rash       Medications:   Prescriptions Prior to Admission[1]    Physical Exam:    Vital Signs:   Vitals:    02/27/25 0734   BP: (!) 189/90   Pulse: (!) 59   Resp: 18   Temp: 96.6 °F (35.9 °C)       General Appearance: Well appearing in no acute distress  Abdomen: Soft, non tender, non distended with normal bowel sounds, no masses    Labs:  Lab Results   Component Value Date    WBC 7.08 02/20/2025    HGB 11.9 (L) 02/20/2025    HCT 36.8 (L) 02/20/2025     02/20/2025    CHOL 124 10/14/2024    TRIG 102 10/14/2024    HDL 37 (L) 10/14/2024    ALT 16 02/20/2025    AST 14 02/20/2025     02/20/2025    K 3.7 02/20/2025     02/20/2025    CREATININE 0.7 02/20/2025    BUN 16 02/20/2025    CO2 26 02/20/2025    TSH 0.554 08/16/2022    INR 0.9 02/20/2025    HGBA1C 5.7 (H) 10/14/2024       I have explained the risks and benefits of this endoscopic procedure to the patient including but not limited to bleeding, inflammation, infection, perforation, and death.    SEDATION PLAN: per anesthesia       History reviewed, vital signs satisfactory, cardiopulmonary status satisfactory, sedation options, risks and plans have been discussed with the patient  All their questions were answered and the patient agrees to the sedation procedures as planned and the patient is deemed an appropriate candidate for the sedation as planned.     The risks, benefits and alternatives of the procedure were discussed with the patient in detail. This discussion was had in the presence of endoscopy staff. The risks include, risks of adverse reaction to sedation requiring the use of reversal agents, bleeding requiring blood transfusion, perforation requiring surgical intervention and technical failure. Other risks include aspiration leading to respiratory distress and respiratory failure resulting in endotracheal intubation and mechanical ventilation including death. If anesthesia is being utilized for this procedure, it is up to  the anesthesiologist to determine airway safety including elective endotracheal intubation. Questions were answered, they agree to proceed. There was no language barriers.       Procedure explained to patient, informed consent obtained and placed in chart.       Nick Hughes MD       [1] (Not in a hospital admission)

## 2025-03-03 ENCOUNTER — RESULTS FOLLOW-UP (OUTPATIENT)
Dept: HEPATOLOGY | Facility: HOSPITAL | Age: 75
End: 2025-03-03

## 2025-03-06 ENCOUNTER — PATIENT MESSAGE (OUTPATIENT)
Dept: HEPATOLOGY | Facility: CLINIC | Age: 75
End: 2025-03-06
Payer: MEDICARE

## 2025-03-10 NOTE — TELEPHONE ENCOUNTER
Called patient and informed of results.  The patient states they understand and have no questions or concerns at this time.

## 2025-03-11 ENCOUNTER — PATIENT MESSAGE (OUTPATIENT)
Dept: INTERNAL MEDICINE | Facility: CLINIC | Age: 75
End: 2025-03-11
Payer: MEDICARE

## 2025-03-11 RX ORDER — AMLODIPINE BESYLATE 5 MG/1
5 TABLET ORAL DAILY
Qty: 7 TABLET | Refills: 0 | Status: SHIPPED | OUTPATIENT
Start: 2025-03-11 | End: 2025-03-18

## 2025-03-11 NOTE — TELEPHONE ENCOUNTER
No care due was identified.  [facility] Embedded Care Due Messages. Reference number: [id number].   3/11/2025 9:00:27 AM CDT

## 2025-03-20 ENCOUNTER — PATIENT MESSAGE (OUTPATIENT)
Dept: HEMATOLOGY/ONCOLOGY | Facility: CLINIC | Age: 75
End: 2025-03-20
Payer: MEDICARE

## 2025-04-01 ENCOUNTER — OFFICE VISIT (OUTPATIENT)
Dept: PODIATRY | Facility: CLINIC | Age: 75
End: 2025-04-01
Payer: MEDICARE

## 2025-04-01 ENCOUNTER — HOSPITAL ENCOUNTER (OUTPATIENT)
Dept: RADIOLOGY | Facility: HOSPITAL | Age: 75
Discharge: HOME | End: 2025-04-01
Attending: PODIATRIST
Payer: MEDICARE

## 2025-04-01 ENCOUNTER — PATIENT OUTREACH (OUTPATIENT)
Dept: ADMINISTRATIVE | Facility: HOSPITAL | Age: 75
End: 2025-04-01
Payer: MEDICARE

## 2025-04-01 DIAGNOSIS — M79.672 PAIN IN BOTH FEET: Primary | ICD-10-CM

## 2025-04-01 DIAGNOSIS — M79.672 LEFT FOOT PAIN: ICD-10-CM

## 2025-04-01 DIAGNOSIS — M79.672 PAIN IN BOTH FEET: ICD-10-CM

## 2025-04-01 DIAGNOSIS — L60.3 NAIL DYSTROPHY: ICD-10-CM

## 2025-04-01 DIAGNOSIS — M79.671 PAIN IN BOTH FEET: ICD-10-CM

## 2025-04-01 DIAGNOSIS — M79.671 PAIN IN BOTH FEET: Primary | ICD-10-CM

## 2025-04-01 DIAGNOSIS — E11.49 TYPE 2 DIABETES MELLITUS WITH NEUROLOGICAL MANIFESTATION: Primary | ICD-10-CM

## 2025-04-01 PROCEDURE — 99999 PR PBB SHADOW E&M-EST. PATIENT-LVL III: CPT | Mod: PBBFAC,,, | Performed by: PODIATRIST

## 2025-04-01 PROCEDURE — 73630 X-RAY EXAM OF FOOT: CPT | Mod: TC,50

## 2025-04-01 PROCEDURE — 11721 DEBRIDE NAIL 6 OR MORE: CPT | Mod: Q9,PBBFAC | Performed by: PODIATRIST

## 2025-04-01 PROCEDURE — 73630 X-RAY EXAM OF FOOT: CPT | Mod: 26,50,, | Performed by: RADIOLOGY

## 2025-04-01 PROCEDURE — 99213 OFFICE O/P EST LOW 20 MIN: CPT | Mod: PBBFAC,25 | Performed by: PODIATRIST

## 2025-04-02 NOTE — PROGRESS NOTES
Subjective:       Patient ID: Venus Caldwell is a 75 y.o. female.    Chief Complaint: Routine Foot Care (Patient is a diabetic and was last seen on 12.17.24 by Jaspal Contreras. She complains of 5/10 pain at present to left lateral foot. )      HPI: Patient presents to the office today for yearly diabetic foot exam and risk evaluation.  She presents with the chief complaint of elongated, thickened and dystrophic nail plates to the B/L foot. Patient also complains of pain to the left foot after sustaining a fall some time ago.  States 5/10 pain with ambulation.  Does have long history of arthritis.  This patient is a Diabetic Type II, complicated with  Peripheral Neuropathy. Patient does follow with Primary Care and/or Endocrinology for management of Diabetes Mellitus. This patient's PMD is Sourav Hernandez MD. This patient last saw his/her primary care provider on 12/17/2024.    Hemoglobin A1C   Date Value Ref Range Status   10/14/2024 5.7 (H) 4.0 - 5.6 % Final     Comment:     ADA Screening Guidelines:  5.7-6.4%  Consistent with prediabetes  >or=6.5%  Consistent with diabetes    High levels of fetal hemoglobin interfere with the HbA1C  assay. Heterozygous hemoglobin variants (HbS, HgC, etc)do  not significantly interfere with this assay.   However, presence of multiple variants may affect accuracy.     02/08/2024 5.8 (H) 4.0 - 5.6 % Final     Comment:     ADA Screening Guidelines:  5.7-6.4%  Consistent with prediabetes  >or=6.5%  Consistent with diabetes    High levels of fetal hemoglobin interfere with the HbA1C  assay. Heterozygous hemoglobin variants (HbS, HgC, etc)do  not significantly interfere with this assay.   However, presence of multiple variants may affect accuracy.     08/18/2023 5.6 4.0 - 5.6 % Final     Comment:     ADA Screening Guidelines:  5.7-6.4%  Consistent with prediabetes  >or=6.5%  Consistent with diabetes    High levels of fetal hemoglobin interfere with the HbA1C  assay.  Heterozygous hemoglobin variants (HbS, HgC, etc)do  not significantly interfere with this assay.   However, presence of multiple variants may affect accuracy.     .     Review of patient's allergies indicates:   Allergen Reactions    Sulfa (sulfonamide antibiotics) Anaphylaxis     Pt became hypoxic after taking sulfa drugs as a child      Buprenorphine Rash       Past Medical History:   Diagnosis Date    Arthritis     Cancer, uterine     Coronary artery disease     Diabetes mellitus     prediabetes    Diabetes mellitus, type 2     Digestive disorder     DM (diabetes mellitus) 08/2019    BS doesn't check 12/16/2019    DM (diabetes mellitus) 08/2019    BS doesn't check 06/29/2021    Hypertension     Metabolic dysfunction-associated steatotic liver disease (MASLD) 1/20/2025    Multiple thyroid nodules 7/8/2021    Ovarian cancer     Sciatica     Sleep apnea        Family History   Problem Relation Name Age of Onset    Diabetes Mother      Cataracts Mother      Diabetes Brother      Cataracts Maternal Grandmother      Breast cancer Maternal Aunt         Social History     Socioeconomic History    Marital status:     Number of children: 1   Occupational History    Occupation: Retired   Tobacco Use    Smoking status: Never    Smokeless tobacco: Never   Substance and Sexual Activity    Alcohol use: No    Drug use: No    Sexual activity: Not Currently     Social Drivers of Health     Financial Resource Strain: Low Risk  (11/5/2024)    Overall Financial Resource Strain (CARDIA)     Difficulty of Paying Living Expenses: Not hard at all   Food Insecurity: No Food Insecurity (11/5/2024)    Hunger Vital Sign     Worried About Running Out of Food in the Last Year: Never true     Ran Out of Food in the Last Year: Never true   Transportation Needs: No Transportation Needs (5/14/2024)    PRAPARE - Transportation     Lack of Transportation (Medical): No     Lack of Transportation (Non-Medical): No   Physical Activity: Unknown  (11/5/2024)    Exercise Vital Sign     Days of Exercise per Week: Patient declined   Stress: No Stress Concern Present (11/5/2024)    [employer] - Occupational Stress Questionnaire     Feeling of Stress : Not at all   Housing Stability: Unknown (11/5/2024)    Housing Stability Vital Sign     Unable to Pay for Housing in the Last Year: No       Past Surgical History:   Procedure Laterality Date    CATARACT EXTRACTION W/  INTRAOCULAR LENS IMPLANT Right 11/17/2022    CATARACT EXTRACTION W/  INTRAOCULAR LENS IMPLANT Left 01/26/2023    CHOLECYSTECTOMY      COLONOSCOPY N/A 08/01/2018    Procedure: COLONOSCOPY;  Surgeon: Yrn Hunter III, MD;  Location: [facility];  Service: Endoscopy;  Laterality: N/A;    COLONOSCOPY N/A 06/30/2022    Procedure: COLONOSCOPY;  Surgeon: Mica Rodriguez MD;  Location: [facility];  Service: Endoscopy;  Laterality: N/A;    ESOPHAGOGASTRODUODENOSCOPY N/A 03/17/2021    Procedure: EGD (ESOPHAGOGASTRODUODENOSCOPY) (Dr. EMILIO billings);  Surgeon: Coy Ortiz MD;  Location: [facility];  Service: Endoscopy;  Laterality: N/A;    HYSTERECTOMY      JOINT REPLACEMENT Right     hip replacement    SURGICAL EXCISION OF ANAL LESION N/A 08/29/2018    Procedure: EXCISION, LESION, ANUS;  Surgeon: Yrn Balderrama MD;  Location: [facility];  Service: General;  Laterality: N/A;    TOTAL KNEE ARTHROPLASTY Bilateral        Review of Systems       Objective:   There were no vitals taken for this visit.    Physical Exam  LOWER EXTREMITY PHYSICAL EXAMINATION    VASCULAR:  The right dorsalis pedis pulse 2/4 and the right posterior tibial pulse 2/4.  The left dorsalis pedis pulse 2/4 and posterior tibial pulse on the left is 2/4.  Capillary refill is intact.  Pedal hair growth intact     NEUROLOGY: Protective sensation is not intact to the left and right plantar surfaces of the foot and digits, as the patient has no sensation/detection at greater than 4 distinct points of contact  with 5.07 Modena Fritz monofilament. Sensation to light touch is intact on the left and right foot. Proprioception is intact, bilateral. Sensation to pin prick is reduced to absent. Vibratory sensation is diminished.    DERMATOLOGY:  Skin is supple, moist, intact.  Superficial blister present to the medial aspect of the left 1st metatarsal head.  No evidence of fat exposure.  No signs of surrounding erythema.  The R1, 2, 5 and left L1,2, 5 are thickened, discolored dystrophic.  There is subungual debris.  Nail plates have area of dark discoloration.  The remaining nails 3-4 on the right foot and the left foot are elongated but of normal color, thickness, and texture.   There is no signs of ingrowing into the medial or lateral borders.  There is no evidence of wounds or skin breakdown.  No edema or erythema.  No obvious lacerations or fissuring.  Interdigital spaces are clean, dry, intact.  No rashes or scars appreciated.    ORTHOPEDIC: Manual Muscle Testing is 5/5 in all planes on the left and right, without pains, with and without resistance.  Mild arthritis palpated to the dorsal foot.  Gait pattern is non-antalgic.    Assessment:     1. 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 Left foot pain    3. 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  Greater than 50% of this visit spent on counseling and coordination of care. Greater than 15 minutes of a 20 minute appointment spent on education about the diabetic foot, neuropathy, and prevention of limb loss.  Shoe inspection. Diabetic Foot Education. Patient reminded of the importance of good nutrition and blood sugar control to help prevent podiatric complications of diabetes. Patient instructed on proper foot hygeine.  We discussed wearing proper and supportive shoe gear, daily foot inspections, never walking barefooted or sock footed, never putting sharp instruments to feet which can cause major complications associated with infection, ulcers, lacerations.      Dystrophic nail plates, as outlined above (R#1,2,5  ; L#1,2,5 ), are sharply debrided with double action nail nipper, and/or with the assistance of a mechanical rotary elia, with removal of all offending nail and nail border(s), for reduction of pains. Nails are reduced in terms of length, width and girth with removal of subungual debris to facilitate pain free weight bearing and ambulation. The elongated nails as outlined in the objective portion of this note, were trimmed to appropriate length, with a double action nail nipper, for alleviation/reduction of pains as well. Follow up in approx. 3-4 months.    X-rays and taken today and interpreted by myself with patient in the room.  No acute fracture dislocation noted on x-ray imaging.  There is bilateral hallux valgus deformity with calcaneal spurring.    Future Appointments   Date Time Provider Department Center   4/14/2025  2:30 PM Teodora Chavez NP [facility]   4/23/2025  1:40 PM Sourav Hernandez MD [facility]   7/29/2025 10:10 AM [facility] [place]   [facility]   7/29/2025 11:20 AM [facility]

## 2025-04-11 ENCOUNTER — SSC ENCOUNTER (OUTPATIENT)
Dept: ADMINISTRATIVE | Facility: OTHER | Age: 75
End: 2025-04-11
Payer: MEDICARE

## 2025-04-11 ENCOUNTER — OUTPATIENT CASE MANAGEMENT (OUTPATIENT)
Dept: ADMINISTRATIVE | Facility: OTHER | Age: 75
End: 2025-04-11
Payer: MEDICARE

## 2025-04-11 NOTE — LETTER
Venus Caldwell  [address]  [place] [address]      Dear Venus,    Welcome to [facility] Complex Care Management Program.  It was a pleasure talking with you today.  My name is Meliza Kimym, and I look forward to being your Care Manager.  My goal is to help you function at the healthiest and highest level possible.  You can contact me directly at [phone].    As an [facility] patient, some of the services we may be able to provide include:     Development of an individualized care plan with a Registered Nurse   Connection with a   Connection with available resources and services    Coordinate communication among your care team members   Provide coaching and education   Help you understand your doctors treatment plan  Help you obtain information about your insurance coverage.     All services provided by [facility]s Complex Care Managers and other care team members are coordinated with and communicated to your primary care team.      As part of your enrollment, you will be receiving education materials and more information about these services in your [facility] account, by phone or through the mail.  If you do not wish to participate or receive information, please contact our office at [phone].      [facility] Patient Rights and Responsibilities available upon request.    Sincerely,        Meliza Oneal RN  [facility]   Outpatient RN Complex Care Manager

## 2025-04-11 NOTE — PROGRESS NOTES
Outpatient Care Management  Initial Patient Assessment    Patient: Venus Caldwell  MRN: [id number]  Date of Service: 04/11/2025  Completed by: Meliza Oneal RN  Referral Date:   Date of Eligibility: 4/11/2025  Program:   High Risk  Status: Ongoing  Effective Dates: 4/11/2025 - present  Responsible Staff: Meliza Oenal RN        Reason for Visit   Patient presents with    OPCM Enrollment Call       Brief Summary:  Venus Caldwell was referred by from a report (hospital discharge data) for high risk of rehospitalization. Patient qualifies for program based on high risk score of .   Active problem list, medical, surgical and social history reviewed. Active comorbidities include GERD, Primary osteoarthritis involving multiple joints, Chronic non-seasonal allergic rhinitis, CAD, Paroxysmal atrial tachycardia, Atherosclerosis of aorta, Sciatica, CHF, HTN, HLD, NICKI on CPAP, SVT, Osteopenia, Incidental pulmonary nodule, Iron deficiency anemia secondary to inadequate dietary iron intake, Multiple thyroid nodules, Nodule of spleen, Type II diabetes mellitus, and Liver fibrosis. Areas of need identified by patient include diabetes management, abdominal pain, and muscle weakness/fall risk.   Next steps: OPCM RN follow up on 4/18/25.    Disability Status  Is the patient alert and oriented (person, place, time, and situation)?: Alert and oriented x 4  Hearing Difficulty or Deaf: no  Visual Difficulty or Blind: yes  Visual and Hearing Conclusion Statement: She denies hearing needs and wears glasses for reading.  Difficulty Concentrating, Remembering or Making Decisions: no  Communication Difficulty: no  Eating/Swallowing Difficulty: no  Walking or Climbing Stairs Difficulty: yes  Walking or Climbing Stairs: ambulation difficulty, requires equipment  Dressing/Bathing Difficulty: no  Grooming: independent  Transferring (e.g., getting in and out of chairs): assistive equipment  Toileting : Independent  Continence :  Continence - Not a problem  Difficulty Managing Errands Independently: no  Equipment Currently Used at Home: glucometer; rollator  ADL Conclusion Statement: She is independent with ADLs and errand management. She has a rollator to use for ambulation as needed.  Change in Functional Status Since Onset of Current Illness/Injury: no        Spiritual Beliefs  Spiritual, Cultural Beliefs, Hoah[employer]ism Practices, Values that Affect Care: no      Social History     Socioeconomic History    Marital status:     Number of children: 1   Occupational History    Occupation: Retired   Tobacco Use    Smoking status: Never    Smokeless tobacco: Never   Substance and Sexual Activity    Alcohol use: No    Drug use: No    Sexual activity: Not Currently     Social Drivers of Health     Financial Resource Strain: Low Risk  (11/5/2024)    Overall Financial Resource Strain (CARDIA)     Difficulty of Paying Living Expenses: Not hard at all   Food Insecurity: No Food Insecurity (11/5/2024)    Hunger Vital Sign     Worried About Running Out of Food in the Last Year: Never true     Ran Out of Food in the Last Year: Never true   Transportation Needs: No Transportation Needs (5/14/2024)    PRAPARE - Transportation     Lack of Transportation (Medical): No     Lack of Transportation (Non-Medical): No   Physical Activity: Unknown (11/5/2024)    Exercise Vital Sign     Days of Exercise per Week: Patient declined   Stress: No Stress Concern Present (11/5/2024)    [employer] - Occupational Stress Questionnaire     Feeling of Stress : Not at all   Housing Stability: Unknown (11/5/2024)    Housing Stability Vital Sign     Unable to Pay for Housing in the Last Year: No       Roles and Relationships  Primary Source of Support/Comfort: spouse  Name of Support/Comfort Primary Source: Saleem Caldwell (Spouse)      Advance Directives (For Healthcare)  Advance Directive  (If Adv Dir status is received, view document under Adv Dir  in header or Chart Review Media tab): Patient does not have Advance Directive, requests information.  Patient Requests Assistance: Handouts provided        Patient Reported Insurance  Verified current insurance plan:: Medicare            4/11/2025     1:23 PM 6/25/2024    11:38 AM 2/10/2023     9:01 AM 1/18/2023     1:17 PM 2/16/2022    10:07 AM 7/8/2021    12:51 PM 2/24/2021     1:32 PM   Depression Patient Health Questionnaire   Over the last two weeks how often have you been bothered by little interest or pleasure in doing things Not at all Not at all Not at all  Not at all  Not at all  Not at all  Not at all    Over the last two weeks how often have you been bothered by feeling down, depressed or hopeless Not at all Not at all Not at all Not at all Not at all  Not at all  Not at all    PHQ-2 Total Score 0 0 0 0 0 0 0       Data saved with a previous flowsheet row definition       Learning Assessment       04/11/2025 1324 [facility] (4/11/2025 - Present)   Created by eMliza Oneal, RN - RN (Nurse) Status: Complete                 PRIMARY LEARNER     Primary Learner Name:  Venus Caldwell BW - 04/11/2025 1324    Relationship:  Patient BW - 04/11/2025 1324    Does the primary learner have any barriers to learning?:  Visual BW - 04/11/2025 1324    What is the preferred language of the primary learner?:  English BW - 04/11/2025 1324    Is an  required?:  No BW - 04/11/2025 1324    How does the primary learner prefer to learn new concepts?:  Listening, Reading BW - 04/11/2025 1324    How often do you need to have someone help you read instructions, pamphlets, or written material from your doctor or pharmacy?:  Rarely BW - 04/11/2025 1324        CO-LEARNER #1     Is an  required?:  No BW - 04/11/2025 1324        CO-LEARNER #2     Is an  required?:  No BW - 04/11/2025 1324        SPECIAL TOPICS     No question answered        ANSWERED BY:     No question answered        Comments          Edit History       Meliza Oneal, RN - RN (Nurse)   04/11/2025 0831

## 2025-04-14 ENCOUNTER — PATIENT OUTREACH (OUTPATIENT)
Dept: ADMINISTRATIVE | Facility: OTHER | Age: 75
End: 2025-04-14
Payer: MEDICARE

## 2025-04-14 ENCOUNTER — OFFICE VISIT (OUTPATIENT)
Dept: GASTROENTEROLOGY | Facility: CLINIC | Age: 75
End: 2025-04-14
Payer: MEDICARE

## 2025-04-14 ENCOUNTER — LAB VISIT (OUTPATIENT)
Dept: LAB | Facility: HOSPITAL | Age: 75
End: 2025-04-14
Attending: NURSE PRACTITIONER
Payer: MEDICARE

## 2025-04-14 VITALS
DIASTOLIC BLOOD PRESSURE: 78 MMHG | SYSTOLIC BLOOD PRESSURE: 126 MMHG | WEIGHT: 154.13 LBS | HEIGHT: 63 IN | BODY MASS INDEX: 27.31 KG/M2 | HEART RATE: 75 BPM

## 2025-04-14 DIAGNOSIS — R11.2 NAUSEA AND VOMITING, UNSPECIFIED VOMITING TYPE: ICD-10-CM

## 2025-04-14 DIAGNOSIS — R10.13 EPIGASTRIC PAIN: Primary | ICD-10-CM

## 2025-04-14 DIAGNOSIS — K21.9 GASTROESOPHAGEAL REFLUX DISEASE, UNSPECIFIED WHETHER ESOPHAGITIS PRESENT: ICD-10-CM

## 2025-04-14 DIAGNOSIS — R10.13 EPIGASTRIC PAIN: ICD-10-CM

## 2025-04-14 LAB
CREAT SERPL-MCNC: 0.7 MG/DL (ref 0.5–1.4)
GFR SERPLBLD CREATININE-BSD FMLA CKD-EPI: >60 ML/MIN/1.73/M2

## 2025-04-14 PROCEDURE — 99214 OFFICE O/P EST MOD 30 MIN: CPT | Mod: PBBFAC | Performed by: NURSE PRACTITIONER

## 2025-04-14 PROCEDURE — 99214 OFFICE O/P EST MOD 30 MIN: CPT | Mod: S$PBB,,, | Performed by: NURSE PRACTITIONER

## 2025-04-14 PROCEDURE — 82565 ASSAY OF CREATININE: CPT

## 2025-04-14 PROCEDURE — 36415 COLL VENOUS BLD VENIPUNCTURE: CPT

## 2025-04-14 PROCEDURE — 99999 PR PBB SHADOW E&M-EST. PATIENT-LVL IV: CPT | Mod: PBBFAC,,, | Performed by: NURSE PRACTITIONER

## 2025-04-14 RX ORDER — OMEPRAZOLE 40 MG/1
40 CAPSULE, DELAYED RELEASE ORAL DAILY
Qty: 90 CAPSULE | Refills: 3 | Status: SHIPPED | OUTPATIENT
Start: 2025-04-14 | End: 2026-04-14

## 2025-04-14 RX ORDER — OMEPRAZOLE 40 MG/1
40 CAPSULE, DELAYED RELEASE ORAL DAILY
Qty: 30 CAPSULE | Refills: 0 | Status: SHIPPED | OUTPATIENT
Start: 2025-04-14 | End: 2025-05-14

## 2025-04-14 RX ORDER — ONDANSETRON 4 MG/1
4 TABLET, ORALLY DISINTEGRATING ORAL EVERY 6 HOURS PRN
Qty: 30 TABLET | Refills: 5 | Status: SHIPPED | OUTPATIENT
Start: 2025-04-14

## 2025-04-14 NOTE — PROGRESS NOTES
Clinic Consult:  [facility] Consultation Note    Reason for Consult:  The primary encounter diagnosis was Epigastric pain. Diagnoses of Nausea and vomiting, unspecified vomiting type and Gastroesophageal reflux disease, unspecified whether esophagitis present were also pertinent to this visit.    PCP: Sourav Hernandez.   No address on file    HPI:  This is a 75 y.o. female here for evaluation of epigastric pain.   Chronic issues.   Eating causes symptoms. She feels like her food doesn't digest down. She will get nauseated and vomit sometimes.   She is on Trulicity and has been for the last couple of years.   She does feel that she may be having some constipation. She is having incomplete stools. Feels that she has stool sitting in rectum.   She had an EGD 2/27/25-- for EV. Had gastric polyps. Otherwise, normal.     Review of Systems   Constitutional:  Negative for fever and weight loss.   HENT:  Negative for sore throat.    Respiratory:  Negative for cough, shortness of breath and wheezing.    Cardiovascular:  Negative for chest pain and palpitations.   Gastrointestinal:  Positive for abdominal pain, nausea and vomiting.   Genitourinary:  Negative for dysuria and frequency.   Skin:  Negative for itching and rash.   Neurological:  Negative for dizziness, speech change, seizures, loss of consciousness and headaches.       Medical History:  has a past medical history of Arthritis, Cancer, uterine, Coronary artery disease, Diabetes mellitus, Diabetes mellitus, type 2, Digestive disorder, DM (diabetes mellitus) (08/2019), DM (diabetes mellitus) (08/2019), Hypertension, Metabolic dysfunction-associated steatotic liver disease (MASLD) (1/20/2025), Multiple thyroid nodules (7/8/2021), Ovarian cancer, Sciatica, and Sleep apnea.    Surgical History:  has a past surgical history that includes Hysterectomy; Total knee arthroplasty (Bilateral); Colonoscopy (N/A, 08/01/2018); Cholecystectomy; Joint replacement  "(Right); Surgical excision of anal lesion (N/A, 08/29/2018); Esophagogastroduodenoscopy (N/A, 03/17/2021); Colonoscopy (N/A, 06/30/2022); Cataract extraction w/  intraocular lens implant (Right, 11/17/2022); and Cataract extraction w/  intraocular lens implant (Left, 01/26/2023).    Family History: family history includes Breast cancer in her maternal aunt; Cataracts in her maternal grandmother and mother; Diabetes in her brother and mother..     Social History:  reports that she has never smoked. She has never used smokeless tobacco. She reports that she does not drink alcohol and does not use drugs.    Allergies: Reviewed    Home Medications:   Medications Ordered Prior to Encounter[1]    Physical Exam:  /78 (BP Location: Right arm, Patient Position: Sitting)   Pulse 75   Ht 5' 3" (1.6 m)   Wt 69.9 kg (154 lb 1.6 oz)   BMI 27.30 kg/m²   Body mass index is 27.3 kg/m².  Physical Exam  Constitutional:       General: She is not in acute distress.  HENT:      Head: Normocephalic.   Neurological:      General: No focal deficit present.      Mental Status: She is alert.   Psychiatric:         Mood and Affect: Mood normal.         Judgment: Judgment normal.         Labs: Pertinent labs reviewed.  CRC Screening:     Assessment:  1. Epigastric pain    2. Nausea and vomiting, unspecified vomiting type    3. Gastroesophageal reflux disease, unspecified whether esophagitis present        Recommendations:   - start Miralax every day  - CT scan   - omeprazole daily  - Zofran PRN    Epigastric pain  -     CT Abdomen Pelvis With IV Contrast Routine Oral Contrast; Future; Expected date: 04/14/2025  -     Creatinine, Serum; Future; Expected date: 04/14/2025    Nausea and vomiting, unspecified vomiting type  -     CT Abdomen Pelvis With IV Contrast Routine Oral Contrast; Future; Expected date: 04/14/2025  -     Creatinine, Serum; Future; Expected date: 04/14/2025  -     ondansetron (ZOFRAN-ODT) 4 MG TbDL; Take 1 tablet (4 " mg total) by mouth every 6 (six) hours as needed (nausea and vomiting).  Dispense: 30 tablet; Refill: 5    Gastroesophageal reflux disease, unspecified whether esophagitis present  -     omeprazole (PRILOSEC) 40 MG capsule; Take 1 capsule (40 mg total) by mouth once daily.  Dispense: 30 capsule; Refill: 0  -     omeprazole (PRILOSEC) 40 MG capsule; Take 1 capsule (40 mg total) by mouth once daily.  Dispense: 90 capsule; Refill: 3    Follow up to be determined after results/ procedure(s).     Thank you so much for allowing me to participate in the care of [facility]  GAMAL Cisneros         [1]   Current Outpatient Medications on File Prior to Visit   Medication Sig Dispense Refill    amLODIPine (NORVASC) 5 MG tablet Take 1 tablet (5 mg total) by mouth once daily. 90 tablet 3    atorvastatin (LIPITOR) 40 MG tablet Take 1 tablet (40 mg total) by mouth every evening. 90 tablet 3    azelastine (ASTELIN) 137 mcg (0.1 %) nasal spray 1 spray (137 mcg total) by Nasal route 2 (two) times daily. 30 mL 11    benzocaine (HURRICAINE) 20 % Gel Use as directed in the mouth or throat 4 (four) times daily as needed.      betamethasone dipropionate (DIPROLENE) 0.05 % cream as needed.       candesartan (ATACAND) 16 MG tablet Take 1 tablet (16 mg total) by mouth once daily. 90 tablet 3    cetirizine (ZYRTEC) 10 MG tablet Take 10 mg by mouth once daily.      cholecalciferol, vitamin D3, 125 mcg (5,000 unit) Tab Take 1 tablet by mouth every morning.      dulaglutide (TRULICITY) 0.75 mg/0.5 mL pen injector Inject 0.75 mg into the skin every 7 days. 12 pen 3    gabapentin (NEURONTIN) 300 MG capsule TAKE 1 CAPSULE THREE TIMES A  capsule 3    meloxicam (MOBIC) 7.5 MG tablet Take 1 tablet (7.5 mg total) by mouth once daily. 90 tablet 3    metoprolol succinate (TOPROL-XL) 100 MG 24 hr tablet Take 1 tablet (100 mg total) by mouth once daily. 90 tablet 3    ondansetron (ZOFRAN) 4 MG tablet Take 1 tablet (4 mg total) by  mouth every 6 (six) hours. 30 tablet 0    valACYclovir (VALTREX) 500 MG tablet Take 500 mg by mouth as needed.       [DISCONTINUED] omeprazole (PRILOSEC) 20 MG capsule Take 20 mg by mouth once daily.      [DISCONTINUED] acetaminophen-codeine 300-30mg (TYLENOL #3) 300-30 mg Tab Take 1 tablet by mouth every 6 (six) hours as needed (pain). 12 tablet 0    [DISCONTINUED] aspirin (ECOTRIN) 81 MG EC tablet Take 1 tablet (81 mg total) by mouth once daily. 90 tablet 3     No current facility-administered medications on file prior to visit.

## 2025-04-14 NOTE — PROGRESS NOTES
Community Health Worker attempted to contact patient via telephone to assist with . Requested call back.  Will attempt again at a later date.

## 2025-04-17 ENCOUNTER — OUTPATIENT CASE MANAGEMENT (OUTPATIENT)
Dept: ADMINISTRATIVE | Facility: OTHER | Age: 75
End: 2025-04-17
Payer: MEDICARE

## 2025-04-17 NOTE — PROGRESS NOTES
Outpatient Care Management  Plan of Care Follow Up Visit    Patient: Venus Caldwell  MRN: [id number]  Date of Service: 04/17/2025  Completed by: Meliza Oneal RN  Referral Date:     Reason for Visit   Patient presents with    OPCM RN Follow Up Call    Nursing Assessment       Brief Summary: OPCM RN followed up with Mrs. Donovan today for care plan review.    Next Steps:   Mrs. Donovan agreed to OPCM RN follow up on or around 5/1/25.  Reassess readiness to check blood glucoses.  Follow up regarding what helps relieve stress.  Follow up regarding diet.  Mrs. Donovan agreed to continue watching what she eats daily to try to pick healthier choices.

## 2025-04-22 ENCOUNTER — PATIENT OUTREACH (OUTPATIENT)
Dept: ADMINISTRATIVE | Facility: HOSPITAL | Age: 75
End: 2025-04-22
Payer: MEDICARE

## 2025-04-22 NOTE — PROGRESS NOTES
SUBJECTIVE:  The patient was seen in room.  He feels okay.  He denies any nausea or vomiting.  No chest pain.  His breathing is better.  He denies any abdominal pain or diarrhea.  No dysuria or hematuria.  He did have dialysis yesterday, tolerated well.    PHYSICAL EXAMINATION:  Vital Signs (last 24 hrs)_____  Last Charted___________  Temp Oral      98.6 F  (MAY 27 07:45)  Heart Rate Peripheral   72 bpm  (MAY 27 07:45)  Resp Rate          16 br/min  (MAY 27 07:45)  SBP      103 mmHg  (MAY 27 07:45)  DBP      66 mmHg  (MAY 27 07:45)    Intake Output Balance    05/27/2017 7a-3p    50     0    50 As of 11:36   3p-11p     0     0     0   11p-7a     0     0     0   Totals    50     0    50    05/26/2017 7a-3p    50  2000 -1950   3p-11p   300     0   300   11p-7a     0     0     0   Totals   350  2000 -1650 05/25/2017 7a-3p   773   400   373   3p-11p   150     0   150   11p-7a   850   400   450   Totals  1773   800   973    HEENT:  Unchanged.     NECK:  Supple.  No JVP elevation.  Good carotid upstroke and volume. He has a right neck tunneled dialysis catheter with no erythema, exudate, tenderness or warmth.    EXTREMITIES:  Reveal no bilateral upper extremity edema.  He has 1+ left lower extremity edema.  He has a right BKA that is fresh with postsurgical changes.      Labs (Last four charted values)  WBC                  9.1 (MAY 27) H 12.6 (MAY 26) 8.4 (MAY 25) 10.1 (MAY 23)   Hgb                  L 7.7 (MAY 27) L 7.9 (MAY 26) L 7.8 (MAY 26) L 9.6 (MAY 25)   Hct                  L 25 (MAY 27) L 26 (MAY 26) L 26 (MAY 26) L 32 (MAY 25)   Plt                  228 (MAY 27) 224 (MAY 26) 225 (MAY 25) 224 (MAY 23)   Na                   139 (MAY 27) 137 (MAY 26) 139 (MAY 25) 139 (MAY 25)   K                    4.3 (MAY 27) 4.6 (MAY 26) 3.6 (MAY 25) 3.6 (MAY 25)   CO2                  26 (MAY 27) 25 (MAY 26) 26 (MAY 25) 26 (MAY 25)   Cl                   103 (MAY 27) 101 (MAY 26) 101 (MAY 25) 101 (MAY 25)   Cr    Lab Report: patient rescheduled her labs from 4.29.25 to 4.14.25. did not have A1c done at that time. Will need A1c done. LVM for return call.                      H 5.12 (MAY 27) H 7.11 (MAY 26) H 5.68 (MAY 25) H 5.68 (MAY 25)   BUN                  H 38 (MAY 27) H 55 (MAY 26) H 43 (MAY 25) H 43 (MAY 25)   Glucose              H 106 (MAY 27) H 197 (MAY 26) H 103 (MAY 25) H 103 (MAY 25)   Mg                   1.8 (MAY 26) 1.8 (MAY 25)   Phos                 H 5.3 (MAY 27) H 6.4 (MAY 26) H 5.2 (MAY 25)   Ca                   L 8.0 (MAY 27) L 7.7 (MAY 26) 8.7 (MAY 25) 8.7 (MAY 25)   PT                   H 12.2 (MAY 24)   INR                  1.1 (MAY 24)     IMPRESSION:  The patient with chronic kidney disease, stage 5, on hemodialysis with diabetes, hypertension, peripheral vascular disease.  The patient is status post right BKA.  1. Chronic kidney disease, stage 5/end-stage renal disease.  no need for HD today.  will plan for next HD on Monday.  2. Hypertension is okay.  The patient does receive midodrine with dialysis usually.  3. Fluid overload.  We will continue with ultrafiltration and diuretics.  4. Anemia.  The patient is on Epogen.  5. Nutrition.  The patient is taking oral diet.  6. Disposition.  We will defer to other services.  Possible [facility].    7. This is discussed with the patient at length.  All questions were answered in detail.

## 2025-04-29 ENCOUNTER — HOSPITAL ENCOUNTER (OUTPATIENT)
Dept: RADIOLOGY | Facility: HOSPITAL | Age: 75
Discharge: HOME | End: 2025-04-29
Attending: NURSE PRACTITIONER
Payer: MEDICARE

## 2025-04-29 ENCOUNTER — RESULTS FOLLOW-UP (OUTPATIENT)
Dept: GASTROENTEROLOGY | Facility: CLINIC | Age: 75
End: 2025-04-29

## 2025-04-29 ENCOUNTER — PATIENT OUTREACH (OUTPATIENT)
Dept: ADMINISTRATIVE | Facility: HOSPITAL | Age: 75
End: 2025-04-29
Payer: MEDICARE

## 2025-04-29 DIAGNOSIS — R11.2 NAUSEA AND VOMITING, UNSPECIFIED VOMITING TYPE: ICD-10-CM

## 2025-04-29 DIAGNOSIS — D73.89 LESION OF SPLEEN: Primary | ICD-10-CM

## 2025-04-29 DIAGNOSIS — R10.13 EPIGASTRIC PAIN: ICD-10-CM

## 2025-04-29 PROCEDURE — 74177 CT ABD & PELVIS W/CONTRAST: CPT | Mod: TC

## 2025-04-29 PROCEDURE — 25500020 PHARM REV CODE 255: Performed by: NURSE PRACTITIONER

## 2025-04-29 PROCEDURE — 74177 CT ABD & PELVIS W/CONTRAST: CPT | Mod: 26,,, | Performed by: RADIOLOGY

## 2025-04-29 RX ADMIN — IOHEXOL 75 ML: 350 INJECTION, SOLUTION INTRAVENOUS at 08:04

## 2025-04-29 RX ADMIN — IOHEXOL 30 ML: 350 INJECTION, SOLUTION INTRAVENOUS at 07:04

## 2025-04-29 NOTE — PROGRESS NOTES
Lab visit report: Patient had a lab appointment scheduled on 4/29/25 that was rescheduled to 4/14/25, Hemoglobin A1c was not ordered.

## 2025-05-01 ENCOUNTER — OUTPATIENT CASE MANAGEMENT (OUTPATIENT)
Dept: ADMINISTRATIVE | Facility: OTHER | Age: 75
End: 2025-05-01
Payer: MEDICARE

## 2025-05-01 NOTE — PROGRESS NOTES
Outpatient Care Management  Plan of Care Follow Up Visit    Patient: Venus Caldwell  MRN: [id number]  Date of Service: 05/01/2025  Completed by: Meliza Oneal RN  Referral Date:     Reason for Visit   Patient presents with    OPCM RN Follow Up Call       Brief Summary: OPCM RN followed up with Mrs. Donovan today for care plan review.    Next Steps:   Mrs. Donovan agreed to OPCM RN follow up on or around 5/22/25.  Follow up regarding pain.  Follow up regarding abdominal pain.  Follow up regarding scheduling gynecologist and MRI appointments.  Mrs. Donovan agreed to schedule an appointment with a gynecologist and to have an MRI done today.

## 2025-05-11 DIAGNOSIS — K21.9 GASTROESOPHAGEAL REFLUX DISEASE, UNSPECIFIED WHETHER ESOPHAGITIS PRESENT: ICD-10-CM

## 2025-05-12 RX ORDER — OMEPRAZOLE 40 MG/1
40 CAPSULE, DELAYED RELEASE ORAL EVERY MORNING
Qty: 90 CAPSULE | Refills: 3 | Status: SHIPPED | OUTPATIENT
Start: 2025-05-12 | End: 2026-05-12

## 2025-05-29 ENCOUNTER — OUTPATIENT CASE MANAGEMENT (OUTPATIENT)
Dept: ADMINISTRATIVE | Facility: OTHER | Age: 75
End: 2025-05-29
Payer: MEDICARE

## 2025-05-29 NOTE — PROGRESS NOTES
Outpatient Care Management  Plan of Care Follow Up Visit    Patient: Venus Caldwell  MRN: [id number]  Date of Service: 05/29/2025  Completed by: Meliza Oneal RN  Referral Date:     Reason for Visit   Patient presents with    OPCM RN Follow Up Call       Brief Summary: OPCM RN followed up with Mrs. Donovan today for care plan review.    Next Steps:   Mrs. Donovan agreed to OPCM RN follow up on or around 6/19/25.  Follow up regarding Hgb A1C.  Revisit daily blood glucose monitoring.  Mrs. Donovan agreed to check her blood sugar daily if it feels like it gets low again.

## 2025-06-04 ENCOUNTER — LAB VISIT (OUTPATIENT)
Dept: LAB | Facility: HOSPITAL | Age: 75
End: 2025-06-04
Attending: FAMILY MEDICINE
Payer: MEDICARE

## 2025-06-04 ENCOUNTER — OFFICE VISIT (OUTPATIENT)
Dept: INTERNAL MEDICINE | Facility: CLINIC | Age: 75
End: 2025-06-04
Payer: MEDICARE

## 2025-06-04 VITALS
OXYGEN SATURATION: 97 % | SYSTOLIC BLOOD PRESSURE: 128 MMHG | WEIGHT: 151 LBS | BODY MASS INDEX: 26.75 KG/M2 | HEART RATE: 75 BPM | HEIGHT: 63 IN | DIASTOLIC BLOOD PRESSURE: 80 MMHG | TEMPERATURE: 98 F

## 2025-06-04 DIAGNOSIS — I25.10 CORONARY ARTERY DISEASE INVOLVING NATIVE CORONARY ARTERY OF NATIVE HEART WITHOUT ANGINA PECTORIS: ICD-10-CM

## 2025-06-04 DIAGNOSIS — E78.2 MIXED DIABETIC HYPERLIPIDEMIA ASSOCIATED WITH TYPE 2 DIABETES MELLITUS: ICD-10-CM

## 2025-06-04 DIAGNOSIS — I70.0 ATHEROSCLEROSIS OF AORTA: Chronic | ICD-10-CM

## 2025-06-04 DIAGNOSIS — E11.69 MIXED DIABETIC HYPERLIPIDEMIA ASSOCIATED WITH TYPE 2 DIABETES MELLITUS: ICD-10-CM

## 2025-06-04 DIAGNOSIS — I47.19 PAROXYSMAL ATRIAL TACHYCARDIA: Chronic | ICD-10-CM

## 2025-06-04 DIAGNOSIS — K74.00 LIVER FIBROSIS: Chronic | ICD-10-CM

## 2025-06-04 DIAGNOSIS — I47.10 SVT (SUPRAVENTRICULAR TACHYCARDIA): Chronic | ICD-10-CM

## 2025-06-04 DIAGNOSIS — K21.9 GASTROESOPHAGEAL REFLUX DISEASE WITHOUT ESOPHAGITIS: Chronic | ICD-10-CM

## 2025-06-04 DIAGNOSIS — I15.2 HYPERTENSION ASSOCIATED WITH DIABETES: Chronic | ICD-10-CM

## 2025-06-04 DIAGNOSIS — E11.49 TYPE II DIABETES MELLITUS WITH NEUROLOGICAL MANIFESTATIONS: Chronic | ICD-10-CM

## 2025-06-04 DIAGNOSIS — E04.2 MULTIPLE THYROID NODULES: Chronic | ICD-10-CM

## 2025-06-04 DIAGNOSIS — I50.32 CHRONIC HEART FAILURE WITH PRESERVED EJECTION FRACTION: Chronic | ICD-10-CM

## 2025-06-04 DIAGNOSIS — E11.49 TYPE II DIABETES MELLITUS WITH NEUROLOGICAL MANIFESTATIONS: Primary | Chronic | ICD-10-CM

## 2025-06-04 DIAGNOSIS — K76.0 METABOLIC DYSFUNCTION-ASSOCIATED STEATOTIC LIVER DISEASE (MASLD): Chronic | ICD-10-CM

## 2025-06-04 DIAGNOSIS — E11.59 HYPERTENSION ASSOCIATED WITH DIABETES: Chronic | ICD-10-CM

## 2025-06-04 LAB
EAG (OHS): 114 MG/DL (ref 68–131)
HBA1C MFR BLD: 5.6 % (ref 4–5.6)

## 2025-06-04 PROCEDURE — 36415 COLL VENOUS BLD VENIPUNCTURE: CPT

## 2025-06-04 PROCEDURE — 99999 PR PBB SHADOW E&M-EST. PATIENT-LVL III: CPT | Mod: PBBFAC,,, | Performed by: FAMILY MEDICINE

## 2025-06-04 PROCEDURE — 83036 HEMOGLOBIN GLYCOSYLATED A1C: CPT

## 2025-06-04 PROCEDURE — 99213 OFFICE O/P EST LOW 20 MIN: CPT | Mod: PBBFAC | Performed by: FAMILY MEDICINE

## 2025-06-04 RX ORDER — ASPIRIN 81 MG/1
81 TABLET ORAL DAILY
Start: 2025-06-04 | End: 2026-06-04

## 2025-06-05 ENCOUNTER — RESULTS FOLLOW-UP (OUTPATIENT)
Dept: INTERNAL MEDICINE | Facility: CLINIC | Age: 75
End: 2025-06-05

## 2025-06-09 ENCOUNTER — PATIENT MESSAGE (OUTPATIENT)
Dept: INTERNAL MEDICINE | Facility: CLINIC | Age: 75
End: 2025-06-09
Payer: MEDICARE

## 2025-06-11 NOTE — TELEPHONE ENCOUNTER
No care due was identified.  [facility] Embedded Care Due Messages. Reference number: [id number].   6/11/2025 7:34:04 AM CDT

## 2025-06-13 NOTE — PROGRESS NOTES
VBC OUTREACH: per chart review pt is not DUE for any HM Topics at this time.   Call patient his labs look great

## 2025-06-16 ENCOUNTER — HOSPITAL ENCOUNTER (OUTPATIENT)
Dept: RADIOLOGY | Facility: HOSPITAL | Age: 75
Discharge: HOME | End: 2025-06-16
Attending: FAMILY MEDICINE
Payer: MEDICARE

## 2025-06-16 ENCOUNTER — OFFICE VISIT (OUTPATIENT)
Dept: OBSTETRICS AND GYNECOLOGY | Facility: CLINIC | Age: 75
End: 2025-06-16
Payer: MEDICARE

## 2025-06-16 VITALS
WEIGHT: 151.88 LBS | SYSTOLIC BLOOD PRESSURE: 138 MMHG | HEIGHT: 63 IN | BODY MASS INDEX: 26.91 KG/M2 | DIASTOLIC BLOOD PRESSURE: 80 MMHG

## 2025-06-16 DIAGNOSIS — Z90.710 S/P HYSTERECTOMY: ICD-10-CM

## 2025-06-16 DIAGNOSIS — N95.2 VAGINAL ATROPHY: ICD-10-CM

## 2025-06-16 DIAGNOSIS — N81.10 BADEN-WALKER GRADE 3 CYSTOCELE: Primary | ICD-10-CM

## 2025-06-16 DIAGNOSIS — Z85.42 HISTORY OF UTERINE CANCER: ICD-10-CM

## 2025-06-16 DIAGNOSIS — Z85.43 HISTORY OF OVARIAN CANCER: ICD-10-CM

## 2025-06-16 DIAGNOSIS — E04.2 MULTIPLE THYROID NODULES: Chronic | ICD-10-CM

## 2025-06-16 PROCEDURE — 76536 US EXAM OF HEAD AND NECK: CPT | Mod: TC

## 2025-06-16 PROCEDURE — 99214 OFFICE O/P EST MOD 30 MIN: CPT | Mod: PBBFAC | Performed by: NURSE PRACTITIONER

## 2025-06-16 PROCEDURE — 99999 PR PBB SHADOW E&M-EST. PATIENT-LVL IV: CPT | Mod: PBBFAC,,, | Performed by: NURSE PRACTITIONER

## 2025-06-16 PROCEDURE — 76536 US EXAM OF HEAD AND NECK: CPT | Mod: 26,,, | Performed by: RADIOLOGY

## 2025-06-16 NOTE — PROGRESS NOTES
"    Venus Caldwell is a 75 y.o. female  presents for she is feeling a "bubble" when she wipes for the past few months.  LMP: No LMP recorded. Patient has had a hysterectomy..    Denies any pain or urination issues  Since had colonoscopy and polyps were found may have some stool issues    Hysterectomy with BSO done due to uterine and ovarian cancer at age 59 yo - radiation was done post surgery - no issues since    Health Maintenance Topics with due status: Not Due       Topic Last Completion Date    TETANUS VACCINE 09/10/2021    DEXA Scan 2023    Diabetic Eye Exam 10/04/2024    Diabetes Urine Screening 10/14/2024    Lipid Panel 10/14/2024    Foot Exam 10/23/2024    Aspirin/Antiplatelet Therapy 2025    Hemoglobin A1c 2025     Past Medical History:   Diagnosis Date    Arthritis     Cancer, uterine     Coronary artery disease     Diabetes mellitus     prediabetes    Diabetes mellitus, type 2     Digestive disorder     DM (diabetes mellitus) 2019    BS doesn't check 2019    DM (diabetes mellitus) 2019    BS doesn't check 2021    Hypertension     Metabolic dysfunction-associated steatotic liver disease (MASLD) 2025    Multiple thyroid nodules 2021    Ovarian cancer     Sciatica     Sleep apnea      Past Surgical History:   Procedure Laterality Date    CATARACT EXTRACTION W/  INTRAOCULAR LENS IMPLANT Right 2022    CATARACT EXTRACTION W/  INTRAOCULAR LENS IMPLANT Left 2023    CHOLECYSTECTOMY      COLONOSCOPY N/A 2018    Procedure: COLONOSCOPY;  Surgeon: Yrn Hunter III, MD;  Location: [facility];  Service: Endoscopy;  Laterality: N/A;    COLONOSCOPY N/A 2022    Procedure: COLONOSCOPY;  Surgeon: Mica Rodriguez MD;  Location: [facility];  Service: Endoscopy;  Laterality: N/A;    ESOPHAGOGASTRODUODENOSCOPY N/A 2021    Procedure: EGD (ESOPHAGOGASTRODUODENOSCOPY) (Dr. EMILIO billings);  Surgeon: Coy Ortiz MD;  Location: [facility]" "ENDO;  Service: Endoscopy;  Laterality: N/A;    EYE SURGERY  2023    Cataract    HYSTERECTOMY      JOINT REPLACEMENT Right     hip replacement    SURGICAL EXCISION OF ANAL LESION N/A 2018    Procedure: EXCISION, LESION, ANUS;  Surgeon: Yrn Balderrama MD;  Location: [facility];  Service: General;  Laterality: N/A;    TOTAL KNEE ARTHROPLASTY Bilateral      Social History[1]  Family History   Problem Relation Name Age of Onset    Diabetes Mother Jory     Cataracts Mother [place]     Arthritis Mother Jory     Diabetes Brother 3     Alcohol abuse Brother 3     Hypertension Brother 3     Kidney disease Brother 3     Cataracts Maternal Grandmother      Breast cancer Maternal Aunt Moms sis     Cancer Maternal Aunt Moms sis     Hypertension Father [place]     Alcohol abuse Brother Sven     Kidney disease Brother 1      OB History          1    Para   1    Term   1            AB        Living   1         SAB        IAB        Ectopic        Multiple        Live Births                     /80 (BP Location: Left arm, Patient Position: Sitting)   Ht 5' 3" (1.6 m)   Wt 68.9 kg (151 lb 14.4 oz)   BMI 26.91 kg/m²       ROS:  Per hpi    PHYSICAL EXAM:  APPEARANCE: Well nourished, well developed, in no acute distress.  AFFECT: WNL, alert and oriented x 3    PELVIC: Normal external genitalia without lesions.  Normal hair distribution.  Adequate perineal body, normal urethral meatus.  Vagina atrophic without lesions or discharge.   Grade 3 cystocele with rectocele.  Bimanual exam shows uterus and cervix absent.  Adnexa absent  EXTREMITIES: No edema.  Physical Exam    1. [place] grade 3 cystocele        2. Vaginal atrophy        3. S/P hysterectomy        4. History of ovarian cancer        5. History of uterine cancer         AND PLAN:    Diagnoses and all orders for this visit:    [place] grade 3 cystocele    Vaginal atrophy    S/P hysterectomy    History of ovarian cancer    History " of uterine cancer     Pt not having pain or urine issues  At this time will just monitor  Educated on cystocele and information given in AVS for pt to review  F/u prn and in 2 yrs     Patient was counseled today on A.C.S. Pap guidelines and recommendations for yearly pelvic exams, mammograms and monthly self breast exams; to see her PCP for other health maintenance.                     Answers submitted by the patient for this visit:  Mass Questionnaire (Submitted on 6/16/2025)  Chief Complaint: Pulmonary/Chest Tumor/Mass  Chronicity: new  Onset: more than 1 month ago  Frequency: constantly  Progression since onset: unchanged  abdominal pain: No  anorexia: No  change in bowel habit: No  chest pain: No  chills: No  congestion: No  cough: No  diaphoresis: No  fatigue: No  fever: No  headaches: No  myalgias: No  nausea: No  neck pain: No  numbness: No  rash: No  swollen glands: No  urinary symptoms: No  vertigo: No  visual change: No  vomiting: No  weakness: No         [1]   Social History  Socioeconomic History    Marital status:     Number of children: 1   Occupational History    Occupation: Retired   Tobacco Use    Smoking status: Never    Smokeless tobacco: Never   Substance and Sexual Activity    Alcohol use: No    Drug use: Never    Sexual activity: Not Currently     Partners: Male     Social Drivers of Health     Financial Resource Strain: Low Risk  (4/17/2025)    Overall Financial Resource Strain (CARDIA)     Difficulty of Paying Living Expenses: Not hard at all   Food Insecurity: No Food Insecurity (4/17/2025)    Hunger Vital Sign     Worried About Running Out of Food in the Last Year: Never true     Ran Out of Food in the Last Year: Never true   Transportation Needs: No Transportation Needs (4/17/2025)    PRAPARE - Transportation     Lack of Transportation (Medical): No     Lack of Transportation (Non-Medical): No   Physical Activity: Inactive (4/17/2025)    Exercise Vital Sign     Days of Exercise per  Week: 0 days     Minutes of Exercise per Session: 0 min   Stress: Stress Concern Present (4/17/2025)    [place] [employer] - Occupational Stress Questionnaire     Feeling of Stress : Very much   Housing Stability: Low Risk  (4/17/2025)    Housing Stability Vital Sign     Unable to Pay for Housing in the Last Year: No     Number of Times Moved in the Last Year: 0     Homeless in the Last Year: No

## 2025-06-19 ENCOUNTER — OUTPATIENT CASE MANAGEMENT (OUTPATIENT)
Dept: ADMINISTRATIVE | Facility: OTHER | Age: 75
End: 2025-06-19
Payer: MEDICARE

## 2025-06-19 NOTE — PROGRESS NOTES
Outpatient Care Management  Plan of Care Follow Up Visit    Patient: Venus Caldwell  MRN: [id number]  Date of Service: 06/19/2025  Completed by: Meliza Oneal RN  Referral Date:     Reason for Visit   Patient presents with    OPCM RN Follow Up Call       Brief Summary: OPCM RN followed up with Mrs. Venus mackey for care plan review.    Next Steps:   OPCM RN follow up on 7/10/25.

## 2025-06-24 NOTE — ASSESSMENT & PLAN NOTE
Start CPAP of 7. (DME - [facility])     Discussed therapeutic goals for positive airway pressure therapy(CPAP or BiPAP): Ideal is usage 100% of nights for 6 - 8 hours per night. Minimum usage is 70% of night for at least 4 hours per night used. Pateint expressed understanding. All Questions answered.    Complaince download in 6 weeks.    Detail Level: Zone

## 2025-07-17 ENCOUNTER — OUTPATIENT CASE MANAGEMENT (OUTPATIENT)
Dept: ADMINISTRATIVE | Facility: OTHER | Age: 75
End: 2025-07-17
Payer: MEDICARE

## 2025-07-17 NOTE — PROGRESS NOTES
Outpatient Care Management  Plan of Care Follow Up Visit    Patient: Venus Caldwell  MRN: [id number]  Date of Service: 07/17/2025  Completed by: Meliza Oneal RN  Referral Date:     Reason for Visit   Patient presents with    OPCM RN Follow Up Call    Case Closure     Graduation.

## 2025-07-27 NOTE — PROGRESS NOTES
Hepatology Note    PATIENT: Venus Caldwell  MRN: [id number]  DATE: 7/29/2025    Provider: Hepatologist - Dr Hughes  Urgency of review: non-urgent  Referring provider: No ref. provider found    Diagnosis:   1. Liver fibrosis    2. Coronary artery disease involving native coronary artery of native heart without angina pectoris    3. Metabolic dysfunction-associated steatotic liver disease (MASLD)    4. Gastroesophageal reflux disease without esophagitis    5. Type II diabetes mellitus with neurological manifestations          Chief complaint:   Chief Complaint   Patient presents with    Hepatic Disease       Subjective:    Initial History: Venus Caldwell is a 75 y.o. female with HFpEF, HTN, PSVT, DM, obesity, OA, uterine CA, GERD who was following to Hepatology Clinic for consultation of liver cirrhosis      07/29/2025   Complains of abdominal discomfort  DM well controlled  She denies recent hematemesis, coffee ground emesis, melena, hematochezia, jaundice, confusion, LE edema, abdominal distension.  Normal EGD    1/25  Patient has new diagnosis of  chronic liver disease on imaging. US 6/24 reported as Coarse echotexture of the liver with mild splenomegaly could reflect cirrhosis and mild portal hypertension.     Prior Relevant History:    She  denies hepatotoxic medication    Review of systems:  A review of 12+ systems was conducted with pertinent positive and negative findings documented in HPI with all other systems reviewed and negative.      PFSH:  Past medical, family, and social history reviewed as documented in chart with pertinent positive medical, family, and social history detailed in HPI.    Past Medical History:   Past Medical History:   Diagnosis Date    Arthritis     Cancer, uterine     Coronary artery disease     Diabetes mellitus     prediabetes    Diabetes mellitus, type 2     Digestive disorder     DM (diabetes mellitus) 08/2019    BS doesn't check 12/16/2019    DM (diabetes mellitus)  08/2019    BS doesn't check 06/29/2021    Hypertension     Metabolic dysfunction-associated steatotic liver disease (MASLD) 1/20/2025    Multiple thyroid nodules 7/8/2021    Ovarian cancer     Sciatica     Sleep apnea        Past Surgical HIstory:   Past Surgical History:   Procedure Laterality Date    CATARACT EXTRACTION W/  INTRAOCULAR LENS IMPLANT Right 11/17/2022    CATARACT EXTRACTION W/  INTRAOCULAR LENS IMPLANT Left 01/26/2023    CHOLECYSTECTOMY      COLONOSCOPY N/A 08/01/2018    Procedure: COLONOSCOPY;  Surgeon: Yrn Hunter III, MD;  Location: [facility] ENDO;  Service: Endoscopy;  Laterality: N/A;    COLONOSCOPY N/A 06/30/2022    Procedure: COLONOSCOPY;  Surgeon: iMca Rodriguez MD;  Location: [facility] ENDO;  Service: Endoscopy;  Laterality: N/A;    ESOPHAGOGASTRODUODENOSCOPY N/A 03/17/2021    Procedure: EGD (ESOPHAGOGASTRODUODENOSCOPY) (Dr. EMILIO billings);  Surgeon: Coy Ortiz MD;  Location: [facility];  Service: Endoscopy;  Laterality: N/A;    EYE SURGERY  1/26/2023    Cataract    JOINT REPLACEMENT Right     hip replacement    SURGICAL EXCISION OF ANAL LESION N/A 08/29/2018    Procedure: EXCISION, LESION, ANUS;  Surgeon: Yrn Balderrama MD;  Location: [facility] OR;  Service: General;  Laterality: N/A;    TOTAL ABDOMINAL HYSTERECTOMY W/ BILATERAL SALPINGOOPHORECTOMY  2008    had radiation post due to cancer of uterus and ovaries    TOTAL KNEE ARTHROPLASTY Bilateral        Family History:   Family History   Problem Relation Name Age of Onset    Diabetes Mother [place]     Cataracts Mother Jory     Arthritis Mother [place]     Diabetes Brother 3     Alcohol abuse Brother 3     Hypertension Brother 3     Kidney disease Brother 3     Cataracts Maternal Grandmother      Breast cancer Maternal Aunt Moms sis     Cancer Maternal Aunt Moms sis     Hypertension Father Brandon     Alcohol abuse Brother Sven     Kidney disease Brother 1      She has no known family history of liver disease.     Social History:   reports that she has never smoked. She has never used smokeless tobacco. She reports that she does not drink alcohol and does not use drugs.    She has no history of Alcohol     She denies history of IV drug use/Tatto  She  denies high-risk sexual contacts, no raw seafood, no sick contacts      Allergies:  Review of patient's allergies indicates:   Allergen Reactions    Buprenorphine Rash       Medications:  Current Outpatient Medications   Medication Sig Dispense Refill    amLODIPine (NORVASC) 5 MG tablet Take 1 tablet (5 mg total) by mouth once daily. 90 tablet 3    aspirin (ECOTRIN) 81 MG EC tablet Take 1 tablet (81 mg total) by mouth once daily.      atorvastatin (LIPITOR) 40 MG tablet Take 1 tablet (40 mg total) by mouth every evening. 90 tablet 3    betamethasone dipropionate (DIPROLENE) 0.05 % cream as needed.       candesartan (ATACAND) 16 MG tablet Take 1 tablet (16 mg total) by mouth once daily. 90 tablet 3    cetirizine (ZYRTEC) 10 MG tablet Take 10 mg by mouth once daily.      dulaglutide (TRULICITY) 0.75 mg/0.5 mL pen injector Inject 0.75 mg into the skin every 7 days. 12 pen 3    fluticasone propionate (CUTIVATE) 0.05 % cream Apply 1 g topically once daily.      gabapentin (NEURONTIN) 300 MG capsule TAKE 1 CAPSULE THREE TIMES A  capsule 3    hydrocortisone 2.5 % cream Apply 1 g topically 2 (two) times daily as needed.      meloxicam (MOBIC) 7.5 MG tablet Take 1 tablet (7.5 mg total) by mouth once daily. 90 tablet 3    metoprolol succinate (TOPROL-XL) 100 MG 24 hr tablet Take 1 tablet (100 mg total) by mouth once daily. 90 tablet 3    mupirocin (BACTROBAN) 2 % ointment Apply 1 g topically 2 (two) times daily as needed.      omeprazole (PRILOSEC) 40 MG capsule Take 1 capsule (40 mg total) by mouth once daily. 90 capsule 3    ondansetron (ZOFRAN-ODT) 4 MG TbDL Take 1 tablet (4 mg total) by mouth every 6 (six) hours as needed (nausea and vomiting). 30 tablet 5    valACYclovir (VALTREX) 500 MG tablet  "Take 500 mg by mouth as needed.       cholecalciferol, vitamin D3, 125 mcg (5,000 unit) Tab Take 1 tablet by mouth every morning.       No current facility-administered medications for this visit.       Review of Systems   Constitutional:  Negative for fatigue, fever and unexpected weight change.   HENT:  Negative for ear pain, nosebleeds and trouble swallowing.    Eyes:  Negative for discharge and redness.   Respiratory:  Negative for cough and shortness of breath.    Cardiovascular:  Negative for palpitations and leg swelling.   Gastrointestinal:  Negative for abdominal distention, abdominal pain, diarrhea and vomiting.   Endocrine: Negative for cold intolerance and polyuria.   Genitourinary:  Negative for flank pain and hematuria.   Musculoskeletal:  Negative for back pain.   Skin:  Negative for pallor.   Neurological:  Negative for seizures and headaches.   Hematological:  Does not bruise/bleed easily.   Psychiatric/Behavioral:  Negative for confusion and hallucinations.        MELD 3.0: 7 at 2/20/2025 10:01 AM  MELD-Na: 6 at 2/20/2025 10:01 AM  Calculated from:  Serum Creatinine: 0.7 mg/dL (Using min of 1 mg/dL) at 2/20/2025 10:01 AM  Serum Sodium: 143 mmol/L (Using max of 137 mmol/L) at 2/20/2025 10:01 AM  Total Bilirubin: 0.5 mg/dL (Using min of 1 mg/dL) at 2/20/2025 10:01 AM  Serum Albumin: 3.6 g/dL (Using max of 3.5 g/dL) at 2/20/2025 10:01 AM  INR(ratio): 0.9 (Using min of 1) at 2/20/2025 10:01 AM  Age at listing (hypothetical): 75 years  Sex: Female at 2/20/2025 10:01 AM       Objective:      Vitals:   Vitals:    07/29/25 1022   BP: 126/72   BP Location: Left arm   Patient Position: Sitting   TempSrc: Other (see comments)   Weight: 68.1 kg (150 lb 2.1 oz)   Height: 5' 3" (1.6 m)       Physical Exam  Constitutional:       Appearance: Normal appearance.   HENT:      Head: Normocephalic and atraumatic.      Right Ear: Tympanic membrane and external ear normal.      Left Ear: Tympanic membrane and external " "ear normal.      Mouth/Throat:      Mouth: Mucous membranes are moist.   Eyes:      Extraocular Movements: Extraocular movements intact.      Pupils: Pupils are equal, round, and reactive to light.   Cardiovascular:      Rate and Rhythm: Normal rate and regular rhythm.      Pulses: Normal pulses.      Heart sounds: Normal heart sounds.   Pulmonary:      Effort: Pulmonary effort is normal.      Breath sounds: Normal breath sounds.   Abdominal:      General: Bowel sounds are normal. There is no distension.      Palpations: Abdomen is soft. There is no mass.      Tenderness: There is no abdominal tenderness.   Musculoskeletal:         General: No swelling or deformity. Normal range of motion.      Cervical back: Normal range of motion and neck supple.   Skin:     Coloration: Skin is not jaundiced.   Neurological:      General: No focal deficit present.      Mental Status: She is alert and oriented to person, place, and time.      Cranial Nerves: No cranial nerve deficit.   Psychiatric:         Mood and Affect: Mood normal.         Behavior: Behavior normal.         Laboratory Data:  No visits with results within 1 Week(s) from this visit.   Latest known visit with results is:   Lab Visit on 06/04/2025   Component Date Value Ref Range Status    Hemoglobin A1c 06/04/2025 5.6  4.0 - 5.6 % Final    ADA Screening Guidelines:  5.7-6.4%  Consistent with prediabetes  >=6.5%  Consistent with diabetes    High levels of fetal hemoglobin interfere with the HbA1C  assay. Heterozygous hemoglobin variants (HbS, HgC, etc)do  not significantly interfere with this assay.   However, presence of multiple variants may affect accuracy.    Estimated Average Glucose 06/04/2025 114  68 - 131 mg/dL Final       Lab Results   Component Value Date    INR 0.9 02/20/2025    INR 1.0 06/14/2018       No results found for: "SMOOTHMUSCAB", "MITOAB"  Lab Results   Component Value Date    IRON 44 02/20/2025    TIBC 292 02/20/2025    FERRITIN 122 " "02/20/2025     Lab Results   Component Value Date    HEPCAB Non-reactive 02/20/2025     Lab Results   Component Value Date    TSH 0.554 08/16/2022     Lab Results   Component Value Date    JUAN Negative 03/17/2005       No results found for: "ABORH"        Lab Results   Component Value Date    HGBA1C 5.6 06/04/2025     Lab Results   Component Value Date    CHOL 124 10/14/2024    CHOL 133 08/16/2022    CHOL 127 08/16/2021     Lab Results   Component Value Date    HDL 37 (L) 10/14/2024    HDL 41 08/16/2022    HDL 46 08/16/2021     Lab Results   Component Value Date    LDLCALC 66.6 10/14/2024    LDLCALC 55.8 (L) 08/16/2022    LDLCALC 56.8 (L) 08/16/2021     Lab Results   Component Value Date    TRIG 102 10/14/2024    TRIG 181 (H) 08/16/2022    TRIG 121 08/16/2021     Lab Results   Component Value Date    CHOLHDL 29.8 10/14/2024    CHOLHDL 30.8 08/16/2022    CHOLHDL 36.2 08/16/2021         I personally reviewed imaging studies and outside records..      Assessment:       1. Liver fibrosis    2. Coronary artery disease involving native coronary artery of native heart without angina pectoris    3. Metabolic dysfunction-associated steatotic liver disease (MASLD)    4. Gastroesophageal reflux disease without esophagitis    5. Type II diabetes mellitus with neurological manifestations            The patient's risk factors for MAFLD include:    Obesity/overweight                     yes There is no height or weight on file to calculate BMI.  Dyslipidemia                                yes  Insulin resistance/Diabetes         yes  Lab Results   Component Value Date    HGBA1C 5.7 (H) 10/14/2024                Plan:     Problem List Items Addressed This Visit          Cardiac/Vascular    Coronary artery disease involving native coronary artery of native heart without angina pectoris (Chronic)       Endocrine    Type II diabetes mellitus with neurological manifestations (Chronic)       GI    Gastroesophageal reflux disease without " esophagitis (Chronic)    Liver fibrosis - Primary (Chronic)    Metabolic dysfunction-associated steatotic liver disease (MASLD) (Chronic)         Venus was seen today for hepatic disease.    Diagnoses and all orders for this visit:    Liver fibrosis    Coronary artery disease involving native coronary artery of native heart without angina pectoris    Metabolic dysfunction-associated steatotic liver disease (MASLD)    Gastroesophageal reflux disease without esophagitis    Type II diabetes mellitus with neurological manifestations          Liver fibrosis  MASLD related considering the Metabolic risk factors  Well compensated clinically  Complete the etiological work up   Monitor the MELD score  Fibro scan planned  HCC surveillance--Imaging and AFP every 6 month if cirrhosis  Dexa scan locally  Check Hepatitis A/B immune status and Immunization with PCP  if not immune    Coronary Artery Disease  -non obs with abd and aortic atherosclerosis   - Ok to continue statin, BB      NICKI  Risk factor for MASLD  Chronic; stable on current treatment plan; Encourage to discuss with PCP  Education given    DM  Risk factor for MASLD  Chronic; stable on current treatment plan; Encourage to discuss with PCP  Education given    GERD  Controlled on PPI        MAFLD  Fatty liver disease is a diagnosis of exclusion, will obtain additional labs to exclude autoimmune/infectitious etiology  Educated patient on spectrum of fatty liver disease and potential for cirrhosis if BAEZA present  --Plan checking serologies to rule out other causes of chronic liver diseases   -Will obtain fibroscan for evaluation of fibrosis  -Discussed new Medication for MASLD    I recommended her a more pragmatic approach to her medical problems which would include the following:  - life-style modifications: weight loss, daily exercise (30 mins of HIIT or cardio), low carb/low fat diet         Educated patient on spectrum of fatty liver disease and potential for  cirrhosis if MASH present        Explored dietary habits and discussed dietary recommendations- Low calorie, low carbohydrate, high protein diet mediterranean with goal of loosing >3-5% to improve steatosis and >7-10% to improve histological changes if present  - control of diabetes or insulin resistance   - control of high cholesterol, if needed with statin drugs or other cholesterol-lowering agents  - coffee consumption (low caloric): 2-3 cups per day may reduce liver fat  -I will defer the management of the patient's dyslipidemia and diabetes to patient's primary care physician and/or endocrinologist   -Reduction of risk factors for CVD        Return to clinic in 6 months.    I have sent communication to the referring physician and/or primary care provider.        I performed this consultation using real-time Telehealth tools, including a live video connection between my location and the patient's location. Prior to initiating the consultation, I obtained informed verbal consent to perform this consultation using Telehealth tools and answered all the questions about the Telehealth interaction.    The patient location is: ([place]) Home  The chief complaint leading to consultation is:Cirrhosis     Visit type: audiovisual     Face to Face time with patient:20     40 minutes of total time spent on the encounter, which includes face to face time and non-face to face time preparing to see the patient (eg, review of tests), Obtaining and/or reviewing separately obtained history, Documenting clinical information in the electronic or other health record, Independently interpreting results (not separately reported) and communicating results to the patient/family/caregiver, or Care coordination (not separately reported).     Each patient to whom he or she provides medical services by telemedicine is:  (1) informed of the relationship between the physician and patient and the respective role of any other health care provider  with respect to management of the patient; and (2) notified that he or she may decline to receive medical services by telemedicine and may withdraw from such care at any time.    We discussed in depth the nature of the patient's disease, the management plan in details. I have provided the patient with an opportunity to ask questions and have all questions answered to his satisfaction.     Discussed with patient that it is likely that she will see results before Myself or my nurse are able to view them and report results due to the Cures Act passed 4/1/21. Results will be sent immediately to the patient who are enrolled in the patient portal. If results come through after business hours or on weekend, we will not see them until the next business day that we are in the office. If resulted during the business day, we will likely not be able to review them until after completing all patient visits in office that day.       Nick Hughes MD  Transplant Hepatologist and Gastroenterologist  [facility] [place]

## 2025-07-29 ENCOUNTER — OFFICE VISIT (OUTPATIENT)
Dept: HEPATOLOGY | Facility: CLINIC | Age: 75
End: 2025-07-29
Payer: MEDICARE

## 2025-07-29 ENCOUNTER — HOSPITAL ENCOUNTER (OUTPATIENT)
Dept: RADIOLOGY | Facility: HOSPITAL | Age: 75
Discharge: HOME | End: 2025-07-29
Attending: INTERNAL MEDICINE
Payer: MEDICARE

## 2025-07-29 VITALS
BODY MASS INDEX: 26.6 KG/M2 | SYSTOLIC BLOOD PRESSURE: 126 MMHG | WEIGHT: 150.13 LBS | DIASTOLIC BLOOD PRESSURE: 72 MMHG | HEIGHT: 63 IN

## 2025-07-29 DIAGNOSIS — I25.10 CORONARY ARTERY DISEASE INVOLVING NATIVE CORONARY ARTERY OF NATIVE HEART WITHOUT ANGINA PECTORIS: ICD-10-CM

## 2025-07-29 DIAGNOSIS — K21.9 GASTROESOPHAGEAL REFLUX DISEASE WITHOUT ESOPHAGITIS: ICD-10-CM

## 2025-07-29 DIAGNOSIS — K76.0 METABOLIC DYSFUNCTION-ASSOCIATED STEATOTIC LIVER DISEASE (MASLD): ICD-10-CM

## 2025-07-29 DIAGNOSIS — K74.00 LIVER FIBROSIS: Primary | ICD-10-CM

## 2025-07-29 DIAGNOSIS — K74.00 LIVER FIBROSIS: ICD-10-CM

## 2025-07-29 DIAGNOSIS — E11.49 TYPE II DIABETES MELLITUS WITH NEUROLOGICAL MANIFESTATIONS: ICD-10-CM

## 2025-07-29 PROCEDURE — 99213 OFFICE O/P EST LOW 20 MIN: CPT | Mod: PBBFAC,25 | Performed by: INTERNAL MEDICINE

## 2025-07-29 PROCEDURE — 99214 OFFICE O/P EST MOD 30 MIN: CPT | Mod: S$PBB,,, | Performed by: INTERNAL MEDICINE

## 2025-07-29 PROCEDURE — 99999 PR PBB SHADOW E&M-EST. PATIENT-LVL III: CPT | Mod: PBBFAC,,, | Performed by: INTERNAL MEDICINE

## 2025-07-29 PROCEDURE — 76705 ECHO EXAM OF ABDOMEN: CPT | Mod: TC

## 2025-07-29 RX ORDER — HYDROCORTISONE 25 MG/G
1 CREAM TOPICAL 2 TIMES DAILY PRN
COMMUNITY
Start: 2025-07-15

## 2025-07-29 RX ORDER — MUPIROCIN 20 MG/G
1 OINTMENT TOPICAL 2 TIMES DAILY PRN
COMMUNITY
Start: 2025-07-15

## 2025-07-29 RX ORDER — FLUTICASONE PROPIONATE 0.5 MG/G
1 CREAM TOPICAL DAILY
COMMUNITY
Start: 2025-07-15

## 2025-08-27 ENCOUNTER — OFFICE VISIT (OUTPATIENT)
Dept: GASTROENTEROLOGY | Facility: CLINIC | Age: 75
End: 2025-08-27
Payer: MEDICARE

## 2025-08-27 VITALS
SYSTOLIC BLOOD PRESSURE: 126 MMHG | HEART RATE: 67 BPM | BODY MASS INDEX: 26.95 KG/M2 | DIASTOLIC BLOOD PRESSURE: 79 MMHG | HEIGHT: 63 IN | WEIGHT: 152.13 LBS

## 2025-08-27 DIAGNOSIS — R11.0 NAUSEA: ICD-10-CM

## 2025-08-27 DIAGNOSIS — D73.89 LESION OF SPLEEN: ICD-10-CM

## 2025-08-27 DIAGNOSIS — R10.13 EPIGASTRIC PAIN: Primary | ICD-10-CM

## 2025-08-27 DIAGNOSIS — K59.00 CONSTIPATION, UNSPECIFIED CONSTIPATION TYPE: ICD-10-CM

## 2025-08-27 PROCEDURE — 99214 OFFICE O/P EST MOD 30 MIN: CPT | Mod: PBBFAC | Performed by: NURSE PRACTITIONER

## 2025-08-27 PROCEDURE — 99214 OFFICE O/P EST MOD 30 MIN: CPT | Mod: S$PBB,,, | Performed by: NURSE PRACTITIONER

## 2025-08-27 PROCEDURE — 99999 PR PBB SHADOW E&M-EST. PATIENT-LVL IV: CPT | Mod: PBBFAC,,, | Performed by: NURSE PRACTITIONER

## 2025-08-27 RX ORDER — DICYCLOMINE HYDROCHLORIDE 10 MG/1
10 CAPSULE ORAL 3 TIMES DAILY PRN
Qty: 90 CAPSULE | Refills: 2 | Status: SHIPPED | OUTPATIENT
Start: 2025-08-27

## 2025-08-27 RX ORDER — DIAZEPAM 2 MG/1
TABLET ORAL
Qty: 2 TABLET | Refills: 0 | Status: SHIPPED | OUTPATIENT
Start: 2025-08-27

## (undated) DEVICE — SEE MEDLINE ITEM 152739

## (undated) DEVICE — SYR ONLY LUER LOCK 20CC

## (undated) DEVICE — SEE MEDLINE ITEM 157117

## (undated) DEVICE — SYR 10CC LUER LOCK

## (undated) DEVICE — MANIFOLD 4 PORT

## (undated) DEVICE — SEE MEDLINE ITEM 157027

## (undated) DEVICE — GLOVE PROTEXIS HYDROGEL SZ7.5

## (undated) DEVICE — SEE L#152161

## (undated) DEVICE — SOL NS 1000CC

## (undated) DEVICE — PANTIES FEMININE NAPKIN LG/XLG

## (undated) DEVICE — INSERT CUSHIONPRONE VIEW LARGE

## (undated) DEVICE — APPLICATOR CHLORAPREP ORN 26ML

## (undated) DEVICE — SUT VICRYL 3-0 27 SH

## (undated) DEVICE — PAD ABD 8X10 STERILE

## (undated) DEVICE — NDL SAFETY 22G X 1.5 ECLIPSE

## (undated) DEVICE — GAUZE SPONGE 4X4 12PLY

## (undated) DEVICE — SEE MEDLINE ITEM 152622

## (undated) DEVICE — TAPE SILK 3IN

## (undated) DEVICE — SUT CHROMIC 3-0 SH 27IN GUT

## (undated) DEVICE — COVER OVERHEAD SURG LT BLUE

## (undated) DEVICE — SEE MEDLINE ITEM 146420

## (undated) DEVICE — ELECTRODE REM PLYHSV RETURN 9

## (undated) DEVICE — SEE MEDLINE ITEM 157148

## (undated) DEVICE — CONTAINER SPECIMEN STRL 4OZ